# Patient Record
Sex: MALE | Race: WHITE | NOT HISPANIC OR LATINO | Employment: OTHER | URBAN - METROPOLITAN AREA
[De-identification: names, ages, dates, MRNs, and addresses within clinical notes are randomized per-mention and may not be internally consistent; named-entity substitution may affect disease eponyms.]

---

## 2017-01-17 ENCOUNTER — ALLSCRIPTS OFFICE VISIT (OUTPATIENT)
Dept: OTHER | Facility: OTHER | Age: 82
End: 2017-01-17

## 2017-02-13 ENCOUNTER — APPOINTMENT (OUTPATIENT)
Dept: LAB | Facility: MEDICAL CENTER | Age: 82
End: 2017-02-13
Payer: MEDICARE

## 2017-02-13 ENCOUNTER — TRANSCRIBE ORDERS (OUTPATIENT)
Dept: ADMINISTRATIVE | Facility: HOSPITAL | Age: 82
End: 2017-02-13

## 2017-02-13 DIAGNOSIS — E03.9 HYPOTHYROIDISM: ICD-10-CM

## 2017-02-13 LAB — TSH SERPL DL<=0.05 MIU/L-ACNC: 3.86 UIU/ML (ref 0.36–3.74)

## 2017-02-13 PROCEDURE — 84443 ASSAY THYROID STIM HORMONE: CPT

## 2017-02-13 PROCEDURE — 36415 COLL VENOUS BLD VENIPUNCTURE: CPT

## 2017-02-20 ENCOUNTER — ALLSCRIPTS OFFICE VISIT (OUTPATIENT)
Dept: OTHER | Facility: OTHER | Age: 82
End: 2017-02-20

## 2017-02-20 DIAGNOSIS — E11.9 TYPE 2 DIABETES MELLITUS WITHOUT COMPLICATIONS (HCC): ICD-10-CM

## 2017-02-20 DIAGNOSIS — E03.9 HYPOTHYROIDISM: ICD-10-CM

## 2017-02-20 DIAGNOSIS — I10 ESSENTIAL (PRIMARY) HYPERTENSION: ICD-10-CM

## 2017-02-20 DIAGNOSIS — E78.5 HYPERLIPIDEMIA: ICD-10-CM

## 2017-02-20 DIAGNOSIS — M72.2 PLANTAR FASCIAL FIBROMATOSIS: ICD-10-CM

## 2017-02-20 LAB
GLUCOSE SERPL-MCNC: 131 MG/DL
HBA1C MFR BLD HPLC: 5.6 %

## 2017-03-06 ENCOUNTER — GENERIC CONVERSION - ENCOUNTER (OUTPATIENT)
Dept: OTHER | Facility: OTHER | Age: 82
End: 2017-03-06

## 2017-03-16 ENCOUNTER — ALLSCRIPTS OFFICE VISIT (OUTPATIENT)
Dept: OTHER | Facility: OTHER | Age: 82
End: 2017-03-16

## 2017-04-12 ENCOUNTER — GENERIC CONVERSION - ENCOUNTER (OUTPATIENT)
Dept: OTHER | Facility: OTHER | Age: 82
End: 2017-04-12

## 2017-05-12 ENCOUNTER — TRANSCRIBE ORDERS (OUTPATIENT)
Dept: ADMINISTRATIVE | Facility: HOSPITAL | Age: 82
End: 2017-05-12

## 2017-05-12 ENCOUNTER — APPOINTMENT (OUTPATIENT)
Dept: LAB | Facility: MEDICAL CENTER | Age: 82
End: 2017-05-12
Payer: MEDICARE

## 2017-05-12 DIAGNOSIS — E11.9 TYPE 2 DIABETES MELLITUS WITHOUT COMPLICATIONS (HCC): ICD-10-CM

## 2017-05-12 DIAGNOSIS — E03.9 HYPOTHYROIDISM: ICD-10-CM

## 2017-05-12 DIAGNOSIS — I10 ESSENTIAL (PRIMARY) HYPERTENSION: ICD-10-CM

## 2017-05-12 DIAGNOSIS — E78.5 HYPERLIPIDEMIA: ICD-10-CM

## 2017-05-12 LAB
ALBUMIN SERPL BCP-MCNC: 4.1 G/DL (ref 3.5–5)
ALP SERPL-CCNC: 75 U/L (ref 46–116)
ALT SERPL W P-5'-P-CCNC: 27 U/L (ref 12–78)
ANION GAP SERPL CALCULATED.3IONS-SCNC: 6 MMOL/L (ref 4–13)
AST SERPL W P-5'-P-CCNC: 17 U/L (ref 5–45)
BASOPHILS # BLD AUTO: 0.05 THOUSANDS/ΜL (ref 0–0.1)
BASOPHILS NFR BLD AUTO: 1 % (ref 0–1)
BILIRUB SERPL-MCNC: 0.54 MG/DL (ref 0.2–1)
BUN SERPL-MCNC: 34 MG/DL (ref 5–25)
CALCIUM SERPL-MCNC: 8.5 MG/DL (ref 8.3–10.1)
CHLORIDE SERPL-SCNC: 104 MMOL/L (ref 100–108)
CHOLEST SERPL-MCNC: 110 MG/DL (ref 50–200)
CO2 SERPL-SCNC: 28 MMOL/L (ref 21–32)
CREAT SERPL-MCNC: 1.38 MG/DL (ref 0.6–1.3)
EOSINOPHIL # BLD AUTO: 0.2 THOUSAND/ΜL (ref 0–0.61)
EOSINOPHIL NFR BLD AUTO: 3 % (ref 0–6)
ERYTHROCYTE [DISTWIDTH] IN BLOOD BY AUTOMATED COUNT: 15.4 % (ref 11.6–15.1)
EST. AVERAGE GLUCOSE BLD GHB EST-MCNC: 114 MG/DL
GFR SERPL CREATININE-BSD FRML MDRD: 49.5 ML/MIN/1.73SQ M
GLUCOSE P FAST SERPL-MCNC: 104 MG/DL (ref 65–99)
HBA1C MFR BLD: 5.6 % (ref 4.2–6.3)
HCT VFR BLD AUTO: 32.1 % (ref 36.5–49.3)
HDLC SERPL-MCNC: 35 MG/DL (ref 40–60)
HGB BLD-MCNC: 9.9 G/DL (ref 12–17)
LDLC SERPL CALC-MCNC: 37 MG/DL (ref 0–100)
LYMPHOCYTES # BLD AUTO: 2.02 THOUSANDS/ΜL (ref 0.6–4.47)
LYMPHOCYTES NFR BLD AUTO: 28 % (ref 14–44)
MCH RBC QN AUTO: 19.8 PG (ref 26.8–34.3)
MCHC RBC AUTO-ENTMCNC: 30.8 G/DL (ref 31.4–37.4)
MCV RBC AUTO: 64 FL (ref 82–98)
MONOCYTES # BLD AUTO: 0.55 THOUSAND/ΜL (ref 0.17–1.22)
MONOCYTES NFR BLD AUTO: 8 % (ref 4–12)
NEUTROPHILS # BLD AUTO: 4.28 THOUSANDS/ΜL (ref 1.85–7.62)
NEUTS SEG NFR BLD AUTO: 60 % (ref 43–75)
NRBC BLD AUTO-RTO: 0 /100 WBCS
PLATELET # BLD AUTO: 195 THOUSANDS/UL (ref 149–390)
POTASSIUM SERPL-SCNC: 4.2 MMOL/L (ref 3.5–5.3)
PROT SERPL-MCNC: 7.3 G/DL (ref 6.4–8.2)
RBC # BLD AUTO: 4.99 MILLION/UL (ref 3.88–5.62)
SODIUM SERPL-SCNC: 138 MMOL/L (ref 136–145)
T4 FREE SERPL-MCNC: 1.28 NG/DL (ref 0.76–1.46)
TRIGL SERPL-MCNC: 189 MG/DL
TSH SERPL DL<=0.05 MIU/L-ACNC: 4.11 UIU/ML (ref 0.36–3.74)
WBC # BLD AUTO: 7.12 THOUSAND/UL (ref 4.31–10.16)

## 2017-05-12 PROCEDURE — 80053 COMPREHEN METABOLIC PANEL: CPT

## 2017-05-12 PROCEDURE — 85025 COMPLETE CBC W/AUTO DIFF WBC: CPT

## 2017-05-12 PROCEDURE — 80061 LIPID PANEL: CPT

## 2017-05-12 PROCEDURE — 83036 HEMOGLOBIN GLYCOSYLATED A1C: CPT

## 2017-05-12 PROCEDURE — 84443 ASSAY THYROID STIM HORMONE: CPT

## 2017-05-12 PROCEDURE — 36415 COLL VENOUS BLD VENIPUNCTURE: CPT

## 2017-05-12 PROCEDURE — 84439 ASSAY OF FREE THYROXINE: CPT

## 2017-05-15 ENCOUNTER — TRANSCRIBE ORDERS (OUTPATIENT)
Dept: ADMINISTRATIVE | Facility: HOSPITAL | Age: 82
End: 2017-05-15

## 2017-05-15 ENCOUNTER — GENERIC CONVERSION - ENCOUNTER (OUTPATIENT)
Dept: OTHER | Facility: OTHER | Age: 82
End: 2017-05-15

## 2017-05-15 ENCOUNTER — APPOINTMENT (OUTPATIENT)
Dept: LAB | Facility: MEDICAL CENTER | Age: 82
End: 2017-05-15
Payer: MEDICARE

## 2017-05-15 DIAGNOSIS — K51.50 LEFT SIDED ULCERATIVE (CHRONIC) COLITIS (HCC): ICD-10-CM

## 2017-05-15 DIAGNOSIS — K51.50 LEFT SIDED ULCERATIVE (CHRONIC) COLITIS (HCC): Primary | ICD-10-CM

## 2017-05-15 LAB — CRP SERPL QL: <3 MG/L

## 2017-05-15 PROCEDURE — 36415 COLL VENOUS BLD VENIPUNCTURE: CPT

## 2017-05-15 PROCEDURE — 86140 C-REACTIVE PROTEIN: CPT

## 2017-05-19 ENCOUNTER — ALLSCRIPTS OFFICE VISIT (OUTPATIENT)
Dept: OTHER | Facility: OTHER | Age: 82
End: 2017-05-19

## 2017-05-19 DIAGNOSIS — I25.10 ATHEROSCLEROTIC HEART DISEASE OF NATIVE CORONARY ARTERY WITHOUT ANGINA PECTORIS: ICD-10-CM

## 2017-05-19 DIAGNOSIS — I73.9 PERIPHERAL VASCULAR DISEASE (HCC): ICD-10-CM

## 2017-05-19 DIAGNOSIS — E78.5 HYPERLIPIDEMIA: ICD-10-CM

## 2017-05-19 DIAGNOSIS — E03.9 HYPOTHYROIDISM: ICD-10-CM

## 2017-05-19 DIAGNOSIS — I10 ESSENTIAL (PRIMARY) HYPERTENSION: ICD-10-CM

## 2017-05-19 DIAGNOSIS — K51.90 ULCERATIVE COLITIS WITHOUT COMPLICATIONS (HCC): ICD-10-CM

## 2017-05-19 DIAGNOSIS — E11.9 TYPE 2 DIABETES MELLITUS WITHOUT COMPLICATIONS (HCC): ICD-10-CM

## 2017-06-07 ENCOUNTER — GENERIC CONVERSION - ENCOUNTER (OUTPATIENT)
Dept: OTHER | Facility: OTHER | Age: 82
End: 2017-06-07

## 2017-06-13 ENCOUNTER — GENERIC CONVERSION - ENCOUNTER (OUTPATIENT)
Dept: OTHER | Facility: OTHER | Age: 82
End: 2017-06-13

## 2017-08-02 ENCOUNTER — TRANSCRIBE ORDERS (OUTPATIENT)
Dept: ADMINISTRATIVE | Facility: HOSPITAL | Age: 82
End: 2017-08-02

## 2017-08-02 ENCOUNTER — APPOINTMENT (OUTPATIENT)
Dept: LAB | Facility: MEDICAL CENTER | Age: 82
End: 2017-08-02
Payer: MEDICARE

## 2017-08-02 DIAGNOSIS — K51.90 ULCERATIVE COLITIS WITHOUT COMPLICATIONS (HCC): ICD-10-CM

## 2017-08-02 DIAGNOSIS — I25.10 ATHEROSCLEROTIC HEART DISEASE OF NATIVE CORONARY ARTERY WITHOUT ANGINA PECTORIS: ICD-10-CM

## 2017-08-02 DIAGNOSIS — E03.9 HYPOTHYROIDISM: ICD-10-CM

## 2017-08-02 DIAGNOSIS — I73.9 PERIPHERAL VASCULAR DISEASE (HCC): ICD-10-CM

## 2017-08-02 DIAGNOSIS — I10 ESSENTIAL (PRIMARY) HYPERTENSION: ICD-10-CM

## 2017-08-02 DIAGNOSIS — E78.5 HYPERLIPIDEMIA: ICD-10-CM

## 2017-08-02 DIAGNOSIS — E11.9 TYPE 2 DIABETES MELLITUS WITHOUT COMPLICATIONS (HCC): ICD-10-CM

## 2017-08-02 LAB
BASOPHILS # BLD AUTO: 0.04 THOUSANDS/ΜL (ref 0–0.1)
BASOPHILS NFR BLD AUTO: 1 % (ref 0–1)
EOSINOPHIL # BLD AUTO: 0.17 THOUSAND/ΜL (ref 0–0.61)
EOSINOPHIL NFR BLD AUTO: 2 % (ref 0–6)
ERYTHROCYTE [DISTWIDTH] IN BLOOD BY AUTOMATED COUNT: 15.9 % (ref 11.6–15.1)
FERRITIN SERPL-MCNC: 108 NG/ML (ref 8–388)
HCT VFR BLD AUTO: 29.7 % (ref 36.5–49.3)
HGB BLD-MCNC: 9.1 G/DL (ref 12–17)
IRON SATN MFR SERPL: 29 %
IRON SERPL-MCNC: 72 UG/DL (ref 65–175)
LYMPHOCYTES # BLD AUTO: 2.09 THOUSANDS/ΜL (ref 0.6–4.47)
LYMPHOCYTES NFR BLD AUTO: 25 % (ref 14–44)
MCH RBC QN AUTO: 19.6 PG (ref 26.8–34.3)
MCHC RBC AUTO-ENTMCNC: 30.6 G/DL (ref 31.4–37.4)
MCV RBC AUTO: 64 FL (ref 82–98)
MONOCYTES # BLD AUTO: 0.74 THOUSAND/ΜL (ref 0.17–1.22)
MONOCYTES NFR BLD AUTO: 9 % (ref 4–12)
NEUTROPHILS # BLD AUTO: 5.38 THOUSANDS/ΜL (ref 1.85–7.62)
NEUTS SEG NFR BLD AUTO: 63 % (ref 43–75)
NRBC BLD AUTO-RTO: 0 /100 WBCS
PLATELET # BLD AUTO: 201 THOUSANDS/UL (ref 149–390)
RBC # BLD AUTO: 4.64 MILLION/UL (ref 3.88–5.62)
TIBC SERPL-MCNC: 252 UG/DL (ref 250–450)
TSH SERPL DL<=0.05 MIU/L-ACNC: 3.24 UIU/ML (ref 0.36–3.74)
WBC # BLD AUTO: 8.47 THOUSAND/UL (ref 4.31–10.16)

## 2017-08-02 PROCEDURE — 83540 ASSAY OF IRON: CPT

## 2017-08-02 PROCEDURE — 84443 ASSAY THYROID STIM HORMONE: CPT

## 2017-08-02 PROCEDURE — 36415 COLL VENOUS BLD VENIPUNCTURE: CPT

## 2017-08-02 PROCEDURE — 83550 IRON BINDING TEST: CPT

## 2017-08-02 PROCEDURE — 85025 COMPLETE CBC W/AUTO DIFF WBC: CPT

## 2017-08-02 PROCEDURE — 82728 ASSAY OF FERRITIN: CPT

## 2017-08-08 ENCOUNTER — GENERIC CONVERSION - ENCOUNTER (OUTPATIENT)
Dept: OTHER | Facility: OTHER | Age: 82
End: 2017-08-08

## 2017-08-09 ENCOUNTER — ALLSCRIPTS OFFICE VISIT (OUTPATIENT)
Dept: OTHER | Facility: OTHER | Age: 82
End: 2017-08-09

## 2017-09-05 ENCOUNTER — GENERIC CONVERSION - ENCOUNTER (OUTPATIENT)
Dept: OTHER | Facility: OTHER | Age: 82
End: 2017-09-05

## 2017-10-23 ENCOUNTER — ALLSCRIPTS OFFICE VISIT (OUTPATIENT)
Dept: OTHER | Facility: OTHER | Age: 82
End: 2017-10-23

## 2017-10-24 NOTE — PROGRESS NOTES
Assessment  1  Chest pain (786 50) (R07 9)   2  DMII (diabetes mellitus, type 2) (250 00) (E11 9)   3  Atherosclerotic heart disease of native coronary artery without angina pectoris (414 01)   (I25 10)   4  Hypertension (401 9) (I10)    Plan  Atherosclerotic heart disease of native coronary artery without angina pectoris, Chest  pain, DMII (diabetes mellitus, type 2), Hypertension    · EKG/ECG- POC; Status:Active - Perform Order; Requested [id number];   Chest pain    · Naproxen 500 MG Oral Tablet; Take 1 tablet twice daily as needed   · Follow Up if Not Better Evaluation and Treatment  Follow-up  Status: Complete  Done:  57LAE5281 02:32PM    Discussion/Summary    EKG done showing normal sinus rhythm with no acute changes  Patient will try naproxen twice daily with food for musculoskeletal chest pain  Chief Complaint  Patient presents today stating he has had tightness in the left side of his chest that goes right through to his back since Saturday morning  Patient states he was at a wedding with loud music on Saturday  He feels tense and does not know if his chest discomfort is due to anxiety  He has questions and concerns if he is in good enough health to be driving to [facility] [employer] tomorrow  No other concerns  History of Present Illness  HPI: Patient is here with left-sided chest pain persistently there since Saturday IE 2 days ago  Patient is chest pain with movement and taking a deep breath  No fevers or significant sputum production  This started after being at a wedding with loud music  No vomiting or diaphoresis or radiation to the arms  No radiation to the neck  No medications use  Review of Systems    Constitutional: no fever or chills, feels well, no tiredness, no recent weight loss or weight gain  ENT: no complaints of earache, no loss of hearing, no nosebleeds or nasal discharge, no sore throat or hoarseness  Cardiovascular: as noted in HPI     Respiratory: no complaints of shortness of breath, no wheezing or cough, no dyspnea on exertion, no orthopnea or PND  Gastrointestinal: no complaints of abdominal pain, no constipation, no nausea or vomiting, no diarrhea or bloody stools  Genitourinary: no complaints of dysuria or incontinence, no hesitancy, no nocturia, no genital lesion, no inadequacy of penile erection  Musculoskeletal: no complaints of arthralgia, no myalgia, no joint swelling or stiffness, no limb pain or swelling  Integumentary: no complaints of skin rash or lesion, no itching or dry skin, no skin wounds  Neurological: no complaints of headache, no confusion, no numbness or tingling, no dizziness or fainting  Active Problems  1  Acute gastritis (535 00) (K29 00)   2  Anemia (285 9) (D64 9)   3  Anxiety (300 00) (F41 9)   4  Atherosclerotic heart disease of native coronary artery without angina pectoris (414 01)   (I25 10)   5  Dehydration (276 51) (E86 0)   6  Depression screening (V79 0) (Z13 89)   7  Dermatitis (692 9) (L30 9)   8  DMII (diabetes mellitus, type 2) (250 00) (E11 9)   9  Dysphagia (787 20) (R13 10)   10  Dysuria (788 1) (R30 0)   11  Elevated prostate specific antigen (PSA) (790 93) (R97 20)   12  Hyperlipidemia (272 4) (E78 5)   13  Hypertension (401 9) (I10)   14  Hypothyroidism (244 9) (E03 9)   15  Need for immunization against influenza (V04 81) (Z23)   16  Oral thrush (112 0) (B37 0)   17  Other thalassemias (282 49) (D56 8)   18  PAD (peripheral artery disease) (443 9) (I73 9)   19  Plantar fasciitis, left (728 71) (M72 2)   20  Screening for genitourinary condition (V81 6) (Z13 89)   21  Screening for neurological condition (V80 09) (Z13 89)   22  Tick bite (919 4,E906 4) (W57 XXXA)   23  Ulcerative colitis without complications (868 0) (L40 30)   24  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)   25  Xerostomia (527 7) (R68 2)    Past Medical History  1  Acute maxillary sinusitis (461 0) (J01 00)   2  Cough (786 2) (R05)   3   History of acute pharyngitis (V12 69) (Z87 09)  Active Problems And Past Medical History Reviewed: The active problems and past medical history were reviewed and updated today  Family History  Mother    1  No pertinent family history    Social History   · Being A Social Drinker   · Former smoker (V15 82) (B54 401)   · No drug use   · Tobacco non-user (V49 89) (Z78 9)    Surgical History  1  History of Complete Colonoscopy   2  History of Needle Biopsy Of Prostate    Current Meds   1  Allegra Allergy 180 MG Oral Tablet; Therapy: (Recorded:75Zpr6373) to Recorded   2  AmLODIPine Besylate 5 MG Oral Tablet; Therapy: 41SKX5197 to (Last Rx:68Dsc6648)  Requested for: 18MVQ8153 Ordered   3  Atorvastatin Calcium 20 MG Oral Tablet; TAKE 1 TABLET DAILY AS DIRECTED; Therapy: 14KZN9465 to 73 773 640); Last Rx:23Oct2012 Ordered   4  Centrum Silver Oral Tablet; Therapy: 79UZG4465 to Recorded   5  Fish Oil 1000 MG Oral Capsule; Therapy: 29AEJ8434 to Recorded   6  Folic Acid 1 MG Oral Tablet; TAKE 1 TABLET DAILY; Therapy: 13DPA9833 to (Evaluate:11Dqx6850); Last Rx:35Wot3155 Ordered   7  Levothyroxine Sodium 25 MCG Oral Tablet; TAKE 1 TABLET DAILY; Therapy: 70BUJ6885 to (Evaluate:80Hjr0859)  Requested for: 57IXH9014; Last   Rx:32Ygm8260 Ordered   8  Metoprolol Tartrate 50 MG Oral Tablet; TAKE 1 TABLET TWICE DAILY; Therapy: 54MSX4016 to (Evaluate:33Sxl5761) Recorded   9  Montelukast Sodium 10 MG Oral Tablet; Therapy: 44GQG8856 to Recorded   10  Potassium 99 MG Oral Tablet; Therapy: 01RTE2001 to Recorded   11  Saw Palmetto 160 MG Oral Tablet; Therapy: (Recorded:03Ahq8123) to Recorded   12  SulfaSALAzine 500 MG Oral Tablet; TAKE 1 TABLET 4 TIMES DAILY; Therapy: 41JHK4587 to (Evaluate:87Vci5699); Last Rx:23Oct2012 Ordered   13  Valsartan-Hydrochlorothiazide 160-25 MG Oral Tablet; take 1 tablet by mouth once    daily;     Therapy: 78QEZ4014 to (YZJBPOFS:34MSC5449)  Requested for: 56ZFM7613; Last    Yumi Talha Ordered   14  Vitamin D3 1000 UNIT Oral Tablet; Therapy: (Recorded:68Zrt7894) to Recorded   15  Zinc 50 MG Oral Tablet; Therapy: 95WIL3916 to Recorded    The medication list was reviewed and updated today  Allergies  1  Atacand TABS   2  Prinivil TABS    Vitals   Recorded: 23NUE1269 01:58PM   Temperature 98 4 F, Tympanic   Systolic 568, LUE, Sitting   Diastolic 68, LUE, Sitting   Height 5 ft 9 in   Weight 175 lb    BMI Calculated 25 84   BSA Calculated 1 95     Physical Exam    Constitutional   General appearance: No acute distress, well appearing and well nourished  Eyes   Conjunctiva and lids: No swelling, erythema, or discharge  Pupils and irises: Equal, round and reactive to light  Ears, Nose, Mouth, and Throat   External inspection of ears and nose: Normal     Otoscopic examination: Tympanic membrance translucent with normal light reflex  Canals patent without erythema  Nasal mucosa, septum, and turbinates: Normal without edema or erythema  Oropharynx: Normal with no erythema, edema, exudate or lesions  Pulmonary   Respiratory effort: No increased work of breathing or signs of respiratory distress  Auscultation of lungs: Clear to auscultation, equal breath sounds bilaterally, no wheezes, no rales, no rhonci  Cardiovascular   Palpation of heart: Normal PMI, no thrills  Auscultation of heart: Normal rate and rhythm, normal S1 and S2, without murmurs  Examination of extremities for edema and/or varicosities: Normal     Abdomen   Abdomen: Non-tender, no masses  Liver and spleen: No hepatomegaly or splenomegaly  Lymphatic   Palpation of lymph nodes in neck: No lymphadenopathy  Musculoskeletal   Gait and station: Normal     Digits and nails: Normal without clubbing or cyanosis  Inspection/palpation of joints, bones, and muscles: Abnormal  -- Anterior chest wall pain with palpation  Skin   Skin and subcutaneous tissue: Normal without rashes or lesions  Neurologic   Cranial nerves: Cranial nerves 2-12 intact  Reflexes: 2+ and symmetric  Sensation: No sensory loss  Psychiatric   Orientation to person, place and time: Normal     Mood and affect: Normal          Results/Data  A 12 lead ECG was performed and was normal -- No acute ischemia  Rhythm and rate: normal sinus rhythm  P-waves: the P wave is normal    QRS: the QRS is normal    ST segment: the ST segments are normal    Comparison to prior ECGs:  no prior ECGs were available for comparison        Future Appointments    Date/Time Provider Specialty Site   11/09/2017 08:00 AM Claude Fowler DO Family Medicine  [facility]     Signatures   Electronically signed by : Chris Concepcion DO; Oct 23 2017  2:32PM EST                       (Author)

## 2017-11-07 ENCOUNTER — ALLSCRIPTS OFFICE VISIT (OUTPATIENT)
Dept: OTHER | Facility: OTHER | Age: 82
End: 2017-11-07

## 2017-11-07 DIAGNOSIS — D64.9 ANEMIA: ICD-10-CM

## 2017-11-07 DIAGNOSIS — I10 ESSENTIAL (PRIMARY) HYPERTENSION: ICD-10-CM

## 2017-11-07 DIAGNOSIS — E03.9 HYPOTHYROIDISM: ICD-10-CM

## 2017-11-07 DIAGNOSIS — E11.9 TYPE 2 DIABETES MELLITUS WITHOUT COMPLICATIONS (HCC): ICD-10-CM

## 2017-11-07 DIAGNOSIS — I25.10 ATHEROSCLEROTIC HEART DISEASE OF NATIVE CORONARY ARTERY WITHOUT ANGINA PECTORIS: ICD-10-CM

## 2017-11-07 DIAGNOSIS — E78.5 HYPERLIPIDEMIA: ICD-10-CM

## 2017-11-07 LAB — GLUCOSE SERPL-MCNC: 129 MG/DL

## 2018-01-13 VITALS — WEIGHT: 184.25 LBS | BODY MASS INDEX: 27.29 KG/M2 | HEIGHT: 69 IN

## 2018-01-13 VITALS
TEMPERATURE: 98.4 F | SYSTOLIC BLOOD PRESSURE: 140 MMHG | BODY MASS INDEX: 25.92 KG/M2 | WEIGHT: 175 LBS | DIASTOLIC BLOOD PRESSURE: 68 MMHG | HEIGHT: 69 IN

## 2018-01-13 VITALS
SYSTOLIC BLOOD PRESSURE: 130 MMHG | BODY MASS INDEX: 26.38 KG/M2 | WEIGHT: 178.13 LBS | HEIGHT: 69 IN | DIASTOLIC BLOOD PRESSURE: 70 MMHG

## 2018-01-13 NOTE — RESULT NOTES
Message   The patient start on levothyroxine 50 Âµg one daily dispense #90     Verified Results  (1) CBC/PLT/DIFF 19Oct2016 06:48AM Don Zambrano   TW Order Number: [id number]     Test Name Result Flag Reference   WBC COUNT 9 68 Thousand/uL  4 31-10 16   RBC COUNT 4 80 Million/uL  3 88-5 62   HEMOGLOBIN 9 5 g/dL L 12 0-17 0   HEMATOCRIT 30 7 % L 36 5-49 3   MCV 64 fL L 82-98   MCH 19 8 pg L 26 8-34 3   MCHC 30 9 g/dL L 31 4-37 4   RDW 16 4 % H 11 6-15 1   PLATELET COUNT 636 Thousands/uL  149-390   nRBC AUTOMATED 0 /100 WBCs     NEUTROPHILS RELATIVE PERCENT 65 %  43-75   LYMPHOCYTES RELATIVE PERCENT 23 %  14-44   MONOCYTES RELATIVE PERCENT 9 %  4-12   EOSINOPHILS RELATIVE PERCENT 2 %  0-6   BASOPHILS RELATIVE PERCENT 1 %  0-1   NEUTROPHILS ABSOLUTE COUNT 6 24 Thousands/?L  1 85-7 62   LYMPHOCYTES ABSOLUTE COUNT 2 25 Thousands/?L  0 60-4 47   MONOCYTES ABSOLUTE COUNT 0 86 Thousand/?L  0 17-1 22   EOSINOPHILS ABSOLUTE COUNT 0 23 Thousand/?L  0 00-0 61   BASOPHILS ABSOLUTE COUNT 0 05 Thousands/?L  0 00-0 10   - Patient Instructions: This bloodwork is non-fasting  Please drink two glasses of water morning of bloodwork  - Patient Instructions: This bloodwork is non-fasting  Please drink two glasses of water morning of bloodwork

## 2018-01-14 VITALS
DIASTOLIC BLOOD PRESSURE: 70 MMHG | BODY MASS INDEX: 25.92 KG/M2 | SYSTOLIC BLOOD PRESSURE: 120 MMHG | WEIGHT: 175 LBS | TEMPERATURE: 96.3 F | HEIGHT: 69 IN

## 2018-01-14 VITALS
HEIGHT: 69 IN | BODY MASS INDEX: 26.99 KG/M2 | DIASTOLIC BLOOD PRESSURE: 60 MMHG | WEIGHT: 182.25 LBS | SYSTOLIC BLOOD PRESSURE: 140 MMHG

## 2018-01-14 VITALS
DIASTOLIC BLOOD PRESSURE: 60 MMHG | WEIGHT: 175 LBS | BODY MASS INDEX: 25.92 KG/M2 | SYSTOLIC BLOOD PRESSURE: 120 MMHG | TEMPERATURE: 96.6 F | HEIGHT: 69 IN

## 2018-01-14 VITALS
DIASTOLIC BLOOD PRESSURE: 78 MMHG | HEIGHT: 69 IN | SYSTOLIC BLOOD PRESSURE: 138 MMHG | BODY MASS INDEX: 27.14 KG/M2 | WEIGHT: 183.25 LBS

## 2018-01-16 NOTE — RESULT NOTES
Verified Results  (1) CBC/PLT/DIFF 48IOM2382 07:33AM Rito Jovel    Order Number: [id number]     Test Name Result Flag Reference   WBC COUNT 7 12 Thousand/uL  4 31-10 16   RBC COUNT 4 99 Million/uL  3 88-5 62   HEMOGLOBIN 9 9 g/dL L 12 0-17 0   HEMATOCRIT 32 1 % L 36 5-49 3   MCV 64 fL L 82-98   MCH 19 8 pg L 26 8-34 3   MCHC 30 8 g/dL L 31 4-37 4   RDW 15 4 % H 11 6-15 1   PLATELET COUNT 396 Thousands/uL  149-390   nRBC AUTOMATED 0 /100 WBCs     NEUTROPHILS RELATIVE PERCENT 60 %  43-75   LYMPHOCYTES RELATIVE PERCENT 28 %  14-44   MONOCYTES RELATIVE PERCENT 8 %  4-12   EOSINOPHILS RELATIVE PERCENT 3 %  0-6   BASOPHILS RELATIVE PERCENT 1 %  0-1   NEUTROPHILS ABSOLUTE COUNT 4 28 Thousands/? ??L  1 85-7 62   LYMPHOCYTES ABSOLUTE COUNT 2 02 Thousands/? ??L  0 60-4 47   MONOCYTES ABSOLUTE COUNT 0 55 Thousand/? ??L  0 17-1 22   EOSINOPHILS ABSOLUTE COUNT 0 20 Thousand/? ??L  0 00-0 61   BASOPHILS ABSOLUTE COUNT 0 05 Thousands/? ??L  0 00-0 10   - Patient Instructions: This bloodwork is non-fasting  Please drink two glasses of water morning of bloodwork       (1) COMPREHENSIVE METABOLIC PANEL 82CRP5449 89:11ZD Rito Jovel    Order Number: [id number]     Test Name Result Flag Reference   SODIUM 138 mmol/L  136-145   POTASSIUM 4 2 mmol/L  3 5-5 3   CHLORIDE 104 mmol/L  100-108   CARBON DIOXIDE 28 mmol/L  21-32   ANION GAP (CALC) 6 mmol/L  4-13   BLOOD UREA NITROGEN 34 mg/dL H 5-25   CREATININE 1 38 mg/dL H 0 60-1 30   Standardized to IDMS reference method   CALCIUM 8 5 mg/dL  8 3-10 1   BILI, TOTAL 0 54 mg/dL  0 20-1 00   ALK PHOSPHATAS 75 U/L     ALT (SGPT) 27 U/L  12-78   AST(SGOT) 17 U/L  5-45   ALBUMIN 4 1 g/dL  3 5-5 0   TOTAL PROTEIN 7 3 g/dL  6 4-8 2   eGFR Non-African American 49 5 ml/min/1 73sq m     National Kidney Disease Education Program recommendations are as follows:  GFR calculation is accurate only with a steady state creatinine  Chronic Kidney disease less than 60 ml/min/1 73 sq  meters  Kidney failure less than 15 ml/min/1 73 sq  meters  GLUCOSE FASTING 104 mg/dL H 65-99     (1) HEMOGLOBIN A1C 25FZY3083 07:33AM Marcelina Delvalle Order Number: [id number]     Test Name Result Flag Reference   HEMOGLOBIN A1C 5 6 %  4 2-6 3   EST  AVG  GLUCOSE 114 mg/dl       (1) LIPID PANEL FASTING W DIRECT LDL REFLEX [id number] 07:33AM Marcelina Delvalle Order Number: [id number]     Test Name Result Flag Reference   CHOLESTEROL 110 mg/dL     LDL CHOLESTEROL CALCULATED 37 mg/dL  0-100   - Patient Instructions: This is a fasting blood test  Water, black tea or black coffee only after 9:00pm the night before test   Drink 2 glasses of water the morning of test       Triglyceride:         Normal              <150 mg/dl       Borderline High    150-199 mg/dl       High               200-499 mg/dl       Very High          >499 mg/dl  Cholesterol:         Desirable        <200 mg/dl      Borderline High  200-239 mg/dl      High             >239 mg/dl  HDL Cholesterol:        High    >59 mg/dL      Low     <41 mg/dL  LDL Cholesterol:        Optimal          <100 mg/dl        Near Optimal     100-129 mg/dl        Above Optimal          Borderline High   130-159 mg/dl          High              160-189 mg/dl          Very High        >189 mg/dl  LDL CALCULATED:    This screening LDL is a calculated result  It does not have the accuracy of the Direct Measured LDL in the monitoring of patients with hyperlipidemia and/or statin therapy  Direct Measure LDL (BFQ878) must be ordered separately in these patients  TRIGLYCERIDES 189 mg/dL H <=150   Specimen collection should occur prior to N-Acetylcysteine or Metamizole administration due to the potential for falsely depressed results  HDL,DIRECT 35 mg/dL L 40-60   Specimen collection should occur prior to Metamizole administration due to the potential for falsely depressed results       (1) TSH WITH FT4 REFLEX [id number] 07:33AM MARTÍN Smith [address], Belen Brand Order Number: [id number]     Test Name Result Flag Reference   TSH 4 110 uIU/mL H 0 358-3 740   Patients undergoing fluorescein dye angiography may retain small amounts of fluorescein in the body for 48-72 hours post procedure  Samples containing fluorescein can produce falsely depressed TSH values  If the patient had this procedure,a specimen should be resubmitted post fluorescein clearance     T4,FREE 1 28 ng/dL  0 76-1 46

## 2018-01-18 NOTE — RESULT NOTES
Verified Results  (1) TSH WITH FT4 REFLEX 02Aug2017 08:09AM Miroslava Huff Order Number: [id number]     Test Name Result Flag Reference   TSH 3 240 uIU/mL  0 358-3 740   Patients undergoing fluorescein dye angiography may retain small amounts of fluorescein in the body for 48-72 hours post procedure  Samples containing fluorescein can produce falsely depressed TSH values  If the patient had this procedure,a specimen should be resubmitted post fluorescein clearance  (1) CBC/PLT/DIFF 02Aug2017 08:09AM Miroslava Huff Order Number: [id number]     Test Name Result Flag Reference   WBC COUNT 8 47 Thousand/uL  4 31-10 16   RBC COUNT 4 64 Million/uL  3 88-5 62   HEMOGLOBIN 9 1 g/dL L 12 0-17 0   HEMATOCRIT 29 7 % L 36 5-49 3   MCV 64 fL L 82-98   MCH 19 6 pg L 26 8-34 3   MCHC 30 6 g/dL L 31 4-37 4   RDW 15 9 % H 11 6-15 1   PLATELET COUNT 804 Thousands/uL  149-390   nRBC AUTOMATED 0 /100 WBCs     NEUTROPHILS RELATIVE PERCENT 63 %  43-75   LYMPHOCYTES RELATIVE PERCENT 25 %  14-44   MONOCYTES RELATIVE PERCENT 9 %  4-12   EOSINOPHILS RELATIVE PERCENT 2 %  0-6   BASOPHILS RELATIVE PERCENT 1 %  0-1   NEUTROPHILS ABSOLUTE COUNT 5 38 Thousands/? ??L  1 85-7 62   LYMPHOCYTES ABSOLUTE COUNT 2 09 Thousands/? ??L  0 60-4 47   MONOCYTES ABSOLUTE COUNT 0 74 Thousand/? ??L  0 17-1 22   EOSINOPHILS ABSOLUTE COUNT 0 17 Thousand/? ??L  0 00-0 61   BASOPHILS ABSOLUTE COUNT 0 04 Thousands/? ??L  0 00-0 10     (1) FERRITIN 02Aug2017 08:09AM Miroslava Huff Order Number: [id number]     Test Name Result Flag Reference   FERRITIN 108 ng/mL  8-388     (1) IRON SATURATION %, TIBC 02Aug2017 08:09 Miroslava Huff Order Number: [id number]     Test Name Result Flag Reference   IRON SATURATION 29 %     TOTAL IRON BINDING CAPACITY 252 ug/dL  250-450   IRON 72 ug/dL     Patients treated with metal-binding drugs (ie  Deferoxamine) may have depressed iron values

## 2018-01-31 ENCOUNTER — TRANSCRIBE ORDERS (OUTPATIENT)
Dept: ADMINISTRATIVE | Facility: HOSPITAL | Age: 83
End: 2018-01-31

## 2018-01-31 ENCOUNTER — TELEPHONE (OUTPATIENT)
Dept: FAMILY MEDICINE CLINIC | Facility: CLINIC | Age: 83
End: 2018-01-31

## 2018-01-31 ENCOUNTER — APPOINTMENT (OUTPATIENT)
Dept: LAB | Facility: MEDICAL CENTER | Age: 83
End: 2018-01-31
Payer: MEDICARE

## 2018-01-31 DIAGNOSIS — I25.10 ATHEROSCLEROTIC HEART DISEASE OF NATIVE CORONARY ARTERY WITHOUT ANGINA PECTORIS: ICD-10-CM

## 2018-01-31 DIAGNOSIS — I10 ESSENTIAL (PRIMARY) HYPERTENSION: ICD-10-CM

## 2018-01-31 DIAGNOSIS — E78.5 HYPERLIPIDEMIA: ICD-10-CM

## 2018-01-31 DIAGNOSIS — D64.9 ANEMIA: ICD-10-CM

## 2018-01-31 DIAGNOSIS — E11.9 TYPE 2 DIABETES MELLITUS WITHOUT COMPLICATIONS (HCC): ICD-10-CM

## 2018-01-31 DIAGNOSIS — E03.9 HYPOTHYROIDISM: ICD-10-CM

## 2018-01-31 LAB
ALBUMIN SERPL BCP-MCNC: 4.3 G/DL (ref 3.5–5)
ALP SERPL-CCNC: 75 U/L (ref 46–116)
ALT SERPL W P-5'-P-CCNC: 24 U/L (ref 12–78)
ANION GAP SERPL CALCULATED.3IONS-SCNC: 7 MMOL/L (ref 4–13)
AST SERPL W P-5'-P-CCNC: 14 U/L (ref 5–45)
BASOPHILS # BLD AUTO: 0.04 THOUSANDS/ΜL (ref 0–0.1)
BASOPHILS NFR BLD AUTO: 1 % (ref 0–1)
BILIRUB SERPL-MCNC: 0.67 MG/DL (ref 0.2–1)
BUN SERPL-MCNC: 30 MG/DL (ref 5–25)
CALCIUM SERPL-MCNC: 8.7 MG/DL (ref 8.3–10.1)
CHLORIDE SERPL-SCNC: 104 MMOL/L (ref 100–108)
CHOLEST SERPL-MCNC: 109 MG/DL (ref 50–200)
CO2 SERPL-SCNC: 30 MMOL/L (ref 21–32)
CREAT SERPL-MCNC: 1.45 MG/DL (ref 0.6–1.3)
CREAT UR-MCNC: 104 MG/DL
EOSINOPHIL # BLD AUTO: 0.2 THOUSAND/ΜL (ref 0–0.61)
EOSINOPHIL NFR BLD AUTO: 2 % (ref 0–6)
ERYTHROCYTE [DISTWIDTH] IN BLOOD BY AUTOMATED COUNT: 15.8 % (ref 11.6–15.1)
EST. AVERAGE GLUCOSE BLD GHB EST-MCNC: 108 MG/DL
FERRITIN SERPL-MCNC: 120 NG/ML (ref 8–388)
GFR SERPL CREATININE-BSD FRML MDRD: 45 ML/MIN/1.73SQ M
GLUCOSE P FAST SERPL-MCNC: 117 MG/DL (ref 65–99)
HBA1C MFR BLD: 5.4 % (ref 4.2–6.3)
HCT VFR BLD AUTO: 31.9 % (ref 36.5–49.3)
HDLC SERPL-MCNC: 41 MG/DL (ref 40–60)
HGB BLD-MCNC: 9.7 G/DL (ref 12–17)
LDLC SERPL CALC-MCNC: 43 MG/DL (ref 0–100)
LYMPHOCYTES # BLD AUTO: 2.24 THOUSANDS/ΜL (ref 0.6–4.47)
LYMPHOCYTES NFR BLD AUTO: 27 % (ref 14–44)
MCH RBC QN AUTO: 19.7 PG (ref 26.8–34.3)
MCHC RBC AUTO-ENTMCNC: 30.4 G/DL (ref 31.4–37.4)
MCV RBC AUTO: 65 FL (ref 82–98)
MICROALBUMIN UR-MCNC: 31.4 MG/L (ref 0–20)
MICROALBUMIN/CREAT 24H UR: 30 MG/G CREATININE (ref 0–30)
MONOCYTES # BLD AUTO: 0.61 THOUSAND/ΜL (ref 0.17–1.22)
MONOCYTES NFR BLD AUTO: 7 % (ref 4–12)
NEUTROPHILS # BLD AUTO: 5.19 THOUSANDS/ΜL (ref 1.85–7.62)
NEUTS SEG NFR BLD AUTO: 63 % (ref 43–75)
NRBC BLD AUTO-RTO: 0 /100 WBCS
PLATELET # BLD AUTO: 217 THOUSANDS/UL (ref 149–390)
POTASSIUM SERPL-SCNC: 4.5 MMOL/L (ref 3.5–5.3)
PROT SERPL-MCNC: 7.7 G/DL (ref 6.4–8.2)
RBC # BLD AUTO: 4.92 MILLION/UL (ref 3.88–5.62)
SODIUM SERPL-SCNC: 141 MMOL/L (ref 136–145)
T4 FREE SERPL-MCNC: 1.22 NG/DL (ref 0.76–1.46)
TRIGL SERPL-MCNC: 127 MG/DL
TSH SERPL DL<=0.05 MIU/L-ACNC: 4.81 UIU/ML (ref 0.36–3.74)
WBC # BLD AUTO: 8.33 THOUSAND/UL (ref 4.31–10.16)

## 2018-01-31 PROCEDURE — 83036 HEMOGLOBIN GLYCOSYLATED A1C: CPT

## 2018-01-31 PROCEDURE — 84443 ASSAY THYROID STIM HORMONE: CPT

## 2018-01-31 PROCEDURE — 82043 UR ALBUMIN QUANTITATIVE: CPT

## 2018-01-31 PROCEDURE — 80053 COMPREHEN METABOLIC PANEL: CPT

## 2018-01-31 PROCEDURE — 82570 ASSAY OF URINE CREATININE: CPT

## 2018-01-31 PROCEDURE — 80061 LIPID PANEL: CPT

## 2018-01-31 PROCEDURE — 36415 COLL VENOUS BLD VENIPUNCTURE: CPT

## 2018-01-31 PROCEDURE — 85025 COMPLETE CBC W/AUTO DIFF WBC: CPT

## 2018-01-31 PROCEDURE — 82728 ASSAY OF FERRITIN: CPT

## 2018-01-31 PROCEDURE — 84439 ASSAY OF FREE THYROXINE: CPT

## 2018-02-01 ENCOUNTER — TELEPHONE (OUTPATIENT)
Dept: FAMILY MEDICINE CLINIC | Facility: CLINIC | Age: 83
End: 2018-02-01

## 2018-02-07 ENCOUNTER — OFFICE VISIT (OUTPATIENT)
Dept: FAMILY MEDICINE CLINIC | Facility: CLINIC | Age: 83
End: 2018-02-07
Payer: MEDICARE

## 2018-02-07 VITALS
BODY MASS INDEX: 26.39 KG/M2 | HEIGHT: 69 IN | DIASTOLIC BLOOD PRESSURE: 90 MMHG | WEIGHT: 178.2 LBS | SYSTOLIC BLOOD PRESSURE: 170 MMHG

## 2018-02-07 DIAGNOSIS — E06.3 HYPOTHYROIDISM DUE TO HASHIMOTO'S THYROIDITIS: ICD-10-CM

## 2018-02-07 DIAGNOSIS — E78.2 MIXED HYPERLIPIDEMIA: ICD-10-CM

## 2018-02-07 DIAGNOSIS — E03.8 HYPOTHYROIDISM DUE TO HASHIMOTO'S THYROIDITIS: ICD-10-CM

## 2018-02-07 DIAGNOSIS — D50.8 OTHER IRON DEFICIENCY ANEMIA: ICD-10-CM

## 2018-02-07 DIAGNOSIS — I25.10 ATHEROSCLEROSIS OF NATIVE CORONARY ARTERY OF NATIVE HEART WITHOUT ANGINA PECTORIS: ICD-10-CM

## 2018-02-07 DIAGNOSIS — E11.00 TYPE 2 DIABETES MELLITUS WITH HYPEROSMOLARITY WITHOUT COMA, UNSPECIFIED LONG TERM INSULIN USE STATUS: Primary | ICD-10-CM

## 2018-02-07 DIAGNOSIS — I10 ESSENTIAL HYPERTENSION: ICD-10-CM

## 2018-02-07 PROBLEM — D64.9 ANEMIA: Status: ACTIVE | Noted: 2017-08-09

## 2018-02-07 PROCEDURE — 99214 OFFICE O/P EST MOD 30 MIN: CPT | Performed by: FAMILY MEDICINE

## 2018-02-07 RX ORDER — SULFASALAZINE 500 MG/1
TABLET ORAL
Refills: 0 | COMMUNITY
Start: 2018-01-16

## 2018-02-07 RX ORDER — BACITRACIN 500 UNIT/G
OINTMENT (GRAM) TOPICAL
COMMUNITY

## 2018-02-07 RX ORDER — MULTIVIT WITH MINERALS/LUTEIN
TABLET ORAL
COMMUNITY
Start: 2015-05-14

## 2018-02-07 RX ORDER — VALSARTAN AND HYDROCHLOROTHIAZIDE 160; 25 MG/1; MG/1
1 TABLET ORAL DAILY
COMMUNITY
Start: 2013-10-01 | End: 2018-05-15 | Stop reason: SDUPTHER

## 2018-02-07 RX ORDER — FEXOFENADINE HCL 180 MG/1
TABLET ORAL
COMMUNITY

## 2018-02-07 RX ORDER — AMLODIPINE BESYLATE 5 MG/1
5 TABLET ORAL DAILY
Refills: 0 | COMMUNITY
Start: 2018-01-24 | End: 2019-05-15 | Stop reason: SDUPTHER

## 2018-02-07 RX ORDER — FOLIC ACID 1 MG/1
1 TABLET ORAL DAILY
COMMUNITY
Start: 2012-10-23

## 2018-02-07 RX ORDER — MONTELUKAST SODIUM 10 MG/1
TABLET ORAL
COMMUNITY
Start: 2014-05-07

## 2018-02-07 RX ORDER — MELATONIN: COMMUNITY

## 2018-02-07 RX ORDER — OMEGA-3 FATTY ACIDS CAP DELAYED RELEASE 1000 MG 1000 MG
CAPSULE DELAYED RELEASE ORAL
COMMUNITY
Start: 2015-05-14

## 2018-02-07 RX ORDER — ATORVASTATIN CALCIUM 20 MG/1
1 TABLET, FILM COATED ORAL DAILY
COMMUNITY
Start: 2012-10-23

## 2018-02-07 RX ORDER — ZINC GLUCONATE 50 MG
TABLET ORAL
COMMUNITY
Start: 2015-05-14

## 2018-02-07 RX ORDER — METOPROLOL TARTRATE 50 MG/1
1 TABLET, FILM COATED ORAL 2 TIMES DAILY
COMMUNITY
Start: 2014-05-20

## 2018-02-07 NOTE — PROGRESS NOTES
Assessment/Plan:    Patient will increase iron supplementation to daily  Cholesterol stable  Blood sugar stable  The patient will start B complex vitamin  Medications on automatic refill  Refills will be given as needed for all chronic disease states listed  No problem-specific Assessment & Plan notes found for this encounter  Diagnoses and all orders for this visit:    Type 2 diabetes mellitus with hyperosmolarity without coma, unspecified long term insulin use status ([facility] [address] )    Hypothyroidism due to Hashimoto's thyroiditis    Mixed hyperlipidemia    Essential hypertension    Atherosclerosis of native coronary artery of native heart without angina pectoris    Other orders  -     folic acid (FOLVITE) 1 mg tablet; Take 1 tablet by mouth daily  -     sulfaSALAzine (AZULFIDINE) 500 mg tablet;   -     valsartan-hydrochlorothiazide (DIOVAN-HCT) 160-25 MG per tablet; Take 1 tablet by mouth daily  -     amLODIPine (NORVASC) 5 mg tablet;   -     metoprolol tartrate (LOPRESSOR) 50 mg tablet; Take 1 tablet by mouth 2 (two) times a day  -     montelukast (SINGULAIR) 10 mg tablet; Take by mouth  -     atorvastatin (LIPITOR) 20 mg tablet; Take 1 tablet by mouth daily  -     Multiple Vitamins-Minerals (CENTRUM SILVER) tablet; Take by mouth  -     cholecalciferol (VITAMIN D3) 1,000 units tablet; Take by mouth  -     Saw Palmetto 160 MG CAPS; Take by mouth  -     Potassium 99 MG TABS; Take by mouth  -     zinc gluconate 50 mg tablet; Take by mouth  -     Omega-3 Fatty Acids (FISH OIL) 1000 MG CPDR; Take by mouth  -     fexofenadine (ALLEGRA) 180 MG tablet; Take by mouth          Subjective:      Patient ID: Anastasia Roman is a 80 y o  male  Patient is here to follow-up on diabetes hypertension hyperlipidemia hypothyroidism  Patient does have some back pain intermittently and does go to chiropractor which does seem to help  No chest pain or shortness of breath or change with urination or defecation   The patient is stable  Patient did go for laboratory studies  No numbness or tingling of the  Feet  All other review systems negative  The following portions of the patient's history were reviewed and updated as appropriate: allergies, current medications, past family history, past medical history, past social history, past surgical history and problem list     Review of Systems   Constitutional: Negative  HENT: Negative  Eyes: Negative  Respiratory: Negative  Cardiovascular: Negative  Gastrointestinal: Negative  Endocrine: Negative  Genitourinary: Negative  Musculoskeletal: Positive for back pain  Skin: Negative  Allergic/Immunologic: Negative  Neurological: Negative  Hematological: Negative  Psychiatric/Behavioral: Negative  Objective:     Physical Exam   Constitutional: He is oriented to person, place, and time  He appears well-developed and well-nourished  No distress  HENT:   Head: Normocephalic  Right Ear: External ear normal    Left Ear: External ear normal    Mouth/Throat: Oropharynx is clear and moist  No oropharyngeal exudate  Eyes: EOM are normal  Pupils are equal, round, and reactive to light  Right eye exhibits no discharge  Left eye exhibits no discharge  No scleral icterus  Neck: Normal range of motion  Neck supple  No thyromegaly present  Cardiovascular: Normal rate, regular rhythm, normal heart sounds and intact distal pulses  Exam reveals no gallop and no friction rub  Pulses are no weak pulses  No murmur heard  Pulses:       Dorsalis pedis pulses are 2+ on the right side, and 2+ on the left side  Posterior tibial pulses are 2+ on the right side, and 2+ on the left side  Pulmonary/Chest: Effort normal and breath sounds normal  No respiratory distress  He has no wheezes  He has no rales  He exhibits no tenderness  Abdominal: Soft  Bowel sounds are normal  He exhibits no distension  There is no tenderness   There is no rebound and no guarding  Musculoskeletal: Normal range of motion  He exhibits no edema or tenderness  Feet:   Right Foot:   Skin Integrity: Negative for ulcer, skin breakdown, erythema, warmth, callus or dry skin  Left Foot:   Skin Integrity: Negative for ulcer, skin breakdown, erythema, warmth, callus or dry skin  Lymphadenopathy:     He has no cervical adenopathy  Neurological: He is oriented to person, place, and time  No cranial nerve deficit  He exhibits normal muscle tone  Coordination normal    Skin: Skin is warm and dry  No rash noted  He is not diaphoretic  No erythema  No pallor  Psychiatric: He has a normal mood and affect  His behavior is normal  Judgment and thought content normal    Nursing note and vitals reviewed  Patient's shoes and socks removed  Right Foot/Ankle   Right Foot Inspection  Skin Exam: skin normal and skin intact no dry skin, no warmth, no callus, no erythema, no maceration, no abnormal color, no pre-ulcer, no ulcer and no callus                          Toe Exam: ROM and strength within normal limitsno swelling, no tenderness, erythema and  no right toe deformity    Vascular    The right DP pulse is 2+  The right PT pulse is 2+  Left Foot/Ankle  Left Foot Inspection  Skin Exam: skin normal and skin intactno dry skin, no warmth, no erythema, no maceration, normal color, no pre-ulcer, no ulcer and no callus                         Toe Exam: ROM and strength within normal limitsno swelling, no tenderness, no erythema and no left toe deformity                     Vascular    The left DP pulse is 2+  The left PT pulse is 2+  Assign Risk Category:  No deformity present; No loss of protective sensation;  No weak pulses       Risk: 0

## 2018-05-08 ENCOUNTER — OFFICE VISIT (OUTPATIENT)
Dept: FAMILY MEDICINE CLINIC | Facility: CLINIC | Age: 83
End: 2018-05-08
Payer: MEDICARE

## 2018-05-08 VITALS
TEMPERATURE: 97.6 F | WEIGHT: 179.6 LBS | SYSTOLIC BLOOD PRESSURE: 140 MMHG | HEIGHT: 69 IN | DIASTOLIC BLOOD PRESSURE: 66 MMHG | BODY MASS INDEX: 26.6 KG/M2

## 2018-05-08 DIAGNOSIS — I25.10 ATHEROSCLEROSIS OF NATIVE CORONARY ARTERY OF NATIVE HEART WITHOUT ANGINA PECTORIS: ICD-10-CM

## 2018-05-08 DIAGNOSIS — E11.00 TYPE 2 DIABETES MELLITUS WITH HYPEROSMOLARITY WITHOUT COMA, WITHOUT LONG-TERM CURRENT USE OF INSULIN (HCC): Primary | ICD-10-CM

## 2018-05-08 DIAGNOSIS — E78.2 MIXED HYPERLIPIDEMIA: ICD-10-CM

## 2018-05-08 DIAGNOSIS — E03.8 HYPOTHYROIDISM DUE TO HASHIMOTO'S THYROIDITIS: ICD-10-CM

## 2018-05-08 DIAGNOSIS — E06.3 HYPOTHYROIDISM DUE TO HASHIMOTO'S THYROIDITIS: ICD-10-CM

## 2018-05-08 DIAGNOSIS — I10 ESSENTIAL HYPERTENSION: ICD-10-CM

## 2018-05-08 PROCEDURE — 99214 OFFICE O/P EST MOD 30 MIN: CPT | Performed by: FAMILY MEDICINE

## 2018-05-08 RX ORDER — LEVOTHYROXINE SODIUM 0.03 MG/1
25 TABLET ORAL DAILY
Refills: 0 | COMMUNITY
Start: 2018-05-04 | End: 2018-11-01 | Stop reason: SDUPTHER

## 2018-05-08 NOTE — PROGRESS NOTES
Assessment/Plan:   diabetes, hypertension, hyperlipidemia and CAD all stable at this time  Patient have laboratory studies prior to next visit  Meds are on automatic refill  Continue with current regimen  Diagnoses and all orders for this visit:    Type 2 diabetes mellitus with hyperosmolarity without coma, without long-term current use of insulin (HCC)  -     Lipid panel; Future  -     Microalbumin / creatinine urine ratio  -     TSH, 3rd generation with T4 reflex; Future  -     CBC and differential; Future  -     Comprehensive metabolic panel; Future  -     HEMOGLOBIN A1C W/ EAG ESTIMATION; Future    Hypothyroidism due to Hashimoto's thyroiditis    Essential hypertension    Mixed hyperlipidemia    Atherosclerosis of native coronary artery of native heart without angina pectoris    Other orders  -     levothyroxine 25 mcg tablet; Take 25 mcg by mouth daily          Subjective:      Patient ID: Stephanie Oleary is a 80 y o  male  Patient is here to follow-up on diabetes hypertension hyperlipidemia CAD  Patient doing well at this time  No chest pain or shortness of breath  The patient with some constipation  Patient is using Metamucil and fruit with improvement  Normal urination  No numbness or tingling in the feet  No change in vision  Patient will be seeing ophthalmologist next month  All review systems negative        The following portions of the patient's history were reviewed and updated as appropriate: allergies, current medications, past family history, past medical history, past social history, past surgical history and problem list     Review of Systems   Constitutional: Negative  HENT: Negative  Eyes: Negative  Respiratory: Negative  Cardiovascular: Negative  Gastrointestinal: Negative  Endocrine: Negative  Genitourinary: Negative  Musculoskeletal: Negative  Skin: Negative  Allergic/Immunologic: Negative  Neurological: Negative      Hematological: Negative  Psychiatric/Behavioral: Negative  Objective:      /66 (BP Location: Left arm, Patient Position: Sitting, Cuff Size: Standard)   Temp 97 6 °F (36 4 °C) (Tympanic)   Ht 5' 8 5" (1 74 m)   Wt 81 5 kg (179 lb 9 6 oz)   BMI 26 91 kg/m²          Physical Exam   Constitutional: He is oriented to person, place, and time  He appears well-developed and well-nourished  No distress  HENT:   Head: Normocephalic  Right Ear: External ear normal    Left Ear: External ear normal    Mouth/Throat: Oropharynx is clear and moist  No oropharyngeal exudate  Eyes: EOM are normal  Pupils are equal, round, and reactive to light  Right eye exhibits no discharge  Left eye exhibits no discharge  No scleral icterus  Neck: Normal range of motion  Neck supple  No thyromegaly present  Cardiovascular: Normal rate, regular rhythm, normal heart sounds and intact distal pulses  Exam reveals no gallop and no friction rub  No murmur heard  Pulmonary/Chest: Effort normal and breath sounds normal  No respiratory distress  He has no wheezes  He has no rales  He exhibits no tenderness  Abdominal: Soft  Bowel sounds are normal  He exhibits no distension  There is no tenderness  There is no rebound and no guarding  Musculoskeletal: Normal range of motion  He exhibits no edema or tenderness  Lymphadenopathy:     He has no cervical adenopathy  Neurological: He is oriented to person, place, and time  No cranial nerve deficit  He exhibits normal muscle tone  Coordination normal    Skin: Skin is warm and dry  No rash noted  He is not diaphoretic  No erythema  No pallor  Psychiatric: He has a normal mood and affect  His behavior is normal  Judgment and thought content normal    Nursing note and vitals reviewed

## 2018-05-15 DIAGNOSIS — I10 ESSENTIAL HYPERTENSION: Primary | ICD-10-CM

## 2018-05-15 RX ORDER — VALSARTAN AND HYDROCHLOROTHIAZIDE 160; 25 MG/1; MG/1
TABLET ORAL
Qty: 30 TABLET | Refills: 3 | Status: SHIPPED | OUTPATIENT
Start: 2018-05-15 | End: 2018-08-14

## 2018-06-13 LAB
LEFT EYE DIABETIC RETINOPATHY: NORMAL
RIGHT EYE DIABETIC RETINOPATHY: NORMAL

## 2018-07-26 DIAGNOSIS — I10 ESSENTIAL HYPERTENSION: Primary | ICD-10-CM

## 2018-07-30 RX ORDER — OLMESARTAN MEDOXOMIL AND HYDROCHLOROTHIAZIDE 20/12.5 20; 12.5 MG/1; MG/1
1 TABLET ORAL DAILY
Qty: 30 TABLET | Refills: 2
Start: 2018-07-30 | End: 2018-11-06 | Stop reason: SDUPTHER

## 2018-08-07 ENCOUNTER — APPOINTMENT (OUTPATIENT)
Dept: LAB | Facility: MEDICAL CENTER | Age: 83
End: 2018-08-07
Payer: MEDICARE

## 2018-08-07 DIAGNOSIS — E11.00 TYPE 2 DIABETES MELLITUS WITH HYPEROSMOLARITY WITHOUT COMA, WITHOUT LONG-TERM CURRENT USE OF INSULIN (HCC): ICD-10-CM

## 2018-08-07 LAB
ALBUMIN SERPL BCP-MCNC: 4 G/DL (ref 3.5–5)
ALP SERPL-CCNC: 67 U/L (ref 46–116)
ALT SERPL W P-5'-P-CCNC: 22 U/L (ref 12–78)
ANION GAP SERPL CALCULATED.3IONS-SCNC: 6 MMOL/L (ref 4–13)
AST SERPL W P-5'-P-CCNC: 14 U/L (ref 5–45)
BASOPHILS # BLD AUTO: 0.05 THOUSANDS/ΜL (ref 0–0.1)
BASOPHILS NFR BLD AUTO: 1 % (ref 0–1)
BILIRUB SERPL-MCNC: 0.57 MG/DL (ref 0.2–1)
BUN SERPL-MCNC: 29 MG/DL (ref 5–25)
CALCIUM SERPL-MCNC: 8.6 MG/DL (ref 8.3–10.1)
CHLORIDE SERPL-SCNC: 107 MMOL/L (ref 100–108)
CHOLEST SERPL-MCNC: 103 MG/DL (ref 50–200)
CO2 SERPL-SCNC: 25 MMOL/L (ref 21–32)
CREAT SERPL-MCNC: 1.44 MG/DL (ref 0.6–1.3)
CREAT UR-MCNC: 57.4 MG/DL
EOSINOPHIL # BLD AUTO: 0.18 THOUSAND/ΜL (ref 0–0.61)
EOSINOPHIL NFR BLD AUTO: 2 % (ref 0–6)
ERYTHROCYTE [DISTWIDTH] IN BLOOD BY AUTOMATED COUNT: 16.2 % (ref 11.6–15.1)
EST. AVERAGE GLUCOSE BLD GHB EST-MCNC: 111 MG/DL
GFR SERPL CREATININE-BSD FRML MDRD: 45 ML/MIN/1.73SQ M
GLUCOSE P FAST SERPL-MCNC: 113 MG/DL (ref 65–99)
HBA1C MFR BLD: 5.5 % (ref 4.2–6.3)
HCT VFR BLD AUTO: 31.6 % (ref 36.5–49.3)
HDLC SERPL-MCNC: 35 MG/DL (ref 40–60)
HGB BLD-MCNC: 9.4 G/DL (ref 12–17)
IMM GRANULOCYTES # BLD AUTO: 0.04 THOUSAND/UL (ref 0–0.2)
IMM GRANULOCYTES NFR BLD AUTO: 1 % (ref 0–2)
LDLC SERPL CALC-MCNC: 33 MG/DL (ref 0–100)
LYMPHOCYTES # BLD AUTO: 2.03 THOUSANDS/ΜL (ref 0.6–4.47)
LYMPHOCYTES NFR BLD AUTO: 27 % (ref 14–44)
MCH RBC QN AUTO: 20.4 PG (ref 26.8–34.3)
MCHC RBC AUTO-ENTMCNC: 29.7 G/DL (ref 31.4–37.4)
MCV RBC AUTO: 69 FL (ref 82–98)
MICROALBUMIN UR-MCNC: 17.6 MG/L (ref 0–20)
MICROALBUMIN/CREAT 24H UR: 31 MG/G CREATININE (ref 0–30)
MONOCYTES # BLD AUTO: 0.57 THOUSAND/ΜL (ref 0.17–1.22)
MONOCYTES NFR BLD AUTO: 8 % (ref 4–12)
NEUTROPHILS # BLD AUTO: 4.75 THOUSANDS/ΜL (ref 1.85–7.62)
NEUTS SEG NFR BLD AUTO: 61 % (ref 43–75)
NONHDLC SERPL-MCNC: 68 MG/DL
NRBC BLD AUTO-RTO: 0 /100 WBCS
PLATELET # BLD AUTO: 203 THOUSANDS/UL (ref 149–390)
PMV BLD AUTO: 11.4 FL (ref 8.9–12.7)
POTASSIUM SERPL-SCNC: 4.3 MMOL/L (ref 3.5–5.3)
PROT SERPL-MCNC: 7.6 G/DL (ref 6.4–8.2)
RBC # BLD AUTO: 4.61 MILLION/UL (ref 3.88–5.62)
SODIUM SERPL-SCNC: 138 MMOL/L (ref 136–145)
TRIGL SERPL-MCNC: 176 MG/DL
TSH SERPL DL<=0.05 MIU/L-ACNC: 3.39 UIU/ML (ref 0.36–3.74)
WBC # BLD AUTO: 7.62 THOUSAND/UL (ref 4.31–10.16)

## 2018-08-07 PROCEDURE — 80061 LIPID PANEL: CPT

## 2018-08-07 PROCEDURE — 83036 HEMOGLOBIN GLYCOSYLATED A1C: CPT

## 2018-08-07 PROCEDURE — 36415 COLL VENOUS BLD VENIPUNCTURE: CPT

## 2018-08-07 PROCEDURE — 82570 ASSAY OF URINE CREATININE: CPT | Performed by: FAMILY MEDICINE

## 2018-08-07 PROCEDURE — 85025 COMPLETE CBC W/AUTO DIFF WBC: CPT

## 2018-08-07 PROCEDURE — 82043 UR ALBUMIN QUANTITATIVE: CPT | Performed by: FAMILY MEDICINE

## 2018-08-07 PROCEDURE — 80053 COMPREHEN METABOLIC PANEL: CPT

## 2018-08-07 PROCEDURE — 84443 ASSAY THYROID STIM HORMONE: CPT

## 2018-08-14 ENCOUNTER — OFFICE VISIT (OUTPATIENT)
Dept: FAMILY MEDICINE CLINIC | Facility: CLINIC | Age: 83
End: 2018-08-14
Payer: MEDICARE

## 2018-08-14 VITALS
OXYGEN SATURATION: 96 % | HEIGHT: 69 IN | BODY MASS INDEX: 26.57 KG/M2 | SYSTOLIC BLOOD PRESSURE: 150 MMHG | DIASTOLIC BLOOD PRESSURE: 68 MMHG | WEIGHT: 179.4 LBS | HEART RATE: 81 BPM

## 2018-08-14 DIAGNOSIS — I10 ESSENTIAL HYPERTENSION: ICD-10-CM

## 2018-08-14 DIAGNOSIS — E78.2 MIXED HYPERLIPIDEMIA: ICD-10-CM

## 2018-08-14 DIAGNOSIS — E11.22 TYPE 2 DIABETES MELLITUS WITH STAGE 3 CHRONIC KIDNEY DISEASE, UNSPECIFIED WHETHER LONG TERM INSULIN USE: Primary | ICD-10-CM

## 2018-08-14 DIAGNOSIS — E03.9 HYPOTHYROIDISM, UNSPECIFIED TYPE: ICD-10-CM

## 2018-08-14 DIAGNOSIS — N18.3 TYPE 2 DIABETES MELLITUS WITH STAGE 3 CHRONIC KIDNEY DISEASE, UNSPECIFIED WHETHER LONG TERM INSULIN USE: Primary | ICD-10-CM

## 2018-08-14 DIAGNOSIS — D50.8 OTHER IRON DEFICIENCY ANEMIA: ICD-10-CM

## 2018-08-14 PROCEDURE — 99214 OFFICE O/P EST MOD 30 MIN: CPT | Performed by: FAMILY MEDICINE

## 2018-08-14 RX ORDER — FLUTICASONE PROPIONATE 50 MCG
SPRAY, SUSPENSION (ML) NASAL
Refills: 0 | COMMUNITY
Start: 2018-06-05

## 2018-08-14 NOTE — PROGRESS NOTES
Assessment/Plan:     Anemia may be related to thalassemia due to family history  Patient will try using iron twice daily  Diabetes, hyperlipidemia, hypothyroidism all stable at this time  Labs reviewed  Patient will continue current regimen  Patient will have blood pressure rechecked at follow-up visit  Will make adjustments accordingly if elevated  meds on automatic refill  Will fill appropriately when needed  Diagnoses and all orders for this visit:    Type 2 diabetes mellitus with stage 3 chronic kidney disease, unspecified whether long term insulin use (HCC)    Essential hypertension    Mixed hyperlipidemia    Hypothyroidism, unspecified type    Other iron deficiency anemia    Other orders  -     fluticasone (FLONASE) 50 mcg/act nasal spray; instill 2 sprays into each nostril once daily as directed          Subjective:      Patient ID: Amanda Becker is a 80 y o  male  Patient here to follow-up on diabetes hypertension hyperlipidemia hypothyroidism  Patient feeling well overall  No headaches or visual disturbance or chest pain or shortness of breath  No difficulty with urination or defecation  No edema  All review systems negative  The following portions of the patient's history were reviewed and updated as appropriate: allergies, current medications, past family history, past medical history, past social history, past surgical history and problem list     Review of Systems   Constitutional: Negative  HENT: Negative  Eyes: Negative  Respiratory: Negative  Cardiovascular: Negative  Gastrointestinal: Negative  Endocrine: Negative  Genitourinary: Negative  Musculoskeletal: Negative  Skin: Negative  Allergic/Immunologic: Negative  Neurological: Negative  Hematological: Negative  Psychiatric/Behavioral: Negative            Objective:      /68 (BP Location: Left arm, Patient Position: Sitting, Cuff Size: Adult)   Pulse 81   Ht 5' 9" (1 753 m) Wt 81 4 kg (179 lb 6 4 oz)   SpO2 96%   BMI 26 49 kg/m²          Physical Exam   Constitutional: He is oriented to person, place, and time  He appears well-developed and well-nourished  No distress  HENT:   Head: Normocephalic  Right Ear: External ear normal    Left Ear: External ear normal    Mouth/Throat: Oropharynx is clear and moist  No oropharyngeal exudate  Eyes: EOM are normal  Pupils are equal, round, and reactive to light  Right eye exhibits no discharge  Left eye exhibits no discharge  No scleral icterus  Neck: Normal range of motion  Neck supple  No thyromegaly present  Cardiovascular: Normal rate, regular rhythm and intact distal pulses  Exam reveals no gallop and no friction rub  Murmur heard  Pulmonary/Chest: Effort normal and breath sounds normal  No respiratory distress  He has no wheezes  He has no rales  He exhibits no tenderness  Abdominal: Soft  Bowel sounds are normal  He exhibits no distension  There is no tenderness  There is no rebound and no guarding  Musculoskeletal: Normal range of motion  He exhibits no edema or tenderness  Lymphadenopathy:     He has no cervical adenopathy  Neurological: He is oriented to person, place, and time  No cranial nerve deficit  He exhibits normal muscle tone  Coordination normal    Skin: Skin is warm and dry  No rash noted  He is not diaphoretic  No erythema  No pallor  Psychiatric: He has a normal mood and affect  His behavior is normal  Judgment and thought content normal    Nursing note and vitals reviewed

## 2018-11-01 DIAGNOSIS — I10 ESSENTIAL HYPERTENSION: ICD-10-CM

## 2018-11-01 DIAGNOSIS — E03.9 PRIMARY HYPOTHYROIDISM: Primary | ICD-10-CM

## 2018-11-01 RX ORDER — LEVOTHYROXINE SODIUM 0.03 MG/1
25 TABLET ORAL DAILY
Qty: 90 TABLET | Refills: 1 | Status: SHIPPED | OUTPATIENT
Start: 2018-11-01 | End: 2018-11-06 | Stop reason: SDUPTHER

## 2018-11-01 NOTE — TELEPHONE ENCOUNTER
[facility] faxed today a request for Levothrthyroxine 25 mcg tab  Another RX request from [facility] is for a refill of Olesartan HXCTZ 20-12 5 mg tab

## 2018-11-06 RX ORDER — LEVOTHYROXINE SODIUM 0.03 MG/1
25 TABLET ORAL DAILY
Qty: 90 TABLET | Refills: 1 | Status: SHIPPED | OUTPATIENT
Start: 2018-11-06 | End: 2019-10-15 | Stop reason: SDUPTHER

## 2018-11-06 RX ORDER — OLMESARTAN MEDOXOMIL AND HYDROCHLOROTHIAZIDE 20/12.5 20; 12.5 MG/1; MG/1
1 TABLET ORAL DAILY
Qty: 30 TABLET | Refills: 2
Start: 2018-11-06 | End: 2019-02-01 | Stop reason: SDUPTHER

## 2018-11-06 NOTE — TELEPHONE ENCOUNTER
Script for Benicar was verbally called into [facility] since it was set to [id number]

## 2018-11-19 ENCOUNTER — OFFICE VISIT (OUTPATIENT)
Dept: FAMILY MEDICINE CLINIC | Facility: CLINIC | Age: 83
End: 2018-11-19
Payer: MEDICARE

## 2018-11-19 VITALS
DIASTOLIC BLOOD PRESSURE: 60 MMHG | SYSTOLIC BLOOD PRESSURE: 140 MMHG | WEIGHT: 172.4 LBS | BODY MASS INDEX: 25.53 KG/M2 | HEIGHT: 69 IN

## 2018-11-19 DIAGNOSIS — E11.22 TYPE 2 DIABETES MELLITUS WITH STAGE 3 CHRONIC KIDNEY DISEASE, UNSPECIFIED WHETHER LONG TERM INSULIN USE: Primary | ICD-10-CM

## 2018-11-19 DIAGNOSIS — N18.3 TYPE 2 DIABETES MELLITUS WITH STAGE 3 CHRONIC KIDNEY DISEASE, UNSPECIFIED WHETHER LONG TERM INSULIN USE: Primary | ICD-10-CM

## 2018-11-19 DIAGNOSIS — E06.3 HYPOTHYROIDISM DUE TO HASHIMOTO'S THYROIDITIS: ICD-10-CM

## 2018-11-19 DIAGNOSIS — E03.8 HYPOTHYROIDISM DUE TO HASHIMOTO'S THYROIDITIS: ICD-10-CM

## 2018-11-19 DIAGNOSIS — E78.2 MIXED HYPERLIPIDEMIA: ICD-10-CM

## 2018-11-19 DIAGNOSIS — I25.10 ATHEROSCLEROSIS OF NATIVE CORONARY ARTERY OF NATIVE HEART WITHOUT ANGINA PECTORIS: ICD-10-CM

## 2018-11-19 DIAGNOSIS — I10 ESSENTIAL HYPERTENSION: ICD-10-CM

## 2018-11-19 DIAGNOSIS — D50.0 IRON DEFICIENCY ANEMIA DUE TO CHRONIC BLOOD LOSS: ICD-10-CM

## 2018-11-19 DIAGNOSIS — Z00.00 MEDICARE ANNUAL WELLNESS VISIT, SUBSEQUENT: ICD-10-CM

## 2018-11-19 PROCEDURE — G0439 PPPS, SUBSEQ VISIT: HCPCS | Performed by: FAMILY MEDICINE

## 2018-11-19 PROCEDURE — 90662 IIV NO PRSV INCREASED AG IM: CPT | Performed by: FAMILY MEDICINE

## 2018-11-19 PROCEDURE — 99214 OFFICE O/P EST MOD 30 MIN: CPT | Performed by: FAMILY MEDICINE

## 2018-11-19 PROCEDURE — G0008 ADMIN INFLUENZA VIRUS VAC: HCPCS | Performed by: FAMILY MEDICINE

## 2018-11-19 NOTE — PROGRESS NOTES
Assessment/Plan:  The diabetes hypertension hypothyroidism CAD all stable at present time  Hyperlipidemia stable  Continue with current regimen  Patient have laboratory studies prior to next visit  Patient have hemoglobin electrophoresis due to family history of thalassemia  And personal history of anemia  Flu shot given at this time  Follow-up in 3 months  Diagnoses and all orders for this visit:    Type 2 diabetes mellitus with stage 3 chronic kidney disease, unspecified whether long term insulin use (HCC)  -     CBC and differential; Future  -     Comprehensive metabolic panel; Future  -     Hemoglobin A1C; Future  -     Lipid panel; Future  -     Microalbumin / creatinine urine ratio  -     TSH, 3rd generation with Free T4 reflex; Future  -     Hemoglobin Electrophoresis; Future    Hypothyroidism due to Hashimoto's thyroiditis    Atherosclerosis of native coronary artery of native heart without angina pectoris    Mixed hyperlipidemia    Essential hypertension    Medicare annual wellness visit, subsequent  -     Flu Vaccine High Dose Split Preservative Free IM    Iron deficiency anemia due to chronic blood loss  -     CBC and differential; Future  -     Comprehensive metabolic panel; Future  -     Hemoglobin A1C; Future  -     Lipid panel; Future  -     Microalbumin / creatinine urine ratio  -     TSH, 3rd generation with Free T4 reflex; Future  -     Hemoglobin Electrophoresis; Future          Subjective:      Patient ID: Ignacio Vance is a 80 y o  male  Patient follow-up on diabetes hypertension hyperlipidemia hypothyroidism  Patient feeling well overall without any new headaches or blurred vision or chest pain or shortness of breath or problems with urination or defecation  No numbness of the feet  All review systems negative  Labs reviewed    All review systems negative        The following portions of the patient's history were reviewed and updated as appropriate: allergies, current medications, past family history, past medical history, past social history, past surgical history and problem list     Review of Systems   Constitutional: Negative  HENT: Negative  Eyes: Negative  Respiratory: Negative  Cardiovascular: Negative  Gastrointestinal: Negative  Endocrine: Negative  Genitourinary: Negative  Musculoskeletal: Negative  Skin: Negative  Allergic/Immunologic: Negative  Neurological: Negative  Hematological: Negative  Psychiatric/Behavioral: Negative  Objective:      /60 (BP Location: Right arm, Patient Position: Sitting, Cuff Size: Standard)   Ht 5' 9" (1 753 m)   Wt 78 2 kg (172 lb 6 4 oz)   BMI 25 46 kg/m²          Physical Exam   Constitutional: He is oriented to person, place, and time  He appears well-developed and well-nourished  No distress  HENT:   Head: Normocephalic  Right Ear: External ear normal    Left Ear: External ear normal    Mouth/Throat: Oropharynx is clear and moist  No oropharyngeal exudate  Eyes: Pupils are equal, round, and reactive to light  EOM are normal  Right eye exhibits no discharge  Left eye exhibits no discharge  No scleral icterus  Neck: Normal range of motion  Neck supple  No thyromegaly present  Cardiovascular: Normal rate, regular rhythm, normal heart sounds and intact distal pulses  Exam reveals no gallop and no friction rub  No murmur heard  Pulmonary/Chest: Effort normal and breath sounds normal  No respiratory distress  He has no wheezes  He has no rales  He exhibits no tenderness  Abdominal: Soft  Bowel sounds are normal  He exhibits no distension  There is no tenderness  There is no rebound and no guarding  Musculoskeletal: Normal range of motion  He exhibits no edema or tenderness  Lymphadenopathy:     He has no cervical adenopathy  Neurological: He is oriented to person, place, and time  No cranial nerve deficit  He exhibits normal muscle tone   Coordination normal  Skin: Skin is warm and dry  No rash noted  He is not diaphoretic  No erythema  No pallor  Psychiatric: He has a normal mood and affect  His behavior is normal  Judgment and thought content normal    Nursing note and vitals reviewed

## 2018-11-19 NOTE — PROGRESS NOTES
Assessment and Plan:  Problem List Items Addressed This Visit     None        Health Maintenance Due   Topic Date Due    INFLUENZA VACCINE  07/01/2018         HPI:  Patient Active Problem List   Diagnosis    Anemia    Atherosclerotic heart disease of native coronary artery without angina pectoris    DMII [facility]di[facility], [facility] 2) [facility]    Hyperlipidemia    Hypertension    Hypothyroidism    PAD (peripheral artery disease) ([facility] [address] )     Past Medical History:   Diagnosis Date    Diabetes mellitus ([facility] )      Past Surgical History:   Procedure Laterality Date    COLONOSCOPY      PROSTATE BIOPSY      needle,  resolved may 2005     Family History   Problem Relation Age of Onset    No Known Problems Mother      History   Smoking Status    Former Smoker   Smokeless Tobacco    Never Used     History   Alcohol Use    Yes     Comment: social      History   Drug Use No         Current Outpatient Prescriptions   Medication Sig Dispense Refill    amLODIPine (NORVASC) 5 mg tablet   0    atorvastatin (LIPITOR) 20 mg tablet Take 1 tablet by mouth daily      cholecalciferol (VITAMIN D3) 1,000 units tablet Take by mouth      fexofenadine (ALLEGRA) 180 MG tablet Take by mouth      fluticasone (FLONASE) 50 mcg/act nasal spray instill 2 sprays into each nostril once daily as directed  0    folic acid (FOLVITE) 1 mg tablet Take 1 tablet by mouth daily      levothyroxine 25 mcg tablet Take 1 tablet (25 mcg total) by mouth daily 90 tablet 1    metoprolol tartrate (LOPRESSOR) 50 mg tablet Take 1 tablet by mouth 2 (two) times a day      montelukast (SINGULAIR) 10 mg tablet Take by mouth      Multiple Vitamins-Minerals (CENTRUM SILVER) tablet Take by mouth      olmesartan-hydrochlorothiazide (BENICAR HCT) 20-12 5 MG per tablet Take 1 tablet by mouth daily 30 tablet 2    Omega-3 Fatty Acids (FISH OIL) 1000 MG CPDR Take by mouth      Potassium 99 MG TABS Take by mouth      Saw Palmetto 160 MG CAPS Take by mouth      sulfaSALAzine (AZULFIDINE) 500 mg tablet   0    zinc gluconate 50 mg tablet Take by mouth       No current facility-administered medications for this visit  Allergies   Allergen Reactions    Lisinopril     Candesartan Rash     Immunization History   Administered Date(s) Administered    Influenza Split High Dose Preservative Free IM 11/07/2017    Pneumococcal Conjugate 13-Valent 10/18/2016    Pneumococcal Polysaccharide PPV23 11/07/2017    Td (adult), adsorbed 10/23/2007       Patient Care Team:  Sander Mcfadden,  as PCP - General (Family Medicine)  Marvin Fonseca DO    Medicare Screening Tests and Risk Assessments:  Ken Roman is here for his Subsequent Wellness visit  Health Risk Assessment:  Patient rates overall health as very good  Patient feels that their physical health rating is Same  Eyesight was rated as Same  Hearing was rated as Same  Patient feels that their emotional and mental health rating is Same  Pain experienced by patient in the last 7 days has been None  Patient states that he has experienced no weight loss or gain in last 6 months  Emotional/Mental Health:  Patient has been feeling nervous/anxious  PHQ-9 Depression Screening:    Frequency of the following problems over the past two weeks:      1  Little interest or pleasure in doing things: 0 - not at all      2  Feeling down, depressed, or hopeless: 0 - not at all  PHQ-2 Score: 0          Broken Bones/Falls: Fall Risk Assessment:    In the past year, patient has experienced: No history of falling in past year          Bladder/Bowel:  Patient has not leaked urine accidently in the last six months  Patient reports no loss of bowel control  Immunizations:  Patient has had a flu vaccination within the last year  Patient has received a pneumonia shot  Patient has not received a shingles shot  Patient has not received tetanus/diphtheria shot       Home Safety:  Patient does not have trouble with stairs inside or outside of their home  Patient currently reports that there are no safety hazards present in home, working smoke alarms, no working carbon monoxide detectors  (Additional Comments: Pt does not have a home that requires them  )    Preventative Screenings:   no prostate cancer screen performed, colon cancer screen completed, no cholesterol screen completed, no glaucoma eye exam completed(Additional Comments: Pt had colon cancer screening within 10 yrs - does not remember when  Dr Mynor Wadsworth did the screening  )    Nutrition:  Current diet: Regular with servings of the following:    Medications:  Patient is currently taking over-the-counter supplements  List of OTC medications includes: Iron, Vitamin D, Allegra as needed, Folic Acid, MVI, Fish Oil, Potassium, Saw Palmetto, Zinc   Patient is able to manage medications  Lifestyle Choices:  Patient reports no tobacco use  Patient has smoked or used tobacco in the past   Patient has stopped his tobacco use  Tobacco use quit date: 40+ yrs ago  Patient reports alcohol use  Alcohol use per week: rarely  Patient drives a vehicle  Patient wears seat belt  Current level of exercise of physical activity described by patient as: moderate  Activities of Daily Living:  Can get out of bed by his or her self, able to dress self, able to make own meals, able to do own shopping, able to bathe self, can do own laundry/housekeeping, can manage own money, pay bills and track expenses    Previous Hospitalizations:  No hospitalization or ED visit in past 12 months        Advanced Directives:  Patient has decided on a power of   Patient has spoken to designated power of   Patient has completed advanced directive          Preventative Screening/Counseling:      Cardiovascular:      General: Risks and Benefits Discussed and Screening Current          Diabetes:      General: Risks and Benefits Discussed and Screening Current          Colorectal Cancer: General: Risks and Benefits Discussed and Screening Current          Prostate Cancer:      General: Risks and Benefits Discussed and Screening Current          Osteoporosis:      General: Risks and Benefits Discussed      Counseling: Calcium and Vitamin D Intake          AAA:      General: Screening Not Indicated          Glaucoma:      General: Risks and Benefits Discussed and Screening Current          HIV:      General: Risks and Benefits Discussed and Screening Not Indicated          Hepatitis C:      General: Risks and Benefits Discussed and Screening Not Indicated        Advanced Directives:   Patient has living will for healthcare, has durable POA for healthcare, patient has an advanced directive  Immunizations:      Influenza: Risks & Benefits Discussed, Influenza UTD This Year and Influenza Due Today      Pneumococcal: Risks & Benefits Discussed and Lifetime Vaccine Completed      Shingrix: Risks & Benefits Discussed and Vaccine Status Unknown      TD: Risks & Benefits Discussed and Td Vaccine UTD      Other Preventative Counseling (Non-Medicare): Fall Prevention and Increase physical activity          No exam data present    Physical Exam:  Review of Systems   Gastrointestinal: Negative for bowel incontinence  Psychiatric/Behavioral: The patient is not nervous/anxious  There were no vitals filed for this visit  There is no height or weight on file to calculate BMI  Physical Exam          Assessment and Plan:    Problem List Items Addressed This Visit     Atherosclerotic heart disease of native coronary artery without angina pectoris    DMII (diabetes mellitus, type 2) ([facility] [address] ) - Primary    Hyperlipidemia    Hypertension    Hypothyroidism      Other Visit Diagnoses     Medicare annual wellness visit, subsequent        Relevant Orders    Flu Vaccine High Dose Split Preservative Free IM        There are no preventive care reminders to display for this patient        HPI:  Kristina Pugh is a 80 y o  male here for his Subsequent Wellness Visit      Patient Active Problem List   Diagnosis    Anemia    Atherosclerotic heart disease of native coronary artery without angina pectoris    DMII [facility]di[facility], [facility]) [facility]    Hyperlipidemia    Hypertension    Hypothyroidism    PAD (peripheral artery disease) ([facility] [address] )     Past Medical History:   Diagnosis Date    Diabetes mellitus ([facility] [address] )      Past Surgical History:   Procedure Laterality Date    COLONOSCOPY      PROSTATE BIOPSY      needle,  resolved may 2005     Family History   Problem Relation Age of Onset    No Known Problems Mother      History   Smoking Status    Former Smoker   Smokeless Tobacco    Never Used     History   Alcohol Use    Yes     Comment: social      History   Drug Use No       Current Outpatient Prescriptions   Medication Sig Dispense Refill    amLODIPine (NORVASC) 5 mg tablet 5 mg daily    0    atorvastatin (LIPITOR) 20 mg tablet Take 1 tablet by mouth daily      cholecalciferol (VITAMIN D3) 1,000 units tablet Take by mouth      fexofenadine (ALLEGRA) 180 MG tablet Take by mouth      fluticasone (FLONASE) 50 mcg/act nasal spray instill 2 sprays into each nostril once daily as directed  0    folic acid (FOLVITE) 1 mg tablet Take 1 tablet by mouth daily      levothyroxine 25 mcg tablet Take 1 tablet (25 mcg total) by mouth daily 90 tablet 1    metoprolol tartrate (LOPRESSOR) 50 mg tablet Take 1 tablet by mouth 2 (two) times a day      montelukast (SINGULAIR) 10 mg tablet Take by mouth      Multiple Vitamins-Minerals (CENTRUM SILVER) tablet Take by mouth      olmesartan-hydrochlorothiazide (BENICAR HCT) 20-12 5 MG per tablet Take 1 tablet by mouth daily 30 tablet 2    Omega-3 Fatty Acids (FISH OIL) 1000 MG CPDR Take by mouth      Potassium 99 MG TABS Take by mouth      Saw Palmetto 160 MG CAPS Take by mouth      sulfaSALAzine (AZULFIDINE) 500 mg tablet   0    zinc gluconate 50 mg tablet Take by mouth       No current facility-administered medications for this visit        Allergies   Allergen Reactions    Lisinopril     Candesartan Rash     Immunization History   Administered Date(s) Administered    Influenza Split High Dose Preservative Free IM 11/07/2017    Pneumococcal Conjugate 13-Valent 10/18/2016    Pneumococcal Polysaccharide PPV23 11/07/2017    Td (adult), adsorbed 10/23/2007       Patient Care Team:  Roderick Hardy DO as PCP - General (Family Medicine)  Dewayne Keto, DO Medicare Screening Tests and Risk Assessments:  Medicare Annual Wellness Visit

## 2019-01-08 ENCOUNTER — OFFICE VISIT (OUTPATIENT)
Dept: FAMILY MEDICINE CLINIC | Facility: CLINIC | Age: 84
End: 2019-01-08
Payer: MEDICARE

## 2019-01-08 VITALS
SYSTOLIC BLOOD PRESSURE: 146 MMHG | DIASTOLIC BLOOD PRESSURE: 70 MMHG | BODY MASS INDEX: 25.48 KG/M2 | WEIGHT: 172 LBS | HEIGHT: 69 IN

## 2019-01-08 DIAGNOSIS — Z00.00 MEDICARE ANNUAL WELLNESS VISIT, SUBSEQUENT: Primary | ICD-10-CM

## 2019-01-08 DIAGNOSIS — D49.2 SKIN NEOPLASM: ICD-10-CM

## 2019-01-08 PROCEDURE — 99213 OFFICE O/P EST LOW 20 MIN: CPT | Performed by: FAMILY MEDICINE

## 2019-01-08 PROCEDURE — G0439 PPPS, SUBSEQ VISIT: HCPCS | Performed by: FAMILY MEDICINE

## 2019-01-08 NOTE — PROGRESS NOTES
Assessment and Plan:  Problem List Items Addressed This Visit     None        There are no preventive care reminders to display for this patient        HPI:  Patient Active Problem List   Diagnosis    Anemia    Atherosclerotic heart disease of native coronary artery without angina pectoris    DMII (diabetes mellitus, type 2) ([facility])    Hyperlipidemia    Hypertension    Hypothyroidism    PAD (peripheral artery disease) ([facility])     Past Medical History:   Diagnosis Date    Diabetes mellitus ([facility] [address] )      Past Surgical History:   Procedure Laterality Date    COLONOSCOPY      PROSTATE BIOPSY      needle,  resolved may 2005     Family History   Problem Relation Age of Onset    No Known Problems Mother      History   Smoking Status    Former Smoker   Smokeless Tobacco    Never Used     History   Alcohol Use    Yes     Comment: social      History   Drug Use No         Current Outpatient Prescriptions   Medication Sig Dispense Refill    amLODIPine (NORVASC) 5 mg tablet 5 mg daily    0    atorvastatin (LIPITOR) 20 mg tablet Take 1 tablet by mouth daily      cholecalciferol (VITAMIN D3) 1,000 units tablet Take by mouth      fexofenadine (ALLEGRA) 180 MG tablet Take by mouth      fluticasone (FLONASE) 50 mcg/act nasal spray instill 2 sprays into each nostril once daily as directed  0    folic acid (FOLVITE) 1 mg tablet Take 1 tablet by mouth daily      levothyroxine 25 mcg tablet Take 1 tablet (25 mcg total) by mouth daily 90 tablet 1    metoprolol tartrate (LOPRESSOR) 50 mg tablet Take 1 tablet by mouth 2 (two) times a day      montelukast (SINGULAIR) 10 mg tablet Take by mouth      Multiple Vitamins-Minerals (CENTRUM SILVER) tablet Take by mouth      olmesartan-hydrochlorothiazide (BENICAR HCT) 20-12 5 MG per tablet Take 1 tablet by mouth daily 30 tablet 2    Omega-3 Fatty Acids (FISH OIL) 1000 MG CPDR Take by mouth      Potassium 99 MG TABS Take by mouth      Saw Palmetto 160 MG CAPS Take by mouth      sulfaSALAzine (AZULFIDINE) 500 mg tablet   0    zinc gluconate 50 mg tablet Take by mouth       No current facility-administered medications for this visit  Allergies   Allergen Reactions    Lisinopril     Candesartan Rash     Immunization History   Administered Date(s) Administered    Influenza Split High Dose Preservative Free IM 11/07/2017    Influenza, high dose seasonal 0 5 mL 11/19/2018    Pneumococcal Conjugate 13-Valent 10/18/2016    Pneumococcal Polysaccharide PPV23 11/07/2017    Td (adult), adsorbed 10/23/2007       Patient Care Team:  Shirley Sahu,  as PCP - General (Family Medicine)  Brenda Brown DO    Medicare Screening Tests and Risk Assessments:  Elvira Rivas is here for his Subsequent Wellness visit  Health Risk Assessment:  Patient rates overall health as good  Patient feels that their physical health rating is Same  Eyesight was rated as Same  Hearing was rated as Same  Patient feels that their emotional and mental health rating is Same  Pain experienced by patient in the last 7 days has been None  Patient states that he has experienced no weight loss or gain in last 6 months  Emotional/Mental Health:  Patient has been feeling nervous/anxious  PHQ-9 Depression Screening:    Frequency of the following problems over the past two weeks:      1  Little interest or pleasure in doing things: 0 - not at all      2  Feeling down, depressed, or hopeless: 0 - not at all  PHQ-2 Score: 0          Broken Bones/Falls: Fall Risk Assessment:    In the past year, patient has experienced: No history of falling in past year          Bladder/Bowel:  Patient has not leaked urine accidently in the last six months  Patient reports no loss of bowel control  Immunizations:  Patient has had a flu vaccination within the last year  Patient has received a pneumonia shot  Patient has not received a shingles shot  Patient has received tetanus/diphtheria shot   Date of tetanus/diphtheria shot: 10/23/2007    Home Safety:  Patient does not have trouble with stairs inside or outside of their home  Patient currently reports that there are no safety hazards present in home, working smoke alarms, working carbon monoxide detectors  Preventative Screenings:   prostate cancer screen performed, colon cancer screen completed, no cholesterol screen completed, glaucoma eye exam completed,     Nutrition:  Current diet: Low Saturated Fat with servings of the following:    Medications:  Patient is currently taking over-the-counter supplements  List of OTC medications includes: Saw Palmetto, folic acid, vitamin D  Patient is able to manage medications  Lifestyle Choices:  Patient reports no tobacco use  Patient has smoked or used tobacco in the past   Patient has stopped his tobacco use  Patient reports alcohol use  Patient drives a vehicle  Patient wears seat belt  Activities of Daily Living:  Can get out of bed by his or her self, able to dress self, able to make own meals, able to do own shopping, able to bathe self, can do own laundry/housekeeping, can manage own money, pay bills and track expenses    Previous Hospitalizations:  No hospitalization or ED visit in past 12 months        Advanced Directives:  Patient has decided on a power of   Patient has spoken to designated power of   Patient has completed advanced directive          Preventative Screening/Counseling:      Cardiovascular:      General: Risks and Benefits Discussed and Screening Current          Diabetes:      General: Risks and Benefits Discussed and Screening Current      Counseling: Healthy Diet          Colorectal Cancer:      General: Risks and Benefits Discussed and Screening Current          Prostate Cancer:      General: Risks and Benefits Discussed      Due for labs: PSA          Osteoporosis:      General: Risks and Benefits Discussed      Counseling: Calcium and Vitamin D Intake          AAA: General: Risks and Benefits Discussed and Screening Not Indicated          Glaucoma:      General: Risks and Benefits Discussed and Screening Current          HIV:      General: Risks and Benefits Discussed and Screening Not Indicated          Hepatitis C:      General: Risks and Benefits Discussed      Counseling: has received general HCV counseling        Advanced Directives:   Patient has living will for healthcare, has durable POA for healthcare, patient has an advanced directive  Immunizations:      Influenza: Risks & Benefits Discussed and Influenza UTD This Year      Pneumococcal: Risks & Benefits Discussed and Lifetime Vaccine Completed      Shingrix: Risks & Benefits Discussed and Vaccine Status Unknown      Zostavax: Risks & Benefits Discussed and Vaccine Status Unknown      TD: Risks & Benefits Discussed and Td Vaccine UTD      Other Preventative Counseling (Non-Medicare): Fall Prevention and Increase physical activity          No exam data present    Physical Exam:  Review of Systems   Gastrointestinal: Negative for bowel incontinence  Psychiatric/Behavioral: The patient is not nervous/anxious  Vitals:    01/08/19 0751   BP: 146/70   BP Location: Right arm   Patient Position: Sitting   Cuff Size: Adult   Weight: 78 kg (172 lb)   Height: 5' 9" (1 753 m)   Body mass index is 25 4 kg/m²  Physical Exam          Assessment and Plan:    Problem List Items Addressed This Visit     None      Visit Diagnoses     Medicare annual wellness visit, subsequent    -  Primary    Relevant Orders    Hepatitis C antibody    PSA, Total Screen        There are no preventive care reminders to display for this patient  HPI:  Jaky Harding is a 80 y o  male here for his Subsequent Wellness Visit      Patient Active Problem List   Diagnosis    Anemia    Atherosclerotic heart disease of native coronary artery without angina pectoris    DMII (diabetes mellitus, type 2) (HCC)    Hyperlipidemia    Hypertension    Hypothyroidism    PAD (peripheral artery disease) ([facility]CC)     Past Medical History:   Diagnosis Date    Diabetes mellitus ([address] )      Past Surgical History:   Procedure Laterality Date    COLONOSCOPY      PROSTATE BIOPSY      needle,  resolved may 2005     Family History   Problem Relation Age of Onset    No Known Problems Mother      History   Smoking Status    Former Smoker   Smokeless Tobacco    Never Used     History   Alcohol Use    Yes     Comment: social      History   Drug Use No       Current Outpatient Prescriptions   Medication Sig Dispense Refill    amLODIPine (NORVASC) 5 mg tablet 5 mg daily    0    atorvastatin (LIPITOR) 20 mg tablet Take 1 tablet by mouth daily      cholecalciferol (VITAMIN D3) 1,000 units tablet Take by mouth      fexofenadine (ALLEGRA) 180 MG tablet Take by mouth      fluticasone (FLONASE) 50 mcg/act nasal spray instill 2 sprays into each nostril once daily as directed  0    folic acid (FOLVITE) 1 mg tablet Take 1 tablet by mouth daily      levothyroxine 25 mcg tablet Take 1 tablet (25 mcg total) by mouth daily 90 tablet 1    metoprolol tartrate (LOPRESSOR) 50 mg tablet Take 1 tablet by mouth 2 (two) times a day      montelukast (SINGULAIR) 10 mg tablet Take by mouth      Multiple Vitamins-Minerals (CENTRUM SILVER) tablet Take by mouth      olmesartan-hydrochlorothiazide (BENICAR HCT) 20-12 5 MG per tablet Take 1 tablet by mouth daily 30 tablet 2    Omega-3 Fatty Acids (FISH OIL) 1000 MG CPDR Take by mouth      Potassium 99 MG TABS Take by mouth      Saw Palmetto 160 MG CAPS Take by mouth      sulfaSALAzine (AZULFIDINE) 500 mg tablet   0    zinc gluconate 50 mg tablet Take by mouth       No current facility-administered medications for this visit        Allergies   Allergen Reactions    Lisinopril     Candesartan Rash     Immunization History   Administered Date(s) Administered    Influenza Split High Dose Preservative Free IM 11/07/2017    Influenza, high dose seasonal 0 5 mL 11/19/2018    Pneumococcal Conjugate 13-Valent 10/18/2016    Pneumococcal Polysaccharide PPV23 11/07/2017    Td (adult), adsorbed 10/23/2007       Patient Care Team:  Madison Gaytan DO as PCP - General (Family Medicine)  Albin Tan DO    Medicare Screening Tests and Risk Assessments:  Medicare Annual Wellness Visit

## 2019-01-08 NOTE — PROGRESS NOTES
Assessment/Plan:  Patient will be set up for biopsy of skin lesion  Diagnoses and all orders for this visit:    Medicare annual wellness visit, subsequent  -     Hepatitis C antibody  -     PSA, Total Screen          Subjective:      Patient ID: Shai Moon is a 80 y o  male  Patient is here with lesion on right pinna for the past 2 months  No bleeding or pruritus  The following portions of the patient's history were reviewed and updated as appropriate: allergies, current medications, past family history, past medical history, past social history, past surgical history and problem list     Review of Systems   Constitutional: Negative  HENT: Negative  Eyes: Negative  Respiratory: Negative  Cardiovascular: Negative  Gastrointestinal: Negative  Endocrine: Negative  Genitourinary: Negative  Musculoskeletal: Negative  Skin: Positive for rash  Allergic/Immunologic: Negative  Neurological: Negative  Hematological: Negative  Psychiatric/Behavioral: Negative  Objective:      /70 (BP Location: Right arm, Patient Position: Sitting, Cuff Size: Adult)   Ht 5' 9" (1 753 m)   Wt 78 kg (172 lb)   BMI 25 40 kg/m²          Physical Exam   Constitutional: He appears well-developed and well-nourished  HENT:   Head: Normocephalic and atraumatic  Left Ear: External ear normal    Mouth/Throat: No oropharyngeal exudate     Skin:   Irregular lesion on right pinna superiorly which may represent possible squamous cell carcinoma

## 2019-01-11 ENCOUNTER — PROCEDURE VISIT (OUTPATIENT)
Dept: FAMILY MEDICINE CLINIC | Facility: CLINIC | Age: 84
End: 2019-01-11
Payer: MEDICARE

## 2019-01-11 VITALS
BODY MASS INDEX: 25.77 KG/M2 | HEIGHT: 69 IN | WEIGHT: 174 LBS | DIASTOLIC BLOOD PRESSURE: 70 MMHG | OXYGEN SATURATION: 98 % | SYSTOLIC BLOOD PRESSURE: 140 MMHG | HEART RATE: 86 BPM

## 2019-01-11 DIAGNOSIS — D49.2 SKIN NEOPLASM: Primary | ICD-10-CM

## 2019-01-11 PROCEDURE — 88305 TISSUE EXAM BY PATHOLOGIST: CPT | Performed by: PATHOLOGY

## 2019-01-11 PROCEDURE — 11311 SHAVE SKIN LESION 0.6-1.0 CM: CPT | Performed by: FAMILY MEDICINE

## 2019-01-11 NOTE — PROGRESS NOTES
Assessment/Plan:  Betadine and alcohol use  Half a cc of lidocaine 1% without epinephrine use  Informed consent obtained Diagnoses and all orders for this visit:    Skin neoplasm          Subjective:      Patient ID: Gemini Aquino is a 80 y o  male  Patient is here for biopsy of lesion on right pinna        The following portions of the patient's history were reviewed and updated as appropriate: allergies, current medications, past family history, past medical history, past social history, past surgical history and problem list     Review of Systems   Skin: Positive for color change           Objective:      /70 (BP Location: Left arm, Patient Position: Sitting, Cuff Size: Large)   Pulse 86   Ht 5' 9" (1 753 m)   Wt 78 9 kg (174 lb)   SpO2 98%   BMI 25 70 kg/m²          Physical Exam   Skin:   Irregular lesion right pinna measuring 8 mm     Shave lesion  Date/Time: 1/11/2019 11:55 AM  Performed by: Paul Pallas  Authorized by: Paul Pallas     Number of Lesions: 1  Lesion 1:     Body area: head/neck    Head/neck location: R ear    Initial size (mm): 8    Final defect size (mm): 8    Malignancy: malignant lesion      Destruction method: shave removal

## 2019-01-15 DIAGNOSIS — C44.222 SQUAMOUS CELL CANCER OF EXTERNAL EAR, RIGHT: Primary | ICD-10-CM

## 2019-01-16 DIAGNOSIS — C44.212 BASAL CELL CARCINOMA (BCC) OF ANTIHELIX OF RIGHT EAR: Primary | ICD-10-CM

## 2019-01-18 ENCOUNTER — TELEPHONE (OUTPATIENT)
Dept: FAMILY MEDICINE CLINIC | Facility: CLINIC | Age: 84
End: 2019-01-18

## 2019-01-18 NOTE — TELEPHONE ENCOUNTER
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

## 2019-01-25 ENCOUNTER — OFFICE VISIT (OUTPATIENT)
Dept: FAMILY MEDICINE CLINIC | Facility: CLINIC | Age: 84
End: 2019-01-25
Payer: MEDICARE

## 2019-01-25 VITALS
SYSTOLIC BLOOD PRESSURE: 156 MMHG | HEIGHT: 69 IN | DIASTOLIC BLOOD PRESSURE: 60 MMHG | BODY MASS INDEX: 25.68 KG/M2 | WEIGHT: 173.4 LBS | TEMPERATURE: 97.4 F | HEART RATE: 77 BPM

## 2019-01-25 DIAGNOSIS — IMO0002 SQUAMOUS CELL CARCINOMA: Primary | ICD-10-CM

## 2019-01-25 DIAGNOSIS — L30.9 DERMATITIS: ICD-10-CM

## 2019-01-25 PROCEDURE — 99213 OFFICE O/P EST LOW 20 MIN: CPT | Performed by: FAMILY MEDICINE

## 2019-01-25 RX ORDER — MOMETASONE FUROATE 1 MG/G
CREAM TOPICAL DAILY
Qty: 45 G | Refills: 0 | Status: SHIPPED | OUTPATIENT
Start: 2019-01-25 | End: 2020-04-14

## 2019-01-25 NOTE — PROGRESS NOTES
Assessment/Plan:  Pathology report discussed with patient  Patient will follow up with Dermatology on the 15th of February for further excision  Diagnoses and all orders for this visit:    Squamous cell carcinoma    Dermatitis  -     mometasone (ELOCON) 0 1 % cream; Apply topically daily          Subjective:      Patient ID: Sheela Garcia is a 80 y o  male  The patient is here with rash intermittently over the past few months on anterior chest wall and shoulder  Patient also status post biopsy right ear  Patient has appointment with Dermatology for further excision due to it being squamous cell carcinoma  Pathology discussed with the patient  The patient has used Neosporin patient      Rash         The following portions of the patient's history were reviewed and updated as appropriate: allergies, current medications, past family history, past medical history, past social history, past surgical history and problem list     Review of Systems   Constitutional: Negative  HENT: Negative  Eyes: Negative  Respiratory: Negative  Cardiovascular: Negative  Gastrointestinal: Negative  Endocrine: Negative  Genitourinary: Negative  Musculoskeletal: Negative  Skin: Positive for rash  Allergic/Immunologic: Negative  Neurological: Negative  Hematological: Negative  Psychiatric/Behavioral: Negative  Objective:      /60 (BP Location: Right arm, Patient Position: Sitting, Cuff Size: Adult)   Pulse 77   Temp (!) 97 4 °F (36 3 °C) (Tympanic)   Ht 5' 9" (1 753 m)   Wt 78 7 kg (173 lb 6 4 oz)   BMI 25 61 kg/m²          Physical Exam   Skin: Rash noted  The erythematous rash on anterior chest wall and back

## 2019-02-01 DIAGNOSIS — I10 ESSENTIAL HYPERTENSION: ICD-10-CM

## 2019-02-01 RX ORDER — OLMESARTAN MEDOXOMIL AND HYDROCHLOROTHIAZIDE 20/12.5 20; 12.5 MG/1; MG/1
1 TABLET ORAL DAILY
Qty: 30 TABLET | Refills: 2
Start: 2019-02-01 | End: 2019-05-08 | Stop reason: SDUPTHER

## 2019-02-20 ENCOUNTER — OFFICE VISIT (OUTPATIENT)
Dept: FAMILY MEDICINE CLINIC | Facility: CLINIC | Age: 84
End: 2019-02-20
Payer: MEDICARE

## 2019-02-20 VITALS
WEIGHT: 174.4 LBS | SYSTOLIC BLOOD PRESSURE: 140 MMHG | BODY MASS INDEX: 25.83 KG/M2 | TEMPERATURE: 96.4 F | DIASTOLIC BLOOD PRESSURE: 70 MMHG | HEIGHT: 69 IN

## 2019-02-20 DIAGNOSIS — E11.22 TYPE 2 DIABETES MELLITUS WITH STAGE 3 CHRONIC KIDNEY DISEASE, UNSPECIFIED WHETHER LONG TERM INSULIN USE: ICD-10-CM

## 2019-02-20 DIAGNOSIS — E03.8 HYPOTHYROIDISM DUE TO HASHIMOTO'S THYROIDITIS: ICD-10-CM

## 2019-02-20 DIAGNOSIS — E66.3 OVERWEIGHT (BMI 25.0-29.9): ICD-10-CM

## 2019-02-20 DIAGNOSIS — E11.9 TYPE 2 DIABETES MELLITUS WITHOUT COMPLICATION, UNSPECIFIED WHETHER LONG TERM INSULIN USE (HCC): Primary | ICD-10-CM

## 2019-02-20 DIAGNOSIS — N18.3 TYPE 2 DIABETES MELLITUS WITH STAGE 3 CHRONIC KIDNEY DISEASE, UNSPECIFIED WHETHER LONG TERM INSULIN USE: ICD-10-CM

## 2019-02-20 DIAGNOSIS — I10 ESSENTIAL HYPERTENSION: ICD-10-CM

## 2019-02-20 DIAGNOSIS — E78.2 MIXED HYPERLIPIDEMIA: ICD-10-CM

## 2019-02-20 DIAGNOSIS — I25.10 ATHEROSCLEROSIS OF NATIVE CORONARY ARTERY OF NATIVE HEART WITHOUT ANGINA PECTORIS: ICD-10-CM

## 2019-02-20 DIAGNOSIS — I73.9 PERIPHERAL VASCULAR DISEASE (HCC): ICD-10-CM

## 2019-02-20 DIAGNOSIS — E06.3 HYPOTHYROIDISM DUE TO HASHIMOTO'S THYROIDITIS: ICD-10-CM

## 2019-02-20 DIAGNOSIS — K51.00 ULCERATIVE PANCOLITIS WITHOUT COMPLICATION (HCC): ICD-10-CM

## 2019-02-20 PROBLEM — N18.30 TYPE 2 DIABETES MELLITUS WITH STAGE 3 CHRONIC KIDNEY DISEASE (HCC): Status: ACTIVE | Noted: 2019-02-20

## 2019-02-20 LAB — SL AMB POCT HEMOGLOBIN AIC: 5.4 (ref ?–6.5)

## 2019-02-20 PROCEDURE — 83036 HEMOGLOBIN GLYCOSYLATED A1C: CPT | Performed by: FAMILY MEDICINE

## 2019-02-20 PROCEDURE — 99214 OFFICE O/P EST MOD 30 MIN: CPT | Performed by: FAMILY MEDICINE

## 2019-02-20 NOTE — PROGRESS NOTES
Assessment/Plan:  The A1c 5 4  Diabetes is very stable as well as blood pressure at this time  Cholesterol stable  Patient will get blood work done before next visit  Thyroid stable also  Patient have labs  Refills will be done automatically  The patient did see dermatologist   Patient will be seeing ophthalmologist in June  Guidance given overall  Diagnoses and all orders for this visit:    Type 2 diabetes mellitus without complication, unspecified whether long term insulin use (HCC)  -     POCT hemoglobin A1c    Essential hypertension    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)    Type 2 diabetes mellitus with stage 3 chronic kidney disease, unspecified whether long term insulin use ([address] )    Peripheral vascular disease (HCC)    Mixed hyperlipidemia    Hypothyroidism due to Hashimoto's thyroiditis    Atherosclerosis of native coronary artery of native heart without angina pectoris          Subjective:      Patient ID: Castillo Hinson is a 80 y o  male  Patient follow-up on diabetes hypertension hyperlipidemia  Patient's fever doing well without cuts or abrasions  No numbness in the feet  No difficulty with urination or defecation  No visual disturbance  No headache or chest pain or shortness of breath  All review systems negative  The following portions of the patient's history were reviewed and updated as appropriate: allergies, current medications, past family history, past medical history, past social history, past surgical history and problem list     Review of Systems   Constitutional: Negative  HENT: Negative  Eyes: Negative  Respiratory: Negative  Cardiovascular: Negative  Gastrointestinal: Negative  Endocrine: Negative  Genitourinary: Negative  Musculoskeletal: Negative  Skin: Negative  Allergic/Immunologic: Negative  Neurological: Negative  Hematological: Negative  Psychiatric/Behavioral: Negative            Objective:      /70 (BP Location: Left arm, Patient Position: Sitting, Cuff Size: Adult)   Temp (!) 96 4 °F (35 8 °C) (Tympanic)   Ht 5' 9" (1 753 m)   Wt 79 1 kg (174 lb 6 4 oz)   BMI 25 75 kg/m²          Physical Exam   Constitutional: He is oriented to person, place, and time  He appears well-developed and well-nourished  No distress  HENT:   Head: Normocephalic  Right Ear: External ear normal    Left Ear: External ear normal    Mouth/Throat: Oropharynx is clear and moist  No oropharyngeal exudate  Eyes: Pupils are equal, round, and reactive to light  EOM are normal  Right eye exhibits no discharge  Left eye exhibits no discharge  No scleral icterus  Neck: Normal range of motion  Neck supple  No thyromegaly present  Cardiovascular: Normal rate, regular rhythm, normal heart sounds and intact distal pulses  Exam reveals no gallop and no friction rub  Pulses are no weak pulses  No murmur heard  Pulses:       Dorsalis pedis pulses are 2+ on the right side, and 2+ on the left side  Posterior tibial pulses are 2+ on the right side, and 2+ on the left side  Pulmonary/Chest: Effort normal and breath sounds normal  No respiratory distress  He has no wheezes  He has no rales  He exhibits no tenderness  Abdominal: Soft  Bowel sounds are normal  He exhibits no distension  There is no tenderness  There is no rebound and no guarding  Musculoskeletal: Normal range of motion  He exhibits no edema or tenderness  Feet:    Feet:   Right Foot:   Skin Integrity: Negative for ulcer, skin breakdown, erythema, warmth, callus or dry skin  Left Foot:   Skin Integrity: Negative for ulcer, skin breakdown, erythema, warmth, callus or dry skin  Lymphadenopathy:     He has no cervical adenopathy  Neurological: He is oriented to person, place, and time  No cranial nerve deficit  He exhibits normal muscle tone  Coordination normal    Skin: Skin is warm and dry  No rash noted  He is not diaphoretic  No erythema  No pallor  Psychiatric: He has a normal mood and affect  His behavior is normal  Judgment and thought content normal    Nursing note and vitals reviewed  Patient's shoes and socks removed  Right Foot/Ankle   Right Foot Inspection  Skin Exam: skin normal and skin intact no dry skin, no warmth, no callus, no erythema, no maceration, no abnormal color, no pre-ulcer, no ulcer and no callus                          Toe Exam: ROM and strength within normal limits  Sensory       Monofilament testing: intact  Vascular  Capillary refills: < 3 seconds  The right DP pulse is 2+  The right PT pulse is 2+  Left Foot/Ankle  Left Foot Inspection  Skin Exam: skin normal and skin intactno dry skin, no warmth, no erythema, no maceration, normal color, no pre-ulcer, no ulcer and no callus                         Toe Exam: ROM and strength within normal limits                   Sensory       Monofilament: intact  Vascular  Capillary refills: < 3 seconds  The left DP pulse is 2+  The left PT pulse is 2+  Assign Risk Category:  No deformity present; No loss of protective sensation; No weak pulses       Risk: 0      BMI Counseling: Body mass index is 25 75 kg/m²  Discussed the patient's BMI with him  The BMI is above average  BMI counseling and education was provided to the patient  Nutrition recommendations include reducing portion sizes

## 2019-02-20 NOTE — PATIENT INSTRUCTIONS

## 2019-05-08 DIAGNOSIS — I10 ESSENTIAL HYPERTENSION: ICD-10-CM

## 2019-05-08 RX ORDER — OLMESARTAN MEDOXOMIL AND HYDROCHLOROTHIAZIDE 20/12.5 20; 12.5 MG/1; MG/1
1 TABLET ORAL DAILY
Qty: 30 TABLET | Refills: 2
Start: 2019-05-08 | End: 2019-05-24 | Stop reason: SDUPTHER

## 2019-05-09 ENCOUNTER — APPOINTMENT (OUTPATIENT)
Dept: LAB | Facility: MEDICAL CENTER | Age: 84
End: 2019-05-09
Payer: MEDICARE

## 2019-05-09 DIAGNOSIS — E11.22 TYPE 2 DIABETES MELLITUS WITH STAGE 3 CHRONIC KIDNEY DISEASE, UNSPECIFIED WHETHER LONG TERM INSULIN USE: ICD-10-CM

## 2019-05-09 DIAGNOSIS — D50.0 IRON DEFICIENCY ANEMIA DUE TO CHRONIC BLOOD LOSS: ICD-10-CM

## 2019-05-09 DIAGNOSIS — N18.3 TYPE 2 DIABETES MELLITUS WITH STAGE 3 CHRONIC KIDNEY DISEASE, UNSPECIFIED WHETHER LONG TERM INSULIN USE: ICD-10-CM

## 2019-05-09 DIAGNOSIS — R97.20 ELEVATED PSA: Primary | ICD-10-CM

## 2019-05-09 LAB
CREAT UR-MCNC: 124 MG/DL
HCV AB SER QL: NORMAL
MICROALBUMIN UR-MCNC: 71.9 MG/L (ref 0–20)
MICROALBUMIN/CREAT 24H UR: 58 MG/G CREATININE (ref 0–30)
PSA SERPL-MCNC: 16 NG/ML (ref 0–4)

## 2019-05-09 PROCEDURE — 82043 UR ALBUMIN QUANTITATIVE: CPT | Performed by: FAMILY MEDICINE

## 2019-05-09 PROCEDURE — 83020 HEMOGLOBIN ELECTROPHORESIS: CPT

## 2019-05-09 PROCEDURE — 86803 HEPATITIS C AB TEST: CPT | Performed by: FAMILY MEDICINE

## 2019-05-09 PROCEDURE — G0103 PSA SCREENING: HCPCS | Performed by: FAMILY MEDICINE

## 2019-05-09 PROCEDURE — 36415 COLL VENOUS BLD VENIPUNCTURE: CPT | Performed by: FAMILY MEDICINE

## 2019-05-09 PROCEDURE — 82570 ASSAY OF URINE CREATININE: CPT | Performed by: FAMILY MEDICINE

## 2019-05-10 ENCOUNTER — TELEPHONE (OUTPATIENT)
Dept: UROLOGY | Facility: AMBULATORY SURGERY CENTER | Age: 84
End: 2019-05-10

## 2019-05-13 LAB
DEPRECATED HGB OTHER BLD-IMP: 0 %
HGB A MFR BLD: 4.1 % (ref 1.8–3.2)
HGB A MFR BLD: 95.9 % (ref 96.4–98.8)
HGB C MFR BLD: 0 %
HGB F MFR BLD: 0 % (ref 0–2)
HGB FRACT BLD-IMP: ABNORMAL
HGB S BLD QL SOLY: NEGATIVE
HGB S MFR BLD: 0 %

## 2019-05-15 ENCOUNTER — OFFICE VISIT (OUTPATIENT)
Dept: FAMILY MEDICINE CLINIC | Facility: CLINIC | Age: 84
End: 2019-05-15
Payer: MEDICARE

## 2019-05-15 VITALS
SYSTOLIC BLOOD PRESSURE: 150 MMHG | TEMPERATURE: 96.5 F | WEIGHT: 179 LBS | DIASTOLIC BLOOD PRESSURE: 70 MMHG | HEIGHT: 69 IN | BODY MASS INDEX: 26.51 KG/M2

## 2019-05-15 DIAGNOSIS — D50.8 OTHER IRON DEFICIENCY ANEMIA: ICD-10-CM

## 2019-05-15 DIAGNOSIS — I25.10 ATHEROSCLEROSIS OF NATIVE CORONARY ARTERY OF NATIVE HEART WITHOUT ANGINA PECTORIS: ICD-10-CM

## 2019-05-15 DIAGNOSIS — I73.9 PAD (PERIPHERAL ARTERY DISEASE) (HCC): ICD-10-CM

## 2019-05-15 DIAGNOSIS — E06.3 HYPOTHYROIDISM DUE TO HASHIMOTO'S THYROIDITIS: ICD-10-CM

## 2019-05-15 DIAGNOSIS — E11.00 TYPE 2 DIABETES MELLITUS WITH HYPEROSMOLARITY WITHOUT COMA, WITHOUT LONG-TERM CURRENT USE OF INSULIN (HCC): Primary | ICD-10-CM

## 2019-05-15 DIAGNOSIS — E03.8 HYPOTHYROIDISM DUE TO HASHIMOTO'S THYROIDITIS: ICD-10-CM

## 2019-05-15 DIAGNOSIS — D56.3 BETA THALASSEMIA MINOR: ICD-10-CM

## 2019-05-15 DIAGNOSIS — N18.30 TYPE 2 DIABETES MELLITUS WITH STAGE 3 CHRONIC KIDNEY DISEASE, WITHOUT LONG-TERM CURRENT USE OF INSULIN (HCC): ICD-10-CM

## 2019-05-15 DIAGNOSIS — E78.2 MIXED HYPERLIPIDEMIA: ICD-10-CM

## 2019-05-15 DIAGNOSIS — I10 ESSENTIAL HYPERTENSION: ICD-10-CM

## 2019-05-15 DIAGNOSIS — R97.20 ELEVATED PSA: ICD-10-CM

## 2019-05-15 DIAGNOSIS — E11.22 TYPE 2 DIABETES MELLITUS WITH STAGE 3 CHRONIC KIDNEY DISEASE, WITHOUT LONG-TERM CURRENT USE OF INSULIN (HCC): ICD-10-CM

## 2019-05-15 PROCEDURE — 99214 OFFICE O/P EST MOD 30 MIN: CPT | Performed by: FAMILY MEDICINE

## 2019-05-15 RX ORDER — AMLODIPINE BESYLATE 10 MG/1
10 TABLET ORAL DAILY
Qty: 90 TABLET | Refills: 1 | Status: SHIPPED | OUTPATIENT
Start: 2019-05-15 | End: 2019-07-11 | Stop reason: SDUPTHER

## 2019-05-20 ENCOUNTER — APPOINTMENT (OUTPATIENT)
Dept: LAB | Facility: MEDICAL CENTER | Age: 84
End: 2019-05-20
Payer: MEDICARE

## 2019-05-20 ENCOUNTER — OFFICE VISIT (OUTPATIENT)
Dept: UROLOGY | Facility: CLINIC | Age: 84
End: 2019-05-20
Payer: MEDICARE

## 2019-05-20 VITALS
HEART RATE: 72 BPM | HEIGHT: 70 IN | WEIGHT: 179 LBS | BODY MASS INDEX: 25.62 KG/M2 | DIASTOLIC BLOOD PRESSURE: 60 MMHG | SYSTOLIC BLOOD PRESSURE: 150 MMHG

## 2019-05-20 DIAGNOSIS — R39.89 ABNORMAL PROSTATE EXAM: Primary | ICD-10-CM

## 2019-05-20 DIAGNOSIS — R97.20 ELEVATED PSA: ICD-10-CM

## 2019-05-20 DIAGNOSIS — R39.89 ABNORMAL PROSTATE EXAM: ICD-10-CM

## 2019-05-20 LAB
ANION GAP SERPL CALCULATED.3IONS-SCNC: 5 MMOL/L (ref 4–13)
BUN SERPL-MCNC: 32 MG/DL (ref 5–25)
CALCIUM SERPL-MCNC: 8.5 MG/DL (ref 8.3–10.1)
CHLORIDE SERPL-SCNC: 107 MMOL/L (ref 100–108)
CO2 SERPL-SCNC: 28 MMOL/L (ref 21–32)
CREAT SERPL-MCNC: 1.53 MG/DL (ref 0.6–1.3)
GFR SERPL CREATININE-BSD FRML MDRD: 41 ML/MIN/1.73SQ M
GLUCOSE P FAST SERPL-MCNC: 126 MG/DL (ref 65–99)
POTASSIUM SERPL-SCNC: 4.6 MMOL/L (ref 3.5–5.3)
SODIUM SERPL-SCNC: 140 MMOL/L (ref 136–145)

## 2019-05-20 PROCEDURE — 36415 COLL VENOUS BLD VENIPUNCTURE: CPT

## 2019-05-20 PROCEDURE — 99204 OFFICE O/P NEW MOD 45 MIN: CPT | Performed by: UROLOGY

## 2019-05-20 PROCEDURE — 80048 BASIC METABOLIC PNL TOTAL CA: CPT

## 2019-05-24 ENCOUNTER — HOSPITAL ENCOUNTER (OUTPATIENT)
Dept: NUCLEAR MEDICINE | Facility: HOSPITAL | Age: 84
Discharge: HOME | End: 2019-05-24
Attending: UROLOGY
Payer: MEDICARE

## 2019-05-24 DIAGNOSIS — I10 ESSENTIAL HYPERTENSION: ICD-10-CM

## 2019-05-24 DIAGNOSIS — R39.89 ABNORMAL PROSTATE EXAM: ICD-10-CM

## 2019-05-24 DIAGNOSIS — R97.20 ELEVATED PSA: ICD-10-CM

## 2019-05-24 PROCEDURE — 78306 BONE IMAGING WHOLE BODY: CPT

## 2019-05-24 PROCEDURE — A9503 TC99M MEDRONATE: HCPCS

## 2019-05-24 RX ORDER — OLMESARTAN MEDOXOMIL AND HYDROCHLOROTHIAZIDE 20/12.5 20; 12.5 MG/1; MG/1
1 TABLET ORAL DAILY
Qty: 30 TABLET | Refills: 0 | Status: SHIPPED | OUTPATIENT
Start: 2019-05-24 | End: 2019-06-21 | Stop reason: SDUPTHER

## 2019-05-24 RX ORDER — OLMESARTAN MEDOXOMIL AND HYDROCHLOROTHIAZIDE 20/12.5 20; 12.5 MG/1; MG/1
1 TABLET ORAL DAILY
Qty: 30 TABLET | Refills: 0
Start: 2019-05-24 | End: 2019-05-24 | Stop reason: SDUPTHER

## 2019-06-17 ENCOUNTER — HOSPITAL ENCOUNTER (OUTPATIENT)
Dept: RADIOLOGY | Facility: HOSPITAL | Age: 84
Discharge: HOME | End: 2019-06-17
Attending: UROLOGY
Payer: MEDICARE

## 2019-06-17 DIAGNOSIS — R97.20 ELEVATED PSA: ICD-10-CM

## 2019-06-17 DIAGNOSIS — R39.89 ABNORMAL PROSTATE EXAM: ICD-10-CM

## 2019-06-17 PROCEDURE — 76377 3D RENDER W/INTRP POSTPROCES: CPT

## 2019-06-17 PROCEDURE — 72197 MRI PELVIS W/O & W/DYE: CPT

## 2019-06-17 PROCEDURE — A9585 GADOBUTROL INJECTION: HCPCS | Performed by: UROLOGY

## 2019-06-17 RX ADMIN — GADOBUTROL 8 ML: 604.72 INJECTION INTRAVENOUS at 11:43

## 2019-06-19 LAB
LEFT EYE DIABETIC RETINOPATHY: NORMAL
RIGHT EYE DIABETIC RETINOPATHY: NORMAL

## 2019-06-21 DIAGNOSIS — I10 ESSENTIAL HYPERTENSION: ICD-10-CM

## 2019-06-21 RX ORDER — OLMESARTAN MEDOXOMIL AND HYDROCHLOROTHIAZIDE 20/12.5 20; 12.5 MG/1; MG/1
1 TABLET ORAL DAILY
Qty: 30 TABLET | Refills: 0 | Status: SHIPPED | OUTPATIENT
Start: 2019-06-21 | End: 2019-07-22 | Stop reason: SDUPTHER

## 2019-06-24 ENCOUNTER — OFFICE VISIT (OUTPATIENT)
Dept: UROLOGY | Facility: CLINIC | Age: 84
End: 2019-06-24
Payer: MEDICARE

## 2019-06-24 ENCOUNTER — TELEPHONE (OUTPATIENT)
Dept: UROLOGY | Facility: CLINIC | Age: 84
End: 2019-06-24

## 2019-06-24 VITALS
BODY MASS INDEX: 25.11 KG/M2 | WEIGHT: 175 LBS | SYSTOLIC BLOOD PRESSURE: 152 MMHG | DIASTOLIC BLOOD PRESSURE: 68 MMHG | HEART RATE: 72 BPM

## 2019-06-24 DIAGNOSIS — R97.20 ELEVATED PSA: Primary | ICD-10-CM

## 2019-06-24 DIAGNOSIS — R39.89 ABNORMAL PROSTATE EXAM: ICD-10-CM

## 2019-06-24 PROCEDURE — 99213 OFFICE O/P EST LOW 20 MIN: CPT | Performed by: UROLOGY

## 2019-07-11 DIAGNOSIS — I10 ESSENTIAL HYPERTENSION: ICD-10-CM

## 2019-07-11 RX ORDER — AMLODIPINE BESYLATE 10 MG/1
10 TABLET ORAL DAILY
Qty: 90 TABLET | Refills: 1 | Status: SHIPPED | OUTPATIENT
Start: 2019-07-11 | End: 2021-03-30 | Stop reason: SDUPTHER

## 2019-07-22 DIAGNOSIS — I10 ESSENTIAL HYPERTENSION: ICD-10-CM

## 2019-07-22 RX ORDER — OLMESARTAN MEDOXOMIL AND HYDROCHLOROTHIAZIDE 20/12.5 20; 12.5 MG/1; MG/1
1 TABLET ORAL DAILY
Qty: 30 TABLET | Refills: 0 | Status: SHIPPED | OUTPATIENT
Start: 2019-07-22 | End: 2019-08-22 | Stop reason: SDUPTHER

## 2019-08-21 ENCOUNTER — OFFICE VISIT (OUTPATIENT)
Dept: FAMILY MEDICINE CLINIC | Facility: CLINIC | Age: 84
End: 2019-08-21
Payer: MEDICARE

## 2019-08-21 VITALS
WEIGHT: 175 LBS | DIASTOLIC BLOOD PRESSURE: 66 MMHG | TEMPERATURE: 97.2 F | BODY MASS INDEX: 25.92 KG/M2 | SYSTOLIC BLOOD PRESSURE: 134 MMHG | HEIGHT: 69 IN

## 2019-08-21 DIAGNOSIS — E78.2 MIXED HYPERLIPIDEMIA: ICD-10-CM

## 2019-08-21 DIAGNOSIS — I10 ESSENTIAL HYPERTENSION: ICD-10-CM

## 2019-08-21 DIAGNOSIS — E11.00 TYPE 2 DIABETES MELLITUS WITH HYPEROSMOLARITY WITHOUT COMA, WITHOUT LONG-TERM CURRENT USE OF INSULIN (HCC): Primary | ICD-10-CM

## 2019-08-21 DIAGNOSIS — E03.3 POSTINFECTIOUS HYPOTHYROIDISM: ICD-10-CM

## 2019-08-21 LAB — SL AMB POCT HEMOGLOBIN AIC: 5.6 (ref ?–6.5)

## 2019-08-21 PROCEDURE — 99214 OFFICE O/P EST MOD 30 MIN: CPT | Performed by: FAMILY MEDICINE

## 2019-08-21 PROCEDURE — 83036 HEMOGLOBIN GLYCOSYLATED A1C: CPT | Performed by: FAMILY MEDICINE

## 2019-08-21 RX ORDER — FLUOROURACIL 50 MG/G
CREAM TOPICAL
Refills: 0 | COMMUNITY
Start: 2019-07-12 | End: 2020-04-14

## 2019-08-21 NOTE — PROGRESS NOTES
Assessment/Plan:  A1c is 5 6  Diabetes hypertension anemia hypothyroidism all stable  Labs reviewed  Patient will have refills given when needed  Follow-up in 3 months  the refills were given when needed  Diagnoses and all orders for this visit:    Type 2 diabetes mellitus with hyperosmolarity without coma, without long-term current use of insulin (HCC)  -     POCT hemoglobin A1c    Essential hypertension    Mixed hyperlipidemia    Postinfectious hypothyroidism    Other orders  -     fluorouracil (EFUDEX) 5 % cream; apply to ROUGH SPOT ON RIGHT EAR ONCE NIGHTLY AT SUPPER TIME FOR 6 WEEKS  [facility] OFF IN AM            Subjective:        Patient ID: Fannie Azul is a 80 y o  male  Patient follow-up on diabetes hypertension hyperlipidemia hypothyroidism  Patient also with history of elevated PSA and did see Urology  Patient had negative workup for cancer  Patient has follow-up in June of 2020  The no chest pain shortness of breath or problems with urination defecation  The no foot related issues  The patient the vision is stable overall  All other review systems negative        The following portions of the patient's history were reviewed and updated as appropriate: allergies, current medications, past family history, past medical history, past social history, past surgical history and problem list       Review of Systems   Constitutional: Negative  HENT: Negative  Eyes: Negative  Respiratory: Negative  Cardiovascular: Negative  Gastrointestinal: Negative  Endocrine: Negative  Genitourinary: Negative  Musculoskeletal: Negative  Skin: Negative  Allergic/Immunologic: Negative  Neurological: Negative  Hematological: Negative  Psychiatric/Behavioral: Negative  Objective:      BMI Counseling: Body mass index is 25 84 kg/m²  Discussed the patient's BMI with him  The BMI is above average  BMI counseling and education was provided to the patient   Nutrition recommendations include reducing portion sizes  Depression Screening Follow-up Plan: Patient's depression screening was positive with a PHQ-2 score of   Their PHQ-9 score was   Patient assessed for underlying major depression  They have no active suicidal ideations  Brief counseling provided and recommend additional follow-up/re-evaluation next office visit  /66 (BP Location: Right arm, Patient Position: Sitting, Cuff Size: Adult)   Temp (!) 97 2 °F (36 2 °C) (Tympanic)   Ht 5' 9" (1 753 m)   Wt 79 4 kg (175 lb)   BMI 25 84 kg/m²          Physical Exam   Constitutional: He is oriented to person, place, and time  He appears well-developed and well-nourished  No distress  HENT:   Head: Normocephalic  Right Ear: External ear normal    Left Ear: External ear normal    Mouth/Throat: Oropharynx is clear and moist  No oropharyngeal exudate  Eyes: Pupils are equal, round, and reactive to light  EOM are normal  Right eye exhibits no discharge  Left eye exhibits no discharge  No scleral icterus  Neck: Normal range of motion  Neck supple  No thyromegaly present  Cardiovascular: Normal rate, regular rhythm, normal heart sounds and intact distal pulses  Exam reveals no gallop and no friction rub  No murmur heard  Pulmonary/Chest: Effort normal and breath sounds normal  No respiratory distress  He has no wheezes  He has no rales  He exhibits no tenderness  Abdominal: Soft  Bowel sounds are normal  He exhibits no distension  There is no tenderness  There is no rebound and no guarding  Musculoskeletal: Normal range of motion  He exhibits no edema or tenderness  Lymphadenopathy:     He has no cervical adenopathy  Neurological: He is oriented to person, place, and time  No cranial nerve deficit  He exhibits normal muscle tone  Coordination normal    Skin: Skin is warm and dry  No rash noted  He is not diaphoretic  No erythema  No pallor  Psychiatric: He has a normal mood and affect   His behavior is normal  Judgment and thought content normal    Nursing note and vitals reviewed

## 2019-08-22 DIAGNOSIS — I10 ESSENTIAL HYPERTENSION: ICD-10-CM

## 2019-08-22 RX ORDER — OLMESARTAN MEDOXOMIL AND HYDROCHLOROTHIAZIDE 20/12.5 20; 12.5 MG/1; MG/1
1 TABLET ORAL DAILY
Qty: 90 TABLET | Refills: 1 | Status: SHIPPED | OUTPATIENT
Start: 2019-08-22 | End: 2020-02-11

## 2019-10-15 DIAGNOSIS — E03.9 PRIMARY HYPOTHYROIDISM: ICD-10-CM

## 2019-10-15 RX ORDER — LEVOTHYROXINE SODIUM 0.03 MG/1
25 TABLET ORAL DAILY
Qty: 90 TABLET | Refills: 1 | Status: SHIPPED | OUTPATIENT
Start: 2019-10-15 | End: 2020-04-21 | Stop reason: SDUPTHER

## 2019-10-25 ENCOUNTER — OFFICE VISIT (OUTPATIENT)
Dept: FAMILY MEDICINE CLINIC | Facility: CLINIC | Age: 84
End: 2019-10-25
Payer: MEDICARE

## 2019-10-25 VITALS
HEIGHT: 69 IN | BODY MASS INDEX: 26.36 KG/M2 | WEIGHT: 178 LBS | DIASTOLIC BLOOD PRESSURE: 60 MMHG | SYSTOLIC BLOOD PRESSURE: 140 MMHG

## 2019-10-25 DIAGNOSIS — E03.8 HYPOTHYROIDISM DUE TO HASHIMOTO'S THYROIDITIS: ICD-10-CM

## 2019-10-25 DIAGNOSIS — E78.2 MIXED HYPERLIPIDEMIA: ICD-10-CM

## 2019-10-25 DIAGNOSIS — E06.3 HYPOTHYROIDISM DUE TO HASHIMOTO'S THYROIDITIS: ICD-10-CM

## 2019-10-25 DIAGNOSIS — S39.012A STRAIN OF LUMBAR REGION, INITIAL ENCOUNTER: ICD-10-CM

## 2019-10-25 DIAGNOSIS — I25.10 ATHEROSCLEROSIS OF NATIVE CORONARY ARTERY OF NATIVE HEART WITHOUT ANGINA PECTORIS: ICD-10-CM

## 2019-10-25 DIAGNOSIS — E11.00 TYPE 2 DIABETES MELLITUS WITH HYPEROSMOLARITY WITHOUT COMA, WITHOUT LONG-TERM CURRENT USE OF INSULIN (HCC): Primary | ICD-10-CM

## 2019-10-25 DIAGNOSIS — I10 ESSENTIAL HYPERTENSION: ICD-10-CM

## 2019-10-25 PROCEDURE — 99213 OFFICE O/P EST LOW 20 MIN: CPT | Performed by: FAMILY MEDICINE

## 2019-10-25 RX ORDER — CELECOXIB 200 MG/1
200 CAPSULE ORAL DAILY
Qty: 30 CAPSULE | Refills: 2 | Status: SHIPPED | OUTPATIENT
Start: 2019-10-25 | End: 2020-06-19

## 2019-10-25 RX ORDER — CYCLOBENZAPRINE HCL 10 MG
10 TABLET ORAL 3 TIMES DAILY PRN
Qty: 30 TABLET | Refills: 0 | Status: SHIPPED | OUTPATIENT
Start: 2019-10-25 | End: 2020-04-14

## 2019-10-25 NOTE — PROGRESS NOTES
Assessment/Plan: To consider physical therapy  Patient use Celebrex and cyclobenzaprine as directed  Diagnoses and all orders for this visit:    Type 2 diabetes mellitus with hyperosmolarity without coma, without long-term current use of insulin ([facility] )    Hypothyroidism due to Hashimoto's thyroiditis    Atherosclerosis of native coronary artery of native heart without angina pectoris    Essential hypertension    Mixed hyperlipidemia    Strain of lumbar region, initial encounter  -     celecoxib (CeleBREX) 200 mg capsule; Take 1 capsule (200 mg total) by mouth daily  -     cyclobenzaprine (FLEXERIL) 10 mg tablet; Take 1 tablet (10 mg total) by mouth 3 (three) times a day as needed for muscle spasms            Subjective:        Patient ID: Dimple Bermeo is a 80 y o  male  Patient is here with some the back pain over the past few weeks  Patient was a reaching for something with left hand when pain ensued  No radicular symptoms  No change in urination or defecation or chest pain shortness of breath  No headache or visual changes  The pain worse 1st thing in the morning and improves with time the  Patient using Tylenol  Patient did go to chiropractor without any significant improvement  The following portions of the patient's history were reviewed and updated as appropriate: allergies, current medications, past family history, past medical history, past social history, past surgical history and problem list       Review of Systems   Constitutional: Negative  HENT: Negative  Eyes: Negative  Respiratory: Negative  Cardiovascular: Negative  Gastrointestinal: Negative  Endocrine: Negative  Genitourinary: Negative  Musculoskeletal: Positive for back pain  Skin: Negative  Allergic/Immunologic: Negative  Neurological: Negative  Hematological: Negative  Psychiatric/Behavioral: Negative  Objective:      BMI Counseling: Body mass index is 26 29 kg/m²  Discussed the patient's BMI with him  The BMI is above normal  Nutrition recommendations include reducing portion sizes  /60 (BP Location: Right arm, Patient Position: Sitting, Cuff Size: Standard)   Ht 5' 9" (1 753 m)   Wt 80 7 kg (178 lb)   BMI 26 29 kg/m²          Physical Exam   Constitutional: He is oriented to person, place, and time  He appears well-developed and well-nourished  Musculoskeletal: Normal range of motion  He exhibits tenderness  Paravertebral muscle spasm pain right lumbar region the greater than left  Some pain with extension and flexion  Negative straight leg raise bilaterally  Gross sensation to the light touch intact bilaterally lower extremities   Neurological: He is alert and oriented to person, place, and time  No sensory deficit  Skin: Skin is warm and dry  Nursing note and vitals reviewed

## 2020-01-10 ENCOUNTER — OFFICE VISIT (OUTPATIENT)
Dept: FAMILY MEDICINE CLINIC | Facility: CLINIC | Age: 85
End: 2020-01-10
Payer: MEDICARE

## 2020-01-10 VITALS
HEIGHT: 69 IN | WEIGHT: 178 LBS | DIASTOLIC BLOOD PRESSURE: 72 MMHG | BODY MASS INDEX: 26.36 KG/M2 | SYSTOLIC BLOOD PRESSURE: 152 MMHG | HEART RATE: 68 BPM

## 2020-01-10 DIAGNOSIS — Z00.00 MEDICARE ANNUAL WELLNESS VISIT, SUBSEQUENT: ICD-10-CM

## 2020-01-10 DIAGNOSIS — Z23 ENCOUNTER FOR ADMINISTRATION OF VACCINE: ICD-10-CM

## 2020-01-10 DIAGNOSIS — I25.10 ATHEROSCLEROSIS OF NATIVE CORONARY ARTERY OF NATIVE HEART WITHOUT ANGINA PECTORIS: ICD-10-CM

## 2020-01-10 DIAGNOSIS — I10 ESSENTIAL HYPERTENSION: ICD-10-CM

## 2020-01-10 DIAGNOSIS — E11.00 TYPE 2 DIABETES MELLITUS WITH HYPEROSMOLARITY WITHOUT COMA, WITHOUT LONG-TERM CURRENT USE OF INSULIN (HCC): Primary | ICD-10-CM

## 2020-01-10 DIAGNOSIS — E03.8 HYPOTHYROIDISM DUE TO HASHIMOTO'S THYROIDITIS: ICD-10-CM

## 2020-01-10 DIAGNOSIS — I73.9 PAD (PERIPHERAL ARTERY DISEASE) (HCC): ICD-10-CM

## 2020-01-10 DIAGNOSIS — E06.3 HYPOTHYROIDISM DUE TO HASHIMOTO'S THYROIDITIS: ICD-10-CM

## 2020-01-10 DIAGNOSIS — E78.2 MIXED HYPERLIPIDEMIA: ICD-10-CM

## 2020-01-10 DIAGNOSIS — N18.30 TYPE 2 DIABETES MELLITUS WITH STAGE 3 CHRONIC KIDNEY DISEASE, WITHOUT LONG-TERM CURRENT USE OF INSULIN (HCC): ICD-10-CM

## 2020-01-10 DIAGNOSIS — K51.00 ULCERATIVE PANCOLITIS WITHOUT COMPLICATION (HCC): ICD-10-CM

## 2020-01-10 DIAGNOSIS — E11.22 TYPE 2 DIABETES MELLITUS WITH STAGE 3 CHRONIC KIDNEY DISEASE, WITHOUT LONG-TERM CURRENT USE OF INSULIN (HCC): ICD-10-CM

## 2020-01-10 PROCEDURE — 90662 IIV NO PRSV INCREASED AG IM: CPT

## 2020-01-10 PROCEDURE — 99214 OFFICE O/P EST MOD 30 MIN: CPT

## 2020-01-10 PROCEDURE — 1123F ACP DISCUSS/DSCN MKR DOCD: CPT

## 2020-01-10 PROCEDURE — G0439 PPPS, SUBSEQ VISIT: HCPCS

## 2020-01-10 PROCEDURE — G0008 ADMIN INFLUENZA VIRUS VAC: HCPCS

## 2020-01-10 NOTE — PROGRESS NOTES
Assessment and Plan:     Problem List Items Addressed This Visit     None           Preventive health issues were discussed with patient, and age appropriate screening tests were ordered as noted in patient's After Visit Summary  Personalized health advice and appropriate referrals for health education or preventive services given if needed, as noted in patient's After Visit Summary  History of Present Illness:     Patient presents for Welcome to Medicare visit       Patient Care Team:  Mere Cardoza DO as PCP - General (Family Medicine)  Slava Blackwell DO     Review of Systems:     Review of Systems   Problem List:     Patient Active Problem List   Diagnosis    Anemia    Atherosclerotic heart disease of native coronary artery without angina pectoris    DMII (diabetes mellitus, type 2) ([facility] )    Hyperlipidemia    Hypertension    Hypothyroidism    PAD (peripheral artery disease) ([facility] )    Skin neoplasm    Squamous cell carcinoma    Dermatitis    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)    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)    Beta thalassemia minor    Elevated PSA    Abnormal prostate exam    Lumbar strain      Past Medical and Surgical History:     Past Medical History:   Diagnosis Date    Diabetes mellitus ([facility] )      Past Surgical History:   Procedure Laterality Date    COLONOSCOPY      PROSTATE BIOPSY      needle,  resolved may 2005      Family History:     Family History   Problem Relation Age of Onset    No Known Problems Mother       Social History:     Social History     Socioeconomic History    Marital status: /Civil Union     Spouse name: Not on file    Number of children: Not on file    Years of education: Not on file    Highest education level: Not on file   Occupational History    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[place] [address] resource strain: Not on file    Food insecurity:     Worry: Not on file Inability: Not on file    Transportation needs:     Medical: Not on file     Non-medical: Not on file   Tobacco Use    Smoking status: Former Smoker    Smokeless tobacco: Never Used   Substance and Sexual Activity    Alcohol use: Yes     Comment: social/rarely    Drug use: No    Sexual activity: Never   Lifestyle    Physical activity:     Days per week: Not on file     Minutes per session: Not on file    Stress: Not on file   Relationships    Social connections:     Talks on phone: Not on file     Gets together: Not on file     Attends [employer] service: Not on file     Active member of club or organization: Not on file     Attends meetings of clubs or organizations: Not on file     Relationship status: Not on file    Intimate partner violence:     Fear of current or ex partner: Not on file     Emotionally abused: Not on file     Physically abused: Not on file     Forced sexual activity: Not on file   Other Topics Concern    Not on file   Social History Narrative    Not on file      Medications and Allergies:     Current Outpatient Medications   Medication Sig Dispense Refill    amLODIPine (NORVASC) 10 mg tablet Take 1 tablet (10 mg total) by mouth daily 90 tablet 1    atorvastatin (LIPITOR) 20 mg tablet Take 1 tablet by mouth daily      celecoxib (CeleBREX) 200 mg capsule Take 1 capsule (200 mg total) by mouth daily 30 capsule 2    cholecalciferol (VITAMIN D3) 1,000 units tablet Take by mouth      cyclobenzaprine (FLEXERIL) 10 mg tablet Take 1 tablet (10 mg total) by mouth 3 (three) times a day as needed for muscle spasms 30 tablet 0    fexofenadine (ALLEGRA) 180 MG tablet Take by mouth      fluorouracil (EFUDEX) 5 % cream apply to ROUGH SPOT ON RIGHT EAR ONCE NIGHTLY AT SUPPER TIME FOR 6 WEEKS   [facility] OFF IN AM  0    fluticasone (FLONASE) 50 mcg/act nasal spray instill 2 sprays into each nostril once daily as directed  0    folic acid (FOLVITE) 1 mg tablet Take 1 tablet by mouth daily      levothyroxine 25 mcg tablet Take 1 tablet (25 mcg total) by mouth daily 90 tablet 1    metoprolol tartrate (LOPRESSOR) 50 mg tablet Take 1 tablet by mouth 2 (two) times a day      mometasone (ELOCON) 0 1 % cream Apply topically daily 45 g 0    montelukast (SINGULAIR) 10 mg tablet Take by mouth      Multiple Vitamins-Minerals (CENTRUM SILVER) tablet Take by mouth      olmesartan-hydrochlorothiazide (BENICAR HCT) 20-12 5 MG per tablet Take 1 tablet by mouth daily 90 tablet 1    Omega-3 Fatty Acids (FISH OIL) 1000 MG CPDR Take by mouth      Potassium 99 MG TABS Take by mouth      Saw Palmetto 160 MG CAPS Take by mouth      sulfaSALAzine (AZULFIDINE) 500 mg tablet   0    zinc gluconate 50 mg tablet Take by mouth       No current facility-administered medications for this visit  Allergies   Allergen Reactions    Lisinopril     Candesartan Rash      Immunizations:     Immunization History   Administered Date(s) Administered    INFLUENZA 10/08/2018    Influenza Split High Dose Preservative Free IM 11/07/2017    Influenza, high dose seasonal 0 5 mL 11/19/2018    Pneumococcal Conjugate 13-Valent 10/18/2016    Pneumococcal Polysaccharide PPV23 11/07/2017    Td (adult), adsorbed 10/23/2007      Health Maintenance:         Topic Date Due    Hepatitis C Screening  Completed         Topic Date Due    Influenza Vaccine  07/01/2019      Medicare Screening Tests and Risk Assessments:     Chelsy Junior is here for his Subsequent Wellness visit  Health Risk Assessment:   Patient rates overall health as good  Patient feels that their physical health rating is same  Eyesight was rated as same  Hearing was rated as same  Patient feels that their emotional and mental health rating is same  Pain experienced in the last 7 days has been none  Patient states that he has experienced no weight loss or gain in last 6 months  Depression Screening:   PHQ-2 Score: 0      Fall Risk Screening:    In the past year, patient has experienced: no history of falling in past year      Home Safety:  Patient does not have trouble with stairs inside or outside of their home  Patient has working smoke alarms and has working carbon monoxide detector  Home safety hazards include: none  Nutrition:   Current diet is Regular and Limited junk food  Medications:   Patient is currently taking over-the-counter supplements  OTC medications include: see medication list  Patient is able to manage medications  Activities of Daily Living (ADLs)/Instrumental Activities of Daily Living (IADLs):   Walk and transfer into and out of bed and chair?: Yes  Dress and groom yourself?: Yes    Bathe or shower yourself?: Yes    Feed yourself? Yes  Do your laundry/housekeeping?: Yes  Manage your money, pay your bills and track your expenses?: Yes  Make your own meals?: Yes    Do your own shopping?: Yes    Advance Care Planning:   Living will: Yes    Durable POA for healthcare:  Yes    Advanced directive: Yes      PREVENTIVE SCREENINGS      Cardiovascular Screening:    General: Screening Not Indicated, History Lipid Disorder and Risks and Benefits Discussed    Due for: Lipid Panel      Diabetes Screening:     General: Screening Not Indicated, History Diabetes and Risks and Benefits Discussed    Due for: Blood Glucose      Colorectal Cancer Screening:     General: Risks and Benefits Discussed and Screening Not Indicated      Prostate Cancer Screening:    General: Screening Not Indicated, Risks and Benefits Discussed and Screening Current      Osteoporosis Screening:    General: Risks and Benefits Discussed      Abdominal Aortic Aneurysm (AAA) Screening:    Risk factors include: tobacco use        General: Risks and Benefits Discussed and Screening Not Indicated      Lung Cancer Screening:     General: Risks and Benefits Discussed and Screening Not Indicated      Hepatitis C Screening:    General: Screening Current and Risks and Benefits Discussed    Other Counseling Topics:   Calcium and vitamin D intake and regular weightbearing exercise  No exam data present     Physical Exam:     There were no vitals taken for this visit      Physical Exam    Alban Bingham DO

## 2020-01-10 NOTE — PATIENT INSTRUCTIONS

## 2020-01-10 NOTE — PROGRESS NOTES
Assessment/Plan:       Diagnoses and all orders for this visit:    Type 2 diabetes mellitus with hyperosmolarity without coma, without long-term current use of insulin ([address] )    Hypothyroidism due to Hashimoto's thyroiditis    Type 2 diabetes mellitus with stage 3 chronic kidney disease, without long-term current use of insulin ([facility])    Atherosclerosis of native coronary artery of native heart without angina pectoris    Essential hypertension    Mixed hyperlipidemia    Medicare annual wellness visit, subsequent  -     influenza vaccine, [id number], high-dose, PF 0 5 mL (FLUZONE HIGH-DOSE)            Subjective:        Patient ID: Jeremy Lacy is a 80 y o  male  Patient here to follow-up on diabetes hypertension hyperlipidemia as well as CAD and hypothyroidism  Patient doing very well at this time  No new headache or blurred vision or chest pain or shortness of breath or problems urinating or defecating  Feet are doing well without any complaints  All other review systems negative        The following portions of the patient's history were reviewed and updated as appropriate: allergies, current medications, past family history, past medical history, past social history, past surgical history and problem list       Review of Systems   Constitutional: Negative  HENT: Negative  Eyes: Negative  Respiratory: Negative  Cardiovascular: Negative  Gastrointestinal: Negative  Endocrine: Negative  Genitourinary: Negative  Musculoskeletal: Negative  Skin: Negative  Allergic/Immunologic: Negative  Neurological: Negative  Hematological: Negative  Psychiatric/Behavioral: Negative  Objective:      BMI Counseling: Body mass index is 26 29 kg/m²  The BMI is above normal  Nutrition recommendations include decreasing portion sizes  Exercise recommendations include moderate physical activity 150 minutes/week                 /72 (BP Location: Right arm)   Pulse 68   Ht 5' 9" (1 753 m)   Wt 80 7 kg (178 lb)   BMI 26 29 kg/m²          Physical Exam   Constitutional: He appears well-developed and well-nourished  No distress  HENT:   Head: Normocephalic  Right Ear: External ear normal    Left Ear: External ear normal    Mouth/Throat: Oropharynx is clear and moist  No oropharyngeal exudate  Eyes: Pupils are equal, round, and reactive to light  EOM are normal  Right eye exhibits no discharge  Left eye exhibits no discharge  No scleral icterus  Neck: Normal range of motion  Neck supple  No thyromegaly present  Cardiovascular: Normal rate, regular rhythm, normal heart sounds and intact distal pulses  Exam reveals no gallop and no friction rub  No murmur heard  Pulmonary/Chest: Effort normal and breath sounds normal  No respiratory distress  He has no wheezes  He has no rales  He exhibits no tenderness  Abdominal: Soft  Bowel sounds are normal  He exhibits no distension  There is no tenderness  There is no rebound and no guarding  Musculoskeletal: Normal range of motion  He exhibits no edema or tenderness  Lymphadenopathy:     He has no cervical adenopathy  Neurological: He is alert  No cranial nerve deficit  He exhibits normal muscle tone  Coordination normal    Skin: Skin is warm and dry  No rash noted  He is not diaphoretic  No erythema  No pallor  Psychiatric: He has a normal mood and affect  His behavior is normal  Judgment and thought content normal    Nursing note and vitals reviewed

## 2020-02-11 DIAGNOSIS — I10 ESSENTIAL HYPERTENSION: ICD-10-CM

## 2020-02-11 RX ORDER — OLMESARTAN MEDOXOMIL AND HYDROCHLOROTHIAZIDE 20/12.5 20; 12.5 MG/1; MG/1
1 TABLET ORAL DAILY
Qty: 90 TABLET | Refills: 1 | Status: SHIPPED | OUTPATIENT
Start: 2020-02-11 | End: 2020-08-06

## 2020-04-07 DIAGNOSIS — E11.00 TYPE 2 DIABETES MELLITUS WITH HYPEROSMOLARITY WITHOUT COMA, WITHOUT LONG-TERM CURRENT USE OF INSULIN (HCC): Primary | ICD-10-CM

## 2020-04-14 ENCOUNTER — OFFICE VISIT (OUTPATIENT)
Dept: FAMILY MEDICINE CLINIC | Facility: CLINIC | Age: 85
End: 2020-04-14
Payer: MEDICARE

## 2020-04-14 VITALS
SYSTOLIC BLOOD PRESSURE: 146 MMHG | HEIGHT: 69 IN | TEMPERATURE: 96.9 F | BODY MASS INDEX: 26.96 KG/M2 | DIASTOLIC BLOOD PRESSURE: 70 MMHG | WEIGHT: 182 LBS

## 2020-04-14 DIAGNOSIS — E11.00 TYPE 2 DIABETES MELLITUS WITH HYPEROSMOLARITY WITHOUT COMA, WITHOUT LONG-TERM CURRENT USE OF INSULIN (HCC): Primary | ICD-10-CM

## 2020-04-14 DIAGNOSIS — I73.9 PAD (PERIPHERAL ARTERY DISEASE) (HCC): ICD-10-CM

## 2020-04-14 DIAGNOSIS — E06.3 HYPOTHYROIDISM DUE TO HASHIMOTO'S THYROIDITIS: ICD-10-CM

## 2020-04-14 DIAGNOSIS — E78.2 MIXED HYPERLIPIDEMIA: ICD-10-CM

## 2020-04-14 DIAGNOSIS — I25.10 ATHEROSCLEROSIS OF NATIVE CORONARY ARTERY OF NATIVE HEART WITHOUT ANGINA PECTORIS: ICD-10-CM

## 2020-04-14 DIAGNOSIS — E03.8 HYPOTHYROIDISM DUE TO HASHIMOTO'S THYROIDITIS: ICD-10-CM

## 2020-04-14 DIAGNOSIS — I10 ESSENTIAL HYPERTENSION: ICD-10-CM

## 2020-04-14 LAB — SL AMB POCT HEMOGLOBIN AIC: 6.1 (ref ?–6.5)

## 2020-04-14 PROCEDURE — 3008F BODY MASS INDEX DOCD: CPT | Performed by: FAMILY MEDICINE

## 2020-04-14 PROCEDURE — 3077F SYST BP >= 140 MM HG: CPT | Performed by: FAMILY MEDICINE

## 2020-04-14 PROCEDURE — 1160F RVW MEDS BY RX/DR IN RCRD: CPT | Performed by: FAMILY MEDICINE

## 2020-04-14 PROCEDURE — 3078F DIAST BP <80 MM HG: CPT | Performed by: FAMILY MEDICINE

## 2020-04-14 PROCEDURE — 4040F PNEUMOC VAC/ADMIN/RCVD: CPT | Performed by: FAMILY MEDICINE

## 2020-04-14 PROCEDURE — 3066F NEPHROPATHY DOC TX: CPT | Performed by: FAMILY MEDICINE

## 2020-04-14 PROCEDURE — 83036 HEMOGLOBIN GLYCOSYLATED A1C: CPT | Performed by: FAMILY MEDICINE

## 2020-04-14 PROCEDURE — 3044F HG A1C LEVEL LT 7.0%: CPT | Performed by: FAMILY MEDICINE

## 2020-04-14 PROCEDURE — 1036F TOBACCO NON-USER: CPT | Performed by: FAMILY MEDICINE

## 2020-04-14 PROCEDURE — 99214 OFFICE O/P EST MOD 30 MIN: CPT | Performed by: FAMILY MEDICINE

## 2020-04-14 PROCEDURE — 2022F DILAT RTA XM EVC RTNOPTHY: CPT | Performed by: FAMILY MEDICINE

## 2020-04-21 DIAGNOSIS — E03.9 PRIMARY HYPOTHYROIDISM: ICD-10-CM

## 2020-04-21 RX ORDER — LEVOTHYROXINE SODIUM 0.03 MG/1
25 TABLET ORAL DAILY
Qty: 90 TABLET | Refills: 1 | Status: SHIPPED | OUTPATIENT
Start: 2020-04-21 | End: 2020-10-12

## 2020-05-21 ENCOUNTER — TELEPHONE (OUTPATIENT)
Dept: UROLOGY | Facility: CLINIC | Age: 85
End: 2020-05-21

## 2020-06-01 ENCOUNTER — TELEPHONE (OUTPATIENT)
Dept: FAMILY MEDICINE CLINIC | Facility: CLINIC | Age: 85
End: 2020-06-01

## 2020-06-12 ENCOUNTER — OFFICE VISIT (OUTPATIENT)
Dept: FAMILY MEDICINE CLINIC | Facility: CLINIC | Age: 85
End: 2020-06-12
Payer: MEDICARE

## 2020-06-12 ENCOUNTER — TELEPHONE (OUTPATIENT)
Dept: FAMILY MEDICINE CLINIC | Facility: CLINIC | Age: 85
End: 2020-06-12

## 2020-06-12 ENCOUNTER — APPOINTMENT (OUTPATIENT)
Dept: RADIOLOGY | Facility: MEDICAL CENTER | Age: 85
End: 2020-06-12
Payer: MEDICARE

## 2020-06-12 VITALS
SYSTOLIC BLOOD PRESSURE: 170 MMHG | TEMPERATURE: 96.6 F | WEIGHT: 180 LBS | HEIGHT: 69 IN | BODY MASS INDEX: 26.66 KG/M2 | DIASTOLIC BLOOD PRESSURE: 70 MMHG

## 2020-06-12 DIAGNOSIS — S86.811A STRAIN OF CALF MUSCLE, RIGHT, INITIAL ENCOUNTER: ICD-10-CM

## 2020-06-12 DIAGNOSIS — M25.561 ACUTE PAIN OF RIGHT KNEE: ICD-10-CM

## 2020-06-12 DIAGNOSIS — M25.561 ACUTE PAIN OF RIGHT KNEE: Primary | ICD-10-CM

## 2020-06-12 PROBLEM — S86.819A STRAIN OF CALF MUSCLE: Status: ACTIVE | Noted: 2020-06-12

## 2020-06-12 PROCEDURE — 1036F TOBACCO NON-USER: CPT | Performed by: FAMILY MEDICINE

## 2020-06-12 PROCEDURE — 2022F DILAT RTA XM EVC RTNOPTHY: CPT | Performed by: FAMILY MEDICINE

## 2020-06-12 PROCEDURE — 3078F DIAST BP <80 MM HG: CPT | Performed by: FAMILY MEDICINE

## 2020-06-12 PROCEDURE — 99213 OFFICE O/P EST LOW 20 MIN: CPT | Performed by: FAMILY MEDICINE

## 2020-06-12 PROCEDURE — 3077F SYST BP >= 140 MM HG: CPT | Performed by: FAMILY MEDICINE

## 2020-06-12 PROCEDURE — 3008F BODY MASS INDEX DOCD: CPT | Performed by: FAMILY MEDICINE

## 2020-06-12 PROCEDURE — 3066F NEPHROPATHY DOC TX: CPT | Performed by: FAMILY MEDICINE

## 2020-06-12 PROCEDURE — 3044F HG A1C LEVEL LT 7.0%: CPT | Performed by: FAMILY MEDICINE

## 2020-06-12 PROCEDURE — 4040F PNEUMOC VAC/ADMIN/RCVD: CPT | Performed by: FAMILY MEDICINE

## 2020-06-12 PROCEDURE — 1160F RVW MEDS BY RX/DR IN RCRD: CPT | Performed by: FAMILY MEDICINE

## 2020-06-12 PROCEDURE — 73562 X-RAY EXAM OF KNEE 3: CPT

## 2020-06-19 ENCOUNTER — OFFICE VISIT (OUTPATIENT)
Dept: FAMILY MEDICINE CLINIC | Facility: CLINIC | Age: 85
End: 2020-06-19
Payer: MEDICARE

## 2020-06-19 VITALS
WEIGHT: 180 LBS | HEIGHT: 69 IN | SYSTOLIC BLOOD PRESSURE: 158 MMHG | BODY MASS INDEX: 26.66 KG/M2 | DIASTOLIC BLOOD PRESSURE: 60 MMHG | TEMPERATURE: 96.8 F

## 2020-06-19 DIAGNOSIS — N18.30 TYPE 2 DIABETES MELLITUS WITH STAGE 3 CHRONIC KIDNEY DISEASE, WITHOUT LONG-TERM CURRENT USE OF INSULIN (HCC): ICD-10-CM

## 2020-06-19 DIAGNOSIS — M17.11 LOCALIZED OSTEOARTHRITIS OF RIGHT KNEE: Primary | ICD-10-CM

## 2020-06-19 DIAGNOSIS — E11.22 TYPE 2 DIABETES MELLITUS WITH STAGE 3 CHRONIC KIDNEY DISEASE, WITHOUT LONG-TERM CURRENT USE OF INSULIN (HCC): ICD-10-CM

## 2020-06-19 LAB
CREAT UR-MCNC: 105 MG/DL
MICROALBUMIN UR-MCNC: 135 MG/L (ref 0–20)
MICROALBUMIN/CREAT 24H UR: 129 MG/G CREATININE (ref 0–30)

## 2020-06-19 PROCEDURE — 3078F DIAST BP <80 MM HG: CPT | Performed by: FAMILY MEDICINE

## 2020-06-19 PROCEDURE — 3044F HG A1C LEVEL LT 7.0%: CPT | Performed by: FAMILY MEDICINE

## 2020-06-19 PROCEDURE — 1036F TOBACCO NON-USER: CPT | Performed by: FAMILY MEDICINE

## 2020-06-19 PROCEDURE — 99213 OFFICE O/P EST LOW 20 MIN: CPT | Performed by: FAMILY MEDICINE

## 2020-06-19 PROCEDURE — 2022F DILAT RTA XM EVC RTNOPTHY: CPT | Performed by: FAMILY MEDICINE

## 2020-06-19 PROCEDURE — 3066F NEPHROPATHY DOC TX: CPT | Performed by: FAMILY MEDICINE

## 2020-06-19 PROCEDURE — 3008F BODY MASS INDEX DOCD: CPT | Performed by: FAMILY MEDICINE

## 2020-06-19 PROCEDURE — 82043 UR ALBUMIN QUANTITATIVE: CPT | Performed by: FAMILY MEDICINE

## 2020-06-19 PROCEDURE — 4040F PNEUMOC VAC/ADMIN/RCVD: CPT | Performed by: FAMILY MEDICINE

## 2020-06-19 PROCEDURE — 4010F ACE/ARB THERAPY RXD/TAKEN: CPT | Performed by: FAMILY MEDICINE

## 2020-06-19 PROCEDURE — 82570 ASSAY OF URINE CREATININE: CPT | Performed by: FAMILY MEDICINE

## 2020-06-19 PROCEDURE — 3077F SYST BP >= 140 MM HG: CPT | Performed by: FAMILY MEDICINE

## 2020-06-19 PROCEDURE — 1160F RVW MEDS BY RX/DR IN RCRD: CPT | Performed by: FAMILY MEDICINE

## 2020-06-19 RX ORDER — AMLODIPINE BESYLATE 5 MG/1
5 TABLET ORAL DAILY
COMMUNITY
Start: 2020-06-08 | End: 2021-03-30 | Stop reason: DRUGHIGH

## 2020-06-19 RX ORDER — MELOXICAM 15 MG/1
15 TABLET ORAL DAILY
Qty: 30 TABLET | Refills: 1 | Status: SHIPPED | OUTPATIENT
Start: 2020-06-19 | End: 2020-11-16 | Stop reason: SDUPTHER

## 2020-06-24 LAB
LEFT EYE DIABETIC RETINOPATHY: NORMAL
RIGHT EYE DIABETIC RETINOPATHY: NORMAL

## 2020-07-15 ENCOUNTER — OFFICE VISIT (OUTPATIENT)
Dept: FAMILY MEDICINE CLINIC | Facility: CLINIC | Age: 85
End: 2020-07-15
Payer: MEDICARE

## 2020-07-15 VITALS
WEIGHT: 177.4 LBS | BODY MASS INDEX: 26.28 KG/M2 | TEMPERATURE: 97.1 F | SYSTOLIC BLOOD PRESSURE: 166 MMHG | HEIGHT: 69 IN | DIASTOLIC BLOOD PRESSURE: 70 MMHG | OXYGEN SATURATION: 97 % | HEART RATE: 83 BPM

## 2020-07-15 DIAGNOSIS — E11.00 TYPE 2 DIABETES MELLITUS WITH HYPEROSMOLARITY WITHOUT COMA, WITHOUT LONG-TERM CURRENT USE OF INSULIN (HCC): Primary | ICD-10-CM

## 2020-07-15 DIAGNOSIS — E11.22 TYPE 2 DIABETES MELLITUS WITH STAGE 3 CHRONIC KIDNEY DISEASE, WITHOUT LONG-TERM CURRENT USE OF INSULIN (HCC): ICD-10-CM

## 2020-07-15 DIAGNOSIS — I10 ESSENTIAL HYPERTENSION: ICD-10-CM

## 2020-07-15 DIAGNOSIS — N18.30 TYPE 2 DIABETES MELLITUS WITH STAGE 3 CHRONIC KIDNEY DISEASE, WITHOUT LONG-TERM CURRENT USE OF INSULIN (HCC): ICD-10-CM

## 2020-07-15 DIAGNOSIS — I25.10 ATHEROSCLEROSIS OF NATIVE CORONARY ARTERY OF NATIVE HEART WITHOUT ANGINA PECTORIS: ICD-10-CM

## 2020-07-15 DIAGNOSIS — I73.9 PAD (PERIPHERAL ARTERY DISEASE) (HCC): ICD-10-CM

## 2020-07-15 DIAGNOSIS — E78.2 MIXED HYPERLIPIDEMIA: ICD-10-CM

## 2020-07-15 PROCEDURE — 3078F DIAST BP <80 MM HG: CPT | Performed by: FAMILY MEDICINE

## 2020-07-15 PROCEDURE — 3077F SYST BP >= 140 MM HG: CPT | Performed by: FAMILY MEDICINE

## 2020-07-15 PROCEDURE — 3044F HG A1C LEVEL LT 7.0%: CPT | Performed by: FAMILY MEDICINE

## 2020-07-15 PROCEDURE — 3008F BODY MASS INDEX DOCD: CPT | Performed by: FAMILY MEDICINE

## 2020-07-15 PROCEDURE — 1160F RVW MEDS BY RX/DR IN RCRD: CPT | Performed by: FAMILY MEDICINE

## 2020-07-15 PROCEDURE — 1036F TOBACCO NON-USER: CPT | Performed by: FAMILY MEDICINE

## 2020-07-15 PROCEDURE — 99214 OFFICE O/P EST MOD 30 MIN: CPT | Performed by: FAMILY MEDICINE

## 2020-07-15 PROCEDURE — 3066F NEPHROPATHY DOC TX: CPT | Performed by: FAMILY MEDICINE

## 2020-07-15 PROCEDURE — 3060F POS MICROALBUMINURIA REV: CPT | Performed by: FAMILY MEDICINE

## 2020-07-15 PROCEDURE — 2022F DILAT RTA XM EVC RTNOPTHY: CPT | Performed by: FAMILY MEDICINE

## 2020-07-15 PROCEDURE — 4040F PNEUMOC VAC/ADMIN/RCVD: CPT | Performed by: FAMILY MEDICINE

## 2020-07-15 NOTE — PROGRESS NOTES
Assessment/Plan:  Diabetes hypertension hyperlipidemia as well as CAD PVD all stable at this time  Patient will continue with current meds as listed  Refills will be given when needed  Patient have laboratory studies prior to next visit  Follow-up in 3 months  The patient did see ophthalmologist        Diagnoses and all orders for this visit:    Type 2 diabetes mellitus with hyperosmolarity without coma, without long-term current use of insulin (HCC)  -     CBC and differential; Future  -     Comprehensive metabolic panel; Future  -     Hemoglobin A1C; Future  -     Lipid panel; Future  -     Microalbumin / creatinine urine ratio    Type 2 diabetes mellitus with stage 3 chronic kidney disease, without long-term current use of insulin (HCC)  -     CBC and differential; Future  -     Comprehensive metabolic panel; Future  -     Hemoglobin A1C; Future  -     Lipid panel; Future  -     Microalbumin / creatinine urine ratio    Atherosclerosis of native coronary artery of native heart without angina pectoris  -     CBC and differential; Future  -     Comprehensive metabolic panel; Future  -     Hemoglobin A1C; Future  -     Lipid panel; Future  -     Microalbumin / creatinine urine ratio    Essential hypertension  -     CBC and differential; Future  -     Comprehensive metabolic panel; Future  -     Hemoglobin A1C; Future  -     Lipid panel; Future  -     Microalbumin / creatinine urine ratio    PAD (peripheral artery disease) (HCC)  -     CBC and differential; Future  -     Comprehensive metabolic panel; Future  -     Hemoglobin A1C; Future  -     Lipid panel; Future  -     Microalbumin / creatinine urine ratio    Mixed hyperlipidemia  -     CBC and differential; Future  -     Comprehensive metabolic panel; Future  -     Hemoglobin A1C; Future  -     Lipid panel; Future  -     Microalbumin / creatinine urine ratio            Subjective:        Patient ID: Berta Kumar is a 80 y o  male        Patient here to follow-up on diabetes hypertension hyperlipidemia  hypothyroidism Peripheral vascular disease and CAD  Patient doing very well at this time  Patient is still using amlodipine 5 mg daily  Patient did not increased to 10 mg yet  No new chest pain or shortness of breath or problems with headaches or blurred vision  No problems with urination or defecation  Patient did stop meloxicam   Knee pain is better  The following portions of the patient's history were reviewed and updated as appropriate: allergies, current medications, past family history, past medical history, past social history, past surgical history and problem list       Review of Systems   Constitutional: Negative  HENT: Negative  Eyes: Negative  Respiratory: Negative  Cardiovascular: Negative  Gastrointestinal: Negative  Endocrine: Negative  Genitourinary: Negative  Musculoskeletal: Negative  Skin: Negative  Allergic/Immunologic: Negative  Neurological: Negative  Hematological: Negative  Psychiatric/Behavioral: Negative  Objective:               /70   Pulse 83   Temp (!) 97 1 °F (36 2 °C) (Oral)   Ht 5' 9" (1 753 m)   Wt 80 5 kg (177 lb 6 4 oz)   SpO2 97%   BMI 26 20 kg/m²          Physical Exam   Constitutional: He appears well-developed and well-nourished  No distress  HENT:   Head: Normocephalic and atraumatic  Right Ear: External ear normal    Left Ear: External ear normal    Eyes: Pupils are equal, round, and reactive to light  EOM are normal  Right eye exhibits no discharge  Left eye exhibits no discharge  No scleral icterus  Neck: Normal range of motion  Neck supple  No thyromegaly present  Cardiovascular: Normal rate, regular rhythm, normal heart sounds and intact distal pulses  Exam reveals no gallop and no friction rub  No murmur heard  Pulmonary/Chest: Effort normal and breath sounds normal  No respiratory distress  He has no wheezes  He has no rales   He exhibits no tenderness  Abdominal: Soft  Bowel sounds are normal  He exhibits no distension  There is no tenderness  There is no rebound and no guarding  Musculoskeletal: Normal range of motion  He exhibits no edema or tenderness  Lymphadenopathy:     He has no cervical adenopathy  Neurological: He is alert  No cranial nerve deficit  He exhibits normal muscle tone  Coordination normal    Skin: Skin is warm and dry  No rash noted  He is not diaphoretic  No erythema  No pallor  Psychiatric: He has a normal mood and affect  His behavior is normal  Judgment and thought content normal    Nursing note and vitals reviewed

## 2020-08-06 DIAGNOSIS — I10 ESSENTIAL HYPERTENSION: ICD-10-CM

## 2020-08-06 RX ORDER — OLMESARTAN MEDOXOMIL AND HYDROCHLOROTHIAZIDE 20/12.5 20; 12.5 MG/1; MG/1
1 TABLET ORAL DAILY
Qty: 90 TABLET | Refills: 1 | Status: SHIPPED | OUTPATIENT
Start: 2020-08-06 | End: 2021-01-31

## 2020-10-12 DIAGNOSIS — E03.9 PRIMARY HYPOTHYROIDISM: ICD-10-CM

## 2020-10-12 RX ORDER — LEVOTHYROXINE SODIUM 0.03 MG/1
TABLET ORAL
Qty: 90 TABLET | Refills: 1 | Status: SHIPPED | OUTPATIENT
Start: 2020-10-12 | End: 2021-04-12

## 2020-10-15 ENCOUNTER — OFFICE VISIT (OUTPATIENT)
Dept: FAMILY MEDICINE CLINIC | Facility: CLINIC | Age: 85
End: 2020-10-15
Payer: MEDICARE

## 2020-10-15 VITALS
WEIGHT: 177 LBS | SYSTOLIC BLOOD PRESSURE: 180 MMHG | HEIGHT: 69 IN | TEMPERATURE: 97.4 F | BODY MASS INDEX: 26.22 KG/M2 | DIASTOLIC BLOOD PRESSURE: 72 MMHG

## 2020-10-15 DIAGNOSIS — I10 ESSENTIAL HYPERTENSION: ICD-10-CM

## 2020-10-15 DIAGNOSIS — I73.9 PAD (PERIPHERAL ARTERY DISEASE) (HCC): ICD-10-CM

## 2020-10-15 DIAGNOSIS — Z23 NEED FOR IMMUNIZATION AGAINST INFLUENZA: ICD-10-CM

## 2020-10-15 DIAGNOSIS — E03.9 HYPOTHYROIDISM, UNSPECIFIED TYPE: ICD-10-CM

## 2020-10-15 DIAGNOSIS — E78.2 MIXED HYPERLIPIDEMIA: ICD-10-CM

## 2020-10-15 DIAGNOSIS — E11.00 TYPE 2 DIABETES MELLITUS WITH HYPEROSMOLARITY WITHOUT COMA, WITHOUT LONG-TERM CURRENT USE OF INSULIN (HCC): Primary | ICD-10-CM

## 2020-10-15 DIAGNOSIS — K51.00 ULCERATIVE PANCOLITIS WITHOUT COMPLICATION (HCC): ICD-10-CM

## 2020-10-15 LAB — SL AMB POCT HEMOGLOBIN AIC: 5.5 (ref ?–6.5)

## 2020-10-15 PROCEDURE — 83036 HEMOGLOBIN GLYCOSYLATED A1C: CPT | Performed by: FAMILY MEDICINE

## 2020-10-15 PROCEDURE — 99214 OFFICE O/P EST MOD 30 MIN: CPT | Performed by: FAMILY MEDICINE

## 2020-10-15 PROCEDURE — G0008 ADMIN INFLUENZA VIRUS VAC: HCPCS

## 2020-10-15 PROCEDURE — 90662 IIV NO PRSV INCREASED AG IM: CPT

## 2020-10-15 RX ORDER — OLMESARTAN MEDOXOMIL AND HYDROCHLOROTHIAZIDE 40/25 40; 25 MG/1; MG/1
1 TABLET ORAL DAILY
Qty: 90 TABLET | Refills: 1 | Status: SHIPPED | OUTPATIENT
Start: 2020-10-15 | End: 2021-04-12

## 2020-11-16 DIAGNOSIS — M17.11 LOCALIZED OSTEOARTHRITIS OF RIGHT KNEE: ICD-10-CM

## 2020-11-16 RX ORDER — MELOXICAM 15 MG/1
15 TABLET ORAL DAILY
Qty: 30 TABLET | Refills: 1 | Status: SHIPPED | OUTPATIENT
Start: 2020-11-16 | End: 2021-04-15 | Stop reason: SDUPTHER

## 2021-01-14 ENCOUNTER — OFFICE VISIT (OUTPATIENT)
Dept: FAMILY MEDICINE CLINIC | Facility: CLINIC | Age: 86
End: 2021-01-14
Payer: MEDICARE

## 2021-01-14 VITALS
TEMPERATURE: 97.9 F | RESPIRATION RATE: 18 BRPM | SYSTOLIC BLOOD PRESSURE: 136 MMHG | HEART RATE: 77 BPM | BODY MASS INDEX: 26.51 KG/M2 | OXYGEN SATURATION: 97 % | DIASTOLIC BLOOD PRESSURE: 78 MMHG | WEIGHT: 179 LBS | HEIGHT: 69 IN

## 2021-01-14 DIAGNOSIS — I25.10 ATHEROSCLEROSIS OF NATIVE CORONARY ARTERY OF NATIVE HEART WITHOUT ANGINA PECTORIS: ICD-10-CM

## 2021-01-14 DIAGNOSIS — I10 ESSENTIAL HYPERTENSION: ICD-10-CM

## 2021-01-14 DIAGNOSIS — E78.2 MIXED HYPERLIPIDEMIA: ICD-10-CM

## 2021-01-14 DIAGNOSIS — E03.9 HYPOTHYROIDISM, UNSPECIFIED TYPE: ICD-10-CM

## 2021-01-14 DIAGNOSIS — N18.31 TYPE 2 DIABETES MELLITUS WITH STAGE 3A CHRONIC KIDNEY DISEASE, WITHOUT LONG-TERM CURRENT USE OF INSULIN (HCC): Primary | ICD-10-CM

## 2021-01-14 DIAGNOSIS — E11.22 TYPE 2 DIABETES MELLITUS WITH STAGE 3A CHRONIC KIDNEY DISEASE, WITHOUT LONG-TERM CURRENT USE OF INSULIN (HCC): Primary | ICD-10-CM

## 2021-01-14 DIAGNOSIS — I73.9 PAD (PERIPHERAL ARTERY DISEASE) (HCC): ICD-10-CM

## 2021-01-14 PROCEDURE — 99214 OFFICE O/P EST MOD 30 MIN: CPT | Performed by: FAMILY MEDICINE

## 2021-01-14 NOTE — PROGRESS NOTES
Assessment/Plan: labs reviewed  Patient go for laboratory studies prior to next visit  Patient will continue modify diet appropriately for diabetes which is been stable last A1c was 5 5  The blood pressure stable at this time  Other chronic conditions stable also  Guidance given regarding meloxicam   Other medications will be refilled when needed  Follow-up in 3 months or as needed  Patient to consider COVID vaccine       Diagnoses and all orders for this visit:    Type 2 diabetes mellitus with stage 3a chronic kidney disease, without long-term current use of insulin (HCC)    Essential hypertension    PAD (peripheral artery disease) (HCC)    Hypothyroidism, unspecified type    Atherosclerosis of native coronary artery of native heart without angina pectoris    Mixed hyperlipidemia            Subjective:        Patient ID: Yuridia Mujica is a 80 y o  male  Patient follow-up on diabetes hypertension hyperlipidemia hypothyroidism CAD  Patient is feeling well  Patient without any headache or blurred vision or chest pain or shortness of breath problems urinating or defecating or joint related issues  All other review systems negative        The following portions of the patient's history were reviewed and updated as appropriate: allergies, current medications, past family history, past medical history, past social history, past surgical history and problem list       Review of Systems   Constitutional: Negative  HENT: Negative  Eyes: Negative  Respiratory: Negative  Cardiovascular: Negative  Gastrointestinal: Negative  Endocrine: Negative  Genitourinary: Negative  Musculoskeletal: Negative  Skin: Negative  Allergic/Immunologic: Negative  Neurological: Negative  Hematological: Negative  Psychiatric/Behavioral: Negative  Objective:      BMI Counseling: Body mass index is 26 43 kg/m²   The BMI is above normal  Nutrition recommendations include decreasing portion sizes  Exercise recommendations include moderate physical activity 150 minutes/week  Depression Screening and Follow-up Plan: Patient's depression screening was positive with a PHQ-2 score of 0  Clincally patient does not have depression  No treatment is required  /78   Pulse 77   Temp 97 9 °F (36 6 °C) (Tympanic)   Resp 18   Ht 5' 9" (1 753 m)   Wt 81 2 kg (179 lb)   SpO2 97%   BMI 26 43 kg/m²          Physical Exam  Vitals signs and nursing note reviewed  Constitutional:       General: He is not in acute distress  Appearance: Normal appearance  He is not ill-appearing, toxic-appearing or diaphoretic  HENT:      Head: Normocephalic and atraumatic  Right Ear: Tympanic membrane, ear canal and external ear normal  There is no impacted cerumen  Left Ear: Tympanic membrane, ear canal and external ear normal  There is no impacted cerumen  Nose: Nose normal  No congestion or rhinorrhea  Mouth/Throat:      Mouth: Mucous membranes are moist       Pharynx: No oropharyngeal exudate or posterior oropharyngeal erythema  Eyes:      General: No scleral icterus  Right eye: No discharge  Left eye: No discharge  Extraocular Movements: Extraocular movements intact  Conjunctiva/sclera: Conjunctivae normal       Pupils: Pupils are equal, round, and reactive to light  Neck:      Musculoskeletal: Normal range of motion and neck supple  No neck rigidity or muscular tenderness  Vascular: No carotid bruit  Cardiovascular:      Rate and Rhythm: Normal rate and regular rhythm  Pulses: Normal pulses  Heart sounds: Normal heart sounds  No murmur  No friction rub  No gallop  Pulmonary:      Effort: Pulmonary effort is normal  No respiratory distress  Breath sounds: Normal breath sounds  No stridor  No wheezing, rhonchi or rales  Chest:      Chest wall: No tenderness  Abdominal:      General: Abdomen is flat   Bowel sounds are normal  There is no distension  Palpations: Abdomen is soft  Tenderness: There is no abdominal tenderness  There is no guarding or rebound  Musculoskeletal: Normal range of motion  General: No swelling, tenderness, deformity or signs of injury  Right lower leg: No edema  Left lower leg: No edema  Lymphadenopathy:      Cervical: No cervical adenopathy  Skin:     General: Skin is warm and dry  Capillary Refill: Capillary refill takes less than 2 seconds  Coloration: Skin is not jaundiced  Findings: No bruising, erythema, lesion or rash  Neurological:      General: No focal deficit present  Mental Status: He is alert and oriented to person, place, and time  Cranial Nerves: No cranial nerve deficit  Sensory: No sensory deficit  Motor: No weakness  Coordination: Coordination normal       Gait: Gait normal    Psychiatric:         Mood and Affect: Mood normal          Behavior: Behavior normal          Thought Content:  Thought content normal          Judgment: Judgment normal

## 2021-01-31 DIAGNOSIS — I10 ESSENTIAL HYPERTENSION: ICD-10-CM

## 2021-01-31 RX ORDER — OLMESARTAN MEDOXOMIL AND HYDROCHLOROTHIAZIDE 20/12.5 20; 12.5 MG/1; MG/1
1 TABLET ORAL DAILY
Qty: 90 TABLET | Refills: 1 | Status: SHIPPED | OUTPATIENT
Start: 2021-01-31 | End: 2021-07-15

## 2021-03-30 DIAGNOSIS — I10 ESSENTIAL HYPERTENSION: ICD-10-CM

## 2021-03-30 RX ORDER — AMLODIPINE BESYLATE 10 MG/1
10 TABLET ORAL DAILY
Qty: 90 TABLET | Refills: 1 | Status: SHIPPED | OUTPATIENT
Start: 2021-03-30 | End: 2021-09-27

## 2021-03-30 NOTE — TELEPHONE ENCOUNTER
Patient is requesting refill of amlodipine 10 mg  He has been taking two 5 mg tablets to use these up instead of using 10 mg tablets

## 2021-04-06 ENCOUNTER — APPOINTMENT (OUTPATIENT)
Dept: LAB | Facility: MEDICAL CENTER | Age: 86
End: 2021-04-06
Payer: MEDICARE

## 2021-04-06 DIAGNOSIS — I10 ESSENTIAL HYPERTENSION: ICD-10-CM

## 2021-04-06 DIAGNOSIS — N18.30 TYPE 2 DIABETES MELLITUS WITH STAGE 3 CHRONIC KIDNEY DISEASE, WITHOUT LONG-TERM CURRENT USE OF INSULIN (HCC): ICD-10-CM

## 2021-04-06 DIAGNOSIS — E11.22 TYPE 2 DIABETES MELLITUS WITH STAGE 3 CHRONIC KIDNEY DISEASE, WITHOUT LONG-TERM CURRENT USE OF INSULIN (HCC): ICD-10-CM

## 2021-04-06 DIAGNOSIS — E78.2 MIXED HYPERLIPIDEMIA: ICD-10-CM

## 2021-04-06 DIAGNOSIS — I25.10 ATHEROSCLEROSIS OF NATIVE CORONARY ARTERY OF NATIVE HEART WITHOUT ANGINA PECTORIS: ICD-10-CM

## 2021-04-06 DIAGNOSIS — E11.00 TYPE 2 DIABETES MELLITUS WITH HYPEROSMOLARITY WITHOUT COMA, WITHOUT LONG-TERM CURRENT USE OF INSULIN (HCC): ICD-10-CM

## 2021-04-06 DIAGNOSIS — I73.9 PAD (PERIPHERAL ARTERY DISEASE) (HCC): ICD-10-CM

## 2021-04-06 LAB
ALBUMIN SERPL BCP-MCNC: 4 G/DL (ref 3.5–5)
ALP SERPL-CCNC: 83 U/L (ref 46–116)
ALT SERPL W P-5'-P-CCNC: 26 U/L (ref 12–78)
ANION GAP SERPL CALCULATED.3IONS-SCNC: 8 MMOL/L (ref 4–13)
AST SERPL W P-5'-P-CCNC: 16 U/L (ref 5–45)
BASOPHILS # BLD AUTO: 0.06 THOUSANDS/ΜL (ref 0–0.1)
BASOPHILS NFR BLD AUTO: 1 % (ref 0–1)
BILIRUB SERPL-MCNC: 0.53 MG/DL (ref 0.2–1)
BUN SERPL-MCNC: 35 MG/DL (ref 5–25)
CALCIUM SERPL-MCNC: 8.8 MG/DL (ref 8.3–10.1)
CHLORIDE SERPL-SCNC: 108 MMOL/L (ref 100–108)
CHOLEST SERPL-MCNC: 111 MG/DL (ref 50–200)
CO2 SERPL-SCNC: 24 MMOL/L (ref 21–32)
CREAT SERPL-MCNC: 1.67 MG/DL (ref 0.6–1.3)
EOSINOPHIL # BLD AUTO: 0.25 THOUSAND/ΜL (ref 0–0.61)
EOSINOPHIL NFR BLD AUTO: 3 % (ref 0–6)
ERYTHROCYTE [DISTWIDTH] IN BLOOD BY AUTOMATED COUNT: 16.8 % (ref 11.6–15.1)
EST. AVERAGE GLUCOSE BLD GHB EST-MCNC: 128 MG/DL
GFR SERPL CREATININE-BSD FRML MDRD: 37 ML/MIN/1.73SQ M
GLUCOSE P FAST SERPL-MCNC: 124 MG/DL (ref 65–99)
HBA1C MFR BLD: 6.1 %
HCT VFR BLD AUTO: 30.8 % (ref 36.5–49.3)
HDLC SERPL-MCNC: 33 MG/DL
HGB BLD-MCNC: 9 G/DL (ref 12–17)
IMM GRANULOCYTES # BLD AUTO: 0.05 THOUSAND/UL (ref 0–0.2)
IMM GRANULOCYTES NFR BLD AUTO: 1 % (ref 0–2)
LDLC SERPL CALC-MCNC: 45 MG/DL (ref 0–100)
LYMPHOCYTES # BLD AUTO: 2.2 THOUSANDS/ΜL (ref 0.6–4.47)
LYMPHOCYTES NFR BLD AUTO: 28 % (ref 14–44)
MCH RBC QN AUTO: 19.8 PG (ref 26.8–34.3)
MCHC RBC AUTO-ENTMCNC: 29.2 G/DL (ref 31.4–37.4)
MCV RBC AUTO: 68 FL (ref 82–98)
MONOCYTES # BLD AUTO: 0.7 THOUSAND/ΜL (ref 0.17–1.22)
MONOCYTES NFR BLD AUTO: 9 % (ref 4–12)
NEUTROPHILS # BLD AUTO: 4.6 THOUSANDS/ΜL (ref 1.85–7.62)
NEUTS SEG NFR BLD AUTO: 58 % (ref 43–75)
NONHDLC SERPL-MCNC: 78 MG/DL
NRBC BLD AUTO-RTO: 0 /100 WBCS
PLATELET # BLD AUTO: 231 THOUSANDS/UL (ref 149–390)
PMV BLD AUTO: 11.2 FL (ref 8.9–12.7)
POTASSIUM SERPL-SCNC: 4.1 MMOL/L (ref 3.5–5.3)
PROT SERPL-MCNC: 7.6 G/DL (ref 6.4–8.2)
RBC # BLD AUTO: 4.54 MILLION/UL (ref 3.88–5.62)
SODIUM SERPL-SCNC: 140 MMOL/L (ref 136–145)
TRIGL SERPL-MCNC: 165 MG/DL
WBC # BLD AUTO: 7.86 THOUSAND/UL (ref 4.31–10.16)

## 2021-04-06 PROCEDURE — 83036 HEMOGLOBIN GLYCOSYLATED A1C: CPT

## 2021-04-06 PROCEDURE — 36415 COLL VENOUS BLD VENIPUNCTURE: CPT

## 2021-04-06 PROCEDURE — 80053 COMPREHEN METABOLIC PANEL: CPT

## 2021-04-06 PROCEDURE — 85025 COMPLETE CBC W/AUTO DIFF WBC: CPT

## 2021-04-06 PROCEDURE — 80061 LIPID PANEL: CPT

## 2021-04-11 DIAGNOSIS — E03.9 PRIMARY HYPOTHYROIDISM: ICD-10-CM

## 2021-04-11 DIAGNOSIS — I10 ESSENTIAL HYPERTENSION: ICD-10-CM

## 2021-04-12 RX ORDER — OLMESARTAN MEDOXOMIL AND HYDROCHLOROTHIAZIDE 40/25 40; 25 MG/1; MG/1
1 TABLET ORAL DAILY
Qty: 90 TABLET | Refills: 1 | Status: SHIPPED | OUTPATIENT
Start: 2021-04-12 | End: 2021-10-07

## 2021-04-12 RX ORDER — LEVOTHYROXINE SODIUM 0.03 MG/1
TABLET ORAL
Qty: 90 TABLET | Refills: 1 | Status: SHIPPED | OUTPATIENT
Start: 2021-04-12 | End: 2021-10-07

## 2021-04-15 ENCOUNTER — OFFICE VISIT (OUTPATIENT)
Dept: FAMILY MEDICINE CLINIC | Facility: CLINIC | Age: 86
End: 2021-04-15
Payer: MEDICARE

## 2021-04-15 VITALS
DIASTOLIC BLOOD PRESSURE: 80 MMHG | BODY MASS INDEX: 26.36 KG/M2 | HEIGHT: 69 IN | TEMPERATURE: 96.5 F | WEIGHT: 178 LBS | SYSTOLIC BLOOD PRESSURE: 160 MMHG

## 2021-04-15 DIAGNOSIS — E03.9 HYPOTHYROIDISM, UNSPECIFIED TYPE: ICD-10-CM

## 2021-04-15 DIAGNOSIS — E78.2 MIXED HYPERLIPIDEMIA: ICD-10-CM

## 2021-04-15 DIAGNOSIS — N18.31 TYPE 2 DIABETES MELLITUS WITH STAGE 3A CHRONIC KIDNEY DISEASE, WITHOUT LONG-TERM CURRENT USE OF INSULIN (HCC): Primary | ICD-10-CM

## 2021-04-15 DIAGNOSIS — M17.11 LOCALIZED OSTEOARTHRITIS OF RIGHT KNEE: ICD-10-CM

## 2021-04-15 DIAGNOSIS — I25.10 ATHEROSCLEROSIS OF NATIVE CORONARY ARTERY OF NATIVE HEART WITHOUT ANGINA PECTORIS: ICD-10-CM

## 2021-04-15 DIAGNOSIS — E11.22 TYPE 2 DIABETES MELLITUS WITH STAGE 3A CHRONIC KIDNEY DISEASE, WITHOUT LONG-TERM CURRENT USE OF INSULIN (HCC): Primary | ICD-10-CM

## 2021-04-15 DIAGNOSIS — I10 ESSENTIAL HYPERTENSION: ICD-10-CM

## 2021-04-15 DIAGNOSIS — Z00.00 MEDICARE ANNUAL WELLNESS VISIT, SUBSEQUENT: ICD-10-CM

## 2021-04-15 DIAGNOSIS — K51.00 ULCERATIVE PANCOLITIS WITHOUT COMPLICATION (HCC): ICD-10-CM

## 2021-04-15 PROCEDURE — 99214 OFFICE O/P EST MOD 30 MIN: CPT | Performed by: FAMILY MEDICINE

## 2021-04-15 PROCEDURE — G0439 PPPS, SUBSEQ VISIT: HCPCS | Performed by: FAMILY MEDICINE

## 2021-04-15 PROCEDURE — 1123F ACP DISCUSS/DSCN MKR DOCD: CPT | Performed by: FAMILY MEDICINE

## 2021-04-15 RX ORDER — MELOXICAM 15 MG/1
15 TABLET ORAL DAILY
Qty: 30 TABLET | Refills: 1 | Status: SHIPPED | OUTPATIENT
Start: 2021-04-15 | End: 2021-10-06 | Stop reason: SDUPTHER

## 2021-04-15 NOTE — PROGRESS NOTES
Assessment/Plan:  The labs and records reviewed  LDL 45  A1c is 6 1  Hypothyroidism diabetes hypertension hyperlipidemia all stable present time  Patient with ongoing chronic anemia which is stable overall  Patient will try to stay more hydrated  Recheck kidney function at follow-up  Diagnoses and all orders for this visit:    Type 2 diabetes mellitus with stage 3a chronic kidney disease, without long-term current use of insulin ([facility])  -     Comprehensive metabolic panel; Future    Localized osteoarthritis of right knee  -     meloxicam (MOBIC) 15 mg tablet; Take 1 tablet (15 mg total) by mouth daily    Hypothyroidism, unspecified type    Essential hypertension  -     Comprehensive metabolic panel; Future    Atherosclerosis of native coronary artery of native heart without angina pectoris    Mixed hyperlipidemia    Medicare annual wellness visit, subsequent    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 ([facility]            Subjective:        Patient ID: Mita Mg is a 80 y o  male  Patient here to follow-up on diabetes hypertension hyperlipidemia hypothyroidism and right knee pain  Patient's knee is stable at the present time  No headache visual disturbance, chest pain or shortness of breath or abdominal pain  No problems with urination or defecation  All other review systems negative  Patient did have laboratory studies done  The following portions of the patient's history were reviewed and updated as appropriate: allergies, current medications, past family history, past medical history, past social history, past surgical history and problem list       Review of Systems   Constitutional: Negative  HENT: Negative  Eyes: Negative  Respiratory: Negative  Cardiovascular: Negative  Gastrointestinal: Negative  Endocrine: Negative  Genitourinary: Negative  Musculoskeletal: Negative  Skin: Negative  Allergic/Immunologic: Negative  Neurological: Negative  Hematological: Negative  Psychiatric/Behavioral: Negative  Objective:      BMI Counseling: Body mass index is 26 29 kg/m²  The BMI is above normal  Nutrition recommendations include consuming healthier snacks  Exercise recommendations include moderate physical activity 150 minutes/week  Depression Screening and Follow-up Plan: Patient's depression screening was positive with a PHQ-2 score of 0  Clincally patient does not have depression  No treatment is required  /80 (BP Location: Right arm, Patient Position: Sitting, Cuff Size: Standard)   Temp (!) 96 5 °F (35 8 °C) (Tympanic)   Ht 5' 9" (1 753 m)   Wt 80 7 kg (178 lb)   BMI 26 29 kg/m²          Physical Exam  Vitals signs and nursing note reviewed  Constitutional:       General: He is not in acute distress  Appearance: Normal appearance  He is not ill-appearing, toxic-appearing or diaphoretic  HENT:      Head: Normocephalic and atraumatic  Right Ear: Tympanic membrane, ear canal and external ear normal  There is no impacted cerumen  Left Ear: Tympanic membrane, ear canal and external ear normal  There is no impacted cerumen  Nose: Nose normal  No congestion or rhinorrhea  Mouth/Throat:      Mouth: Mucous membranes are moist       Pharynx: No oropharyngeal exudate or posterior oropharyngeal erythema  Eyes:      General: No scleral icterus  Right eye: No discharge  Left eye: No discharge  Extraocular Movements: Extraocular movements intact  Conjunctiva/sclera: Conjunctivae normal       Pupils: Pupils are equal, round, and reactive to light  Neck:      Musculoskeletal: Normal range of motion and neck supple  No neck rigidity or muscular tenderness  Vascular: No carotid bruit  Cardiovascular:      Rate and Rhythm: Normal rate and regular rhythm  Pulses: Normal pulses  Heart sounds: Normal heart sounds  No murmur  No friction rub  No gallop  Pulmonary:      Effort: Pulmonary effort is normal  No respiratory distress  Breath sounds: Normal breath sounds  No stridor  No wheezing, rhonchi or rales  Chest:      Chest wall: No tenderness  Abdominal:      General: Abdomen is flat  Bowel sounds are normal  There is no distension  Palpations: Abdomen is soft  Tenderness: There is no abdominal tenderness  There is no guarding or rebound  Musculoskeletal: Normal range of motion  General: No swelling, tenderness, deformity or signs of injury  Right lower leg: No edema  Left lower leg: No edema  Lymphadenopathy:      Cervical: No cervical adenopathy  Skin:     General: Skin is warm and dry  Capillary Refill: Capillary refill takes less than 2 seconds  Coloration: Skin is not jaundiced  Findings: No bruising, erythema, lesion or rash  Neurological:      General: No focal deficit present  Mental Status: He is alert and oriented to person, place, and time  Cranial Nerves: No cranial nerve deficit  Sensory: No sensory deficit  Motor: No weakness  Coordination: Coordination normal       Gait: Gait normal    Psychiatric:         Mood and Affect: Mood normal          Behavior: Behavior normal          Thought Content:  Thought content normal          Judgment: Judgment normal

## 2021-04-15 NOTE — PATIENT INSTRUCTIONS

## 2021-04-15 NOTE — PROGRESS NOTES
Diabetic Foot Exam    Patient's shoes and socks removed  Right Foot/Ankle   Right Foot Inspection  Skin Exam: skin normal and skin intact no dry skin, no warmth, no callus, no erythema, no maceration, no abnormal color, no pre-ulcer, no ulcer and no callus                          Toe Exam: ROM and strength within normal limits  Sensory       Monofilament testing: intact  Vascular  Capillary refills: < 3 seconds  The right DP pulse is 2+  The right PT pulse is 2+  Left Foot/Ankle  Left Foot Inspection  Skin Exam: skin normal and skin intactno dry skin, no warmth, no erythema, no maceration, normal color, no pre-ulcer, no ulcer and no callus                         Toe Exam: ROM and strength within normal limits                   Sensory       Monofilament: intact  Vascular  Capillary refills: < 3 seconds  The left DP pulse is 2+  The left PT pulse is 2+  Assign Risk Category:  No deformity present; No loss of protective sensation; No weak pulses       Risk: 0     Assessment and Plan:     Problem List Items Addressed This Visit     None           Preventive health issues were discussed with patient, and age appropriate screening tests were ordered as noted in patient's After Visit Summary  Personalized health advice and appropriate referrals for health education or preventive services given if needed, as noted in patient's After Visit Summary       History of Present Illness:     Patient presents for Medicare Annual Wellness visit    Patient Care Team:  Fide Cardenas DO as PCP - General (Family Medicine)  Saima Haddad DO     Problem List:     Patient Active Problem List   Diagnosis    Anemia    Atherosclerotic heart disease of native coronary artery without angina pectoris    [facility]    Hyperlipidemia    Hypertension    Hypothyroidism    [facility]AD [facility]peripheral artery disease) ([facility] )    Skin neoplasm    Squamous cell carcinoma    Dermatitis    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 ([place] [address] )    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)    Beta thalassemia minor    Elevated PSA    Abnormal prostate exam    Lumbar strain    Acute pain of right knee    Strain of calf muscle    Localized osteoarthritis of right knee      Past Medical and Surgical History:     Past Medical History:   Diagnosis Date    Anemia     Diabetes mellitus [facility] )     Hypertension      Past Surgical History:   Procedure Laterality Date    COLONOSCOPY      PROSTATE BIOPSY      needle,  resolved may 2005      Family History:     Family History   Problem Relation Age of Onset    No Known Problems Mother       Social History:     E-Cigarette/Vaping    E-Cigarette Use Never User      E-Cigarette/Vaping Substances    Nicotine No     THC No     CBD No     Flavoring No     Other No     Unknown No      Social History     Socioeconomic History    Marital status: /Civil Union     Spouse name: Not on file    Number of children: Not on file    Years of education: Not on file    Highest education level: Not on file   Occupational History    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[place] [address] resource strain: Not on file    Food insecurity     Worry: Not on file     Inability: Not on file   Sopheon needs     Medical: Not on file     Non-medical: Not on file   Tobacco Use    Smoking status: Former Smoker    Smokeless tobacco: Never Used   Substance and Sexual Activity    Alcohol use: Yes     Comment: social/rarely    Drug use: Never    Sexual activity: Never   Lifestyle    Physical activity     Days per week: Not on file     Minutes per session: Not on file    Stress: Not on file   Relationships    Social connections     Talks on phone: Not on file     Gets together: Not on file     Attends Baptism service: Not on file     Active member of club or organization: Not on file     Attends meetings of clubs or organizations: Not on file Relationship status: Not on file    Intimate partner violence     Fear of current or ex partner: Not on file     Emotionally abused: Not on file     Physically abused: Not on file     Forced sexual activity: Not on file   Other Topics Concern    Not on file   Social History Narrative    Not on file      Medications and Allergies:     Current Outpatient Medications   Medication Sig Dispense Refill    amLODIPine (NORVASC) 10 mg tablet Take 1 tablet (10 mg total) by mouth daily 90 tablet 1    atorvastatin (LIPITOR) 20 mg tablet Take 1 tablet by mouth daily      cholecalciferol (VITAMIN D3) 1,000 units tablet Take by mouth      fexofenadine (ALLEGRA) 180 MG tablet Take by mouth      fluticasone (FLONASE) 50 mcg/act nasal spray instill 2 sprays into each nostril once daily as directed  0    folic acid (FOLVITE) 1 mg tablet Take 1 tablet by mouth daily      levothyroxine 25 mcg tablet take 1 tablet by mouth daily 90 tablet 1    meloxicam (MOBIC) 15 mg tablet Take 1 tablet (15 mg total) by mouth daily 30 tablet 1    metoprolol tartrate (LOPRESSOR) 50 mg tablet Take 1 tablet by mouth 2 (two) times a day      montelukast (SINGULAIR) 10 mg tablet Take by mouth      Multiple Vitamins-Minerals (CENTRUM SILVER) tablet Take by mouth      olmesartan-hydrochlorothiazide (BENICAR HCT) 20-12 5 MG per tablet take 1 tablet by mouth daily 90 tablet 1    olmesartan-hydrochlorothiazide (BENICAR HCT) 40-25 MG per tablet take 1 tablet by mouth daily 90 tablet 1    Omega-3 Fatty Acids (FISH OIL) 1000 MG CPDR Take by mouth      Potassium 99 MG TABS Take by mouth      Saw Palmetto 160 MG CAPS Take by mouth      sulfaSALAzine (AZULFIDINE) 500 mg tablet   0    zinc gluconate 50 mg tablet Take by mouth       No current facility-administered medications for this visit        Allergies   Allergen Reactions    Lisinopril     Candesartan Rash      Immunizations:     Immunization History   Administered Date(s) Administered   [facility] INFLUENZA 10/08/2018, 11/03/2019    Influenza Split High Dose Preservative Free IM 11/07/2017    Influenza, high dose seasonal 0 7 mL 11/19/2018, 01/10/2020, 10/15/2020    Pneumococcal Conjugate 13-Valent 10/18/2016    Pneumococcal Polysaccharide PPV23 11/07/2017    Td (adult), adsorbed 10/23/2007      Health Maintenance:         Topic Date Due    Hepatitis C Screening  Completed         Topic Date Due    DTaP,Tdap,and Td Vaccines (1 - Tdap) 12/01/1956      Medicare Health Risk Assessment:     There were no vitals taken for this visit  Lucho bowers is here for his Subsequent Wellness visit  Health Risk Assessment:   Patient rates overall health as good  Patient feels that their physical health rating is same  Patient is very satisfied with their life  Eyesight was rated as same  Hearing was rated as slightly worse  Patient feels that their emotional and mental health rating is same  Patients states they are sometimes angry  Patient states they are sometimes unusually tired/fatigued  Pain experienced in the last 7 days has been some  Patient's pain rating has been 3/10  Patient states that he has experienced no weight loss or gain in last 6 months  Depression Screening:   PHQ-2 Score: 0      Fall Risk Screening: In the past year, patient has experienced: no history of falling in past year      Home Safety:  Patient does not have trouble with stairs inside or outside of their home  Patient has working smoke alarms and has working carbon monoxide detector  Home safety hazards include: none  Nutrition:   Current diet is Regular  Medications:   Patient is currently taking over-the-counter supplements  OTC medications include: see medication list  Patient is able to manage medications       Activities of Daily Living (ADLs)/Instrumental Activities of Daily Living (IADLs):   Walk and transfer into and out of bed and chair?: Yes  Dress and groom yourself?: Yes    Bathe or shower yourself?: Yes    Feed yourself? Yes  Do your laundry/housekeeping?: Yes  Manage your money, pay your bills and track your expenses?: Yes  Make your own meals?: Yes    Do your own shopping?: Yes    Previous Hospitalizations:   Any hospitalizations or ED visits within the last 12 months?: No      Advance Care Planning:   Living will: Yes    Durable POA for healthcare: Yes    Advanced directive: Yes      PREVENTIVE SCREENINGS      Cardiovascular Screening:    General: Screening Not Indicated, History Lipid Disorder, Risks and Benefits Discussed and Screening Current      Diabetes Screening:     General: Screening Not Indicated, History Diabetes, Risks and Benefits Discussed and Screening Current      Colorectal Cancer Screening:     General: Screening Not Indicated and Risks and Benefits Discussed      Prostate Cancer Screening:    General: Screening Not Indicated and Risks and Benefits Discussed      Osteoporosis Screening:    General: Risks and Benefits Discussed      Abdominal Aortic Aneurysm (AAA) Screening:    Risk factors include: tobacco use        General: Risks and Benefits Discussed      Lung Cancer Screening:     General: Screening Not Indicated and Risks and Benefits Discussed      Hepatitis C Screening:    General: Screening Current and Risks and Benefits Discussed    Screening, Brief Intervention, and Referral to Treatment (SBIRT)    Screening  Typical number of drinks in a day: 0  Typical number of drinks in a week: 0  Interpretation: Low risk drinking behavior  Other Counseling Topics:   Regular weightbearing exercise         Shon Rater, DO

## 2021-07-15 ENCOUNTER — OFFICE VISIT (OUTPATIENT)
Dept: FAMILY MEDICINE CLINIC | Facility: CLINIC | Age: 86
End: 2021-07-15
Payer: MEDICARE

## 2021-07-15 VITALS
BODY MASS INDEX: 26.13 KG/M2 | WEIGHT: 176.4 LBS | DIASTOLIC BLOOD PRESSURE: 70 MMHG | SYSTOLIC BLOOD PRESSURE: 140 MMHG | TEMPERATURE: 95.1 F | HEIGHT: 69 IN

## 2021-07-15 DIAGNOSIS — E78.2 MIXED HYPERLIPIDEMIA: ICD-10-CM

## 2021-07-15 DIAGNOSIS — E11.00 TYPE 2 DIABETES MELLITUS WITH HYPEROSMOLARITY WITHOUT COMA, WITHOUT LONG-TERM CURRENT USE OF INSULIN (HCC): Primary | ICD-10-CM

## 2021-07-15 DIAGNOSIS — I25.10 ATHEROSCLEROSIS OF NATIVE CORONARY ARTERY OF NATIVE HEART WITHOUT ANGINA PECTORIS: ICD-10-CM

## 2021-07-15 DIAGNOSIS — N18.30 TYPE 2 DIABETES MELLITUS WITH STAGE 3 CHRONIC KIDNEY DISEASE, WITHOUT LONG-TERM CURRENT USE OF INSULIN, UNSPECIFIED WHETHER STAGE 3A OR 3B CKD (HCC): ICD-10-CM

## 2021-07-15 DIAGNOSIS — E03.9 HYPOTHYROIDISM, UNSPECIFIED TYPE: ICD-10-CM

## 2021-07-15 DIAGNOSIS — E11.22 TYPE 2 DIABETES MELLITUS WITH STAGE 3 CHRONIC KIDNEY DISEASE, WITHOUT LONG-TERM CURRENT USE OF INSULIN, UNSPECIFIED WHETHER STAGE 3A OR 3B CKD (HCC): ICD-10-CM

## 2021-07-15 DIAGNOSIS — I73.9 PAD (PERIPHERAL ARTERY DISEASE) (HCC): ICD-10-CM

## 2021-07-15 DIAGNOSIS — I10 ESSENTIAL HYPERTENSION: ICD-10-CM

## 2021-07-15 LAB — SL AMB POCT HEMOGLOBIN AIC: 5.9 (ref ?–6.5)

## 2021-07-15 PROCEDURE — 99214 OFFICE O/P EST MOD 30 MIN: CPT | Performed by: FAMILY MEDICINE

## 2021-07-15 PROCEDURE — 83036 HEMOGLOBIN GLYCOSYLATED A1C: CPT | Performed by: FAMILY MEDICINE

## 2021-07-15 NOTE — PROGRESS NOTES
Assessment/Plan:  Patient's conditions stable overall  A1c is 5 9  Labs and records reviewed  Patient doing well physically  Patient will continue with current list of medications and will have refills done automatically  Patient have laboratory studies done prior to next visit  Follow-up in 3 months or as needed  Diagnoses and all orders for this visit:    Type 2 diabetes mellitus with hyperosmolarity without coma, without long-term current use of insulin ([facility])  -     POCT hemoglobin A1c    Type 2 diabetes mellitus with stage 3 chronic kidney disease, without long-term current use of insulin, unspecified whether stage 3a or 3b CKD ([facility])    Hypothyroidism, unspecified type    Essential hypertension    PAD (peripheral artery disease) ([facility] )    Mixed hyperlipidemia    Atherosclerosis of native coronary artery of native heart without angina pectoris            Subjective:        Patient ID: Yanira Sow is a 80 y o  male  Patient follow-up on diabetes hypothyroidism hypertension peripheral vascular disease CAD  Patient feeling well overall  Patient is sleeping well overall  Patient with some arthritic issues intermittently  No chest pain shortness of breath or headache or visual changes or problems with urination or defecation or abdominal pain or edema  The all other review systems negative  The following portions of the patient's history were reviewed and updated as appropriate: allergies, current medications, past family history, past medical history, past social history, past surgical history and problem list       Review of Systems   Constitutional: Negative  HENT: Negative  Eyes: Negative  Respiratory: Negative  Cardiovascular: Negative  Gastrointestinal: Negative  Endocrine: Negative  Genitourinary: Negative  Musculoskeletal: Positive for arthralgias  Skin: Negative  Allergic/Immunologic: Negative  Neurological: Negative  Hematological: Negative  Psychiatric/Behavioral: Negative  Objective:      BMI Counseling: Body mass index is 26 05 kg/m²  The BMI is above normal  Nutrition recommendations include decreasing portion sizes  Exercise recommendations include moderate physical activity 150 minutes/week  Depression Screening and Follow-up Plan: Clincally patient does not have depression  No treatment is required  /70 (BP Location: Right arm, Patient Position: Sitting, Cuff Size: Standard)   Temp (!) 95 1 °F (35 1 °C) (Tympanic)   Ht 5' 9" (1 753 m)   Wt 80 kg (176 lb 6 4 oz)   BMI 26 05 kg/m²          Physical Exam  Vitals and nursing note reviewed  Constitutional:       General: He is not in acute distress  Appearance: Normal appearance  He is not ill-appearing, toxic-appearing or diaphoretic  HENT:      Head: Normocephalic and atraumatic  Right Ear: Tympanic membrane, ear canal and external ear normal  There is no impacted cerumen  Left Ear: Tympanic membrane, ear canal and external ear normal  There is no impacted cerumen  Nose: Nose normal  No congestion or rhinorrhea  Mouth/Throat:      Mouth: Mucous membranes are moist       Pharynx: No oropharyngeal exudate or posterior oropharyngeal erythema  Eyes:      General: No scleral icterus  Right eye: No discharge  Left eye: No discharge  Extraocular Movements: Extraocular movements intact  Conjunctiva/sclera: Conjunctivae normal       Pupils: Pupils are equal, round, and reactive to light  Neck:      Vascular: No carotid bruit  Cardiovascular:      Rate and Rhythm: Normal rate and regular rhythm  Pulses: Normal pulses  Heart sounds: Normal heart sounds  No murmur heard  No friction rub  No gallop  Pulmonary:      Effort: Pulmonary effort is normal  No respiratory distress  Breath sounds: Normal breath sounds  No stridor  No wheezing, rhonchi or rales  Chest:      Chest wall: No tenderness  Abdominal:      General: Abdomen is flat  Bowel sounds are normal  There is no distension  Palpations: Abdomen is soft  Tenderness: There is no abdominal tenderness  There is no guarding or rebound  Musculoskeletal:         General: No swelling, tenderness, deformity or signs of injury  Normal range of motion  Cervical back: Normal range of motion and neck supple  No rigidity  No muscular tenderness  Right lower leg: No edema  Left lower leg: No edema  Lymphadenopathy:      Cervical: No cervical adenopathy  Skin:     General: Skin is warm and dry  Capillary Refill: Capillary refill takes less than 2 seconds  Coloration: Skin is not jaundiced  Findings: No bruising, erythema, lesion or rash  Neurological:      Mental Status: He is alert and oriented to person, place, and time  Mental status is at baseline  Cranial Nerves: No cranial nerve deficit  Sensory: No sensory deficit  Motor: No weakness  Coordination: Coordination normal       Gait: Gait normal    Psychiatric:         Mood and Affect: Mood normal          Behavior: Behavior normal          Thought Content:  Thought content normal          Judgment: Judgment normal

## 2021-10-06 ENCOUNTER — APPOINTMENT (OUTPATIENT)
Dept: LAB | Facility: MEDICAL CENTER | Age: 86
End: 2021-10-06
Payer: MEDICARE

## 2021-10-06 DIAGNOSIS — I25.10 ATHEROSCLEROSIS OF NATIVE CORONARY ARTERY OF NATIVE HEART WITHOUT ANGINA PECTORIS: ICD-10-CM

## 2021-10-06 DIAGNOSIS — M17.11 LOCALIZED OSTEOARTHRITIS OF RIGHT KNEE: ICD-10-CM

## 2021-10-06 DIAGNOSIS — I10 ESSENTIAL HYPERTENSION: ICD-10-CM

## 2021-10-06 DIAGNOSIS — E11.00 TYPE 2 DIABETES MELLITUS WITH HYPEROSMOLARITY WITHOUT COMA, WITHOUT LONG-TERM CURRENT USE OF INSULIN (HCC): ICD-10-CM

## 2021-10-06 DIAGNOSIS — E03.9 HYPOTHYROIDISM, UNSPECIFIED TYPE: ICD-10-CM

## 2021-10-06 DIAGNOSIS — E78.2 MIXED HYPERLIPIDEMIA: ICD-10-CM

## 2021-10-06 DIAGNOSIS — I73.9 PAD (PERIPHERAL ARTERY DISEASE) (HCC): ICD-10-CM

## 2021-10-06 LAB
ALBUMIN SERPL BCP-MCNC: 3.9 G/DL (ref 3.5–5)
ALP SERPL-CCNC: 81 U/L (ref 46–116)
ALT SERPL W P-5'-P-CCNC: 39 U/L (ref 12–78)
ANION GAP SERPL CALCULATED.3IONS-SCNC: 5 MMOL/L (ref 4–13)
AST SERPL W P-5'-P-CCNC: 22 U/L (ref 5–45)
BASOPHILS # BLD AUTO: 0.07 THOUSANDS/ΜL (ref 0–0.1)
BASOPHILS NFR BLD AUTO: 1 % (ref 0–1)
BILIRUB SERPL-MCNC: 0.45 MG/DL (ref 0.2–1)
BUN SERPL-MCNC: 32 MG/DL (ref 5–25)
CALCIUM SERPL-MCNC: 8.9 MG/DL (ref 8.3–10.1)
CHLORIDE SERPL-SCNC: 109 MMOL/L (ref 100–108)
CHOLEST SERPL-MCNC: 92 MG/DL (ref 50–200)
CO2 SERPL-SCNC: 25 MMOL/L (ref 21–32)
CREAT SERPL-MCNC: 1.5 MG/DL (ref 0.6–1.3)
CREAT UR-MCNC: 106 MG/DL
EOSINOPHIL # BLD AUTO: 0.13 THOUSAND/ΜL (ref 0–0.61)
EOSINOPHIL NFR BLD AUTO: 2 % (ref 0–6)
ERYTHROCYTE [DISTWIDTH] IN BLOOD BY AUTOMATED COUNT: 16.6 % (ref 11.6–15.1)
GFR SERPL CREATININE-BSD FRML MDRD: 42 ML/MIN/1.73SQ M
GLUCOSE P FAST SERPL-MCNC: 130 MG/DL (ref 65–99)
HCT VFR BLD AUTO: 30.6 % (ref 36.5–49.3)
HDLC SERPL-MCNC: 32 MG/DL
HGB BLD-MCNC: 9 G/DL (ref 12–17)
IMM GRANULOCYTES # BLD AUTO: 0.04 THOUSAND/UL (ref 0–0.2)
IMM GRANULOCYTES NFR BLD AUTO: 1 % (ref 0–2)
LDLC SERPL CALC-MCNC: 39 MG/DL (ref 0–100)
LYMPHOCYTES # BLD AUTO: 1.75 THOUSANDS/ΜL (ref 0.6–4.47)
LYMPHOCYTES NFR BLD AUTO: 27 % (ref 14–44)
MCH RBC QN AUTO: 20.1 PG (ref 26.8–34.3)
MCHC RBC AUTO-ENTMCNC: 29.4 G/DL (ref 31.4–37.4)
MCV RBC AUTO: 68 FL (ref 82–98)
MICROALBUMIN UR-MCNC: 152 MG/L (ref 0–20)
MICROALBUMIN/CREAT 24H UR: 143 MG/G CREATININE (ref 0–30)
MONOCYTES # BLD AUTO: 0.62 THOUSAND/ΜL (ref 0.17–1.22)
MONOCYTES NFR BLD AUTO: 10 % (ref 4–12)
NEUTROPHILS # BLD AUTO: 3.88 THOUSANDS/ΜL (ref 1.85–7.62)
NEUTS SEG NFR BLD AUTO: 59 % (ref 43–75)
NONHDLC SERPL-MCNC: 60 MG/DL
NRBC BLD AUTO-RTO: 0 /100 WBCS
PLATELET # BLD AUTO: 202 THOUSANDS/UL (ref 149–390)
PMV BLD AUTO: 11.2 FL (ref 8.9–12.7)
POTASSIUM SERPL-SCNC: 4.8 MMOL/L (ref 3.5–5.3)
PROT SERPL-MCNC: 7.2 G/DL (ref 6.4–8.2)
RBC # BLD AUTO: 4.48 MILLION/UL (ref 3.88–5.62)
SODIUM SERPL-SCNC: 139 MMOL/L (ref 136–145)
T4 FREE SERPL-MCNC: 1.3 NG/DL (ref 0.76–1.46)
TRIGL SERPL-MCNC: 107 MG/DL
TSH SERPL DL<=0.05 MIU/L-ACNC: 4.01 UIU/ML (ref 0.36–3.74)
WBC # BLD AUTO: 6.49 THOUSAND/UL (ref 4.31–10.16)

## 2021-10-06 PROCEDURE — 82570 ASSAY OF URINE CREATININE: CPT | Performed by: FAMILY MEDICINE

## 2021-10-06 PROCEDURE — 84443 ASSAY THYROID STIM HORMONE: CPT

## 2021-10-06 PROCEDURE — 85025 COMPLETE CBC W/AUTO DIFF WBC: CPT

## 2021-10-06 PROCEDURE — 84439 ASSAY OF FREE THYROXINE: CPT

## 2021-10-06 PROCEDURE — 80053 COMPREHEN METABOLIC PANEL: CPT

## 2021-10-06 PROCEDURE — 82043 UR ALBUMIN QUANTITATIVE: CPT | Performed by: FAMILY MEDICINE

## 2021-10-06 PROCEDURE — 80061 LIPID PANEL: CPT

## 2021-10-06 PROCEDURE — 36415 COLL VENOUS BLD VENIPUNCTURE: CPT

## 2021-10-06 RX ORDER — MELOXICAM 15 MG/1
15 TABLET ORAL DAILY
Qty: 30 TABLET | Refills: 1 | Status: SHIPPED | OUTPATIENT
Start: 2021-10-06 | End: 2021-12-15 | Stop reason: SDUPTHER

## 2021-11-02 ENCOUNTER — OFFICE VISIT (OUTPATIENT)
Dept: FAMILY MEDICINE CLINIC | Facility: CLINIC | Age: 86
End: 2021-11-02
Payer: MEDICARE

## 2021-11-02 VITALS
HEIGHT: 69 IN | BODY MASS INDEX: 25.65 KG/M2 | SYSTOLIC BLOOD PRESSURE: 144 MMHG | OXYGEN SATURATION: 98 % | DIASTOLIC BLOOD PRESSURE: 78 MMHG | RESPIRATION RATE: 16 BRPM | TEMPERATURE: 97.8 F | WEIGHT: 173.2 LBS | HEART RATE: 58 BPM

## 2021-11-02 DIAGNOSIS — E11.00 TYPE 2 DIABETES MELLITUS WITH HYPEROSMOLARITY WITHOUT COMA, WITHOUT LONG-TERM CURRENT USE OF INSULIN (HCC): Primary | ICD-10-CM

## 2021-11-02 DIAGNOSIS — I10 PRIMARY HYPERTENSION: ICD-10-CM

## 2021-11-02 DIAGNOSIS — Z23 NEED FOR IMMUNIZATION AGAINST INFLUENZA: ICD-10-CM

## 2021-11-02 DIAGNOSIS — E78.2 MIXED HYPERLIPIDEMIA: ICD-10-CM

## 2021-11-02 DIAGNOSIS — I25.10 ATHEROSCLEROSIS OF NATIVE CORONARY ARTERY OF NATIVE HEART WITHOUT ANGINA PECTORIS: ICD-10-CM

## 2021-11-02 DIAGNOSIS — I73.9 PAD (PERIPHERAL ARTERY DISEASE) (HCC): ICD-10-CM

## 2021-11-02 DIAGNOSIS — E03.9 HYPOTHYROIDISM, UNSPECIFIED TYPE: ICD-10-CM

## 2021-11-02 LAB — SL AMB POCT HEMOGLOBIN AIC: 5.7 (ref ?–6.5)

## 2021-11-02 PROCEDURE — 99214 OFFICE O/P EST MOD 30 MIN: CPT | Performed by: FAMILY MEDICINE

## 2021-11-02 PROCEDURE — 83036 HEMOGLOBIN GLYCOSYLATED A1C: CPT | Performed by: FAMILY MEDICINE

## 2021-11-02 PROCEDURE — 90662 IIV NO PRSV INCREASED AG IM: CPT

## 2021-11-02 PROCEDURE — G0008 ADMIN INFLUENZA VIRUS VAC: HCPCS

## 2021-12-15 DIAGNOSIS — M17.11 LOCALIZED OSTEOARTHRITIS OF RIGHT KNEE: ICD-10-CM

## 2021-12-15 RX ORDER — MELOXICAM 15 MG/1
15 TABLET ORAL DAILY
Qty: 30 TABLET | Refills: 1 | Status: SHIPPED | OUTPATIENT
Start: 2021-12-15 | End: 2022-02-03

## 2022-01-24 ENCOUNTER — APPOINTMENT (OUTPATIENT)
Dept: LAB | Facility: MEDICAL CENTER | Age: 87
End: 2022-01-24
Payer: MEDICARE

## 2022-01-24 DIAGNOSIS — E11.22 TYPE 2 DIABETES MELLITUS WITH STAGE 3A CHRONIC KIDNEY DISEASE, WITHOUT LONG-TERM CURRENT USE OF INSULIN (HCC): ICD-10-CM

## 2022-01-24 DIAGNOSIS — I10 ESSENTIAL HYPERTENSION: ICD-10-CM

## 2022-01-24 DIAGNOSIS — E03.9 HYPOTHYROIDISM, UNSPECIFIED TYPE: ICD-10-CM

## 2022-01-24 DIAGNOSIS — N18.31 TYPE 2 DIABETES MELLITUS WITH STAGE 3A CHRONIC KIDNEY DISEASE, WITHOUT LONG-TERM CURRENT USE OF INSULIN (HCC): ICD-10-CM

## 2022-01-24 LAB
ALBUMIN SERPL BCP-MCNC: 4.1 G/DL (ref 3.5–5)
ALP SERPL-CCNC: 81 U/L (ref 46–116)
ALT SERPL W P-5'-P-CCNC: 25 U/L (ref 12–78)
ANION GAP SERPL CALCULATED.3IONS-SCNC: 6 MMOL/L (ref 4–13)
AST SERPL W P-5'-P-CCNC: 12 U/L (ref 5–45)
BILIRUB SERPL-MCNC: 1.46 MG/DL (ref 0.2–1)
BUN SERPL-MCNC: 43 MG/DL (ref 5–25)
CALCIUM SERPL-MCNC: 9.2 MG/DL (ref 8.3–10.1)
CHLORIDE SERPL-SCNC: 110 MMOL/L (ref 100–108)
CO2 SERPL-SCNC: 25 MMOL/L (ref 21–32)
CREAT SERPL-MCNC: 1.45 MG/DL (ref 0.6–1.3)
GFR SERPL CREATININE-BSD FRML MDRD: 43 ML/MIN/1.73SQ M
GLUCOSE P FAST SERPL-MCNC: 133 MG/DL (ref 65–99)
POTASSIUM SERPL-SCNC: 4.5 MMOL/L (ref 3.5–5.3)
PROT SERPL-MCNC: 7.6 G/DL (ref 6.4–8.2)
SODIUM SERPL-SCNC: 141 MMOL/L (ref 136–145)
TSH SERPL DL<=0.05 MIU/L-ACNC: 3.27 UIU/ML (ref 0.36–3.74)

## 2022-01-24 PROCEDURE — 80053 COMPREHEN METABOLIC PANEL: CPT

## 2022-01-24 PROCEDURE — 84443 ASSAY THYROID STIM HORMONE: CPT

## 2022-01-24 PROCEDURE — 36415 COLL VENOUS BLD VENIPUNCTURE: CPT

## 2022-02-03 ENCOUNTER — OFFICE VISIT (OUTPATIENT)
Dept: FAMILY MEDICINE CLINIC | Facility: CLINIC | Age: 87
End: 2022-02-03
Payer: MEDICARE

## 2022-02-03 VITALS
HEART RATE: 80 BPM | SYSTOLIC BLOOD PRESSURE: 130 MMHG | HEIGHT: 69 IN | OXYGEN SATURATION: 98 % | TEMPERATURE: 96.6 F | WEIGHT: 168 LBS | BODY MASS INDEX: 24.88 KG/M2 | DIASTOLIC BLOOD PRESSURE: 80 MMHG

## 2022-02-03 DIAGNOSIS — K51.00 ULCERATIVE PANCOLITIS WITHOUT COMPLICATION (HCC): ICD-10-CM

## 2022-02-03 DIAGNOSIS — E78.2 MIXED HYPERLIPIDEMIA: ICD-10-CM

## 2022-02-03 DIAGNOSIS — I10 PRIMARY HYPERTENSION: ICD-10-CM

## 2022-02-03 DIAGNOSIS — M25.511 ACUTE PAIN OF BOTH SHOULDERS: ICD-10-CM

## 2022-02-03 DIAGNOSIS — N18.30 TYPE 2 DIABETES MELLITUS WITH STAGE 3 CHRONIC KIDNEY DISEASE, WITHOUT LONG-TERM CURRENT USE OF INSULIN, UNSPECIFIED WHETHER STAGE 3A OR 3B CKD (HCC): Primary | ICD-10-CM

## 2022-02-03 DIAGNOSIS — E03.9 HYPOTHYROIDISM, UNSPECIFIED TYPE: ICD-10-CM

## 2022-02-03 DIAGNOSIS — M25.512 ACUTE PAIN OF BOTH SHOULDERS: ICD-10-CM

## 2022-02-03 DIAGNOSIS — I73.9 PAD (PERIPHERAL ARTERY DISEASE) (HCC): ICD-10-CM

## 2022-02-03 DIAGNOSIS — E11.22 TYPE 2 DIABETES MELLITUS WITH STAGE 3 CHRONIC KIDNEY DISEASE, WITHOUT LONG-TERM CURRENT USE OF INSULIN, UNSPECIFIED WHETHER STAGE 3A OR 3B CKD (HCC): Primary | ICD-10-CM

## 2022-02-03 PROCEDURE — 99214 OFFICE O/P EST MOD 30 MIN: CPT | Performed by: FAMILY MEDICINE

## 2022-02-03 RX ORDER — CELECOXIB 200 MG/1
200 CAPSULE ORAL DAILY
Qty: 30 CAPSULE | Refills: 3 | Status: SHIPPED | OUTPATIENT
Start: 2022-02-03 | End: 2022-05-03

## 2022-02-03 RX ORDER — PREDNISONE 20 MG/1
40 TABLET ORAL DAILY
Qty: 10 TABLET | Refills: 0 | Status: SHIPPED | OUTPATIENT
Start: 2022-02-03 | End: 2022-02-08

## 2022-02-03 NOTE — PATIENT INSTRUCTIONS
10% - bad control"> 10% - bad control,Hemoglobin A1c (HbA1c) greater than 10% indicating poor diabetic control,Haemoglobin A1c greater than 10% indicating poor diabetic control">   Diabetes Mellitus Type 2 in Adults, Ambulatory Care   GENERAL INFORMATION:   Diabetes mellitus type 2  is a disease that affects how your body uses glucose (sugar)  Insulin helps move sugar out of the blood so it can be used for energy  Normally, when the blood sugar level increases, the pancreas makes more insulin  Type 2 diabetes develops because either the body cannot make enough insulin, or it cannot use the insulin correctly  After many years, your pancreas may stop making insulin  Common symptoms include the following:   · More hunger or thirst than usual     · Frequent urination     · Weight loss without trying     · Blurred vision  Seek immediate care for the following symptoms:   · Severe abdominal pain, or pain that spreads to your back  You may also be vomiting  · Trouble staying awake or focusing    · Shaking or sweating    · Blurred or double vision    · Breath has a fruity, sweet smell    · Breathing is deep and labored, or rapid and shallow    · Heartbeat is fast and weak  Treatment for diabetes mellitus type 2  includes keeping your blood sugar at a normal level  You must eat the right foods, and exercise regularly  You may also need medicine if you cannot control your blood sugar level with nutrition and exercise  Manage diabetes mellitus type 2:   · Check your blood sugar level  You will be taught how to check a small drop of blood in a glucose monitor  Ask your healthcare provider when and how often to check during the day  Ask your healthcare provider what your blood sugar levels should be when you check them  · Keep track of carbohydrates (sugar and starchy foods)  Your blood sugar level can get too high if you eat too many carbohydrates   Your dietitian will help you plan meals and snacks that have the right amount of carbohydrates  · Eat low-fat foods  Some examples are skinless chicken and low-fat milk  · Eat less sodium (salt)  Some examples of high-sodium foods to limit are soy sauce, potato chips, and soup  Do not add salt to food you cook  Limit your use of table salt  · Eat high-fiber foods  Foods that are a good source of fiber include vegetables, whole grain bread, and beans  · Limit alcohol  Alcohol affects your blood sugar level and can make it harder to manage your diabetes  Women should limit alcohol to 1 drink a day  Men should limit alcohol to 2 drinks a day  A drink of alcohol is 12 ounces of beer, 5 ounces of wine, or 1½ ounces of liquor  · Get regular exercise  Exercise can help keep your blood sugar level steady, decrease your risk of heart disease, and help you lose weight  Exercise for at least 30 minutes, 5 days a week  Include muscle strengthening activities 2 days each week  Work with your healthcare provider to create an exercise plan  · Check your feet each day  for injuries or open sores  Ask your healthcare provider for activities you can do if you have an open sore  · Quit smoking  If you smoke, it is never too late to quit  Smoking can worsen the problems that may occur with diabetes  Ask your healthcare provider for information about how to stop smoking if you are having trouble quitting  · Ask about your weight:  Ask healthcare providers if you need to lose weight, and how much to lose  Ask them to help you with a weight loss program  Even a 10 to 15 pound weight loss can help you manage your blood sugar level  · Carry medical alert identification  Wear medical alert jewelry or carry a card that says you have diabetes  Ask your healthcare provider where to get these items  · Ask about vaccines  Diabetes puts you at risk of serious illness if you get the flu, pneumonia, or hepatitis   Ask your healthcare provider if you should get a flu, pneumonia, or hepatitis B vaccine, and when to get the vaccine  Follow up with your healthcare provider as directed:  Write down your questions so you remember to ask them during your visits  CARE AGREEMENT:   You have the right to help plan your care  Learn about your health condition and how it may be treated  Discuss treatment options with your caregivers to decide what care you want to receive  You always have the right to refuse treatment  The above information is an  only  It is not intended as medical advice for individual conditions or treatments  Talk to your doctor, nurse or pharmacist before following any medical regimen to see if it is safe and effective for you  © 2014 [address] is for End User's use only and may not be sold, redistributed or otherwise used for commercial purposes  All illustrations and images included in CareNotes® are the copyrighted property of [employer] D [employer]  or [employer]

## 2022-02-03 NOTE — PROGRESS NOTES
Assessment/Plan:  TSH is 3 2  Glucose 133 AST 12 GFR 43 LDL 39  Last A1c is 5 7  The diabetes, hypothyroidism, hypertension, hyperlipidemia all stable at present time  Continue with current regimen of meds as listed  Refills will be given as needed as these are on automatic refill  Patient use prednisone as directed as well as Celebrex  Patient will remain off meloxicam   Diabetic education given  Follow-up in 3 months       Diagnoses and all orders for this visit:    Type 2 diabetes mellitus with stage 3 chronic kidney disease, without long-term current use of insulin, unspecified whether stage 3a or 3b CKD (HCC)    Hypothyroidism, unspecified type    Primary hypertension    PAD (peripheral artery disease) (HCC)    Mixed hyperlipidemia    Acute pain of both shoulders  -     celecoxib (CeleBREX) 200 mg capsule; Take 1 capsule (200 mg total) by mouth daily  -     predniSONE 20 mg tablet; Take 2 tablets (40 mg total) by mouth daily for 5 days    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 ([facility])            Subjective:        Patient ID: Gemini Aquino is a 80 y o  male  Patient follow-up on diabetes hypothyroidism hypertension PAD hyperlipidemia  Patient does notice bilateral shoulder pain as well as some bilateral arm pain worse at night when lying down  The no neck pain or back pain associated with this  No lower extremity pain associated with this  No chest pain or shortness of breath associated with this  No change urination or defecation  Vision is stable overall  No Headaches  Patient using meloxicam without significant improvement  The following portions of the patient's history were reviewed and updated as appropriate: allergies, current medications, past family history, past medical history, past social history, past surgical history and problem list       Review of Systems   Constitutional: Negative  HENT: Negative  Eyes: Negative  Respiratory: Negative      Cardiovascular: Negative  Gastrointestinal: Negative  Endocrine: Negative  Genitourinary: Negative  Musculoskeletal: Positive for arthralgias  Skin: Negative  Allergic/Immunologic: Negative  Neurological: Negative  Hematological: Negative  Psychiatric/Behavioral: Negative  Objective:               /80   Pulse 80   Temp (!) 96 6 °F (35 9 °C)   Ht 5' 9" (1 753 m)   Wt 76 2 kg (168 lb)   SpO2 98%   BMI 24 81 kg/m²          Physical Exam  Vitals and nursing note reviewed  Constitutional:       General: He is not in acute distress  Appearance: Normal appearance  He is not ill-appearing, toxic-appearing or diaphoretic  HENT:      Head: Normocephalic and atraumatic  Right Ear: Tympanic membrane, ear canal and external ear normal  There is no impacted cerumen  Left Ear: Tympanic membrane, ear canal and external ear normal  There is no impacted cerumen  Nose: Nose normal  No congestion or rhinorrhea  Mouth/Throat:      Mouth: Mucous membranes are moist       Pharynx: No oropharyngeal exudate or posterior oropharyngeal erythema  Eyes:      General: No scleral icterus  Right eye: No discharge  Left eye: No discharge  Extraocular Movements: Extraocular movements intact  Conjunctiva/sclera: Conjunctivae normal       Pupils: Pupils are equal, round, and reactive to light  Neck:      Vascular: No carotid bruit  Cardiovascular:      Rate and Rhythm: Normal rate and regular rhythm  Pulses: Normal pulses  Heart sounds: Normal heart sounds  No murmur heard  No friction rub  No gallop  Pulmonary:      Effort: Pulmonary effort is normal  No respiratory distress  Breath sounds: Normal breath sounds  No stridor  No wheezing, rhonchi or rales  Chest:      Chest wall: No tenderness  Musculoskeletal:         General: Tenderness present  No swelling, deformity or signs of injury        Cervical back: Normal range of motion and neck supple  No rigidity  No muscular tenderness  Right lower leg: No edema  Left lower leg: No edema  Lymphadenopathy:      Cervical: No cervical adenopathy  Skin:     General: Skin is warm and dry  Capillary Refill: Capillary refill takes less than 2 seconds  Coloration: Skin is not jaundiced  Findings: No bruising, erythema, lesion or rash  Neurological:      Mental Status: He is alert and oriented to person, place, and time  Mental status is at baseline  Cranial Nerves: No cranial nerve deficit  Sensory: No sensory deficit  Motor: No weakness  Coordination: Coordination normal       Gait: Gait normal    Psychiatric:         Mood and Affect: Mood normal          Behavior: Behavior normal          Thought Content:  Thought content normal          Judgment: Judgment normal

## 2022-03-22 ENCOUNTER — APPOINTMENT (OUTPATIENT)
Dept: LAB | Facility: MEDICAL CENTER | Age: 87
End: 2022-03-22
Payer: MEDICARE

## 2022-03-22 DIAGNOSIS — K51.919 ULCERATIVE COLITIS WITH COMPLICATION, UNSPECIFIED LOCATION (HCC): ICD-10-CM

## 2022-03-22 LAB
ALBUMIN SERPL BCP-MCNC: 4.2 G/DL (ref 3.5–5)
ALP SERPL-CCNC: 73 U/L (ref 46–116)
ALT SERPL W P-5'-P-CCNC: 23 U/L (ref 12–78)
ANION GAP SERPL CALCULATED.3IONS-SCNC: 6 MMOL/L (ref 4–13)
AST SERPL W P-5'-P-CCNC: 17 U/L (ref 5–45)
BILIRUB SERPL-MCNC: 0.53 MG/DL (ref 0.2–1)
BUN SERPL-MCNC: 41 MG/DL (ref 5–25)
CALCIUM SERPL-MCNC: 8.8 MG/DL (ref 8.3–10.1)
CHLORIDE SERPL-SCNC: 109 MMOL/L (ref 100–108)
CO2 SERPL-SCNC: 25 MMOL/L (ref 21–32)
CREAT SERPL-MCNC: 1.61 MG/DL (ref 0.6–1.3)
CRP SERPL QL: <3 MG/L
ERYTHROCYTE [DISTWIDTH] IN BLOOD BY AUTOMATED COUNT: 16.2 % (ref 11.6–15.1)
GFR SERPL CREATININE-BSD FRML MDRD: 38 ML/MIN/1.73SQ M
GLUCOSE P FAST SERPL-MCNC: 121 MG/DL (ref 65–99)
HCT VFR BLD AUTO: 28 % (ref 36.5–49.3)
HGB BLD-MCNC: 8.5 G/DL (ref 12–17)
MCH RBC QN AUTO: 19.7 PG (ref 26.8–34.3)
MCHC RBC AUTO-ENTMCNC: 30.4 G/DL (ref 31.4–37.4)
MCV RBC AUTO: 65 FL (ref 82–98)
PLATELET # BLD AUTO: 281 THOUSANDS/UL (ref 149–390)
PMV BLD AUTO: 11.5 FL (ref 8.9–12.7)
POTASSIUM SERPL-SCNC: 4.4 MMOL/L (ref 3.5–5.3)
PROT SERPL-MCNC: 7.5 G/DL (ref 6.4–8.2)
RBC # BLD AUTO: 4.32 MILLION/UL (ref 3.88–5.62)
SODIUM SERPL-SCNC: 140 MMOL/L (ref 136–145)
WBC # BLD AUTO: 8.78 THOUSAND/UL (ref 4.31–10.16)

## 2022-03-22 PROCEDURE — 80053 COMPREHEN METABOLIC PANEL: CPT

## 2022-03-22 PROCEDURE — 86140 C-REACTIVE PROTEIN: CPT

## 2022-03-22 PROCEDURE — 85027 COMPLETE CBC AUTOMATED: CPT

## 2022-03-22 PROCEDURE — 36415 COLL VENOUS BLD VENIPUNCTURE: CPT

## 2022-03-25 DIAGNOSIS — I10 ESSENTIAL HYPERTENSION: ICD-10-CM

## 2022-03-25 RX ORDER — AMLODIPINE BESYLATE 10 MG/1
TABLET ORAL
Qty: 90 TABLET | Refills: 1 | Status: SHIPPED | OUTPATIENT
Start: 2022-03-25

## 2022-05-03 ENCOUNTER — OFFICE VISIT (OUTPATIENT)
Dept: FAMILY MEDICINE CLINIC | Facility: CLINIC | Age: 87
End: 2022-05-03
Payer: MEDICARE

## 2022-05-03 VITALS
TEMPERATURE: 95 F | SYSTOLIC BLOOD PRESSURE: 140 MMHG | DIASTOLIC BLOOD PRESSURE: 60 MMHG | RESPIRATION RATE: 20 BRPM | OXYGEN SATURATION: 96 % | HEIGHT: 69 IN | BODY MASS INDEX: 25.77 KG/M2 | HEART RATE: 87 BPM | WEIGHT: 174 LBS

## 2022-05-03 DIAGNOSIS — E11.00 TYPE 2 DIABETES MELLITUS WITH HYPEROSMOLARITY WITHOUT COMA, WITHOUT LONG-TERM CURRENT USE OF INSULIN (HCC): Primary | ICD-10-CM

## 2022-05-03 DIAGNOSIS — E03.9 HYPOTHYROIDISM, UNSPECIFIED TYPE: ICD-10-CM

## 2022-05-03 DIAGNOSIS — I10 PRIMARY HYPERTENSION: ICD-10-CM

## 2022-05-03 DIAGNOSIS — I25.10 ATHEROSCLEROSIS OF NATIVE CORONARY ARTERY OF NATIVE HEART WITHOUT ANGINA PECTORIS: ICD-10-CM

## 2022-05-03 DIAGNOSIS — E78.2 MIXED HYPERLIPIDEMIA: ICD-10-CM

## 2022-05-03 LAB — SL AMB POCT HEMOGLOBIN AIC: 5.4 (ref ?–6.5)

## 2022-05-03 PROCEDURE — 83036 HEMOGLOBIN GLYCOSYLATED A1C: CPT | Performed by: FAMILY MEDICINE

## 2022-05-03 PROCEDURE — 1123F ACP DISCUSS/DSCN MKR DOCD: CPT | Performed by: FAMILY MEDICINE

## 2022-05-03 PROCEDURE — 99214 OFFICE O/P EST MOD 30 MIN: CPT | Performed by: FAMILY MEDICINE

## 2022-05-03 RX ORDER — ASPIRIN 81 MG/1
81 TABLET ORAL DAILY
COMMUNITY

## 2022-05-03 NOTE — PROGRESS NOTES
Assessment/Plan:A1c 5 4  CMP CBC C-reactive protein TSH reviewed  Monofilament test done at this time  Patient will be referred to podiatry for routine foot care  Patient see ophthalmologist appropriately  Patient will continue with current regimen for diabetes hypertension hyperlipidemia hypothyroidism  Refills   Will be given when needed  Patient will follow-up in 3 months or as needed  Diagnoses and all orders for this visit:    Type 2 diabetes mellitus with hyperosmolarity without coma, without long-term current use of insulin (HCC)  -     POCT hemoglobin A1c    Hypothyroidism, unspecified type    Atherosclerosis of native coronary artery of native heart without angina pectoris    Primary hypertension    Mixed hyperlipidemia    Other orders  -     aspirin (ECOTRIN LOW STRENGTH) 81 mg EC tablet; Take 81 mg by mouth daily            Subjective:        Patient ID: Jessie Evans is a 80 y o  male  Patient here to follow-up on diabetes hypothyroidism well as hypertension hyperlipidemia CAD  Patient feeling fairly well overall other than having some arthritic issues in hands  Patient is using fish oil with improvement in shoulder pain  No new change urination or defecation is patient shortness of breath  Diabetes          The following portions of the patient's history were reviewed and updated as appropriate: allergies, current medications, past family history, past medical history, past social history, past surgical history and problem list       Review of Systems   Constitutional: Negative  HENT: Negative  Eyes: Negative  Respiratory: Negative  Cardiovascular: Negative  Gastrointestinal: Negative  Endocrine: Negative  Genitourinary: Negative  Musculoskeletal: Positive for arthralgias  Skin: Negative  Allergic/Immunologic: Negative  Neurological: Negative  Hematological: Negative  Psychiatric/Behavioral: Negative              Objective:      BMI Counseling: Body mass index is 25 7 kg/m²  The BMI is above normal  Nutrition recommendations include decreasing portion sizes  Exercise recommendations include moderate physical activity 150 minutes/week  Rationale for BMI follow-up plan is due to patient being overweight or obese  Depression Screening and Follow-up Plan: Patient was screened for depression during today's encounter  They screened negative with a PHQ-2 score of 0             /60 (BP Location: Right arm, Patient Position: Sitting, Cuff Size: Standard)   Pulse 87   Temp (!) 95 °F (35 °C) (Tympanic)   Resp 20   Ht 5' 9" (1 753 m)   Wt 78 9 kg (174 lb)   SpO2 96%   BMI 25 70 kg/m²          Physical Exam  Vitals and nursing note reviewed  Constitutional:       General: He is not in acute distress  Appearance: Normal appearance  He is not ill-appearing, toxic-appearing or diaphoretic  HENT:      Head: Normocephalic and atraumatic  Right Ear: Tympanic membrane, ear canal and external ear normal  There is no impacted cerumen  Left Ear: Tympanic membrane, ear canal and external ear normal  There is no impacted cerumen  Nose: Nose normal  No congestion or rhinorrhea  Mouth/Throat:      Mouth: Mucous membranes are moist       Pharynx: No oropharyngeal exudate or posterior oropharyngeal erythema  Eyes:      General: No scleral icterus  Right eye: No discharge  Left eye: No discharge  Extraocular Movements: Extraocular movements intact  Conjunctiva/sclera: Conjunctivae normal       Pupils: Pupils are equal, round, and reactive to light  Neck:      Vascular: No carotid bruit  Cardiovascular:      Rate and Rhythm: Normal rate and regular rhythm  Pulses: Normal pulses  Heart sounds: Normal heart sounds  No murmur heard  No friction rub  No gallop  Pulmonary:      Effort: Pulmonary effort is normal  No respiratory distress  Breath sounds: Normal breath sounds  No stridor  No wheezing, rhonchi or rales  Chest:      Chest wall: No tenderness  Musculoskeletal:         General: Tenderness present  No swelling, deformity or signs of injury  Normal range of motion  Cervical back: Normal range of motion and neck supple  No rigidity  No muscular tenderness  Right lower leg: No edema  Left lower leg: No edema  Lymphadenopathy:      Cervical: No cervical adenopathy  Skin:     General: Skin is warm and dry  Capillary Refill: Capillary refill takes less than 2 seconds  Coloration: Skin is not jaundiced  Findings: No bruising, erythema, lesion or rash  Neurological:      Mental Status: He is alert and oriented to person, place, and time  Mental status is at baseline  Cranial Nerves: No cranial nerve deficit  Sensory: No sensory deficit  Motor: No weakness  Coordination: Coordination normal       Gait: Gait normal    Psychiatric:         Mood and Affect: Mood normal          Behavior: Behavior normal          Thought Content:  Thought content normal          Judgment: Judgment normal

## 2022-05-03 NOTE — PROGRESS NOTES
Diabetic Foot Exam    Patient's shoes and socks removed  Right Foot/Ankle   Right Foot Inspection  Skin Exam: skin normal and skin intact  No dry skin, no warmth, no callus, no erythema, no maceration, no abnormal color, no pre-ulcer, no ulcer and no callus  Toe Exam: ROM and strength within normal limits  Sensory   Monofilament testing: intact    Vascular  Capillary refills: < 3 seconds  The right DP pulse is 2+  The right PT pulse is 2+  Left Foot/Ankle  Left Foot Inspection  Skin Exam: skin normal and skin intact  No dry skin, no warmth, no erythema, no maceration, normal color, no pre-ulcer, no ulcer and no callus  Toe Exam: ROM and strength within normal limits  Sensory   Monofilament testing: intact    Vascular  Capillary refills: < 3 seconds  The left DP pulse is 2+  The left PT pulse is 2+  Assign Risk Category  No deformity present  No loss of protective sensation  No weak pulses  Risk: 0        Assessment and Plan:     Problem List Items Addressed This Visit        Endocrine    DMII (diabetes mellitus, type 2) ([facility] ) - Primary    Relevant Orders    POCT hemoglobin A1c (Completed)           Preventive health issues were discussed with patient, and age appropriate screening tests were ordered as noted in patient's After Visit Summary  Personalized health advice and appropriate referrals for health education or preventive services given if needed, as noted in patient's After Visit Summary       History of Present Illness:     Patient presents for Medicare Annual Wellness visit    Patient Care Team:  Mere Cardoza DO as PCP - General (Family Medicine)  Mansoor Oden DO     Problem List:     Patient Active Problem List   Diagnosis    Anemia    Atherosclerotic heart disease of native coronary artery without angina pectoris    DMII (diabetes mellitus, type 2) ([facility]    Hyperlipidemia    Hypertension    Hypothyroidism    PAD (peripheral artery disease) ([facility] )    Skin neoplasm  Squamous cell carcinoma    Dermatitis    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)    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)    Beta thalassemia minor    Elevated PSA    Abnormal prostate exam    Lumbar strain    Acute pain of right knee    Strain of calf muscle    Localized osteoarthritis of right knee    Acute pain of both shoulders      Past Medical and Surgical History:     Past Medical History:   Diagnosis Date    Allergic     Anemia     Arthritis     Diabetes mellitus ([address] )     Disease of thyroid gland     Hyperlipemia     Hypertension      Past Surgical History:   Procedure Laterality Date    COLONOSCOPY      PROSTATE BIOPSY      needle,  resolved may 2005      Family History:     Family History   Problem Relation Age of Onset    No Known Problems Mother       Social History:     Social History     Socioeconomic History    Marital status: /Civil Union     Spouse name: None    Number of children: None    Years of education: None    Highest education level: None   Occupational History    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[employer]    Smoking status: Former Smoker     Types: Cigarettes    Smokeless tobacco: Never Used   Vaping Use    Vaping Use: Never used   Substance and Sexual Activity    Alcohol use: Not Currently     Comment: social/rarely    Drug use: Never    Sexual activity: Not Currently   Other Topics Concern    None   Social History Narrative    None     Social Determinants of Health     Financial Resource Strain: Not on file   Food Insecurity: Not on file   Transportation Needs: Not on file   Physical Activity: Not on file   Stress: Not on file   Social Connections: Not on file   Intimate Partner Violence: Not on file   Housing Stability: Not on file      Medications and Allergies:     Current Outpatient Medications   Medication Sig Dispense Refill    amLODIPine (NORVASC) 10 mg tablet take 1 tablet by mouth once daily 90 tablet 1    aspirin (ECOTRIN LOW STRENGTH) 81 mg EC tablet Take 81 mg by mouth daily      atorvastatin (LIPITOR) 20 mg tablet Take 1 tablet by mouth daily      cholecalciferol (VITAMIN D3) 1,000 units tablet Take by mouth      fexofenadine (ALLEGRA) 180 MG tablet Take by mouth      fluticasone (FLONASE) 50 mcg/act nasal spray instill 2 sprays into each nostril once daily as directed  0    folic acid (FOLVITE) 1 mg tablet Take 1 tablet by mouth daily      levothyroxine 25 mcg tablet take 1 tablet by mouth once daily 90 tablet 1    metoprolol tartrate (LOPRESSOR) 50 mg tablet Take 1 tablet by mouth 2 (two) times a day      montelukast (SINGULAIR) 10 mg tablet Take by mouth      Multiple Vitamins-Minerals (CENTRUM SILVER) tablet Take by mouth      olmesartan-hydrochlorothiazide (BENICAR HCT) 40-25 MG per tablet take 1 tablet by mouth once daily 90 tablet 1    Omega-3 Fatty Acids (FISH OIL) 1000 MG CPDR Take by mouth      Potassium 99 MG TABS Take by mouth      Saw Palmetto 160 MG CAPS Take by mouth      sulfaSALAzine (AZULFIDINE) 500 mg tablet   0    zinc gluconate 50 mg tablet Take by mouth      celecoxib (CeleBREX) 200 mg capsule Take 1 capsule (200 mg total) by mouth daily (Patient not taking: Reported on 5/3/2022 ) 30 capsule 3    oxaprozin (DAYPRO) 600 MG tablet Take 2 tablets (1,200 mg total) by mouth daily (Patient not taking: Reported on 5/3/2022 ) 60 tablet 2     No current facility-administered medications for this visit       Allergies   Allergen Reactions    Lisinopril     Candesartan Rash      Immunizations:     Immunization History   Administered Date(s) Administered    COVID-19 MODERNA VACC 0 5 ML IM 02/08/2021, 02/16/2021, 03/09/2021    INFLUENZA 10/08/2018, 11/03/2019    Influenza Split High Dose Preservative Free IM 11/07/2017    Influenza, high dose seasonal 0 7 mL 11/19/2018, 01/10/2020, 10/15/2020, 11/02/2021    Pneumococcal Conjugate 13-Valent 10/18/2016    Pneumococcal Polysaccharide PPV23 11/07/2017    Td (adult), adsorbed 10/23/2007      Health Maintenance:         Topic Date Due    Hepatitis C Screening  Completed         Topic Date Due    DTaP,Tdap,and Td Vaccines (1 - Tdap) 10/24/2007    COVID-19 Vaccine (3 - Booster) 08/09/2021      Medicare Health Risk Assessment:     There were no vitals taken for this visit  Zuhair Lau is here for his Subsequent Wellness visit  Health Risk Assessment:   Patient rates overall health as good  Patient feels that their physical health rating is same  Patient is very satisfied with their life  Eyesight was rated as same  Hearing was rated as slightly worse  Patient feels that their emotional and mental health rating is same  Patients states they are sometimes angry  Patient states they are never, rarely unusually tired/fatigued  Pain experienced in the last 7 days has been some  Patient's pain rating has been 5/10  Patient states that he has experienced no weight loss or gain in last 6 months  Depression Screening:   PHQ-2 Score: 0      Fall Risk Screening: In the past year, patient has experienced: no history of falling in past year      Home Safety:  Patient does not have trouble with stairs inside or outside of their home  Patient has working smoke alarms and has no working carbon monoxide detector  Home safety hazards include: none  Nutrition:   Current diet is Regular  Medications:   Patient is currently taking over-the-counter supplements  OTC medications include: see medication list  Patient is able to manage medications  Activities of Daily Living (ADLs)/Instrumental Activities of Daily Living (IADLs):   Walk and transfer into and out of bed and chair?: Yes  Dress and groom yourself?: Yes    Bathe or shower yourself?: Yes    Feed yourself?  Yes  Do your laundry/housekeeping?: Yes  Manage your money, pay your bills and track your expenses?: Yes  Make your own meals?: Yes    Do your own shopping?: Yes    Previous Hospitalizations:   Any hospitalizations or ED visits within the last 12 months?: No      Advance Care Planning:   Living will: Yes    Durable POA for healthcare:  Yes    Advanced directive: Yes      PREVENTIVE SCREENINGS      Cardiovascular Screening:    General: Screening Not Indicated and History Lipid Disorder      Diabetes Screening:     General: Screening Not Indicated and History Diabetes      Colorectal Cancer Screening:     General: Screening Not Indicated      Prostate Cancer Screening:    General: Screening Not Indicated      Abdominal Aortic Aneurysm (AAA) Screening:    Risk factors include: tobacco use        Lung Cancer Screening:     General: Screening Not Indicated      Hepatitis C Screening:    General: Screening Current      [facility]

## 2022-08-04 ENCOUNTER — OFFICE VISIT (OUTPATIENT)
Dept: FAMILY MEDICINE CLINIC | Facility: CLINIC | Age: 87
End: 2022-08-04
Payer: MEDICARE

## 2022-08-04 VITALS
DIASTOLIC BLOOD PRESSURE: 82 MMHG | SYSTOLIC BLOOD PRESSURE: 130 MMHG | TEMPERATURE: 99 F | WEIGHT: 176 LBS | OXYGEN SATURATION: 95 % | HEIGHT: 69 IN | HEART RATE: 83 BPM | BODY MASS INDEX: 26.07 KG/M2

## 2022-08-04 DIAGNOSIS — M79.641 RIGHT HAND PAIN: ICD-10-CM

## 2022-08-04 DIAGNOSIS — E11.00 TYPE 2 DIABETES MELLITUS WITH HYPEROSMOLARITY WITHOUT COMA, WITHOUT LONG-TERM CURRENT USE OF INSULIN (HCC): Primary | ICD-10-CM

## 2022-08-04 DIAGNOSIS — Z00.00 MEDICARE ANNUAL WELLNESS VISIT, SUBSEQUENT: ICD-10-CM

## 2022-08-04 DIAGNOSIS — Z23 ENCOUNTER FOR IMMUNIZATION: ICD-10-CM

## 2022-08-04 PROCEDURE — G0439 PPPS, SUBSEQ VISIT: HCPCS | Performed by: FAMILY MEDICINE

## 2022-08-04 PROCEDURE — 90677 PCV20 VACCINE IM: CPT

## 2022-08-04 PROCEDURE — G0009 ADMIN PNEUMOCOCCAL VACCINE: HCPCS

## 2022-08-04 NOTE — PROGRESS NOTES
Assessment and Plan:     Problem List Items Addressed This Visit        Endocrine    DMII (diabetes mellitus, type 2) ([facility] ) - Primary           Preventive health issues were discussed with patient, and age appropriate screening tests were ordered as noted in patient's After Visit Summary  Personalized health advice and appropriate referrals for health education or preventive services given if needed, as noted in patient's After Visit Summary       History of Present Illness:     Patient presents for a Medicare Wellness Visit    HPI   Patient Care Team:  Marissa Davis DO as PCP - General (Family Medicine)  Jus Rodriguez DO     Review of Systems:     Review of Systems     Problem List:     Patient Active Problem List   Diagnosis    Anemia    Atherosclerotic heart disease of native coronary artery without angina pectoris    DMII (diabetes mellitus, type 2) ([facility] )    Hyperlipidemia    Hypertension    Hypothyroidism    PAD (peripheral artery disease) ([facility] )    Skin neoplasm    Squamous cell carcinoma    Dermatitis    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)    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)    Beta thalassemia minor    Elevated PSA    Abnormal prostate exam    Lumbar strain    Acute pain of right knee    Strain of calf muscle    Localized osteoarthritis of right knee    Acute pain of both shoulders      Past Medical and Surgical History:     Past Medical History:   Diagnosis Date    Allergic     Anemia     Arthritis     Diabetes mellitus ([facility] )     Disease of thyroid gland     Hyperlipemia     Hypertension      Past Surgical History:   Procedure Laterality Date    COLONOSCOPY      PROSTATE BIOPSY      needle,  resolved may 2005      Family History:     Family History   Problem Relation Age of Onset    No Known Problems Mother       Social History:     Social History     Socioeconomic History    Marital status: /Civil Union     Spouse name: None    Number of children: None    Years of education: None    Highest education level: None   Occupational History    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[employer]    Smoking status: Former Smoker     Types: Cigarettes    Smokeless tobacco: Never Used   Vaping Use    Vaping Use: Never used   Substance and Sexual Activity    Alcohol use: Not Currently     Comment: social/rarely    Drug use: Never    Sexual activity: Not Currently   Other Topics Concern    None   Social History Narrative    None     Social Determinants of Health     Financial Resource Strain: Not on file   Food Insecurity: Not on file   Transportation Needs: Not on file   Physical Activity: Not on file   Stress: Not on file   Social Connections: Not on file   Intimate Partner Violence: Not on file   Housing Stability: Not on file      Medications and Allergies:     Current Outpatient Medications   Medication Sig Dispense Refill    amLODIPine (NORVASC) 10 mg tablet take 1 tablet by mouth once daily 90 tablet 1    aspirin (ECOTRIN LOW STRENGTH) 81 mg EC tablet Take 81 mg by mouth daily      atorvastatin (LIPITOR) 20 mg tablet Take 1 tablet by mouth daily      cholecalciferol (VITAMIN D3) 1,000 units tablet Take by mouth      fexofenadine (ALLEGRA) 180 MG tablet Take by mouth      fluticasone (FLONASE) 50 mcg/act nasal spray instill 2 sprays into each nostril once daily as directed  0    folic acid (FOLVITE) 1 mg tablet Take 1 tablet by mouth daily      levothyroxine 25 mcg tablet take 1 tablet by mouth once daily 90 tablet 1    metoprolol tartrate (LOPRESSOR) 50 mg tablet Take 1 tablet by mouth 2 (two) times a day      montelukast (SINGULAIR) 10 mg tablet Take by mouth      Multiple Vitamins-Minerals (CENTRUM SILVER) tablet Take by mouth      olmesartan-hydrochlorothiazide (BENICAR HCT) 40-25 MG per tablet take 1 tablet by mouth once daily 90 tablet 1    Omega-3 Fatty Acids (FISH OIL) 1000 MG CPDR Take by mouth  Potassium 99 MG TABS Take by mouth      Saw Palmetto 160 MG CAPS Take by mouth      sulfaSALAzine (AZULFIDINE) 500 mg tablet   0    zinc gluconate 50 mg tablet Take by mouth       No current facility-administered medications for this visit  Allergies   Allergen Reactions    Lisinopril     Candesartan Rash      Immunizations:     Immunization History   Administered Date(s) Administered    COVID-19 MODERNA VACC 0 5 ML IM 02/08/2021, 02/16/2021, 03/09/2021    INFLUENZA 10/08/2018, 11/03/2019    Influenza Split High Dose Preservative Free IM 11/07/2017    Influenza, high dose seasonal 0 7 mL 11/19/2018, 01/10/2020, 10/15/2020, 11/02/2021    Pneumococcal Conjugate 13-Valent 10/18/2016    Pneumococcal Polysaccharide PPV23 11/07/2017    Td (adult), adsorbed 10/23/2007      Health Maintenance:         Topic Date Due    Hepatitis C Screening  Completed         Topic Date Due    COVID-19 Vaccine (4 - Booster) 07/09/2021    Influenza Vaccine (1) 09/01/2022      Medicare Screening Tests and Risk Assessments:     Jennifer Whitman is here for his Subsequent Wellness visit  Health Risk Assessment:   Patient rates overall health as good  Patient feels that their physical health rating is same  Patient is satisfied with their life  Eyesight was rated as same  Hearing was rated as slightly worse  Patient feels that their emotional and mental health rating is same  Patients states they are never, rarely angry  Patient states they are never, rarely unusually tired/fatigued  Pain experienced in the last 7 days has been some  Patient states that he has experienced no weight loss or gain in last 6 months  Depression Screening:   PHQ-2 Score: 0      Fall Risk Screening: In the past year, patient has experienced: no history of falling in past year      Home Safety:  Patient does not have trouble with stairs inside or outside of their home  Patient has working smoke alarms and has no working carbon monoxide detector  Home safety hazards include: none  Nutrition:   Current diet is Regular  Medications:   Patient is currently taking over-the-counter supplements  OTC medications include: see medication list  Patient is able to manage medications  Activities of Daily Living (ADLs)/Instrumental Activities of Daily Living (IADLs):   Walk and transfer into and out of bed and chair?: Yes  Dress and groom yourself?: Yes    Bathe or shower yourself?: Yes    Feed yourself? Yes  Do your laundry/housekeeping?: Yes  Manage your money, pay your bills and track your expenses?: Yes  Make your own meals?: Yes    Do your own shopping?: Yes    Previous Hospitalizations:   Any hospitalizations or ED visits within the last 12 months?: No      Advance Care Planning:   Living will: Yes    Durable POA for healthcare: Yes    Advanced directive: Yes      Cognitive Screening:   Provider or family/friend/caregiver concerned regarding cognition?: No    PREVENTIVE SCREENINGS      Cardiovascular Screening:    General: Screening Not Indicated, History Lipid Disorder, Risks and Benefits Discussed and Screening Current      Diabetes Screening:     General: Screening Not Indicated, History Diabetes, Risks and Benefits Discussed and Screening Current      Colorectal Cancer Screening:     General: Screening Not Indicated and Risks and Benefits Discussed      Prostate Cancer Screening:    General: Screening Not Indicated and Risks and Benefits Discussed      Osteoporosis Screening:    General: Risks and Benefits Discussed and Patient Declines      Abdominal Aortic Aneurysm (AAA) Screening:    Risk factors include: tobacco use        General: Risks and Benefits Discussed and Screening Not Indicated      Lung Cancer Screening:     General: Screening Not Indicated and Risks and Benefits Discussed      Hepatitis C Screening:    General: Screening Current and Risks and Benefits Discussed    Other Counseling Topics:   Regular weightbearing exercise       No exam data present     Physical Exam:     /82 (BP Location: Right arm, Patient Position: Sitting, Cuff Size: Standard)   Pulse 83   Temp 99 °F (37 2 °C) (Temporal)   Ht 5' 9" (1 753 m)   Wt 79 8 kg (176 lb)   SpO2 95%   BMI 25 99 kg/m²     Physical Exam     Kimberly Rutledge DO

## 2022-08-04 NOTE — PATIENT INSTRUCTIONS

## 2022-08-17 ENCOUNTER — TELEPHONE (OUTPATIENT)
Dept: FAMILY MEDICINE CLINIC | Facility: CLINIC | Age: 87
End: 2022-08-17

## 2022-08-17 NOTE — TELEPHONE ENCOUNTER
The above medication of Delofenac Sodium was approved today and I called patient pharmacy of choice to process  I also called patient to let him know that his medication was approved and call the pharmacy of his choice to see when he can pick-up; a $40 co pay is due for this medication

## 2022-10-28 ENCOUNTER — RA CDI HCC (OUTPATIENT)
Dept: OTHER | Facility: HOSPITAL | Age: 87
End: 2022-10-28

## 2022-10-28 NOTE — PROGRESS NOTES
[facility]  coding opportunities          Chart Reviewed number of suggestions sent to Provider: 2     Patients Insurance     Medicare Insurance: Medicare        [id number]  [id number]

## 2022-11-04 ENCOUNTER — OFFICE VISIT (OUTPATIENT)
Dept: FAMILY MEDICINE CLINIC | Facility: CLINIC | Age: 87
End: 2022-11-04

## 2022-11-04 VITALS
OXYGEN SATURATION: 98 % | TEMPERATURE: 98 F | DIASTOLIC BLOOD PRESSURE: 88 MMHG | HEART RATE: 98 BPM | BODY MASS INDEX: 25.68 KG/M2 | HEIGHT: 69 IN | SYSTOLIC BLOOD PRESSURE: 132 MMHG | WEIGHT: 173.4 LBS

## 2022-11-04 DIAGNOSIS — I73.9 PAD (PERIPHERAL ARTERY DISEASE) (HCC): ICD-10-CM

## 2022-11-04 DIAGNOSIS — E78.2 MIXED HYPERLIPIDEMIA: ICD-10-CM

## 2022-11-04 DIAGNOSIS — I10 PRIMARY HYPERTENSION: ICD-10-CM

## 2022-11-04 DIAGNOSIS — E03.9 HYPOTHYROIDISM, UNSPECIFIED TYPE: ICD-10-CM

## 2022-11-04 DIAGNOSIS — Z23 IMMUNIZATION DUE: Primary | ICD-10-CM

## 2022-11-04 DIAGNOSIS — E11.00 TYPE 2 DIABETES MELLITUS WITH HYPEROSMOLARITY WITHOUT COMA, WITHOUT LONG-TERM CURRENT USE OF INSULIN (HCC): ICD-10-CM

## 2022-11-04 LAB — SL AMB POCT HEMOGLOBIN AIC: 5.7 (ref ?–6.5)

## 2022-11-04 NOTE — PROGRESS NOTES
Assessment/Plan: A1c 5 7 previous CMP CBC TSH C-reactive protein reviewed at this time  Last LDL 39  Patient will continue with current regimen for diabetes hypertension hyperlipidemia well as hypothyroidism P 80  Refills will given when needed  Flu shot given this time  The patient will follow-up as needed  Diagnoses and all orders for this visit:    Type 2 diabetes mellitus with hyperosmolarity without coma, without long-term current use of insulin (HCC)  -     POCT hemoglobin A1c    Primary hypertension    Hypothyroidism, unspecified type    PAD (peripheral artery disease) ([facility])    Mixed hyperlipidemia            Subjective:        Patient ID: Estrada Barbosa is a 80 y o  male  Patient is here to follow-up on diabetes hypertension hyperlipidemia hypothyroidism PVD  Patient feeling well overall  The no chest pain shortness a Liza or dizziness  No abdominal pain or problems urinating or defecating  No foot issues  No visual issues  The following portions of the patient's history were reviewed and updated as appropriate: allergies, current medications, past family history, past medical history, past social history, past surgical history and problem list       Review of Systems   Constitutional: Negative  HENT: Negative  Eyes: Negative  Respiratory: Negative  Cardiovascular: Negative  Gastrointestinal: Negative  Endocrine: Negative  Genitourinary: Negative  Musculoskeletal: Negative  Skin: Negative  Allergic/Immunologic: Negative  Neurological: Negative  Hematological: Negative  Psychiatric/Behavioral: Negative  Objective:               /88 (BP Location: Right arm, Patient Position: Sitting, Cuff Size: Standard)   Pulse 98   Temp 98 °F (36 7 °C) (Temporal)   Ht 5' 9" (1 753 m)   Wt 78 7 kg (173 lb 6 4 oz)   SpO2 98%   BMI 25 61 kg/m²          Physical Exam  Vitals and nursing note reviewed     Constitutional:       General: He is not in acute distress  Appearance: Normal appearance  He is not ill-appearing, toxic-appearing or diaphoretic  HENT:      Head: Normocephalic and atraumatic  Right Ear: Tympanic membrane, ear canal and external ear normal  There is no impacted cerumen  Left Ear: Tympanic membrane, ear canal and external ear normal  There is no impacted cerumen  Nose: Nose normal  No congestion or rhinorrhea  Mouth/Throat:      Mouth: Mucous membranes are moist       Pharynx: No oropharyngeal exudate or posterior oropharyngeal erythema  Eyes:      General: No scleral icterus  Right eye: No discharge  Left eye: No discharge  Extraocular Movements: Extraocular movements intact  Conjunctiva/sclera: Conjunctivae normal       Pupils: Pupils are equal, round, and reactive to light  Neck:      Vascular: No carotid bruit  Cardiovascular:      Rate and Rhythm: Normal rate and regular rhythm  Pulses: Normal pulses  Heart sounds: Normal heart sounds  No murmur heard  No friction rub  No gallop  Pulmonary:      Effort: Pulmonary effort is normal  No respiratory distress  Breath sounds: Normal breath sounds  No stridor  No wheezing, rhonchi or rales  Chest:      Chest wall: No tenderness  Musculoskeletal:         General: No swelling, tenderness, deformity or signs of injury  Normal range of motion  Cervical back: Normal range of motion and neck supple  No rigidity  No muscular tenderness  Right lower leg: No edema  Left lower leg: No edema  Lymphadenopathy:      Cervical: No cervical adenopathy  Skin:     General: Skin is warm and dry  Capillary Refill: Capillary refill takes less than 2 seconds  Coloration: Skin is not jaundiced  Findings: No bruising, erythema, lesion or rash  Neurological:      Mental Status: He is alert and oriented to person, place, and time  Mental status is at baseline        Cranial Nerves: No cranial nerve deficit  Sensory: No sensory deficit  Motor: No weakness  Coordination: Coordination normal       Gait: Gait normal    Psychiatric:         Mood and Affect: Mood normal          Behavior: Behavior normal          Thought Content:  Thought content normal          Judgment: Judgment normal

## 2022-11-10 ENCOUNTER — NURSE TRIAGE (OUTPATIENT)
Dept: OTHER | Facility: OTHER | Age: 87
End: 2022-11-10

## 2022-11-10 ENCOUNTER — OFFICE VISIT (OUTPATIENT)
Dept: FAMILY MEDICINE CLINIC | Facility: CLINIC | Age: 87
End: 2022-11-10

## 2022-11-10 VITALS
HEIGHT: 69 IN | HEART RATE: 93 BPM | BODY MASS INDEX: 25.18 KG/M2 | DIASTOLIC BLOOD PRESSURE: 60 MMHG | WEIGHT: 170 LBS | RESPIRATION RATE: 20 BRPM | SYSTOLIC BLOOD PRESSURE: 148 MMHG | TEMPERATURE: 100.5 F | OXYGEN SATURATION: 99 %

## 2022-11-10 DIAGNOSIS — J06.9 URTI (ACUTE UPPER RESPIRATORY INFECTION): Primary | ICD-10-CM

## 2022-11-10 RX ORDER — AZITHROMYCIN 250 MG/1
TABLET, FILM COATED ORAL
Qty: 6 TABLET | Refills: 0 | Status: SHIPPED | OUTPATIENT
Start: 2022-11-10 | End: 2022-11-15

## 2022-11-10 NOTE — TELEPHONE ENCOUNTER
Regarding: sore throat and back ache  ----- Message from Coral Fung sent at 11/10/2022  8:27 AM EST -----  " I have a really bad sore throat, back ache and post nasal drip   I would like to an apt today "

## 2022-11-10 NOTE — TELEPHONE ENCOUNTER
Reason for Disposition  • SEVERE (e g , excruciating) throat pain    Answer Assessment - Initial Assessment Questions  1  ONSET: "When did the throat start hurting?" (Hours or days ago)       2 days ago  2  SEVERITY: "How bad is the sore throat?" (Scale 1-10; mild, moderate or severe)    - MILD (1-3):  doesn't interfere with eating or normal activities    - MODERATE (4-7): interferes with eating some solids and normal activities    - SEVERE (8-10):  excruciating pain, interferes with most normal activities    - SEVERE DYSPHAGIA: can't swallow liquids, drooling      Moderate  3  STREP EXPOSURE: "Has there been any exposure to strep within the past week?" If Yes, ask: "What type of contact occurred?"       None  4  VIRAL SYMPTOMS: "Are there any symptoms of a cold, such as a runny nose, cough, hoarse voice or red eyes?"       Rhinorrhea-clear  Coughing on occasion  Mid-back discomfort  5  FEVER: "Do you have a fever?" If Yes, ask: "What is your temperature, how was it measured, and when did it start?"      No fever  6  PUS ON THE TONSILS: "Is there pus on the tonsils in the back of your throat?"      Unsure  7  OTHER SYMPTOMS: "Do you have any other symptoms?" (e g , difficulty breathing, headache, rash)      Post nasal drip  8   PREGNANCY: "Is there any chance you are pregnant?" "When was your last menstrual period?"      NA    Protocols used: SORE THROAT-ADULT-

## 2022-11-10 NOTE — PROGRESS NOTES
Assessment/Plan:   I recommend supportive care fluids and rest   Will call with swab results  Follow-up if no better in 3-5 days  Seek treatment in the emergency department immediately if symptoms worsen  Diagnoses and all orders for this visit:    URTI (acute upper respiratory infection)  -     azithromycin (ZITHROMAX) 250 mg tablet; Take 2 tablets today then 1 tablet daily x 4 days  -     COVID/FLU/RSV; Future        Subjective:     Patient ID: Tracey Garcia is a 80 y o  male  Patient presents with:  Sore Throat: Patient is complaining of a sore throat, running nose, cough, loss of appetite  and sneezing for about 2 days  Cough          Review of Systems   Constitutional: Positive for activity change, appetite change and fever  HENT: Positive for congestion, postnasal drip, rhinorrhea, sinus pressure, sinus pain and sore throat  Respiratory: Positive for cough  Cardiovascular: Negative  Objective:     Physical Exam  Vitals and nursing note reviewed  Constitutional:       Appearance: Normal appearance  He is well-developed  HENT:      Head: Normocephalic and atraumatic  Right Ear: External ear normal       Left Ear: External ear normal       Nose: Nose normal  No congestion or rhinorrhea  Eyes:      Conjunctiva/sclera: Conjunctivae normal       Pupils: Pupils are equal, round, and reactive to light  Cardiovascular:      Rate and Rhythm: Normal rate and regular rhythm  Heart sounds: Normal heart sounds  Pulmonary:      Effort: Pulmonary effort is normal       Breath sounds: Rhonchi present  No wheezing or rales  Abdominal:      General: Bowel sounds are normal       Palpations: Abdomen is soft  Musculoskeletal:         General: Normal range of motion  Cervical back: Normal range of motion and neck supple  Skin:     General: Skin is warm and dry  Neurological:      Mental Status: He is alert and oriented to person, place, and time        Deep Tendon Reflexes: Reflexes are normal and symmetric     Psychiatric:         Behavior: Behavior normal

## 2022-11-11 DIAGNOSIS — U07.1 COVID: Primary | ICD-10-CM

## 2022-11-11 LAB
FLUAV RNA RESP QL NAA+PROBE: NEGATIVE
FLUBV RNA RESP QL NAA+PROBE: NEGATIVE
RSV RNA RESP QL NAA+PROBE: NEGATIVE
SARS-COV-2 RNA RESP QL NAA+PROBE: POSITIVE

## 2022-11-11 RX ORDER — NIRMATRELVIR AND RITONAVIR 150-100 MG
2 KIT ORAL 2 TIMES DAILY
Qty: 20 TABLET | Refills: 0 | Status: SHIPPED | OUTPATIENT
Start: 2022-11-11 | End: 2022-11-16

## 2022-11-11 NOTE — PROGRESS NOTES
I spoke to the patient regarding his swab results  we discussed treatment options  We will start back squid two capsules twice a day for five days  I will also treat his wife who is starting with symptoms

## 2023-01-09 ENCOUNTER — OFFICE VISIT (OUTPATIENT)
Dept: FAMILY MEDICINE CLINIC | Facility: CLINIC | Age: 88
End: 2023-01-09

## 2023-01-09 VITALS
DIASTOLIC BLOOD PRESSURE: 68 MMHG | TEMPERATURE: 97.5 F | HEART RATE: 86 BPM | SYSTOLIC BLOOD PRESSURE: 158 MMHG | WEIGHT: 166.4 LBS | HEIGHT: 69 IN | BODY MASS INDEX: 24.65 KG/M2 | OXYGEN SATURATION: 98 %

## 2023-01-09 DIAGNOSIS — J01.00 ACUTE NON-RECURRENT MAXILLARY SINUSITIS: Primary | ICD-10-CM

## 2023-01-09 DIAGNOSIS — E11.00 TYPE 2 DIABETES MELLITUS WITH HYPEROSMOLARITY WITHOUT COMA, WITHOUT LONG-TERM CURRENT USE OF INSULIN (HCC): ICD-10-CM

## 2023-01-09 DIAGNOSIS — N18.30 TYPE 2 DIABETES MELLITUS WITH STAGE 3 CHRONIC KIDNEY DISEASE, WITHOUT LONG-TERM CURRENT USE OF INSULIN, UNSPECIFIED WHETHER STAGE 3A OR 3B CKD (HCC): ICD-10-CM

## 2023-01-09 DIAGNOSIS — E11.22 TYPE 2 DIABETES MELLITUS WITH STAGE 3 CHRONIC KIDNEY DISEASE, WITHOUT LONG-TERM CURRENT USE OF INSULIN, UNSPECIFIED WHETHER STAGE 3A OR 3B CKD (HCC): ICD-10-CM

## 2023-01-09 DIAGNOSIS — K51.00 ULCERATIVE PANCOLITIS WITHOUT COMPLICATION (HCC): ICD-10-CM

## 2023-01-09 DIAGNOSIS — I73.9 PAD (PERIPHERAL ARTERY DISEASE) (HCC): ICD-10-CM

## 2023-01-09 RX ORDER — AMOXICILLIN 500 MG/1
1000 CAPSULE ORAL EVERY 12 HOURS SCHEDULED
Qty: 28 CAPSULE | Refills: 0 | Status: SHIPPED | OUTPATIENT
Start: 2023-01-09 | End: 2023-01-16

## 2023-01-09 NOTE — PROGRESS NOTES
Assessment/Plan:       Diagnoses and all orders for this visit:    Acute non-recurrent maxillary sinusitis  -     amoxicillin (AMOXIL) 500 mg capsule; Take 2 capsules (1,000 mg total) by mouth every 12 (twelve) hours for 7 days    Type 2 diabetes mellitus with hyperosmolarity without coma, without long-term current use of insulin (HCC)    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)    PAD (peripheral artery disease) ([facility])    Type 2 diabetes mellitus with stage 3 chronic kidney disease, without long-term current use of insulin, unspecified whether stage 3a or 3b CKD ([facility])            Subjective:        Patient ID: Concepción De Luna is a 80 y o  male  Patient is here with sinus pain pressure congestion and bad taste in his mouth along with sore throat over the past week or 2  The following portions of the patient's history were reviewed and updated as appropriate: allergies, current medications, past family history, past medical history, past social history, past surgical history and problem list       Review of Systems   Constitutional: Negative  HENT: Positive for congestion, postnasal drip, rhinorrhea, sinus pressure, sinus pain and sore throat  Eyes: Negative  Respiratory: Negative  Cardiovascular: Negative  Gastrointestinal: Negative  Endocrine: Negative  Genitourinary: Negative  Musculoskeletal: Negative  Skin: Negative  Allergic/Immunologic: Negative  Neurological: Negative  Hematological: Negative  Psychiatric/Behavioral: Negative  Objective:        Depression Screening and Follow-up Plan: Clincally patient does not have depression  No treatment is required  /68 (BP Location: Right arm, Patient Position: Sitting, Cuff Size: Standard)   Pulse 86   Temp 97 5 °F (36 4 °C) (Temporal)   Ht 5' 9" (1 753 m)   Wt 75 5 kg (166 lb 6 4 oz)   SpO2 98%   BMI 24 57 kg/m²          Physical Exam  Vitals and nursing note reviewed  Constitutional:       General: He is not in acute distress  Appearance: Normal appearance  He is not ill-appearing, toxic-appearing or diaphoretic  HENT:      Head: Normocephalic and atraumatic  Right Ear: Tympanic membrane, ear canal and external ear normal  There is no impacted cerumen  Left Ear: Tympanic membrane, ear canal and external ear normal  There is no impacted cerumen  Nose: Rhinorrhea present  No congestion  Mouth/Throat:      Mouth: Mucous membranes are moist       Pharynx: Oropharyngeal exudate present  No posterior oropharyngeal erythema  Eyes:      General: No scleral icterus  Right eye: No discharge  Left eye: No discharge  Extraocular Movements: Extraocular movements intact  Conjunctiva/sclera: Conjunctivae normal       Pupils: Pupils are equal, round, and reactive to light  Neck:      Vascular: No carotid bruit  Cardiovascular:      Rate and Rhythm: Normal rate and regular rhythm  Pulses: Normal pulses  Heart sounds: Normal heart sounds  No murmur heard  No friction rub  No gallop  Pulmonary:      Effort: Pulmonary effort is normal  No respiratory distress  Breath sounds: Normal breath sounds  No stridor  No wheezing, rhonchi or rales  Chest:      Chest wall: No tenderness  Abdominal:      Palpations: Abdomen is soft  Musculoskeletal:         General: No swelling, tenderness, deformity or signs of injury  Normal range of motion  Cervical back: Normal range of motion and neck supple  No rigidity  No muscular tenderness  Right lower leg: No edema  Left lower leg: No edema  Lymphadenopathy:      Cervical: No cervical adenopathy  Skin:     General: Skin is warm and dry  Capillary Refill: Capillary refill takes less than 2 seconds  Coloration: Skin is not jaundiced  Findings: No bruising, erythema, lesion or rash     Neurological:      Mental Status: He is alert and oriented to person, place, and time  Mental status is at baseline  Cranial Nerves: No cranial nerve deficit  Sensory: No sensory deficit  Motor: No weakness  Coordination: Coordination normal       Gait: Gait normal    Psychiatric:         Mood and Affect: Mood normal          Behavior: Behavior normal          Thought Content:  Thought content normal          Judgment: Judgment normal

## 2023-02-06 ENCOUNTER — OFFICE VISIT (OUTPATIENT)
Dept: FAMILY MEDICINE CLINIC | Facility: CLINIC | Age: 88
End: 2023-02-06

## 2023-02-06 VITALS
HEART RATE: 99 BPM | BODY MASS INDEX: 24.97 KG/M2 | HEIGHT: 69 IN | WEIGHT: 168.6 LBS | DIASTOLIC BLOOD PRESSURE: 70 MMHG | TEMPERATURE: 97.2 F | OXYGEN SATURATION: 95 % | SYSTOLIC BLOOD PRESSURE: 136 MMHG

## 2023-02-06 DIAGNOSIS — I73.9 PAD (PERIPHERAL ARTERY DISEASE) (HCC): ICD-10-CM

## 2023-02-06 DIAGNOSIS — I10 PRIMARY HYPERTENSION: ICD-10-CM

## 2023-02-06 DIAGNOSIS — E11.00 TYPE 2 DIABETES MELLITUS WITH HYPEROSMOLARITY WITHOUT COMA, WITHOUT LONG-TERM CURRENT USE OF INSULIN (HCC): Primary | ICD-10-CM

## 2023-02-06 DIAGNOSIS — E78.2 MIXED HYPERLIPIDEMIA: ICD-10-CM

## 2023-02-06 DIAGNOSIS — E03.9 ACQUIRED HYPOTHYROIDISM: ICD-10-CM

## 2023-02-06 LAB — SL AMB POCT HEMOGLOBIN AIC: 6.2 (ref ?–6.5)

## 2023-02-06 RX ORDER — AMLODIPINE BESYLATE 5 MG/1
TABLET ORAL
COMMUNITY
Start: 2023-02-03 | End: 2023-02-06

## 2023-02-06 NOTE — PROGRESS NOTES
Assessment/Plan: A1c 6 2  Blood pressure will be rechecked  BP stable  TSH normal   CMP reviewed  Patient will go for the laboratory studies prior to next visit  Patient will continue with current regimen for chronic conditions listed below  Refills will be given when needed  Follow-up in 3 months  Patient will see ophthalmologist        Diagnoses and all orders for this visit:    Type 2 diabetes mellitus with hyperosmolarity without coma, without long-term current use of insulin (HCC)  -     POCT hemoglobin A1c  -     CBC and differential; Future  -     Comprehensive metabolic panel; Future  -     Hemoglobin A1C; Future  -     Lipid panel; Future  -     Microalbumin / creatinine urine ratio  -     TSH, 3rd generation with Free T4 reflex; Future    Acquired hypothyroidism  -     CBC and differential; Future  -     Comprehensive metabolic panel; Future  -     Hemoglobin A1C; Future  -     Lipid panel; Future  -     Microalbumin / creatinine urine ratio  -     TSH, 3rd generation with Free T4 reflex; Future    Primary hypertension  -     CBC and differential; Future  -     Comprehensive metabolic panel; Future  -     Hemoglobin A1C; Future  -     Lipid panel; Future  -     Microalbumin / creatinine urine ratio  -     TSH, 3rd generation with Free T4 reflex; Future    PAD (peripheral artery disease) (HCC)  -     CBC and differential; Future  -     Comprehensive metabolic panel; Future  -     Hemoglobin A1C; Future  -     Lipid panel; Future  -     Microalbumin / creatinine urine ratio  -     TSH, 3rd generation with Free T4 reflex; Future    Mixed hyperlipidemia  -     CBC and differential; Future  -     Comprehensive metabolic panel; Future  -     Hemoglobin A1C; Future  -     Lipid panel; Future  -     Microalbumin / creatinine urine ratio  -     TSH, 3rd generation with Free T4 reflex;  Future    Other orders  -     Discontinue: amLODIPine (NORVASC) 5 mg tablet;  (Patient not taking: Reported on 2/6/2023)  - OLMESARTAN MEDOXOMIL PO; Take by mouth (Patient not taking: Reported on 2/6/2023)            Subjective:        Patient ID: Myah Ruiz is a 80 y o  male  Patient here for follow-up on diabetes hypertension hyperlipidemia hypothyroidism  Patient with postnasal drip/sinus issues  Patient is using [employer] [employer]  Patient otherwise in normal state of health  No new change in urination or defecation or chest pain shortness of breath  Vision stable overall  No foot issues  The following portions of the patient's history were reviewed and updated as appropriate: allergies, current medications, past family history, past medical history, past social history, past surgical history and problem list       Review of Systems   Constitutional: Negative  HENT: Positive for postnasal drip  Eyes: Negative  Respiratory: Negative  Cardiovascular: Negative  Gastrointestinal: Negative  Endocrine: Negative  Genitourinary: Negative  Musculoskeletal: Negative  Skin: Negative  Allergic/Immunologic: Negative  Neurological: Negative  Hematological: Negative  Psychiatric/Behavioral: Negative  Objective:        Depression Screening and Follow-up Plan: Clincally patient does not have depression  No treatment is required  /80 (BP Location: Right arm, Patient Position: Sitting, Cuff Size: Standard)   Pulse 99   Temp (!) 97 2 °F (36 2 °C) (Tympanic)   Ht 5' 9" (1 753 m)   Wt 76 5 kg (168 lb 9 6 oz)   SpO2 95%   BMI 24 90 kg/m²          Physical Exam  Vitals and nursing note reviewed  Constitutional:       General: He is not in acute distress  Appearance: Normal appearance  He is not ill-appearing, toxic-appearing or diaphoretic  HENT:      Head: Normocephalic and atraumatic  Right Ear: Tympanic membrane, ear canal and external ear normal  There is no impacted cerumen        Left Ear: Tympanic membrane, ear canal and external ear normal  There is no impacted cerumen  Nose: Nose normal  No congestion or rhinorrhea  Mouth/Throat:      Mouth: Mucous membranes are moist       Pharynx: No oropharyngeal exudate or posterior oropharyngeal erythema  Eyes:      General: No scleral icterus  Right eye: No discharge  Left eye: No discharge  Extraocular Movements: Extraocular movements intact  Conjunctiva/sclera: Conjunctivae normal       Pupils: Pupils are equal, round, and reactive to light  Neck:      Vascular: No carotid bruit  Cardiovascular:      Rate and Rhythm: Normal rate and regular rhythm  Pulses: Normal pulses  Heart sounds: Normal heart sounds  No murmur heard  No friction rub  No gallop  Pulmonary:      Effort: Pulmonary effort is normal  No respiratory distress  Breath sounds: Normal breath sounds  No stridor  No wheezing, rhonchi or rales  Chest:      Chest wall: No tenderness  Musculoskeletal:         General: No swelling, tenderness, deformity or signs of injury  Normal range of motion  Cervical back: Normal range of motion and neck supple  No rigidity  No muscular tenderness  Right lower leg: No edema  Left lower leg: No edema  Lymphadenopathy:      Cervical: No cervical adenopathy  Skin:     General: Skin is warm and dry  Capillary Refill: Capillary refill takes less than 2 seconds  Coloration: Skin is not jaundiced  Findings: No bruising, erythema, lesion or rash  Neurological:      Mental Status: He is alert and oriented to person, place, and time  Mental status is at baseline  Cranial Nerves: No cranial nerve deficit  Sensory: No sensory deficit  Motor: No weakness  Coordination: Coordination normal       Gait: Gait normal    Psychiatric:         Mood and Affect: Mood normal          Behavior: Behavior normal          Thought Content:  Thought content normal          Judgment: Judgment normal

## 2023-03-10 PROBLEM — J01.00 ACUTE NON-RECURRENT MAXILLARY SINUSITIS: Status: RESOLVED | Noted: 2023-01-09 | Resolved: 2023-03-10

## 2023-04-01 DIAGNOSIS — I10 ESSENTIAL HYPERTENSION: ICD-10-CM

## 2023-04-03 RX ORDER — OLMESARTAN MEDOXOMIL AND HYDROCHLOROTHIAZIDE 40/25 40; 25 MG/1; MG/1
TABLET ORAL
Qty: 90 TABLET | Refills: 1 | Status: SHIPPED | OUTPATIENT
Start: 2023-04-03

## 2023-04-03 RX ORDER — AMLODIPINE BESYLATE 10 MG/1
TABLET ORAL
Qty: 90 TABLET | Refills: 1 | Status: SHIPPED | OUTPATIENT
Start: 2023-04-03

## 2023-04-07 DIAGNOSIS — E03.9 PRIMARY HYPOTHYROIDISM: ICD-10-CM

## 2023-04-07 RX ORDER — LEVOTHYROXINE SODIUM 0.03 MG/1
TABLET ORAL
Qty: 90 TABLET | Refills: 1 | Status: SHIPPED | OUTPATIENT
Start: 2023-04-07

## 2023-05-01 ENCOUNTER — APPOINTMENT (OUTPATIENT)
Dept: LAB | Facility: MEDICAL CENTER | Age: 88
End: 2023-05-01

## 2023-05-01 DIAGNOSIS — I73.9 PAD (PERIPHERAL ARTERY DISEASE) (HCC): ICD-10-CM

## 2023-05-01 DIAGNOSIS — E78.2 MIXED HYPERLIPIDEMIA: ICD-10-CM

## 2023-05-01 DIAGNOSIS — E11.00 TYPE 2 DIABETES MELLITUS WITH HYPEROSMOLARITY WITHOUT COMA, WITHOUT LONG-TERM CURRENT USE OF INSULIN (HCC): ICD-10-CM

## 2023-05-01 DIAGNOSIS — E03.9 ACQUIRED HYPOTHYROIDISM: ICD-10-CM

## 2023-05-01 DIAGNOSIS — I10 PRIMARY HYPERTENSION: ICD-10-CM

## 2023-05-01 LAB
ALBUMIN SERPL BCP-MCNC: 4 G/DL (ref 3.5–5)
ALP SERPL-CCNC: 82 U/L (ref 46–116)
ALT SERPL W P-5'-P-CCNC: 28 U/L (ref 12–78)
ANION GAP SERPL CALCULATED.3IONS-SCNC: 2 MMOL/L (ref 4–13)
AST SERPL W P-5'-P-CCNC: 19 U/L (ref 5–45)
BASOPHILS # BLD AUTO: 0.06 THOUSANDS/ΜL (ref 0–0.1)
BASOPHILS NFR BLD AUTO: 1 % (ref 0–1)
BILIRUB SERPL-MCNC: 0.47 MG/DL (ref 0.2–1)
BUN SERPL-MCNC: 38 MG/DL (ref 5–25)
CALCIUM SERPL-MCNC: 8.6 MG/DL (ref 8.3–10.1)
CHLORIDE SERPL-SCNC: 111 MMOL/L (ref 96–108)
CHOLEST SERPL-MCNC: 124 MG/DL
CO2 SERPL-SCNC: 27 MMOL/L (ref 21–32)
CREAT SERPL-MCNC: 1.66 MG/DL (ref 0.6–1.3)
CREAT UR-MCNC: 71.9 MG/DL
EOSINOPHIL # BLD AUTO: 0.21 THOUSAND/ΜL (ref 0–0.61)
EOSINOPHIL NFR BLD AUTO: 3 % (ref 0–6)
ERYTHROCYTE [DISTWIDTH] IN BLOOD BY AUTOMATED COUNT: 17.5 % (ref 11.6–15.1)
GFR SERPL CREATININE-BSD FRML MDRD: 36 ML/MIN/1.73SQ M
GLUCOSE P FAST SERPL-MCNC: 131 MG/DL (ref 65–99)
HCT VFR BLD AUTO: 27.9 % (ref 36.5–49.3)
HDLC SERPL-MCNC: 40 MG/DL
HGB BLD-MCNC: 8.1 G/DL (ref 12–17)
IMM GRANULOCYTES # BLD AUTO: 0.05 THOUSAND/UL (ref 0–0.2)
IMM GRANULOCYTES NFR BLD AUTO: 1 % (ref 0–2)
LDLC SERPL CALC-MCNC: 57 MG/DL (ref 0–100)
LYMPHOCYTES # BLD AUTO: 2.17 THOUSANDS/ΜL (ref 0.6–4.47)
LYMPHOCYTES NFR BLD AUTO: 28 % (ref 14–44)
MCH RBC QN AUTO: 19.9 PG (ref 26.8–34.3)
MCHC RBC AUTO-ENTMCNC: 29 G/DL (ref 31.4–37.4)
MCV RBC AUTO: 68 FL (ref 82–98)
MICROALBUMIN UR-MCNC: 273 MG/L (ref 0–20)
MICROALBUMIN/CREAT 24H UR: 380 MG/G CREATININE (ref 0–30)
MONOCYTES # BLD AUTO: 0.66 THOUSAND/ΜL (ref 0.17–1.22)
MONOCYTES NFR BLD AUTO: 9 % (ref 4–12)
NEUTROPHILS # BLD AUTO: 4.59 THOUSANDS/ΜL (ref 1.85–7.62)
NEUTS SEG NFR BLD AUTO: 58 % (ref 43–75)
NONHDLC SERPL-MCNC: 84 MG/DL
NRBC BLD AUTO-RTO: 0 /100 WBCS
PLATELET # BLD AUTO: 245 THOUSANDS/UL (ref 149–390)
PMV BLD AUTO: 11.1 FL (ref 8.9–12.7)
POTASSIUM SERPL-SCNC: 4.6 MMOL/L (ref 3.5–5.3)
PROT SERPL-MCNC: 7.3 G/DL (ref 6.4–8.4)
RBC # BLD AUTO: 4.08 MILLION/UL (ref 3.88–5.62)
SODIUM SERPL-SCNC: 140 MMOL/L (ref 135–147)
TRIGL SERPL-MCNC: 135 MG/DL
TSH SERPL DL<=0.05 MIU/L-ACNC: 3.96 UIU/ML (ref 0.45–4.5)
WBC # BLD AUTO: 7.74 THOUSAND/UL (ref 4.31–10.16)

## 2023-05-02 LAB
EST. AVERAGE GLUCOSE BLD GHB EST-MCNC: 111 MG/DL
HBA1C MFR BLD: 5.5 %

## 2023-05-09 ENCOUNTER — OFFICE VISIT (OUTPATIENT)
Dept: FAMILY MEDICINE CLINIC | Facility: CLINIC | Age: 88
End: 2023-05-09

## 2023-05-09 VITALS
HEART RATE: 74 BPM | SYSTOLIC BLOOD PRESSURE: 170 MMHG | BODY MASS INDEX: 25.86 KG/M2 | TEMPERATURE: 97.8 F | HEIGHT: 69 IN | OXYGEN SATURATION: 96 % | DIASTOLIC BLOOD PRESSURE: 70 MMHG | WEIGHT: 174.6 LBS

## 2023-05-09 DIAGNOSIS — E03.9 ACQUIRED HYPOTHYROIDISM: ICD-10-CM

## 2023-05-09 DIAGNOSIS — I10 PRIMARY HYPERTENSION: ICD-10-CM

## 2023-05-09 DIAGNOSIS — I73.9 PAD (PERIPHERAL ARTERY DISEASE) (HCC): ICD-10-CM

## 2023-05-09 DIAGNOSIS — I25.10 ATHEROSCLEROSIS OF NATIVE CORONARY ARTERY OF NATIVE HEART WITHOUT ANGINA PECTORIS: ICD-10-CM

## 2023-05-09 DIAGNOSIS — E78.2 MIXED HYPERLIPIDEMIA: ICD-10-CM

## 2023-05-09 DIAGNOSIS — E11.00 TYPE 2 DIABETES MELLITUS WITH HYPEROSMOLARITY WITHOUT COMA, WITHOUT LONG-TERM CURRENT USE OF INSULIN (HCC): Primary | ICD-10-CM

## 2023-05-09 LAB
LEFT EYE DIABETIC RETINOPATHY: NORMAL
LEFT EYE IMAGE QUALITY: NORMAL
LEFT EYE MACULAR EDEMA: NORMAL
LEFT EYE OTHER RETINOPATHY: NORMAL
RIGHT EYE DIABETIC RETINOPATHY: NORMAL
RIGHT EYE IMAGE QUALITY: NORMAL
RIGHT EYE MACULAR EDEMA: NORMAL
RIGHT EYE OTHER RETINOPATHY: NORMAL
SEVERITY (EYE EXAM): NORMAL

## 2023-05-09 NOTE — PROGRESS NOTES
Assessment/Plan: TSH 3 9 CMP reviewed LDL 57 A1c 5 5  CBC reviewed  Patient worked up at this time and blood pressure reflecting this  Patient wishes to recheck  Patient will continue with other meds for chronic conditions such as diabetes hypothyroidism hyperlipidemia which is all stable at present time  CAD stable  Follow-up in 3 months  Diagnoses and all orders for this visit:    Type 2 diabetes mellitus with hyperosmolarity without coma, without long-term current use of insulin (HCC)  -     IRIS Diabetic eye exam    Acquired hypothyroidism    Primary hypertension    PAD (peripheral artery disease) ([facility] [address] )    Mixed hyperlipidemia    Atherosclerosis of native coronary artery of native heart without angina pectoris            Subjective:        Patient ID: Marcela Ramirez is a 80 y o  male  Patient is here to follow-up on diabetes hypothyroidism hypertension hyperlipidemia with history of PAD and CAD  Patient feeling well overall  Denies any chest pain shortness of breath or problems urinary defecating  Feet are stable overall  No new visual disturbance  Diabetes          The following portions of the patient's history were reviewed and updated as appropriate: allergies, current medications, past family history, past medical history, past social history, past surgical history and problem list       Review of Systems   Constitutional: Negative  HENT: Negative  Eyes: Negative  Respiratory: Negative  Cardiovascular: Negative  Gastrointestinal: Negative  Endocrine: Negative  Genitourinary: Negative  Musculoskeletal: Negative  Skin: Negative  Allergic/Immunologic: Negative  Neurological: Negative  Hematological: Negative  Psychiatric/Behavioral: Negative  Objective:      BMI Counseling: Body mass index is 25 78 kg/m²  The BMI is above normal  Nutrition recommendations include consuming healthier snacks   Exercise recommendations include moderate "physical activity 150 minutes/week  Rationale for BMI follow-up plan is due to patient being overweight or obese  Depression Screening and Follow-up Plan: Clincally patient does not have depression  No treatment is required  BP (!) 190/80 (BP Location: Right arm, Patient Position: Sitting, Cuff Size: Standard)   Pulse 74   Temp 97 8 °F (36 6 °C) (Temporal)   Ht 5' 9\" (1 753 m)   Wt 79 2 kg (174 lb 9 6 oz)   SpO2 96%   BMI 25 78 kg/m²          Physical Exam  Vitals and nursing note reviewed  Constitutional:       General: He is not in acute distress  Appearance: Normal appearance  He is not ill-appearing, toxic-appearing or diaphoretic  HENT:      Head: Normocephalic and atraumatic  Right Ear: Tympanic membrane, ear canal and external ear normal  There is no impacted cerumen  Left Ear: Tympanic membrane, ear canal and external ear normal  There is no impacted cerumen  Nose: Nose normal  No congestion or rhinorrhea  Mouth/Throat:      Mouth: Mucous membranes are moist       Pharynx: No oropharyngeal exudate or posterior oropharyngeal erythema  Eyes:      General: No scleral icterus  Right eye: No discharge  Left eye: No discharge  Extraocular Movements: Extraocular movements intact  Conjunctiva/sclera: Conjunctivae normal       Pupils: Pupils are equal, round, and reactive to light  Neck:      Vascular: No carotid bruit  Cardiovascular:      Rate and Rhythm: Normal rate and regular rhythm  Pulses: Normal pulses  Heart sounds: Normal heart sounds  No murmur heard  No friction rub  No gallop  Pulmonary:      Effort: Pulmonary effort is normal  No respiratory distress  Breath sounds: Normal breath sounds  No stridor  No wheezing, rhonchi or rales  Chest:      Chest wall: No tenderness  Musculoskeletal:         General: No swelling, tenderness, deformity or signs of injury  Normal range of motion        Cervical back: " Normal range of motion and neck supple  No rigidity  No muscular tenderness  Right lower leg: No edema  Left lower leg: No edema  Lymphadenopathy:      Cervical: No cervical adenopathy  Skin:     General: Skin is warm and dry  Capillary Refill: Capillary refill takes less than 2 seconds  Coloration: Skin is not jaundiced  Findings: No bruising, erythema, lesion or rash  Neurological:      Mental Status: He is alert and oriented to person, place, and time  Mental status is at baseline  Cranial Nerves: No cranial nerve deficit  Sensory: No sensory deficit  Motor: No weakness  Coordination: Coordination normal       Gait: Gait normal    Psychiatric:         Mood and Affect: Mood normal          Behavior: Behavior normal          Thought Content:  Thought content normal          Judgment: Judgment normal

## 2023-08-28 ENCOUNTER — RA CDI HCC (OUTPATIENT)
Dept: OTHER | Facility: HOSPITAL | Age: 88
End: 2023-08-28

## 2023-08-28 NOTE — PROGRESS NOTES
[address] [facility] coding opportunities          Chart Reviewed number of suggestions sent to Provider: 3  E11.51  E11.36  E11.29     Patients Insurance     Medicare Insurance: [employer]

## 2023-08-30 ENCOUNTER — OFFICE VISIT (OUTPATIENT)
Dept: FAMILY MEDICINE CLINIC | Facility: CLINIC | Age: 88
End: 2023-08-30
Payer: MEDICARE

## 2023-08-30 VITALS
HEIGHT: 69 IN | SYSTOLIC BLOOD PRESSURE: 128 MMHG | HEART RATE: 83 BPM | BODY MASS INDEX: 24.73 KG/M2 | TEMPERATURE: 98.6 F | OXYGEN SATURATION: 97 % | DIASTOLIC BLOOD PRESSURE: 62 MMHG | WEIGHT: 167 LBS

## 2023-08-30 DIAGNOSIS — E03.9 ACQUIRED HYPOTHYROIDISM: ICD-10-CM

## 2023-08-30 DIAGNOSIS — I73.9 PAD (PERIPHERAL ARTERY DISEASE) (HCC): ICD-10-CM

## 2023-08-30 DIAGNOSIS — E11.22 TYPE 2 DIABETES MELLITUS WITH STAGE 3 CHRONIC KIDNEY DISEASE, WITHOUT LONG-TERM CURRENT USE OF INSULIN, UNSPECIFIED WHETHER STAGE 3A OR 3B CKD (HCC): ICD-10-CM

## 2023-08-30 DIAGNOSIS — N18.30 TYPE 2 DIABETES MELLITUS WITH STAGE 3 CHRONIC KIDNEY DISEASE, WITHOUT LONG-TERM CURRENT USE OF INSULIN, UNSPECIFIED WHETHER STAGE 3A OR 3B CKD (HCC): ICD-10-CM

## 2023-08-30 DIAGNOSIS — E11.00 TYPE 2 DIABETES MELLITUS WITH HYPEROSMOLARITY WITHOUT COMA, WITHOUT LONG-TERM CURRENT USE OF INSULIN (HCC): Primary | ICD-10-CM

## 2023-08-30 DIAGNOSIS — Z00.00 MEDICARE ANNUAL WELLNESS VISIT, SUBSEQUENT: ICD-10-CM

## 2023-08-30 DIAGNOSIS — I10 PRIMARY HYPERTENSION: ICD-10-CM

## 2023-08-30 DIAGNOSIS — E78.2 MIXED HYPERLIPIDEMIA: ICD-10-CM

## 2023-08-30 LAB — SL AMB POCT HEMOGLOBIN AIC: 5.7 (ref ?–6.5)

## 2023-08-30 PROCEDURE — G0439 PPPS, SUBSEQ VISIT: HCPCS | Performed by: FAMILY MEDICINE

## 2023-08-30 PROCEDURE — 99214 OFFICE O/P EST MOD 30 MIN: CPT | Performed by: FAMILY MEDICINE

## 2023-08-30 PROCEDURE — 83036 HEMOGLOBIN GLYCOSYLATED A1C: CPT | Performed by: FAMILY MEDICINE

## 2023-08-30 NOTE — PROGRESS NOTES
Assessment and Plan:     Problem List Items Addressed This Visit        Endocrine    DMII (diabetes mellitus, type 2) ([address]) - Primary    Relevant Orders    POCT hemoglobin A1c (Completed)    Hypothyroidism    Type 2 diabetes mellitus with stage 3 chronic kidney disease ([address])       Cardiovascular and Mediastinum    Hypertension    PAD (peripheral artery disease) ([address])       Other    Hyperlipidemia   Other Visit Diagnoses     Medicare annual wellness visit, subsequent          A1c 5.7. TSH normal.  LDL 57 urine microalbumin reviewed. CMP CBC reviewed. Depression Screening and Follow-up Plan: Patient was screened for depression during today's encounter. They screened negative with a PHQ-2 score of 0. Preventive health issues were discussed with patient, and age appropriate screening tests were ordered as noted in patient's After Visit Summary. Personalized health advice and appropriate referrals for health education or preventive services given if needed, as noted in patient's After Visit Summary. History of Present Illness:     Patient presents for a Medicare Wellness Visit    Patient here to follow-up on diabetes hypertension hyperlipidemia peripheral vascular disease. Is also with hypothyroid state. Patient also here for Medicare wellness exam.  Patient feeling well overall. Patient Care Team:  Naheed Koehler DO as PCP - General (Family Medicine)  Francesco Fairchild DO     Review of Systems:     Review of Systems   Constitutional: Negative. HENT: Negative. Eyes: Negative. Respiratory: Negative. Cardiovascular: Negative. Gastrointestinal: Negative. Endocrine: Negative. Genitourinary: Negative. Musculoskeletal: Negative. Skin: Negative. Allergic/Immunologic: Negative. Neurological: Negative. Hematological: Negative. Psychiatric/Behavioral: Negative.          Problem List:     Patient Active Problem List   Diagnosis   • Anemia   • Atherosclerotic heart disease of native coronary artery without angina pectoris   • DMII (diabetes mellitus, type 2) ([address])   • Hyperlipidemia   • Hypertension   • Hypothyroidism   • PAD (peripheral artery disease) ([facility])   • Skin neoplasm   • Squamous cell carcinoma   • Dermatitis   • 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 ([facility])   • 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)   • Beta thalassemia minor   • Elevated PSA   • Abnormal prostate exam   • Lumbar strain   • Acute pain of right knee   • Strain of calf muscle   • Localized osteoarthritis of right knee   • Acute pain of both shoulders   • Right hand pain      Past Medical and Surgical History:     Past Medical History:   Diagnosis Date   • Allergic    • Anemia    • Arthritis    • Diabetes mellitus ([address])    • Disease of thyroid gland    • Hyperlipemia    • Hypertension      Past Surgical History:   Procedure Laterality Date   • COLONOSCOPY     • PROSTATE BIOPSY      needle,  resolved may 2005      Family History:     Family History   Problem Relation Age of Onset   • No Known Problems Mother       Social History:     Social History     Socioeconomic History   • Marital status: /Civil Union     Spouse name: None   • Number of children: None   • Years of education: None   • Highest education level: None   Occupational History   • 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[employer]   Tobacco Use   • Smoking status: Former     Types: Cigarettes   • Smokeless tobacco: Never   Vaping Use   • Vaping Use: Never used   Substance and Sexual Activity   • Alcohol use: Not Currently     Comment: social/rarely   • Drug use: Never   • Sexual activity: Not Currently   Other Topics Concern   • None   Social History Narrative   • None     Social Determinants of Health     Financial Resource Strain: Low Risk  (8/30/2023)    Overall Financial Resource Strain (CARDIA)    • Difficulty of Paying Living Expenses: Not hard at all   Food Insecurity: Not on file   Transportation Needs: No Transportation Needs (8/30/2023)    PRAPARE - Transportation    • Lack of Transportation (Medical): No    • Lack of Transportation (Non-Medical): No   Physical Activity: Not on file   Stress: Not on file   Social Connections: Not on file   Intimate Partner Violence: Not on file   Housing Stability: Not on file      Medications and Allergies:     Current Outpatient Medications   Medication Sig Dispense Refill   • amLODIPine (NORVASC) 10 mg tablet take 1 tablet by mouth once daily 90 tablet 1   • aspirin (ECOTRIN LOW STRENGTH) 81 mg EC tablet Take 81 mg by mouth daily     • atorvastatin (LIPITOR) 20 mg tablet Take 1 tablet by mouth daily     • cholecalciferol (VITAMIN D3) 1,000 units tablet Take by mouth     • Diclofenac Sodium (VOLTAREN) 1 % Apply 2 g topically 4 (four) times a day 441 g 5   • folic acid (FOLVITE) 1 mg tablet Take 1 tablet by mouth daily     • levothyroxine 25 mcg tablet take 1 tablet by mouth once daily 90 tablet 1   • metoprolol tartrate (LOPRESSOR) 50 mg tablet Take 1 tablet by mouth 2 (two) times a day     • montelukast (SINGULAIR) 10 mg tablet Take by mouth     • Multiple Vitamins-Minerals (CENTRUM SILVER) tablet Take by mouth     • olmesartan-hydrochlorothiazide (BENICAR HCT) 40-25 MG per tablet take 1 tablet by mouth once daily 90 tablet 1   • Omega-3 Fatty Acids (FISH OIL) 1000 MG CPDR Take by mouth     • Potassium 99 MG TABS Take by mouth     • Saw Palmetto 160 MG CAPS Take by mouth     • sulfaSALAzine (AZULFIDINE) 500 mg tablet   0   • zinc gluconate 50 mg tablet Take by mouth     • OLMESARTAN MEDOXOMIL PO Take by mouth (Patient not taking: Reported on 2/6/2023)       No current facility-administered medications for this visit.      Allergies   Allergen Reactions   • Lisinopril    • Candesartan Rash      Immunizations:     Immunization History   Administered Date(s) Administered   • COVID-19 MODERNA VACC 0.5 ML IM 02/08/2021, 02/16/2021, 03/09/2021   • INFLUENZA 10/08/2018, 11/03/2019   • Influenza Split High Dose Preservative Free IM 11/07/2017   • Influenza, high dose seasonal 0.7 mL 11/19/2018, 01/10/2020, 10/15/2020, 11/02/2021, 11/04/2022   • Pneumococcal Conjugate 13-Valent 10/18/2016   • Pneumococcal Conjugate Vaccine 20-valent (Pcv20), Polysace 08/04/2022   • Pneumococcal Polysaccharide PPV23 11/07/2017   • Td (adult), adsorbed 10/23/2007      Health Maintenance:         Topic Date Due   • Hepatitis C Screening  Completed         Topic Date Due   • COVID-19 Vaccine (4 - Moderna series) 05/04/2021   • Influenza Vaccine (1) 09/01/2023      Medicare Screening Tests and Risk Assessments:     Olayinka Wang is here for his Subsequent Wellness visit. Health Risk Assessment:   Patient rates overall health as good. Patient feels that their physical health rating is same. Patient is very satisfied with their life. Eyesight was rated as same. Hearing was rated as same. Patient feels that their emotional and mental health rating is same. Patients states they are never, rarely angry. Patient states they are sometimes unusually tired/fatigued. Pain experienced in the last 7 days has been none. Patient states that he has experienced no weight loss or gain in last 6 months. Depression Screening:   PHQ-2 Score: 0      Fall Risk Screening: In the past year, patient has experienced: no history of falling in past year      Home Safety:  Patient does not have trouble with stairs inside or outside of their home. Patient has working smoke alarms and has no working carbon monoxide detector. Home safety hazards include: none. Nutrition:   Current diet is Regular. Medications:   Patient is currently taking over-the-counter supplements. OTC medications include: see medication list. Patient is able to manage medications.      Activities of Daily Living (ADLs)/Instrumental Activities of Daily Living (IADLs):   Walk and transfer into and out of bed and chair?: Yes  Dress and groom yourself?: Yes    Bathe or shower yourself?: Yes    Feed yourself? Yes  Do your laundry/housekeeping?: Yes  Manage your money, pay your bills and track your expenses?: Yes  Make your own meals?: Yes    Do your own shopping?: Yes    Previous Hospitalizations:   Any hospitalizations or ED visits within the last 12 months?: No      Advance Care Planning:   Living will: Yes    Durable POA for healthcare: Yes    Advanced directive: Yes      Cognitive Screening:   Provider or family/friend/caregiver concerned regarding cognition?: No    PREVENTIVE SCREENINGS      Cardiovascular Screening:    General: Screening Not Indicated, History Lipid Disorder, Risks and Benefits Discussed and Screening Current      Diabetes Screening:     General: Screening Not Indicated, History Diabetes, Risks and Benefits Discussed and Screening Current      Colorectal Cancer Screening:     General: Screening Not Indicated, Risks and Benefits Discussed and Screening Current      Prostate Cancer Screening:    General: Screening Not Indicated, Risks and Benefits Discussed and Screening Current      Osteoporosis Screening:    General: Risks and Benefits Discussed and Patient Declines      Abdominal Aortic Aneurysm (AAA) Screening:    Risk factors include: tobacco use        General: Risks and Benefits Discussed and Screening Not Indicated      Lung Cancer Screening:     General: Screening Not Indicated and Risks and Benefits Discussed      Hepatitis C Screening:    General: Screening Current and Risks and Benefits Discussed    Other Counseling Topics:   Regular weightbearing exercise. No results found. Physical Exam:     /62 (BP Location: Right arm, Patient Position: Sitting, Cuff Size: Standard)   Pulse 83   Temp 98.6 °F (37 °C) (Temporal)   Ht 5' 9" (1.753 m)   Wt 75.8 kg (167 lb)   SpO2 97%   BMI 24.66 kg/m²     Physical Exam  Vitals and nursing note reviewed. Constitutional:       General: He is not in acute distress. Appearance: Normal appearance.  He is well-developed. He is not ill-appearing, toxic-appearing or diaphoretic. HENT:      Head: Normocephalic and atraumatic. Right Ear: Tympanic membrane, ear canal and external ear normal. There is no impacted cerumen. Left Ear: Tympanic membrane, ear canal and external ear normal. There is no impacted cerumen. Nose: Nose normal. No congestion or rhinorrhea. Mouth/Throat:      Pharynx: No oropharyngeal exudate. Eyes:      General: No scleral icterus. Right eye: No discharge. Left eye: No discharge. Conjunctiva/sclera: Conjunctivae normal.      Pupils: Pupils are equal, round, and reactive to light. Neck:      Thyroid: No thyromegaly. Vascular: No JVD. Trachea: No tracheal deviation. Cardiovascular:      Rate and Rhythm: Normal rate and regular rhythm. Pulses: Normal pulses. Heart sounds: Murmur heard. No friction rub. No gallop. Pulmonary:      Effort: Pulmonary effort is normal. No respiratory distress. Breath sounds: Normal breath sounds. No stridor. No wheezing or rales. Chest:      Chest wall: No tenderness. Musculoskeletal:         General: No swelling, tenderness or deformity. Normal range of motion. Cervical back: Normal range of motion and neck supple. Right lower leg: No edema. Left lower leg: No edema. Lymphadenopathy:      Cervical: No cervical adenopathy. Skin:     General: Skin is warm and dry. Capillary Refill: Capillary refill takes less than 2 seconds. Coloration: Skin is not jaundiced or pale. Findings: No bruising, erythema or rash. Neurological:      Mental Status: He is alert and oriented to person, place, and time. Mental status is at baseline. Cranial Nerves: No cranial nerve deficit. Sensory: No sensory deficit. Motor: No abnormal muscle tone.       Coordination: Coordination normal.      Deep Tendon Reflexes: Reflexes normal.   Psychiatric:         Mood and Affect: Mood normal.         Behavior: Behavior normal.         Thought Content:  Thought content normal.         Judgment: Judgment normal.          Ramon Fearing, DO

## 2023-08-30 NOTE — PATIENT INSTRUCTIONS
Medicare Preventive Visit Patient Instructions  Thank you for completing your Welcome to Medicare Visit or Medicare Annual Wellness Visit today. Your next wellness visit will be due in one year (8/30/2024). The screening/preventive services that you may require over the next 5-10 years are detailed below. Some tests may not apply to you based off risk factors and/or age. Screening tests ordered at today's visit but not completed yet may show as past due. Also, please note that scanned in results may not display below. Preventive Screenings:  Service Recommendations Previous Testing/Comments   Colorectal Cancer Screening  · Colonoscopy    · Fecal Occult Blood Test (FOBT)/Fecal Immunochemical Test (FIT)  · Fecal DNA/Cologuard Test  · Flexible Sigmoidoscopy Age: 43-73 years old   Colonoscopy: every 10 years (May be performed more frequently if at higher risk)  OR  FOBT/FIT: every 1 year  OR  Cologuard: every 3 years  OR  Sigmoidoscopy: every 5 years  Screening may be recommended earlier than age 39 if at higher risk for colorectal cancer. Also, an individualized decision between you and your healthcare provider will decide whether screening between the ages of 77-80 would be appropriate.  Colonoscopy: Not on file  FOBT/FIT: Not on file  Cologuard: Not on file  Sigmoidoscopy: Not on file    Screening Not Indicated     Prostate Cancer Screening Individualized decision between patient and health care provider in men between ages of 53-66   Medicare will cover every 12 months beginning on the day after your 50th birthday PSA: 16.0 ng/mL     Screening Not Indicated     Hepatitis C Screening Once for adults born between 1945 and 1965  More frequently in patients at high risk for Hepatitis C Hep C Antibody: 05/09/2019    Screening Current   Diabetes Screening 1-2 times per year if you're at risk for diabetes or have pre-diabetes Fasting glucose: 131 mg/dL (5/1/2023)  A1C: 5.7 (8/30/2023)  Screening Not Indicated  History Diabetes   Cholesterol Screening Once every 5 years if you don't have a lipid disorder. May order more often based on risk factors. Lipid panel: 05/01/2023  Screening Not Indicated  History Lipid Disorder      Other Preventive Screenings Covered by Medicare:  1. Abdominal Aortic Aneurysm (AAA) Screening: covered once if your at risk. You're considered to be at risk if you have a family history of AAA or a male between the age of 70-76 who smoking at least 100 cigarettes in your lifetime. 2. Lung Cancer Screening: covers low dose CT scan once per year if you meet all of the following conditions: (1) Age 48-67; (2) No signs or symptoms of lung cancer; (3) Current smoker or have quit smoking within the last 15 years; (4) You have a tobacco smoking history of at least 20 pack years (packs per day x number of years you smoked); (5) You get a written order from a healthcare provider. 3. Glaucoma Screening: covered annually if you're considered high risk: (1) You have diabetes OR (2) Family history of glaucoma OR (3)  aged 48 and older OR (3)  American aged 72 and older  3. Osteoporosis Screening: covered every 2 years if you meet one of the following conditions: (1) Have a vertebral abnormality; (2) On glucocorticoid therapy for more than 3 months; (3) Have primary hyperparathyroidism; (4) On osteoporosis medications and need to assess response to drug therapy. 5. HIV Screening: covered annually if you're between the age of 14-79. Also covered annually if you are younger than 13 and older than 72 with risk factors for HIV infection. For pregnant patients, it is covered up to 3 times per pregnancy.     Immunizations:  Immunization Recommendations   Influenza Vaccine Annual influenza vaccination during flu season is recommended for all persons aged >= 6 months who do not have contraindications   Pneumococcal Vaccine   * Pneumococcal conjugate vaccine = PCV13 (Prevnar 13), PCV15 (Vaxneuvance), PCV20 (Prevnar 20)  * Pneumococcal polysaccharide vaccine = PPSV23 (Pneumovax) Adults 2364 years old: 1-3 doses may be recommended based on certain risk factors  Adults 72 years old: 1-2 doses may be recommended based off what pneumonia vaccine you previously received   Hepatitis B Vaccine 3 dose series if at intermediate or high risk (ex: diabetes, end stage renal disease, liver disease)   Tetanus (Td) Vaccine - COST NOT COVERED BY MEDICARE PART B Following completion of primary series, a booster dose should be given every 10 years to maintain immunity against tetanus. Td may also be given as tetanus wound prophylaxis. Tdap Vaccine - COST NOT COVERED BY MEDICARE PART B Recommended at least once for all adults. For pregnant patients, recommended with each pregnancy. Shingles Vaccine (Shingrix) - COST NOT COVERED BY MEDICARE PART B  2 shot series recommended in those aged 48 and above     Health Maintenance Due:      Topic Date Due   • Hepatitis C Screening  Completed     Immunizations Due:      Topic Date Due   • COVID-19 Vaccine (4 - Moderna series) 05/04/2021   • Influenza Vaccine (1) 09/01/2023     Advance Directives   What are advance directives? Advance directives are legal documents that state your wishes and plans for medical care. These plans are made ahead of time in case you lose your ability to make decisions for yourself. Advance directives can apply to any medical decision, such as the treatments you want, and if you want to donate organs. What are the types of advance directives? There are many types of advance directives, and each state has rules about how to use them. You may choose a combination of any of the following:  · Living will: This is a written record of the treatment you want. You can also choose which treatments you do not want, which to limit, and which to stop at a certain time. This includes surgery, medicine, IV fluid, and tube feedings.    · Durable power of  for [facility]): This is a written record that states who you want to make healthcare choices for you when you are unable to make them for yourself. This person, called a proxy, is usually a family member or a friend. You may choose more than 1 proxy. · Do not resuscitate (DNR) order:  A DNR order is used in case your heart stops beating or you stop breathing. It is a request not to have certain forms of treatment, such as CPR. A DNR order may be included in other types of advance directives. · Medical directive: This covers the care that you want if you are in a coma, near death, or unable to make decisions for yourself. You can list the treatments you want for each condition. Treatment may include pain medicine, surgery, blood transfusions, dialysis, IV or tube feedings, and a ventilator (breathing machine). · Values history: This document has questions about your views, beliefs, and how you feel and think about life. This information can help others choose the care that you would choose. Why are advance directives important? An advance directive helps you control your care. Although spoken wishes may be used, it is better to have your wishes written down. Spoken wishes can be misunderstood, or not followed. Treatments may be given even if you do not want them. An advance directive may make it easier for your family to make difficult choices about your care. © Copyright [employer] 2018 Information is for End User's use only and may not be sold, redistributed or otherwise used for commercial purposes.  All illustrations and images included in CareNotes® are the copyrighted property of [employer] Inc. or [employer]

## 2023-10-19 DIAGNOSIS — E03.9 PRIMARY HYPOTHYROIDISM: ICD-10-CM

## 2023-10-19 RX ORDER — LEVOTHYROXINE SODIUM 0.03 MG/1
25 TABLET ORAL DAILY
Qty: 90 TABLET | Refills: 1 | Status: SHIPPED | OUTPATIENT
Start: 2023-10-19

## 2023-11-06 ENCOUNTER — OFFICE VISIT (OUTPATIENT)
Dept: FAMILY MEDICINE CLINIC | Facility: CLINIC | Age: 88
End: 2023-11-06
Payer: MEDICARE

## 2023-11-06 VITALS
BODY MASS INDEX: 25.12 KG/M2 | WEIGHT: 169.6 LBS | SYSTOLIC BLOOD PRESSURE: 130 MMHG | TEMPERATURE: 98 F | OXYGEN SATURATION: 99 % | DIASTOLIC BLOOD PRESSURE: 70 MMHG | HEART RATE: 81 BPM | HEIGHT: 69 IN

## 2023-11-06 DIAGNOSIS — E03.9 ACQUIRED HYPOTHYROIDISM: ICD-10-CM

## 2023-11-06 DIAGNOSIS — I10 PRIMARY HYPERTENSION: ICD-10-CM

## 2023-11-06 DIAGNOSIS — E78.2 MIXED HYPERLIPIDEMIA: ICD-10-CM

## 2023-11-06 DIAGNOSIS — I73.9 PAD (PERIPHERAL ARTERY DISEASE) (HCC): ICD-10-CM

## 2023-11-06 DIAGNOSIS — Z23 ENCOUNTER FOR IMMUNIZATION: ICD-10-CM

## 2023-11-06 DIAGNOSIS — E11.00 TYPE 2 DIABETES MELLITUS WITH HYPEROSMOLARITY WITHOUT COMA, UNSPECIFIED WHETHER LONG TERM INSULIN USE (HCC): Primary | ICD-10-CM

## 2023-11-06 DIAGNOSIS — I25.10 ATHEROSCLEROSIS OF NATIVE CORONARY ARTERY OF NATIVE HEART WITHOUT ANGINA PECTORIS: ICD-10-CM

## 2023-11-06 PROCEDURE — G0008 ADMIN INFLUENZA VIRUS VAC: HCPCS

## 2023-11-06 PROCEDURE — 90662 IIV NO PRSV INCREASED AG IM: CPT

## 2023-11-06 PROCEDURE — 99214 OFFICE O/P EST MOD 30 MIN: CPT | Performed by: FAMILY MEDICINE

## 2023-11-06 NOTE — PROGRESS NOTES
Assessment/Plan: Patient will see ophthalmologist appropriately. Last A1c 5.7 CMP CBC TSH lipid profile reviewed at this time. Patient will have labs drawn at follow-up visit. Patient will continue with current regimen for chronic conditions listed below. Patient's meds were refilled. We will continue to refill when needed. Flu shot given at this time. Follow-up in 3 months. Diagnoses and all orders for this visit:    Type 2 diabetes mellitus with hyperosmolarity without coma, unspecified whether long term insulin use ([address])    Acquired hypothyroidism    Primary hypertension    Atherosclerosis of native coronary artery of native heart without angina pectoris    PAD (peripheral artery disease) ([facility])    Mixed hyperlipidemia            Subjective:        Patient ID: Milan Brooks is a 80 y.o. male. Patient is here to follow-up on diabetes hypothyroidism hypertension hyperlipidemia etc.  Patient feeling well overall. No chest pain or shortness of breath. No problems urinating or defecating. Patient wishes to see a new ophthalmologist.          The following portions of the patient's history were reviewed and updated as appropriate: allergies, current medications, past family history, past medical history, past social history, past surgical history and problem list.      Review of Systems   Constitutional: Negative. HENT: Negative. Eyes:  Positive for visual disturbance. Respiratory: Negative. Cardiovascular: Negative. Gastrointestinal: Negative. Endocrine: Negative. Genitourinary: Negative. Musculoskeletal: Negative. Skin: Negative. Allergic/Immunologic: Negative. Neurological: Negative. Hematological: Negative. Psychiatric/Behavioral: Negative. Objective:      BMI Counseling: Body mass index is 25.05 kg/m². The BMI is above normal. Nutrition recommendations include encouraging healthy choices of fruits and vegetables.  Exercise recommendations include moderate physical activity 150 minutes/week. Rationale for BMI follow-up plan is due to patient being overweight or obese. Depression Screening and Follow-up Plan: Clincally patient does not have depression. No treatment is required. /70 (BP Location: Right arm, Patient Position: Sitting, Cuff Size: Standard)   Pulse 81   Temp 98 °F (36.7 °C) (Temporal)   Ht 5' 9" (1.753 m)   Wt 76.9 kg (169 lb 9.6 oz)   SpO2 99%   BMI 25.05 kg/m²          Physical Exam  Vitals and nursing note reviewed. Constitutional:       General: He is not in acute distress. Appearance: Normal appearance. He is not ill-appearing, toxic-appearing or diaphoretic. HENT:      Head: Normocephalic and atraumatic. Right Ear: Tympanic membrane, ear canal and external ear normal. There is no impacted cerumen. Left Ear: Tympanic membrane, ear canal and external ear normal. There is no impacted cerumen. Nose: Nose normal. No congestion or rhinorrhea. Mouth/Throat:      Mouth: Mucous membranes are moist.      Pharynx: No oropharyngeal exudate or posterior oropharyngeal erythema. Eyes:      General: No scleral icterus. Right eye: No discharge. Left eye: No discharge. Neck:      Vascular: No carotid bruit. Cardiovascular:      Rate and Rhythm: Normal rate and regular rhythm. Pulses: Normal pulses. Heart sounds: Normal heart sounds. No murmur heard. No friction rub. No gallop. Pulmonary:      Effort: Pulmonary effort is normal. No respiratory distress. Breath sounds: Normal breath sounds. No stridor. No wheezing, rhonchi or rales. Chest:      Chest wall: No tenderness. Musculoskeletal:         General: No swelling, tenderness, deformity or signs of injury. Normal range of motion. Cervical back: Normal range of motion and neck supple. No rigidity. No muscular tenderness. Right lower leg: No edema. Left lower leg: No edema. Lymphadenopathy:      Cervical: No cervical adenopathy. Skin:     General: Skin is warm and dry. Capillary Refill: Capillary refill takes less than 2 seconds. Coloration: Skin is not jaundiced. Findings: No bruising, erythema, lesion or rash. Neurological:      Mental Status: He is alert and oriented to person, place, and time. Mental status is at baseline. Cranial Nerves: No cranial nerve deficit. Sensory: No sensory deficit. Motor: No weakness. Coordination: Coordination normal.      Gait: Gait normal.   Psychiatric:         Mood and Affect: Mood normal.         Behavior: Behavior normal.         Thought Content:  Thought content normal.         Judgment: Judgment normal.

## 2024-02-07 ENCOUNTER — OFFICE VISIT (OUTPATIENT)
Dept: FAMILY MEDICINE CLINIC | Facility: CLINIC | Age: 89
End: 2024-02-07
Payer: MEDICARE

## 2024-02-07 ENCOUNTER — TELEPHONE (OUTPATIENT)
Age: 89
End: 2024-02-07

## 2024-02-07 VITALS
HEIGHT: 69 IN | HEART RATE: 85 BPM | WEIGHT: 171.4 LBS | BODY MASS INDEX: 25.39 KG/M2 | SYSTOLIC BLOOD PRESSURE: 170 MMHG | TEMPERATURE: 98 F | DIASTOLIC BLOOD PRESSURE: 60 MMHG | OXYGEN SATURATION: 98 %

## 2024-02-07 DIAGNOSIS — K51.00 ULCERATIVE PANCOLITIS WITHOUT COMPLICATION (HCC): ICD-10-CM

## 2024-02-07 DIAGNOSIS — I10 PRIMARY HYPERTENSION: ICD-10-CM

## 2024-02-07 DIAGNOSIS — E11.22 TYPE 2 DIABETES MELLITUS WITH STAGE 3 CHRONIC KIDNEY DISEASE, WITHOUT LONG-TERM CURRENT USE OF INSULIN, UNSPECIFIED WHETHER STAGE 3A OR 3B CKD (HCC): ICD-10-CM

## 2024-02-07 DIAGNOSIS — N18.30 TYPE 2 DIABETES MELLITUS WITH STAGE 3 CHRONIC KIDNEY DISEASE, WITHOUT LONG-TERM CURRENT USE OF INSULIN, UNSPECIFIED WHETHER STAGE 3A OR 3B CKD (HCC): ICD-10-CM

## 2024-02-07 DIAGNOSIS — I73.9 PAD (PERIPHERAL ARTERY DISEASE) (HCC): ICD-10-CM

## 2024-02-07 DIAGNOSIS — E11.00 TYPE 2 DIABETES MELLITUS WITH HYPEROSMOLARITY WITHOUT COMA, UNSPECIFIED WHETHER LONG TERM INSULIN USE (HCC): Primary | ICD-10-CM

## 2024-02-07 DIAGNOSIS — E78.2 MIXED HYPERLIPIDEMIA: ICD-10-CM

## 2024-02-07 DIAGNOSIS — I25.10 ATHEROSCLEROSIS OF NATIVE CORONARY ARTERY OF NATIVE HEART WITHOUT ANGINA PECTORIS: ICD-10-CM

## 2024-02-07 DIAGNOSIS — D56.3 BETA THALASSEMIA MINOR: ICD-10-CM

## 2024-02-07 DIAGNOSIS — E03.9 ACQUIRED HYPOTHYROIDISM: ICD-10-CM

## 2024-02-07 PROCEDURE — 99214 OFFICE O/P EST MOD 30 MIN: CPT | Performed by: FAMILY MEDICINE

## 2024-02-07 NOTE — PROGRESS NOTES
Assessment/Plan: Previous labs reviewed from last year.  Patient for the labs prior to next visit in 3 months.  Patient will continue with current regimen for chronic conditions.  Patient up-to-date seeing ophthalmologist.  No foot related issues at this time.  Will recheck blood pressure at follow-up visit.  Patient under some stress this morning.  Follow-up 3 months       Diagnoses and all orders for this visit:    Type 2 diabetes mellitus with hyperosmolarity without coma, unspecified whether long term insulin use (HCC)  -     POCT hemoglobin A1c  -     Comprehensive metabolic panel; Future  -     CBC and differential; Future  -     Albumin / creatinine urine ratio; Future  -     Hemoglobin A1C; Future  -     TSH, 3rd generation with Free T4 reflex; Future  -     Lipid panel; Future    Acquired hypothyroidism  -     Comprehensive metabolic panel; Future  -     CBC and differential; Future  -     Albumin / creatinine urine ratio; Future  -     Hemoglobin A1C; Future  -     TSH, 3rd generation with Free T4 reflex; Future  -     Lipid panel; Future    Primary hypertension  -     Comprehensive metabolic panel; Future  -     CBC and differential; Future  -     Albumin / creatinine urine ratio; Future  -     Hemoglobin A1C; Future  -     TSH, 3rd generation with Free T4 reflex; Future  -     Lipid panel; Future    Atherosclerosis of native coronary artery of native heart without angina pectoris  -     Comprehensive metabolic panel; Future  -     CBC and differential; Future  -     Albumin / creatinine urine ratio; Future  -     Hemoglobin A1C; Future  -     TSH, 3rd generation with Free T4 reflex; Future  -     Lipid panel; Future    Mixed hyperlipidemia  -     Comprehensive metabolic panel; Future  -     CBC and differential; Future  -     Albumin / creatinine urine ratio; Future  -     Hemoglobin A1C; Future  -     TSH, 3rd generation with Free T4 reflex; Future  -     Lipid panel; Future    Beta thalassemia minor  -  "    Comprehensive metabolic panel; Future  -     CBC and differential; Future  -     Albumin / creatinine urine ratio; Future  -     Hemoglobin A1C; Future  -     TSH, 3rd generation with Free T4 reflex; Future  -     Lipid panel; Future    PAD (peripheral artery disease) (HCC)    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)    Type 2 diabetes mellitus with stage 3 chronic kidney disease, without long-term current use of insulin, unspecified whether stage 3a or 3b CKD (HCC)            Subjective:        Patient ID: Jose Zamora is a 88 y.o. male.      The patient is here to follow-up on diabetes hypertension hyperlipidemia etc.  Patient doing fairly well overall.  Patient does have some nocturia.  No dysuria or hematuria.  No fevers noted.  Patient otherwise in normal state of health.  No chest pain shortness of breath or headache visual disturbances or foot related issues at the present time.  No problems with bowel movements.  Vaccines reviewed          The following portions of the patient's history were reviewed and updated as appropriate: allergies, current medications, past family history, past medical history, past social history, past surgical history and problem list.      Review of Systems   Constitutional: Negative.    HENT: Negative.     Eyes: Negative.    Respiratory: Negative.     Cardiovascular: Negative.    Gastrointestinal: Negative.    Endocrine: Negative.    Genitourinary:         Nocturia   Musculoskeletal: Negative.    Skin: Negative.    Allergic/Immunologic: Negative.    Neurological: Negative.    Hematological: Negative.    Psychiatric/Behavioral: Negative.             Objective:        Depression Screening and Follow-up Plan: Patient was screened for depression during today's encounter. They screened negative with a PHQ-2 score of 0.            /60 (BP Location: Right arm, Patient Position: Sitting, Cuff Size: Standard)   Pulse 85   Temp 98 °F (36.7 °C) (Temporal)   Ht 5' 9\" " (1.753 m)   Wt 77.7 kg (171 lb 6.4 oz)   SpO2 98%   BMI 25.31 kg/m²          Physical Exam  Vitals and nursing note reviewed.   Constitutional:       General: He is not in acute distress.     Appearance: Normal appearance. He is not ill-appearing, toxic-appearing or diaphoretic.   HENT:      Head: Normocephalic and atraumatic.      Right Ear: Tympanic membrane, ear canal and external ear normal. There is no impacted cerumen.      Left Ear: Tympanic membrane, ear canal and external ear normal. There is no impacted cerumen.      Nose: Nose normal. No congestion or rhinorrhea.      Mouth/Throat:      Mouth: Mucous membranes are moist.      Pharynx: No oropharyngeal exudate or posterior oropharyngeal erythema.   Eyes:      General: No scleral icterus.        Right eye: No discharge.         Left eye: No discharge.   Neck:      Vascular: No carotid bruit.   Cardiovascular:      Rate and Rhythm: Normal rate and regular rhythm.      Pulses: Normal pulses.      Heart sounds: Normal heart sounds. No murmur heard.     No friction rub. No gallop.   Pulmonary:      Effort: Pulmonary effort is normal. No respiratory distress.      Breath sounds: Normal breath sounds. No stridor. No wheezing, rhonchi or rales.   Chest:      Chest wall: No tenderness.   Musculoskeletal:         General: No swelling, tenderness, deformity or signs of injury. Normal range of motion.      Cervical back: Normal range of motion and neck supple. No rigidity. No muscular tenderness.      Right lower leg: No edema.      Left lower leg: No edema.   Lymphadenopathy:      Cervical: No cervical adenopathy.   Skin:     General: Skin is warm and dry.      Capillary Refill: Capillary refill takes less than 2 seconds.      Coloration: Skin is not jaundiced.      Findings: No bruising, erythema, lesion or rash.   Neurological:      Mental Status: He is alert and oriented to person, place, and time. Mental status is at baseline.      Cranial Nerves: No cranial  nerve deficit.      Sensory: No sensory deficit.      Motor: No weakness.      Coordination: Coordination normal.      Gait: Gait normal.   Psychiatric:         Mood and Affect: Mood normal.         Behavior: Behavior normal.         Thought Content: Thought content normal.         Judgment: Judgment normal.

## 2024-02-07 NOTE — TELEPHONE ENCOUNTER
Pt called in stating that he was just seen by Dr. Hodges today and the nurse that was taking his BP and asking him questions wanted him to call back about the one medication that he has been taking that he couldn't remember.  He states he is taking the amlodipine 10mg.   Please advise.

## 2024-03-23 DIAGNOSIS — E03.9 PRIMARY HYPOTHYROIDISM: ICD-10-CM

## 2024-03-24 RX ORDER — LEVOTHYROXINE SODIUM 0.03 MG/1
25 TABLET ORAL DAILY
Qty: 90 TABLET | Refills: 1 | Status: SHIPPED | OUTPATIENT
Start: 2024-03-24

## 2024-04-16 DIAGNOSIS — I10 ESSENTIAL HYPERTENSION: ICD-10-CM

## 2024-04-16 RX ORDER — OLMESARTAN MEDOXOMIL AND HYDROCHLOROTHIAZIDE 40/25 40; 25 MG/1; MG/1
1 TABLET ORAL DAILY
Qty: 90 TABLET | Refills: 1 | Status: SHIPPED | OUTPATIENT
Start: 2024-04-16

## 2024-04-16 NOTE — TELEPHONE ENCOUNTER
Reason for call:   [x] Refill   [] Prior Auth  [] Other:     Office:   [x] PCP/Provider -   [] Specialty/Provider -     Benicar-HCT  40-25 mg    [id number] - [place] - [address]  [address] [facility] [address]  Phone: [phone]  Fax: [phone]     Does the patient have enough for 3 days?   [] Yes   [x] No - Send as HP to POD

## 2024-05-06 ENCOUNTER — APPOINTMENT (OUTPATIENT)
Dept: LAB | Facility: MEDICAL CENTER | Age: 89
End: 2024-05-06
Payer: MEDICARE

## 2024-05-06 DIAGNOSIS — D56.3 BETA THALASSEMIA MINOR: ICD-10-CM

## 2024-05-06 DIAGNOSIS — I25.10 ATHEROSCLEROSIS OF NATIVE CORONARY ARTERY OF NATIVE HEART WITHOUT ANGINA PECTORIS: ICD-10-CM

## 2024-05-06 DIAGNOSIS — E11.00 TYPE 2 DIABETES MELLITUS WITH HYPEROSMOLARITY WITHOUT COMA, UNSPECIFIED WHETHER LONG TERM INSULIN USE (HCC): ICD-10-CM

## 2024-05-06 DIAGNOSIS — E78.2 MIXED HYPERLIPIDEMIA: ICD-10-CM

## 2024-05-06 DIAGNOSIS — I10 PRIMARY HYPERTENSION: ICD-10-CM

## 2024-05-06 DIAGNOSIS — E03.9 ACQUIRED HYPOTHYROIDISM: ICD-10-CM

## 2024-05-06 LAB
ALBUMIN SERPL BCP-MCNC: 4.4 G/DL (ref 3.5–5)
ALP SERPL-CCNC: 75 U/L (ref 34–104)
ALT SERPL W P-5'-P-CCNC: 17 U/L (ref 7–52)
ANION GAP SERPL CALCULATED.3IONS-SCNC: 10 MMOL/L (ref 4–13)
AST SERPL W P-5'-P-CCNC: 17 U/L (ref 13–39)
BASOPHILS # BLD AUTO: 0.06 THOUSANDS/ÂΜL (ref 0–0.1)
BASOPHILS NFR BLD AUTO: 1 % (ref 0–1)
BILIRUB SERPL-MCNC: 0.44 MG/DL (ref 0.2–1)
BUN SERPL-MCNC: 42 MG/DL (ref 5–25)
CALCIUM SERPL-MCNC: 8.4 MG/DL (ref 8.4–10.2)
CHLORIDE SERPL-SCNC: 107 MMOL/L (ref 96–108)
CHOLEST SERPL-MCNC: 111 MG/DL
CO2 SERPL-SCNC: 24 MMOL/L (ref 21–32)
CREAT SERPL-MCNC: 1.59 MG/DL (ref 0.6–1.3)
CREAT UR-MCNC: 76.3 MG/DL
EOSINOPHIL # BLD AUTO: 0.24 THOUSAND/ÂΜL (ref 0–0.61)
EOSINOPHIL NFR BLD AUTO: 3 % (ref 0–6)
ERYTHROCYTE [DISTWIDTH] IN BLOOD BY AUTOMATED COUNT: 16.3 % (ref 11.6–15.1)
EST. AVERAGE GLUCOSE BLD GHB EST-MCNC: 157 MG/DL
GFR SERPL CREATININE-BSD FRML MDRD: 38 ML/MIN/1.73SQ M
GLUCOSE P FAST SERPL-MCNC: 159 MG/DL (ref 65–99)
HBA1C MFR BLD: 7.1 %
HCT VFR BLD AUTO: 29 % (ref 36.5–49.3)
HDLC SERPL-MCNC: 35 MG/DL
HGB BLD-MCNC: 8.6 G/DL (ref 12–17)
IMM GRANULOCYTES # BLD AUTO: 0.05 THOUSAND/UL (ref 0–0.2)
IMM GRANULOCYTES NFR BLD AUTO: 1 % (ref 0–2)
LDLC SERPL CALC-MCNC: 43 MG/DL (ref 0–100)
LYMPHOCYTES # BLD AUTO: 1.79 THOUSANDS/ÂΜL (ref 0.6–4.47)
LYMPHOCYTES NFR BLD AUTO: 23 % (ref 14–44)
MCH RBC QN AUTO: 20.1 PG (ref 26.8–34.3)
MCHC RBC AUTO-ENTMCNC: 29.7 G/DL (ref 31.4–37.4)
MCV RBC AUTO: 68 FL (ref 82–98)
MICROALBUMIN UR-MCNC: 268.7 MG/L
MICROALBUMIN/CREAT 24H UR: 352 MG/G CREATININE (ref 0–30)
MONOCYTES # BLD AUTO: 0.66 THOUSAND/ÂΜL (ref 0.17–1.22)
MONOCYTES NFR BLD AUTO: 8 % (ref 4–12)
NEUTROPHILS # BLD AUTO: 5.1 THOUSANDS/ÂΜL (ref 1.85–7.62)
NEUTS SEG NFR BLD AUTO: 64 % (ref 43–75)
NONHDLC SERPL-MCNC: 76 MG/DL
NRBC BLD AUTO-RTO: 0 /100 WBCS
PLATELET # BLD AUTO: 217 THOUSANDS/UL (ref 149–390)
PMV BLD AUTO: 11.3 FL (ref 8.9–12.7)
POTASSIUM SERPL-SCNC: 4.6 MMOL/L (ref 3.5–5.3)
PROT SERPL-MCNC: 7.1 G/DL (ref 6.4–8.4)
RBC # BLD AUTO: 4.28 MILLION/UL (ref 3.88–5.62)
SODIUM SERPL-SCNC: 141 MMOL/L (ref 135–147)
T4 FREE SERPL-MCNC: 0.92 NG/DL (ref 0.61–1.12)
TRIGL SERPL-MCNC: 167 MG/DL
TSH SERPL DL<=0.05 MIU/L-ACNC: 5.03 UIU/ML (ref 0.45–4.5)
WBC # BLD AUTO: 7.9 THOUSAND/UL (ref 4.31–10.16)

## 2024-05-06 PROCEDURE — 36415 COLL VENOUS BLD VENIPUNCTURE: CPT

## 2024-05-06 PROCEDURE — 85025 COMPLETE CBC W/AUTO DIFF WBC: CPT

## 2024-05-06 PROCEDURE — 80053 COMPREHEN METABOLIC PANEL: CPT

## 2024-05-06 PROCEDURE — 80061 LIPID PANEL: CPT

## 2024-05-06 PROCEDURE — 82570 ASSAY OF URINE CREATININE: CPT

## 2024-05-06 PROCEDURE — 84443 ASSAY THYROID STIM HORMONE: CPT

## 2024-05-06 PROCEDURE — 84439 ASSAY OF FREE THYROXINE: CPT

## 2024-05-06 PROCEDURE — 83036 HEMOGLOBIN GLYCOSYLATED A1C: CPT

## 2024-05-06 PROCEDURE — 82043 UR ALBUMIN QUANTITATIVE: CPT

## 2024-05-07 ENCOUNTER — RA CDI HCC (OUTPATIENT)
Dept: OTHER | Facility: HOSPITAL | Age: 89
End: 2024-05-07

## 2024-05-07 NOTE — PROGRESS NOTES
HCC coding opportunities          Chart Reviewed number of suggestions sent to Provider: 2     Patients Insurance     Medicare Insurance: Medicare        E11.51  E11.36

## 2024-05-14 ENCOUNTER — OFFICE VISIT (OUTPATIENT)
Dept: FAMILY MEDICINE CLINIC | Facility: CLINIC | Age: 89
End: 2024-05-14
Payer: MEDICARE

## 2024-05-14 VITALS
OXYGEN SATURATION: 97 % | SYSTOLIC BLOOD PRESSURE: 175 MMHG | WEIGHT: 174.4 LBS | TEMPERATURE: 98.1 F | BODY MASS INDEX: 25.83 KG/M2 | HEART RATE: 82 BPM | DIASTOLIC BLOOD PRESSURE: 74 MMHG | HEIGHT: 69 IN

## 2024-05-14 DIAGNOSIS — E78.2 MIXED HYPERLIPIDEMIA: ICD-10-CM

## 2024-05-14 DIAGNOSIS — E11.00 TYPE 2 DIABETES MELLITUS WITH HYPEROSMOLARITY WITHOUT COMA, WITHOUT LONG-TERM CURRENT USE OF INSULIN (HCC): Primary | ICD-10-CM

## 2024-05-14 DIAGNOSIS — I73.9 PAD (PERIPHERAL ARTERY DISEASE) (HCC): ICD-10-CM

## 2024-05-14 DIAGNOSIS — I10 PRIMARY HYPERTENSION: ICD-10-CM

## 2024-05-14 DIAGNOSIS — E03.9 ACQUIRED HYPOTHYROIDISM: ICD-10-CM

## 2024-05-14 DIAGNOSIS — E03.9 PRIMARY HYPOTHYROIDISM: ICD-10-CM

## 2024-05-14 DIAGNOSIS — I25.10 ATHEROSCLEROSIS OF NATIVE CORONARY ARTERY OF NATIVE HEART WITHOUT ANGINA PECTORIS: ICD-10-CM

## 2024-05-14 PROCEDURE — G2211 COMPLEX E/M VISIT ADD ON: HCPCS | Performed by: FAMILY MEDICINE

## 2024-05-14 PROCEDURE — 99214 OFFICE O/P EST MOD 30 MIN: CPT | Performed by: FAMILY MEDICINE

## 2024-05-14 RX ORDER — LEVOTHYROXINE SODIUM 0.05 MG/1
50 TABLET ORAL DAILY
Qty: 90 TABLET | Refills: 1 | Status: SHIPPED | OUTPATIENT
Start: 2024-05-14

## 2024-05-14 NOTE — PROGRESS NOTES
Assessment/Plan: LDL 43 T4 normal CMP reviewed.  CBC reviewed.  TSH 5.0 A1c 7.1.  Urine microalbumin reviewed.  Patient will modify diet regarding diabetes.  Patient has been drinking more soda and eating more chocolate cake in general.  Monofilament test done at this time.  Levothyroxine increased to 50 mcg daily.  Continue with same treatment for hyperlipidemia.  Refills will be given when needed.  Will recheck blood pressure at follow-up.  Will add further medication if needed.  Patient under some stress at this time.  Follow-up in 3 months.       Diagnoses and all orders for this visit:    Type 2 diabetes mellitus with hyperosmolarity without coma, without long-term current use of insulin (HCC)    Primary hypertension    Atherosclerosis of native coronary artery of native heart without angina pectoris    PAD (peripheral artery disease) (HCC)    Acquired hypothyroidism    Mixed hyperlipidemia    Primary hypothyroidism  -     levothyroxine 50 mcg tablet; Take 1 tablet (50 mcg total) by mouth daily            Subjective:        Patient ID: Jose Zamora is a 88 y.o. male.      Patient is here to follow-up on diabetes hypertension hyperlipidemia PAD CAD and hypothyroid state.  No new foot related issues.  Vision stable.  No new chest pain shortness of breath or problems urinating or defecating.          The following portions of the patient's history were reviewed and updated as appropriate: allergies, current medications, past family history, past medical history, past social history, past surgical history and problem list.      Review of Systems   Constitutional: Negative.    HENT: Negative.     Eyes: Negative.    Respiratory: Negative.     Cardiovascular: Negative.    Gastrointestinal: Negative.    Endocrine: Negative.    Genitourinary: Negative.    Musculoskeletal: Negative.    Skin: Negative.    Allergic/Immunologic: Negative.    Neurological: Negative.    Hematological: Negative.    Psychiatric/Behavioral:  "Negative.             Objective:               BP (!) 175/74 (BP Location: Right arm, Patient Position: Sitting, Cuff Size: Standard)   Pulse 82   Temp 98.1 °F (36.7 °C) (Temporal)   Ht 5' 9\" (1.753 m)   Wt 79.1 kg (174 lb 6.4 oz)   SpO2 97%   BMI 25.75 kg/m²          Physical Exam  Vitals and nursing note reviewed.   Constitutional:       General: He is not in acute distress.     Appearance: Normal appearance. He is not ill-appearing, toxic-appearing or diaphoretic.   HENT:      Head: Normocephalic and atraumatic.      Right Ear: Tympanic membrane, ear canal and external ear normal. There is no impacted cerumen.      Left Ear: Tympanic membrane, ear canal and external ear normal. There is no impacted cerumen.      Nose: Nose normal. No congestion or rhinorrhea.      Mouth/Throat:      Mouth: Mucous membranes are moist.      Pharynx: No oropharyngeal exudate or posterior oropharyngeal erythema.   Eyes:      General: No scleral icterus.        Right eye: No discharge.         Left eye: No discharge.   Neck:      Vascular: No carotid bruit.   Cardiovascular:      Rate and Rhythm: Normal rate and regular rhythm.      Pulses: Normal pulses.      Heart sounds: Normal heart sounds. No murmur heard.     No friction rub. No gallop.   Pulmonary:      Effort: Pulmonary effort is normal. No respiratory distress.      Breath sounds: Normal breath sounds. No stridor. No wheezing, rhonchi or rales.   Chest:      Chest wall: No tenderness.   Musculoskeletal:         General: No swelling, tenderness, deformity or signs of injury. Normal range of motion.      Cervical back: Normal range of motion and neck supple. No rigidity. No muscular tenderness.      Right lower leg: No edema.      Left lower leg: No edema.   Lymphadenopathy:      Cervical: No cervical adenopathy.   Skin:     General: Skin is warm and dry.      Capillary Refill: Capillary refill takes less than 2 seconds.      Coloration: Skin is not jaundiced.      " Findings: No bruising, erythema, lesion or rash.   Neurological:      Mental Status: He is alert and oriented to person, place, and time. Mental status is at baseline.      Cranial Nerves: No cranial nerve deficit.      Sensory: No sensory deficit.      Motor: No weakness.      Coordination: Coordination normal.      Gait: Gait normal.   Psychiatric:         Mood and Affect: Mood normal.         Behavior: Behavior normal.         Thought Content: Thought content normal.         Judgment: Judgment normal.

## 2024-06-16 ENCOUNTER — TELEPHONE (OUTPATIENT)
Dept: OTHER | Facility: OTHER | Age: 89
End: 2024-06-16

## 2024-06-16 NOTE — TELEPHONE ENCOUNTER
"Pt stated, \"I would like a refill on my Metoprolol.\"    Advised pt to request refill from [facility] provider who previously prescribed medication. Asked pt to follow up with pcp.     Please call pt when office reopens.   "

## 2024-06-17 DIAGNOSIS — I10 PRIMARY HYPERTENSION: Primary | ICD-10-CM

## 2024-06-17 RX ORDER — METOPROLOL TARTRATE 50 MG/1
50 TABLET, FILM COATED ORAL 2 TIMES DAILY
Qty: 180 TABLET | Refills: 3 | Status: SHIPPED | OUTPATIENT
Start: 2024-06-17

## 2024-07-11 ENCOUNTER — TELEPHONE (OUTPATIENT)
Dept: FAMILY MEDICINE CLINIC | Facility: CLINIC | Age: 89
End: 2024-07-11

## 2024-07-11 NOTE — TELEPHONE ENCOUNTER
Patient called requesting refill for olmesartan-hydrochlorothiazide 40mg . Patient made aware medication was refilled on 4/16/24 for 90 tablets with 1 refills to   [id number] - [place] - [address].  [address], [place] [address]  Phone: [phone]  Fax: [phone]    pharmacy. Patient instructed to contact the pharmacy to obtain refills of medication. Patient verbalized understanding.

## 2024-07-15 DIAGNOSIS — I10 ESSENTIAL HYPERTENSION: ICD-10-CM

## 2024-07-15 RX ORDER — OLMESARTAN MEDOXOMIL AND HYDROCHLOROTHIAZIDE 40/25 40; 25 MG/1; MG/1
1 TABLET ORAL DAILY
Qty: 90 TABLET | Refills: 1 | Status: SHIPPED | OUTPATIENT
Start: 2024-07-15

## 2024-07-15 NOTE — TELEPHONE ENCOUNTER
Reason for call:   [x] Refill   [] Prior Auth  [x] Other: pharmacy change - [facility] closed     Office:   [x] PCP/Provider - Mega Hodges DO /[facility]  [] Specialty/Provider -     Medication: olmesartan-hydrochlorothiazide (BENICAR HCT) 40-25 MG per tablet     Dose/Frequency: Take 1 tablet by mouth daily,     Quantity: 90    Pharmacy:   [facility]/pharmacy #[id number] - [place], [place] - [address]        Does the patient have enough for 3 days?   [] Yes   [x] No - Send as HP to POD

## 2024-08-12 ENCOUNTER — RA CDI HCC (OUTPATIENT)
Dept: OTHER | Facility: HOSPITAL | Age: 89
End: 2024-08-12

## 2024-08-19 ENCOUNTER — OFFICE VISIT (OUTPATIENT)
Dept: FAMILY MEDICINE CLINIC | Facility: CLINIC | Age: 89
End: 2024-08-19
Payer: MEDICARE

## 2024-08-19 VITALS
HEART RATE: 60 BPM | TEMPERATURE: 98.3 F | SYSTOLIC BLOOD PRESSURE: 162 MMHG | RESPIRATION RATE: 16 BRPM | DIASTOLIC BLOOD PRESSURE: 86 MMHG | OXYGEN SATURATION: 97 % | BODY MASS INDEX: 24.29 KG/M2 | HEIGHT: 69 IN | WEIGHT: 164 LBS

## 2024-08-19 DIAGNOSIS — E03.9 ACQUIRED HYPOTHYROIDISM: ICD-10-CM

## 2024-08-19 DIAGNOSIS — E11.00 TYPE 2 DIABETES MELLITUS WITH HYPEROSMOLARITY WITHOUT COMA, WITHOUT LONG-TERM CURRENT USE OF INSULIN (HCC): Primary | ICD-10-CM

## 2024-08-19 DIAGNOSIS — I73.9 PAD (PERIPHERAL ARTERY DISEASE) (HCC): ICD-10-CM

## 2024-08-19 DIAGNOSIS — S39.012A STRAIN OF LUMBAR REGION, INITIAL ENCOUNTER: ICD-10-CM

## 2024-08-19 DIAGNOSIS — H61.23 BILATERAL IMPACTED CERUMEN: ICD-10-CM

## 2024-08-19 DIAGNOSIS — I10 PRIMARY HYPERTENSION: ICD-10-CM

## 2024-08-19 DIAGNOSIS — E78.2 MIXED HYPERLIPIDEMIA: ICD-10-CM

## 2024-08-19 DIAGNOSIS — I25.10 ATHEROSCLEROSIS OF NATIVE CORONARY ARTERY OF NATIVE HEART WITHOUT ANGINA PECTORIS: ICD-10-CM

## 2024-08-19 PROCEDURE — G2211 COMPLEX E/M VISIT ADD ON: HCPCS | Performed by: FAMILY MEDICINE

## 2024-08-19 PROCEDURE — 99214 OFFICE O/P EST MOD 30 MIN: CPT | Performed by: FAMILY MEDICINE

## 2024-08-19 RX ORDER — CYCLOBENZAPRINE HCL 10 MG
TABLET ORAL DAILY
COMMUNITY

## 2024-08-19 RX ORDER — CELECOXIB 200 MG/1
200 CAPSULE ORAL DAILY
Qty: 90 CAPSULE | Refills: 1 | Status: SHIPPED | OUTPATIENT
Start: 2024-08-19

## 2024-08-19 NOTE — PROGRESS NOTES
Assessment/Plan: LDL 43 TSH 5.0 A1c 7.1 urine microalbumin reviewed CMP CBC reviewed.  Patient is Celebrex for lumbar/thoracic pain and will use Tylenol as needed.  Patient will have labs prior to next visit.  Continue with dietary modification regarding diabetes.  Continue with other meds for other chronic conditions and will recheck TSH at follow-up visit.  Cerumen patient removed.  Follow-up in 3 months       Diagnoses and all orders for this visit:    Type 2 diabetes mellitus with hyperosmolarity without coma, without long-term current use of insulin ([facility])  -     POCT hemoglobin A1c  -     Comprehensive metabolic panel; Future  -     CBC and differential; Future  -     Hemoglobin A1C; Future  -     TSH, 3rd generation with Free T4 reflex; Future    Acquired hypothyroidism  -     Comprehensive metabolic panel; Future  -     CBC and differential; Future  -     Hemoglobin A1C; Future  -     TSH, 3rd generation with Free T4 reflex; Future    Primary hypertension  -     Comprehensive metabolic panel; Future  -     CBC and differential; Future  -     Hemoglobin A1C; Future  -     TSH, 3rd generation with Free T4 reflex; Future    Atherosclerosis of native coronary artery of native heart without angina pectoris  -     Comprehensive metabolic panel; Future  -     CBC and differential; Future  -     Hemoglobin A1C; Future  -     TSH, 3rd generation with Free T4 reflex; Future    PAD (peripheral artery disease) (HCC)  -     Comprehensive metabolic panel; Future  -     CBC and differential; Future  -     Hemoglobin A1C; Future  -     TSH, 3rd generation with Free T4 reflex; Future    Mixed hyperlipidemia  -     Comprehensive metabolic panel; Future  -     CBC and differential; Future  -     Hemoglobin A1C; Future  -     TSH, 3rd generation with Free T4 reflex; Future    Strain of lumbar region, initial encounter  -     celecoxib (CeleBREX) 200 mg capsule; Take 1 capsule (200 mg total) by mouth daily    Bilateral impacted  "cerumen    Other orders  -     cyclobenzaprine (FLEXERIL) 10 mg tablet; Take by mouth daily            Subjective:        Patient ID: Jose Zamora is a 88 y.o. male.      Patient here to follow-up on diabetes hypertension hyperlipidemia with history of CAD peripheral vascular disease.  Patient with some chronic back pain presently 2 out of 10.  No radicular symptoms.  No chest pain or difficulty breathing.  Urination and defecation normal overall.  Patient with decreased hearing.    Diabetes          The following portions of the patient's history were reviewed and updated as appropriate: allergies, current medications, past family history, past medical history, past social history, past surgical history and problem list.      Review of Systems   Constitutional: Negative.    HENT:  Positive for hearing loss.    Eyes: Negative.    Respiratory: Negative.     Cardiovascular: Negative.    Gastrointestinal: Negative.    Endocrine: Negative.    Genitourinary: Negative.    Musculoskeletal:  Positive for back pain.   Skin: Negative.    Allergic/Immunologic: Negative.    Neurological: Negative.    Hematological: Negative.    Psychiatric/Behavioral: Negative.             Objective:               /86 (BP Location: Right arm, Patient Position: Sitting, Cuff Size: Standard)   Pulse 60   Temp 98.3 °F (36.8 °C)   Resp 16   Ht 5' 9\" (1.753 m)   Wt 74.4 kg (164 lb)   SpO2 97%   BMI 24.22 kg/m²          Physical Exam  Vitals and nursing note reviewed.   Constitutional:       General: He is not in acute distress.     Appearance: Normal appearance. He is not ill-appearing, toxic-appearing or diaphoretic.   HENT:      Head: Normocephalic and atraumatic.      Right Ear: Tympanic membrane, ear canal and external ear normal. There is impacted cerumen.      Left Ear: Tympanic membrane, ear canal and external ear normal. There is impacted cerumen.      Nose: Nose normal. No congestion or rhinorrhea.      Mouth/Throat:      " Mouth: Mucous membranes are moist.      Pharynx: No oropharyngeal exudate or posterior oropharyngeal erythema.   Eyes:      General: No scleral icterus.        Right eye: No discharge.         Left eye: No discharge.   Neck:      Vascular: No carotid bruit.   Cardiovascular:      Rate and Rhythm: Normal rate and regular rhythm.      Pulses: Normal pulses.      Heart sounds: Normal heart sounds. No murmur heard.     No friction rub. No gallop.   Pulmonary:      Effort: Pulmonary effort is normal. No respiratory distress.      Breath sounds: Normal breath sounds. No stridor. No wheezing, rhonchi or rales.   Chest:      Chest wall: No tenderness.   Musculoskeletal:         General: Tenderness present. No swelling, deformity or signs of injury.      Cervical back: Normal range of motion and neck supple. No rigidity. No muscular tenderness.      Right lower leg: No edema.      Left lower leg: No edema.   Lymphadenopathy:      Cervical: No cervical adenopathy.   Skin:     General: Skin is warm and dry.      Capillary Refill: Capillary refill takes less than 2 seconds.      Coloration: Skin is not jaundiced.      Findings: No bruising, erythema, lesion or rash.   Neurological:      Mental Status: He is alert and oriented to person, place, and time. Mental status is at baseline.      Cranial Nerves: No cranial nerve deficit.      Sensory: No sensory deficit.      Motor: No weakness.      Coordination: Coordination normal.      Gait: Gait normal.   Psychiatric:         Mood and Affect: Mood normal.         Behavior: Behavior normal.         Thought Content: Thought content normal.         Judgment: Judgment normal.

## 2024-09-18 PROBLEM — H61.23 BILATERAL IMPACTED CERUMEN: Status: RESOLVED | Noted: 2024-08-19 | Resolved: 2024-09-18

## 2024-10-14 ENCOUNTER — OFFICE VISIT (OUTPATIENT)
Age: 89
End: 2024-10-14
Payer: MEDICARE

## 2024-10-14 ENCOUNTER — TELEPHONE (OUTPATIENT)
Age: 89
End: 2024-10-14

## 2024-10-14 VITALS
BODY MASS INDEX: 23.22 KG/M2 | SYSTOLIC BLOOD PRESSURE: 122 MMHG | HEIGHT: 69 IN | OXYGEN SATURATION: 98 % | WEIGHT: 156.8 LBS | TEMPERATURE: 97.8 F | HEART RATE: 101 BPM | DIASTOLIC BLOOD PRESSURE: 68 MMHG

## 2024-10-14 DIAGNOSIS — M54.6 ACUTE BILATERAL THORACIC BACK PAIN: ICD-10-CM

## 2024-10-14 DIAGNOSIS — R11.0 NAUSEA: ICD-10-CM

## 2024-10-14 DIAGNOSIS — J01.00 ACUTE MAXILLARY SINUSITIS, RECURRENCE NOT SPECIFIED: Primary | ICD-10-CM

## 2024-10-14 DIAGNOSIS — R63.4 WEIGHT LOSS: ICD-10-CM

## 2024-10-14 DIAGNOSIS — R10.84 GENERALIZED ABDOMINAL PAIN: Primary | ICD-10-CM

## 2024-10-14 DIAGNOSIS — E78.2 MIXED HYPERLIPIDEMIA: ICD-10-CM

## 2024-10-14 PROCEDURE — G2211 COMPLEX E/M VISIT ADD ON: HCPCS | Performed by: FAMILY MEDICINE

## 2024-10-14 PROCEDURE — 99214 OFFICE O/P EST MOD 30 MIN: CPT | Performed by: FAMILY MEDICINE

## 2024-10-14 RX ORDER — LEVOFLOXACIN 250 MG/1
250 TABLET, FILM COATED ORAL DAILY
Qty: 7 TABLET | Refills: 0 | Status: SHIPPED | OUTPATIENT
Start: 2024-10-14 | End: 2024-10-21

## 2024-10-14 RX ORDER — ONDANSETRON 4 MG/1
4 TABLET, FILM COATED ORAL EVERY 8 HOURS PRN
Qty: 20 TABLET | Refills: 0 | Status: SHIPPED | OUTPATIENT
Start: 2024-10-14

## 2024-10-14 RX ORDER — OMEPRAZOLE 40 MG/1
40 CAPSULE, DELAYED RELEASE ORAL
Qty: 90 CAPSULE | Refills: 3 | Status: SHIPPED | OUTPATIENT
Start: 2024-10-14 | End: 2024-10-20

## 2024-10-14 NOTE — TELEPHONE ENCOUNTER
Pt wife called in stating she just picked up the medication and missing sinus prescription kindly call the med and inform her by an return call so she can collect them too. Thanks

## 2024-10-14 NOTE — TELEPHONE ENCOUNTER
Mega Hodges, DO to [facility]  Just now  Levaquin 250 mg daily number 7 pills no refills

## 2024-10-14 NOTE — PROGRESS NOTES
Assessment/Plan: Patient have ultrasound done as well as start omeprazole and Zofran as needed.  Patient will go for laboratory studies as well as urinalysis.  Patient will follow-up as scheduled in 2 weeks.       Diagnoses and all orders for this visit:    Generalized abdominal pain  -     omeprazole (PriLOSEC) 40 MG capsule; Take 1 capsule (40 mg total) by mouth daily before breakfast  -     ondansetron (ZOFRAN) 4 mg tablet; Take 1 tablet (4 mg total) by mouth every 8 (eight) hours as needed for nausea or vomiting  -     US abdomen complete; Future  -     Lipase; Future  -     Comprehensive metabolic panel; Future  -     CBC and differential; Future  -     TSH, 3rd generation with Free T4 reflex; Future  -     Urinalysis with microscopic; Future    Weight loss  -     omeprazole (PriLOSEC) 40 MG capsule; Take 1 capsule (40 mg total) by mouth daily before breakfast  -     ondansetron (ZOFRAN) 4 mg tablet; Take 1 tablet (4 mg total) by mouth every 8 (eight) hours as needed for nausea or vomiting  -     US abdomen complete; Future  -     Lipase; Future  -     Comprehensive metabolic panel; Future  -     CBC and differential; Future  -     TSH, 3rd generation with Free T4 reflex; Future  -     Urinalysis with microscopic; Future    Acute bilateral thoracic back pain  -     omeprazole (PriLOSEC) 40 MG capsule; Take 1 capsule (40 mg total) by mouth daily before breakfast  -     ondansetron (ZOFRAN) 4 mg tablet; Take 1 tablet (4 mg total) by mouth every 8 (eight) hours as needed for nausea or vomiting  -     US abdomen complete; Future  -     Lipase; Future  -     Comprehensive metabolic panel; Future  -     CBC and differential; Future  -     TSH, 3rd generation with Free T4 reflex; Future  -     Urinalysis with microscopic; Future    Nausea  -     omeprazole (PriLOSEC) 40 MG capsule; Take 1 capsule (40 mg total) by mouth daily before breakfast  -     ondansetron (ZOFRAN) 4 mg tablet; Take 1 tablet (4 mg total) by mouth  every 8 (eight) hours as needed for nausea or vomiting  -     US abdomen complete; Future  -     Lipase; Future  -     Comprehensive metabolic panel; Future  -     CBC and differential; Future  -     TSH, 3rd generation with Free T4 reflex; Future  -     Urinalysis with microscopic; Future    Mixed hyperlipidemia  -     Lipid panel; Future            Subjective:        Patient ID: Jose Zamora is a 88 y.o. male.      Patient is here with abdominal pain as well as back pain over the past few weeks.  Patient to use Tylenol without any improvement.  Appetite is decreased.  Patient did notice weight loss.  Patient feeling weak overall.  No fever noted.  Patient does notice significant sinus issues with postnasal drip.  No vomiting.  Some nausea noted.  Patient with some urinary frequency.  Some looser stools.  No chest pain or shortness of breath.    Abdominal Pain  Associated symptoms include nausea. Pertinent negatives include no fever.   Back Pain  Associated symptoms include abdominal pain. Pertinent negatives include no fever.   Fatigue  Associated symptoms include abdominal pain, congestion, fatigue and nausea. Pertinent negatives include no fever.         The following portions of the patient's history were reviewed and updated as appropriate: allergies, current medications, past family history, past medical history, past social history, past surgical history and problem list.      Review of Systems   Constitutional:  Positive for activity change, appetite change, fatigue and unexpected weight change. Negative for fever.   HENT:  Positive for congestion, postnasal drip, rhinorrhea, sinus pressure and sinus pain.    Eyes: Negative.    Respiratory: Negative.     Cardiovascular: Negative.    Gastrointestinal:  Positive for abdominal pain and nausea.   Endocrine: Negative.    Genitourinary: Negative.    Musculoskeletal:  Positive for back pain.   Skin: Negative.    Allergic/Immunologic: Negative.    Neurological:  "Negative.    Hematological: Negative.    Psychiatric/Behavioral: Negative.             Objective:               /68 (BP Location: Right arm, Patient Position: Sitting, Cuff Size: Standard)   Pulse 101   Temp 97.8 °F (36.6 °C) (Temporal)   Ht 5' 9\" (1.753 m)   Wt 71.1 kg (156 lb 12.8 oz)   SpO2 98%   BMI 23.16 kg/m²          Physical Exam    "

## 2024-10-14 NOTE — TELEPHONE ENCOUNTER
Parul daughter requesting an update on prior message clarifying medication for sinuses.    Pharmacy- [facility]

## 2024-10-14 NOTE — TELEPHONE ENCOUNTER
Patients Daughter called, questions if medication for patient sinuses were called into he pharmacy. Upon chart review/messages, confirmed previous message regarding the patients medication. Patients Daughter request a callback if and when prescription are submitted, Please advise at [phone], if any further questions.

## 2024-10-15 ENCOUNTER — TELEPHONE (OUTPATIENT)
Age: 89
End: 2024-10-15

## 2024-10-15 ENCOUNTER — APPOINTMENT (EMERGENCY)
Dept: CT IMAGING | Facility: HOSPITAL | Age: 89
DRG: 378 | End: 2024-10-15
Payer: MEDICARE

## 2024-10-15 ENCOUNTER — HOSPITAL ENCOUNTER (INPATIENT)
Facility: HOSPITAL | Age: 89
LOS: 5 days | Discharge: HOME | DRG: 378 | End: 2024-10-20
Attending: EMERGENCY MEDICINE | Admitting: INTERNAL MEDICINE
Payer: MEDICARE

## 2024-10-15 ENCOUNTER — LAB (OUTPATIENT)
Age: 89
DRG: 378 | End: 2024-10-15
Payer: MEDICARE

## 2024-10-15 DIAGNOSIS — R63.4 WEIGHT LOSS: ICD-10-CM

## 2024-10-15 DIAGNOSIS — N18.32 CKD STAGE 3B, GFR 30-44 ML/MIN (HCC): ICD-10-CM

## 2024-10-15 DIAGNOSIS — R11.0 NAUSEA: ICD-10-CM

## 2024-10-15 DIAGNOSIS — N17.9 AKI (ACUTE KIDNEY INJURY) (HCC): ICD-10-CM

## 2024-10-15 DIAGNOSIS — I73.9 PAD (PERIPHERAL ARTERY DISEASE) (HCC): ICD-10-CM

## 2024-10-15 DIAGNOSIS — R10.84 GENERALIZED ABDOMINAL PAIN: ICD-10-CM

## 2024-10-15 DIAGNOSIS — I10 PRIMARY HYPERTENSION: ICD-10-CM

## 2024-10-15 DIAGNOSIS — E11.00 TYPE 2 DIABETES MELLITUS WITH HYPEROSMOLARITY WITHOUT COMA, WITHOUT LONG-TERM CURRENT USE OF INSULIN (HCC): ICD-10-CM

## 2024-10-15 DIAGNOSIS — D64.9 SYMPTOMATIC ANEMIA: Primary | ICD-10-CM

## 2024-10-15 DIAGNOSIS — K86.89 PANCREATIC MASS: ICD-10-CM

## 2024-10-15 DIAGNOSIS — I25.10 ATHEROSCLEROSIS OF NATIVE CORONARY ARTERY OF NATIVE HEART WITHOUT ANGINA PECTORIS: ICD-10-CM

## 2024-10-15 DIAGNOSIS — M54.6 ACUTE BILATERAL THORACIC BACK PAIN: ICD-10-CM

## 2024-10-15 DIAGNOSIS — E78.2 MIXED HYPERLIPIDEMIA: ICD-10-CM

## 2024-10-15 DIAGNOSIS — E03.9 ACQUIRED HYPOTHYROIDISM: ICD-10-CM

## 2024-10-15 DIAGNOSIS — K92.2 UGIB (UPPER GASTROINTESTINAL BLEED): ICD-10-CM

## 2024-10-15 LAB
ABO GROUP BLD: NORMAL
ABO GROUP BLD: NORMAL
ALBUMIN SERPL BCG-MCNC: 3.7 G/DL (ref 3.5–5)
ALBUMIN SERPL BCG-MCNC: 4 G/DL (ref 3.5–5)
ALP SERPL-CCNC: 171 U/L (ref 34–104)
ALP SERPL-CCNC: 174 U/L (ref 34–104)
ALT SERPL W P-5'-P-CCNC: 39 U/L (ref 7–52)
ALT SERPL W P-5'-P-CCNC: 40 U/L (ref 7–52)
ANION GAP SERPL CALCULATED.3IONS-SCNC: 12 MMOL/L (ref 4–13)
ANION GAP SERPL CALCULATED.3IONS-SCNC: 15 MMOL/L (ref 4–13)
APTT PPP: 28 SECONDS (ref 23–34)
AST SERPL W P-5'-P-CCNC: 22 U/L (ref 13–39)
AST SERPL W P-5'-P-CCNC: 24 U/L (ref 13–39)
BACTERIA UR QL AUTO: ABNORMAL /HPF
BASOPHILS # BLD AUTO: 0.03 THOUSANDS/ΜL (ref 0–0.1)
BASOPHILS # BLD AUTO: 0.04 THOUSANDS/ΜL (ref 0–0.1)
BASOPHILS NFR BLD AUTO: 0 % (ref 0–1)
BASOPHILS NFR BLD AUTO: 0 % (ref 0–1)
BILIRUB SERPL-MCNC: 0.29 MG/DL (ref 0.2–1)
BILIRUB SERPL-MCNC: 0.31 MG/DL (ref 0.2–1)
BILIRUB UR QL STRIP: NEGATIVE
BLD GP AB SCN SERPL QL: NEGATIVE
BUN SERPL-MCNC: 69 MG/DL (ref 5–25)
BUN SERPL-MCNC: 73 MG/DL (ref 5–25)
CALCIUM SERPL-MCNC: 8.1 MG/DL (ref 8.4–10.2)
CALCIUM SERPL-MCNC: 8.1 MG/DL (ref 8.4–10.2)
CHLORIDE SERPL-SCNC: 106 MMOL/L (ref 96–108)
CHLORIDE SERPL-SCNC: 108 MMOL/L (ref 96–108)
CHOLEST SERPL-MCNC: 83 MG/DL
CLARITY UR: CLEAR
CO2 SERPL-SCNC: 17 MMOL/L (ref 21–32)
CO2 SERPL-SCNC: 18 MMOL/L (ref 21–32)
COLOR UR: ABNORMAL
CREAT SERPL-MCNC: 2.44 MG/DL (ref 0.6–1.3)
CREAT SERPL-MCNC: 2.69 MG/DL (ref 0.6–1.3)
EOSINOPHIL # BLD AUTO: 0.01 THOUSAND/ΜL (ref 0–0.61)
EOSINOPHIL # BLD AUTO: 0.1 THOUSAND/ΜL (ref 0–0.61)
EOSINOPHIL NFR BLD AUTO: 0 % (ref 0–6)
EOSINOPHIL NFR BLD AUTO: 1 % (ref 0–6)
ERYTHROCYTE [DISTWIDTH] IN BLOOD BY AUTOMATED COUNT: 16.3 % (ref 11.6–15.1)
ERYTHROCYTE [DISTWIDTH] IN BLOOD BY AUTOMATED COUNT: 16.9 % (ref 11.6–15.1)
EST. AVERAGE GLUCOSE BLD GHB EST-MCNC: 157 MG/DL
GFR SERPL CREATININE-BSD FRML MDRD: 20 ML/MIN/1.73SQ M
GFR SERPL CREATININE-BSD FRML MDRD: 22 ML/MIN/1.73SQ M
GLUCOSE P FAST SERPL-MCNC: 269 MG/DL (ref 65–99)
GLUCOSE SERPL-MCNC: 346 MG/DL (ref 65–140)
GLUCOSE UR STRIP-MCNC: NEGATIVE MG/DL
HBA1C MFR BLD: 7.1 %
HCT VFR BLD AUTO: 15.3 % (ref 36.5–49.3)
HCT VFR BLD AUTO: 16 % (ref 36.5–49.3)
HDLC SERPL-MCNC: 22 MG/DL
HGB BLD-MCNC: 4.5 G/DL (ref 12–17)
HGB BLD-MCNC: 4.7 G/DL (ref 12–17)
HGB UR QL STRIP.AUTO: NEGATIVE
IMM GRANULOCYTES # BLD AUTO: 0.08 THOUSAND/UL (ref 0–0.2)
IMM GRANULOCYTES # BLD AUTO: 0.11 THOUSAND/UL (ref 0–0.2)
IMM GRANULOCYTES NFR BLD AUTO: 1 % (ref 0–2)
IMM GRANULOCYTES NFR BLD AUTO: 1 % (ref 0–2)
INR PPP: 1.3 (ref 0.85–1.19)
KETONES UR STRIP-MCNC: NEGATIVE MG/DL
LDLC SERPL CALC-MCNC: 13 MG/DL (ref 0–100)
LEUKOCYTE ESTERASE UR QL STRIP: NEGATIVE
LIPASE SERPL-CCNC: 128 U/L (ref 11–82)
LYMPHOCYTES # BLD AUTO: 1.32 THOUSANDS/ΜL (ref 0.6–4.47)
LYMPHOCYTES # BLD AUTO: 1.86 THOUSANDS/ΜL (ref 0.6–4.47)
LYMPHOCYTES NFR BLD AUTO: 11 % (ref 14–44)
LYMPHOCYTES NFR BLD AUTO: 14 % (ref 14–44)
MCH RBC QN AUTO: 19.8 PG (ref 26.8–34.3)
MCH RBC QN AUTO: 20.2 PG (ref 26.8–34.3)
MCHC RBC AUTO-ENTMCNC: 29.4 G/DL (ref 31.4–37.4)
MCHC RBC AUTO-ENTMCNC: 29.4 G/DL (ref 31.4–37.4)
MCV RBC AUTO: 67 FL (ref 82–98)
MCV RBC AUTO: 69 FL (ref 82–98)
MONOCYTES # BLD AUTO: 0.78 THOUSAND/ΜL (ref 0.17–1.22)
MONOCYTES # BLD AUTO: 0.97 THOUSAND/ΜL (ref 0.17–1.22)
MONOCYTES NFR BLD AUTO: 7 % (ref 4–12)
MONOCYTES NFR BLD AUTO: 7 % (ref 4–12)
NEUTROPHILS # BLD AUTO: 9.35 THOUSANDS/ΜL (ref 1.85–7.62)
NEUTROPHILS # BLD AUTO: 9.97 THOUSANDS/ΜL (ref 1.85–7.62)
NEUTS SEG NFR BLD AUTO: 77 % (ref 43–75)
NEUTS SEG NFR BLD AUTO: 81 % (ref 43–75)
NITRITE UR QL STRIP: NEGATIVE
NON-SQ EPI CELLS URNS QL MICRO: ABNORMAL /HPF
NONHDLC SERPL-MCNC: 61 MG/DL
NRBC BLD AUTO-RTO: 0 /100 WBCS
NRBC BLD AUTO-RTO: 0 /100 WBCS
PH UR STRIP.AUTO: 5.5 [PH]
PLATELET # BLD AUTO: 242 THOUSANDS/UL (ref 149–390)
PLATELET # BLD AUTO: 266 THOUSANDS/UL (ref 149–390)
PMV BLD AUTO: 10.3 FL (ref 8.9–12.7)
PMV BLD AUTO: 11.4 FL (ref 8.9–12.7)
POTASSIUM SERPL-SCNC: 5 MMOL/L (ref 3.5–5.3)
POTASSIUM SERPL-SCNC: 5.2 MMOL/L (ref 3.5–5.3)
PROT SERPL-MCNC: 6.2 G/DL (ref 6.4–8.4)
PROT SERPL-MCNC: 6.3 G/DL (ref 6.4–8.4)
PROT UR STRIP-MCNC: ABNORMAL MG/DL
PROTHROMBIN TIME: 16.3 SECONDS (ref 12.3–15)
RBC # BLD AUTO: 2.27 MILLION/UL (ref 3.88–5.62)
RBC # BLD AUTO: 2.33 MILLION/UL (ref 3.88–5.62)
RBC #/AREA URNS AUTO: ABNORMAL /HPF
RH BLD: POSITIVE
RH BLD: POSITIVE
SODIUM SERPL-SCNC: 135 MMOL/L (ref 135–147)
SODIUM SERPL-SCNC: 141 MMOL/L (ref 135–147)
SP GR UR STRIP.AUTO: 1.02 (ref 1–1.03)
SPECIMEN EXPIRATION DATE: NORMAL
T4 FREE SERPL-MCNC: 1.1 NG/DL (ref 0.61–1.12)
TRIGL SERPL-MCNC: 241 MG/DL
TSH SERPL DL<=0.05 MIU/L-ACNC: 5.87 UIU/ML (ref 0.45–4.5)
UROBILINOGEN UR STRIP-ACNC: <2 MG/DL
WBC # BLD AUTO: 11.57 THOUSAND/UL (ref 4.31–10.16)
WBC # BLD AUTO: 13.05 THOUSAND/UL (ref 4.31–10.16)
WBC #/AREA URNS AUTO: ABNORMAL /HPF

## 2024-10-15 PROCEDURE — 86850 RBC ANTIBODY SCREEN: CPT | Performed by: EMERGENCY MEDICINE

## 2024-10-15 PROCEDURE — 84439 ASSAY OF FREE THYROXINE: CPT

## 2024-10-15 PROCEDURE — 99223 1ST HOSP IP/OBS HIGH 75: CPT | Performed by: INTERNAL MEDICINE

## 2024-10-15 PROCEDURE — 83540 ASSAY OF IRON: CPT | Performed by: INTERNAL MEDICINE

## 2024-10-15 PROCEDURE — 99285 EMERGENCY DEPT VISIT HI MDM: CPT | Performed by: EMERGENCY MEDICINE

## 2024-10-15 PROCEDURE — 86923 COMPATIBILITY TEST ELECTRIC: CPT

## 2024-10-15 PROCEDURE — 85025 COMPLETE CBC W/AUTO DIFF WBC: CPT | Performed by: EMERGENCY MEDICINE

## 2024-10-15 PROCEDURE — 36430 TRANSFUSION BLD/BLD COMPNT: CPT

## 2024-10-15 PROCEDURE — 83550 IRON BINDING TEST: CPT | Performed by: INTERNAL MEDICINE

## 2024-10-15 PROCEDURE — 93005 ELECTROCARDIOGRAM TRACING: CPT

## 2024-10-15 PROCEDURE — 84443 ASSAY THYROID STIM HORMONE: CPT

## 2024-10-15 PROCEDURE — 85025 COMPLETE CBC W/AUTO DIFF WBC: CPT

## 2024-10-15 PROCEDURE — 85730 THROMBOPLASTIN TIME PARTIAL: CPT | Performed by: EMERGENCY MEDICINE

## 2024-10-15 PROCEDURE — 86901 BLOOD TYPING SEROLOGIC RH(D): CPT | Performed by: EMERGENCY MEDICINE

## 2024-10-15 PROCEDURE — 83036 HEMOGLOBIN GLYCOSYLATED A1C: CPT

## 2024-10-15 PROCEDURE — 36415 COLL VENOUS BLD VENIPUNCTURE: CPT

## 2024-10-15 PROCEDURE — 96366 THER/PROPH/DIAG IV INF ADDON: CPT

## 2024-10-15 PROCEDURE — 86900 BLOOD TYPING SEROLOGIC ABO: CPT | Performed by: EMERGENCY MEDICINE

## 2024-10-15 PROCEDURE — 85610 PROTHROMBIN TIME: CPT | Performed by: EMERGENCY MEDICINE

## 2024-10-15 PROCEDURE — 81001 URINALYSIS AUTO W/SCOPE: CPT

## 2024-10-15 PROCEDURE — 74176 CT ABD & PELVIS W/O CONTRAST: CPT

## 2024-10-15 PROCEDURE — 82728 ASSAY OF FERRITIN: CPT | Performed by: INTERNAL MEDICINE

## 2024-10-15 PROCEDURE — 30233N1 TRANSFUSION OF NONAUTOLOGOUS RED BLOOD CELLS INTO PERIPHERAL VEIN, PERCUTANEOUS APPROACH: ICD-10-PCS | Performed by: EMERGENCY MEDICINE

## 2024-10-15 PROCEDURE — 83690 ASSAY OF LIPASE: CPT

## 2024-10-15 PROCEDURE — 99285 EMERGENCY DEPT VISIT HI MDM: CPT

## 2024-10-15 PROCEDURE — P9016 RBC LEUKOCYTES REDUCED: HCPCS

## 2024-10-15 PROCEDURE — 80053 COMPREHEN METABOLIC PANEL: CPT | Performed by: EMERGENCY MEDICINE

## 2024-10-15 PROCEDURE — 80061 LIPID PANEL: CPT

## 2024-10-15 PROCEDURE — 96365 THER/PROPH/DIAG IV INF INIT: CPT

## 2024-10-15 RX ORDER — FEXOFENADINE HCL 180 MG/1
180 TABLET ORAL DAILY PRN
COMMUNITY

## 2024-10-15 RX ORDER — TAMSULOSIN HYDROCHLORIDE 0.4 MG/1
0.4 CAPSULE ORAL ONCE
Status: COMPLETED | OUTPATIENT
Start: 2024-10-15 | End: 2024-10-15

## 2024-10-15 RX ORDER — ATORVASTATIN CALCIUM 20 MG/1
20 TABLET, FILM COATED ORAL
Status: DISCONTINUED | OUTPATIENT
Start: 2024-10-16 | End: 2024-10-20 | Stop reason: HOSPADM

## 2024-10-15 RX ORDER — LEVOTHYROXINE SODIUM 50 UG/1
50 TABLET ORAL
Status: DISCONTINUED | OUTPATIENT
Start: 2024-10-16 | End: 2024-10-20 | Stop reason: HOSPADM

## 2024-10-15 RX ADMIN — SODIUM CHLORIDE 8 MG/HR: 9 INJECTION, SOLUTION INTRAVENOUS at 19:25

## 2024-10-15 RX ADMIN — TAMSULOSIN HYDROCHLORIDE 0.4 MG: 0.4 CAPSULE ORAL at 23:59

## 2024-10-15 RX ADMIN — SODIUM CHLORIDE 1000 ML: 0.9 INJECTION, SOLUTION INTRAVENOUS at 17:47

## 2024-10-15 RX ADMIN — SODIUM CHLORIDE 80 MG: 9 INJECTION, SOLUTION INTRAVENOUS at 18:07

## 2024-10-15 NOTE — TELEPHONE ENCOUNTER
Pt returning callback regarding labs. Warm transferred pt to [facility] to go over provider's message.

## 2024-10-15 NOTE — TELEPHONE ENCOUNTER
----- Message from Mega Hodges DO sent at 10/15/2024  4:26 PM EDT -----  Call patient immediately.  Patient's hemoglobin 4.7.  Patient also with acute kidney injury.  Patient to go to emergency room for evaluation and admission at this time.

## 2024-10-15 NOTE — TELEPHONE ENCOUNTER
Patient returned call. Read Dr. Hodges's message to him: Call patient immediately.  Patient's hemoglobin 4.7.  Patient also with acute kidney injury.  Patient to go to emergency room for evaluation and admission at this time.     Advised patient on the importance of getting to the hospital right now. He said he didn't know how he was going to get there. Told him that if he doesn't have anyone to take him immediately, he should call 911.  Patient was SOB with conversation.  Briefly explained what a hemoglobin is and what an PAWEL is. Patient understood the importance of going to the ED now.

## 2024-10-15 NOTE — ED PROVIDER NOTES
"Pt Name: Jose Zamora  MRN: [id number]  Birthdate 1935  Age/Sex: 88 y.o. male  Date of evaluation: 10/15/2024  PCP: Scott Hodges,         FINAL IMPRESSION    Final diagnoses:   Symptomatic anemia   Pancreatic mass   PAWEL (acute kidney injury) (HCC)         DISPOSITION/PLAN      Time reflects when diagnosis was documented in both MDM as applicable and the Disposition within this note       Time User Action Codes Description Comment    10/15/2024  8:49 PM Avis Baum Add [D64.9] Symptomatic anemia     10/15/2024  8:49 PM Avis Baum Add [K86.89] Pancreatic mass     10/15/2024  8:49 PM Avis Bamu Add [N17.9] PAWEL (acute kidney injury) (HCC)           ED Disposition       ED Disposition   Admit    Condition   Stable    Date/Time   Tue Oct 15, 2024  8:50 PM    Comment   Case was discussed with ALESIA and the patient's admission status was agreed to be Admission Status: inpatient status.               Follow-up Information    None           PATIENT REFERRED TO:    No follow-up provider specified.    DISCHARGE MEDICATIONS:    Patient's Medications   Discharge Prescriptions    No medications on file       No discharge procedures on file.          CHIEF COMPLAINT    Chief Complaint   Patient presents with    Abnormal Lab     Pt reports SOB, dizziness and weakness for the past \"couple of months.\" Hemoglobin results of 4.7 per labs today. Pt denies blood in stool or known origin for abnormal labs         HPI    Jose presents to the Emergency Department complaining of weakness.  He had been to see his PCP complaining of feeling fatigued and back pain.  Outpatient labs were ordered and he was scheduled for outpatient US of his abdomen which is upcoming.  His Hg was resulted at 4.7 and he had an elevated BUN/Cr.   He was called and told to come to to the ER.         HPI      Past Medical and Surgical History    Past Medical History:   Diagnosis Date    Allergic     Anemia     Arthritis "     Diabetes mellitus (HCC)     Disease of thyroid gland     Hyperlipemia     Hypertension        Past Surgical History:   Procedure Laterality Date    COLONOSCOPY      PROSTATE BIOPSY      needle,  resolved may 2005       Family History   Problem Relation Age of Onset    No Known Problems Mother        Social History     Tobacco Use    Smoking status: Former     Types: Cigarettes    Smokeless tobacco: Never   Vaping Use    Vaping status: Never Used   Substance Use Topics    Alcohol use: Not Currently     Comment: social/rarely    Drug use: Never         .    Allergies    Allergies   Allergen Reactions    Lisinopril     Candesartan Rash    Penicillins Rash       Home Medications    Prior to Admission medications    Medication Sig Start Date End Date Taking? Authorizing Provider   amLODIPine (NORVASC) 10 mg tablet take 1 tablet by mouth once daily 4/3/23   Mega Hodges DO   aspirin (ECOTRIN LOW STRENGTH) 81 mg EC tablet Take 81 mg by mouth daily    Historical Provider, MD   atorvastatin (LIPITOR) 20 mg tablet Take 1 tablet by mouth daily 10/23/12   Historical Provider, MD   cholecalciferol (VITAMIN D3) 1,000 units tablet Take by mouth    Historical Provider, MD   cyclobenzaprine (FLEXERIL) 10 mg tablet Take by mouth daily    Historical Provider, MD   Diclofenac Sodium (VOLTAREN) 1 % Apply 2 g topically 4 (four) times a day 8/4/22   Mega Hodges DO   folic acid (FOLVITE) 1 mg tablet Take 1 tablet by mouth daily 10/23/12   Historical Provider, MD   levofloxacin (LEVAQUIN) 250 mg tablet Take 1 tablet (250 mg total) by mouth daily for 7 days 10/14/24 10/21/24  Mega Hodges DO   levothyroxine 50 mcg tablet Take 1 tablet (50 mcg total) by mouth daily 5/14/24   Mega Hodges DO   metoprolol tartrate (LOPRESSOR) 50 mg tablet Take 1 tablet (50 mg total) by mouth 2 (two) times a day 6/17/24   Mega Hodges DO   montelukast (SINGULAIR) 10 mg tablet Take by mouth 5/7/14   Historical Provider, MD   Multiple Vitamins-Minerals  (CENTRUM SILVER) tablet Take by mouth 5/14/15   Historical Provider, MD   olmesartan-hydrochlorothiazide (BENICAR HCT) 40-25 MG per tablet Take 1 tablet by mouth daily 7/15/24   Mega Hodges DO   Omega-3 Fatty Acids (FISH OIL) 1000 MG CPDR Take by mouth 5/14/15   Historical Provider, MD   omeprazole (PriLOSEC) 40 MG capsule Take 1 capsule (40 mg total) by mouth daily before breakfast 10/14/24 10/9/25  Mega Hodges DO   ondansetron (ZOFRAN) 4 mg tablet Take 1 tablet (4 mg total) by mouth every 8 (eight) hours as needed for nausea or vomiting 10/14/24   Mega Hodges DO   Potassium 99 MG TABS Take by mouth 5/14/15   Historical Provider, MD [place] 160 MG CAPS Take by mouth    Historical Provider, MD   sulfaSALAzine (AZULFIDINE) 500 mg tablet  1/16/18   Historical Provider, MD   zinc gluconate 50 mg tablet Take by mouth 5/14/15   Historical Provider, MD           Review of Systems    Review of Systems   Constitutional:  Positive for activity change and fatigue. Negative for chills and fever.   HENT:  Negative for ear pain and sore throat.    Eyes:  Negative for pain and visual disturbance.   Respiratory:  Positive for shortness of breath. Negative for cough.    Cardiovascular:  Negative for chest pain and palpitations.   Gastrointestinal:  Positive for blood in stool and diarrhea. Negative for abdominal pain and vomiting.   Genitourinary:  Negative for dysuria and hematuria.   Musculoskeletal:  Positive for back pain. Negative for arthralgias.   Skin:  Negative for color change and rash.   Neurological:  Positive for dizziness, weakness and light-headedness. Negative for seizures and syncope.   All other systems reviewed and are negative.        Physical Exam      ED Triage Vitals   Temperature Pulse Respirations Blood Pressure SpO2   10/15/24 1731 10/15/24 1731 10/15/24 1731 10/15/24 1731 10/15/24 1731   98.1 °F (36.7 °C) 103 20 140/64 98 %      Temp Source Heart Rate Source Patient Position - Orthostatic VS  BP Location FiO2 (%)   10/15/24 1731 10/15/24 1731 10/15/24 1930 10/15/24 1731 --   Oral Monitor Lying Left arm       Pain Score       --                      Physical Exam  Vitals and nursing note reviewed.   Constitutional:       General: He is not in acute distress.     Appearance: He is well-developed. He is not diaphoretic.   HENT:      Head: Normocephalic and atraumatic.      Nose: Nose normal.   Eyes:      Conjunctiva/sclera: Conjunctivae normal.      Pupils: Pupils are equal, round, and reactive to light.   Cardiovascular:      Rate and Rhythm: Normal rate and regular rhythm.      Heart sounds: Murmur heard.      Systolic murmur is present.      No friction rub. No gallop.   Pulmonary:      Effort: Pulmonary effort is normal. No respiratory distress.      Breath sounds: Normal breath sounds. No wheezing or rales.   Abdominal:      General: Bowel sounds are normal.      Palpations: Abdomen is soft.      Tenderness: There is no abdominal tenderness. There is no guarding or rebound.   Musculoskeletal:         General: Normal range of motion.      Cervical back: Normal range of motion and neck supple.   Skin:     General: Skin is warm and dry.      Coloration: Skin is pale.   Neurological:      Mental Status: He is alert and oriented to person, place, and time.   Psychiatric:         Behavior: Behavior normal.                Assessment and Plan    Jose Zamora is a 88 y.o. male who presents with symptomatic anemia diagnosed as an outpatient. Physical examination remarkable for pallor. Differential diagnosis (not completely inclusive) includes GI bleeding. Plan will be to perform diagnostic testing and treat symptomatically.      MDM      Diagnostic Results        Labs:    Results for orders placed or performed during the hospital encounter of 10/15/24   CBC and differential    Collection Time: 10/15/24  5:46 PM   Result Value Ref Range    WBC 11.57 (H) 4.31 - 10.16 Thousand/uL    RBC 2.27 (L) 3.88 - 5.62  Million/uL    Hemoglobin 4.5 (LL) 12.0 - 17.0 g/dL    Hematocrit 15.3 (L) 36.5 - 49.3 %    MCV 67 (L) 82 - 98 fL    MCH 19.8 (L) 26.8 - 34.3 pg    MCHC 29.4 (L) 31.4 - 37.4 g/dL    RDW 16.3 (H) 11.6 - 15.1 %    MPV 10.3 8.9 - 12.7 fL    Platelets 242 149 - 390 Thousands/uL    nRBC 0 /100 WBCs    Segmented % 81 (H) 43 - 75 %    Immature Grans % 1 0 - 2 %    Lymphocytes % 11 (L) 14 - 44 %    Monocytes % 7 4 - 12 %    Eosinophils Relative 0 0 - 6 %    Basophils Relative 0 0 - 1 %    Absolute Neutrophils 9.35 (H) 1.85 - 7.62 Thousands/µL    Absolute Immature Grans 0.08 0.00 - 0.20 Thousand/uL    Absolute Lymphocytes 1.32 0.60 - 4.47 Thousands/µL    Absolute Monocytes 0.78 0.17 - 1.22 Thousand/µL    Eosinophils Absolute 0.01 0.00 - 0.61 Thousand/µL    Basophils Absolute 0.03 0.00 - 0.10 Thousands/µL   Comprehensive metabolic panel    Collection Time: 10/15/24  5:46 PM   Result Value Ref Range    Sodium 135 135 - 147 mmol/L    Potassium 5.0 3.5 - 5.3 mmol/L    Chloride 106 96 - 108 mmol/L    CO2 17 (L) 21 - 32 mmol/L    ANION GAP 12 4 - 13 mmol/L    BUN 73 (H) 5 - 25 mg/dL    Creatinine 2.69 (H) 0.60 - 1.30 mg/dL    Glucose 346 (H) 65 - 140 mg/dL    Calcium 8.1 (L) 8.4 - 10.2 mg/dL    AST 24 13 - 39 U/L    ALT 39 7 - 52 U/L    Alkaline Phosphatase 171 (H) 34 - 104 U/L    Total Protein 6.3 (L) 6.4 - 8.4 g/dL    Albumin 4.0 3.5 - 5.0 g/dL    Total Bilirubin 0.31 0.20 - 1.00 mg/dL    eGFR 20 ml/min/1.73sq m   Protime-INR    Collection Time: 10/15/24  5:46 PM   Result Value Ref Range    Protime 16.3 (H) 12.3 - 15.0 seconds    INR 1.30 (H) 0.85 - 1.19   APTT    Collection Time: 10/15/24  5:46 PM   Result Value Ref Range    PTT 28 23 - 34 seconds   Type and screen    Collection Time: 10/15/24  5:46 PM   Result Value Ref Range    ABO Grouping B     Rh Factor Positive     Antibody Screen Negative     Specimen Expiration Date [id number]    [facility] Recheck - Contact Blood Bank Prior to Collection    Collection Time: 10/15/24  6:07 PM    Result Value Ref Range    ABO Grouping B     Rh Factor Positive    Prepare Leukoreduced RBC: 2 Units    Collection Time: 10/15/24  7:37 PM   Result Value Ref Range    Unit Product Code J7771V38     Unit Number [id number]     Unit ABO B     Unit RH POS     Crossmatch Compatible     Unit Dispense Status Crossmatched     Unit Product Volume 300 ml    Unit Product Code X0824A31     Unit Number [id number]     Unit ABO B     Unit RH POS     Crossmatch Compatible     Unit Dispense Status Issued     Unit Product Volume 300 ml       All labs reviewed and utilized in the medical decision making process    Radiology:    CT abdomen pelvis wo contrast   Final Result      Pancreatic mass with evidence of secondary bile duct obstruction. Most commonly adenocarcinoma. Further evaluation with pancreas MR and MRCP recommended.      Small indeterminate left renal nodule, may be a complicated cyst. This can also be evaluated with MR without and with IV contrast.      Other nonemergent findings above.            Workstation performed: [id number]             All radiology studies independently viewed by me and interpreted by the radiologist.    Procedure    Procedures      ED Course of Care and Re-Assessments        Medications   pantoprazole (PROTONIX) 80 mg in sodium chloride 0.9 % 100 mL infusion (8 mg/hr Intravenous New Bag 10/15/24 1925)   sodium chloride 0.9 % bolus 1,000 mL (0 mL Intravenous Stopped 10/15/24 1848)   pantoprazole (PROTONIX) 80 mg in sodium chloride 0.9 % 100 mL IVPB (0 mg Intravenous Stopped 10/15/24 1824)                  Avis Baum, DO Avis Baum,   10/17/24 0057

## 2024-10-15 NOTE — TELEPHONE ENCOUNTER
L/m for patient to contact office back asap, patient needs to go to ER for evaluation, patients hemoglobin was 4.7.

## 2024-10-16 ENCOUNTER — APPOINTMENT (INPATIENT)
Dept: MRI IMAGING | Facility: HOSPITAL | Age: 89
DRG: 378 | End: 2024-10-16
Payer: MEDICARE

## 2024-10-16 PROBLEM — M54.50 LOWER BACK PAIN: Status: ACTIVE | Noted: 2024-10-16

## 2024-10-16 PROBLEM — R33.9 URINARY RETENTION: Status: ACTIVE | Noted: 2024-10-16

## 2024-10-16 PROBLEM — N17.9 AKI (ACUTE KIDNEY INJURY) (HCC): Status: ACTIVE | Noted: 2024-10-16

## 2024-10-16 PROBLEM — K86.89 PANCREATIC MASS: Status: ACTIVE | Noted: 2024-10-16

## 2024-10-16 LAB
ABO GROUP BLD BPU: NORMAL
ABO GROUP BLD BPU: NORMAL
ANION GAP SERPL CALCULATED.3IONS-SCNC: 9 MMOL/L (ref 4–13)
ATRIAL RATE: 102 BPM
BPU ID: NORMAL
BPU ID: NORMAL
BUN SERPL-MCNC: 63 MG/DL (ref 5–25)
CALCIUM SERPL-MCNC: 8.2 MG/DL (ref 8.4–10.2)
CHLORIDE SERPL-SCNC: 112 MMOL/L (ref 96–108)
CO2 SERPL-SCNC: 20 MMOL/L (ref 21–32)
CREAT SERPL-MCNC: 2.33 MG/DL (ref 0.6–1.3)
CROSSMATCH: NORMAL
CROSSMATCH: NORMAL
ERYTHROCYTE [DISTWIDTH] IN BLOOD BY AUTOMATED COUNT: 22.8 % (ref 11.6–15.1)
FERRITIN SERPL-MCNC: 25 NG/ML (ref 24–336)
GFR SERPL CREATININE-BSD FRML MDRD: 24 ML/MIN/1.73SQ M
GLUCOSE SERPL-MCNC: 172 MG/DL (ref 65–140)
GLUCOSE SERPL-MCNC: 184 MG/DL (ref 65–140)
GLUCOSE SERPL-MCNC: 196 MG/DL (ref 65–140)
GLUCOSE SERPL-MCNC: 262 MG/DL (ref 65–140)
HCT VFR BLD AUTO: 21.3 % (ref 36.5–49.3)
HGB BLD-MCNC: 6 G/DL (ref 12–17)
HGB BLD-MCNC: 6.7 G/DL (ref 12–17)
HGB BLD-MCNC: 7 G/DL (ref 12–17)
IRON SATN MFR SERPL: 24 % (ref 15–50)
IRON SERPL-MCNC: 64 UG/DL (ref 50–212)
MCH RBC QN AUTO: 23.4 PG (ref 26.8–34.3)
MCHC RBC AUTO-ENTMCNC: 31.5 G/DL (ref 31.4–37.4)
MCV RBC AUTO: 75 FL (ref 82–98)
P AXIS: 69 DEGREES
PLATELET # BLD AUTO: 184 THOUSANDS/UL (ref 149–390)
PMV BLD AUTO: 11.3 FL (ref 8.9–12.7)
POTASSIUM SERPL-SCNC: 4.2 MMOL/L (ref 3.5–5.3)
PR INTERVAL: 154 MS
QRS AXIS: 69 DEGREES
QRSD INTERVAL: 80 MS
QT INTERVAL: 348 MS
QTC INTERVAL: 453 MS
RBC # BLD AUTO: 2.86 MILLION/UL (ref 3.88–5.62)
SODIUM SERPL-SCNC: 141 MMOL/L (ref 135–147)
T WAVE AXIS: 57 DEGREES
TIBC SERPL-MCNC: 271 UG/DL (ref 250–450)
UIBC SERPL-MCNC: 207 UG/DL (ref 155–355)
UNIT DISPENSE STATUS: NORMAL
UNIT DISPENSE STATUS: NORMAL
UNIT PRODUCT CODE: NORMAL
UNIT PRODUCT CODE: NORMAL
UNIT PRODUCT VOLUME: 300 ML
UNIT PRODUCT VOLUME: 300 ML
UNIT RH: NORMAL
UNIT RH: NORMAL
VENTRICULAR RATE: 102 BPM
WBC # BLD AUTO: 9.98 THOUSAND/UL (ref 4.31–10.16)

## 2024-10-16 PROCEDURE — 97162 PT EVAL MOD COMPLEX 30 MIN: CPT

## 2024-10-16 PROCEDURE — 85018 HEMOGLOBIN: CPT | Performed by: INTERNAL MEDICINE

## 2024-10-16 PROCEDURE — 97166 OT EVAL MOD COMPLEX 45 MIN: CPT

## 2024-10-16 PROCEDURE — 82948 REAGENT STRIP/BLOOD GLUCOSE: CPT

## 2024-10-16 PROCEDURE — 99232 SBSQ HOSP IP/OBS MODERATE 35: CPT | Performed by: STUDENT IN AN ORGANIZED HEALTH CARE EDUCATION/TRAINING PROGRAM

## 2024-10-16 PROCEDURE — 85027 COMPLETE CBC AUTOMATED: CPT | Performed by: INTERNAL MEDICINE

## 2024-10-16 PROCEDURE — 99223 1ST HOSP IP/OBS HIGH 75: CPT | Performed by: INTERNAL MEDICINE

## 2024-10-16 PROCEDURE — 80048 BASIC METABOLIC PNL TOTAL CA: CPT | Performed by: INTERNAL MEDICINE

## 2024-10-16 PROCEDURE — 93010 ELECTROCARDIOGRAM REPORT: CPT | Performed by: INTERNAL MEDICINE

## 2024-10-16 PROCEDURE — P9016 RBC LEUKOCYTES REDUCED: HCPCS

## 2024-10-16 PROCEDURE — 74181 MRI ABDOMEN W/O CONTRAST: CPT

## 2024-10-16 RX ORDER — ACETAMINOPHEN 325 MG/1
975 TABLET ORAL EVERY 8 HOURS PRN
Status: DISCONTINUED | OUTPATIENT
Start: 2024-10-16 | End: 2024-10-20 | Stop reason: HOSPADM

## 2024-10-16 RX ORDER — POLYETHYLENE GLYCOL 3350 17 G/17G
17 POWDER, FOR SOLUTION ORAL 2 TIMES DAILY
Status: DISCONTINUED | OUTPATIENT
Start: 2024-10-16 | End: 2024-10-20 | Stop reason: HOSPADM

## 2024-10-16 RX ORDER — INSULIN LISPRO 100 [IU]/ML
1-6 INJECTION, SOLUTION INTRAVENOUS; SUBCUTANEOUS EVERY 6 HOURS SCHEDULED
Status: DISCONTINUED | OUTPATIENT
Start: 2024-10-16 | End: 2024-10-17

## 2024-10-16 RX ORDER — SODIUM CHLORIDE, SODIUM GLUCONATE, SODIUM ACETATE, POTASSIUM CHLORIDE, MAGNESIUM CHLORIDE, SODIUM PHOSPHATE, DIBASIC, AND POTASSIUM PHOSPHATE .53; .5; .37; .037; .03; .012; .00082 G/100ML; G/100ML; G/100ML; G/100ML; G/100ML; G/100ML; G/100ML
100 INJECTION, SOLUTION INTRAVENOUS CONTINUOUS
Status: DISPENSED | OUTPATIENT
Start: 2024-10-16 | End: 2024-10-17

## 2024-10-16 RX ADMIN — INSULIN LISPRO 3 UNITS: 100 INJECTION, SOLUTION INTRAVENOUS; SUBCUTANEOUS at 17:29

## 2024-10-16 RX ADMIN — INSULIN LISPRO 1 UNITS: 100 INJECTION, SOLUTION INTRAVENOUS; SUBCUTANEOUS at 06:13

## 2024-10-16 RX ADMIN — Medication 12.5 MG: at 09:12

## 2024-10-16 RX ADMIN — SODIUM CHLORIDE 8 MG/HR: 9 INJECTION, SOLUTION INTRAVENOUS at 16:17

## 2024-10-16 RX ADMIN — POLYETHYLENE GLYCOL 3350 17 G: 17 POWDER, FOR SOLUTION ORAL at 09:12

## 2024-10-16 RX ADMIN — SODIUM CHLORIDE 8 MG/HR: 9 INJECTION, SOLUTION INTRAVENOUS at 05:58

## 2024-10-16 RX ADMIN — Medication 12.5 MG: at 17:28

## 2024-10-16 RX ADMIN — LEVOTHYROXINE SODIUM 50 MCG: 50 TABLET ORAL at 06:13

## 2024-10-16 RX ADMIN — SODIUM CHLORIDE, SODIUM GLUCONATE, SODIUM ACETATE, POTASSIUM CHLORIDE, MAGNESIUM CHLORIDE, SODIUM PHOSPHATE, DIBASIC, AND POTASSIUM PHOSPHATE 100 ML/HR: .53; .5; .37; .037; .03; .012; .00082 INJECTION, SOLUTION INTRAVENOUS at 17:33

## 2024-10-16 RX ADMIN — INSULIN LISPRO 2 UNITS: 100 INJECTION, SOLUTION INTRAVENOUS; SUBCUTANEOUS at 01:19

## 2024-10-16 RX ADMIN — INSULIN LISPRO 3 UNITS: 100 INJECTION, SOLUTION INTRAVENOUS; SUBCUTANEOUS at 12:22

## 2024-10-16 NOTE — ASSESSMENT & PLAN NOTE
Acute on superimposed chronic kidney disease  Isolyte, urinary retention protocol  Nephrology consulted    Recent Labs     10/15/24  0714 10/15/24  1746 10/16/24  0603   BUN 69* 73* 63*   CREATININE 2.44* 2.69* 2.33*   EGFR 22 20 24       Results from last 7 days   Lab Units 10/15/24  0714   BLOOD UA  Negative   PROTEIN UA mg/dl Trace*

## 2024-10-16 NOTE — CONSULTS
Consultation -  [facility]  Patient: Jose Zamora 88 y.o. male   MRN: [id number]  PCP: Scott Hodges DO  Unit/Bed#: [place] [address] -01 Encounter: [id number]  Length of Stay: 1 days        Inpatient consult to gastroenterology     Date/Time  10/15/2024 5:26 PM     Performed by  Pool Chiang MD   Authorized by  Wanda Bauer MD         Assessment/Plan:  Jose Zamora is a 88 y.o. 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 Found to have anemia and new HOP mass.    # weakness, fatigue  # weight loss  # melena  # panc mass  # IHDD/CBDD   - Hb 4.5 on admission, baseline appears to be 8-10. Suspect chronic slow blood loss. Coupled with recent reported melena and PAWEL on admission (likely from poor PO intake), he could be having a slow UGIB from PUD, esophagitis, MWT, gastritis, varices, GAVE, Dieulafoy lesion, AVMs, small bowel Crohn's. Otherwise he may be having decreased erythropoiesis given active malignancy.   - Hb > 7 (trend daily), plt > 50, INR < 1.5, T&S q3d, 2 large bore IV access   - hold AC, antiplatelets and BB   - we discussed his imaging findings and his overall presentation c/f panc RUTHANN.. Surprisingly his LFTs are fairly unremarkable despite significant biliary dilatation. Hhe is amenable to proceed w/ EGD for UGIB eval and EUS w/ FNA for sampling of mass. We will d/w his daughter too.   - pending improvement of his Cr he can get contrast-enhanced imaging for staging purposes. Initial non-con CTAP is limited in evaluating for mets.   - await MRCP   - he needs at least another unit of pRBC, so we can aim for procedures tomorrow after improvement in his blood counts.   - resume diet. NPO amn   - PPI bid    # constipation   - significant stool burden noted on CT.   - aggressive bowel regimen    # pan UC   - no records. Chart review shows last colon 07/19/12 no details. Prior colon in 2004 showed L-sided  colitis   - c/w home 5-ASA    History of Present Illness:  Jose Zamora is a 88 y.o. 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 Found to have anemia and new HOP mass.    For several weeks if not 1-2 months, he has been having progressively worsening epigastric pain, intermittent N/V, loss of appetite. He's lost about 10-12lb from not eating.    He also reports feeling more fatigued than usual, having to stop and take breaks from his ADLs. He is unable to go up a flight of stairs without having to take a breather. This has been more noticeable in the past week. He says his daughter told him he was looking more pale than usual.    He says he was started on Abx last month for a sinus infxn and noted darker stools thereafter. He attributed it to his medications.     UC was diagnosed many decades ago. He was having severe symptoms w/ bloody diarrhea then. Last colonoscopy in the chart 2012 showed mild L-sided colitis. He has been maintained on sulfasalazine for many years.     GI HPI NEGATIVES:  Denies any recent N/V/D/C, hematemesis, heartburn, dysphagia, odynophagia, abdominal pain, hematochezia, melena, stool incontinence, jaundice, pruritis, anorexia, weight loss, weakness, or fatigue.    REVIEW OF SYSTEMS:  Otherwise, pt denies any fevers/chills, lightheadedness, dizziness, CP, SOB, urinary symptoms, weakness/numbness/tingling.    Past Medical History:   Diagnosis Date    Allergic     Anemia     Arthritis     Diabetes mellitus (HCC)     Disease of thyroid gland     Hyperlipemia     Hypertension      Past Surgical History:   Procedure Laterality Date    COLONOSCOPY      PROSTATE BIOPSY      needle,  resolved may 2005     Prior to Admission medications    Medication Sig Start Date End Date Taking? Authorizing Provider   amLODIPine (NORVASC) 10 mg tablet take 1 tablet by mouth once daily 4/3/23  Yes Mega Hodges, DO   aspirin  (ECOTRIN LOW STRENGTH) 81 mg EC tablet Take 81 mg by mouth daily   Yes Historical Provider, MD   atorvastatin (LIPITOR) 20 mg tablet Take 1 tablet by mouth daily 10/23/12  Yes Historical Provider, MD   cholecalciferol (VITAMIN D3) 1,000 units tablet Take by mouth   Yes Historical Provider, MD   folic acid (FOLVITE) 1 mg tablet Take 1 tablet by mouth daily 10/23/12  Yes Historical Provider, MD   levofloxacin (LEVAQUIN) 250 mg tablet Take 1 tablet (250 mg total) by mouth daily for 7 days 10/14/24 10/21/24 Yes Mega Hodges DO   levothyroxine 50 mcg tablet Take 1 tablet (50 mcg total) by mouth daily 5/14/24  Yes Mega Hodges DO   metoprolol tartrate (LOPRESSOR) 50 mg tablet Take 1 tablet (50 mg total) by mouth 2 (two) times a day 6/17/24  Yes Mega Hodges DO   montelukast (SINGULAIR) 10 mg tablet Take by mouth 5/7/14  Yes Historical Provider, MD   Multiple Vitamins-Minerals (CENTRUM SILVER) tablet Take by mouth 5/14/15  Yes Historical Provider, MD   olmesartan-hydrochlorothiazide (BENICAR HCT) 40-25 MG per tablet Take 1 tablet by mouth daily 7/15/24  Yes Mega Hogdes DO   Omega-3 Fatty Acids (FISH OIL) 1000 MG CPDR Take by mouth 5/14/15  Yes Historical Provider, MD   omeprazole (PriLOSEC) 40 MG capsule Take 1 capsule (40 mg total) by mouth daily before breakfast 10/14/24 10/9/25 Yes Mega Hodges DO   ondansetron (ZOFRAN) 4 mg tablet Take 1 tablet (4 mg total) by mouth every 8 (eight) hours as needed for nausea or vomiting 10/14/24  Yes Mega Hodges DO   Potassium 99 MG TABS Take by mouth 5/14/15  Yes Historical Provider, MD   Saw [place] 160 MG CAPS Take by mouth   Yes Historical Provider, MD   sulfaSALAzine (AZULFIDINE) 500 mg tablet  1/16/18  Yes Historical Provider, MD   zinc gluconate 50 mg tablet Take by mouth 5/14/15  Yes Historical Provider, MD   fexofenadine (Allegra Allergy) 180 MG tablet Take 180 mg by mouth daily as needed (allergies)    Historical Provider, MD     Allergies   Allergen Reactions     "Lisinopril     Candesartan Rash    Penicillins Rash     Social History     Tobacco Use    Smoking status: Former     Types: Cigarettes    Smokeless tobacco: Never   Vaping Use    Vaping status: Never Used   Substance Use Topics    Alcohol use: Not Currently     Comment: social/rarely    Drug use: Never       Social History     Tobacco Use    Smoking status: Former     Types: Cigarettes    Smokeless tobacco: Never   Vaping Use    Vaping status: Never Used   Substance Use Topics    Alcohol use: Not Currently     Comment: social/rarely    Drug use: Never     Family History   Problem Relation Age of Onset    No Known Problems Mother        Objective:  /65   Pulse 95   Temp 98.1 °F (36.7 °C) (Oral)   Resp 18   Ht 5' 9\" (1.753 m)   Wt 70.6 kg (155 lb 10.3 oz)   SpO2 97%   BMI 22.98 kg/m²     Intake/Output Summary (Last 24 hours) at 10/16/2024 0723  Last data filed at 10/16/2024 0601  Gross per 24 hour   Intake 1837.92 ml   Output 700 ml   Net 1137.92 ml     Body mass index is 22.98 kg/m².  Physical Exam  Constitutional:       General: He is not in acute distress.     Appearance: Normal appearance.   HENT:      Head: Normocephalic and atraumatic.      Nose: Nose normal.      Mouth/Throat:      Mouth: Mucous membranes are moist.   Eyes:      General: No scleral icterus.     Extraocular Movements: Extraocular movements intact.      Conjunctiva/sclera: Conjunctivae normal.      Pupils: Pupils are equal, round, and reactive to light.   Cardiovascular:      Rate and Rhythm: Normal rate and regular rhythm.   Pulmonary:      Effort: Pulmonary effort is normal.   Abdominal:      General: Abdomen is flat. There is no distension.      Palpations: There is no mass.      Tenderness: There is abdominal tenderness (epigastric).   Musculoskeletal:         General: No swelling. Normal range of motion.   Skin:     General: Skin is warm and dry.      Capillary Refill: Capillary refill takes less than 2 seconds.   Neurological: "      General: No focal deficit present.      Mental Status: He is alert.   Psychiatric:         Mood and Affect: Mood normal.         Labs:  I have reviewed all available labs and imaging results in Epic.  Results from last 7 days   Lab Units 10/16/24  0603 10/15/24  1746 10/15/24  0714   SODIUM mmol/L 141 135 141   POTASSIUM mmol/L 4.2 5.0 5.2   CHLORIDE mmol/L 112* 106 108   CO2 mmol/L 20* 17* 18*   BUN mg/dL 63* 73* 69*   CREATININE mg/dL 2.33* 2.69* 2.44*   GLUCOSE RANDOM mg/dL 184* 346*  --    CALCIUM mg/dL 8.2* 8.1* 8.1*   ALBUMIN g/dL  --  4.0 3.7   ALK PHOS U/L  --  171* 174*   ALT U/L  --  39 40   AST U/L  --  24 22   EGFR ml/min/1.73sq m 24 20 22     Results from last 7 days   Lab Units 10/16/24  0603 10/16/24  0012 10/15/24  1746 10/15/24  0714   WBC Thousand/uL 9.98  --  11.57* 13.05*   HEMOGLOBIN g/dL 6.7* 6.0* 4.5* 4.7*   HEMATOCRIT % 21.3*  --  15.3* 16.0*   PLATELETS Thousands/uL 184  --  242 266   SEGS PCT %  --   --  81* 77*   LYMPHO PCT %  --   --  11* 14   MONO PCT %  --   --  7 7   EOS PCT %  --   --  0 1   BASOS PCT %  --   --  0 0   TOTAL NEUT ABS Thousands/µL  --   --  9.35* 9.97*   LYMPHS ABS Thousands/µL  --   --  1.32 1.86   MONOS ABS Thousand/µL  --   --  0.78 0.97   EOS ABS Thousand/µL  --   --  0.01 0.10   BASOS ABS Thousands/µL  --   --  0.03 0.04     Results from last 7 days   Lab Units 10/15/24  1746   PROTIME seconds 16.3*   INR  1.30*   PTT seconds 28           Additional Labs:  Results from last 7 days   Lab Units 10/15/24  0714   LIPASE u/L 128*          Results from last 7 days   Lab Units 10/15/24  0714   HEMOGLOBIN A1C % 7.1*   TRIGLYCERIDES mg/dL 241*   HDL mg/dL 22*   LDL CALC mg/dL 13   TSH 3RD GENERATON uIU/mL 5.865*   FREE T4 ng/dL 1.10       Imaging:  CT abdomen pelvis wo contrast   Final Result by Alexia Gordon MD (10/15 2003)      Pancreatic mass with evidence of secondary bile duct obstruction. Most commonly adenocarcinoma. Further evaluation with pancreas MR and  MRCP recommended.      Small indeterminate left renal nodule, may be a complicated cyst. This can also be evaluated with MR without and with IV contrast.      Other nonemergent findings above.            Workstation performed: [id number]         MRI inpatient order    (Results Pending)       Medications:   Current Facility-Administered Medications   Medication Dose Route Frequency Provider Last Rate    acetaminophen  975 mg Oral Q8H PRN Wanda Bauer MD      [Held by provider] atorvastatin  20 mg Oral Daily With Dinner Wanda Bauer MD      insulin lispro  1-6 Units Subcutaneous Q6H EMMANUELLE Wanda Bauer MD      levothyroxine  50 mcg Oral Daily Before Breakfast Wanda Bauer MD      metoprolol tartrate  12.5 mg Oral BID Wanda aBuer MD      pantoprazole (PROTONIX) 80 mg in sodium chloride 0.9 % 100 mL infusion  8 mg/hr Intravenous Continuous Avis Baum DO 8 mg/hr (10/16/24 0558)       Problem List:  Patient Active Problem List   Diagnosis    Anemia    Atherosclerotic heart disease of native coronary artery without angina pectoris    DMII (diabetes mellitus, type 2) (HCC)    Hyperlipidemia    Hypertension    Hypothyroidism    PAD (peripheral artery disease) (HCC)    Skin neoplasm    Squamous cell carcinoma    Dermatitis    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)    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)    Beta thalassemia minor    Elevated PSA    Abnormal prostate exam    Lumbar strain    Acute pain of right knee    Strain of calf muscle    Localized osteoarthritis of right knee    Acute pain of both shoulders    Right hand pain    Generalized abdominal pain    Weight loss    Acute bilateral thoracic back pain    Nausea    UGIB (upper gastrointestinal bleed)    Urinary retention    PAWEL (acute kidney injury) (HCC)    Pancreatic mass    Lower back pain       Case discussed with attending physician Dr. Baires. All recs are preliminary pending  final attestation.    Pool Chiang, PGY-5

## 2024-10-16 NOTE — ASSESSMENT & PLAN NOTE
Lab Results   Component Value Date    HGBA1C 7.1 (H) 10/15/2024       Recent Labs     10/16/24  0033   POCGLU 196*       Blood Sugar Average: Last 72 hrs:  (P) 196    ISS as needed

## 2024-10-16 NOTE — PLAN OF CARE
Problem: Potential for Falls  Goal: Patient will remain free of falls  Description: INTERVENTIONS:  - Educate patient/family on patient safety including physical limitations  - Instruct patient to call for assistance with activity   - Consult OT/PT to assist with strengthening/mobility   - Keep Call bell within reach  - Keep bed low and locked with side rails adjusted as appropriate  - Keep care items and personal belongings within reach  - Initiate and maintain comfort rounds  - Make Fall Risk Sign visible to staff  - Offer Toileting every 2 Hours, in advance of need  - Initiate/Maintain bed alarm  - Obtain necessary fall risk management equipment: non skid socks  - Apply yellow socks and bracelet for high fall risk patients  - Consider moving patient to room near nurses station  Outcome: Progressing     Problem: SAFETY ADULT  Goal: Patient will remain free of falls  Description: INTERVENTIONS:  - Educate patient/family on patient safety including physical limitations  - Instruct patient to call for assistance with activity   - Consult OT/PT to assist with strengthening/mobility   - Keep Call bell within reach  - Keep bed low and locked with side rails adjusted as appropriate  - Keep care items and personal belongings within reach  - Initiate and maintain comfort rounds  - Make Fall Risk Sign visible to staff  - Offer Toileting every 2 Hours, in advance of need  - Initiate/Maintain bed alarm  - Obtain necessary fall risk management equipment: non skid socks  - Apply yellow socks and bracelet for high fall risk patients  - Consider moving patient to room near nurses station  Outcome: Progressing  Goal: Maintain or return to baseline ADL function  Description: INTERVENTIONS:  -  Assess patient's ability to carry out ADLs; assess patient's baseline for ADL function and identify physical deficits which impact ability to perform ADLs (bathing, care of mouth/teeth, toileting, grooming, dressing, etc.)  -  Assess/evaluate cause of self-care deficits   - Assess range of motion  - Assess patient's mobility; develop plan if impaired  - Assess patient's need for assistive devices and provide as appropriate  - Encourage maximum independence but intervene and supervise when necessary  - Involve family in performance of ADLs  - Assess for home care needs following discharge   - Consider OT consult to assist with ADL evaluation and planning for discharge  - Provide patient education as appropriate  Outcome: Progressing  Goal: Maintains/Returns to pre admission functional level  Description: INTERVENTIONS:  - Perform AM-PAC 6 Click Basic Mobility/ Daily Activity assessment daily.  - Set and communicate daily mobility goal to care team and patient/family/caregiver.   - Collaborate with rehabilitation services on mobility goals if consulted  - Perform Range of Motion 3 times a day.  - Reposition patient every 3 hours.  - Dangle patient 3 times a day  - Stand patient 3 times a day  - Ambulate patient 3 times a day  - Out of bed to chair 3 times a day   - Out of bed for meals 3 times a day  - Out of bed for toileting  - Record patient progress and toleration of activity level   Outcome: Progressing     Problem: HEMATOLOGIC - ADULT  Goal: Maintains hematologic stability  Description: INTERVENTIONS  - Assess for signs and symptoms of bleeding or hemorrhage  - Monitor labs  - Administer supportive blood products/factors as ordered and appropriate  Outcome: Progressing

## 2024-10-16 NOTE — ASSESSMENT & PLAN NOTE
Patient reports difficulty urinating today   Was started on levaquin 10/14 outpatient for UTI   UA today only notable for trace proteinuria  Will hold further antibiotics   Urinary retention protocol   Patient has prostatomegaly - trial flomax

## 2024-10-16 NOTE — ASSESSMENT & PLAN NOTE
Lab Results   Component Value Date    HGBA1C 7.1 (H) 10/15/2024     Recent Labs     10/16/24  0033 10/16/24  0601 10/16/24  1207   POCGLU 196* 172* 262*       Blood Sugar Average: Last 72 hrs:  (P) 210    Sliding scale

## 2024-10-16 NOTE — PROGRESS NOTES
Progress Note - Hospitalist   Name: Jose Zamora 88 y.o. male I MRN: [id number]  Unit/Bed#: [place] [address] -01 I Date of Admission: 10/15/2024   Date of Service: 10/16/2024 I Hospital Day: 1    Assessment & Plan  UGIB (upper gastrointestinal bleed)  88 year old male presented with melena, weakness, and epigastric discomfort. He was sent in her due to outpatient labs showing a hemoglobin of 4.5. Concern for potential GI bleed.  Aspirin on hold  Received 2U pRBC, will order a 3rd to optimize her further for a planned procedure tomorrow.   Continue PPI Drip  GI recommendations appreciated  Urinary retention  Difficulty with urination prior to admission.   Was started on levaquin 10/14 outpatient for UTI   UA today only notable for trace proteinuria  Urinary retention protocol  Patient has prostatomegaly - trial flomax  PAWEL (acute kidney injury) (Formerly [facility])  Acute on superimposed chronic kidney disease  Isolyte, urinary retention protocol  Nephrology consulted    Recent Labs     10/15/24  0714 10/15/24  1746 10/16/24  0603   BUN 69* 73* 63*   CREATININE 2.44* 2.69* 2.33*   EGFR 22 20 24       Results from last 7 days   Lab Units 10/15/24  0714   BLOOD UA  Negative   PROTEIN UA mg/dl Trace*     Pancreatic mass  CT Abdomen pelvis showing pancreatic mass.  MRI Abdomen showing enlarged pancreatic head likely harboring an underlying infiltrative pancreatic head mass, intra/extrahepatic biliary dilatation and pancreatic ductal dilatation  GI Recommendations appreciated. EGD for UGIB and EUS with FNA for sampling of mass.    Recent Labs     10/15/24  0714 10/15/24  1746   AST 22 24   ALT 40 39   TBILI 0.29 0.31   ALKPHOS 174* 171*   INR  --  1.30*     DMII (diabetes mellitus, type 2) (Formerly [facility])  Lab Results   Component Value Date    HGBA1C 7.1 (H) 10/15/2024     Recent Labs     10/16/24  0033 10/16/24  0601 10/16/24  1207   POCGLU 196* 172* 262*       Blood Sugar Average: Last 72 hrs:  (P) 210    Sliding scale  Hyperlipidemia  Lipitor  held on admission  PAD (peripheral artery disease) (HCC)  Aspirin on hold due to blood loss.   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   PT/OT    VTE Pharmacologic Prophylaxis:   held due to bleeding    Mobility:   Basic Mobility Inpatient Raw Score: 20  JH-HLM Goal: 6: Walk 10 steps or more  JH-HLM Achieved: 7: Walk 25 feet or more    Discussions with Specialists or Other Care Team Provider: GI    Education and Discussions with Family / Patient: patient, attempted to call [facility] / Parul both didn't     Current Length of Stay: 1 day(s)  Current Patient Status: Inpatient   Certification Statement: The patient will continue to require additional inpatient hospital stay due to gi reevaluation, EGD / EUS  Discharge Plan: TBD    Code Status: Level 1 - Full Code    Subjective   Patient seen and examined. Denies chest pain, shortness of breath, abdominal pain.    Objective   Vitals:   Temp (24hrs), Av.3 °F (36.8 °C), Min:97.9 °F (36.6 °C), Max:98.8 °F (37.1 °C)    Temp:  [97.9 °F (36.6 °C)-98.8 °F (37.1 °C)] 98.4 °F (36.9 °C)  HR:  [] 94  Resp:  [16-23] 20  BP: (117-154)/(59-79) 140/64  SpO2:  [94 %-100 %] 99 %  Body mass index is 22.98 kg/m².     Input and Output Summary (last 24 hours):     Intake/Output Summary (Last 24 hours) at 10/16/2024 1507  Last data filed at 10/16/2024 1000  Gross per 24 hour   Intake 1897.92 ml   Output 950 ml   Net 947.92 ml       Physical Exam  Vitals reviewed.   HENT:      Head: Normocephalic.      Nose: Nose normal.      Mouth/Throat:      Mouth: Mucous membranes are moist.   Eyes:      General: No scleral icterus.  Cardiovascular:      Rate and Rhythm: Normal rate.   Pulmonary:      Effort: Pulmonary effort is  normal. No respiratory distress.      Breath sounds: No wheezing.   Abdominal:      General: There is no distension.      Palpations: Abdomen is soft.      Tenderness: There is no abdominal tenderness.   Skin:     General: Skin is warm.   Neurological:      Mental Status: He is alert.   Psychiatric:         Mood and Affect: Mood normal.         Behavior: Behavior normal.       Lines/Drains:              Lab Results: I have reviewed the following results:   Results from last 7 days   Lab Units 10/16/24  1159 10/16/24  0603 10/16/24  0012 10/15/24  1746 10/15/24  0714   WBC Thousand/uL  --  9.98  --  11.57* 13.05*   HEMOGLOBIN g/dL 7.0* 6.7* 6.0* 4.5* 4.7*   PLATELETS Thousands/uL  --  184  --  242 266   MCV fL  --  75*  --  67* 69*   INR   --   --   --  1.30*  --      Results from last 7 days   Lab Units 10/16/24  0603 10/15/24  1746 10/15/24  0714   SODIUM mmol/L 141 135 141   POTASSIUM mmol/L 4.2 5.0 5.2   CHLORIDE mmol/L 112* 106 108   CO2 mmol/L 20* 17* 18*   ANION GAP mmol/L 9 12 15*   BUN mg/dL 63* 73* 69*   CREATININE mg/dL 2.33* 2.69* 2.44*   CALCIUM mg/dL 8.2* 8.1* 8.1*   ALBUMIN g/dL  --  4.0 3.7   TOTAL BILIRUBIN mg/dL  --  0.31 0.29   ALK PHOS U/L  --  171* 174*   ALT U/L  --  39 40   AST U/L  --  24 22   EGFR ml/min/1.73sq m 24 20 22   GLUCOSE RANDOM mg/dL 184* 346*  --                           Results from last 7 days   Lab Units 10/16/24  1207 10/16/24  0601 10/16/24  0033   POC GLUCOSE mg/dl 262* 172* 196*     Results from last 7 days   Lab Units 10/15/24  0714   HEMOGLOBIN A1C % 7.1*     Results from last 7 days   Lab Units 10/15/24  0714   TSH 3RD GENERATON uIU/mL 5.865*   FREE T4 ng/dL 1.10       Recent Cultures (last 7 days):         Imaging:  Reviewed radiology reports from this admission: ct a/p, mri abdomen    Last 24 Hours Medication List:     Current Facility-Administered Medications:     acetaminophen (TYLENOL) tablet 975 mg, Q8H PRN    [Held by provider] atorvastatin (LIPITOR) tablet 20  mg, Daily With Dinner    insulin lispro (HumALOG/ADMELOG) 100 units/mL subcutaneous injection 1-6 Units, Q6H EMMANUELLE **AND** Fingerstick Glucose (POCT), Q6H    levothyroxine tablet 50 mcg, Daily Before Breakfast    metoprolol tartrate (LOPRESSOR) partial tablet 12.5 mg, BID    pantoprazole (PROTONIX) 80 mg in sodium chloride 0.9 % 100 mL infusion, Continuous, Last Rate: 8 mg/hr (10/16/24 0558)    polyethylene glycol (MIRALAX) packet 17 g, BID      **Please Note: This note may have been constructed using a voice recognition system.**

## 2024-10-16 NOTE — H&P
H&P - Hospitalist   Name: Jose Zamora 88 y.o. male I MRN: [id number]  Unit/Bed#: [place] [address] -01 I Date of Admission: 10/15/2024   Date of Service: 10/16/2024 I Hospital Day: 1     Assessment & Plan  UGIB (upper gastrointestinal bleed)  Presents with black colored stools, malaise, weakness, pallor, epigastric discomfort   On baby aspirin daily  Routine labs outpatient showed hemoglobin of 4.5  Suspect anemia due to UGIB and chronic blood loss iso malignancy   S/p 2u prbc ordered in ER   Check hemoglobin after 1 unit - improved to 6  Continue to monitor hemoglobin q6, transfuse to keep > 7   On protonix drip   Hold aspirin and AC  Check iron panel (add on sample)  GI consult for further recommendations     Urinary retention  Patient reports difficulty urinating today   Was started on levaquin 10/14 outpatient for UTI   UA today only notable for trace proteinuria  Will hold further antibiotics   Urinary retention protocol   Patient has prostatomegaly - trial flomax    PAWEL (acute kidney injury) (Formerly [facility])  Creatinine in may was 1.56 and now > 2  Patient was also receiving levaquin outpatient for UTI   UA results reviewed - no UTI, trace proteinuria  CT shows no hydro  Will hold on nephrotoxins at this time   Also with normal anion gap metabolic acidosis   If no improvement consider nephrology input   Pancreatic mass  Noted incidentally on CT abd, suspicious for malignancy   MRCP ordered  Follow up GI input   DMII (diabetes mellitus, type 2) (Formerly [facility])  Lab Results   Component Value Date    HGBA1C 7.1 (H) 10/15/2024       Recent Labs     10/16/24  0033   POCGLU 196*       Blood Sugar Average: Last 72 hrs:  (P) 196    ISS as needed  Hyperlipidemia  Hold lipitor due to pawel   PAD (peripheral artery disease) (Formerly [facility])  Hold aspirin, lipitor   Hypertension  Continue with metoprolol BID   Hold ARB/Thiazide  Medication reconciliation in AM   Hypothyroidism  Continue with levothyroxine daily  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  (HCC)  Continue with sulfasalazine   Lower back pain  No acute pathology noted on CT abd/pelvis   Suspect related to pancreatic mass based on location and timeline of pain   PT/OT input   Tylenol as needed       VTE Pharmacologic Prophylaxis:   Moderate Risk (Score 3-4) - Pharmacological DVT Prophylaxis Contraindicated. Sequential Compression Devices Ordered.  Code Status: Level 1 - Full Code   Discussion with family: Updated  (daughter, niece, and grandson) at bedside.    Anticipated Length of Stay: Patient will be admitted on an inpatient basis with an anticipated length of stay of greater than 2 midnights secondary to above.    History of Present Illness   Chief Complaint: abnormal labs    Jose Zamora is a 88 y.o. male with a PMH of PAD, CKD 3, Hypothyroidism, HTN, Type II DM, Ulcerative colitis, Dyslipidemia presenting with complaints of abnormal labs and black stools. Patient reports for the past 3 days, he has noticed dark to black colored stools. He usually has a BM once daily however has had increased bowel movements, up to 3 times a day in the last few days. He also reports epigastric discomfort, described mainly as nausea, which tends to be worse with eating. For the past 1 week he has been increasingly tired and has had very poor appetite, feels like he has lost weight during this time, cannot quantify how many lbs. Patient also noted feeling lightheaded with positional changes. He has also been having back pain, located in mid to lower back, extending across, relieved with not moving. Patient has been taking a baby aspirin daily, denies recent increase in use. Back pain has been relieve by tylenol. Per patients family members at bedside, he has been weak for the past few months, not as active as he used to be, not walking as much. He has had not vomiting, chest pains, diarrhea, fevers, chills, headaches/. Admits to increased sinus congestion over the last month, has been taking allegra  and flonase. He is a former smoker, quit 30 yrs ago, denies alcohol use.   He lives with his wife at home.     Patient went to his PCP yesterday for evaluation, had routine blood word done and was found to have hemoglobin of 4.5    He was ordered for 2u prbc, started on Protonix infusion and will be admitted for further management.     Review of Systems   Constitutional:  Positive for appetite change, fatigue and unexpected weight change. Negative for chills, diaphoresis and fever.   HENT:  Positive for congestion and postnasal drip. Negative for ear pain and sore throat.    Eyes:  Negative for pain and visual disturbance.   Respiratory:  Negative for cough and shortness of breath.    Cardiovascular:  Negative for chest pain and palpitations.   Gastrointestinal:  Negative for abdominal pain and vomiting.   Endocrine: Negative for polyuria.   Genitourinary:  Negative for dysuria and hematuria.   Musculoskeletal:  Negative for arthralgias and back pain.   Skin:  Negative for color change and rash.   Neurological:  Positive for weakness and light-headedness. Negative for dizziness, seizures and syncope.   Psychiatric/Behavioral:  Negative for sleep disturbance.    All other systems reviewed and are negative.      Historical Information   Past Medical History:   Diagnosis Date    Allergic     Anemia     Arthritis     Diabetes mellitus (HCC)     Disease of thyroid gland     Hyperlipemia     Hypertension      Past Surgical History:   Procedure Laterality Date    COLONOSCOPY      PROSTATE BIOPSY      needle,  resolved may 2005     Social History     Tobacco Use    Smoking status: Former     Types: Cigarettes    Smokeless tobacco: Never   Vaping Use    Vaping status: Never Used   Substance and Sexual Activity    Alcohol use: Not Currently     Comment: social/rarely    Drug use: Never    Sexual activity: Not Currently     E-Cigarette/Vaping    E-Cigarette Use Never User      E-Cigarette/Vaping Substances    Nicotine No      THC No     CBD No     Flavoring No     Other No     Unknown No      Family History   Problem Relation Age of Onset    No Known Problems Mother      Social History:  Marital Status: /Civil Union       Meds/Allergies   I have reviewed home medications with patient family member.  Prior to Admission medications    Medication Sig Start Date End Date Taking? Authorizing Provider   amLODIPine (NORVASC) 10 mg tablet take 1 tablet by mouth once daily 4/3/23  Yes Mega Hodges DO   aspirin (ECOTRIN LOW STRENGTH) 81 mg EC tablet Take 81 mg by mouth daily   Yes Historical Provider, MD   atorvastatin (LIPITOR) 20 mg tablet Take 1 tablet by mouth daily 10/23/12  Yes Historical Provider, MD   cholecalciferol (VITAMIN D3) 1,000 units tablet Take by mouth   Yes Historical Provider, MD   folic acid (FOLVITE) 1 mg tablet Take 1 tablet by mouth daily 10/23/12  Yes Historical Provider, MD   levofloxacin (LEVAQUIN) 250 mg tablet Take 1 tablet (250 mg total) by mouth daily for 7 days 10/14/24 10/21/24 Yes Mega Hodges DO   levothyroxine 50 mcg tablet Take 1 tablet (50 mcg total) by mouth daily 5/14/24  Yes Mega Hodges DO   metoprolol tartrate (LOPRESSOR) 50 mg tablet Take 1 tablet (50 mg total) by mouth 2 (two) times a day 6/17/24  Yes Mega Hodges DO   montelukast (SINGULAIR) 10 mg tablet Take by mouth 5/7/14  Yes Historical Provider, MD   Multiple Vitamins-Minerals (CENTRUM SILVER) tablet Take by mouth 5/14/15  Yes Historical Provider, MD   olmesartan-hydrochlorothiazide (BENICAR HCT) 40-25 MG per tablet Take 1 tablet by mouth daily 7/15/24  Yes Mega Hodges DO   Omega-3 Fatty Acids (FISH OIL) 1000 MG CPDR Take by mouth 5/14/15  Yes Historical Provider, MD   omeprazole (PriLOSEC) 40 MG capsule Take 1 capsule (40 mg total) by mouth daily before breakfast 10/14/24 10/9/25 Yes Mega Hodges DO   ondansetron (ZOFRAN) 4 mg tablet Take 1 tablet (4 mg total) by mouth every 8 (eight) hours as needed for nausea or vomiting 10/14/24   Yes Mega Hodges DO   Potassium 99 MG TABS Take by mouth 5/14/15  Yes Historical Provider, MD   Joe [place] 160 MG CAPS Take by mouth   Yes Historical Provider, MD   sulfaSALAzine (AZULFIDINE) 500 mg tablet  1/16/18  Yes Historical Provider, MD   zinc gluconate 50 mg tablet Take by mouth 5/14/15  Yes Historical Provider, MD   fexofenadine (Allegra Allergy) 180 MG tablet Take 180 mg by mouth daily as needed (allergies)    Historical Provider, MD   cyclobenzaprine (FLEXERIL) 10 mg tablet Take by mouth daily  Patient not taking: Reported on 10/15/2024  10/15/24  Historical Provider, MD   Diclofenac Sodium (VOLTAREN) 1 % Apply 2 g topically 4 (four) times a day  Patient not taking: Reported on 10/15/2024 8/4/22 10/15/24  Mega Hodges DO     Allergies   Allergen Reactions    Lisinopril     Candesartan Rash    Penicillins Rash       Objective :  Temp:  [97.9 °F (36.6 °C)-98.8 °F (37.1 °C)] 98.1 °F (36.7 °C)  HR:  [] 95  BP: (117-154)/(59-79) 117/65  Resp:  [17-23] 18  SpO2:  [94 %-100 %] 97 %  O2 Device: None (Room air)    Physical Exam  Vitals and nursing note reviewed.   Constitutional:       General: He is not in acute distress.     Appearance: He is well-developed and normal weight. He is ill-appearing. He is not toxic-appearing or diaphoretic.   HENT:      Head: Normocephalic and atraumatic.      Nose: Congestion and rhinorrhea present.   Eyes:      General: No scleral icterus.     Conjunctiva/sclera: Conjunctivae normal.   Cardiovascular:      Rate and Rhythm: Normal rate and regular rhythm.      Heart sounds: Murmur heard.   Pulmonary:      Effort: Pulmonary effort is normal. No respiratory distress.      Breath sounds: Normal breath sounds. No wheezing or rales.   Abdominal:      General: Bowel sounds are normal. There is no distension.      Palpations: Abdomen is soft.      Tenderness: There is no abdominal tenderness. There is no guarding.   Musculoskeletal:         General: No swelling.      Cervical  back: Neck supple.      Right lower leg: No edema.      Left lower leg: No edema.   Skin:     General: Skin is warm and dry.      Capillary Refill: Capillary refill takes less than 2 seconds.      Findings: No rash.   Neurological:      General: No focal deficit present.      Mental Status: He is alert and oriented to person, place, and time.      Cranial Nerves: No cranial nerve deficit.      Motor: Tremor present.      Comments: Lower jaw tremor   Psychiatric:         Mood and Affect: Mood normal.          Lines/Drains:            Lab Results: I have reviewed the following results:  Results from last 7 days   Lab Units 10/16/24  0012 10/15/24  1746   WBC Thousand/uL  --  11.57*   HEMOGLOBIN g/dL 6.0* 4.5*   HEMATOCRIT %  --  15.3*   PLATELETS Thousands/uL  --  242   SEGS PCT %  --  81*   LYMPHO PCT %  --  11*   MONO PCT %  --  7   EOS PCT %  --  0     Results from last 7 days   Lab Units 10/15/24  1746   SODIUM mmol/L 135   POTASSIUM mmol/L 5.0   CHLORIDE mmol/L 106   CO2 mmol/L 17*   BUN mg/dL 73*   CREATININE mg/dL 2.69*   ANION GAP mmol/L 12   CALCIUM mg/dL 8.1*   ALBUMIN g/dL 4.0   TOTAL BILIRUBIN mg/dL 0.31   ALK PHOS U/L 171*   ALT U/L 39   AST U/L 24   GLUCOSE RANDOM mg/dL 346*     Results from last 7 days   Lab Units 10/15/24  1746   INR  1.30*     Results from last 7 days   Lab Units 10/16/24  0033   POC GLUCOSE mg/dl 196*     Lab Results   Component Value Date    HGBA1C 7.1 (H) 10/15/2024    HGBA1C 7.1 (H) 05/06/2024    HGBA1C 5.7 08/30/2023           Imaging Results Review: I reviewed radiology reports from this admission including: CT abdomen/pelvis.  Other Study Results Review: No additional pertinent studies reviewed.    Administrative Statements   I have spent a total time of 60 minutes in caring for this patient on the day of the visit/encounter including Instructions for management.    ** Please Note: This note has been constructed using a voice recognition system. **

## 2024-10-16 NOTE — CASE MANAGEMENT
Case Management Assessment & Discharge Planning Note    Patient name Jose Zamora  Location [facility] 2 /[facility] 2 M* MRN [id number]  : 1935 Date 10/16/2024       Current Admission Date: 10/15/2024  Current Admission Diagnosis:UGIB (upper gastrointestinal bleed)   Patient Active Problem List    Diagnosis Date Noted Date Diagnosed    Urinary retention 10/16/2024     PAWEL (acute kidney injury) ([facility]) 10/16/2024     Pancreatic mass 10/16/2024     Lower back pain 10/16/2024     UGIB (upper gastrointestinal bleed) 10/15/2024     Generalized abdominal pain 10/14/2024     Weight loss 10/14/2024     Acute bilateral thoracic back pain 10/14/2024     Nausea 10/14/2024     Right hand pain 2022     Acute pain of both shoulders 2022     Localized osteoarthritis of right knee 2020     Acute pain of right knee 2020     Strain of calf muscle 2020     Lumbar strain 10/25/2019     Abnormal prostate exam 2019     Beta thalassemia minor 05/15/2019     Elevated PSA 05/15/2019     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 ([facility]) 2019     Type 2 diabetes mellitus with stage 3 chronic kidney disease ([facility]) 2019     Squamous cell carcinoma 2019     Dermatitis 2019     Skin neoplasm 2019     Anemia 2017     Hypothyroidism 10/19/2016     PAD (peripheral artery disease) ([facility]) 2015     Hyperlipidemia 2013     Atherosclerotic heart disease of native coronary artery without angina pectoris 09/10/2012     DMII (diabetes mellitus, type 2) ([facility]) 09/10/2012     Hypertension 09/10/2012       LOS (days): 1  Geometric Mean LOS (GMLOS) (days): 2.9  Days to GMLOS:2.1     OBJECTIVE:    Risk of Unplanned Readmission Score: 16.66         Current admission status: Inpatient       Preferred Pharmacy:   [id number] - [place] - [address]  [address]  [place] [address]  Phone: [phone] Fax: [phone]    [id number]  - [place] - [address]  [address]  [place] [address]  Phone: [phone] Fax: [phone]    [id number] - [place] - [address]  [address] [address]  Phone: [phone] Fax: [phone]    Primary Care Provider: Scott Hodges DO    Primary Insurance: MEDICARE  Secondary Insurance: BLUE CROSS    ASSESSMENT:  Active Health Care Proxies       Yolanda Zamora [facility] Care Representative - Spouse   Primary Phone: [phone] (Home)                           Readmission Root Cause  30 Day Readmission: No    Patient Information  Admitted from:: Home  Mental Status: Alert  During Assessment patient was accompanied by: Daughter  Assessment information provided by:: Patient  Primary Caregiver: Self  Support Systems: Self, Family members  County of Residence: [place]  What city do you live in?: [place]  Home entry access options. Select all that apply.: No steps to enter home  Type of Current Residence: [facility]  Living Arrangements: Lives w/ Spouse/significant other  Is patient a ?: No    Activities of Daily Living Prior to Admission  Functional Status: Independent  Completes ADLs independently?: Yes  Ambulates independently?: Yes  Does patient use assisted devices?: No  Does patient currently own DME?: No  Does patient have a history of Outpatient Therapy (PT/OT)?: No  Does the patient have a history of Short-Term Rehab?: No  Does patient have a history of HHC?: No  Does patient currently have HHC?: No         Patient Information Continued  Income Source: Pension/custodial  Does patient have prescription coverage?: Yes  Does patient receive dialysis treatments?: No  Does patient have a history of substance abuse?: No  Does patient have a history of Mental Health Diagnosis?: No         Means of Transportation  Means of Transport to Appts:: Drives Self      Social Determinants of Health (SDOH)      Flowsheet Row Most Recent Value    Housing Stability    In the last 12 months, was there a time when you were not able to pay the mortgage or rent on time? N   At any time in the past 12 months, were you homeless or living in a shelter (including now)? N   Transportation Needs    In the past 12 months, has lack of transportation kept you from medical appointments or from getting medications? no   In the past 12 months, has lack of transportation kept you from meetings, work, or from getting things needed for daily living? No   Food Insecurity    Within the past 12 months, you worried that your food would run out before you got the money to buy more. Never true   Within the past 12 months, the food you bought just didn't last and you didn't have money to get more. Never true   [employer]    In the past 12 months has the electric, gas, oil, or water company threatened to shut off services in your home? No            DISCHARGE DETAILS:    Discharge planning discussed with:: patient, daughter at bedside  Freedom of Choice: Yes     CM contacted family/caregiver?: Yes  Were Treatment Team discharge recommendations reviewed with patient/caregiver?: Yes  Did patient/caregiver verbalize understanding of patient care needs?: Yes  Were patient/caregiver advised of the risks associated with not following Treatment Team discharge recommendations?: Yes    Contacts  Patient Contacts: Yolanda Hayward, spouse  Relationship to Patient:: Family  Contact Method: Phone  Phone Number: [phone]  Reason/Outcome: Emergency Contact                        Treatment Team Recommendation: Home  Discharge Destination Plan:: Home  Transport at Discharge : Family                                      Additional Comments: Introduced self and role to patient and daughter Parul at bedside.   Patient independent at baseline, no use of DME, or history of HHC/STR.  Patient family supportive and will be able to stay with patient/wife after discharge.  CM will continue to follow.

## 2024-10-16 NOTE — OCCUPATIONAL THERAPY NOTE
Occupational Therapy Evaluation     Patient Name: Jose Zamora  Today's Date: 10/16/2024  Problem List  Principal Problem:    UGIB (upper gastrointestinal bleed)  Active Problems:    DMII (diabetes mellitus, type 2) (HCC)    Hyperlipidemia    Hypertension    Hypothyroidism    PAD (peripheral artery disease) (HCC)    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)    Urinary retention    PAWEL (acute kidney injury) (HCC)    Pancreatic mass    Lower back pain    Past Medical History  Past Medical History:   Diagnosis Date    Allergic     Anemia     Arthritis     Diabetes mellitus (HCC)     Disease of thyroid gland     Hyperlipemia     Hypertension      Past Surgical History  Past Surgical History:   Procedure Laterality Date    COLONOSCOPY      PROSTATE BIOPSY      needle,  resolved may 2005         10/16/24 1103   OT Last Visit   OT Visit Date 10/16/24   Note Type   Note type Evaluation   Pain Assessment   Pain Assessment Tool 0-10   Pain Score No Pain   Restrictions/Precautions   Weight Bearing Precautions Per Order No   Home Living   Type of Home House   Home Layout One level;Stairs to enter with rails  (3 LONI)   Bathroom Shower/Tub Tub/shower unit   Bathroom Toilet Standard   Bathroom Equipment Shower chair   Home Equipment   (no DME)   Prior Function   Level of [place] Independent with ADLs;Independent with functional mobility;Independent with IADLS   Lives With Spouse   Receives Help From Family   IADLs Independent with driving;Independent with meal prep;Independent with medication management   Falls in the last 6 months 0   Vocational Retired   ADL   Eating Assistance 7  Independent   Grooming Assistance 7  Independent   UB Bathing Assistance 6  Modified Independent   LB Bathing Assistance 6  Modified Independent   UB Dressing Assistance 6  Modified independent   LB Dressing Assistance 6  Modified independent   Toileting Assistance  6  Modified independent   Bed Mobility   Supine to Sit 6  Modified  independent   Additional items HOB elevated   Transfers   Sit to Stand 5  Supervision   Stand to Sit 5  Supervision   Functional Mobility   Functional Mobility 6  Modified independent   Additional Comments no device. household distance   Balance   Static Sitting Normal   Dynamic Sitting Good   Static Standing Fair   Dynamic Standing Fair -   Ambulatory Fair -   Activity Tolerance   Activity Tolerance Patient tolerated treatment well   Medical Staff Made Aware PT Mehdi   Nurse Made Aware RN   RUE Assessment   RUE Assessment WFL   LUE Assessment   LUE Assessment WFL   Hand Function   Gross Motor Coordination Functional   Fine Motor Coordination Functional   Psychosocial   Psychosocial (WDL) WDL   Cognition   Overall Cognitive Status WFL   Arousal/Participation Cooperative   Attention Within functional limits   Memory Within functional limits   Following Commands Follows all commands and directions without difficulty   Comments pleasant and cooperative.   Assessment   Assessment Jose Zamora is a 88 y.o. male seen for OT evaluation s/p admit to [facility] on 10/15/2024 w/ UGIB (upper gastrointestinal bleed).  Comorbidities affecting pt's functional performance at time of assessment include: DM and HTN. Pt with active OT orders and activity orders for Up and OOB as tolerated. Personal factors affecting pt at time of IE include:LONI home environment. Prior to admission, pt lives with wife in a single level home with 3 LONI. I with ADLS, IADLs and mobility. 0 falls. Drives. Upon evaluation: Pt currently requires dorinda for UB ADLs, dorinda for LB ADLs, dorinda for toileting, dorinda for bed mobility, supervision for functional transfers, and supervision  mobility 2* the following deficits impacting occupational performance:weakness. Pt is currently at their functional baseline and no skilled OT is warranted at this time. From OT standpoint, recommendation at time of d/c would be no rehab needs.   Plan   OT Frequency Eval only  (DC OT)    Discharge Recommendation   Rehab Resource Intensity Level, OT No post-acute rehabilitation needs   Additional Comments  The patient's raw score on the AM-PAC Daily Activity Inpatient Short Form is 24. A raw score of greater than or equal to 19 suggests the patient may benefit from discharge to home. Please refer to the recommendation of the Occupational Therapist for safe discharge planning.   AM-PAC Daily Activity Inpatient   Lower Body Dressing 4   Bathing 4   Toileting 4   Upper Body Dressing 4   Grooming 4   Eating 4   Daily Activity Raw Score 24   Daily Activity Standardized Score (Calc for Raw Score >=11) 57.54   -PAC Applied Cognition Inpatient   Following a Speech/Presentation 4   Understanding Ordinary Conversation 4   Taking Medications 4   Remembering Where Things Are Placed or Put Away 4   Remembering List of 4-5 Errands 4   Taking Care of Complicated Tasks 4   Applied Cognition Raw Score 24   Applied Cognition Standardized Score 62.21   Mrary Willoughby, OT

## 2024-10-16 NOTE — ASSESSMENT & PLAN NOTE
88 year old male presented with melena, weakness, and epigastric discomfort. He was sent in her due to outpatient labs showing a hemoglobin of 4.5. Concern for potential GI bleed.  Aspirin on hold  Received 2U pRBC, will order a 3rd to optimize her further for a planned procedure tomorrow.   Continue PPI Drip  GI recommendations appreciated

## 2024-10-16 NOTE — ASSESSMENT & PLAN NOTE
Patient has known CKD 3 due to DM with baseline creatinine 1.5 - 1.6  UA is somewhat bland with only trace proteinuria  CT abd shows no hydro  Will avoid nephrotoxins, renally dose meds  Repeat renal function daily

## 2024-10-16 NOTE — ASSESSMENT & PLAN NOTE
No acute pathology noted on CT abd/pelvis   Suspect related to pancreatic mass based on location and timeline of pain   PT/OT input   Tylenol as needed

## 2024-10-16 NOTE — ASSESSMENT & PLAN NOTE
Difficulty with urination prior to admission.   Was started on levaquin 10/14 outpatient for UTI   UA today only notable for trace proteinuria  Urinary retention protocol  Patient has prostatomegaly - trial flomax

## 2024-10-16 NOTE — ASSESSMENT & PLAN NOTE
CT Abdomen pelvis showing pancreatic mass.  MRI Abdomen showing enlarged pancreatic head likely harboring an underlying infiltrative pancreatic head mass, intra/extrahepatic biliary dilatation and pancreatic ductal dilatation  GI Recommendations appreciated. EGD for UGIB and EUS with FNA for sampling of mass.    Recent Labs     10/15/24  0714 10/15/24  1746   AST 22 24   ALT 40 39   TBILI 0.29 0.31   ALKPHOS 174* 171*   INR  --  1.30*

## 2024-10-16 NOTE — PLAN OF CARE
Problem: Potential for Falls  Goal: Patient will remain free of falls  Description: INTERVENTIONS:  - Educate patient/family on patient safety including physical limitations  - Instruct patient to call for assistance with activity   - Consult OT/PT to assist with strengthening/mobility   - Keep Call bell within reach  - Keep bed low and locked with side rails adjusted as appropriate  - Keep care items and personal belongings within reach  - Initiate and maintain comfort rounds  - Make Fall Risk Sign visible to staff  - Offer Toileting every 2 Hours, in advance of need  - Initiate/Maintain bed alarm  - Obtain necessary fall risk management equipment: alarms  - Apply yellow socks and bracelet for high fall risk patients  - Consider moving patient to room near nurses station  Outcome: Progressing     Problem: SAFETY ADULT  Goal: Patient will remain free of falls  Description: INTERVENTIONS:  - Educate patient/family on patient safety including physical limitations  - Instruct patient to call for assistance with activity   - Consult OT/PT to assist with strengthening/mobility   - Keep Call bell within reach  - Keep bed low and locked with side rails adjusted as appropriate  - Keep care items and personal belongings within reach  - Initiate and maintain comfort rounds  - Make Fall Risk Sign visible to staff  - Offer Toileting every 2 Hours, in advance of need  - Initiate/Maintain bed alarm  - Obtain necessary fall risk management equipment: alarms  - Apply yellow socks and bracelet for high fall risk patients  - Consider moving patient to room near nurses station  Outcome: Progressing  Goal: Maintain or return to baseline ADL function  Description: INTERVENTIONS:  -  Assess patient's ability to carry out ADLs; assess patient's baseline for ADL function and identify physical deficits which impact ability to perform ADLs (bathing, care of mouth/teeth, toileting, grooming, dressing, etc.)  - Assess/evaluate cause of  self-care deficits   - Assess range of motion  - Assess patient's mobility; develop plan if impaired  - Assess patient's need for assistive devices and provide as appropriate  - Encourage maximum independence but intervene and supervise when necessary  - Involve family in performance of ADLs  - Assess for home care needs following discharge   - Consider OT consult to assist with ADL evaluation and planning for discharge  - Provide patient education as appropriate  Outcome: Progressing  Goal: Maintains/Returns to pre admission functional level  Description: INTERVENTIONS:  - Perform AM-PAC 6 Click Basic Mobility/ Daily Activity assessment daily.  - Set and communicate daily mobility goal to care team and patient/family/caregiver.   - Collaborate with rehabilitation services on mobility goals if consulted  - Perform Range of Motion 3 times a day.  - Reposition patient every 2 hours.  - Dangle patient 3 times a day  - Stand patient 3 times a day  - Ambulate patient 3 times a day  - Out of bed to chair 3 times a day   - Out of bed for meals 3 times a day  - Out of bed for toileting  - Record patient progress and toleration of activity level   Outcome: Progressing     Problem: HEMATOLOGIC - ADULT  Goal: Maintains hematologic stability  Description: INTERVENTIONS  - Assess for signs and symptoms of bleeding or hemorrhage  - Monitor labs  - Administer supportive blood products/factors as ordered and appropriate  Outcome: Progressing

## 2024-10-16 NOTE — ASSESSMENT & PLAN NOTE
Presents with black colored stools, malaise, weakness, pallor, epigastric discomfort   On baby aspirin daily  Routine labs outpatient showed hemoglobin of 4.5  Suspect anemia due to UGIB and chronic blood loss iso malignancy   S/p 2u prbc ordered in ER   Check hemoglobin after 1 unit - improved to 6  Continue to monitor hemoglobin q6, transfuse to keep > 7   On protonix drip   Hold aspirin and AC  Check iron panel (add on sample)  GI consult for further recommendations

## 2024-10-16 NOTE — ASSESSMENT & PLAN NOTE
Creatinine in may was 1.56 and now > 2  Patient was also receiving levaquin outpatient for UTI   UA results reviewed - no UTI, trace proteinuria  CT shows no hydro  Will hold on nephrotoxins at this time   Also with normal anion gap metabolic acidosis   If no improvement consider nephrology input

## 2024-10-17 ENCOUNTER — ANESTHESIA (INPATIENT)
Dept: GASTROENTEROLOGY | Facility: HOSPITAL | Age: 89
DRG: 378 | End: 2024-10-17
Payer: MEDICARE

## 2024-10-17 ENCOUNTER — APPOINTMENT (INPATIENT)
Dept: GASTROENTEROLOGY | Facility: HOSPITAL | Age: 89
DRG: 378 | End: 2024-10-17
Payer: MEDICARE

## 2024-10-17 ENCOUNTER — ANESTHESIA EVENT (INPATIENT)
Dept: GASTROENTEROLOGY | Facility: HOSPITAL | Age: 89
DRG: 378 | End: 2024-10-17
Payer: MEDICARE

## 2024-10-17 PROBLEM — N18.9 CKD (CHRONIC KIDNEY DISEASE): Status: ACTIVE | Noted: 2024-10-17

## 2024-10-17 LAB
ABO GROUP BLD BPU: NORMAL
ALBUMIN SERPL BCG-MCNC: 3.6 G/DL (ref 3.5–5)
ALP SERPL-CCNC: 122 U/L (ref 34–104)
ALT SERPL W P-5'-P-CCNC: 30 U/L (ref 7–52)
ANION GAP SERPL CALCULATED.3IONS-SCNC: 8 MMOL/L (ref 4–13)
AST SERPL W P-5'-P-CCNC: 24 U/L (ref 13–39)
BASOPHILS # BLD AUTO: 0.07 THOUSANDS/ΜL (ref 0–0.1)
BASOPHILS NFR BLD AUTO: 1 % (ref 0–1)
BILIRUB SERPL-MCNC: 0.49 MG/DL (ref 0.2–1)
BPU ID: NORMAL
BUN SERPL-MCNC: 48 MG/DL (ref 5–25)
CALCIUM SERPL-MCNC: 8.1 MG/DL (ref 8.4–10.2)
CHLORIDE SERPL-SCNC: 111 MMOL/L (ref 96–108)
CO2 SERPL-SCNC: 23 MMOL/L (ref 21–32)
CREAT SERPL-MCNC: 1.85 MG/DL (ref 0.6–1.3)
CROSSMATCH: NORMAL
EOSINOPHIL # BLD AUTO: 0.19 THOUSAND/ΜL (ref 0–0.61)
EOSINOPHIL NFR BLD AUTO: 2 % (ref 0–6)
ERYTHROCYTE [DISTWIDTH] IN BLOOD BY AUTOMATED COUNT: 22.5 % (ref 11.6–15.1)
GFR SERPL CREATININE-BSD FRML MDRD: 31 ML/MIN/1.73SQ M
GLUCOSE SERPL-MCNC: 160 MG/DL (ref 65–140)
GLUCOSE SERPL-MCNC: 161 MG/DL (ref 65–140)
GLUCOSE SERPL-MCNC: 178 MG/DL (ref 65–140)
GLUCOSE SERPL-MCNC: 194 MG/DL (ref 65–140)
GLUCOSE SERPL-MCNC: 219 MG/DL (ref 65–140)
HCT VFR BLD AUTO: 25 % (ref 36.5–49.3)
HGB BLD-MCNC: 7.8 G/DL (ref 12–17)
HGB BLD-MCNC: 8 G/DL (ref 12–17)
IMM GRANULOCYTES # BLD AUTO: 0.1 THOUSAND/UL (ref 0–0.2)
IMM GRANULOCYTES NFR BLD AUTO: 1 % (ref 0–2)
LYMPHOCYTES # BLD AUTO: 1.41 THOUSANDS/ΜL (ref 0.6–4.47)
LYMPHOCYTES NFR BLD AUTO: 13 % (ref 14–44)
MAGNESIUM SERPL-MCNC: 2.1 MG/DL (ref 1.9–2.7)
MCH RBC QN AUTO: 24.6 PG (ref 26.8–34.3)
MCHC RBC AUTO-ENTMCNC: 31.2 G/DL (ref 31.4–37.4)
MCV RBC AUTO: 79 FL (ref 82–98)
MONOCYTES # BLD AUTO: 1.03 THOUSAND/ΜL (ref 0.17–1.22)
MONOCYTES NFR BLD AUTO: 10 % (ref 4–12)
NEUTROPHILS # BLD AUTO: 7.78 THOUSANDS/ΜL (ref 1.85–7.62)
NEUTS SEG NFR BLD AUTO: 73 % (ref 43–75)
NRBC BLD AUTO-RTO: 0 /100 WBCS
PLATELET # BLD AUTO: 176 THOUSANDS/UL (ref 149–390)
PMV BLD AUTO: 10.9 FL (ref 8.9–12.7)
POTASSIUM SERPL-SCNC: 3.8 MMOL/L (ref 3.5–5.3)
PROT SERPL-MCNC: 5.9 G/DL (ref 6.4–8.4)
RBC # BLD AUTO: 3.17 MILLION/UL (ref 3.88–5.62)
SODIUM SERPL-SCNC: 142 MMOL/L (ref 135–147)
UNIT DISPENSE STATUS: NORMAL
UNIT PRODUCT CODE: NORMAL
UNIT PRODUCT VOLUME: 350 ML
UNIT RH: NORMAL
WBC # BLD AUTO: 10.58 THOUSAND/UL (ref 4.31–10.16)

## 2024-10-17 PROCEDURE — 88305 TISSUE EXAM BY PATHOLOGIST: CPT | Performed by: STUDENT IN AN ORGANIZED HEALTH CARE EDUCATION/TRAINING PROGRAM

## 2024-10-17 PROCEDURE — 82948 REAGENT STRIP/BLOOD GLUCOSE: CPT

## 2024-10-17 PROCEDURE — 0DB68ZX EXCISION OF STOMACH, VIA NATURAL OR ARTIFICIAL OPENING ENDOSCOPIC, DIAGNOSTIC: ICD-10-PCS | Performed by: INTERNAL MEDICINE

## 2024-10-17 PROCEDURE — 88342 IMHCHEM/IMCYTCHM 1ST ANTB: CPT | Performed by: STUDENT IN AN ORGANIZED HEALTH CARE EDUCATION/TRAINING PROGRAM

## 2024-10-17 PROCEDURE — 85018 HEMOGLOBIN: CPT | Performed by: STUDENT IN AN ORGANIZED HEALTH CARE EDUCATION/TRAINING PROGRAM

## 2024-10-17 PROCEDURE — 88172 CYTP DX EVAL FNA 1ST EA SITE: CPT | Performed by: STUDENT IN AN ORGANIZED HEALTH CARE EDUCATION/TRAINING PROGRAM

## 2024-10-17 PROCEDURE — 0F9G4ZX DRAINAGE OF PANCREAS, PERCUTANEOUS ENDOSCOPIC APPROACH, DIAGNOSTIC: ICD-10-PCS | Performed by: INTERNAL MEDICINE

## 2024-10-17 PROCEDURE — C1889 IMPLANT/INSERT DEVICE, NOC: HCPCS | Performed by: STUDENT IN AN ORGANIZED HEALTH CARE EDUCATION/TRAINING PROGRAM

## 2024-10-17 PROCEDURE — 88341 IMHCHEM/IMCYTCHM EA ADD ANTB: CPT | Performed by: STUDENT IN AN ORGANIZED HEALTH CARE EDUCATION/TRAINING PROGRAM

## 2024-10-17 PROCEDURE — 80053 COMPREHEN METABOLIC PANEL: CPT | Performed by: STUDENT IN AN ORGANIZED HEALTH CARE EDUCATION/TRAINING PROGRAM

## 2024-10-17 PROCEDURE — 99233 SBSQ HOSP IP/OBS HIGH 50: CPT | Performed by: STUDENT IN AN ORGANIZED HEALTH CARE EDUCATION/TRAINING PROGRAM

## 2024-10-17 PROCEDURE — 83735 ASSAY OF MAGNESIUM: CPT | Performed by: STUDENT IN AN ORGANIZED HEALTH CARE EDUCATION/TRAINING PROGRAM

## 2024-10-17 PROCEDURE — 85025 COMPLETE CBC W/AUTO DIFF WBC: CPT | Performed by: STUDENT IN AN ORGANIZED HEALTH CARE EDUCATION/TRAINING PROGRAM

## 2024-10-17 PROCEDURE — 43242 EGD US FINE NEEDLE BX/ASPIR: CPT | Performed by: INTERNAL MEDICINE

## 2024-10-17 PROCEDURE — 43239 EGD BIOPSY SINGLE/MULTIPLE: CPT | Performed by: INTERNAL MEDICINE

## 2024-10-17 PROCEDURE — 88360 TUMOR IMMUNOHISTOCHEM/MANUAL: CPT | Performed by: STUDENT IN AN ORGANIZED HEALTH CARE EDUCATION/TRAINING PROGRAM

## 2024-10-17 PROCEDURE — 88313 SPECIAL STAINS GROUP 2: CPT | Performed by: STUDENT IN AN ORGANIZED HEALTH CARE EDUCATION/TRAINING PROGRAM

## 2024-10-17 PROCEDURE — 99223 1ST HOSP IP/OBS HIGH 75: CPT | Performed by: INTERNAL MEDICINE

## 2024-10-17 RX ORDER — FENTANYL CITRATE 50 UG/ML
INJECTION, SOLUTION INTRAMUSCULAR; INTRAVENOUS AS NEEDED
Status: DISCONTINUED | OUTPATIENT
Start: 2024-10-17 | End: 2024-10-17

## 2024-10-17 RX ORDER — INSULIN LISPRO 100 [IU]/ML
1-5 INJECTION, SOLUTION INTRAVENOUS; SUBCUTANEOUS
Status: DISCONTINUED | OUTPATIENT
Start: 2024-10-17 | End: 2024-10-20 | Stop reason: HOSPADM

## 2024-10-17 RX ORDER — HYDRALAZINE HYDROCHLORIDE 20 MG/ML
10 INJECTION INTRAMUSCULAR; INTRAVENOUS ONCE
Status: COMPLETED | OUTPATIENT
Start: 2024-10-17 | End: 2024-10-17

## 2024-10-17 RX ORDER — SODIUM CHLORIDE, SODIUM GLUCONATE, SODIUM ACETATE, POTASSIUM CHLORIDE, MAGNESIUM CHLORIDE, SODIUM PHOSPHATE, DIBASIC, AND POTASSIUM PHOSPHATE .53; .5; .37; .037; .03; .012; .00082 G/100ML; G/100ML; G/100ML; G/100ML; G/100ML; G/100ML; G/100ML
75 INJECTION, SOLUTION INTRAVENOUS CONTINUOUS
Status: DISCONTINUED | OUTPATIENT
Start: 2024-10-17 | End: 2024-10-18

## 2024-10-17 RX ORDER — ONDANSETRON 2 MG/ML
4 INJECTION INTRAMUSCULAR; INTRAVENOUS EVERY 6 HOURS PRN
Status: DISCONTINUED | OUTPATIENT
Start: 2024-10-17 | End: 2024-10-20 | Stop reason: HOSPADM

## 2024-10-17 RX ORDER — SODIUM CHLORIDE 9 MG/ML
125 INJECTION, SOLUTION INTRAVENOUS CONTINUOUS
Status: DISCONTINUED | OUTPATIENT
Start: 2024-10-17 | End: 2024-10-17

## 2024-10-17 RX ORDER — LIDOCAINE HYDROCHLORIDE 20 MG/ML
INJECTION, SOLUTION EPIDURAL; INFILTRATION; INTRACAUDAL; PERINEURAL AS NEEDED
Status: DISCONTINUED | OUTPATIENT
Start: 2024-10-17 | End: 2024-10-17

## 2024-10-17 RX ORDER — PHENYLEPHRINE HCL IN 0.9% NACL 1 MG/10 ML
SYRINGE (ML) INTRAVENOUS AS NEEDED
Status: DISCONTINUED | OUTPATIENT
Start: 2024-10-17 | End: 2024-10-17

## 2024-10-17 RX ORDER — PROPOFOL 10 MG/ML
INJECTION, EMULSION INTRAVENOUS AS NEEDED
Status: DISCONTINUED | OUTPATIENT
Start: 2024-10-17 | End: 2024-10-17

## 2024-10-17 RX ORDER — HYDRALAZINE HYDROCHLORIDE 20 MG/ML
10 INJECTION INTRAMUSCULAR; INTRAVENOUS EVERY 6 HOURS PRN
Status: DISCONTINUED | OUTPATIENT
Start: 2024-10-17 | End: 2024-10-17

## 2024-10-17 RX ORDER — SODIUM CHLORIDE, SODIUM LACTATE, POTASSIUM CHLORIDE, CALCIUM CHLORIDE 600; 310; 30; 20 MG/100ML; MG/100ML; MG/100ML; MG/100ML
INJECTION, SOLUTION INTRAVENOUS CONTINUOUS PRN
Status: DISCONTINUED | OUTPATIENT
Start: 2024-10-17 | End: 2024-10-17

## 2024-10-17 RX ADMIN — INSULIN LISPRO 1 UNITS: 100 INJECTION, SOLUTION INTRAVENOUS; SUBCUTANEOUS at 17:30

## 2024-10-17 RX ADMIN — POLYETHYLENE GLYCOL 3350 17 G: 17 POWDER, FOR SOLUTION ORAL at 20:14

## 2024-10-17 RX ADMIN — SODIUM CHLORIDE, SODIUM GLUCONATE, SODIUM ACETATE, POTASSIUM CHLORIDE, MAGNESIUM CHLORIDE, SODIUM PHOSPHATE, DIBASIC, AND POTASSIUM PHOSPHATE 75 ML/HR: .53; .5; .37; .037; .03; .012; .00082 INJECTION, SOLUTION INTRAVENOUS at 20:13

## 2024-10-17 RX ADMIN — PROPOFOL 120 MG: 10 INJECTION, EMULSION INTRAVENOUS at 14:40

## 2024-10-17 RX ADMIN — HYDRALAZINE HYDROCHLORIDE 10 MG: 20 INJECTION, SOLUTION INTRAMUSCULAR; INTRAVENOUS at 20:09

## 2024-10-17 RX ADMIN — SODIUM CHLORIDE, SODIUM GLUCONATE, SODIUM ACETATE, POTASSIUM CHLORIDE, MAGNESIUM CHLORIDE, SODIUM PHOSPHATE, DIBASIC, AND POTASSIUM PHOSPHATE 100 ML/HR: .53; .5; .37; .037; .03; .012; .00082 INJECTION, SOLUTION INTRAVENOUS at 03:14

## 2024-10-17 RX ADMIN — SODIUM CHLORIDE 8 MG/HR: 9 INJECTION, SOLUTION INTRAVENOUS at 18:32

## 2024-10-17 RX ADMIN — FENTANYL CITRATE 25 MCG: 50 INJECTION INTRAMUSCULAR; INTRAVENOUS at 14:54

## 2024-10-17 RX ADMIN — ACETAMINOPHEN 325MG 975 MG: 325 TABLET ORAL at 19:38

## 2024-10-17 RX ADMIN — LIDOCAINE HYDROCHLORIDE 100 MG: 20 INJECTION, SOLUTION EPIDURAL; INFILTRATION; INTRACAUDAL at 14:40

## 2024-10-17 RX ADMIN — Medication 200 MCG: at 15:18

## 2024-10-17 RX ADMIN — Medication 12.5 MG: at 17:30

## 2024-10-17 RX ADMIN — PROPOFOL 140 MCG/KG/MIN: 10 INJECTION, EMULSION INTRAVENOUS at 14:42

## 2024-10-17 RX ADMIN — SODIUM CHLORIDE, SODIUM LACTATE, POTASSIUM CHLORIDE, AND CALCIUM CHLORIDE: .6; .31; .03; .02 INJECTION, SOLUTION INTRAVENOUS at 14:34

## 2024-10-17 RX ADMIN — INSULIN LISPRO 3 UNITS: 100 INJECTION, SOLUTION INTRAVENOUS; SUBCUTANEOUS at 00:07

## 2024-10-17 RX ADMIN — SODIUM CHLORIDE 8 MG/HR: 9 INJECTION, SOLUTION INTRAVENOUS at 03:13

## 2024-10-17 NOTE — CONSULTS
Consultation - Nephrology   Jose ZULMA Zamora 88 y.o. male MRN: [id number]  Unit/Bed#: [place] [address] -[id number] Encounter: [id number]    ASSESSMENT and PLAN:  Assessment & Plan  PAWEL (acute kidney injury) ([facility])  Admitted with a creatinine of 2.44 on 10/15, increased to 2.69.  PAWEL in the setting of symptomatic severe anemia with a hemoglobin of 4.7 on admission as well as loss of autoregulation due to ARB use as well as diuretic use.  Kidney function improving,  creatinine down to 1.85.  Keep holding olmesartan with HCTZ.  Avoid relative hypotension.  Follow daily labs.    CKD (chronic kidney disease)  Lab Results   Component Value Date    EGFR 31 10/17/2024    EGFR 24 10/16/2024    EGFR 20 10/15/2024    CREATININE 1.85 (H) 10/17/2024    CREATININE 2.33 (H) 10/16/2024    CREATININE 2.69 (H) 10/15/2024   Baseline creatinine around 1.4-1.6 back in 2016.  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    UGIB (upper gastrointestinal bleed)  Present with melena, weakness, symptomatic anemia with a hemoglobin of 4.5 status post blood transfusion.  GI on board, plan for EGD and EUS today  DMII (diabetes mellitus, type 2) ([facility])  Lab Results   Component Value Date    HGBA1C 7.1 (H) 10/15/2024       Recent Labs     10/16/24  0033 10/16/24  0601 10/16/24  1207 10/17/24  0549   POCGLU 196* 172* 262* 161*       Blood Sugar Average: Last 72 hrs:  (P) 197.75    Hypertension  Blood pressure is stable, borderline hypotension.  Keep holding olmesartan with HCTZ given PAWEL  PAD (peripheral artery disease) ([facility])    Urinary retention  Follow urinary retention protocol  Pancreatic mass  Plan for EGD and EUS for sampling of mass      SUMMARY OF RECOMMENDATIONS:  Keep holding olmesartan with HCTZ.  Monitor H&H recommend blood transfusion for hemoglobin less than 7.  Avoid relative hypotension.  Kidney function improving.  Follow daily labs      HISTORY OF PRESENT ILLNESS:  Requesting  Physician: Jose Dozier MD  Reason for Consult: PAWEL on top of CKD    Jose Zamora is a 88 y.o. male who was admitted to [facility] after presenting with normal blood test showing hemoglobin of 4.7 per report weakness, abdominal pain. A renal consultation is requested today for assistance in the management of PAWEL on top of CKD.  Patient with history of hypertension, diabetes, hyperlipidemia, CKD with previous creatinine around 1.4-1.6 back in 2016 who presents to the hospital with abnormal labs showing a hemoglobin of 4.7 and reported weakness and abdominal pain.  During my evaluation patient in general is feeling better, patient's daughter is at bedside.  Currently denies any chest pain or shortness of breath and some nasal congestion and sinus problems reports abdominal pain improving, denies nausea, no vomiting, no diarrhea  Denies prior NSAID used      PAST MEDICAL HISTORY:  Past Medical History:   Diagnosis Date    Allergic     Anemia     Arthritis     Diabetes mellitus (HCC)     Disease of thyroid gland     Hyperlipemia     Hypertension        PAST SURGICAL HISTORY:  Past Surgical History:   Procedure Laterality Date    COLONOSCOPY      PROSTATE BIOPSY      needle,  resolved may 2005       SOCIAL HISTORY:  Social History     Substance and Sexual Activity   Alcohol Use Not Currently    Comment: social/rarely     Social History     Substance and Sexual Activity   Drug Use Never     Social History     Tobacco Use   Smoking Status Former    Types: Cigarettes   Smokeless Tobacco Never       FAMILY HISTORY:  Family History   Problem Relation Age of Onset    No Known Problems Mother        ALLERGIES:  Allergies   Allergen Reactions    Lisinopril     Candesartan Rash    Penicillins Rash       MEDICATIONS:    Current Facility-Administered Medications:     acetaminophen (TYLENOL) tablet 975 mg, 975 mg, Oral, Q8H PRN, Wanda Bauer MD    [Held by provider] atorvastatin (LIPITOR) tablet 20 mg, 20  mg, Oral, Daily With Dinner, Wanda Bauer MD    insulin lispro (HumALOG/ADMELOG) 100 units/mL subcutaneous injection 1-6 Units, 1-6 Units, Subcutaneous, Q6H EMMANUELLE, 3 Units at 10/17/24 0007 **AND** Fingerstick Glucose (POCT), , , Q6H, Wanda Bauer MD    levothyroxine tablet 50 mcg, 50 mcg, Oral, Daily Before Breakfast, Wanda Bauer MD, 50 mcg at 10/16/24 0613    metoprolol tartrate (LOPRESSOR) partial tablet 12.5 mg, 12.5 mg, Oral, BID, Wanda Bauer MD, 12.5 mg at 10/16/24 1728    pantoprazole (PROTONIX) 80 mg in sodium chloride 0.9 % 100 mL infusion, 8 mg/hr, Intravenous, Continuous, Avis Baum DO, Last Rate: 10 mL/hr at 10/17/24 0313, 8 mg/hr at 10/17/24 0313    polyethylene glycol (MIRALAX) packet 17 g, 17 g, Oral, BID, Pool Chiang MD, 17 g at 10/16/24 0912    REVIEW OF SYSTEMS:  All the systems were reviewed and were negative except as documented on the HPI.    PHYSICAL EXAM:  Current Weight: Weight - Scale: 70.6 kg (155 lb 10.3 oz)  First Weight: Weight - Scale: 70.6 kg (155 lb 10.3 oz)  Vitals:    10/16/24 1726 10/16/24 2124 10/17/24 0716 10/17/24 1040   BP: 160/73 152/70 (!) 172/90 (!) 172/88   BP Location:       Pulse: 102 94 (!) 114 (!) 120   Resp: 20 20 20    Temp: 98.2 °F (36.8 °C) 98.6 °F (37 °C) 98.2 °F (36.8 °C)    TempSrc:   Oral    SpO2:  97% 98% 98%   Weight:       Height:           Intake/Output Summary (Last 24 hours) at 10/17/2024 1128  Last data filed at 10/17/2024 1101  Gross per 24 hour   Intake 912.5 ml   Output 1850 ml   Net -937.5 ml       General: conscious, cooperative, in not acute distress  Eyes: conjunctivae pale, anicteric sclerae  ENT: lips and mucous membranes moist  Neck: supple, no JVD  Chest: clear breath sounds bilateral, no crackles, ronchus or wheezings  CVS: distinct S1 & S2, normal rate, regular rhythm  Abdomen: soft, non-tender, non-distended, normoactive bowel sounds  Extremities: no edema of both legs  Skin: no rash  Neuro: awake, alert,  "oriented      Invasive Devices:        Lab Results:   Results from last 7 days   Lab Units 10/17/24  0525 10/17/24  0008 10/16/24  1159 10/16/24  0603 10/16/24  0012 10/15/24  1746 10/15/24  0714   WBC Thousand/uL 10.58*  --   --  9.98  --  11.57* 13.05*   HEMOGLOBIN g/dL 7.8* 8.0* 7.0* 6.7*   < > 4.5* 4.7*   HEMATOCRIT % 25.0*  --   --  21.3*  --  15.3* 16.0*   PLATELETS Thousands/uL 176  --   --  184  --  242 266   SODIUM mmol/L 142  --   --  141  --  135 141   POTASSIUM mmol/L 3.8  --   --  4.2  --  5.0 5.2   CHLORIDE mmol/L 111*  --   --  112*  --  106 108   CO2 mmol/L 23  --   --  20*  --  17* 18*   BUN mg/dL 48*  --   --  63*  --  73* 69*   CREATININE mg/dL 1.85*  --   --  2.33*  --  2.69* 2.44*   CALCIUM mg/dL 8.1*  --   --  8.2*  --  8.1* 8.1*   MAGNESIUM mg/dL 2.1  --   --   --   --   --   --    ALK PHOS U/L 122*  --   --   --   --  171* 174*   ALT U/L 30  --   --   --   --  39 40   AST U/L 24  --   --   --   --  24 22    < > = values in this interval not displayed.       Other Studies:   CT scan of the abdomen and pelvis without contrast on 10/15 pancreatic mass with evidence of secondary bile duct obstruction most commonly adenocarcinoma.    Urinalysis on 10/15, no RBCs, 1-2 WBCs, trace protein      Portions of the record may have been created with voice recognition software. Occasional wrong word or \"sound a like\" substitutions may have occurred due to the inherent limitations of voice recognition software. Read the chart carefully and recognize, using context, where substitutions have occurred.If you have any questions, please contact the dictating provider.  "

## 2024-10-17 NOTE — PLAN OF CARE
Problem: Potential for Falls  Goal: Patient will remain free of falls  Description: INTERVENTIONS:  - Educate patient/family on patient safety including physical limitations  - Instruct patient to call for assistance with activity   - Consult OT/PT to assist with strengthening/mobility   - Keep Call bell within reach  - Keep bed low and locked with side rails adjusted as appropriate  - Keep care items and personal belongings within reach  - Initiate and maintain comfort rounds  - Make Fall Risk Sign visible to staff  - Offer Toileting every Hours, in advance of need  - Initiate/Maintain alarm  - Obtain necessary fall risk management equipment:  - Apply yellow socks and bracelet for high fall risk patients  - Consider moving patient to room near nurses station  Outcome: Progressing     Problem: SAFETY ADULT  Goal: Patient will remain free of falls  Description: INTERVENTIONS:  - Educate patient/family on patient safety including physical limitations  - Instruct patient to call for assistance with activity   - Consult OT/PT to assist with strengthening/mobility   - Keep Call bell within reach  - Keep bed low and locked with side rails adjusted as appropriate  - Keep care items and personal belongings within reach  - Initiate and maintain comfort rounds  - Make Fall Risk Sign visible to staff  - Offer Toileting every  Hours, in advance of need  - Initiate/Maintainalarm  - Obtain necessary fall risk management equipment:   - Apply yellow socks and bracelet for high fall risk patients  - Consider moving patient to room near nurses station  Outcome: Progressing  Goal: Maintain or return to baseline ADL function  Description: INTERVENTIONS:  -  Assess patient's ability to carry out ADLs; assess patient's baseline for ADL function and identify physical deficits which impact ability to perform ADLs (bathing, care of mouth/teeth, toileting, grooming, dressing, etc.)  - Assess/evaluate cause of self-care deficits   - Assess  range of motion  - Assess patient's mobility; develop plan if impaired  - Assess patient's need for assistive devices and provide as appropriate  - Encourage maximum independence but intervene and supervise when necessary  - Involve family in performance of ADLs  - Assess for home care needs following discharge   - Consider OT consult to assist with ADL evaluation and planning for discharge  - Provide patient education as appropriate  Outcome: Progressing  Goal: Maintains/Returns to pre admission functional level  Description: INTERVENTIONS:  - Perform AM-PAC 6 Click Basic Mobility/ Daily Activity assessment daily.  - Set and communicate daily mobility goal to care team and patient/family/caregiver.   - Collaborate with rehabilitation services on mobility goals if consulted  - Perform Range of Motion times a day.  - Reposition patient every  hours.  - Dangle patient  times a day  - Stand patient times a day  - Ambulate patient  times a day  - Out of bed to chair  times a day   - Out of bed for meals  times a day  - Out of bed for toileting  - Record patient progress and toleration of activity level   Outcome: Progressing     Problem: HEMATOLOGIC - ADULT  Goal: Maintains hematologic stability  Description: INTERVENTIONS  - Assess for signs and symptoms of bleeding or hemorrhage  - Monitor labs  - Administer supportive blood products/factors as ordered and appropriate  Outcome: Progressing

## 2024-10-17 NOTE — ASSESSMENT & PLAN NOTE
Lab Results   Component Value Date    HGBA1C 7.1 (H) 10/15/2024       Recent Labs     10/16/24  0033 10/16/24  0601 10/16/24  1207 10/17/24  0549   POCGLU 196* 172* 262* 161*       Blood Sugar Average: Last 72 hrs:  (P) 197.75

## 2024-10-17 NOTE — ASSESSMENT & PLAN NOTE
Present with melena, weakness, symptomatic anemia with a hemoglobin of 4.5 status post blood transfusion.  GI on board, plan for EGD and EUS today

## 2024-10-17 NOTE — ANESTHESIA POSTPROCEDURE EVALUATION
Post-Op Assessment Note    CV Status:  Stable    Pain management: adequate       Mental Status:  Alert and awake   Hydration Status:  Euvolemic   PONV Controlled:  Controlled   Airway Patency:  Patent  Two or more mitigation strategies used for obstructive sleep apnea   Post Op Vitals Reviewed: Yes    No anethesia notable event occurred.    Staff: Anesthesiologist           Last Filed PACU Vitals:  Vitals Value Taken Time   Temp 97.7 °F (36.5 °C) 10/17/24 1539   Pulse 95 10/17/24 1605   /69 10/17/24 1605   Resp 18 10/17/24 1605   SpO2 94 % 10/17/24 1605       Modified Gen:  Activity: 2 (10/17/2024  4:05 PM)  Respiration: 2 (10/17/2024  4:05 PM)  Circulation: 2 (10/17/2024  4:05 PM)  Consciousness: 2 (10/17/2024  4:05 PM)  Oxygen Saturation: 2 (10/17/2024  4:05 PM)  Modified Gen Score: 10 (10/17/2024  4:05 PM)

## 2024-10-17 NOTE — ASSESSMENT & PLAN NOTE
CT Abdomen pelvis showing pancreatic mass.  MRI Abdomen showing enlarged pancreatic head likely harboring an underlying infiltrative pancreatic head mass, intra/extrahepatic biliary dilatation and pancreatic ductal dilatation  GI Recommendations appreciated. EGD for UGIB and EUS with FNA for sampling of mass.  Spoke to our patient and his daughter about our concerns for malignancy.  Awaiting further guidance from GI post-procedure    Recent Labs     10/15/24  0714 10/15/24  1746 10/17/24  0525   AST 22 24 24   ALT 40 39 30   TBILI 0.29 0.31 0.49   ALKPHOS 174* 171* 122*   INR  --  1.30*  --

## 2024-10-17 NOTE — PLAN OF CARE
Problem: HEMATOLOGIC - ADULT  Goal: Maintains hematologic stability  Description: INTERVENTIONS  - Assess for signs and symptoms of bleeding or hemorrhage  - Monitor labs  - Administer supportive blood products/factors as ordered and appropriate  Outcome: Progressing     Problem: Potential for Falls  Goal: Patient will remain free of falls  Description: INTERVENTIONS:  - Educate patient/family on patient safety including physical limitations  - Instruct patient to call for assistance with activity   - Consult OT/PT to assist with strengthening/mobility   - Keep Call bell within reach  - Keep bed low and locked with side rails adjusted as appropriate  - Keep care items and personal belongings within reach  - Initiate and maintain comfort rounds  - Make Fall Risk Sign visible to staff  - Offer Toileting every  Hours, in advance of need  - Initiate/Maintain alarm  - Obtain necessary fall risk management equipment:   - Apply yellow socks and bracelet for high fall risk patients  - Consider moving patient to room near nurses station  Outcome: Progressing

## 2024-10-17 NOTE — ASSESSMENT & PLAN NOTE
Admitted with a creatinine of 2.44 on 10/15, increased to 2.69.  PAWEL in the setting of symptomatic severe anemia with a hemoglobin of 4.7 on admission as well as loss of autoregulation due to ARB use as well as diuretic use.  Kidney function improving,  creatinine down to 1.85.  Keep holding olmesartan with HCTZ.  Avoid relative hypotension.  Follow daily labs.

## 2024-10-17 NOTE — ASSESSMENT & PLAN NOTE
88 year old male presented with melena, weakness, and epigastric discomfort. He was sent in her due to outpatient labs showing a hemoglobin of 4.5. Concern for potential GI bleed.  Aspirin on hold  Received 3U pRBC  Continue PPI Drip  GI recommendations appreciated. For EGD and EUS today    Results from last 7 days   Lab Units 10/17/24  0525 10/17/24  0008 10/16/24  1159 10/16/24  0603 10/16/24  0012 10/15/24  1746   HEMOGLOBIN g/dL 7.8* 8.0* 7.0* 6.7* 6.0* 4.5*   MCV fL 79*  --   --  75*  --  67*   RDW % 22.5*  --   --  22.8*  --  16.3*   IRON ug/dL  --   --   --   --   --  64   TIBC ug/dL  --   --   --   --   --  271   FERRITIN ng/mL  --   --   --   --   --  25

## 2024-10-17 NOTE — ANESTHESIA POSTPROCEDURE EVALUATION
Post-Op Assessment Note    CV Status:  Stable  Pain Score: 0    Pain management: adequate       Mental Status:  Sleepy and arousable   Hydration Status:  Euvolemic and stable   PONV Controlled:  Controlled   Airway Patency:  Patent and adequate     Post Op Vitals Reviewed: Yes    No anethesia notable event occurred.    Staff: Anesthesiologist, CRNA           Last Filed PACU Vitals:  Vitals Value Taken Time   Temp 97.7 °F (36.5 °C) 10/17/24 1539   Pulse 85 10/17/24 1539   /60 10/17/24 1539   Resp 18 10/17/24 1539   SpO2 98 % 10/17/24 1539       Modified Gen:  Activity: 0 (10/17/2024  3:39 PM)  Respiration: 2 (10/17/2024  3:39 PM)  Circulation: 1 (10/17/2024  3:39 PM)  Consciousness: 0 (10/17/2024  3:39 PM)  Oxygen Saturation: 2 (10/17/2024  3:39 PM)  Modified Gen Score: 5 (10/17/2024  3:39 PM)

## 2024-10-17 NOTE — ASSESSMENT & PLAN NOTE
Acute on superimposed chronic kidney disease  [place], urinary retention protocol  Nephrology consulted, Maintain IV hydration.  Depending on Gi's EUS today, might require staging / contrast administration    Recent Labs     10/15/24  1746 10/16/24  0603 10/17/24  0525   BUN 73* 63* 48*   CREATININE 2.69* 2.33* 1.85*   EGFR 20 24 31       Results from last 7 days   Lab Units 10/15/24  0714   BLOOD UA  Negative   PROTEIN UA mg/dl Trace*

## 2024-10-17 NOTE — PROGRESS NOTES
Progress Note - Hospitalist   Name: Jose Zamora 88 y.o. male I MRN: [id number]  Unit/Bed#: [place] [address] -01 I Date of Admission: 10/15/2024   Date of Service: 10/17/2024 I Hospital Day: 2    Assessment & Plan  UGIB (upper gastrointestinal bleed)  88 year old male presented with melena, weakness, and epigastric discomfort. He was sent in her due to outpatient labs showing a hemoglobin of 4.5. Concern for potential GI bleed.  Aspirin on hold  Received 3U pRBC  Continue PPI Drip  GI recommendations appreciated. For EGD and EUS today    Results from last 7 days   Lab Units 10/17/24  0525 10/17/24  0008 10/16/24  1159 10/16/24  0603 10/16/24  0012 10/15/24  1746   HEMOGLOBIN g/dL 7.8* 8.0* 7.0* 6.7* 6.0* 4.5*   MCV fL 79*  --   --  75*  --  67*   RDW % 22.5*  --   --  22.8*  --  16.3*   IRON ug/dL  --   --   --   --   --  64   TIBC ug/dL  --   --   --   --   --  271   FERRITIN ng/mL  --   --   --   --   --  25       Urinary retention  Difficulty with urination prior to admission.   Was started on levaquin 10/14 outpatient for UTI   UA today only notable for trace proteinuria  Urinary retention protocol  Patient has prostatomegaly - trial flomax  PAWEL (acute kidney injury) (HCC)  Acute on superimposed chronic kidney disease  Isolyte, urinary retention protocol  Nephrology consulted, Maintain IV hydration.  Depending on Gi's EUS today, might require staging / contrast administration    Recent Labs     10/15/24  1746 10/16/24  0603 10/17/24  0525   BUN 73* 63* 48*   CREATININE 2.69* 2.33* 1.85*   EGFR 20 24 31       Results from last 7 days   Lab Units 10/15/24  0714   BLOOD UA  Negative   PROTEIN UA mg/dl Trace*     Pancreatic mass  CT Abdomen pelvis showing pancreatic mass.  MRI Abdomen showing enlarged pancreatic head likely harboring an underlying infiltrative pancreatic head mass, intra/extrahepatic biliary dilatation and pancreatic ductal dilatation  GI Recommendations appreciated. EGD for UGIB and EUS with FNA for  sampling of mass.  Spoke to our patient and his daughter about our concerns for malignancy.  Awaiting further guidance from GI post-procedure    Recent Labs     10/15/24  0714 10/15/24  1746 10/17/24  0525   AST 22 24 24   ALT 40 39 30   TBILI 0.29 0.31 0.49   ALKPHOS 174* 171* 122*   INR  --  1.30*  --      DMII (diabetes mellitus, type 2) (HCC)  Lab Results   Component Value Date    HGBA1C 7.1 (H) 10/15/2024     Recent Labs     10/16/24  0033 10/16/24  0601 10/16/24  1207 10/17/24  0549   POCGLU 196* 172* 262* 161*       Blood Sugar Average: Last 72 hrs:  (P) 197.75    Sliding scale  Hyperlipidemia  Lipitor held on admission  PAD (peripheral artery disease) (HCC)  Aspirin on hold due to blood loss.   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   PT/OT    VTE Pharmacologic Prophylaxis:   c/I due to anemia    Mobility:   Basic Mobility Inpatient Raw Score: 20  -[facility] Goal: 6: Walk 10 steps or more  -HL Achieved: 1: Laying in bed    Discussions with Specialists or Other Care Team Provider: gi    Education and Discussions with Family / Patient: patient, daughter    Current Length of Stay: 2 day(s)  Current Patient Status: Inpatient   Certification Statement: The patient will continue to require additional inpatient hospital stay due to egd, eus, hemoglobin monitoring  Discharge Plan: >48 hours    Code Status: Level 1 - Full Code    Subjective   Patient seen and examined.  I discussed extensively with our patient and her daughter the ramifications of his current imaging findings that this might potentially be malignant in nature.  They are aware that we still have yet to obtain a biopsy to confirm the suspicions.  Patient currently denies any chest pain, shortness  of breath, abdominal pain, fever, or chills at this time.    Objective   Vitals:   Temp (24hrs), Av.3 °F (36.8 °C), Min:98 °F (36.7 °C), Max:98.6 °F (37 °C)    Temp:  [98 °F (36.7 °C)-98.6 °F (37 °C)] 98.2 °F (36.8 °C)  HR:  [] 114  Resp:  [17-20] 20  BP: (137-172)/(64-90) 172/90  SpO2:  [97 %-100 %] 98 %  Body mass index is 22.98 kg/m².     Input and Output Summary (last 24 hours):     Intake/Output Summary (Last 24 hours) at 10/17/2024 1037  Last data filed at 10/17/2024 1016  Gross per 24 hour   Intake 912.5 ml   Output 1550 ml   Net -637.5 ml       Physical Exam  Vitals reviewed.   Constitutional:       General: He is not in acute distress.  HENT:      Head: Normocephalic.      Nose: Nose normal.      Mouth/Throat:      Mouth: Mucous membranes are moist.   Eyes:      General: No scleral icterus.  Cardiovascular:      Rate and Rhythm: Normal rate and regular rhythm.   Pulmonary:      Effort: Pulmonary effort is normal. No respiratory distress.      Breath sounds: No wheezing or rales.   Abdominal:      General: There is no distension.      Palpations: Abdomen is soft.      Tenderness: There is no abdominal tenderness.   Skin:     General: Skin is warm.   Neurological:      Mental Status: He is alert.   Psychiatric:         Mood and Affect: Mood normal.         Behavior: Behavior normal.       Lines/Drains:              Lab Results: I have reviewed the following results:   Results from last 7 days   Lab Units 10/17/24  0525 10/17/24  0008 10/16/24  1159 10/16/24  0603 10/16/24  0012 10/15/24  1746   WBC Thousand/uL 10.58*  --   --  9.98  --  11.57*   HEMOGLOBIN g/dL 7.8* 8.0* 7.0* 6.7*   < > 4.5*   PLATELETS Thousands/uL 176  --   --  184  --  242   MCV fL 79*  --   --  75*  --  67*   INR   --   --   --   --   --  1.30*    < > = values in this interval not displayed.     Results from last 7 days   Lab Units 10/17/24  0525 10/16/24  0603 10/15/24  1746 10/15/24  0714   SODIUM mmol/L 142 141 135 141    POTASSIUM mmol/L 3.8 4.2 5.0 5.2   CHLORIDE mmol/L 111* 112* 106 108   CO2 mmol/L 23 20* 17* 18*   ANION GAP mmol/L 8 9 12 15*   BUN mg/dL 48* 63* 73* 69*   CREATININE mg/dL 1.85* 2.33* 2.69* 2.44*   CALCIUM mg/dL 8.1* 8.2* 8.1* 8.1*   ALBUMIN g/dL 3.6  --  4.0 3.7   TOTAL BILIRUBIN mg/dL 0.49  --  0.31 0.29   ALK PHOS U/L 122*  --  171* 174*   ALT U/L 30  --  39 40   AST U/L 24  --  24 22   EGFR ml/min/1.73sq m 31 24 20 22   GLUCOSE RANDOM mg/dL 160* 184* 346*  --      Results from last 7 days   Lab Units 10/17/24  0525   MAGNESIUM mg/dL 2.1                      Results from last 7 days   Lab Units 10/17/24  0549 10/16/24  1207 10/16/24  0601 10/16/24  0033   POC GLUCOSE mg/dl 161* 262* 172* 196*     Results from last 7 days   Lab Units 10/15/24  0714   HEMOGLOBIN A1C % 7.1*     Results from last 7 days   Lab Units 10/15/24  0714   TSH 3RD GENERATON uIU/mL 5.865*   FREE T4 ng/dL 1.10       Recent Cultures (last 7 days):         Imaging:  Reviewed radiology reports from this admission: ct a/p, mri abdomen    Last 24 Hours Medication List:     Current Facility-Administered Medications:     acetaminophen (TYLENOL) tablet 975 mg, Q8H PRN    [Held by provider] atorvastatin (LIPITOR) tablet 20 mg, Daily With Dinner    insulin lispro (HumALOG/ADMELOG) 100 units/mL subcutaneous injection 1-6 Units, Q6H EMMANUELLE **AND** Fingerstick Glucose (POCT), Q6H    levothyroxine tablet 50 mcg, Daily Before Breakfast    metoprolol tartrate (LOPRESSOR) partial tablet 12.5 mg, BID    pantoprazole (PROTONIX) 80 mg in sodium chloride 0.9 % 100 mL infusion, Continuous, Last Rate: 8 mg/hr (10/17/24 0313)    polyethylene glycol (MIRALAX) packet 17 g, BID    Administrative Statements   Today, Patient Was Seen By: Jose Dozier MD  I have spent a total time of 45 minutes in caring for this patient on the day of the visit/encounter including Diagnostic results, Risks and benefits of tx options, Patient and family education, Impressions,  Counseling / Coordination of care, Documenting in the medical record, Obtaining or reviewing history  , and Communicating with other healthcare professionals .    **Please Note: This note may have been constructed using a voice recognition system.**

## 2024-10-17 NOTE — ASSESSMENT & PLAN NOTE
Lab Results   Component Value Date    HGBA1C 7.1 (H) 10/15/2024     Recent Labs     10/16/24  0033 10/16/24  0601 10/16/24  1207 10/17/24  0549   POCGLU 196* 172* 262* 161*       Blood Sugar Average: Last 72 hrs:  (P) 197.75    Sliding scale

## 2024-10-17 NOTE — ANESTHESIA PREPROCEDURE EVALUATION
Procedure:  EGD  ENDOSCOPIC ULTRASOUND (UPPER)    Relevant Problems   ANESTHESIA (within normal limits)      CARDIO   (+) Atherosclerotic heart disease of native coronary artery without angina pectoris   (+) Hyperlipidemia   (+) Hypertension      ENDO   (+) DMII (diabetes mellitus, type 2) (HCC)   (+) Hypothyroidism   (+) 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)      GI/HEPATIC   (+) UGIB (upper gastrointestinal bleed)      /RENAL   (+) PAWEL (acute kidney injury) (HCC)   (+) Abnormal prostate exam   (+) CKD (chronic kidney disease)      HEMATOLOGY   (+) Anemia   (+) Beta thalassemia minor      MUSCULOSKELETAL   (+) Acute bilateral thoracic back pain   (+) Localized osteoarthritis of right knee   (+) Lower back pain   (+) Lumbar strain   (+) Strain of calf muscle      Urinary   (+) Urinary retention      Other   (+) Pancreatic mass   (+) Weight loss        Physical Exam    Airway    Mallampati score: II         Dental       Cardiovascular  Cardiovascular exam normal    Pulmonary  Pulmonary exam normal Breath sounds clear to auscultation    Other Findings        Anesthesia Plan  ASA Score- 3     Anesthesia Type- IV sedation with anesthesia with ASA Monitors.         Additional Monitors:     Airway Plan:            Plan Factors-    Chart reviewed.   Existing labs reviewed. Patient summary reviewed.    Patient is not a current smoker.              Induction- intravenous.    Postoperative Plan-     Perioperative Resuscitation Plan - Level 1 - Full Code.       Informed Consent- Anesthetic plan and risks discussed with patient.

## 2024-10-18 LAB
ALBUMIN SERPL BCG-MCNC: 3.2 G/DL (ref 3.5–5)
ALP SERPL-CCNC: 107 U/L (ref 34–104)
ALT SERPL W P-5'-P-CCNC: 23 U/L (ref 7–52)
ANION GAP SERPL CALCULATED.3IONS-SCNC: 9 MMOL/L (ref 4–13)
AST SERPL W P-5'-P-CCNC: 16 U/L (ref 13–39)
BILIRUB SERPL-MCNC: 0.49 MG/DL (ref 0.2–1)
BUN SERPL-MCNC: 46 MG/DL (ref 5–25)
CALCIUM ALBUM COR SERPL-MCNC: 8.2 MG/DL (ref 8.3–10.1)
CALCIUM SERPL-MCNC: 7.6 MG/DL (ref 8.4–10.2)
CHLORIDE SERPL-SCNC: 110 MMOL/L (ref 96–108)
CO2 SERPL-SCNC: 21 MMOL/L (ref 21–32)
CREAT SERPL-MCNC: 1.86 MG/DL (ref 0.6–1.3)
DME PARACHUTE DELIVERY DATE REQUESTED: NORMAL
DME PARACHUTE DELIVERY NOTE: NORMAL
DME PARACHUTE ITEM DESCRIPTION: NORMAL
DME PARACHUTE ORDER STATUS: NORMAL
DME PARACHUTE SUPPLIER NAME: NORMAL
DME PARACHUTE SUPPLIER PHONE: NORMAL
ERYTHROCYTE [DISTWIDTH] IN BLOOD BY AUTOMATED COUNT: 23.1 % (ref 11.6–15.1)
GFR SERPL CREATININE-BSD FRML MDRD: 31 ML/MIN/1.73SQ M
GLUCOSE SERPL-MCNC: 160 MG/DL (ref 65–140)
GLUCOSE SERPL-MCNC: 163 MG/DL (ref 65–140)
GLUCOSE SERPL-MCNC: 173 MG/DL (ref 65–140)
GLUCOSE SERPL-MCNC: 180 MG/DL (ref 65–140)
GLUCOSE SERPL-MCNC: 182 MG/DL (ref 65–140)
GLUCOSE SERPL-MCNC: 245 MG/DL (ref 65–140)
HCT VFR BLD AUTO: 22.3 % (ref 36.5–49.3)
HGB BLD-MCNC: 7.1 G/DL (ref 12–17)
INR PPP: 1.25 (ref 0.85–1.19)
MAGNESIUM SERPL-MCNC: 1.8 MG/DL (ref 1.9–2.7)
MCH RBC QN AUTO: 24.5 PG (ref 26.8–34.3)
MCHC RBC AUTO-ENTMCNC: 31.8 G/DL (ref 31.4–37.4)
MCV RBC AUTO: 77 FL (ref 82–98)
PLATELET # BLD AUTO: 180 THOUSANDS/UL (ref 149–390)
PMV BLD AUTO: 10.9 FL (ref 8.9–12.7)
POTASSIUM SERPL-SCNC: 3.7 MMOL/L (ref 3.5–5.3)
PROT SERPL-MCNC: 5.3 G/DL (ref 6.4–8.4)
PROTHROMBIN TIME: 15.9 SECONDS (ref 12.3–15)
RBC # BLD AUTO: 2.9 MILLION/UL (ref 3.88–5.62)
SODIUM SERPL-SCNC: 140 MMOL/L (ref 135–147)
WBC # BLD AUTO: 17.29 THOUSAND/UL (ref 4.31–10.16)

## 2024-10-18 PROCEDURE — 99223 1ST HOSP IP/OBS HIGH 75: CPT | Performed by: INTERNAL MEDICINE

## 2024-10-18 PROCEDURE — 85610 PROTHROMBIN TIME: CPT | Performed by: STUDENT IN AN ORGANIZED HEALTH CARE EDUCATION/TRAINING PROGRAM

## 2024-10-18 PROCEDURE — 82948 REAGENT STRIP/BLOOD GLUCOSE: CPT

## 2024-10-18 PROCEDURE — 99232 SBSQ HOSP IP/OBS MODERATE 35: CPT | Performed by: INTERNAL MEDICINE

## 2024-10-18 PROCEDURE — 80053 COMPREHEN METABOLIC PANEL: CPT | Performed by: STUDENT IN AN ORGANIZED HEALTH CARE EDUCATION/TRAINING PROGRAM

## 2024-10-18 PROCEDURE — 83735 ASSAY OF MAGNESIUM: CPT | Performed by: STUDENT IN AN ORGANIZED HEALTH CARE EDUCATION/TRAINING PROGRAM

## 2024-10-18 PROCEDURE — 99232 SBSQ HOSP IP/OBS MODERATE 35: CPT | Performed by: STUDENT IN AN ORGANIZED HEALTH CARE EDUCATION/TRAINING PROGRAM

## 2024-10-18 PROCEDURE — 85027 COMPLETE CBC AUTOMATED: CPT | Performed by: STUDENT IN AN ORGANIZED HEALTH CARE EDUCATION/TRAINING PROGRAM

## 2024-10-18 PROCEDURE — P9040 RBC LEUKOREDUCED IRRADIATED: HCPCS

## 2024-10-18 RX ORDER — PANTOPRAZOLE SODIUM 40 MG/1
40 TABLET, DELAYED RELEASE ORAL
Status: DISCONTINUED | OUTPATIENT
Start: 2024-10-18 | End: 2024-10-20 | Stop reason: HOSPADM

## 2024-10-18 RX ADMIN — SODIUM CHLORIDE 8 MG/HR: 9 INJECTION, SOLUTION INTRAVENOUS at 04:19

## 2024-10-18 RX ADMIN — INSULIN LISPRO 1 UNITS: 100 INJECTION, SOLUTION INTRAVENOUS; SUBCUTANEOUS at 17:07

## 2024-10-18 RX ADMIN — Medication 12.5 MG: at 17:04

## 2024-10-18 RX ADMIN — POLYETHYLENE GLYCOL 3350 17 G: 17 POWDER, FOR SOLUTION ORAL at 21:33

## 2024-10-18 RX ADMIN — POLYETHYLENE GLYCOL 3350 17 G: 17 POWDER, FOR SOLUTION ORAL at 07:49

## 2024-10-18 RX ADMIN — INSULIN LISPRO 2 UNITS: 100 INJECTION, SOLUTION INTRAVENOUS; SUBCUTANEOUS at 13:04

## 2024-10-18 RX ADMIN — PANTOPRAZOLE SODIUM 40 MG: 40 TABLET, DELAYED RELEASE ORAL at 17:04

## 2024-10-18 RX ADMIN — LEVOTHYROXINE SODIUM 50 MCG: 50 TABLET ORAL at 06:04

## 2024-10-18 RX ADMIN — Medication 12.5 MG: at 07:49

## 2024-10-18 RX ADMIN — INSULIN LISPRO 1 UNITS: 100 INJECTION, SOLUTION INTRAVENOUS; SUBCUTANEOUS at 07:51

## 2024-10-18 RX ADMIN — IRON SUCROSE 300 MG: 20 INJECTION, SOLUTION INTRAVENOUS at 13:04

## 2024-10-18 NOTE — ASSESSMENT & PLAN NOTE
Present with melena, weakness, symptomatic anemia with a hemoglobin of 4.5 status post blood transfusion.  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

## 2024-10-18 NOTE — ASSESSMENT & PLAN NOTE
Acute on superimposed chronic kidney disease  Isolyte, urinary retention protocol  Management per nephrology    Recent Labs     10/16/24  0603 10/17/24  0525 10/18/24  0445   BUN 63* 48* 46*   CREATININE 2.33* 1.85* 1.86*   EGFR 24 31 31       Results from last 7 days   Lab Units 10/15/24  0714   BLOOD UA  Negative   PROTEIN UA mg/dl Trace*

## 2024-10-18 NOTE — PLAN OF CARE
Problem: Potential for Falls  Goal: Patient will remain free of falls  Description: INTERVENTIONS:  - Educate patient/family on patient safety including physical limitations  - Instruct patient to call for assistance with activity   - Consult OT/PT to assist with strengthening/mobility   - Keep Call bell within reach  - Keep bed low and locked with side rails adjusted as appropriate  - Keep care items and personal belongings within reach  - Initiate and maintain comfort rounds  - Make Fall Risk Sign visible to staff  - Offer Toileting every  Hours, in advance of need  - Initiate/Maintain alarm  - Obtain necessary fall risk management equipment:   - Apply yellow socks and bracelet for high fall risk patients  - Consider moving patient to room near nurses station  Outcome: Progressing     Problem: SAFETY ADULT  Goal: Patient will remain free of falls  Description: INTERVENTIONS:  - Educate patient/family on patient safety including physical limitations  - Instruct patient to call for assistance with activity   - Consult OT/PT to assist with strengthening/mobility   - Keep Call bell within reach  - Keep bed low and locked with side rails adjusted as appropriate  - Keep care items and personal belongings within reach  - Initiate and maintain comfort rounds  - Make Fall Risk Sign visible to staff  - Offer Toileting every  Hours, in advance of need  - Initiate/Maintain alarm  - Obtain necessary fall risk management equipment:   - Apply yellow socks and bracelet for high fall risk patients  - Consider moving patient to room near nurses station  Outcome: Progressing  Goal: Maintain or return to baseline ADL function  Description: INTERVENTIONS:  -  Assess patient's ability to carry out ADLs; assess patient's baseline for ADL function and identify physical deficits which impact ability to perform ADLs (bathing, care of mouth/teeth, toileting, grooming, dressing, etc.)  - Assess/evaluate cause of self-care deficits   -  Assess range of motion  - Assess patient's mobility; develop plan if impaired  - Assess patient's need for assistive devices and provide as appropriate  - Encourage maximum independence but intervene and supervise when necessary  - Involve family in performance of ADLs  - Assess for home care needs following discharge   - Consider OT consult to assist with ADL evaluation and planning for discharge  - Provide patient education as appropriate  Outcome: Progressing  Goal: Maintains/Returns to pre admission functional level  Description: INTERVENTIONS:  - Perform AM-PAC 6 Click Basic Mobility/ Daily Activity assessment daily.  - Set and communicate daily mobility goal to care team and patient/family/caregiver.   - Collaborate with rehabilitation services on mobility goals if consulted  - Perform Range of Motion  times a day.  - Reposition patient every  hours.  - Dangle patient  times a day  - Stand patient  times a day  - Ambulate patient  times a day  - Out of bed to chair  times a day   - Out of bed for meals  times a day  - Out of bed for toileting  - Record patient progress and toleration of activity level   Outcome: Progressing     Problem: HEMATOLOGIC - ADULT  Goal: Maintains hematologic stability  Description: INTERVENTIONS  - Assess for signs and symptoms of bleeding or hemorrhage  - Monitor labs  - Administer supportive blood products/factors as ordered and appropriate  Outcome: Progressing

## 2024-10-18 NOTE — ASSESSMENT & PLAN NOTE
Lab Results   Component Value Date    HGBA1C 7.1 (H) 10/15/2024     Recent Labs     10/17/24  1654 10/17/24  2200 10/18/24  0743 10/18/24  1114   POCGLU 194* 178* 163* 245*       Blood Sugar Average: Last 72 hrs:  (P) 197.2    Sliding scale

## 2024-10-18 NOTE — ASSESSMENT & PLAN NOTE
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  - Will plan for outpatient follow-up upon discharge  - Will order outpatient ultrasound in the next 3 months to follow the cyst as outlined above on MRI

## 2024-10-18 NOTE — ASSESSMENT & PLAN NOTE
CT Abdomen pelvis showing pancreatic mass.  MRI Abdomen showing enlarged pancreatic head likely harboring an underlying infiltrative pancreatic head mass, intra/extrahepatic biliary dilatation and pancreatic ductal dilatation  GI Recommendations appreciated. S/p EUS. Awaiting biopsy results.   Spoke to our patient and his daughter about our concerns for malignancy.  Awaiting further guidance from GI post-procedure    Recent Labs     10/15/24  1746 10/17/24  0525 10/18/24  0445   AST 24 24 16   ALT 39 30 23   TBILI 0.31 0.49 0.49   ALKPHOS 171* 122* 107*   INR 1.30*  --  1.25*

## 2024-10-18 NOTE — ASSESSMENT & PLAN NOTE
Lab Results   Component Value Date    HGBA1C 7.1 (H) 10/15/2024       Recent Labs     10/17/24  1654 10/17/24  2200 10/18/24  0743 10/18/24  1114   POCGLU 194* 178* 163* 245*       Blood Sugar Average: Last 72 hrs:  (P) 197.2  Per primary service

## 2024-10-18 NOTE — ASSESSMENT & PLAN NOTE
Lab Results   Component Value Date    EGFR 31 10/18/2024    EGFR 31 10/17/2024    EGFR 24 10/16/2024    CREATININE 1.86 (H) 10/18/2024    CREATININE 1.85 (H) 10/17/2024    CREATININE 2.33 (H) 10/16/2024

## 2024-10-18 NOTE — CASE MANAGEMENT
Case Management Discharge Planning Note    Patient name Jose Zamora  Location [facility] M* MRN [id number]  : 1935 Date 10/18/2024       Current Admission Date: 10/15/2024  Current Admission Diagnosis:UGIB (upper gastrointestinal bleed)   Patient Active Problem List    Diagnosis Date Noted Date Diagnosed    CKD (chronic kidney disease) 10/17/2024     Urinary retention 10/16/2024     PAWEL (acute kidney injury) ([facility]) 10/16/2024     Pancreatic mass 10/16/2024     Lower back pain 10/16/2024     UGIB (upper gastrointestinal bleed) 10/15/2024     Generalized abdominal pain 10/14/2024     Weight loss 10/14/2024     Acute bilateral thoracic back pain 10/14/2024     Nausea 10/14/2024     Right hand pain 2022     Acute pain of both shoulders 2022     Localized osteoarthritis of right knee 2020     Acute pain of right knee 2020     Strain of calf muscle 2020     Lumbar strain 10/25/2019     Abnormal prostate exam 2019     Beta thalassemia minor 05/15/2019     Elevated PSA 05/15/2019     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 ([facility]) 2019     Type 2 diabetes mellitus with stage 3 chronic kidney disease ([facility]) 2019     Squamous cell carcinoma 2019     Dermatitis 2019     Skin neoplasm 2019     Anemia 2017     Hypothyroidism 10/19/2016     PAD (peripheral artery disease) ([facility]) 2015     Hyperlipidemia 2013     Atherosclerotic heart disease of native coronary artery without angina pectoris 09/10/2012     DMII (diabetes mellitus, type 2) ([facility]) 09/10/2012     Hypertension 09/10/2012       LOS (days): 3  Geometric Mean LOS (GMLOS) (days): 4.1  Days to GMLOS:1.5     OBJECTIVE:  Risk of Unplanned Readmission Score: 18.16         Current admission status: Inpatient   Preferred Pharmacy:   [id number] - [place] - [address] [facility]  [address]  [place] [address]  Phone: [phone] Fax:  [phone]    [id number] - [place] - [address]  [address]  [place] [address]  Phone: [phone] Fax: [phone]    [id number] - [place] - [address]  [address]  [place] [address]  Phone: [phone] Fax: [phone]    Primary Care Provider: Scott Hodges DO    Primary Insurance: MEDICARE  Secondary Insurance: BLUE CROSS    DISCHARGE DETAILS:    Discharge planning discussed with:: Patient and patient's daughter at bedside        CM contacted family/caregiver?: Yes (Patient's daughter at bedside)  Were Treatment Team discharge recommendations reviewed with patient/caregiver?: Yes  Did patient/caregiver verbalize understanding of patient care needs?: Yes    Contacts  Patient Contacts: TEENA JARAMILLO  Relationship to Patient:: Family  Contact Method: In Person  Reason/Outcome: Emergency Contact, Continuity of Care, Discharge Planning    Requested Home Health Care         Is the patient interested in HHC at discharge?: No    DME Referral Provided  Referral made for DME?: Yes  DME referral completed for the following items:: Walker  DME Supplier Name:: [employer]    Other Referral/Resources/Interventions Provided:  Interventions: DME  Referral Comments: DME: [employer] [facility]    Treatment Team Recommendation: Home  Discharge Destination Plan:: Home       IMM Given (Date):: 10/18/24  IMM Given to:: Patient     Additional Comments: CM met with patient and patient's daughter at bedside. CM provided patient with IMM; patient agreeable and understood right to appeal. CM placed IMM in scan bin. Patient's daughter requested RW for patient to be delivered home; CM requested in [place]. CM to follow for further discharge planning needs.

## 2024-10-18 NOTE — PROGRESS NOTES
Progress Note - Hospitalist   Name: Jose Zamora 88 y.o. male I MRN: [id number]  Unit/Bed#: [place] [address] -01 I Date of Admission: 10/15/2024   Date of Service: 10/18/2024 I Hospital Day: 3    Assessment & Plan  UGIB (upper gastrointestinal bleed)  88 year old male presented with melena, weakness, and epigastric discomfort. He was sent in her due to outpatient labs showing a hemoglobin of 4.5. Concern for potential GI bleed.  Aspirin on hold  Received 3U pRBC, for one more unit today  Continue PPI BID  S/p EGD which showed: Single ulcer in the ampullary region with clean base (John III). Ulcerated area at the ampulla is likely tumor protruding through the ampulla  S/p EUS: Heterogeneous and hypoechoic mass measuring 46 mm x 30 mm with well-defined margins was visualized in the head of the pancreas; 4 fine needle biopsy passes were taken. An adequate sample was obtained. Cytology analysis was performed. Onsite cytologist was present. There were no cysts in the pancreas. There were no lesions in the examined part of the liver. There was a gallstone in the gallbladder. The CBD was dilated to 10mm and PD was dilated to 8mm. There were no enlarged lymph nodes in the peripancreatic, gastrohepatic, or celiac areas.  Oncology consulted, awaiting biopsy results.    Results from last 7 days   Lab Units 10/18/24  0445 10/17/24  0525 10/17/24  0008 10/16/24  1159 10/16/24  0603 10/16/24  0012 10/15/24  1746   HEMOGLOBIN g/dL 7.1* 7.8* 8.0* 7.0* 6.7*   < > 4.5*   MCV fL 77* 79*  --   --  75*  --  67*   RDW % 23.1* 22.5*  --   --  22.8*  --  16.3*   IRON ug/dL  --   --   --   --   --   --  64   TIBC ug/dL  --   --   --   --   --   --  271   FERRITIN ng/mL  --   --   --   --   --   --  25    < > = values in this interval not displayed.     Urinary retention  Difficulty with urination prior to admission.   Was started on levaquin 10/14 outpatient for UTI   UA today only notable for trace proteinuria  Urinary retention  protocol  Patient has prostatomegaly - trial flomax  PAWEL (acute kidney injury) (HCC)  Acute on superimposed chronic kidney disease  Isolyte, urinary retention protocol  Management per nephrology    Recent Labs     10/16/24  0603 10/17/24  0525 10/18/24  0445   BUN 63* 48* 46*   CREATININE 2.33* 1.85* 1.86*   EGFR 24 31 31       Results from last 7 days   Lab Units 10/15/24  0714   BLOOD UA  Negative   PROTEIN UA mg/dl Trace*     Pancreatic mass  CT Abdomen pelvis showing pancreatic mass.  MRI Abdomen showing enlarged pancreatic head likely harboring an underlying infiltrative pancreatic head mass, intra/extrahepatic biliary dilatation and pancreatic ductal dilatation  GI Recommendations appreciated. S/p EUS. Awaiting biopsy results.   Spoke to our patient and his daughter about our concerns for malignancy.  Awaiting further guidance from GI post-procedure    Recent Labs     10/15/24  1746 10/17/24  0525 10/18/24  0445   AST 24 24 16   ALT 39 30 23   TBILI 0.31 0.49 0.49   ALKPHOS 171* 122* 107*   INR 1.30*  --  1.25*     DMII (diabetes mellitus, type 2) (HCC)  Lab Results   Component Value Date    HGBA1C 7.1 (H) 10/15/2024     Recent Labs     10/17/24  1654 10/17/24  2200 10/18/24  0743 10/18/24  1114   POCGLU 194* 178* 163* 245*       Blood Sugar Average: Last 72 hrs:  (P) 197.2    Sliding scale  Hyperlipidemia  Lipitor held on admission  PAD (peripheral artery disease) (HCC)  Aspirin on hold due to blood loss.   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   PT/OT  CKD (chronic kidney disease)  Lab Results   Component Value Date    EGFR 31 10/18/2024    EGFR 31 10/17/2024    EGFR 24 10/16/2024    CREATININE 1.86 (H) 10/18/2024    CREATININE 1.85 (H) 10/17/2024     CREATININE 2.33 (H) 10/16/2024       VTE Pharmacologic Prophylaxis:   c/I due to anemia    Mobility:   Basic Mobility Inpatient Raw Score: 20  JH-HLM Goal: 6: Walk 10 steps or more  JH-HLM Achieved: 6: Walk 10 steps or more    Discussions with Specialists or Other Care Team Provider: GI    Education and Discussions with Family / Patient: patient, daughter    Current Length of Stay: 3 day(s)  Current Patient Status: Inpatient   Certification Statement: The patient will continue to require additional inpatient hospital stay due to blood transfusion  Discharge Plan: 24-48 hours    Code Status: Level 1 - Full Code    Subjective   Patient seen and examined. Denies any bloody stools, chest pain, abdominal pain.    Objective   Vitals:   Temp (24hrs), Av.8 °F (37.1 °C), Min:97.7 °F (36.5 °C), Max:99.9 °F (37.7 °C)    Temp:  [97.7 °F (36.5 °C)-99.9 °F (37.7 °C)] 98.5 °F (36.9 °C)  HR:  [] 100  Resp:  [16-20] 18  BP: (124-204)/() 130/60  SpO2:  [92 %-98 %] 95 %  Body mass index is 22.89 kg/m².     Input and Output Summary (last 24 hours):     Intake/Output Summary (Last 24 hours) at 10/18/2024 1135  Last data filed at 10/18/2024 0741  Gross per 24 hour   Intake 2430 ml   Output 1450 ml   Net 980 ml       Physical Exam  Vitals reviewed.   HENT:      Head: Normocephalic.      Nose: Nose normal.      Mouth/Throat:      Mouth: Mucous membranes are moist.   Eyes:      General: No scleral icterus.  Cardiovascular:      Rate and Rhythm: Normal rate and regular rhythm.   Pulmonary:      Effort: Pulmonary effort is normal. No respiratory distress.      Breath sounds: No wheezing.   Abdominal:      General: There is no distension.      Palpations: Abdomen is soft.      Tenderness: There is no abdominal tenderness.   Skin:     General: Skin is warm.   Neurological:      Mental Status: He is alert.   Psychiatric:         Mood and Affect: Mood normal.         Behavior: Behavior normal.       Lines/Drains:              Lab  Results: I have reviewed the following results:   Results from last 7 days   Lab Units 10/18/24  0445 10/17/24  0525 10/17/24  0008 10/16/24  1159 10/16/24  0603 10/16/24  0012 10/15/24  1746   WBC Thousand/uL 17.29* 10.58*  --   --  9.98  --  11.57*   HEMOGLOBIN g/dL 7.1* 7.8* 8.0*   < > 6.7*   < > 4.5*   PLATELETS Thousands/uL 180 176  --   --  184  --  242   MCV fL 77* 79*  --   --  75*  --  67*   INR  1.25*  --   --   --   --   --  1.30*    < > = values in this interval not displayed.     Results from last 7 days   Lab Units 10/18/24  0445 10/17/24  0525 10/16/24  0603 10/15/24  1746   SODIUM mmol/L 140 142 141 135   POTASSIUM mmol/L 3.7 3.8 4.2 5.0   CHLORIDE mmol/L 110* 111* 112* 106   CO2 mmol/L 21 23 20* 17*   ANION GAP mmol/L 9 8 9 12   BUN mg/dL 46* 48* 63* 73*   CREATININE mg/dL 1.86* 1.85* 2.33* 2.69*   CALCIUM mg/dL 7.6* 8.1* 8.2* 8.1*   ALBUMIN g/dL 3.2* 3.6  --  4.0   TOTAL BILIRUBIN mg/dL 0.49 0.49  --  0.31   ALK PHOS U/L 107* 122*  --  171*   ALT U/L 23 30  --  39   AST U/L 16 24  --  24   EGFR ml/min/1.73sq m 31 31 24 20   GLUCOSE RANDOM mg/dL 173* 160* 184* 346*     Results from last 7 days   Lab Units 10/18/24  0445 10/17/24  0525   MAGNESIUM mg/dL 1.8* 2.1                      Results from last 7 days   Lab Units 10/18/24  1114 10/18/24  0743 10/17/24  2200 10/17/24  1654 10/17/24  1310 10/17/24  0549 10/17/24  0004 10/16/24  1207 10/16/24  0601 10/16/24  0033   POC GLUCOSE mg/dl 245* 163* 178* 194* 219* 161* 182* 262* 172* 196*     Results from last 7 days   Lab Units 10/15/24  0714   HEMOGLOBIN A1C % 7.1*     Results from last 7 days   Lab Units 10/15/24  0714   TSH 3RD GENERATON uIU/mL 5.865*   FREE T4 ng/dL 1.10       Recent Cultures (last 7 days):         Imaging:  Reviewed radiology reports from this admission: ct a/p, mri abdomen    Last 24 Hours Medication List:     Current Facility-Administered Medications:     acetaminophen (TYLENOL) tablet 975 mg, Q8H PRN    [Held by provider]  atorvastatin (LIPITOR) tablet 20 mg, Daily With Dinner    insulin lispro (HumALOG/ADMELOG) 100 units/mL subcutaneous injection 1-5 Units, TID AC **AND** Fingerstick Glucose (POCT), TID AC    levothyroxine tablet 50 mcg, Daily Before Breakfast    metoprolol tartrate (LOPRESSOR) partial tablet 12.5 mg, BID    multi-electrolyte (PLASMALYTE-A/ISOLYTE-S PH 7.4) IV solution, Continuous, Last Rate: 75 mL/hr (10/17/24 2013)    ondansetron (ZOFRAN) injection 4 mg, Q6H PRN    pantoprazole (PROTONIX) 80 mg in sodium chloride 0.9 % 100 mL infusion, Continuous, Last Rate: 8 mg/hr (10/18/24 0419)    polyethylene glycol (MIRALAX) packet 17 g, BID      **Please Note: This note may have been constructed using a voice recognition system.**

## 2024-10-18 NOTE — PROGRESS NOTES
Progress Note - Nephrology   Name: Jose Zamora 88 y.o. male I MRN: [id number]  Unit/Bed#: [place] [address] -01 I Date of Admission: 10/15/2024   Date of Service: 10/18/2024 I Hospital Day: 3     Assessment & Plan  PAWEL (acute kidney injury) (HCC)  Admitted with a creatinine of 2.44 on 10/15, increased to 2.69.  Etiology: PAWEL in the setting of symptomatic severe anemia with a hemoglobin of 4.7 on admission as well as loss of autoregulation due to ARB use as well as diuretic use.    Workup:  - MRI of kidneys: No hydronephrosis, slight hyperdense left renal lesion seen on CT scan not well-characterized secondary to motion artifact and lack of IV contrast most likely represents proteinaceous cyst; scattered bilateral renal simple cysts  - UA: Negative heme/trace proteinuria/no RBCs/1-2 WBCs    Current creatinine: 1.86 stable.  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  - Will plan for outpatient follow-up upon discharge  - Will order outpatient ultrasound in the next 3 months to follow the cyst as outlined above on MRI    UGIB (upper gastrointestinal bleed)  Present with melena, weakness, symptomatic anemia with a hemoglobin of 4.5 status post blood transfusion.  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  - Hemoglobin 7.1 per primary service potential transfusion  - Iron saturation acceptable 24% but ferritin only 25 will give Venofer/intravenous  iron  DMII (diabetes mellitus, type 2) (HCC)  Lab Results   Component Value Date    HGBA1C 7.1 (H) 10/15/2024       Recent Labs     10/17/24  1654 10/17/24  2200 10/18/24  0743 10/18/24  1114   POCGLU 194* 178* 163* 245*       Blood Sugar Average: Last 72 hrs:  (P) 197.2  Per primary service    Hypertension  Blood pressure is stable, borderline hypotension.  Maintain metoprolol with hold parameters  Keep holding olmesartan with HCTZ and relatively good blood pressure  - If needed could add small dose of amlodipine  PAD (peripheral artery disease) (HCC)  Asymptomatic per primary service  Urinary retention  Follow urinary retention protocol  Pancreatic mass  Please see above regarding potential cancer followed by GI    I have reviewed the nephrology recommendations including CKD management and intravenous iron for iron deficiency, with SLIM attending, and we are in agreement with renal plan including the information outlined above.     Subjective   Brief History of Admission -88-year-old male with a history of hypertension/dyslipidemia/hypothyroidism/diabetes mellitus who presents with severe anemia 4.7.  We are seeing for acute on top of chronic kidney disease.    He feels great today no nausea vomiting or diarrhea  No chest pain or shortness of breath  Urinating well    Objective :  Temp:  [97.7 °F (36.5 °C)-99.9 °F (37.7 °C)] 98.2 °F (36.8 °C)  HR:  [] 100  BP: (124-204)/() 131/59  Resp:  [16-20] 18  SpO2:  [92 %-98 %] 97 %  O2 Device: None (Room air)    Current Weight: Weight - Scale: 70.3 kg (155 lb)  First Weight: Weight - Scale: 70.6 kg (155 lb 10.3 oz)  I/O         10/16 0701  10/17 0700 10/17 0701  10/18 0700 10/18 0701  10/19 0700    P.O. 620 480     I.V. (mL/kg)  1950 (27.7)     Blood 352.5      IV Piggyback       Total Intake(mL/kg) 972.5 (13.8) 2430 (34.6)     Urine (mL/kg/hr) 1400 (0.8) 1750 (1) 100 (0.3)    Total Output 1400 1750 100    Net -427.5 +893 -100                 Physical  Exam  Physical Exam: General:  No acute distress  Skin:  No acute rash  Eyes:  No scleral icterus and noninjected  ENT:  Moist mucous membranes  Neck:  Supple, no jugular venous distention, trachea midline, overall appearance is normal  Chest:  Clear to auscultation  CVS:  Regular rate and rhythm, without a rub or gallops  Abdomen:  Normal bowel sounds, soft and nontender and nondistended  Extremities:  No edema, and no cyanosis, no significant arthritic changes  Neuro:  No gross focality  Psych:  Alert and oriented and appropriate    Medications:    Current Facility-Administered Medications:     acetaminophen (TYLENOL) tablet 975 mg, 975 mg, Oral, Q8H PRN, Wanda Bauer MD, 975 mg at 10/17/24 1938    [Held by provider] atorvastatin (LIPITOR) tablet 20 mg, 20 mg, Oral, Daily With Dinner, Wanda Bauer MD    insulin lispro (HumALOG/ADMELOG) 100 units/mL subcutaneous injection 1-5 Units, 1-5 Units, Subcutaneous, TID AC, 1 Units at 10/18/24 0751 **AND** Fingerstick Glucose (POCT), , , TID Jose MARIANO MD    levothyroxine tablet 50 mcg, 50 mcg, Oral, Daily Before Breakfast, Wanda Bauer MD, 50 mcg at 10/18/24 0604    metoprolol tartrate (LOPRESSOR) partial tablet 12.5 mg, 12.5 mg, Oral, BID, Wanda Bauer MD, 12.5 mg at 10/18/24 0749    multi-electrolyte (PLASMALYTE-A/ISOLYTE-S PH 7.4) IV solution, 75 mL/hr, Intravenous, Continuous, KAREL Maloney, Last Rate: 75 mL/hr at 10/17/24 2013, 75 mL/hr at 10/17/24 2013    ondansetron (ZOFRAN) injection 4 mg, 4 mg, Intravenous, Q6H PRN, Sunil Orozco DO    pantoprazole (PROTONIX) EC tablet 40 mg, 40 mg, Oral, BID Jose MRAIANO MD    polyethylene glycol (MIRALAX) packet 17 g, 17 g, Oral, BID, Pool Chiang MD, 17 g at 10/18/24 0749      Lab Results: I have reviewed the following results:  Results from last 7 days   Lab Units 10/18/24  0445 10/17/24  0525 10/17/24  0008 10/16/24  1159 10/16/24  0603 10/16/24  0012 10/15/24  1746  "10/15/24  0714   WBC Thousand/uL 17.29* 10.58*  --   --  9.98  --  11.57* 13.05*   HEMOGLOBIN g/dL 7.1* 7.8* 8.0* 7.0* 6.7* 6.0* 4.5* 4.7*   HEMATOCRIT % 22.3* 25.0*  --   --  21.3*  --  15.3* 16.0*   PLATELETS Thousands/uL 180 176  --   --  184  --  242 266   POTASSIUM mmol/L 3.7 3.8  --   --  4.2  --  5.0 5.2   CHLORIDE mmol/L 110* 111*  --   --  112*  --  106 108   CO2 mmol/L 21 23  --   --  20*  --  17* 18*   BUN mg/dL 46* 48*  --   --  63*  --  73* 69*   CREATININE mg/dL 1.86* 1.85*  --   --  2.33*  --  2.69* 2.44*   CALCIUM mg/dL 7.6* 8.1*  --   --  8.2*  --  8.1* 8.1*   MAGNESIUM mg/dL 1.8* 2.1  --   --   --   --   --   --    ALBUMIN g/dL 3.2* 3.6  --   --   --   --  4.0 3.7       Administrative Statements     Portions of the record may have been created with voice recognition software. Occasional wrong word or \"sound a like\" substitutions may have occurred due to the inherent limitations of voice recognition software. Read the chart carefully and recognize, using context, where substitutions have occurred.If you have any questions, please contact the dictating provider.  "

## 2024-10-18 NOTE — ASSESSMENT & PLAN NOTE
Admitted with a creatinine of 2.44 on 10/15, increased to 2.69.  Etiology: PAWEL in the setting of symptomatic severe anemia with a hemoglobin of 4.7 on admission as well as loss of autoregulation due to ARB use as well as diuretic use.    Workup:  - MRI of kidneys: No hydronephrosis, slight hyperdense left renal lesion seen on CT scan not well-characterized secondary to motion artifact and lack of IV contrast most likely represents proteinaceous cyst; scattered bilateral renal simple cysts  - UA: Negative heme/trace proteinuria/no RBCs/1-2 WBCs    Current creatinine: 1.86 stable.  Approaching baseline    Recommend  - Oral intake  - Avoid nephrotoxic agents and hypotension

## 2024-10-18 NOTE — PROGRESS NOTES
"Progress Note -  [facility]  Patient: Jose Zamora 88 y.o. male   MRN: [id number]  PCP: Scott Hodges DO  Unit/Bed#: [place] [address] -[id number] Encounter: [id number]  Length of Stay: 3 days    Assessment/Plan  Jose Zamora is a 88 y.o. 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 Found to have anemia and new HOP mass.     # pancreatic ca  # IHDD/CBDD  # ampullary ulceration   - s/p EGD/EUS 10/17/24. He has a 1cm ulcerated ampulla due to mass effect. The mass itself was biopsied. Prelim cyto suspicious for NET vs acinar cell ca, less likely RUTHANN.   - await final path   - messaged the onc nurse navigator to coordinate care moving forward.    - may need additional images for staging prior to treatment. He may benefit from chemo. Not the best surgical candidate    - c/w diet and PPI bid   - nutrition supplementation w/ meals   - okay from GI perspective to discharge     # constipation   - significant stool burden noted on CT.   - aggressive bowel regimen     # pan UC   - no records. Chart review shows last colon 07/19/12 no details. Prior colon in 2004 showed L-sided colitis   - c/w home 5-ASA     Subjective:  Interval History:   NAEO. Ate dinner w/o issue. Hoping to go home soon.    Otherwise, pt denies any fevers/chills, lightheadedness, dizziness, CP, SOB, N/V/D/C, abdom pain, urinary symptoms, weakness/numbness/tingling.    ROS otherwise as covered in Interval History.    Objective:  /63   Pulse (!) 115   Temp 98.7 °F (37.1 °C)   Resp 20   Ht 5' 9\" (1.753 m)   Wt 70.3 kg (155 lb)   SpO2 95%   BMI 22.89 kg/m²     Intake/Output Summary (Last 24 hours) at 10/18/2024 0805  Last data filed at 10/18/2024 0001  Gross per 24 hour   Intake 2430 ml   Output 1750 ml   Net 680 ml     Physical Exam  Constitutional:       General: He is not in acute distress.     Appearance: Normal appearance.   HENT:      Head: " Normocephalic and atraumatic.      Nose: Nose normal.      Mouth/Throat:      Mouth: Mucous membranes are moist.   Eyes:      General: No scleral icterus.     Extraocular Movements: Extraocular movements intact.      Conjunctiva/sclera: Conjunctivae normal.      Pupils: Pupils are equal, round, and reactive to light.   Cardiovascular:      Rate and Rhythm: Normal rate and regular rhythm.   Pulmonary:      Effort: Pulmonary effort is normal.   Abdominal:      General: Abdomen is flat. There is no distension.      Palpations: There is no mass.      Tenderness: There is abdominal tenderness (epigastric).   Musculoskeletal:         General: No swelling. Normal range of motion.   Skin:     General: Skin is warm and dry.      Capillary Refill: Capillary refill takes less than 2 seconds.   Neurological:      General: No focal deficit present.      Mental Status: He is alert.   Psychiatric:         Mood and Affect: Mood normal.         Labs:  I have reviewed all available labs and imaging results in Epic.  Results from last 7 days   Lab Units 10/18/24  0445 10/17/24  0525 10/16/24  0603 10/15/24  1746   SODIUM mmol/L 140 142 141 135   POTASSIUM mmol/L 3.7 3.8 4.2 5.0   CHLORIDE mmol/L 110* 111* 112* 106   CO2 mmol/L 21 23 20* 17*   BUN mg/dL 46* 48* 63* 73*   CREATININE mg/dL 1.86* 1.85* 2.33* 2.69*   GLUCOSE RANDOM mg/dL 173* 160* 184* 346*   CALCIUM mg/dL 7.6* 8.1* 8.2* 8.1*   ALBUMIN g/dL 3.2* 3.6  --  4.0   ALK PHOS U/L 107* 122*  --  171*   ALT U/L 23 30  --  39   AST U/L 16 24  --  24   EGFR ml/min/1.73sq m 31 31 24 20     Results from last 7 days   Lab Units 10/18/24  0445 10/17/24  0525 10/17/24  0008 10/16/24  1159 10/16/24  0603 10/16/24  0012 10/15/24  1746 10/15/24  0714   WBC Thousand/uL 17.29* 10.58*  --   --  9.98  --  11.57* 13.05*   HEMOGLOBIN g/dL 7.1* 7.8* 8.0*   < > 6.7*   < > 4.5* 4.7*   HEMATOCRIT % 22.3* 25.0*  --   --  21.3*  --  15.3* 16.0*   PLATELETS Thousands/uL 180 176  --   --  184  --  242 266    SEGS PCT %  --  73  --   --   --   --  81* 77*   LYMPHO PCT %  --  13*  --   --   --   --  11* 14   MONO PCT %  --  10  --   --   --   --  7 7   EOS PCT %  --  2  --   --   --   --  0 1   BASOS PCT %  --  1  --   --   --   --  0 0   TOTAL NEUT ABS Thousands/µL  --  7.78*  --   --   --   --  9.35* 9.97*   LYMPHS ABS Thousands/µL  --  1.41  --   --   --   --  1.32 1.86   MONOS ABS Thousand/µL  --  1.03  --   --   --   --  0.78 0.97   EOS ABS Thousand/µL  --  0.19  --   --   --   --  0.01 0.10   BASOS ABS Thousands/µL  --  0.07  --   --   --   --  0.03 0.04    < > = values in this interval not displayed.     Results from last 7 days   Lab Units 10/18/24  0445 10/15/24  1746   PROTIME seconds 15.9* 16.3*   INR  1.25* 1.30*   PTT seconds  --  28           Additional Labs:  Results from last 7 days   Lab Units 10/18/24  0445 10/17/24  0525 10/15/24  0714   LIPASE u/L  --   --  128*   MAGNESIUM mg/dL 1.8* 2.1  --           Results from last 7 days   Lab Units 10/15/24  0714   HEMOGLOBIN A1C % 7.1*   TRIGLYCERIDES mg/dL 241*   HDL mg/dL 22*   LDL CALC mg/dL 13   TSH 3RD GENERATON uIU/mL 5.865*   FREE T4 ng/dL 1.10       Imaging:  MRI abdomen wo contrast and mrcp   Final Result by Bob Mota MD (10/16 1121)      Limited by motion artifact and lack of IV contrast.      Enlarged pancreatic head measuring 4.7 x 2.7 cm, #2/27, likely harboring an underlying infiltrative pancreatic head mass. The remainder of the pancreatic parenchyma is atrophic.      Intra/extrahepatic biliary dilation and pancreatic ductal dilation with caliber changes of these ducts at the site of suspected pancreatic head mass.         Workstation performed: [id number]         CT abdomen pelvis wo contrast   Final Result by Alexia Gordon MD (10/15 2003)      Pancreatic mass with evidence of secondary bile duct obstruction. Most commonly adenocarcinoma. Further evaluation with pancreas MR and MRCP recommended.      Small indeterminate  left renal nodule, may be a complicated cyst. This can also be evaluated with MR without and with IV contrast.      Other nonemergent findings above.            Workstation performed: [id number]             Medications:   Current Facility-Administered Medications   Medication Dose Route Frequency Provider Last Rate    acetaminophen  975 mg Oral Q8H PRN Wanda Bauer MD      [Held by provider] atorvastatin  20 mg Oral Daily With Dinner Wanda Bauer MD      insulin lispro  1-5 Units Subcutaneous TID AC Jose Dozier MD      levothyroxine  50 mcg Oral Daily Before Breakfast Wanda Bauer MD      metoprolol tartrate  12.5 mg Oral BID Wanda Bauer MD      multi-electrolyte  75 mL/hr Intravenous Continuous KAREL Maloney 75 mL/hr (10/17/24 2013)    ondansetron  4 mg Intravenous Q6H PRN Sunil Orozco DO      pantoprazole (PROTONIX) 80 mg in sodium chloride 0.9 % 100 mL infusion  8 mg/hr Intravenous Continuous Avis Baum DO 8 mg/hr (10/18/24 0419)    polyethylene glycol  17 g Oral BID Pool Chiang MD         Problem List:  Patient Active Problem List   Diagnosis    Anemia    Atherosclerotic heart disease of native coronary artery without angina pectoris    DMII (diabetes mellitus, type 2) (HCC)    Hyperlipidemia    Hypertension    Hypothyroidism    PAD (peripheral artery disease) (HCC)    Skin neoplasm    Squamous cell carcinoma    Dermatitis    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)    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)    Beta thalassemia minor    Elevated PSA    Abnormal prostate exam    Lumbar strain    Acute pain of right knee    Strain of calf muscle    Localized osteoarthritis of right knee    Acute pain of both shoulders    Right hand pain    Generalized abdominal pain    Weight loss    Acute bilateral thoracic back pain    Nausea    UGIB (upper gastrointestinal bleed)    Urinary retention    PAWEL (acute kidney injury)  (HCC)    Pancreatic mass    Lower back pain    CKD (chronic kidney disease)       Case discussed with attending physician Dr. Baires. All recs are preliminary pending final attestation.    Pool Chiang, PGY-5

## 2024-10-18 NOTE — ASSESSMENT & PLAN NOTE
Blood pressure is stable, borderline hypotension.  Maintain metoprolol with hold parameters  Keep holding olmesartan with HCTZ and relatively good blood pressure  - If needed could add small dose of amlodipine

## 2024-10-18 NOTE — ASSESSMENT & PLAN NOTE
88 year old male presented with melena, weakness, and epigastric discomfort. He was sent in her due to outpatient labs showing a hemoglobin of 4.5. Concern for potential GI bleed.  Aspirin on hold  Received 3U pRBC, for one more unit today  Continue PPI BID  S/p EGD which showed: Single ulcer in the ampullary region with clean base (John III). Ulcerated area at the ampulla is likely tumor protruding through the ampulla  S/p EUS: Heterogeneous and hypoechoic mass measuring 46 mm x 30 mm with well-defined margins was visualized in the head of the pancreas; 4 fine needle biopsy passes were taken. An adequate sample was obtained. Cytology analysis was performed. Onsite cytologist was present. There were no cysts in the pancreas. There were no lesions in the examined part of the liver. There was a gallstone in the gallbladder. The CBD was dilated to 10mm and PD was dilated to 8mm. There were no enlarged lymph nodes in the peripancreatic, gastrohepatic, or celiac areas.  Oncology consulted, awaiting biopsy results.    Results from last 7 days   Lab Units 10/18/24  0445 10/17/24  0525 10/17/24  0008 10/16/24  1159 10/16/24  0603 10/16/24  0012 10/15/24  1746   HEMOGLOBIN g/dL 7.1* 7.8* 8.0* 7.0* 6.7*   < > 4.5*   MCV fL 77* 79*  --   --  75*  --  67*   RDW % 23.1* 22.5*  --   --  22.8*  --  16.3*   IRON ug/dL  --   --   --   --   --   --  64   TIBC ug/dL  --   --   --   --   --   --  271   FERRITIN ng/mL  --   --   --   --   --   --  25    < > = values in this interval not displayed.

## 2024-10-18 NOTE — CONSULTS
Medical Oncology/Hematology Consult Note  Jose Zamora, male, 88 y.o., 1935,  [place] [facility] /[facility] 2 M*, [id number]     Reason for consultation: pancreatic mass    Assessment/plan:     1. Pancreatic head mass highly suspicious for malignancy   2. UGIB  3. Acute on chronic anemia  4. CKD  5. Beta thalassemia minor    Jose Zamora is an 87 yo male who initially presented with melena, weakness and epigastric discomfort.  EGD showed a single ulcer in the ampullary region with clean base.  EUS noted heterogeneous and hypoechoic mass measuring 46 mm x 30 mm in the head of the pancreas s/p biopsy.  Final pathology remains pending.  Lengthy discussion with the patient and his daughter (Parul) at bedside.  Discussed concern for underlying malignancy.  Recommend checking CA 19-9, CT chest to complete staging.  He will need surgical oncology evaluation if no evidence of metastatic disease which can be set up after discharge.  He will need oncology follow-up after discharge.    Regarding his acute on chronic anemia.  Recommend continued supportive care.  He is receiving IV iron.  PRBC transfusion if hemoglobin less than 7 g as deciliter or patient symptomatic.      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  He was found to have anemia and new head of pancreas mass.  Patient notes progressively worsening epigastric pain, intermittent nausea/vomiting, loss of appetite and 10-12 pound weight loss.    10/15/2024: CT abd/pelv:   Pancreatic mass with evidence of secondary bile duct obstruction. Most commonly adenocarcinoma. Further evaluation with pancreas MR and MRCP recommended.   Small indeterminate left renal nodule, may be a complicated cyst. This can also be evaluated with MR without and with IV  contrast.   Other nonemergent findings above.    10/16/2024: MRCP:   Limited by motion artifact and lack of IV contrast.   Enlarged pancreatic head measuring 4.7 x 2.7 cm, #2/27, likely harboring an underlying infiltrative pancreatic head mass. The remainder of the pancreatic parenchyma is atrophic.  Intra/extrahepatic biliary dilation and pancreatic ductal dilation with caliber changes of these ducts at the site of suspected pancreatic head mass.    10/16/2024: EGD:  Single ulcer in the ampullary region with clean base (John III)  Edematous, erythematous mucosa with erosion in the body of the stomach; performed cold forceps biopsy  3 cm type I hiatal hernia  The upper third of the esophagus, middle third of the esophagus and lower third of the esophagus appeared normal.  The duodenal bulb and 1st part of the duodenum appeared normal.  Ulcerated area at the ampulla is likely tumor protruding through the ampulla    10/16/2024: EUS:  Heterogeneous and hypoechoic mass measuring 46 mm x 30 mm with well-defined margins was visualized in the head of the pancreas; 4 fine needle biopsy passes were taken. An adequate sample was obtained. Cytology analysis was performed. Onsite cytologist was present  There were no cysts in the pancreas. There were no lesions in the examined part of the liver. There was a gallstone in the gallbladder. The CBD was dilated to 10mm and PD was dilated to 8mm. There were no enlarged lymph nodes in the peripancreatic, gastrohepatic, or celiac areas.     His hemoglobin on admission was 4.5 g/deciliter (baseline appears to be 8-10 g as deciliter).  He required 3 units PRBC transfusion.  Currently receiving IV iron.  Hematology consulted for pancreatic mass.  Patient resting comfortably with daughter at bedside.  Patient states he feels better after blood transfusion.    Review of Systems:   Review of Systems   Constitutional:  Positive for fatigue. Negative for chills and fever.   HENT:  Negative  for ear pain and sore throat.    Eyes:  Negative for pain and visual disturbance.   Respiratory:  Negative for cough and shortness of breath.    Cardiovascular:  Negative for chest pain and palpitations.   Gastrointestinal:  Positive for abdominal pain. Negative for vomiting.   Genitourinary:  Negative for dysuria and hematuria.   Musculoskeletal:  Negative for arthralgias and back pain.   Skin:  Negative for color change and rash.   Neurological:  Negative for seizures and syncope.   All other systems reviewed and are negative.      Past Medical History:   Diagnosis Date    Allergic     Anemia     Arthritis     Diabetes mellitus (HCC)     Disease of thyroid gland     Hyperlipemia     Hypertension        Past Surgical History:   Procedure Laterality Date    COLONOSCOPY      PROSTATE BIOPSY      needle,  resolved may 2005       Family History   Problem Relation Age of Onset    No Known Problems Mother        Social History     Socioeconomic History    Marital status: /Civil Union     Spouse name: None    Number of children: None    Years of education: None    Highest education level: None   Occupational History    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[employer]   Tobacco Use    Smoking status: Former     Types: Cigarettes    Smokeless tobacco: Never   Vaping Use    Vaping status: Never Used   Substance and Sexual Activity    Alcohol use: Not Currently     Comment: social/rarely    Drug use: Never    Sexual activity: Not Currently   Other Topics Concern    None   Social History Narrative    None     Social Determinants of Health     Financial Resource Strain: Low Risk  (8/30/2023)    Overall Financial Resource Strain (CARDIA)     Difficulty of Paying Living Expenses: Not hard at all   Food Insecurity: No Food Insecurity (10/16/2024)    Hunger Vital Sign     Worried About Running Out of Food in the Last Year: Never true     Ran Out of Food in the Last Year: Never true   Transportation Needs: No Transportation Needs  (10/16/2024)    PRAPARE - Transportation     Lack of Transportation (Medical): No     Lack of Transportation (Non-Medical): No   Physical Activity: Not on file   Stress: Not on file   Social Connections: Not on file   Intimate Partner Violence: Not on file   Housing Stability: Unknown (10/16/2024)    Housing Stability Vital Sign     Unable to Pay for Housing in the Last Year: No     Number of Times Moved in the Last Year: Not on file     Homeless in the Last Year: No         Current Facility-Administered Medications:     acetaminophen (TYLENOL) tablet 975 mg, 975 mg, Oral, Q8H PRN, Wanda Bauer MD, 975 mg at 10/17/24 1938    [Held by provider] atorvastatin (LIPITOR) tablet 20 mg, 20 mg, Oral, Daily With Dinner, Wanda Bauer MD    insulin lispro (HumALOG/ADMELOG) 100 units/mL subcutaneous injection 1-5 Units, 1-5 Units, Subcutaneous, TID AC, 2 Units at 10/18/24 1304 **AND** Fingerstick Glucose (POCT), , , TID AC, Jose Dozier MD    iron sucrose (VENOFER) 300 mg in sodium chloride 0.9 % 250 mL IVPB, 300 mg, Intravenous, Daily, Neil Paz MD, Last Rate: 166.7 mL/hr at 10/18/24 1304, 300 mg at 10/18/24 1304    levothyroxine tablet 50 mcg, 50 mcg, Oral, Daily Before Breakfast, Wanda Bauer MD, 50 mcg at 10/18/24 0604    metoprolol tartrate (LOPRESSOR) partial tablet 12.5 mg, 12.5 mg, Oral, BID, Wanda Bauer MD, 12.5 mg at 10/18/24 0749    ondansetron (ZOFRAN) injection 4 mg, 4 mg, Intravenous, Q6H PRN, Sunil Orozco DO    pantoprazole (PROTONIX) EC tablet 40 mg, 40 mg, Oral, BID ACJose MD    polyethylene glycol (MIRALAX) packet 17 g, 17 g, Oral, BID, Pool Chiang MD, 17 g at 10/18/24 0749      Medications Prior to Admission:     amLODIPine (NORVASC) 10 mg tablet    aspirin (ECOTRIN LOW STRENGTH) 81 mg EC tablet    atorvastatin (LIPITOR) 20 mg tablet    cholecalciferol (VITAMIN D3) 1,000 units tablet    folic acid (FOLVITE) 1 mg tablet    levofloxacin (LEVAQUIN) 250 mg tablet     "levothyroxine 50 mcg tablet    metoprolol tartrate (LOPRESSOR) 50 mg tablet    montelukast (SINGULAIR) 10 mg tablet    Multiple Vitamins-Minerals (CENTRUM SILVER) tablet    olmesartan-hydrochlorothiazide (BENICAR HCT) 40-25 MG per tablet    Omega-3 Fatty Acids (FISH OIL) 1000 MG CPDR    omeprazole (PriLOSEC) 40 MG capsule    ondansetron (ZOFRAN) 4 mg tablet    Potassium 99 MG TABS    Saw Palmetto 160 MG CAPS    sulfaSALAzine (AZULFIDINE) 500 mg tablet    zinc gluconate 50 mg tablet    fexofenadine (Allegra Allergy) 180 MG tablet    Allergies   Allergen Reactions    Lisinopril     Candesartan Rash    Penicillins Rash       Physical Exam:   /59   Pulse 100   Temp 98.2 °F (36.8 °C)   Resp 18   Ht 5' 9\" (1.753 m)   Wt 70.3 kg (155 lb)   SpO2 97%   BMI 22.89 kg/m²     Physical Exam  Vitals reviewed.   Constitutional:       General: He is not in acute distress.     Appearance: Normal appearance.   HENT:      Head: Normocephalic and atraumatic.      Mouth/Throat:      Mouth: Mucous membranes are moist.   Eyes:      Extraocular Movements: Extraocular movements intact.      Conjunctiva/sclera: Conjunctivae normal.   Cardiovascular:      Rate and Rhythm: Normal rate.   Pulmonary:      Effort: Pulmonary effort is normal. No respiratory distress.   Abdominal:      General: There is no distension.   Musculoskeletal:      Cervical back: Normal range of motion.   Skin:     General: Skin is warm.      Coloration: Skin is pale. Skin is not jaundiced.   Neurological:      Mental Status: He is alert and oriented to person, place, and time. Mental status is at baseline.   Psychiatric:         Thought Content: Thought content normal.         Recent Results (from the past 48 hour(s))   Fingerstick Glucose (POCT)    Collection Time: 10/17/24 12:04 AM   Result Value Ref Range    POC Glucose 182 (H) 65 - 140 mg/dl   Serial Hemoglobin Q6hrs    Collection Time: 10/17/24 12:08 AM   Result Value Ref Range    Hemoglobin 8.0 (L) 12.0 " - 17.0 g/dL   CBC and differential    Collection Time: 10/17/24  5:25 AM   Result Value Ref Range    WBC 10.58 (H) 4.31 - 10.16 Thousand/uL    RBC 3.17 (L) 3.88 - 5.62 Million/uL    Hemoglobin 7.8 (L) 12.0 - 17.0 g/dL    Hematocrit 25.0 (L) 36.5 - 49.3 %    MCV 79 (L) 82 - 98 fL    MCH 24.6 (L) 26.8 - 34.3 pg    MCHC 31.2 (L) 31.4 - 37.4 g/dL    RDW 22.5 (H) 11.6 - 15.1 %    MPV 10.9 8.9 - 12.7 fL    Platelets 176 149 - 390 Thousands/uL    nRBC 0 /100 WBCs    Segmented % 73 43 - 75 %    Immature Grans % 1 0 - 2 %    Lymphocytes % 13 (L) 14 - 44 %    Monocytes % 10 4 - 12 %    Eosinophils Relative 2 0 - 6 %    Basophils Relative 1 0 - 1 %    Absolute Neutrophils 7.78 (H) 1.85 - 7.62 Thousands/µL    Absolute Immature Grans 0.10 0.00 - 0.20 Thousand/uL    Absolute Lymphocytes 1.41 0.60 - 4.47 Thousands/µL    Absolute Monocytes 1.03 0.17 - 1.22 Thousand/µL    Eosinophils Absolute 0.19 0.00 - 0.61 Thousand/µL    Basophils Absolute 0.07 0.00 - 0.10 Thousands/µL   Comprehensive metabolic panel    Collection Time: 10/17/24  5:25 AM   Result Value Ref Range    Sodium 142 135 - 147 mmol/L    Potassium 3.8 3.5 - 5.3 mmol/L    Chloride 111 (H) 96 - 108 mmol/L    CO2 23 21 - 32 mmol/L    ANION GAP 8 4 - 13 mmol/L    BUN 48 (H) 5 - 25 mg/dL    Creatinine 1.85 (H) 0.60 - 1.30 mg/dL    Glucose 160 (H) 65 - 140 mg/dL    Calcium 8.1 (L) 8.4 - 10.2 mg/dL    AST 24 13 - 39 U/L    ALT 30 7 - 52 U/L    Alkaline Phosphatase 122 (H) 34 - 104 U/L    Total Protein 5.9 (L) 6.4 - 8.4 g/dL    Albumin 3.6 3.5 - 5.0 g/dL    Total Bilirubin 0.49 0.20 - 1.00 mg/dL    eGFR 31 ml/min/1.73sq m   Magnesium    Collection Time: 10/17/24  5:25 AM   Result Value Ref Range    Magnesium 2.1 1.9 - 2.7 mg/dL   Prepare Leukoreduced RBC: 1 Units    Collection Time: 10/17/24  5:30 AM   Result Value Ref Range    Unit Product Code D8938X13     Unit Number [id number]     Unit ABO B     Unit RH POS     Crossmatch Compatible     Unit Dispense Status Presumed  Trans     Unit Product Volume 350 mL   Fingerstick Glucose (POCT)    Collection Time: 10/17/24  5:49 AM   Result Value Ref Range    POC Glucose 161 (H) 65 - 140 mg/dl   Fingerstick Glucose (POCT)    Collection Time: 10/17/24  1:10 PM   Result Value Ref Range    POC Glucose 219 (H) 65 - 140 mg/dl   Fingerstick Glucose (POCT)    Collection Time: 10/17/24  4:54 PM   Result Value Ref Range    POC Glucose 194 (H) 65 - 140 mg/dl   Fingerstick Glucose (POCT)    Collection Time: 10/17/24 10:00 PM   Result Value Ref Range    POC Glucose 178 (H) 65 - 140 mg/dl   CBC    Collection Time: 10/18/24  4:45 AM   Result Value Ref Range    WBC 17.29 (H) 4.31 - 10.16 Thousand/uL    RBC 2.90 (L) 3.88 - 5.62 Million/uL    Hemoglobin 7.1 (L) 12.0 - 17.0 g/dL    Hematocrit 22.3 (L) 36.5 - 49.3 %    MCV 77 (L) 82 - 98 fL    MCH 24.5 (L) 26.8 - 34.3 pg    MCHC 31.8 31.4 - 37.4 g/dL    RDW 23.1 (H) 11.6 - 15.1 %    Platelets 180 149 - 390 Thousands/uL    MPV 10.9 8.9 - 12.7 fL   Magnesium    Collection Time: 10/18/24  4:45 AM   Result Value Ref Range    Magnesium 1.8 (L) 1.9 - 2.7 mg/dL   Comprehensive metabolic panel    Collection Time: 10/18/24  4:45 AM   Result Value Ref Range    Sodium 140 135 - 147 mmol/L    Potassium 3.7 3.5 - 5.3 mmol/L    Chloride 110 (H) 96 - 108 mmol/L    CO2 21 21 - 32 mmol/L    ANION GAP 9 4 - 13 mmol/L    BUN 46 (H) 5 - 25 mg/dL    Creatinine 1.86 (H) 0.60 - 1.30 mg/dL    Glucose 173 (H) 65 - 140 mg/dL    Calcium 7.6 (L) 8.4 - 10.2 mg/dL    Corrected Calcium 8.2 (L) 8.3 - 10.1 mg/dL    AST 16 13 - 39 U/L    ALT 23 7 - 52 U/L    Alkaline Phosphatase 107 (H) 34 - 104 U/L    Total Protein 5.3 (L) 6.4 - 8.4 g/dL    Albumin 3.2 (L) 3.5 - 5.0 g/dL    Total Bilirubin 0.49 0.20 - 1.00 mg/dL    eGFR 31 ml/min/1.73sq m   Protime-INR    Collection Time: 10/18/24  4:45 AM   Result Value Ref Range    Protime 15.9 (H) 12.3 - 15.0 seconds    INR 1.25 (H) 0.85 - 1.19   Fingerstick Glucose (POCT)    Collection Time: 10/18/24   7:43 AM   Result Value Ref Range    POC Glucose 163 (H) 65 - 140 mg/dl   Fingerstick Glucose (POCT)    Collection Time: 10/18/24 11:14 AM   Result Value Ref Range    POC Glucose 245 (H) 65 - 140 mg/dl   Prepare Leukoreduced RBC: 1 Units    Collection Time: 10/18/24 11:16 AM   Result Value Ref Range    Unit Product Code D5234A41     Unit Number [id number]     Unit ABO B     Unit RH POS     Crossmatch Compatible     Unit Dispense Status Issued     Unit Product Volume 350 mL   Nanda Walker    Collection Time: 10/18/24 12:23 PM   Result Value Ref Range    Supplier Name [employer]     Supplier Phone Number [phone]     Order Status Contacting Patient     Delivery Note TEENA [phone]     Delivery Request Date 10/18/2024     Item Description Wheeled Walker, Adult        EGD    Result Date: 10/17/2024  Narrative: Table formatting from the original result was not included. [facility] [address] [facility] [address] [phone] DATE OF SERVICE: 10/17/24 PHYSICIAN(S): Attending: Phil Baires MD Fellow: Pool Chiang MD INDICATION: Symptomatic anemia POST-OP DIAGNOSIS: See the impression below. PREPROCEDURE: Informed consent was obtained for the procedure, including sedation.  Risks of perforation, hemorrhage, adverse drug reaction and aspiration were discussed. The patient was placed in the left lateral decubitus position. Patient was explained about the risks and benefits of the procedure. Risks including but not limited to bleeding, infection, and perforation were explained in detail. Also explained about less than 100% sensitivity with the exam and other alternatives. PROCEDURE: EGD DETAILS OF PROCEDURE: Patient was taken to the procedure room where a time out was performed to confirm correct patient and correct procedure. The patient underwent monitored anesthesia care, which was administered by an anesthesia professional. The patient's blood  pressure, heart rate, level of consciousness, respirations, oxygen, ECG and ETCO2 were monitored throughout the procedure. The scope was introduced through the mouth and advanced to the second part of the duodenum. Retroflexion was performed in the fundus. The patient experienced no blood loss. The procedure was not difficult. The patient tolerated the procedure well. There were no apparent adverse events. ANESTHESIA INFORMATION: ASA: III Anesthesia Type: IV Sedation with Anesthesia MEDICATIONS: No administrations occurring from 1434 to 1450 on 10/17/24 FINDINGS: Single round ulcer in the ampullary region with clean base (John III) Edematous and erythematous mucosa with erosion in the body of the stomach; performed cold forceps biopsy to rule out H. pylori 3 cm sliding hiatal hernia (type I hiatal hernia) without Nael lesions present - GE junction 42 cm from the incisors, diaphragmatic impression 45 cm from the incisors, confirmed by retroflexion:  Hill classification: Grade II The upper third of the esophagus, middle third of the esophagus and lower third of the esophagus appeared normal. The duodenal bulb and 1st part of the duodenum appeared normal. SPECIMENS: ID Type Source Tests Collected by Time Destination 1 : r/o h pylori Tissue Stomach TISSUE EXAM Phil Baires MD 10/17/2024  2:49 PM      Impression: Single ulcer in the ampullary region with clean base (John III) Edematous, erythematous mucosa with erosion in the body of the stomach; performed cold forceps biopsy 3 cm type I hiatal hernia The upper third of the esophagus, middle third of the esophagus and lower third of the esophagus appeared normal. The duodenal bulb and 1st part of the duodenum appeared normal. Ulcerated area at the ampulla is likely tumor protruding through the ampulla RECOMMENDATION: Await pathology results Proceed with EUS    Phil Baires MD     Endoscopic ultrasonography, GI (Upper)    Result Date: 10/17/2024  Narrative:  Table formatting from the original result was not included. [facility] End[facility] [address] [facility] [address] [phone] DATE OF SERVICE: 10/17/24 PHYSICIAN(S): Attending: Phil Biares MD Fellow: No Staff Documented INDICATION: Pancreatic mass POST-OP DIAGNOSIS: See the impression below. PREPROCEDURE: Informed consent was obtained for the procedure, including sedation.  Risks of perforation, hemorrhage, adverse drug reaction and aspiration were discussed. The patient was placed in the left lateral decubitus position. Patient was explained about the risks and benefits of the procedure. Risks including but not limited to bleeding, infection, and perforation were explained in detail. Also explained about less than 100% sensitivity with the exam and other alternatives. PROCEDURE: EUS UPPER DETAILS OF PROCEDURE: Patient was taken to the procedure room where a time out was performed to confirm correct patient and correct procedure. The patient underwent monitored anesthesia care, which was administered by an anesthesia professional. The patient's blood pressure, heart rate, oxygen, respirations, level of consciousness, ECG and ETCO2 were monitored throughout the procedure. The linear scope was introduced through the mouth and advanced to the third part of the duodenum. Retroflexion was performed in the fundus. The patient experienced no blood loss. The procedure was not difficult. The patient tolerated the procedure well. There were no apparent adverse events. ANESTHESIA INFORMATION: ASA: III Anesthesia Type: IV Sedation with Anesthesia MEDICATIONS: No administrations occurring from 1434 to 1537 on 10/17/24 FINDINGS: Heterogeneous and hypoechoic mass measuring 46 mm x 30 mm with well-defined margins was visualized in the head of the pancreas; 4 successful fine needle biopsy passes were taken with a 25 gauge needle using a transduodenal approach guided by Doppler. An adequate sample was  obtained. Cytology analysis was performed. Onsite cytologist was present There were no cysts in the pancreas. There were no lesions in the examined part of the liver. There was a gallstone in the gallbladder. The CBD was dilated to 10mm and PD was dilated to 8mm. There were no enlarged lymph nodes in the peripancreatic, gastrohepatic, or celiac areas. SPECIMENS: ID Type Source Tests Collected by Time Destination 1 : r/o h pylori Tissue Stomach TISSUE EXAM Phil Baires MD 10/17/2024  2:49 PM  2 : pancreatic head mass FNA Pancreas FINE NEEDLE ASPIRATION/BIOPSY Phil Baires MD 10/17/2024  3:14 PM       Impression: Heterogeneous and hypoechoic mass measuring 46 mm x 30 mm with well-defined margins was visualized in the head of the pancreas; 4 fine needle biopsy passes were taken. An adequate sample was obtained. Cytology analysis was performed. Onsite cytologist was present There were no cysts in the pancreas. There were no lesions in the examined part of the liver. There was a gallstone in the gallbladder. The CBD was dilated to 10mm and PD was dilated to 8mm. There were no enlarged lymph nodes in the peripancreatic, gastrohepatic, or celiac areas. RECOMMENDATION: Await cytology results Return to floor and resume diet Consult Oncology    Phil Baires MD     MRI abdomen wo contrast and mrcp    Result Date: 10/16/2024  Narrative: MRI OF THE ABDOMEN WITHOUT CONTRAST WITH MRCP INDICATION: 88 years / Male. Patient incidentally noted to have Pancreatic mass. Please assess further, no contrast per ordering md due to gage. COMPARISON: CT abdomen pelvis performed yesterday. TECHNIQUE: Multiplanar/multisequence MRI of the abdomen with 3D MRCP was performed without the administration of contrast. DIAGNOSTIC QUALITY: Limited by motion and the lack of IV contrast. FINDINGS: LOWER CHEST: Unremarkable. LIVER: Normal in size and configuration. No suspicious mass. Limited evaluation of hepatic and portal veins on this non-contrast  MRI is unremarkable. BILE DUCTS: Intra and extrahepatic biliary dilation with maximum CBD diameter of 10 mm. Caliber change of the CBD at the site of suspected pancreatic head mass. GALLBLADDER: 15 mm dependent gallstone. Gallbladder distention without wall thickening or pericholecystic inflammatory changes. PANCREAS: Diffuse pancreatic parenchymal atrophy with enlarged pancreatic head measuring 4.7 x 2.7 cm #2/27 likely harboring an underlying infiltrative pancreatic head mass. Diffuse pancreatic ductal dilation measuring up to 9 mm with abrupt caliber change within the pancreatic head at the site of suspected mass. Simple exophytic pancreatic neck cyst measuring 19 mm #15/17. 11 mm pancreatic body cyst #15/14. ADRENAL GLANDS: Bilateral adrenal hyperplasia more apparent on the left. SPLEEN: Normal. KIDNEYS/PROXIMAL URETERS: No hydroureteronephrosis. The slightly hyperdense left renal lesion seen on CT is not well characterized on this study secondary to motion artifact and lack of IV contrast however this is likely to represent a proteinaceous cyst  #15/19. Scattered bilateral simple renal cysts. BOWEL: No dilated loops of bowel. PERITONEUM/RETROPERITONEUM: No ascites. LYMPH NODES: No abdominal lymphadenopathy. VESSELS: No aneurysm. ABDOMINAL WALL: Unremarkable BONES: No suspicious osseous lesion.     Impression: Limited by motion artifact and lack of IV contrast. Enlarged pancreatic head measuring 4.7 x 2.7 cm, #2/27, likely harboring an underlying infiltrative pancreatic head mass. The remainder of the pancreatic parenchyma is atrophic. Intra/extrahepatic biliary dilation and pancreatic ductal dilation with caliber changes of these ducts at the site of suspected pancreatic head mass. Workstation performed: [id number]     CT abdomen pelvis wo contrast    Result Date: 10/15/2024  Narrative: CT ABDOMEN AND PELVIS WITHOUT IV CONTRAST INDICATION:   pain. Per ED notes, shortness of breath, dizziness and weakness for a  couple of months. Hemoglobin 4.7. COMPARISON: 7/17/2019 prostate MRI TECHNIQUE:  CT examination of the abdomen and pelvis was performed.   Axial, sagittal, and coronal 2D reformatted images were created from the source data and submitted for interpretation. Radiation dose length product (DLP) for this visit:  423 mGy-cm .  This examination, like all CT scans performed in the [facility], was performed utilizing techniques to minimize radiation dose exposure, including the use of iterative reconstruction and automated exposure control. IV Contrast: Not given. Enteric Contrast:  Enteric contrast was not administered. FINDINGS: ABDOMEN LOWER CHEST: Emphysema, dependent atelectasis and atherosclerosis. LIVER/BILIARY TREE: Liver is within normal limits. Intrahepatic and extrahepatic duct dilation to the level of a mass to be described below. GALLBLADDER: 1.2 cm proximal calcified gallstone and mild gallbladder distention. No wall thickening or pericholecystic fatty stranding or fluid. SPLEEN:  Within normal limits. PANCREAS: 3.4 x 3.3 cm width by depth pancreatic head/neck mass (2/67), isointense to parenchyma. Downstream pancreatic atrophy and duct dilation. 2.0 cm simple appearing cyst in the proximal pancreatic body/neck (2/59).. Probably additional tiny cysts in the distal pancreas. No calcification or secondary signs of acute pancreatitis. ADRENAL GLANDS: Mild bilateral thickening without discrete mass. KIDNEYS/URETERS: Small right cortical cyst. 1.1 cm exophytic left lower pole nodule, indeterminate by Hounsfield units (2/69). STOMACH AND BOWEL:  Within normal limits. APPENDIX:  Within normal limits. ABDOMINOPELVIC CAVITY:  No abnormal air, fluid or enlarged lymph nodes. VESSELS: Limited evaluation without IV contrast.  Normal aorta caliber. PELVIS: REPRODUCTIVE ORGANS: Similar enlarged prostate. Seminal vesicles are within normal limits. URINARY BLADDER: Prostatic ingrowth. Otherwise within normal  limits. ABDOMINAL WALL/INGUINAL REGIONS:  Within normal limits. BONES:  Degenerative changes. Small left iliac bone island. The study was marked in EPIC for immediate notification.     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 Further evaluation with pancreas MR and MRCP recommended. Small indeterminate left renal nodule, may be a complicated cyst. This can also be evaluated with MR without and with IV contrast. Other nonemergent findings above. Workstation performed: [id number]       Labs and pertinent reports reviewed.      This note has been generated by voice recognition software system.  Therefore, there may be spelling, grammar, and or syntax errors. Please contact if questions arise.

## 2024-10-19 ENCOUNTER — APPOINTMENT (INPATIENT)
Dept: CT IMAGING | Facility: HOSPITAL | Age: 89
DRG: 378 | End: 2024-10-19
Payer: MEDICARE

## 2024-10-19 PROBLEM — N18.32 CKD STAGE 3B, GFR 30-44 ML/MIN (HCC): Status: ACTIVE | Noted: 2024-10-19

## 2024-10-19 LAB
ABO GROUP BLD BPU: NORMAL
ALBUMIN SERPL BCG-MCNC: 3.3 G/DL (ref 3.5–5)
ALP SERPL-CCNC: 103 U/L (ref 34–104)
ALT SERPL W P-5'-P-CCNC: 20 U/L (ref 7–52)
ANION GAP SERPL CALCULATED.3IONS-SCNC: 8 MMOL/L (ref 4–13)
AST SERPL W P-5'-P-CCNC: 13 U/L (ref 13–39)
BASOPHILS # BLD AUTO: 0.04 THOUSANDS/ΜL (ref 0–0.1)
BASOPHILS NFR BLD AUTO: 0 % (ref 0–1)
BILIRUB SERPL-MCNC: 0.47 MG/DL (ref 0.2–1)
BPU ID: NORMAL
BUN SERPL-MCNC: 42 MG/DL (ref 5–25)
CALCIUM ALBUM COR SERPL-MCNC: 8.7 MG/DL (ref 8.3–10.1)
CALCIUM SERPL-MCNC: 8.1 MG/DL (ref 8.4–10.2)
CHLORIDE SERPL-SCNC: 110 MMOL/L (ref 96–108)
CO2 SERPL-SCNC: 23 MMOL/L (ref 21–32)
CREAT SERPL-MCNC: 1.83 MG/DL (ref 0.6–1.3)
CROSSMATCH: NORMAL
EOSINOPHIL # BLD AUTO: 0.25 THOUSAND/ΜL (ref 0–0.61)
EOSINOPHIL NFR BLD AUTO: 3 % (ref 0–6)
ERYTHROCYTE [DISTWIDTH] IN BLOOD BY AUTOMATED COUNT: 22.5 % (ref 11.6–15.1)
GFR SERPL CREATININE-BSD FRML MDRD: 32 ML/MIN/1.73SQ M
GLUCOSE SERPL-MCNC: 178 MG/DL (ref 65–140)
GLUCOSE SERPL-MCNC: 179 MG/DL (ref 65–140)
GLUCOSE SERPL-MCNC: 249 MG/DL (ref 65–140)
GLUCOSE SERPL-MCNC: 257 MG/DL (ref 65–140)
GLUCOSE SERPL-MCNC: 257 MG/DL (ref 65–140)
HCT VFR BLD AUTO: 28.3 % (ref 36.5–49.3)
HGB BLD-MCNC: 9 G/DL (ref 12–17)
IMM GRANULOCYTES # BLD AUTO: 0.05 THOUSAND/UL (ref 0–0.2)
IMM GRANULOCYTES NFR BLD AUTO: 1 % (ref 0–2)
LYMPHOCYTES # BLD AUTO: 1.06 THOUSANDS/ΜL (ref 0.6–4.47)
LYMPHOCYTES NFR BLD AUTO: 12 % (ref 14–44)
MAGNESIUM SERPL-MCNC: 1.9 MG/DL (ref 1.9–2.7)
MCH RBC QN AUTO: 24.5 PG (ref 26.8–34.3)
MCHC RBC AUTO-ENTMCNC: 31.8 G/DL (ref 31.4–37.4)
MCV RBC AUTO: 77 FL (ref 82–98)
MONOCYTES # BLD AUTO: 0.83 THOUSAND/ΜL (ref 0.17–1.22)
MONOCYTES NFR BLD AUTO: 9 % (ref 4–12)
NEUTROPHILS # BLD AUTO: 6.93 THOUSANDS/ΜL (ref 1.85–7.62)
NEUTS SEG NFR BLD AUTO: 75 % (ref 43–75)
NRBC BLD AUTO-RTO: 0 /100 WBCS
PLATELET # BLD AUTO: 178 THOUSANDS/UL (ref 149–390)
PMV BLD AUTO: 10.4 FL (ref 8.9–12.7)
POTASSIUM SERPL-SCNC: 3.5 MMOL/L (ref 3.5–5.3)
PROT SERPL-MCNC: 5.6 G/DL (ref 6.4–8.4)
RBC # BLD AUTO: 3.67 MILLION/UL (ref 3.88–5.62)
SODIUM SERPL-SCNC: 141 MMOL/L (ref 135–147)
UNIT DISPENSE STATUS: NORMAL
UNIT PRODUCT CODE: NORMAL
UNIT PRODUCT VOLUME: 350 ML
UNIT RH: NORMAL
WBC # BLD AUTO: 9.16 THOUSAND/UL (ref 4.31–10.16)

## 2024-10-19 PROCEDURE — 99232 SBSQ HOSP IP/OBS MODERATE 35: CPT | Performed by: STUDENT IN AN ORGANIZED HEALTH CARE EDUCATION/TRAINING PROGRAM

## 2024-10-19 PROCEDURE — 80053 COMPREHEN METABOLIC PANEL: CPT | Performed by: STUDENT IN AN ORGANIZED HEALTH CARE EDUCATION/TRAINING PROGRAM

## 2024-10-19 PROCEDURE — 83735 ASSAY OF MAGNESIUM: CPT | Performed by: STUDENT IN AN ORGANIZED HEALTH CARE EDUCATION/TRAINING PROGRAM

## 2024-10-19 PROCEDURE — 85025 COMPLETE CBC W/AUTO DIFF WBC: CPT | Performed by: STUDENT IN AN ORGANIZED HEALTH CARE EDUCATION/TRAINING PROGRAM

## 2024-10-19 PROCEDURE — 82948 REAGENT STRIP/BLOOD GLUCOSE: CPT

## 2024-10-19 PROCEDURE — 71260 CT THORAX DX C+: CPT

## 2024-10-19 RX ORDER — SODIUM CHLORIDE 9 MG/ML
75 INJECTION, SOLUTION INTRAVENOUS CONTINUOUS
Status: DISCONTINUED | OUTPATIENT
Start: 2024-10-19 | End: 2024-10-20

## 2024-10-19 RX ADMIN — Medication 12.5 MG: at 08:52

## 2024-10-19 RX ADMIN — INSULIN LISPRO 2 UNITS: 100 INJECTION, SOLUTION INTRAVENOUS; SUBCUTANEOUS at 12:21

## 2024-10-19 RX ADMIN — IRON SUCROSE 300 MG: 20 INJECTION, SOLUTION INTRAVENOUS at 08:52

## 2024-10-19 RX ADMIN — Medication 12.5 MG: at 17:03

## 2024-10-19 RX ADMIN — IOHEXOL 85 ML: 350 INJECTION, SOLUTION INTRAVENOUS at 14:27

## 2024-10-19 RX ADMIN — LEVOTHYROXINE SODIUM 50 MCG: 50 TABLET ORAL at 05:48

## 2024-10-19 RX ADMIN — PANTOPRAZOLE SODIUM 40 MG: 40 TABLET, DELAYED RELEASE ORAL at 05:48

## 2024-10-19 RX ADMIN — POLYETHYLENE GLYCOL 3350 17 G: 17 POWDER, FOR SOLUTION ORAL at 09:11

## 2024-10-19 RX ADMIN — POLYETHYLENE GLYCOL 3350 17 G: 17 POWDER, FOR SOLUTION ORAL at 21:34

## 2024-10-19 RX ADMIN — INSULIN LISPRO 2 UNITS: 100 INJECTION, SOLUTION INTRAVENOUS; SUBCUTANEOUS at 17:03

## 2024-10-19 RX ADMIN — INSULIN LISPRO 1 UNITS: 100 INJECTION, SOLUTION INTRAVENOUS; SUBCUTANEOUS at 08:51

## 2024-10-19 RX ADMIN — SODIUM CHLORIDE 75 ML/HR: 0.9 INJECTION, SOLUTION INTRAVENOUS at 09:47

## 2024-10-19 RX ADMIN — PANTOPRAZOLE SODIUM 40 MG: 40 TABLET, DELAYED RELEASE ORAL at 17:03

## 2024-10-19 RX ADMIN — ATORVASTATIN CALCIUM 20 MG: 20 TABLET, FILM COATED ORAL at 17:03

## 2024-10-19 NOTE — ASSESSMENT & PLAN NOTE
Acute on superimposed chronic kidney disease  Isolyte, urinary retention protocol  Management per nephrology    Recent Labs     10/17/24  0525 10/18/24  0445 10/19/24  0547   BUN 48* 46* 42*   CREATININE 1.85* 1.86* 1.83*   EGFR 31 31 32       Results from last 7 days   Lab Units 10/15/24  0714   BLOOD UA  Negative   PROTEIN UA mg/dl Trace*

## 2024-10-19 NOTE — ASSESSMENT & PLAN NOTE
Present with melena, weakness, symptomatic anemia with a hemoglobin of 4.5 status post blood transfusion.  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

## 2024-10-19 NOTE — ASSESSMENT & PLAN NOTE
Lab Results   Component Value Date    HGBA1C 7.1 (H) 10/15/2024     Recent Labs     10/18/24  1603 10/18/24  2108 10/19/24  0744 10/19/24  1144   POCGLU 160* 180* 178* 257*       Blood Sugar Average: Last 72 hrs:  (P) [id number]    Sliding scale

## 2024-10-19 NOTE — ASSESSMENT & PLAN NOTE
Lab Results   Component Value Date    EGFR 32 10/19/2024    EGFR 31 10/18/2024    EGFR 31 10/17/2024    CREATININE 1.83 (H) 10/19/2024    CREATININE 1.86 (H) 10/18/2024    CREATININE 1.85 (H) 10/17/2024

## 2024-10-19 NOTE — ASSESSMENT & PLAN NOTE
Etiology:  Suspect secondary to symptomatic severe anemia with a hemoglobin of 4.7 on admission as well as loss of autoregulation due to ARB use as well as diuretic use.  Admission creatinine of 2.44 on 10/15, increased to 2.69.  Baseline creatinine 1.4-1.66 back to 2016  olmesartan-HCTZ currently on hold  Status post IV fluids  Creatinine improving currently 1.3 and appears to have plateaued  Workup:  MRI of kidneys: No hydronephrosis, slight hyperdense left renal lesion seen on CT scan not well-characterized secondary to motion artifact and lack of IV contrast most likely represents proteinaceous cyst; scattered bilateral renal simple cysts  UA: Negative heme/trace proteinuria/no RBCs/1-2 WBCs  Plan  Continue to hold olmesartan-HCTZ for now  Avoid hypotension or perturbations of blood pressure  Avoid NSAIDs, nephrotoxins and limit IV contrast  Patient does require CTA to evaluate pancreatic mass-with stability of renal function recommend IV fluids 75 cc an hour 4 hours before and 4 hours post imaging  Patient is at risk for acute kidney injury given recent PAWEL was discussed with patient and is aware IV fluids as needed for PAWEL risk reduction.  Okay for discharge from nephrology standpoint when medically stable per primary team  Will need follow-up in the outpatient office patient prefers [place] office  BMP in a.m. if remains hospitalized

## 2024-10-19 NOTE — ASSESSMENT & PLAN NOTE
Patient requires CTA for staging  With renal function stable for the last 2 days okay to proceed with CTA however patient will require hydration pre and post managing  Recommend fluids 75 cc an hour 4 hours before and 4 hours after  Discussed with primary team

## 2024-10-19 NOTE — ASSESSMENT & PLAN NOTE
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

## 2024-10-19 NOTE — ASSESSMENT & PLAN NOTE
88 year old male presented with melena, weakness, and epigastric discomfort. He was sent in her due to outpatient labs showing a hemoglobin of 4.5. Concern for potential GI bleed.  Aspirin on hold  Received 3U pRBC, for one more unit today  Continue PPI BID  S/p EGD which showed: Single ulcer in the ampullary region with clean base (John III). Ulcerated area at the ampulla is likely tumor protruding through the ampulla  S/p EUS: Heterogeneous and hypoechoic mass measuring 46 mm x 30 mm with well-defined margins was visualized in the head of the pancreas; 4 fine needle biopsy passes were taken. An adequate sample was obtained. Cytology analysis was performed. Onsite cytologist was present. There were no cysts in the pancreas. There were no lesions in the examined part of the liver. There was a gallstone in the gallbladder. The CBD was dilated to 10mm and PD was dilated to 8mm. There were no enlarged lymph nodes in the peripancreatic, gastrohepatic, or celiac areas.  Oncology consulted, awaiting biopsy results.    Results from last 7 days   Lab Units 10/19/24  0547 10/18/24  0445 10/17/24  0525 10/17/24  0008 10/16/24  1159 10/16/24  0012 10/15/24  1746   HEMOGLOBIN g/dL 9.0* 7.1* 7.8* 8.0* 7.0*   < > 4.5*   MCV fL 77* 77* 79*  --   --    < > 67*   RDW % 22.5* 23.1* 22.5*  --   --    < > 16.3*   IRON ug/dL  --   --   --   --   --   --  64   TIBC ug/dL  --   --   --   --   --   --  271   FERRITIN ng/mL  --   --   --   --   --   --  25    < > = values in this interval not displayed.

## 2024-10-19 NOTE — ASSESSMENT & PLAN NOTE
88 year old male presented with melena, weakness, and epigastric discomfort. He was sent in her due to outpatient labs showing a hemoglobin of 4.5. Concern for potential GI bleed.  Aspirin on hold  Received 4U pRBC, for one more unit today  Continue PPI BID  S/p EGD which showed: Single ulcer in the ampullary region with clean base (John III). Ulcerated area at the ampulla is likely tumor protruding through the ampulla  S/p EUS: Heterogeneous and hypoechoic mass measuring 46 mm x 30 mm with well-defined margins was visualized in the head of the pancreas; 4 fine needle biopsy passes were taken. An adequate sample was obtained. Cytology analysis was performed. Onsite cytologist was present. There were no cysts in the pancreas. There were no lesions in the examined part of the liver. There was a gallstone in the gallbladder. The CBD was dilated to 10mm and PD was dilated to 8mm. There were no enlarged lymph nodes in the peripancreatic, gastrohepatic, or celiac areas.    Results from last 7 days   Lab Units 10/19/24  0547 10/18/24  0445 10/17/24  0525 10/17/24  0008 10/16/24  1159 10/16/24  0012 10/15/24  1746   HEMOGLOBIN g/dL 9.0* 7.1* 7.8* 8.0* 7.0*   < > 4.5*   MCV fL 77* 77* 79*  --   --    < > 67*   RDW % 22.5* 23.1* 22.5*  --   --    < > 16.3*   IRON ug/dL  --   --   --   --   --   --  64   TIBC ug/dL  --   --   --   --   --   --  271   FERRITIN ng/mL  --   --   --   --   --   --  25    < > = values in this interval not displayed.

## 2024-10-19 NOTE — ASSESSMENT & PLAN NOTE
Lab Results   Component Value Date    HGBA1C 7.1 (H) 10/15/2024     Recent Labs     10/18/24  1603 10/18/24  2108 10/19/24  0744 10/19/24  1144   POCGLU 160* 180* 178* 257*       Blood Sugar Average: Last 72 hrs:  (P) [id number]    Sliding scale

## 2024-10-19 NOTE — ASSESSMENT & PLAN NOTE
- Hemoglobin as low as 4.5 with baseline 9-10 requiring 2 units packed red blood cells  - Continue to trend hemoglobin  - Monitor stool output  - Continue PPI twice daily  - Progress diet from GI standpoint  - Okay for discharge from GI standpoint

## 2024-10-19 NOTE — ASSESSMENT & PLAN NOTE
CT Abdomen pelvis showing pancreatic mass.  MRI Abdomen showing enlarged pancreatic head likely harboring an underlying infiltrative pancreatic head mass, intra/extrahepatic biliary dilatation and pancreatic ductal dilatation  GI Recommendations appreciated. S/p EUS. Awaiting biopsy results.   Spoke to our patient and his daughter about our concerns for malignancy.  Awaiting further guidance from GI post-procedure    Recent Labs     10/17/24  0525 10/18/24  0445 10/19/24  0547   AST 24 16 13   ALT 30 23 20   TBILI 0.49 0.49 0.47   ALKPHOS 122* 107* 103   INR  --  1.25*  --       moderate assist with  ADLs

## 2024-10-19 NOTE — PROGRESS NOTES
Progress Note - Hospitalist   Name: Jose Zamora 88 y.o. male I MRN: [id number]  Unit/Bed#: [place] [address] -01 I Date of Admission: 10/15/2024   Date of Service: 10/19/2024 I Hospital Day: 4    Assessment & Plan  UGIB (upper gastrointestinal bleed)  88 year old male presented with melena, weakness, and epigastric discomfort. He was sent in her due to outpatient labs showing a hemoglobin of 4.5. Concern for potential GI bleed.  Aspirin on hold  Received 4U pRBC, for one more unit today  Continue PPI BID  S/p EGD which showed: Single ulcer in the ampullary region with clean base (John III). Ulcerated area at the ampulla is likely tumor protruding through the ampulla  S/p EUS: Heterogeneous and hypoechoic mass measuring 46 mm x 30 mm with well-defined margins was visualized in the head of the pancreas; 4 fine needle biopsy passes were taken. An adequate sample was obtained. Cytology analysis was performed. Onsite cytologist was present. There were no cysts in the pancreas. There were no lesions in the examined part of the liver. There was a gallstone in the gallbladder. The CBD was dilated to 10mm and PD was dilated to 8mm. There were no enlarged lymph nodes in the peripancreatic, gastrohepatic, or celiac areas.    Results from last 7 days   Lab Units 10/19/24  0547 10/18/24  0445 10/17/24  0525 10/17/24  0008 10/16/24  1159 10/16/24  0012 10/15/24  1746   HEMOGLOBIN g/dL 9.0* 7.1* 7.8* 8.0* 7.0*   < > 4.5*   MCV fL 77* 77* 79*  --   --    < > 67*   RDW % 22.5* 23.1* 22.5*  --   --    < > 16.3*   IRON ug/dL  --   --   --   --   --   --  64   TIBC ug/dL  --   --   --   --   --   --  271   FERRITIN ng/mL  --   --   --   --   --   --  25    < > = values in this interval not displayed.     Urinary retention  Difficulty with urination prior to admission.   Was started on levaquin 10/14 outpatient for UTI   UA today only notable for trace proteinuria  Urinary retention protocol  Patient has prostatomegaly - trial  flomax  PAWEL (acute kidney injury) (HCC)  Acute on superimposed chronic kidney disease  Isolyte, urinary retention protocol  Management per nephrology    Recent Labs     10/17/24  0525 10/18/24  0445 10/19/24  0547   BUN 48* 46* 42*   CREATININE 1.85* 1.86* 1.83*   EGFR 31 31 32       Results from last 7 days   Lab Units 10/15/24  0714   BLOOD UA  Negative   PROTEIN UA mg/dl Trace*     Pancreatic mass  CT Abdomen pelvis showing pancreatic mass.  MRI Abdomen showing enlarged pancreatic head likely harboring an underlying infiltrative pancreatic head mass, intra/extrahepatic biliary dilatation and pancreatic ductal dilatation  GI Recommendations appreciated. S/p EUS. Awaiting biopsy results.   Cleared by GI for discharge, awaiting Chest CT to be completed today. Anticipate discharge tomorrow if hemodynamically stable. Cleared by nephrology to proceed with contrast administration for CT scan.    Recent Labs     10/17/24  0525 10/18/24  0445 10/19/24  0547   AST 24 16 13   ALT 30 23 20   TBILI 0.49 0.49 0.47   ALKPHOS 122* 107* 103   INR  --  1.25*  --      DMII (diabetes mellitus, type 2) ([facility])  Lab Results   Component Value Date    HGBA1C 7.1 (H) 10/15/2024     Recent Labs     10/18/24  1603 10/18/24  2108 10/19/24  0744 10/19/24  1144   POCGLU 160* 180* 178* 257*       Blood Sugar Average: Last 72 hrs:  (P) [id number]    Sliding scale  Hyperlipidemia  Lipitor held on admission  PAD (peripheral artery disease) ([facility])  Aspirin on hold due to blood loss.   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 ([facility])  Lab Results   Component Value Date    EGFR 32 10/19/2024    EGFR 31 10/18/2024    EGFR 31 10/17/2024    CREATININE  1.83 (H) 10/19/2024    CREATININE 1.86 (H) 10/18/2024    CREATININE 1.85 (H) 10/17/2024       VTE Pharmacologic Prophylaxis:   c/I due to bleeding    Mobility:   Basic Mobility Inpatient Raw Score: 17  -[facility] Goal: 5: Stand one or more mins  -HLM Achieved: 6: Walk 10 steps or more    Discussions with Specialists or Other Care Team Provider: GI, oncology    Education and Discussions with Family / Patient: patient, daughter    Current Length of Stay: 4 day(s)  Current Patient Status: Inpatient   Certification Statement: The patient will continue to require additional inpatient hospital stay due to chest ct, dispo planning  Discharge Plan: ya to home    Code Status: Level 1 - Full Code    Subjective   Patient seen and examined. No new complaints overnight. Denies chest pain, shortness of breath, abdominal pain, fever, or chills.    Objective   Vitals:   Temp (24hrs), Av.5 °F (36.9 °C), Min:98.3 °F (36.8 °C), Max:98.8 °F (37.1 °C)    Temp:  [98.3 °F (36.8 °C)-98.8 °F (37.1 °C)] 98.3 °F (36.8 °C)  HR:  [103-107] 105  Resp:  [18-20] 18  BP: (137-168)/(62-73) 167/73  SpO2:  [94 %-97 %] 94 %  Body mass index is 22.89 kg/m².     Input and Output Summary (last 24 hours):     Intake/Output Summary (Last 24 hours) at 10/19/2024 1336  Last data filed at 10/19/2024 1306  Gross per 24 hour   Intake 1070 ml   Output 600 ml   Net 470 ml       Physical Exam  Vitals reviewed.   Constitutional:       General: He is not in acute distress.  HENT:      Head: Normocephalic.      Nose: Nose normal.      Mouth/Throat:      Mouth: Mucous membranes are moist.   Eyes:      General: No scleral icterus.  Cardiovascular:      Rate and Rhythm: Normal rate and regular rhythm.   Pulmonary:      Effort: Pulmonary effort is normal. No respiratory distress.      Breath sounds: No wheezing.   Abdominal:      General: There is no distension.      Palpations: Abdomen is soft.      Tenderness: There is no abdominal tenderness.   Skin:     General:  Skin is warm.   Neurological:      Mental Status: He is alert.   Psychiatric:         Mood and Affect: Mood normal.         Behavior: Behavior normal.       Lines/Drains:              Lab Results: I have reviewed the following results:   Results from last 7 days   Lab Units 10/19/24  0547 10/18/24  0445 10/17/24  0525 10/16/24  0012 10/15/24  1746   WBC Thousand/uL 9.16 17.29* 10.58*   < > 11.57*   HEMOGLOBIN g/dL 9.0* 7.1* 7.8*   < > 4.5*   PLATELETS Thousands/uL 178 180 176   < > 242   MCV fL 77* 77* 79*   < > 67*   INR   --  1.25*  --   --  1.30*    < > = values in this interval not displayed.     Results from last 7 days   Lab Units 10/19/24  0547 10/18/24  0445 10/17/24  0525   SODIUM mmol/L 141 140 142   POTASSIUM mmol/L 3.5 3.7 3.8   CHLORIDE mmol/L 110* 110* 111*   CO2 mmol/L 23 21 23   ANION GAP mmol/L 8 9 8   BUN mg/dL 42* 46* 48*   CREATININE mg/dL 1.83* 1.86* 1.85*   CALCIUM mg/dL 8.1* 7.6* 8.1*   ALBUMIN g/dL 3.3* 3.2* 3.6   TOTAL BILIRUBIN mg/dL 0.47 0.49 0.49   ALK PHOS U/L 103 107* 122*   ALT U/L 20 23 30   AST U/L 13 16 24   EGFR ml/min/1.73sq m 32 31 31   GLUCOSE RANDOM mg/dL 179* 173* 160*     Results from last 7 days   Lab Units 10/19/24  0547 10/18/24  0445 10/17/24  0525   MAGNESIUM mg/dL 1.9 1.8* 2.1                      Results from last 7 days   Lab Units 10/19/24  1144 10/19/24  0744 10/18/24  2108 10/18/24  1603 10/18/24  1114 10/18/24  0743 10/17/24  2200 10/17/24  1654 10/17/24  1310 10/17/24  0549 10/17/24  0004 10/16/24  1207   POC GLUCOSE mg/dl 257* 178* 180* 160* 245* 163* 178* 194* 219* 161* 182* 262*     Results from last 7 days   Lab Units 10/15/24  0714   HEMOGLOBIN A1C % 7.1*     Results from last 7 days   Lab Units 10/15/24  0714   TSH 3RD GENERATON uIU/mL 5.865*   FREE T4 ng/dL 1.10       Recent Cultures (last 7 days):         Imaging:  Reviewed radiology reports from this admission: no new imaging    Last 24 Hours Medication List:     Current Facility-Administered  Medications:     acetaminophen (TYLENOL) tablet 975 mg, Q8H PRN    atorvastatin (LIPITOR) tablet 20 mg, Daily With Dinner    insulin lispro (HumALOG/ADMELOG) 100 units/mL subcutaneous injection 1-5 Units, TID AC **AND** Fingerstick Glucose (POCT), TID AC    iron sucrose (VENOFER) 300 mg in sodium chloride 0.9 % 250 mL IVPB, Daily, Last Rate: 300 mg (10/19/24 0820)    levothyroxine tablet 50 mcg, Daily Before Breakfast    metoprolol tartrate (LOPRESSOR) partial tablet 12.5 mg, BID    ondansetron (ZOFRAN) injection 4 mg, Q6H PRN    pantoprazole (PROTONIX) EC tablet 40 mg, BID AC    polyethylene glycol (MIRALAX) packet 17 g, BID    sodium chloride 0.9 % infusion, Continuous, Last Rate: 75 mL/hr (10/19/24 0959)    **Please Note: This note may have been constructed using a voice recognition system.**

## 2024-10-19 NOTE — PROGRESS NOTES
Progress Note - Gastroenterology   Name: Jose Zamora 88 y.o. male I MRN: [id number]  Unit/Bed#: [place] [address] -01 I Date of Admission: 10/15/2024   Date of Service: 10/19/2024 I Hospital Day: 4       88-year-old male with history of type 2 diabetes mellitus, hypertension, hypothyroidism and pancolonic ulcerative colitis who presented on 10/15 due to melena, weight loss and weakness found to have new pancreatic head mass and acute blood loss anemia with EUS performed on 10/17 showing large pancreatic head mass extending ampulla with evidence of ampullary ulcer  Assessment & Plan  UGIB (upper gastrointestinal bleed)  - Hemoglobin as low as 4.5 with baseline 9-10 requiring 2 units packed red blood cells  - Continue to trend hemoglobin  - Monitor stool output  - Continue PPI twice daily  - Progress diet from GI standpoint  - Okay for discharge from GI standpoint  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)  - Continue mesalamine outpatient  - Follow-up outpatient  Pancreatic mass  - Agree with oncology recommendations for lab work and CT chest for staging prior to discharge  - Follow-up with oncology  - Biopsies pending      GI will sign off at this time.  Please reach out to on-call GI fellow for further GI needs.  ______________________________________________________________________    Subjective:  Patient without bowel movement since admission.  No melena or hematochezia.  Denies nausea or vomiting.  Denies abdominal pain.  Overall appears quite well.  Tolerating diet    Medication Administration - last 24 hours from 10/18/2024 1057 to 10/19/2024 1057         Date/Time Order Dose Route Action Action by     10/18/2024 1300 EDT pantoprazole (PROTONIX) 80 mg in sodium chloride 0.9 % 100 mL infusion 0 mg/hr Intravenous Stopped Shavon Padgett RN     10/19/2024 0430 EDT atorvastatin (LIPITOR) tablet 20 mg -- Oral Unheld by provider Wanda Bauer MD     10/18/2024 1630 EDT atorvastatin (LIPITOR) tablet 20 mg  0 mg Oral Hold Shavon Padgett RN     10/19/2024 0700 EDT levothyroxine tablet 50 mcg -- Oral Canceled Entry Martha Marquez RN     10/19/2024 0548 EDT levothyroxine tablet 50 mcg 50 mcg Oral Given Martha Marquez RN     10/19/2024 0852 EDT metoprolol tartrate (LOPRESSOR) partial tablet 12.5 mg 12.5 mg Oral Given Keegan Multani     10/18/2024 1704 EDT metoprolol tartrate (LOPRESSOR) partial tablet 12.5 mg 12.5 mg Oral Given Shavon Padgett RN     10/19/2024 0911 EDT polyethylene glycol (MIRALAX) packet 17 g 17 g Oral Given Keegan Multani     10/18/2024 2133 EDT polyethylene glycol (MIRALAX) packet 17 g 17 g Oral Given Martha Marquez RN     10/19/2024 0851 EDT insulin lispro (HumALOG/ADMELOG) 100 units/mL subcutaneous injection 1-5 Units 1 Units Subcutaneous Given Keegan Multani     10/18/2024 1707 EDT insulin lispro (HumALOG/ADMELOG) 100 units/mL subcutaneous injection 1-5 Units 1 Units Subcutaneous Given Shavon Padgett RN     10/18/2024 1304 EDT insulin lispro (HumALOG/ADMELOG) 100 units/mL subcutaneous injection 1-5 Units 2 Units Subcutaneous Given Shavon Padgett RN     10/18/2024 1300 EDT multi-electrolyte (PLASMALYTE-A/ISOLYTE-S PH 7.4) IV solution 0 mL/hr Intravenous Stopped Shavon Padgett RN     10/19/2024 0700 EDT pantoprazole (PROTONIX) EC tablet 40 mg -- Oral Canceled Entry Martha Marquez RN     10/19/2024 0548 EDT pantoprazole (PROTONIX) EC tablet 40 mg 40 mg Oral Given Martha Marquez RN     10/18/2024 1704 EDT pantoprazole (PROTONIX) EC tablet 40 mg 40 mg Oral Given Shavon Padgett RN     10/19/2024 0852 EDT iron sucrose (VENOFER) 300 mg in sodium chloride 0.9 % 250 mL IVPB 300 mg Intravenous New Bag Keegan Multani     10/18/2024 1304 EDT iron sucrose (VENOFER) 300 mg in sodium chloride 0.9 % 250 mL IVPB 300 mg Intravenous New Bag Shavon Padgett RN     10/19/2024 0947 EDT sodium chloride 0.9 % infusion 75 mL/hr Intravenous New Bag Keegan Multani            Objective:     Vitals: Blood pressure  "167/73, pulse 105, temperature 98.3 °F (36.8 °C), temperature source Oral, resp. rate 18, height 5' 9\" (1.753 m), weight 70.3 kg (155 lb), SpO2 94%.,Body mass index is 22.89 kg/m².      Intake/Output Summary (Last 24 hours) at 10/19/2024 1057  Last data filed at 10/18/2024 1705  Gross per 24 hour   Intake 590 ml   Output 600 ml   Net -10 ml       Physical Exam:   General Appearance: Awake and alert, in no acute distress  Abdomen: Soft, non-tender, non-distended; bowel sounds normal; no masses or no organomegaly    Invasive Devices       Peripheral Intravenous Line  Duration             Peripheral IV 10/15/24 Right;Upper;Ventral (anterior) Arm 3 days    Peripheral IV 10/17/24 Distal;Right;Ventral (anterior) Forearm 1 day    Peripheral IV 10/18/24 Left Antecubital <1 day                    Lab Results:  No results displayed because visit has over 200 results.          Imaging Studies: I have personally reviewed pertinent imaging studies.      "

## 2024-10-19 NOTE — PLAN OF CARE
Problem: Potential for Falls  Goal: Patient will remain free of falls  Description: INTERVENTIONS:  - Educate patient/family on patient safety including physical limitations  - Instruct patient to call for assistance with activity   - Consult OT/PT to assist with strengthening/mobility   - Keep Call bell within reach  - Keep bed low and locked with side rails adjusted as appropriate  - Keep care items and personal belongings within reach  - Initiate and maintain comfort rounds  - Make Fall Risk Sign visible to staff  - Offer Toileting every 2 Hours, in advance of need  - Initiate/Maintain alarm  - Obtain necessary fall risk management equipment  - Apply yellow socks and bracelet for high fall risk patients  - Consider moving patient to room near nurses station  Outcome: Progressing     Problem: SAFETY ADULT  Goal: Patient will remain free of falls  Description: INTERVENTIONS:  - Educate patient/family on patient safety including physical limitations  - Instruct patient to call for assistance with activity   - Consult OT/PT to assist with strengthening/mobility   - Keep Call bell within reach  - Keep bed low and locked with side rails adjusted as appropriate  - Keep care items and personal belongings within reach  - Initiate and maintain comfort rounds  - Make Fall Risk Sign visible to staff  - Offer Toileting every 2 Hours, in advance of need  - Initiate/Maintain alarm  - Obtain necessary fall risk management equipment  - Apply yellow socks and bracelet for high fall risk patients  - Consider moving patient to room near nurses station  Outcome: Progressing     Problem: HEMATOLOGIC - ADULT  Goal: Maintains hematologic stability  Description: INTERVENTIONS  - Assess for signs and symptoms of bleeding or hemorrhage  - Monitor labs  - Administer supportive blood products/factors as ordered and appropriate  Outcome: Progressing

## 2024-10-19 NOTE — ASSESSMENT & PLAN NOTE
CT Abdomen pelvis showing pancreatic mass.  MRI Abdomen showing enlarged pancreatic head likely harboring an underlying infiltrative pancreatic head mass, intra/extrahepatic biliary dilatation and pancreatic ductal dilatation  GI Recommendations appreciated. S/p EUS. Awaiting biopsy results.   Cleared by GI for discharge, awaiting Chest CT to be completed today. Anticipate discharge tomorrow if hemodynamically stable. Cleared by nephrology to proceed with contrast administration for CT scan.    Recent Labs     10/17/24  0525 10/18/24  0445 10/19/24  0547   AST 24 16 13   ALT 30 23 20   TBILI 0.49 0.49 0.47   ALKPHOS 122* 107* 103   INR  --  1.25*  --

## 2024-10-19 NOTE — PROGRESS NOTES
Progress Note - Nephrology   Name: Jose Zamora 88 y.o. male I MRN: [id number]  Unit/Bed#: [place] [address] -01 I Date of Admission: 10/15/2024   Date of Service: 10/19/2024 I Hospital Day: 4     Assessment & Plan  PAWEL (acute kidney injury) ([facility])  Etiology:  Suspect secondary to symptomatic severe anemia with a hemoglobin of 4.7 on admission as well as loss of autoregulation due to ARB use as well as diuretic use.  Admission creatinine of 2.44 on 10/15, increased to 2.69.  Baseline creatinine 1.4-1.66 back to 2016  olmesartan-HCTZ currently on hold  Status post IV fluids  Creatinine improving currently 1.3 and appears to have plateaued  Workup:  MRI of kidneys: No hydronephrosis, slight hyperdense left renal lesion seen on CT scan not well-characterized secondary to motion artifact and lack of IV contrast most likely represents proteinaceous cyst; scattered bilateral renal simple cysts  UA: Negative heme/trace proteinuria/no RBCs/1-2 WBCs  Plan  Continue to hold olmesartan-HCTZ for now  Avoid hypotension or perturbations of blood pressure  Avoid NSAIDs, nephrotoxins and limit IV contrast  Patient does require CTA to evaluate pancreatic mass-with stability of renal function recommend IV fluids 75 cc an hour 4 hours before and 4 hours post imaging  Patient is at risk for acute kidney injury given recent PAWEL was discussed with patient and is aware IV fluids as needed for PAWEL risk reduction.  Okay for discharge from nephrology standpoint when medically stable per primary team  Will need follow-up in the outpatient office patient prefers [facility] in a.m. 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  Hemoglobin 9.0 this morning  Receiving IV Venofer low ferritin at 25 -have acceptable iron saturation of 24% f   Hypertension  Current blood pressure 130s  Currently on metoprolol 12.5 mg 2 times daily  As above keep holding olmesartan with HCTZ and relatively good blood pressure   If needed could add small dose of amlodipine  DMII (diabetes mellitus, type 2) (HCC)  Lab Results   Component Value Date    HGBA1C 7.1 (H) 10/15/2024   Management per primary team    PAD (peripheral artery disease) (HCC)  Asymptomatic per primary service  Urinary retention  Follow urinary retention protocol  Management per primary team  Pancreatic mass  Patient requires CTA for staging  With renal function stable for the last 2 days okay to proceed with CTA however patient will require hydration pre and post managing  Recommend fluids 75 cc an hour 4 hours before and 4 hours after  Discussed with primary team     Overall recommendations and plans which has been reviewed with the primary team in agreement with the plan as stated below  Continue to hold olmesartan and HCTZ  Consider adding amlodipine low-dose if needed for blood pressure control  Check BMP in a.m. if remains hospitalized  Continue to receive IV iron for low ferritin  Okay for CTA with pre and post IV hydration given stable renal function for staging of  pancreatic mass recently seen  Okay for discharge from nephrology standpoint when medically ready for primary team  Nephrology office messaged to arrange hospital follow-up in the [place] office for CKD management    Review of systems  Patient seen and examined at bedside:  Review of Systems   Constitutional: Negative.  Negative for activity change, appetite change, chills, diaphoresis, fatigue and fever.   HENT: Negative.  Negative for congestion and facial swelling.    Respiratory: Negative.     Cardiovascular: Negative.    Gastrointestinal: Negative.    Endocrine: Negative.    Genitourinary: Negative.    Musculoskeletal: Negative.    Skin: Negative.    Allergic/Immunologic: Negative.    Neurological: Negative.    Hematological: Negative.    Psychiatric/Behavioral: Negative.            Physical exam:  Current Weight: Weight - Scale: 70.3 kg (155 lb)  Vitals:    10/18/24 1427 10/18/24 1705 10/18/24 2110 10/19/24 0744   BP:  137/62 168/73 167/73   BP Location:    Right arm   Pulse:  (!) 106 (!) 107 105   Resp: 18 20 18   Temp:   98.8 °F (37.1 °C) 98.3 °F (36.8 °C)   TempSrc: Oral   Oral   SpO2:  96% 95% 94%   Weight:       Height:           Intake/Output Summary (Last 24 hours) at 10/19/2024 0856  Last data filed at 10/18/2024 1705  Gross per 24 hour   Intake 830 ml   Output 600 ml   Net 230 ml       Physical Exam  Vitals and nursing note reviewed.   Constitutional:       Appearance: Normal appearance.   HENT:      Head: Normocephalic.      Nose: Nose normal.      Mouth/Throat:      Mouth: Mucous membranes are moist.      Pharynx: Oropharynx is clear.   Eyes:      Extraocular Movements: Extraocular movements intact.      Conjunctiva/sclera: Conjunctivae normal.   Cardiovascular:      Rate and Rhythm: Normal rate and regular rhythm.      Pulses: Normal pulses.   Pulmonary:      Breath sounds: Normal breath sounds.   Abdominal:      General: Bowel sounds are normal.      Palpations: Abdomen is soft.    Musculoskeletal:         General: Normal range of motion.      Cervical back: Normal range of motion.   Skin:     General: Skin is warm.   Neurological:      General: No focal deficit present.      Mental Status: He is alert.   Psychiatric:         Mood and Affect: Mood normal.         Behavior: Behavior normal.            Medications:    Current Facility-Administered Medications:     acetaminophen (TYLENOL) tablet 975 mg, 975 mg, Oral, Q8H PRN, Wanda Bauer MD, 975 mg at 10/17/24 1938    atorvastatin (LIPITOR) tablet 20 mg, 20 mg, Oral, Daily With Dinner, Wanda Bauer MD    insulin lispro (HumALOG/ADMELOG) 100 units/mL subcutaneous injection 1-5 Units, 1-5 Units, Subcutaneous, TID AC, 1 Units at 10/19/24 0851 **AND** Fingerstick Glucose (POCT), , , TID AC, Jose Dozier MD    iron sucrose (VENOFER) 300 mg in sodium chloride 0.9 % 250 mL IVPB, 300 mg, Intravenous, Daily, Neil Paz MD, Last Rate: 166.7 mL/hr at 10/18/24 1304, 300 mg at 10/18/24 1304    levothyroxine tablet 50 mcg, 50 mcg, Oral, Daily Before Breakfast, Wanda Bauer MD, 50 mcg at 10/19/24 0548    metoprolol tartrate (LOPRESSOR) partial tablet 12.5 mg, 12.5 mg, Oral, BID, Wanda Bauer MD, 12.5 mg at 10/18/24 1704    ondansetron (ZOFRAN) injection 4 mg, 4 mg, Intravenous, Q6H PRN, Sunil Orozco DO    pantoprazole (PROTONIX) EC tablet 40 mg, 40 mg, Oral, BID AC, Jose Dozier MD, 40 mg at 10/19/24 0548    polyethylene glycol (MIRALAX) packet 17 g, 17 g, Oral, BID, Pool Chiang MD, 17 g at 10/18/24 6473    Laboratory Results:  Results from last 7 days   Lab Units 10/19/24  0547 10/18/24  0445 10/17/24  0525 10/17/24  0008 10/16/24  1159 10/16/24  0603 10/16/24  0012 10/15/24  1746 10/15/24  0714   WBC Thousand/uL 9.16 17.29* 10.58*  --   --  9.98  --  11.57* 13.05*   HEMOGLOBIN g/dL 9.0* 7.1* 7.8* 8.0* 7.0* 6.7* 6.0* 4.5* 4.7*   HEMATOCRIT % 28.3* 22.3* 25.0*  --   --  21.3*  --  15.3* 16.0*   PLATELETS Thousands/uL 178 180  "176  --   --  184  --  242 266   SODIUM mmol/L 141 140 142  --   --  141  --  135 141   POTASSIUM mmol/L 3.5 3.7 3.8  --   --  4.2  --  5.0 5.2   CHLORIDE mmol/L 110* 110* 111*  --   --  112*  --  106 108   CO2 mmol/L 23 21 23  --   --  20*  --  17* 18*   BUN mg/dL 42* 46* 48*  --   --  63*  --  73* 69*   CREATININE mg/dL 1.83* 1.86* 1.85*  --   --  2.33*  --  2.69* 2.44*   CALCIUM mg/dL 8.1* 7.6* 8.1*  --   --  8.2*  --  8.1* 8.1*   MAGNESIUM mg/dL 1.9 1.8* 2.1  --   --   --   --   --   --          Medical records through [facility] and care everywhere has been reviewed for this encounter    Portions of the record may have been created with voice recognition software. Occasional wrong word or \"sound a like\" substitutions may have occurred due to the inherent limitations of voice recognition software. Read the chart carefully and recognize,   "

## 2024-10-19 NOTE — ASSESSMENT & PLAN NOTE
- Agree with oncology recommendations for lab work and CT chest for staging prior to discharge  - Follow-up with oncology  - Biopsies pending

## 2024-10-19 NOTE — ASSESSMENT & PLAN NOTE
Current blood pressure 130s  Currently on metoprolol 12.5 mg 2 times daily  As above keep holding olmesartan with HCTZ and relatively good blood pressure   If needed could add small dose of amlodipine

## 2024-10-20 PROBLEM — E83.42 HYPOMAGNESEMIA: Status: ACTIVE | Noted: 2024-10-20

## 2024-10-20 PROBLEM — E87.6 HYPOKALEMIA: Status: ACTIVE | Noted: 2024-10-20

## 2024-10-20 LAB
ANION GAP SERPL CALCULATED.3IONS-SCNC: 8 MMOL/L (ref 4–13)
BUN SERPL-MCNC: 33 MG/DL (ref 5–25)
CALCIUM SERPL-MCNC: 8 MG/DL (ref 8.4–10.2)
CANCER AG125 SERPL-ACNC: 23.4 U/ML (ref 0–35)
CEA SERPL-MCNC: 3.6 NG/ML (ref 0–3)
CHLORIDE SERPL-SCNC: 111 MMOL/L (ref 96–108)
CO2 SERPL-SCNC: 23 MMOL/L (ref 21–32)
CREAT SERPL-MCNC: 1.63 MG/DL (ref 0.6–1.3)
ERYTHROCYTE [DISTWIDTH] IN BLOOD BY AUTOMATED COUNT: 22.4 % (ref 11.6–15.1)
GFR SERPL CREATININE-BSD FRML MDRD: 37 ML/MIN/1.73SQ M
GLUCOSE SERPL-MCNC: 188 MG/DL (ref 65–140)
GLUCOSE SERPL-MCNC: 196 MG/DL (ref 65–140)
GLUCOSE SERPL-MCNC: 198 MG/DL (ref 65–140)
HCT VFR BLD AUTO: 27.6 % (ref 36.5–49.3)
HGB BLD-MCNC: 8.8 G/DL (ref 12–17)
MAGNESIUM SERPL-MCNC: 1.8 MG/DL (ref 1.9–2.7)
MCH RBC QN AUTO: 25.4 PG (ref 26.8–34.3)
MCHC RBC AUTO-ENTMCNC: 31.9 G/DL (ref 31.4–37.4)
MCV RBC AUTO: 80 FL (ref 82–98)
PLATELET # BLD AUTO: 196 THOUSANDS/UL (ref 149–390)
PMV BLD AUTO: 11.2 FL (ref 8.9–12.7)
POTASSIUM SERPL-SCNC: 3.5 MMOL/L (ref 3.5–5.3)
RBC # BLD AUTO: 3.47 MILLION/UL (ref 3.88–5.62)
SODIUM SERPL-SCNC: 142 MMOL/L (ref 135–147)
WBC # BLD AUTO: 9.75 THOUSAND/UL (ref 4.31–10.16)

## 2024-10-20 PROCEDURE — 85027 COMPLETE CBC AUTOMATED: CPT | Performed by: STUDENT IN AN ORGANIZED HEALTH CARE EDUCATION/TRAINING PROGRAM

## 2024-10-20 PROCEDURE — 99222 1ST HOSP IP/OBS MODERATE 55: CPT | Performed by: SURGERY

## 2024-10-20 PROCEDURE — 86304 IMMUNOASSAY TUMOR CA 125: CPT

## 2024-10-20 PROCEDURE — 82948 REAGENT STRIP/BLOOD GLUCOSE: CPT

## 2024-10-20 PROCEDURE — 80048 BASIC METABOLIC PNL TOTAL CA: CPT | Performed by: STUDENT IN AN ORGANIZED HEALTH CARE EDUCATION/TRAINING PROGRAM

## 2024-10-20 PROCEDURE — 82378 CARCINOEMBRYONIC ANTIGEN: CPT

## 2024-10-20 PROCEDURE — 99239 HOSP IP/OBS DSCHRG MGMT >30: CPT | Performed by: STUDENT IN AN ORGANIZED HEALTH CARE EDUCATION/TRAINING PROGRAM

## 2024-10-20 PROCEDURE — 86301 IMMUNOASSAY TUMOR CA 19-9: CPT

## 2024-10-20 PROCEDURE — 83735 ASSAY OF MAGNESIUM: CPT | Performed by: STUDENT IN AN ORGANIZED HEALTH CARE EDUCATION/TRAINING PROGRAM

## 2024-10-20 PROCEDURE — 99232 SBSQ HOSP IP/OBS MODERATE 35: CPT | Performed by: STUDENT IN AN ORGANIZED HEALTH CARE EDUCATION/TRAINING PROGRAM

## 2024-10-20 RX ORDER — AMLODIPINE BESYLATE 5 MG/1
5 TABLET ORAL DAILY
Status: DISCONTINUED | OUTPATIENT
Start: 2024-10-20 | End: 2024-10-20 | Stop reason: HOSPADM

## 2024-10-20 RX ORDER — AMLODIPINE BESYLATE 5 MG/1
5 TABLET ORAL DAILY
Qty: 30 TABLET | Refills: 0 | Status: SHIPPED | OUTPATIENT
Start: 2024-10-21 | End: 2024-11-20

## 2024-10-20 RX ORDER — PANTOPRAZOLE SODIUM 40 MG/1
40 TABLET, DELAYED RELEASE ORAL
Qty: 60 TABLET | Refills: 0 | Status: SHIPPED | OUTPATIENT
Start: 2024-10-20 | End: 2024-11-19

## 2024-10-20 RX ORDER — MAGNESIUM SULFATE HEPTAHYDRATE 40 MG/ML
2 INJECTION, SOLUTION INTRAVENOUS ONCE
Status: COMPLETED | OUTPATIENT
Start: 2024-10-20 | End: 2024-10-20

## 2024-10-20 RX ORDER — METOPROLOL TARTRATE 25 MG/1
12.5 TABLET, FILM COATED ORAL 2 TIMES DAILY
Qty: 30 TABLET | Refills: 0 | Status: SHIPPED | OUTPATIENT
Start: 2024-10-20 | End: 2024-11-19

## 2024-10-20 RX ORDER — POTASSIUM CHLORIDE 1500 MG/1
20 TABLET, EXTENDED RELEASE ORAL ONCE
Status: COMPLETED | OUTPATIENT
Start: 2024-10-20 | End: 2024-10-20

## 2024-10-20 RX ADMIN — POLYETHYLENE GLYCOL 3350 17 G: 17 POWDER, FOR SOLUTION ORAL at 08:00

## 2024-10-20 RX ADMIN — IRON SUCROSE 300 MG: 20 INJECTION, SOLUTION INTRAVENOUS at 08:00

## 2024-10-20 RX ADMIN — AMLODIPINE BESYLATE 5 MG: 5 TABLET ORAL at 09:07

## 2024-10-20 RX ADMIN — POTASSIUM CHLORIDE 20 MEQ: 1500 TABLET, EXTENDED RELEASE ORAL at 09:07

## 2024-10-20 RX ADMIN — INSULIN LISPRO 1 UNITS: 100 INJECTION, SOLUTION INTRAVENOUS; SUBCUTANEOUS at 07:56

## 2024-10-20 RX ADMIN — Medication 12.5 MG: at 08:00

## 2024-10-20 RX ADMIN — LEVOTHYROXINE SODIUM 50 MCG: 50 TABLET ORAL at 06:09

## 2024-10-20 RX ADMIN — MAGNESIUM SULFATE HEPTAHYDRATE 2 G: 40 INJECTION, SOLUTION INTRAVENOUS at 10:20

## 2024-10-20 RX ADMIN — PANTOPRAZOLE SODIUM 40 MG: 40 TABLET, DELAYED RELEASE ORAL at 06:09

## 2024-10-20 RX ADMIN — INSULIN LISPRO 1 UNITS: 100 INJECTION, SOLUTION INTRAVENOUS; SUBCUTANEOUS at 11:47

## 2024-10-20 NOTE — PLAN OF CARE
Problem: SAFETY ADULT  Goal: Patient will remain free of falls  Description: INTERVENTIONS:  - Educate patient/family on patient safety including physical limitations  - Instruct patient to call for assistance with activity   - Consult OT/PT to assist with strengthening/mobility   - Keep Call bell within reach  - Keep bed low and locked with side rails adjusted as appropriate  - Keep care items and personal belongings within reach  - Initiate and maintain comfort rounds  - Make Fall Risk Sign visible to staff  - Offer Toileting every 2 Hours, in advance of need  - Initiate/Maintain alarm  - Obtain necessary fall risk management equipment  - Apply yellow socks and bracelet for high fall risk patients  - Consider moving patient to room near nurses station  Outcome: Progressing     Problem: HEMATOLOGIC - ADULT  Goal: Maintains hematologic stability  Description: INTERVENTIONS  - Assess for signs and symptoms of bleeding or hemorrhage  - Monitor labs  - Administer supportive blood products/factors as ordered and appropriate  Outcome: Progressing

## 2024-10-20 NOTE — ASSESSMENT & PLAN NOTE
Acute on superimposed chronic kidney disease  Improved, cleared by nephrology for discharge    Recent Labs     10/18/24  0445 10/19/24  0547 10/20/24  0603   BUN 46* 42* 33*   CREATININE 1.86* 1.83* 1.63*   EGFR 31 32 37       Results from last 7 days   Lab Units 10/15/24  0714   BLOOD UA  Negative   PROTEIN UA mg/dl Trace*

## 2024-10-20 NOTE — ASSESSMENT & PLAN NOTE
88 year old male presented with melena, weakness, and epigastric discomfort. He was sent in her due to outpatient labs showing a hemoglobin of 4.5. Concern for potential GI bleed.  Aspirin on hold, GI recommends against NSAIDS   Received 4U pRBC,   S/p EGD and EUS. Outpatient GI follow-up. Continue PPI BID.    Results from last 7 days   Lab Units 10/20/24  0603 10/19/24  0547 10/18/24  0445 10/17/24  0525 10/17/24  0008 10/16/24  0012 10/15/24  1746   HEMOGLOBIN g/dL 8.8* 9.0* 7.1* 7.8* 8.0*   < > 4.5*   MCV fL 80* 77* 77* 79*  --    < > 67*   RDW % 22.4* 22.5* 23.1* 22.5*  --    < > 16.3*   IRON ug/dL  --   --   --   --   --   --  64   TIBC ug/dL  --   --   --   --   --   --  271   FERRITIN ng/mL  --   --   --   --   --   --  25    < > = values in this interval not displayed.

## 2024-10-20 NOTE — ASSESSMENT & PLAN NOTE
Will give magnesium 2 g IV x 1 today for hypomagnesia mag 1.8  Check magnesium in a.m. if remains hospitalized

## 2024-10-20 NOTE — ASSESSMENT & PLAN NOTE
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 Surgery consulted on day of discharge for streamlining outpatient follow up.    -GI consulted s/p EUS Bx of lesion on 10/17, awaiting pathology  -Med Onc consulted   -collect tumor markers (recommend CEA, CA 19-9, )   -follow path report   -Chest CT 10/19 negative for metastatic nodules  -patient will need referral for outpatient follow up with Dr. Rodriguez after pathology results (estimate date for 2 weeks from discharge)  -recommend obtaining outpatient CT w/ pancreatic protocol before appointment   -if unable to get IV contrast for CT, would recommend MRI w/ contrast  -further surgical plan to be discussed at outpatient follow up

## 2024-10-20 NOTE — ASSESSMENT & PLAN NOTE
Lab Results   Component Value Date    HGBA1C 7.1 (H) 10/15/2024       Recent Labs     10/19/24  1144 10/19/24  1626 10/19/24  2103 10/20/24  0744   POCGLU 257* 249* 257* 196*       Blood Sugar Average: Last 72 hrs:  (P) [id number]

## 2024-10-20 NOTE — ASSESSMENT & PLAN NOTE
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

## 2024-10-20 NOTE — ASSESSMENT & PLAN NOTE
Etiology:  Suspect secondary to symptomatic severe anemia,  loss of autoregulation due to ARB use as well as diuretic use.  Admission creatinine of 2.44 on 10/15, increased to 2.69.  Baseline creatinine 1.4-1.66 back to 2016  olmesartan-HCTZ currently on hold  Status post IV fluids  Creatinine improving currently 1.63  S/p CTA 10/19/2024  Workup:  MRI of kidneys: No hydronephrosis, slight hyperdense left renal lesion seen on CT scan not well-characterized secondary to motion artifact and lack of IV contrast most likely represents proteinaceous cyst; scattered bilateral renal simple cysts  UA: Negative heme/trace proteinuria/no RBCs/1-2 WBCs  Plan  Continue to hold olmesartan-HCTZ for now  Avoid hypotension or perturbations of blood pressure  Avoid NSAIDs, nephrotoxins and limit IV contrast  Okay for discharge from nephrology standpoint when medically stable per primary team  Will need follow-up in the outpatient office patient prefers [place] office  BMP in a.m. if remains hospitalized

## 2024-10-20 NOTE — ASSESSMENT & PLAN NOTE
Lab Results   Component Value Date    HGBA1C 7.1 (H) 10/15/2024     Recent Labs     10/19/24  1626 10/19/24  2103 10/20/24  0744 10/20/24  1124   POCGLU 249* 257* 196* 198*       Blood Sugar Average: Last 72 hrs:  (P) [id number]  Outpatient PCP follow-up

## 2024-10-20 NOTE — ASSESSMENT & PLAN NOTE
Present with melena, weakness, symptomatic anemia with a hemoglobin of 4.5 status post blood transfusion.  Status post EGD/EUS 10/17: 1 cm ulcerated ampulla due to mass effect biopsy pending   Hemoglobin 8.8  this morning  Receiving IV Venofer low ferritin at 25 -have acceptable iron saturation of 24%

## 2024-10-20 NOTE — DISCHARGE SUMMARY
Discharge Summary - Hospitalist   Name: Jose Zamora 88 y.o. male I MRN: [id number]  Unit/Bed#: [place] [address] -01 I Date of Admission: 10/15/2024   Date of Service: 10/20/2024 I Hospital Day: 5     Assessment & Plan  UGIB (upper gastrointestinal bleed)  88 year old male presented with melena, weakness, and epigastric discomfort. He was sent in her due to outpatient labs showing a hemoglobin of 4.5. Concern for potential GI bleed.  Aspirin on hold, GI recommends against NSAIDS   Received 4U pRBC,   S/p EGD and EUS. Outpatient GI follow-up. Continue PPI BID.    Results from last 7 days   Lab Units 10/20/24  0603 10/19/24  0547 10/18/24  0445 10/17/24  0525 10/17/24  0008 10/16/24  0012 10/15/24  1746   HEMOGLOBIN g/dL 8.8* 9.0* 7.1* 7.8* 8.0*   < > 4.5*   MCV fL 80* 77* 77* 79*  --    < > 67*   RDW % 22.4* 22.5* 23.1* 22.5*  --    < > 16.3*   IRON ug/dL  --   --   --   --   --   --  64   TIBC ug/dL  --   --   --   --   --   --  271   FERRITIN ng/mL  --   --   --   --   --   --  25    < > = values in this interval not displayed.     Urinary retention  Difficulty with urination prior to admission.   Was started on levaquin 10/14 outpatient for UTI   PAWEL (acute kidney injury) (HCC)  Acute on superimposed chronic kidney disease  Improved, cleared by nephrology for discharge    Recent Labs     10/18/24  0445 10/19/24  0547 10/20/24  0603   BUN 46* 42* 33*   CREATININE 1.86* 1.83* 1.63*   EGFR 31 32 37       Results from last 7 days   Lab Units 10/15/24  0714   BLOOD UA  Negative   PROTEIN UA mg/dl Trace*     Pancreatic mass  CT Abdomen pelvis showing pancreatic mass.  MRI Abdomen showing enlarged pancreatic head likely harboring an underlying infiltrative pancreatic head mass, intra/extrahepatic biliary dilatation and pancreatic ductal dilatation  GI Recommendations appreciated. S/p EGD and EUS. Awaiting biopsy results.  Follow-up with the following services outpatient: Oncology, surgical oncology, gastroenterology.    DMII (diabetes mellitus, type 2) (Formerly [facility])  Lab Results   Component Value Date    HGBA1C 7.1 (H) 10/15/2024     Recent Labs     10/19/24  1626 10/19/24  2103 10/20/24  0744 10/20/24  1124   POCGLU 249* 257* 196* 198*       Blood Sugar Average: Last 72 hrs:  (P) [id number]  Outpatient PCP follow-up  Hyperlipidemia  Continue statin  PAD (peripheral artery disease) (Formerly [facility])  Aspirin on hold given GI bleed and PAWEL  Continue statin  Hypertension  Above goal  Continue metoprolol and amlodipine  ARB / Thiazide on hold  Hypothyroidism  Continue levothyroxine  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 (Formerly [facility])  Continue with sulfasalazine   Lower back pain  Possibly related to pancreatic mass.  CKD stage 3b, 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 (Formerly [facility])  Lab Results   Component Value Date    EGFR 37 10/20/2024    EGFR 32 10/19/2024    EGFR 31 10/18/2024    CREATININE 1.63 (H) 10/20/2024    CREATININE 1.83 (H) 10/19/2024    CREATININE 1.86 (H) 10/18/2024       Discharging Physician / Practitioner: Jose Dozier MD  PCP: Scott Hodges DO  Admission Date:   Admission Orders (From admission, onward)       Ordered        10/15/24 2050  INPATIENT ADMISSION  Once                          Discharge Date: 10/20/24    Medical Problems       Resolved Problems  Date Reviewed: 10/14/2024   None         Consultations During Hospital Stay:  Oncology, surgical oncology, nephrology, GI    Procedures Performed:   EGD      IMPRESSION:  Single ulcer in the ampullary region with clean base (John III)  Edematous, erythematous mucosa with erosion in the body of the stomach; performed cold forceps biopsy  3 cm type I hiatal hernia  The upper third of the esophagus, middle third of the esophagus and lower third of the esophagus appeared normal.  The duodenal bulb and 1st part of the duodenum appeared normal.  Ulcerated area at the ampulla is likely tumor protruding through the ampulla     RECOMMENDATION:  Await pathology results   Proceed with EUS      EUS      IMPRESSION:  Heterogeneous and hypoechoic mass measuring 46 mm x 30 mm with well-defined margins was visualized in the head of the pancreas; 4 fine needle biopsy passes were taken. An adequate sample was obtained. Cytology analysis was performed. Onsite cytologist was present  There were no cysts in the pancreas. There were no lesions in the examined part of the liver. There was a gallstone in the gallbladder. The CBD was dilated to 10mm and PD was dilated to 8mm. There were no enlarged lymph nodes in the peripancreatic, gastrohepatic, or celiac areas.        RECOMMENDATION:  Await cytology results  Return to floor and resume diet  Consult Oncology       Significant Findings / Test Results:   Imaging  CT A/p:    Pancreatic mass with evidence of secondary bile duct obstruction. Most commonly adenocarcinoma. Further evaluation with pancreas MR and MRCP recommended.     Small indeterminate left renal nodule, may be a complicated cyst. This can also be evaluated with MR without and with IV contrast.    Mri abdomen:    Limited by motion artifact and lack of IV contrast.     Enlarged pancreatic head measuring 4.7 x 2.7 cm, #2/27, likely harboring an underlying infiltrative pancreatic head mass. The remainder of the pancreatic parenchyma is atrophic.     Intra/extrahepatic biliary dilation and pancreatic ductal dilation with caliber changes of these ducts at the site of suspected pancreatic head mass.    CT chest:    No lung metastases.     Mild patchy consolidation and groundglass opacity in the left lower lobe which could be due to pneumonia in the appropriate clinical setting.     Trace left pleural effusion.     Pulmonary artery enlargement which can be seen with pulmonary hypertension.     Incidental Findings:   As written above     Test Results Pending at Discharge (will require follow up):   , CEA, CA 19-9, pathology, tissue exam     Outpatient Tests Requested:  CBC, CMP  Follow-up: PCP, GI,  Surgical oncology, Hematology oncology  Outpatient CT w/ pancreatic protocol before appointment (Surgical oncology)    Complications:  none    Reason for Admission: [facility] [place]    Hospital Course:     Jose Zamora is a 88 y.o. male patient who originally presented to the hospital on 10/15/2024 due to black stools.    Patient is an 88-year-old female with history of PAD, CKD, hypothyroidism, hypertension, type 2 diabetes mellitus, ulcerative colitis, and hyperlipidemia presented institution due to abnormal labs and black stools.  There was concern for upper GI bleed.  He was transfused a total of 4 units during this admission.  Gastroenterology was consulted.  An EGD was performed which showed a clean-based ulcer.  Fortunately, he continues to remain hemodynamically stable after he received transfusion.  GI recommended continued PPI twice daily upon discharge.    Major incidental concern during this admission is his pancreatic mass.  Oncology, surgical oncology, and GI were all consulted.  EUS was performed which findings they stated are concerning for malignancy.  Pathology and additional serologies are still pending.  Patient and his daughter are both aware that this is likely secondary to cancer but will still need confirmation.  Oncology, surgical oncology will assist in arranging his outpatient follow-up for neck step in management.    Patient and daughter are both aware of the need to follow-up with his primary care provider within a week from discharge for repeat blood work and transition of care.    Patient agrees to follow-up with his providers as scheduled and to take his medications as prescribed. All questions were addressed.    he understood the need to seek immediate medical attention should he develop any chest pain, shortness of breath, severe pain, fever, chills, or any other concerning symptoms.    Please see above list of diagnoses and related plan for additional information.     Condition at Discharge:  "good     Discharge Day Visit / Exam:     Subjective:  patient seen and examined at bedside. Comfortable, no new complaints overnight.   Vitals: Blood Pressure: 168/71 (10/20/24 0742)  Pulse: 100 (10/20/24 0742)  Temperature: 98 °F (36.7 °C) (10/20/24 0742)  Temp Source: Oral (10/19/24 0744)  Respirations: 18 (10/20/24 0742)  Height: 5' 9\" (175.3 cm) (10/17/24 1429)  Weight - Scale: 70.3 kg (155 lb) (10/17/24 1429)  SpO2: 94 % (10/20/24 0742)  Exam:   Physical Exam  Vitals reviewed.   Constitutional:       General: He is not in acute distress.  HENT:      Head: Normocephalic.      Nose: Nose normal.      Mouth/Throat:      Mouth: Mucous membranes are moist.   Eyes:      General: No scleral icterus.  Cardiovascular:      Rate and Rhythm: Normal rate and regular rhythm.   Pulmonary:      Effort: Pulmonary effort is normal. No respiratory distress.      Breath sounds: No wheezing or rales.   Abdominal:      General: There is no distension.      Palpations: Abdomen is soft.      Tenderness: There is no abdominal tenderness.   Skin:     General: Skin is warm.   Neurological:      Mental Status: He is alert and oriented to person, place, and time.   Psychiatric:         Mood and Affect: Mood normal.         Behavior: Behavior normal.       Discussion with Family: daughter    Discharge instructions/Information to patient and family:   See after visit summary for information provided to patient and family.      Provisions for Follow-Up Care:  See after visit summary for information related to follow-up care and any pertinent home health orders.      Disposition:     Home    Planned Readmission: No     Discharge Statement:  I spent 40 minutes discharging the patient. This time was spent on the day of discharge. I had direct contact with the patient on the day of discharge. Greater than 50% of the total time was spent examining patient, answering all patient questions, arranging and discussing plan of care with patient as well " as directly providing post-discharge instructions.  Additional time then spent on discharge activities.    Discharge Medications:  See after visit summary for reconciled discharge medications provided to patient and family.      ** Please Note: This note has been constructed using a voice recognition system **

## 2024-10-20 NOTE — CONSULTS
Consultation - Oncology-Surgical   Name: Jose Zamora 88 y.o. male I MRN: [id number]  Unit/Bed#: [place] [address] -01 I Date of Admission: 10/15/2024   Date of Service: 10/20/2024 I Hospital Day: 5   Inpatient Consult to Surgical Oncology  Consult performed by: Nnamdi Chen MD  Consult ordered by: Jose Dozier MD      Physician Requesting Evaluation: Jose Dozier MD   Reason for Evaluation / Principal Problem: new pancreatic mass    Assessment & Plan  Pancreatic mass  3.4 x 3.3 cm pancreatic head/neck mass identified incidentally on ED imaging, with 2 cm simple appearing cyst in proximal pancreatic body/neck without signs of acute pancreatitis. Surgery consulted on day of discharge for streamlining outpatient follow up.    -GI consulted s/p EUS Bx of lesion on 10/17, awaiting pathology  -Med Onc consulted   -collect tumor markers (recommend CEA, CA 19-9, )   -follow path report   -Chest CT 10/19 negative for metastatic nodules  -patient will need referral for outpatient follow up with Dr. Rodriguez after pathology results (estimate date for 2 weeks from discharge)  -recommend obtaining outpatient CT w/ pancreatic protocol before appointment   -if unable to get IV contrast for CT, would recommend MRI w/ contrast  -further surgical plan to be discussed at outpatient follow up  Ok for discharge from Oncology-Surgical service perspective.    Nnamdi Chen MD  General Surgery  10/20/24  1:48 PM      History of Present Illness   Jose Zamora is a 88 y.o. male w/ PMHx of DM, HTN, hypothyroidism, and UC who presented to [facility] and admitted to medicine service for upper GI bleed with ongoing melena and hgb=4.5 requiring transfusion. Incidentally, patient found to have a new pancreatic head lesion mass in his admission CT scan. GI consulted for EUS biopsy which was completed on 10/17--final path pending. Medical Oncology consulted for new mass and providing recommendations for systemic workup. Primary team reached  out to surgical team day of consult (10/20) to assist with arranging outpatient follow up appointment with the surgical oncology team.    Review of Systems   Constitutional:  Positive for fatigue (resolved). Negative for activity change, appetite change, chills and fever.   HENT: Negative.     Eyes: Negative.    Respiratory:  Negative for chest tightness, shortness of breath and wheezing.    Cardiovascular:  Negative for chest pain and palpitations.   Gastrointestinal:  Positive for blood in stool. Negative for abdominal distention, abdominal pain, constipation, nausea and vomiting.   Genitourinary:  Negative for difficulty urinating, dysuria and urgency.   Musculoskeletal:  Negative for arthralgias, neck pain and neck stiffness.   Skin:  Negative for color change and wound.   Allergic/Immunologic: Negative.    Neurological:  Positive for weakness (resolved). Negative for dizziness and seizures.   Psychiatric/Behavioral:  Negative for agitation and confusion.      Historical Information   Past Medical History:   Diagnosis Date    Allergic     Anemia     Arthritis     Diabetes mellitus (HCC)     Disease of thyroid gland     Hyperlipemia     Hypertension      Past Surgical History:   Procedure Laterality Date    COLONOSCOPY      PROSTATE BIOPSY      needle,  resolved may 2005     Social History     Tobacco Use    Smoking status: Former     Types: Cigarettes    Smokeless tobacco: Never   Vaping Use    Vaping status: Never Used   Substance and Sexual Activity    Alcohol use: Not Currently     Comment: social/rarely    Drug use: Never    Sexual activity: Not Currently     E-Cigarette/Vaping    E-Cigarette Use Never User      E-Cigarette/Vaping Substances    Nicotine No     THC No     CBD No     Flavoring No     Other No     Unknown No      Family History   Problem Relation Age of Onset    No Known Problems Mother      Social History     Tobacco Use    Smoking status: Former     Types: Cigarettes    Smokeless tobacco:  Never   Vaping Use    Vaping status: Never Used   Substance and Sexual Activity    Alcohol use: Not Currently     Comment: social/rarely    Drug use: Never    Sexual activity: Not Currently       Current Facility-Administered Medications:     acetaminophen (TYLENOL) tablet 975 mg, Q8H PRN    amLODIPine (NORVASC) tablet 5 mg, Daily    atorvastatin (LIPITOR) tablet 20 mg, Daily With Dinner    insulin lispro (HumALOG/ADMELOG) 100 units/mL subcutaneous injection 1-5 Units, TID AC **AND** Fingerstick Glucose (POCT), TID AC    levothyroxine tablet 50 mcg, Daily Before Breakfast    metoprolol tartrate (LOPRESSOR) partial tablet 12.5 mg, BID    ondansetron (ZOFRAN) injection 4 mg, Q6H PRN    pantoprazole (PROTONIX) EC tablet 40 mg, BID AC    polyethylene glycol (MIRALAX) packet 17 g, BID    sodium chloride 0.9 % infusion, Continuous, Last Rate: 75 mL/hr (10/19/24 0947)  Prior to Admission Medications   Prescriptions Last Dose Informant Patient Reported? Taking?   Multiple Vitamins-Minerals (CENTRUM SILVER) tablet  Self Yes Yes   Sig: Take by mouth   Omega-3 Fatty Acids (FISH OIL) 1000 MG CPDR  Self Yes Yes   Sig: Take by mouth   Potassium 99 MG TABS  Self Yes Yes   Sig: Take by mouth   Saw Palmetto 160 MG CAPS  Self Yes Yes   Sig: Take by mouth   amLODIPine (NORVASC) 10 mg tablet   No Yes   Sig: take 1 tablet by mouth once daily   aspirin (ECOTRIN LOW STRENGTH) 81 mg EC tablet  Self Yes Yes   Sig: Take 81 mg by mouth daily   atorvastatin (LIPITOR) 20 mg tablet  Self Yes Yes   Sig: Take 1 tablet by mouth daily   cholecalciferol (VITAMIN D3) 1,000 units tablet  Self Yes Yes   Sig: Take by mouth   fexofenadine (Allegra Allergy) 180 MG tablet   Yes No   Sig: Take 180 mg by mouth daily as needed (allergies)   folic acid (FOLVITE) 1 mg tablet  Self Yes Yes   Sig: Take 1 tablet by mouth daily   levofloxacin (LEVAQUIN) 250 mg tablet   No Yes   Sig: Take 1 tablet (250 mg total) by mouth daily for 7 days   levothyroxine 50 mcg tablet    No Yes   Sig: Take 1 tablet (50 mcg total) by mouth daily   metoprolol tartrate (LOPRESSOR) 50 mg tablet   No Yes   Sig: Take 1 tablet (50 mg total) by mouth 2 (two) times a day   montelukast (SINGULAIR) 10 mg tablet  Self Yes Yes   Sig: Take by mouth   olmesartan-hydrochlorothiazide (BENICAR HCT) 40-25 MG per tablet   No Yes   Sig: Take 1 tablet by mouth daily   omeprazole (PriLOSEC) 40 MG capsule   No Yes   Sig: Take 1 capsule (40 mg total) by mouth daily before breakfast   ondansetron (ZOFRAN) 4 mg tablet   No Yes   Sig: Take 1 tablet (4 mg total) by mouth every 8 (eight) hours as needed for nausea or vomiting   sulfaSALAzine (AZULFIDINE) 500 mg tablet  Self Yes Yes   zinc gluconate 50 mg tablet  Self Yes Yes   Sig: Take by mouth      Facility-Administered Medications: None     Lisinopril, Candesartan, and Penicillins    Objective :  Temp:  [98 °F (36.7 °C)-98.9 °F (37.2 °C)] 98 °F (36.7 °C)  HR:  [100-103] 100  BP: (166-170)/(71-73) 168/71  Resp:  [18-19] 18  SpO2:  [94 %-95 %] 94 %    Physical Exam:  GENERAL APPEARANCE: well developed, well nourished elderly male in NAD. Sitting in bedside chair.  HEENT: NCAT; EOMI; normal external nose & ears; MMM  NECK: Supple, normal ROM.  CARDIOVASCULAR: Regular rate. Grossly well perfused.  LUNGS/CHEST: Symmetric chest rise/fall with respirations.  ABD: Soft; non-distended; non-tender.  EXT: Normal ROM. No observable deformities in 4/4 extremities. No edema.  NEURO: AAOx4. No focal neurologic deficits. Distally neurovascularly intact.  SKIN: Warm, dry and well perfused; no rash; no jaundice.      Lab Results: I have reviewed the following results:  Recent Labs     10/18/24  0445 10/19/24  0547 10/20/24  0603   WBC 17.29* 9.16 9.75   HGB 7.1* 9.0* 8.8*   HCT 22.3* 28.3* 27.6*    178 196   SODIUM 140 141 142   K 3.7 3.5 3.5   * 110* 111*   CO2 21 23 23   BUN 46* 42* 33*   CREATININE 1.86* 1.83* 1.63*   GLUC 173* 179* 188*   MG 1.8* 1.9 1.8*   AST 16 13  --     ALT 23 20  --    ALB 3.2* 3.3*  --    TBILI 0.49 0.47  --    ALKPHOS 107* 103  --    INR 1.25*  --   --      Imaging Results Review: I reviewed radiology reports from this admission including: CT chest, CT abdomen/pelvis, and MRI abdomen/MRCP.  CT chest w contrast   Final Result      No lung metastases.      Mild patchy consolidation and groundglass opacity in the left lower lobe which could be due to pneumonia in the appropriate clinical setting.      Trace left pleural effusion.      Pulmonary artery enlargement which can be seen with pulmonary hypertension.         Workstation performed: [id number]         MRI abdomen wo contrast and mrcp   Final Result      Limited by motion artifact and lack of IV contrast.      Enlarged pancreatic head measuring 4.7 x 2.7 cm, #2/27, likely harboring an underlying infiltrative pancreatic head mass. The remainder of the pancreatic parenchyma is atrophic.      Intra/extrahepatic biliary dilation and pancreatic ductal dilation with caliber changes of these ducts at the site of suspected pancreatic head mass.         Workstation performed: [id number]         CT abdomen pelvis wo contrast   Final Result      Pancreatic mass with evidence of secondary bile duct obstruction. Most commonly adenocarcinoma. Further evaluation with pancreas MR and MRCP recommended.      Small indeterminate left renal nodule, may be a complicated cyst. This can also be evaluated with MR without and with IV contrast.      Other nonemergent findings above.            Workstation performed: [id number]         CT abdomen pelvis w contrast    (Results Pending)     Other Study Results Review: EKG was reviewed.     VTE Pharmacologic Prophylaxis: VTE covered by:    None    VTE Mechanical Prophylaxis: sequential compression device

## 2024-10-20 NOTE — DISCHARGE INSTR - AVS FIRST PAGE
Would like you to obtain these with your primary care provider within a week from discharge: Complete blood count, comprehensive metabolic panel  Follow-up: PCP, GI, Surgical oncology, Hematology oncology  Outpatient CT w/ pancreatic protocol before appointment (Surgical oncology)  Pending bloodwork results

## 2024-10-20 NOTE — ASSESSMENT & PLAN NOTE
Lab Results   Component Value Date    EGFR 37 10/20/2024    EGFR 32 10/19/2024    EGFR 31 10/18/2024    CREATININE 1.63 (H) 10/20/2024    CREATININE 1.83 (H) 10/19/2024    CREATININE 1.86 (H) 10/18/2024

## 2024-10-20 NOTE — NURSING NOTE
Pt discharge to home accompanied by daughter. No s/s of any distress noted. Discharge instructions given to pt and daughter and they stated that they understood instructions. Pt took all personal belongings.

## 2024-10-20 NOTE — ASSESSMENT & PLAN NOTE
Current blood pressure 160's overnight  Currently on metoprolol 12.5 mg 2 times daily  As above keep holding olmesartan with HCTZ-will evaluate pt in office to restart   Will add amlodipine 5 mg daily for now

## 2024-10-20 NOTE — PROGRESS NOTES
Progress Note - Nephrology   Name: Jose Zamora 88 y.o. male I MRN: [id number]  Unit/Bed#: [place] [address] -01 I Date of Admission: 10/15/2024   Date of Service: 10/20/2024 I Hospital Day: 5     Assessment & Plan  PAWEL (acute kidney injury) ([facility])  Etiology:  Suspect secondary to symptomatic severe anemia,  loss of autoregulation due to ARB use as well as diuretic use.  Admission creatinine of 2.44 on 10/15, increased to 2.69.  Baseline creatinine 1.4-1.66 back to 2016  olmesartan-HCTZ currently on hold  Status post IV fluids  Creatinine improving currently 1.63  S/p CTA 10/19/2024  Workup:  MRI of kidneys: No hydronephrosis, slight hyperdense left renal lesion seen on CT scan not well-characterized secondary to motion artifact and lack of IV contrast most likely represents proteinaceous cyst; scattered bilateral renal simple cysts  UA: Negative heme/trace proteinuria/no RBCs/1-2 WBCs  Plan  Continue to hold olmesartan-HCTZ for now  Avoid hypotension or perturbations of blood pressure  Avoid NSAIDs, nephrotoxins and limit IV contrast  Okay for discharge from nephrology standpoint when medically stable per primary team  Will need follow-up in the outpatient office patient prefers [place] office  BMP in a.m. 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 ([facility])  Lab Results   Component Value Date    EGFR 37 10/20/2024    EGFR 32 10/19/2024    EGFR 31 10/18/2024    CREATININE 1.63 (H) 10/20/2024    CREATININE 1.83 (H) 10/19/2024    CREATININE 1.86 (H) 10/18/2024   Etiology; suspect secondary to diabetic kidney disease hypertensive nephrosclerosis, arterionephrosclerosis and age-related nephron loss  Baseline creatinine 1.4-1.66 dating back to 2016  Post recent UACR 352 mg/grams on 5/6/2024  Not follow with nephrology as outpatient  Recommend follow-up on discharge [facility] to establish CKD management-office messaged too arrange appt  Recommend renal ultrasound in the next 3 months to follow-up cysts  noted on MRI  UGIB (upper gastrointestinal bleed)  Present with melena, weakness, symptomatic anemia with a hemoglobin of 4.5 status post blood transfusion.  Status post EGD/EUS 10/17: 1 cm ulcerated ampulla due to mass effect biopsy pending   Hemoglobin 8.8  this morning  Receiving IV Venofer low ferritin at 25 -have acceptable iron saturation of 24%   Hypertension  Current blood pressure 160's overnight  Currently on metoprolol 12.5 mg 2 times daily  As above keep holding olmesartan with HCTZ-will evaluate pt in office to restart   Will add amlodipine 5 mg daily for now  DMII (diabetes mellitus, type 2) (HCC)  Lab Results   Component Value Date    HGBA1C 7.1 (H) 10/15/2024   Management per primary team    PAD (peripheral artery disease) (HCC)  Asymptomatic per primary service  Urinary retention  Follow urinary retention protocol  Management per primary team  Pancreatic mass  S/p CTA 10/19/2024  S/P IVF 75 cc   Discussed with primary team   Hypokalemia  Will give K-Dur 20 mEq x 1 for potassium of 3.5  Repeat BMP in a.m. if remains hospitalized  Hypomagnesemia  Will give magnesium 2 g IV x 1 today for hypomagnesia mag 1.8  Check magnesium in a.m. if remains hospitalized    Overall recommendations and plans which has been reviewed with the primary team in agreement with the plan as stated below  Renal function stable okay for discharge and medically ready per primary team  Will add amlodipine 5 mg daily for now  Need to hold losartan/HCTZ on discharge until patient reevaluated and recent CTA  NP in a.m. if remains hospitalized  Will give K. Dur 20M EQ x 1 for potassium 3.5  Will give magnesium 2 g IV x 1 today    Review of systems  Patient seen and examined at bedside: patient reports feeling good.  Anxious to go home  Review of Systems   Constitutional: Negative.  Negative for activity change, appetite change, chills, diaphoresis, fatigue and fever.   HENT: Negative.  Negative for congestion and facial  swelling.    Respiratory: Negative.     Cardiovascular: Negative.    Gastrointestinal: Negative.    Endocrine: Negative.    Genitourinary: Negative.    Musculoskeletal: Negative.    Skin: Negative.    Allergic/Immunologic: Negative.    Neurological: Negative.    Hematological: Negative.    Psychiatric/Behavioral: Negative.            Physical exam:  Current Weight: Weight - Scale: 70.3 kg (155 lb)  Vitals:    10/19/24 0744 10/19/24 1547 10/19/24 2105 10/20/24 0742   BP: 167/73 170/73 166/71 168/71   BP Location: Right arm      Pulse: 105 103 103 100   Resp: 18  19 18   Temp: 98.3 °F (36.8 °C) 98.1 °F (36.7 °C) 98.9 °F (37.2 °C) 98 °F (36.7 °C)   TempSrc: Oral      SpO2: 94% 94% 95% 94%   Weight:       Height:           Intake/Output Summary (Last 24 hours) at 10/20/2024 0825  Last data filed at 10/20/2024 0746  Gross per 24 hour   Intake 1140 ml   Output 2375 ml   Net -1235 ml       Physical Exam  Vitals reviewed.   Constitutional:       Appearance: Normal appearance.   HENT:      Head: Normocephalic and atraumatic.      Nose: Nose normal.      Mouth/Throat:      Mouth: Mucous membranes are moist.      Pharynx: Oropharynx is clear.   Eyes:      Extraocular Movements: Extraocular movements intact.      Conjunctiva/sclera: Conjunctivae normal.   Cardiovascular:      Rate and Rhythm: Normal rate and regular rhythm.      Pulses: Normal pulses.      Heart sounds: Normal heart sounds.   Pulmonary:      Effort: Pulmonary effort is normal.   Abdominal:      General: Bowel sounds are normal.      Palpations: Abdomen is soft.   Musculoskeletal:         General: Normal range of motion.      Cervical back: Normal range of motion and neck supple.   Skin:     General: Skin is warm and dry.      Coloration: Skin is pale.   Neurological:      General: No focal deficit present.      Mental Status: He is alert and oriented to person, place, and time.   Psychiatric:         Mood and Affect: Mood normal.         Behavior: Behavior  normal.            Medications:    Current Facility-Administered Medications:     acetaminophen (TYLENOL) tablet 975 mg, 975 mg, Oral, Q8H PRN, Wanda Bauer MD, 975 mg at 10/17/24 1938    amLODIPine (NORVASC) tablet 5 mg, 5 mg, Oral, Daily, Karon GRANADOS'Javier, CRNP    atorvastatin (LIPITOR) tablet 20 mg, 20 mg, Oral, Daily With Dinner, Wanda Bauer MD, 20 mg at 10/19/24 1703    insulin lispro (HumALOG/ADMELOG) 100 units/mL subcutaneous injection 1-5 Units, 1-5 Units, Subcutaneous, TID AC, 1 Units at 10/20/24 0756 **AND** Fingerstick Glucose (POCT), , , TID AC, Jose Dozier MD    iron sucrose (VENOFER) 300 mg in sodium chloride 0.9 % 250 mL IVPB, 300 mg, Intravenous, Daily, Neil Paz MD, Last Rate: 166.7 mL/hr at 10/20/24 0800, 300 mg at 10/20/24 0800    levothyroxine tablet 50 mcg, 50 mcg, Oral, Daily Before Breakfast, Wanda Bauer MD, 50 mcg at 10/20/24 0609    metoprolol tartrate (LOPRESSOR) partial tablet 12.5 mg, 12.5 mg, Oral, BID, Wanda Bauer MD, 12.5 mg at 10/20/24 0800    ondansetron (ZOFRAN) injection 4 mg, 4 mg, Intravenous, Q6H PRN, Sunil Orozco DO    pantoprazole (PROTONIX) EC tablet 40 mg, 40 mg, Oral, BID Jose MARIANO MD, 40 mg at 10/20/24 0609    polyethylene glycol (MIRALAX) packet 17 g, 17 g, Oral, BID, Pool Chiang MD, 17 g at 10/20/24 0800    sodium chloride 0.9 % infusion, 75 mL/hr, Intravenous, Continuous, Karon GRANADOS'KAREL Herrera, Last Rate: 75 mL/hr at 10/19/24 0947, 75 mL/hr at 10/19/24 0947    Laboratory Results:  Results from last 7 days   Lab Units 10/20/24  0603 10/19/24  0547 10/18/24  0445 10/17/24  0525 10/17/24  0008 10/16/24  1159 10/16/24  0603 10/16/24  0012 10/15/24  1746 10/15/24  0714   WBC Thousand/uL 9.75 9.16 17.29* 10.58*  --   --  9.98  --  11.57* 13.05*   HEMOGLOBIN g/dL 8.8* 9.0* 7.1* 7.8* 8.0* 7.0* 6.7*   < > 4.5* 4.7*   HEMATOCRIT % 27.6* 28.3* 22.3* 25.0*  --   --  21.3*  --  15.3* 16.0*   PLATELETS Thousands/uL 196 178 180 176  --   --  184   "--  242 266   SODIUM mmol/L 142 141 140 142  --   --  141  --  135 141   POTASSIUM mmol/L 3.5 3.5 3.7 3.8  --   --  4.2  --  5.0 5.2   CHLORIDE mmol/L 111* 110* 110* 111*  --   --  112*  --  106 108   CO2 mmol/L 23 23 21 23  --   --  20*  --  17* 18*   BUN mg/dL 33* 42* 46* 48*  --   --  63*  --  73* 69*   CREATININE mg/dL 1.63* 1.83* 1.86* 1.85*  --   --  2.33*  --  2.69* 2.44*   CALCIUM mg/dL 8.0* 8.1* 7.6* 8.1*  --   --  8.2*  --  8.1* 8.1*   MAGNESIUM mg/dL 1.8* 1.9 1.8* 2.1  --   --   --   --   --   --     < > = values in this interval not displayed.         Medical records through [facility] and care everywhere has been reviewed for this encounter    Portions of the record may have been created with voice recognition software. Occasional wrong word or \"sound a like\" substitutions may have occurred due to the inherent limitations of voice recognition software. Read the chart carefully and recognize,   "

## 2024-10-20 NOTE — ASSESSMENT & PLAN NOTE
CT Abdomen pelvis showing pancreatic mass.  MRI Abdomen showing enlarged pancreatic head likely harboring an underlying infiltrative pancreatic head mass, intra/extrahepatic biliary dilatation and pancreatic ductal dilatation  GI Recommendations appreciated. S/p EGD and EUS. Awaiting biopsy results.  Follow-up with the following services outpatient: Oncology, surgical oncology, gastroenterology.

## 2024-10-21 ENCOUNTER — TELEPHONE (OUTPATIENT)
Age: 89
End: 2024-10-21

## 2024-10-21 ENCOUNTER — TELEPHONE (OUTPATIENT)
Dept: NEPHROLOGY | Facility: CLINIC | Age: 89
End: 2024-10-21

## 2024-10-21 ENCOUNTER — TRANSITIONAL CARE MANAGEMENT (OUTPATIENT)
Age: 89
End: 2024-10-21

## 2024-10-21 VITALS
TEMPERATURE: 98 F | HEIGHT: 69 IN | RESPIRATION RATE: 18 BRPM | WEIGHT: 155 LBS | HEART RATE: 100 BPM | BODY MASS INDEX: 22.96 KG/M2 | SYSTOLIC BLOOD PRESSURE: 168 MMHG | DIASTOLIC BLOOD PRESSURE: 71 MMHG | OXYGEN SATURATION: 94 %

## 2024-10-21 DIAGNOSIS — N18.32 CKD STAGE 3B, GFR 30-44 ML/MIN (HCC): ICD-10-CM

## 2024-10-21 DIAGNOSIS — E11.22 TYPE 2 DIABETES MELLITUS WITH STAGE 3 CHRONIC KIDNEY DISEASE, WITHOUT LONG-TERM CURRENT USE OF INSULIN, UNSPECIFIED WHETHER STAGE 3A OR 3B CKD (HCC): Primary | ICD-10-CM

## 2024-10-21 DIAGNOSIS — N17.9 AKI (ACUTE KIDNEY INJURY) (HCC): ICD-10-CM

## 2024-10-21 DIAGNOSIS — N18.30 TYPE 2 DIABETES MELLITUS WITH STAGE 3 CHRONIC KIDNEY DISEASE, WITHOUT LONG-TERM CURRENT USE OF INSULIN, UNSPECIFIED WHETHER STAGE 3A OR 3B CKD (HCC): Primary | ICD-10-CM

## 2024-10-21 DIAGNOSIS — I10 PRIMARY HYPERTENSION: ICD-10-CM

## 2024-10-21 NOTE — TELEPHONE ENCOUNTER
Called and spoke with Parul and was able to schedule hospital follow up in [place] with Dr. Sorensen 11/6

## 2024-10-21 NOTE — TELEPHONE ENCOUNTER
I scheduled patient for a TCM on 11/1.  He was in the hospital for upper GI bleed.  He is feeling well but his last 2 BM's were dark black.  He wanted to let you know and ask if this is anything to worry about?

## 2024-10-21 NOTE — TELEPHONE ENCOUNTER
----- Message from Joselyn Reyes Bahamonde, MD sent at 10/20/2024  1:20 PM EDT -----  Hi,     This pat will be discharged from [place] the next 48 hours and will need to have follow-up appointment with BMP, UACR, CBC.  Can be seen by AP in the next 3 to 4 weeks    Thanks    JIM

## 2024-10-21 NOTE — TELEPHONE ENCOUNTER
----- Message from KAREL Amanda sent at 10/19/2024  8:58 AM EDT -----  Patient to be discharged from [facility] next 24 to 48 hours.  Please monitor and arrange hospital follow-up in 6 to 8 weeks in the [place] office  Check BMP, magnesium, Phos, urinalysis with micro and UACR with upcoming office visit  linda

## 2024-10-22 ENCOUNTER — PATIENT OUTREACH (OUTPATIENT)
Dept: HEMATOLOGY ONCOLOGY | Facility: CLINIC | Age: 89
End: 2024-10-22

## 2024-10-22 ENCOUNTER — HOSPITAL ENCOUNTER (OUTPATIENT)
Dept: RADIOLOGY | Facility: HOSPITAL | Age: 89
Discharge: HOME | End: 2024-10-22
Payer: MEDICARE

## 2024-10-22 ENCOUNTER — TELEPHONE (OUTPATIENT)
Age: 89
End: 2024-10-22

## 2024-10-22 ENCOUNTER — OFFICE VISIT (OUTPATIENT)
Age: 89
End: 2024-10-22
Payer: MEDICARE

## 2024-10-22 ENCOUNTER — TELEPHONE (OUTPATIENT)
Dept: HEMATOLOGY ONCOLOGY | Facility: CLINIC | Age: 89
End: 2024-10-22

## 2024-10-22 ENCOUNTER — DOCUMENTATION (OUTPATIENT)
Dept: HEMATOLOGY ONCOLOGY | Facility: CLINIC | Age: 89
End: 2024-10-22

## 2024-10-22 VITALS
TEMPERATURE: 98 F | HEART RATE: 98 BPM | DIASTOLIC BLOOD PRESSURE: 60 MMHG | RESPIRATION RATE: 20 BRPM | SYSTOLIC BLOOD PRESSURE: 138 MMHG | HEIGHT: 69 IN | BODY MASS INDEX: 22.96 KG/M2 | WEIGHT: 155 LBS | OXYGEN SATURATION: 98 %

## 2024-10-22 DIAGNOSIS — E11.00 TYPE 2 DIABETES MELLITUS WITH HYPEROSMOLARITY WITHOUT COMA, WITHOUT LONG-TERM CURRENT USE OF INSULIN (HCC): ICD-10-CM

## 2024-10-22 DIAGNOSIS — I80.8 SUPERFICIAL PHLEBITIS OF ARM: Primary | ICD-10-CM

## 2024-10-22 DIAGNOSIS — D3A.8 PRIMARY PANCREATIC NEUROENDOCRINE TUMOR: Primary | ICD-10-CM

## 2024-10-22 DIAGNOSIS — I73.9 PAD (PERIPHERAL ARTERY DISEASE) (HCC): ICD-10-CM

## 2024-10-22 DIAGNOSIS — K86.89 PANCREATIC MASS: ICD-10-CM

## 2024-10-22 PROCEDURE — 88305 TISSUE EXAM BY PATHOLOGIST: CPT | Performed by: STUDENT IN AN ORGANIZED HEALTH CARE EDUCATION/TRAINING PROGRAM

## 2024-10-22 PROCEDURE — 74177 CT ABD & PELVIS W/CONTRAST: CPT

## 2024-10-22 PROCEDURE — 88360 TUMOR IMMUNOHISTOCHEM/MANUAL: CPT | Performed by: STUDENT IN AN ORGANIZED HEALTH CARE EDUCATION/TRAINING PROGRAM

## 2024-10-22 PROCEDURE — G2211 COMPLEX E/M VISIT ADD ON: HCPCS | Performed by: FAMILY MEDICINE

## 2024-10-22 PROCEDURE — 99214 OFFICE O/P EST MOD 30 MIN: CPT | Performed by: FAMILY MEDICINE

## 2024-10-22 PROCEDURE — 88342 IMHCHEM/IMCYTCHM 1ST ANTB: CPT | Performed by: STUDENT IN AN ORGANIZED HEALTH CARE EDUCATION/TRAINING PROGRAM

## 2024-10-22 PROCEDURE — 88172 CYTP DX EVAL FNA 1ST EA SITE: CPT | Performed by: STUDENT IN AN ORGANIZED HEALTH CARE EDUCATION/TRAINING PROGRAM

## 2024-10-22 PROCEDURE — 88341 IMHCHEM/IMCYTCHM EA ADD ANTB: CPT | Performed by: STUDENT IN AN ORGANIZED HEALTH CARE EDUCATION/TRAINING PROGRAM

## 2024-10-22 RX ORDER — CEPHALEXIN 500 MG/1
500 CAPSULE ORAL 2 TIMES DAILY
Qty: 14 CAPSULE | Refills: 0 | Status: SHIPPED | OUTPATIENT
Start: 2024-10-22 | End: 2024-10-29

## 2024-10-22 RX ADMIN — IOHEXOL 50 ML: 350 INJECTION, SOLUTION INTRAVENOUS at 13:22

## 2024-10-22 NOTE — PROGRESS NOTES
"Ambulatory Visit  Name: Jose Zamora      : 1935      MRN: [id number]  Encounter Provider: Jett Murray DO  Encounter Date: 10/22/2024   Encounter department: [facility] PRIMARY CARE    Assessment & Plan  PAD (peripheral artery disease) ([facility])  Follow up with cardiology.       Type 2 diabetes mellitus with hyperosmolarity without coma, without long-term current use of insulin ([facility])    Lab Results   Component Value Date    HGBA1C 7.1 (H) 10/15/2024            Superficial phlebitis of arm    Orders:    cephalexin (KEFLEX) 500 mg capsule; Take 1 capsule (500 mg total) by mouth 2 (two) times a day for 7 days  We discussed further treatment with the patient his son and daughter-in-law.  I do recommend warm compresses a few times a day.  Return if symptoms worsen.     History of Present Illness     Patient was recently discharged from the hospital.  He went for a CAT scan today and the nurse there noticed left forearm.    Arm Pain           Review of Systems   Constitutional: Negative.    HENT: Negative.     Respiratory: Negative.     Cardiovascular: Negative.    Skin:         As noted in HPI.           Objective     /60 (BP Location: Right arm, Patient Position: Sitting, Cuff Size: Adult)   Pulse 98   Temp 98 °F (36.7 °C) (Temporal)   Resp 20   Ht 5' 9\" (1.753 m)   Wt 70.3 kg (155 lb)   SpO2 98%   BMI 22.89 kg/m²     Physical Exam  Constitutional:       Appearance: He is well-developed.   HENT:      Head: Normocephalic and atraumatic.   Eyes:      Pupils: Pupils are equal, round, and reactive to light.   Cardiovascular:      Rate and Rhythm: Normal rate.   Pulmonary:      Effort: Pulmonary effort is normal.   Abdominal:      Palpations: Abdomen is soft.   Musculoskeletal:         General: Normal range of motion.      Cervical back: Normal range of motion and neck supple.   Lymphadenopathy:      Cervical: No cervical adenopathy.   Skin:     General: Skin is warm.      Comments: As " noted swelling and some tenderness in his right upper arm or forearm.  Does seem to be a firm vein from phlebitis.   Neurological:      Mental Status: He is alert and oriented to person, place, and time.   Psychiatric:         Behavior: Behavior normal.

## 2024-10-22 NOTE — PROGRESS NOTES
Initial outreach. Spoke to patient's daughter Parul.  Introduced myself and explained the role of an Oncology Nurse Navigator to assist with coordination of cancer care, preparation for upcoming appointment, be a point of contact prior to oncology consult, provide support and connect her with available resources. Discussed Surgical Oncology referral placed by Dr. Baires.  Explained I will be their main contact until they are established with their team and a treatment plan is established.     Introduced Catie, and explained she will be the patient's patient navigator, who will reach out after the first consult to introduce themselves. The PN will provide support, make sure there is a good understanding of the plan and schedule and to connect with additional resources if/when needed.     Assessed for barriers of care. Patient lives with alone with his wife.  He does drive but his adult children have been taking him to his recent appointments.  Parul lives in [place] and patient's son lives in [place].  Parul stated the patient told her he lost 10-12 lbs based on the way his clothes are fitting.  Parul said the patient's appetite has improved since his hospitalization.      I provided my direct contact information for questions or concerns.  Parul was very appreciative.

## 2024-10-22 NOTE — TELEPHONE ENCOUNTER
Spoke with son who said that patient was hospitalized last week. Patient has 2 areas on each arm that are red, warm and swollen from IV sticks in the hospital. Son sent pictures to [facility]. OV scheduled for today for evaluation.

## 2024-10-22 NOTE — TELEPHONE ENCOUNTER
"Soft Intake Form   Patient Details   Jose Zamora     1935     Reason For Appointment   Who is Calling? Magali Melton   If not patient, Name?  NA   DID YOU CONFIRM INSURANCE WITH PATIENT? E verified, Routed to finance   Who is the Referring Doctor? Dr. Fuentes   What is the diagnosis? 1. Pancreatic head mass highly suspicious for malignancy 2. UGIB 3. Acute on chronic anemia 4. CKD 5. Beta thalassemia minor   Has this diagnosis been confirmed by a biopsy/surgery?    If yes, what is the date it was done? Pancreatic bx taken on 10/16   Biopsy done at [facility]?  If not, where was it done? Yes   Was imaging done, and was it done at [facility]?  If not, where was it done? Yes-[facility]   Have you been seen by another Oncologist?  If so, who and where (name of facility, city and state) No   For 2nd Opinions Only: Are you currently undergoing treatment, or are you scheduled to start treatment?  If yes, name of facility, city and state  NA   For \"History Of\" only: Have you completed treatment?  NA   Have you had Genetic Testing done in the past?  If so, advise to bring test results to their visit No   Record Gathering Information   Did you advise to have records faxed to [phone]?  NA   Did you advise to have disks sent to the proper address with imaging? (\"History of\" Patients)  5 years of imaging for breast patients-Mammos, US etc NA   Scheduling Information   Did you send new patient paperwork?  Email or mail? NA   What is the best call back number?   (If the [facility] is calling, please use their number) NA   Miscellaneous Information      The patient is scheduled for his HFU appointment with Dr. Fuentes on 10/29 at 1540 in the [facility].      "

## 2024-10-22 NOTE — RESULT ENCOUNTER NOTE
I called him with his results and left a voicemail to call me back.  Will refer to oncology for further evaluation and management.

## 2024-10-22 NOTE — PROGRESS NOTES
All records needed are in patients chart. No records retrieval needed at this time.     Referral received/ Chart reviewed for work up completed     Imaging completed:    [] PET/CT   [x] MRI   [x] CT   [] US   [] Mammo   [] Bone scan   [] N/A    Pathology completed:    Date:  10/17/24    Location:  [facility]   []N/A    Additional records needed:   [] Genomic report   [] Genetic testing results   [] Office Note   [] Procedure/ Operative note   [] Lab results   [] N/A      [] Radiation Oncology records retrieval needed (PN to route to rad/onc clerical pool once scheduled)  Date:  Location:

## 2024-10-22 NOTE — NURSING NOTE
Called by CT technologist Ede today for patient who arrived for a CT scan of abdomen and pelvis with contrast upon attempting to look for an access site they noted that patient's left arm at the posterior forearm and anterior AC the skin was hard, hot to touch and red. Per patient he just got out of the hospital.  Arrived to find patient on the table, after identifing myself I noticed the left arm was red, warmer than rest of arm and hard to touch, per patient he is has an appointment with his primary next week. Area was marked with skin marker and dated. I also spoke son and daughter-in-law who stated that he was since last Tuesday to Sunday hospitalized at the [facility] and that during his hospital stay they had a difficult time with IV access. Son was made aware that skin was marked and instructed to keep an eye on the area and to take a picture and upload it to my chart so the primary MD can see the area also. They will call the primary should the area grow out of the lines, to get a sooner appointment.

## 2024-10-23 PROBLEM — D3A.8 PRIMARY PANCREATIC NEUROENDOCRINE TUMOR: Status: ACTIVE | Noted: 2024-10-23

## 2024-10-23 LAB — CANCER AG19-9 SERPL-ACNC: 45 U/ML (ref 0–35)

## 2024-10-24 ENCOUNTER — OFFICE VISIT (OUTPATIENT)
Dept: SURGICAL ONCOLOGY | Facility: CLINIC | Age: 89
End: 2024-10-24
Payer: MEDICARE

## 2024-10-24 ENCOUNTER — RA CDI HCC (OUTPATIENT)
Dept: OTHER | Facility: HOSPITAL | Age: 89
End: 2024-10-24

## 2024-10-24 VITALS
HEIGHT: 69 IN | OXYGEN SATURATION: 98 % | SYSTOLIC BLOOD PRESSURE: 156 MMHG | RESPIRATION RATE: 20 BRPM | WEIGHT: 158 LBS | TEMPERATURE: 97.5 F | HEART RATE: 101 BPM | BODY MASS INDEX: 23.4 KG/M2 | DIASTOLIC BLOOD PRESSURE: 70 MMHG

## 2024-10-24 DIAGNOSIS — C25.0 MALIGNANT NEOPLASM OF HEAD OF PANCREAS (HCC): ICD-10-CM

## 2024-10-24 DIAGNOSIS — D3A.8 PRIMARY PANCREATIC NEUROENDOCRINE TUMOR: Primary | ICD-10-CM

## 2024-10-24 LAB
DME PARACHUTE DELIVERY DATE ACTUAL: NORMAL
DME PARACHUTE DELIVERY DATE REQUESTED: NORMAL
DME PARACHUTE DELIVERY NOTE: NORMAL
DME PARACHUTE ITEM DESCRIPTION: NORMAL
DME PARACHUTE ORDER STATUS: NORMAL
DME PARACHUTE SUPPLIER NAME: NORMAL
DME PARACHUTE SUPPLIER PHONE: NORMAL

## 2024-10-24 PROCEDURE — 99215 OFFICE O/P EST HI 40 MIN: CPT | Performed by: SURGERY

## 2024-10-24 NOTE — ASSESSMENT & PLAN NOTE
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  This is intermediate grade with a Ki-67 of 20%.  We had a long discussion regarding treatment options.  There is no evidence of metastasis based on his CT or MRI.  I would recommend getting a dotatate PET to see if there is really no evidence of disease.  If there is no distant disease I would recommend that he consider surgical resection.  This would require pancreaticoduodenectomy.  I did discuss the risks associated with the procedure.  We also discussed if there is metastatic disease octreotide would be the mainstay of treatment.  Of concern is that he likely had GI bleeding from this tumor.  And without some type of treatment this could recur.  I will see him back once we have the results of the PET/CT.  He is agreeable to this plan.  All his questions were answered.

## 2024-10-24 NOTE — PROGRESS NOTES
Surgical Oncology Follow Up       [facility][note 3] [facility] [place]  [address] [facility] [address]  [phone]    Jose Zamora  1935  [id number]  [facility][note 3] [facility] [place]  [address] [facility] [address]  [phone]    1. 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  This is intermediate grade with a Ki-67 of 20%.  We had a long discussion regarding treatment options.  There is no evidence of metastasis based on his CT or MRI.  I would recommend getting a dotatate PET to see if there is really no evidence of disease.  If there is no distant disease I would recommend that he consider surgical resection.  This would require pancreaticoduodenectomy.  I did discuss the risks associated with the procedure.  We also discussed if there is metastatic disease octreotide would be the mainstay of treatment.  Of concern is that he likely had GI bleeding from this tumor.  And without some type of treatment this could recur.  I will see him back once we have the results of the PET/CT.  He is agreeable to this plan.  All his questions were answered.  Orders:  -     Ambulatory Referral to Surgical Oncology  -     NM PET CT skull base to mid thigh; Future; Expected date: 10/24/2024  2. Malignant neoplasm of head of pancreas (HCC)  -     NM PET CT skull base to mid thigh; Future; Expected date: 10/24/2024         Chief Complaint   Patient presents with    office visit       Return in about 2 weeks (around 11/7/2024) for Office visit long.      Oncology History   Primary pancreatic neuroendocrine tumor   10/17/2024 Biopsy    Endoscopic ultrasound  - Pancreas, Head Mass, FNA:  Positive for malignancy.  - Pancreatic neuroendocrine tumor (PanNET), favor at least well-differentiated neuroendocrine  tumor, WHO grade 2 of 3, in this limited sample.     10/24/2024 -  Cancer Staged    Staging form: Neuroendocrine Tumor - Pancreas, AJCC V9  - Clinical: Stage II (cT3, cN0, cM0) - Signed by Nba Rodriguez MD on 10/24/2024           Staging: Neuroendocrine tumor of the pancreas, October 2024  Treatment history:    Current treatment:    Disease status:      History of Present Illness: Patient returns after his hospitalization for fatigue, weight loss and GI bleeding.  He was initially found to have a hemoglobin of 4.5 that required transfusion.  Imaging revealed a pancreatic head mass.  He then underwent an EUS.  Pathology revealed pancreatic neuroendocrine tumor that was well-differentiated, grade 2.  Ki-67 was approximately 19%.  Noncontrast MRI while he was hospitalized shows no evidence of metastasis.  Pancreas protocol CT on October 22, 2024 revealed a 4.4 cm mass in the pancreatic head.  There was no vessel involvement.  There was no evidence of metastatic disease to the liver.  I personally reviewed the films.  He has lost approximately 15 pounds over the last 2 months.  He has a loss of appetite.    Review of Systems  Complete ROS Surg Onc:   Complete ROS Surg Onc:   Constitutional: The patient has fatigue, weight loss and change in appetite.  Eyes: No complaints of visual problems, no scleral icterus.   ENT: no complaints of ear pain, no hoarseness, no difficulty swallowing,  no tinnitus and no new masses in head, oral cavity, or neck.   Cardiovascular: No complaints of chest pain, no palpitations, no ankle edema.   Respiratory: No complaints of shortness of breath, no cough.   Gastrointestinal: No complaints of jaundice, no bloody stools, no pale stools.   Genitourinary: No complaints of dysuria, no hematuria, no nocturia, no frequent urination, no urethral discharge.   Musculoskeletal: No complaints of weakness, paralysis, joint stiffness or arthralgias.  Integumentary: No complaints of rash, no new lesions.    Neurological: No complaints of convulsions, no seizures, no dizziness.   Hematologic/Lymphatic: No complaints of easy bruising.   Endocrine:  No hot or cold intolerance.  No polydipsia, polyphagia, or polyuria.  Allergy/immunology:  No environmental allergies.  No food allergies.  Not immunocompromised.  Skin:  No pallor or rash.  No wound.        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)    Beta thalassemia minor    Elevated PSA    Abnormal prostate exam    Lumbar strain    Acute pain of right knee    Strain of calf muscle    Localized osteoarthritis of right knee    Acute pain of both shoulders    Right hand pain    Generalized abdominal pain    Weight loss    Acute bilateral thoracic back pain    Nausea    UGIB (upper gastrointestinal bleed)    Urinary retention    PAWEL (acute kidney injury) (HCC)    Pancreatic mass    Lower back pain    CKD stage 3b, 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)    Hypokalemia    Hypomagnesemia    Primary pancreatic neuroendocrine tumor     Past Medical History:   Diagnosis Date    Allergic     Anemia     Arthritis     Diabetes mellitus (HCC)     Disease of thyroid gland     Hyperlipemia     Hypertension      Past Surgical History:   Procedure Laterality Date    COLONOSCOPY      PROSTATE BIOPSY      needle,  resolved may 2005     Family History   Problem Relation Age of Onset    No Known Problems Mother      Social History     Socioeconomic History    Marital status: /Civil Union     Spouse name: Not on file    Number of children: Not on file    Years of education: Not on file    Highest  education level: Not on file   Occupational History    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[employer]   Tobacco Use    Smoking status: Former     Types: Cigarettes    Smokeless tobacco: Never   Vaping Use    Vaping status: Never Used   Substance and Sexual Activity    Alcohol use: Not Currently     Comment: social/rarely    Drug use: Never    Sexual activity: Not Currently   Other Topics Concern    Not on file   Social History Narrative    Not on file     Social Determinants of Health     Financial Resource Strain: Low Risk  (8/30/2023)    Overall Financial Resource Strain (CARDIA)     Difficulty of Paying Living Expenses: Not hard at all   Food Insecurity: No Food Insecurity (10/16/2024)    Nursing - Inadequate Food Risk Classification     Worried About Running Out of Food in the Last Year: Never true     Ran Out of Food in the Last Year: Never true     Ran Out of Food in the Last Year: Not on file   Transportation Needs: No Transportation Needs (10/16/2024)    PRAPARE - Transportation     Lack of Transportation (Medical): No     Lack of Transportation (Non-Medical): No   Physical Activity: Not on file   Stress: Not on file   Social Connections: Not on file   Intimate Partner Violence: Not on file   Housing Stability: Unknown (10/16/2024)    Housing Stability Vital Sign     Unable to Pay for Housing in the Last Year: No     Number of Times Moved in the Last Year: Not on file     Homeless in the Last Year: No       Current Outpatient Medications:     amLODIPine (NORVASC) 5 mg tablet, Take 1 tablet (5 mg total) by mouth daily, Disp: 30 tablet, Rfl: 0    atorvastatin (LIPITOR) 20 mg tablet, Take 1 tablet by mouth daily, Disp: , Rfl:     cephalexin (KEFLEX) 500 mg capsule, Take 1 capsule (500 mg total) by mouth 2 (two) times a day for 7 days, Disp: 14 capsule, Rfl: 0    cholecalciferol (VITAMIN D3) 1,000 units tablet, Take by mouth, Disp: , Rfl:     fexofenadine (Allegra Allergy) 180 MG tablet, Take 180 mg by mouth daily as  needed (allergies), Disp: , Rfl:     folic acid (FOLVITE) 1 mg tablet, Take 1 tablet by mouth daily, Disp: , Rfl:     levothyroxine 50 mcg tablet, Take 1 tablet (50 mcg total) by mouth daily, Disp: 90 tablet, Rfl: 1    metoprolol tartrate (LOPRESSOR) 25 mg tablet, Take 0.5 tablets (12.5 mg total) by mouth 2 (two) times a day, Disp: 30 tablet, Rfl: 0    montelukast (SINGULAIR) 10 mg tablet, Take by mouth, Disp: , Rfl:     Multiple Vitamins-Minerals (CENTRUM SILVER) tablet, Take by mouth, Disp: , Rfl:     Omega-3 Fatty Acids (FISH OIL) 1000 MG CPDR, Take by mouth, Disp: , Rfl:     ondansetron (ZOFRAN) 4 mg tablet, Take 1 tablet (4 mg total) by mouth every 8 (eight) hours as needed for nausea or vomiting, Disp: 20 tablet, Rfl: 0    pantoprazole (PROTONIX) 40 mg tablet, Take 1 tablet (40 mg total) by mouth 2 (two) times a day before meals, Disp: 60 tablet, Rfl: 0    Potassium 99 MG TABS, Take by mouth, Disp: , Rfl:     Saw Palmetto 160 MG CAPS, Take by mouth, Disp: , Rfl:     sulfaSALAzine (AZULFIDINE) 500 mg tablet, , Disp: , Rfl: 0    zinc gluconate 50 mg tablet, Take by mouth, Disp: , Rfl:   Allergies   Allergen Reactions    Lisinopril     Candesartan Rash    Penicillins Rash     Vitals:    10/24/24 1415   BP: 156/70   Pulse: 101   Resp: 20   Temp: 97.5 °F (36.4 °C)   SpO2: 98%       Physical Exam  Constitutional: General appearance: The Patient is well-developed and well-nourished who appears the stated age in no acute distress. Patient is pleasant and talkative.     HEENT:  Normocephalic.  Sclerae are anicteric. Mucous membranes are moist. Neck is supple without adenopathy. No JVD.     Chest: The lungs are clear to auscultation.     Cardiac: Heart is regular rate.     Abdomen: Abdomen is soft, non-tender, non-distended and without masses.     Extremities: There is no clubbing or cyanosis. There is no edema.  Symmetric.  Neuro: Grossly nonfocal. Gait is normal.     Lymphatic: No evidence of cervical adenopathy  bilaterally.   No evidence of axillary adenopathy bilaterally.  Skin: Warm, anicteric.    Psych:  Patient is pleasant and talkative.  Breasts:        Pathology:  Final Diagnosis   A-B. Pancreas, Head Mass, FNA (Cell Block and Smear preparations):  Positive for malignancy.  - Pancreatic neuroendocrine tumor (PanNET), favor at least well-differentiated neuroendocrine tumor, WHO grade 2 of 3, in this limited sample.     Satisfactory for evaluation.      Immunohistochemistry performed at [facility] demonstrates the following staining profile in the lesional cells:               Positive: Cam 5.2, synaptophysin, chromogranin (focal), CD56               Negative: Beta-catenin (cytoplasmic)     A Ki-67 immunostain shows a proliferative activity up to ~19% in this limited sample.     Note: As reported in the “WHO Reporting System for Pancreaticobiliary Cytopathology *” the diagnostic category of “malignant” carries a % risk of malignancy being found in subsequent FNAB or resection.  The usual management following an initial diagnosis of “malignant” is per clinical stage. Ultimately clinical and radiologic correlation is needed for this patient in arriving at the actual management plan.      *Asael FAY, Field ANDERSEN, Audra ARCHIBALD (Eds). (2022). WHO Reporting System for Pancreaticobiliary Cytopathology. [facility].  FNAB - fine needle aspiration/biopsy      Electronically signed by Joseluis Schulz DO on 10/22/2024 at 11:35 AM       Labs:  Component  Ref Range & Units 10/20/24  3:28 PM   CA 19-9  0 - 35 U/mL 45 High        Imaging  CT abdomen pelvis w contrast    Result Date: 10/24/2024  Narrative: CT ABDOMEN AND PELVIS WITH IV CONTRAST INDICATION: K86.89: Other specified diseases of pancreas. . COMPARISON: CT abdomen/pelvis dated 10/15/2024. TECHNIQUE: CT examination of the abdomen and pelvis was performed. Multiplanar 2D reformatted images were created from the source data. This examination, like all CT scans performed in the [facility], was performed utilizing techniques to minimize radiation dose exposure, including the use of iterative reconstruction and automated exposure control. Radiation dose length product (DLP) for this visit: 971.15 mGy-cm IV Contrast: 50 mL of iohexol (OMNIPAQUE) Enteric Contrast: Not administered. FINDINGS: ABDOMEN LOWER CHEST: Trace left pleural effusion with subjacent atelectasis. LIVER/BILIARY TREE: Redemonstrated mild to moderate intra and extrahepatic biliary ductal dilatation with maximal common bile duct diameter of 0.9 cm at the site of the obstructing pancreatic head mass. No focal liver mass is identified. GALLBLADDER: Cholelithiasis. Gallbladder distention without pericholecystic inflammatory changes. SPLEEN: Unremarkable. PANCREAS: There is a lesion suspicious for pancreatic cancer, which will be described based on Society of Abdominal Radiologists and American Pancreatic Association recommendations: MORPHOLOGIC EVALUATION: Appearance: Isoattenuating, infiltrating mass Size: 4.4 cm in maximal axial dimension Location: Pancreatic head. Pancreatic duct narrowing/abrupt cut-off with or without upstream dilatation: Diffuse upstream ductal dilatation with abrupt cut off at the level of the pancreatic head Biliary tree abrupt cut-off with or without upstream dilatation: Mild to moderate intra and extrahepatic biliary ductal dilatation with maximal common bile duct diameter of 0.9 cm at the site of the obstructing pancreatic head mass. ARTERIAL EVALUATION: Superior Mesenteric Artery Involvement: Absent. Celiac Axis Involvement: Absent. Common Hepatic Artery Involvement: Absent. Arterial Variant: Type: Replaced right hepatic artery. Tumor involvement: Absent. VENOUS EVALUATION: Main Portal Vein Involvement: Absent. Superior Mesenteric Vein Involvement: Absent. Diffuse pancreatic atrophy distal to the mass. 1.1 cm cystic lesion in the pancreatic tail. ADRENAL GLANDS: Diffuse bilateral adrenal gland  thickening, left greater than right. KIDNEYS/URETERS: 1.7 cm right renal cyst. 1.3 cm complex posterior left renal cyst, likely hemorrhagic/proteinaceous. Subcentimeter hypoattenuating renal lesion(s), too small to characterize but statistically likely benign, which do not warrant follow-up  (Radiology June 2019). No hydronephrosis. STOMACH AND BOWEL: No evidence for bowel obstruction. Colonic diverticulosis without evidence for acute diverticulitis. APPENDIX: No findings to suggest appendicitis. ABDOMINOPELVIC CAVITY: No ascites. No pneumoperitoneum. No lymphadenopathy. VESSELS: Atherosclerotic disease. No abdominal aortic aneurysm. PELVIS REPRODUCTIVE ORGANS: The prostate gland is markedly enlarged measuring 8.5 x 7.6 x 6.8 cm and indents the urinary bladder base. URINARY BLADDER: Unremarkable. ABDOMINAL WALL/INGUINAL REGIONS: Unremarkable. BONES: No acute fracture or suspicious osseous lesion.     Impression: Redemonstrated findings of a 4.4 cm infiltrative pancreatic head mass. No evidence for vascular involvement. No evidence for metastatic disease in the abdomen or pelvis. Uncomplicated colonic diverticulosis. Marked prostatomegaly indenting the urinary bladder base. Trace left pleural effusion with subjacent atelectasis. Workstation performed: [id number]     CT chest w contrast    Result Date: 10/19/2024  Narrative: CT CHEST WITH IV CONTRAST INDICATION:   staging. Per my review of the medical record, presented with melena, weakness, and epigastric discomfort. Pancreatic head mass on CT, status post endoscopic ultrasound and biopsy 10/17/2024, pathology pending. COMPARISON: CXR 10/3/2012, abdomen CT 10/15/2024. TECHNIQUE: Chest CT with intravenous contrast.  Axial, sagittal, coronal 2D reformats and coronal MIPS from source data. Radiation dose length product (DLP):  397.78 mGy-cm . Radiation dose exposure minimized using iterative reconstruction and automated exposure control. IV Contrast:  85 mL of iohexol  (OMNIPAQUE) FINDINGS: LUNGS: No metastases. Mild patchy consolidation and groundglass opacity in the left lower lobe. AIRWAYS: No significant filling defects. PLEURA: Trace left pleural effusion. HEART/GREAT VESSELS: Normal heart size. Mild coronary artery calcification indicating atherosclerotic heart disease. Pulmonary artery enlargement. MEDIASTINUM AND DELIA:  Unremarkable. CHEST WALL AND LOWER NECK: Unremarkable. UPPER ABDOMEN: Intrahepatic biliary dilation. Dilation of the pancreatic duct with atrophy of the body of the pancreas. Right renal cyst. Left adrenal hyperplasia. OSSEOUS STRUCTURES: Mild degenerative disease in the spine.     Impression: No lung metastases. Mild patchy consolidation and groundglass opacity in the left lower lobe which could be due to pneumonia in the appropriate clinical setting. Trace left pleural effusion. Pulmonary artery enlargement which can be seen with pulmonary hypertension. Workstation performed: [id number]     EGD    Result Date: 10/17/2024  Narrative: Table formatting from the original result was not included. [facility] End[facility] [address] [facility] [address] [phone] DATE OF SERVICE: 10/17/24 PHYSICIAN(S): Attending: Phil Baires MD Fellow: Pool Chiang MD INDICATION: Symptomatic anemia POST-OP DIAGNOSIS: See the impression below. PREPROCEDURE: Informed consent was obtained for the procedure, including sedation.  Risks of perforation, hemorrhage, adverse drug reaction and aspiration were discussed. The patient was placed in the left lateral decubitus position. Patient was explained about the risks and benefits of the procedure. Risks including but not limited to bleeding, infection, and perforation were explained in detail. Also explained about less than 100% sensitivity with the exam and other alternatives. PROCEDURE: EGD DETAILS OF PROCEDURE: Patient was taken to the procedure room where a time out was performed to confirm correct patient  and correct procedure. The patient underwent monitored anesthesia care, which was administered by an anesthesia professional. The patient's blood pressure, heart rate, level of consciousness, respirations, oxygen, ECG and ETCO2 were monitored throughout the procedure. The scope was introduced through the mouth and advanced to the second part of the duodenum. Retroflexion was performed in the fundus. The patient experienced no blood loss. The procedure was not difficult. The patient tolerated the procedure well. There were no apparent adverse events. ANESTHESIA INFORMATION: ASA: III Anesthesia Type: IV Sedation with Anesthesia MEDICATIONS: No administrations occurring from 1434 to 1450 on 10/17/24 FINDINGS: Single round ulcer in the ampullary region with clean base (John III) Edematous and erythematous mucosa with erosion in the body of the stomach; performed cold forceps biopsy to rule out H. pylori 3 cm sliding hiatal hernia (type I hiatal hernia) without Nael lesions present - GE junction 42 cm from the incisors, diaphragmatic impression 45 cm from the incisors, confirmed by retroflexion:  Hill classification: Grade II The upper third of the esophagus, middle third of the esophagus and lower third of the esophagus appeared normal. The duodenal bulb and 1st part of the duodenum appeared normal. SPECIMENS: ID Type Source Tests Collected by Time Destination 1 : r/o h pylori Tissue Stomach TISSUE EXAM Phil Baires MD 10/17/2024  2:49 PM      Impression: Single ulcer in the ampullary region with clean base (John III) Edematous, erythematous mucosa with erosion in the body of the stomach; performed cold forceps biopsy 3 cm type I hiatal hernia The upper third of the esophagus, middle third of the esophagus and lower third of the esophagus appeared normal. The duodenal bulb and 1st part of the duodenum appeared normal. Ulcerated area at the ampulla is likely tumor protruding through the ampulla RECOMMENDATION:  Await pathology results Proceed with EUS    Phil Baires MD     Endoscopic ultrasonography, GI (Upper)    Result Date: 10/17/2024  Narrative: Table formatting from the original result was not included. [facility] Endoscopy [address] [facility] [address] [phone] DATE OF SERVICE: 10/17/24 PHYSICIAN(S): Attending: Phil Baires MD Fellow: No Staff Documented INDICATION: Pancreatic mass POST-OP DIAGNOSIS: See the impression below. PREPROCEDURE: Informed consent was obtained for the procedure, including sedation.  Risks of perforation, hemorrhage, adverse drug reaction and aspiration were discussed. The patient was placed in the left lateral decubitus position. Patient was explained about the risks and benefits of the procedure. Risks including but not limited to bleeding, infection, and perforation were explained in detail. Also explained about less than 100% sensitivity with the exam and other alternatives. PROCEDURE: EUS UPPER DETAILS OF PROCEDURE: Patient was taken to the procedure room where a time out was performed to confirm correct patient and correct procedure. The patient underwent monitored anesthesia care, which was administered by an anesthesia professional. The patient's blood pressure, heart rate, oxygen, respirations, level of consciousness, ECG and ETCO2 were monitored throughout the procedure. The linear scope was introduced through the mouth and advanced to the third part of the duodenum. Retroflexion was performed in the fundus. The patient experienced no blood loss. The procedure was not difficult. The patient tolerated the procedure well. There were no apparent adverse events. ANESTHESIA INFORMATION: ASA: III Anesthesia Type: IV Sedation with Anesthesia MEDICATIONS: No administrations occurring from 1434 to 1537 on 10/17/24 FINDINGS: Heterogeneous and hypoechoic mass measuring 46 mm x 30 mm with well-defined margins was visualized in the head of the pancreas; 4  successful fine needle biopsy passes were taken with a 25 gauge needle using a transduodenal approach guided by Doppler. An adequate sample was obtained. Cytology analysis was performed. Onsite cytologist was present There were no cysts in the pancreas. There were no lesions in the examined part of the liver. There was a gallstone in the gallbladder. The CBD was dilated to 10mm and PD was dilated to 8mm. There were no enlarged lymph nodes in the peripancreatic, gastrohepatic, or celiac areas. SPECIMENS: ID Type Source Tests Collected by Time Destination 1 : r/o h pylori Tissue Stomach TISSUE EXAM Phil Baires MD 10/17/2024  2:49 PM  2 : pancreatic head mass FNA Pancreas FINE NEEDLE ASPIRATION/BIOPSY Phil Baires MD 10/17/2024  3:14 PM       Impression: Heterogeneous and hypoechoic mass measuring 46 mm x 30 mm with well-defined margins was visualized in the head of the pancreas; 4 fine needle biopsy passes were taken. An adequate sample was obtained. Cytology analysis was performed. Onsite cytologist was present There were no cysts in the pancreas. There were no lesions in the examined part of the liver. There was a gallstone in the gallbladder. The CBD was dilated to 10mm and PD was dilated to 8mm. There were no enlarged lymph nodes in the peripancreatic, gastrohepatic, or celiac areas. RECOMMENDATION: Await cytology results Return to floor and resume diet Consult Oncology    Phil Baires MD     MRI abdomen wo contrast and mrcp    Result Date: 10/16/2024  Narrative: MRI OF THE ABDOMEN WITHOUT CONTRAST WITH MRCP INDICATION: 88 years / Male. Patient incidentally noted to have Pancreatic mass. Please assess further, no contrast per ordering md due to gage. COMPARISON: CT abdomen pelvis performed yesterday. TECHNIQUE: Multiplanar/multisequence MRI of the abdomen with 3D MRCP was performed without the administration of contrast. DIAGNOSTIC QUALITY: Limited by motion and the lack of IV contrast. FINDINGS: LOWER CHEST:  Unremarkable. LIVER: Normal in size and configuration. No suspicious mass. Limited evaluation of hepatic and portal veins on this non-contrast MRI is unremarkable. BILE DUCTS: Intra and extrahepatic biliary dilation with maximum CBD diameter of 10 mm. Caliber change of the CBD at the site of suspected pancreatic head mass. GALLBLADDER: 15 mm dependent gallstone. Gallbladder distention without wall thickening or pericholecystic inflammatory changes. PANCREAS: Diffuse pancreatic parenchymal atrophy with enlarged pancreatic head measuring 4.7 x 2.7 cm #2/27 likely harboring an underlying infiltrative pancreatic head mass. Diffuse pancreatic ductal dilation measuring up to 9 mm with abrupt caliber change within the pancreatic head at the site of suspected mass. Simple exophytic pancreatic neck cyst measuring 19 mm #15/17. 11 mm pancreatic body cyst #15/14. ADRENAL GLANDS: Bilateral adrenal hyperplasia more apparent on the left. SPLEEN: Normal. KIDNEYS/PROXIMAL URETERS: No hydroureteronephrosis. The slightly hyperdense left renal lesion seen on CT is not well characterized on this study secondary to motion artifact and lack of IV contrast however this is likely to represent a proteinaceous cyst  #15/19. Scattered bilateral simple renal cysts. BOWEL: No dilated loops of bowel. PERITONEUM/RETROPERITONEUM: No ascites. LYMPH NODES: No abdominal lymphadenopathy. VESSELS: No aneurysm. ABDOMINAL WALL: Unremarkable BONES: No suspicious osseous lesion.     Impression: Limited by motion artifact and lack of IV contrast. Enlarged pancreatic head measuring 4.7 x 2.7 cm, #2/27, likely harboring an underlying infiltrative pancreatic head mass. The remainder of the pancreatic parenchyma is atrophic. Intra/extrahepatic biliary dilation and pancreatic ductal dilation with caliber changes of these ducts at the site of suspected pancreatic head mass. Workstation performed: [id number]     CT abdomen pelvis wo contrast    Result Date:  10/15/2024  Narrative: CT ABDOMEN AND PELVIS WITHOUT IV CONTRAST INDICATION:   pain. Per ED notes, shortness of breath, dizziness and weakness for a couple of months. Hemoglobin 4.7. COMPARISON: 7/17/2019 prostate MRI TECHNIQUE:  CT examination of the abdomen and pelvis was performed.   Axial, sagittal, and coronal 2D reformatted images were created from the source data and submitted for interpretation. Radiation dose length product (DLP) for this visit:  423 mGy-cm .  This examination, like all CT scans performed in the [facility], was performed utilizing techniques to minimize radiation dose exposure, including the use of iterative reconstruction and automated exposure control. IV Contrast: Not given. Enteric Contrast:  Enteric contrast was not administered. FINDINGS: ABDOMEN LOWER CHEST: Emphysema, dependent atelectasis and atherosclerosis. LIVER/BILIARY TREE: Liver is within normal limits. Intrahepatic and extrahepatic duct dilation to the level of a mass to be described below. GALLBLADDER: 1.2 cm proximal calcified gallstone and mild gallbladder distention. No wall thickening or pericholecystic fatty stranding or fluid. SPLEEN:  Within normal limits. PANCREAS: 3.4 x 3.3 cm width by depth pancreatic head/neck mass (2/67), isointense to parenchyma. Downstream pancreatic atrophy and duct dilation. 2.0 cm simple appearing cyst in the proximal pancreatic body/neck (2/59).. Probably additional tiny cysts in the distal pancreas. No calcification or secondary signs of acute pancreatitis. ADRENAL GLANDS: Mild bilateral thickening without discrete mass. KIDNEYS/URETERS: Small right cortical cyst. 1.1 cm exophytic left lower pole nodule, indeterminate by Hounsfield units (2/69). STOMACH AND BOWEL:  Within normal limits. APPENDIX:  Within normal limits. ABDOMINOPELVIC CAVITY:  No abnormal air, fluid or enlarged lymph nodes. VESSELS: Limited evaluation without IV contrast.  Normal aorta caliber. PELVIS:  REPRODUCTIVE ORGANS: Similar enlarged prostate. Seminal vesicles are within normal limits. URINARY BLADDER: Prostatic ingrowth. Otherwise within normal limits. ABDOMINAL WALL/INGUINAL REGIONS:  Within normal limits. BONES:  Degenerative changes. Small left iliac bone island. The study was marked in EPIC for immediate notification.     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 Further evaluation with pancreas MR and MRCP recommended. Small indeterminate left renal nodule, may be a complicated cyst. This can also be evaluated with MR without and with IV contrast. Other nonemergent findings above. Workstation performed: [id number]     I personally reviewed and interpreted the above laboratory and imaging data.

## 2024-10-24 NOTE — LETTER
October 24, 2024     Mega Hodges DO  [address]  [address]  [place] [place] [address]    Patient: Jose Zamora   YOB: 1935   Date of Visit: 10/24/2024       Dear Dr. Hodges:    Thank you for referring Jose Zamora to me for evaluation. Below are my notes for this consultation.    If you have questions, please do not hesitate to call me. I look forward to following your patient along with you.         Sincerely,        Nba Rodriguez MD        CC: MD Nba Orourke MD  10/24/2024  2:56 PM  Sign when Signing Visit               Surgical Oncology Follow Up       [facility] [place]  [address] [address]  [phone]    Jose Zamora  1935  [id number]  [facility]  [address] [address]  [phone]    1. 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  This is intermediate grade with a Ki-67 of 20%.  We had a long discussion regarding treatment options.  There is no evidence of metastasis based on his CT or MRI.  I would recommend getting a dotatate PET to see if there is really no evidence of disease.  If there is no distant disease I would recommend that he consider surgical resection.  This would require pancreaticoduodenectomy.  I did discuss the risks associated with the procedure.  We also discussed if there is metastatic disease octreotide would be the mainstay of treatment.  Of concern is that he likely had GI bleeding from this tumor.  And without some type of treatment this could recur.  I will see him back once we have the results of the PET/CT.  He is agreeable to this plan.  All his questions were answered.  Orders:  -     Ambulatory Referral to Surgical Oncology  -     NM PET CT skull base to mid thigh;  Future; Expected date: 10/24/2024  2. Malignant neoplasm of head of pancreas (HCC)  -     NM PET CT skull base to mid thigh; Future; Expected date: 10/24/2024         Chief Complaint   Patient presents with   • office visit       Return in about 2 weeks (around 11/7/2024) for Office visit long.      Oncology History   Primary pancreatic neuroendocrine tumor   10/17/2024 Biopsy    Endoscopic ultrasound  - Pancreas, Head Mass, FNA:  Positive for malignancy.  - Pancreatic neuroendocrine tumor (PanNET), favor at least well-differentiated neuroendocrine tumor, WHO grade 2 of 3, in this limited sample.     10/24/2024 -  Cancer Staged    Staging form: Neuroendocrine Tumor - Pancreas, AJCC V9  - Clinical: Stage II (cT3, cN0, cM0) - Signed by Nba Rodriguez MD on 10/24/2024           Staging: Neuroendocrine tumor of the pancreas, October 2024  Treatment history:    Current treatment:    Disease status:      History of Present Illness: Patient returns after his hospitalization for fatigue, weight loss and GI bleeding.  He was initially found to have a hemoglobin of 4.5 that required transfusion.  Imaging revealed a pancreatic head mass.  He then underwent an EUS.  Pathology revealed pancreatic neuroendocrine tumor that was well-differentiated, grade 2.  Ki-67 was approximately 19%.  Noncontrast MRI while he was hospitalized shows no evidence of metastasis.  Pancreas protocol CT on October 22, 2024 revealed a 4.4 cm mass in the pancreatic head.  There was no vessel involvement.  There was no evidence of metastatic disease to the liver.  I personally reviewed the films.  He has lost approximately 15 pounds over the last 2 months.  He has a loss of appetite.    Review of Systems  Complete ROS Surg Onc:   Complete ROS Surg Onc:   Constitutional: The patient has fatigue, weight loss and change in appetite.  Eyes: No complaints of visual problems, no scleral icterus.   ENT: no complaints of ear pain, no hoarseness, no difficulty  swallowing,  no tinnitus and no new masses in head, oral cavity, or neck.   Cardiovascular: No complaints of chest pain, no palpitations, no ankle edema.   Respiratory: No complaints of shortness of breath, no cough.   Gastrointestinal: No complaints of jaundice, no bloody stools, no pale stools.   Genitourinary: No complaints of dysuria, no hematuria, no nocturia, no frequent urination, no urethral discharge.   Musculoskeletal: No complaints of weakness, paralysis, joint stiffness or arthralgias.  Integumentary: No complaints of rash, no new lesions.   Neurological: No complaints of convulsions, no seizures, no dizziness.   Hematologic/Lymphatic: No complaints of easy bruising.   Endocrine:  No hot or cold intolerance.  No polydipsia, polyphagia, or polyuria.  Allergy/immunology:  No environmental allergies.  No food allergies.  Not immunocompromised.  Skin:  No pallor or rash.  No wound.        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 ([facility]ly [facility])   • Type 2 diabetes mellitus with stage 3 chronic kidney disease (Formerly [facility])   • Beta thalassemia minor   • Elevated PSA   • Abnormal prostate exam   • Lumbar strain   • Acute pain of right knee   • Strain of calf muscle   • Localized osteoarthritis of right knee   • Acute pain of both shoulders   • Right hand pain   • Generalized abdominal pain   • Weight loss   • Acute bilateral thoracic back pain   • Nausea   • UGIB (upper gastrointestinal bleed)   • Urinary retention   • PAWEL (acute kidney injury) ([facility])   • Pancreatic mass   • Lower back pain   • CKD stage 3b, 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 ([facility])   • Hypokalemia   • Hypomagnesemia   • Primary pancreatic  neuroendocrine tumor     Past Medical History:   Diagnosis Date   • Allergic    • Anemia    • Arthritis    • Diabetes mellitus (HCC)    • Disease of thyroid gland    • Hyperlipemia    • Hypertension      Past Surgical History:   Procedure Laterality Date   • COLONOSCOPY     • PROSTATE BIOPSY      needle,  resolved may 2005     Family History   Problem Relation Age of Onset   • No Known Problems Mother      Social History     Socioeconomic History   • Marital status: /Civil Union     Spouse name: Not on file   • Number of children: Not on file   • Years of education: Not on file   • Highest education level: Not on file   Occupational History   • 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[employer]   Tobacco Use   • Smoking status: Former     Types: Cigarettes   • Smokeless tobacco: Never   Vaping Use   • Vaping status: Never Used   Substance and Sexual Activity   • Alcohol use: Not Currently     Comment: social/rarely   • Drug use: Never   • Sexual activity: Not Currently   Other Topics Concern   • Not on file   Social History Narrative   • Not on file     Social Determinants of Health     Financial Resource Strain: Low Risk  (8/30/2023)    Overall Financial Resource Strain (CARDIA)    • Difficulty of Paying Living Expenses: Not hard at all   Food Insecurity: No Food Insecurity (10/16/2024)    Nursing - Inadequate Food Risk Classification    • Worried About Running Out of Food in the Last Year: Never true    • Ran Out of Food in the Last Year: Never true    • Ran Out of Food in the Last Year: Not on file   Transportation Needs: No Transportation Needs (10/16/2024)    PRAPARE - Transportation    • Lack of Transportation (Medical): No    • Lack of Transportation (Non-Medical): No   Physical Activity: Not on file   Stress: Not on file   Social Connections: Not on file   Intimate Partner Violence: Not on file   Housing Stability: Unknown (10/16/2024)    Housing Stability Vital Sign    • Unable to Pay for Housing in the Last  Year: No    • Number of Times Moved in the Last Year: Not on file    • Homeless in the Last Year: No       Current Outpatient Medications:   •  amLODIPine (NORVASC) 5 mg tablet, Take 1 tablet (5 mg total) by mouth daily, Disp: 30 tablet, Rfl: 0  •  atorvastatin (LIPITOR) 20 mg tablet, Take 1 tablet by mouth daily, Disp: , Rfl:   •  cephalexin (KEFLEX) 500 mg capsule, Take 1 capsule (500 mg total) by mouth 2 (two) times a day for 7 days, Disp: 14 capsule, Rfl: 0  •  cholecalciferol (VITAMIN D3) 1,000 units tablet, Take by mouth, Disp: , Rfl:   •  fexofenadine (Allegra Allergy) 180 MG tablet, Take 180 mg by mouth daily as needed (allergies), Disp: , Rfl:   •  folic acid (FOLVITE) 1 mg tablet, Take 1 tablet by mouth daily, Disp: , Rfl:   •  levothyroxine 50 mcg tablet, Take 1 tablet (50 mcg total) by mouth daily, Disp: 90 tablet, Rfl: 1  •  metoprolol tartrate (LOPRESSOR) 25 mg tablet, Take 0.5 tablets (12.5 mg total) by mouth 2 (two) times a day, Disp: 30 tablet, Rfl: 0  •  montelukast (SINGULAIR) 10 mg tablet, Take by mouth, Disp: , Rfl:   •  Multiple Vitamins-Minerals (CENTRUM SILVER) tablet, Take by mouth, Disp: , Rfl:   •  Omega-3 Fatty Acids (FISH OIL) 1000 MG CPDR, Take by mouth, Disp: , Rfl:   •  ondansetron (ZOFRAN) 4 mg tablet, Take 1 tablet (4 mg total) by mouth every 8 (eight) hours as needed for nausea or vomiting, Disp: 20 tablet, Rfl: 0  •  pantoprazole (PROTONIX) 40 mg tablet, Take 1 tablet (40 mg total) by mouth 2 (two) times a day before meals, Disp: 60 tablet, Rfl: 0  •  Potassium 99 MG TABS, Take by mouth, Disp: , Rfl:   •  Saw Palmetto 160 MG CAPS, Take by mouth, Disp: , Rfl:   •  sulfaSALAzine (AZULFIDINE) 500 mg tablet, , Disp: , Rfl: 0  •  zinc gluconate 50 mg tablet, Take by mouth, Disp: , Rfl:   Allergies   Allergen Reactions   • Lisinopril    • Candesartan Rash   • Penicillins Rash     Vitals:    10/24/24 1415   BP: 156/70   Pulse: 101   Resp: 20   Temp: 97.5 °F (36.4 °C)   SpO2: 98%        Physical Exam  Constitutional: General appearance: The Patient is well-developed and well-nourished who appears the stated age in no acute distress. Patient is pleasant and talkative.     HEENT:  Normocephalic.  Sclerae are anicteric. Mucous membranes are moist. Neck is supple without adenopathy. No JVD.     Chest: The lungs are clear to auscultation.     Cardiac: Heart is regular rate.     Abdomen: Abdomen is soft, non-tender, non-distended and without masses.     Extremities: There is no clubbing or cyanosis. There is no edema.  Symmetric.  Neuro: Grossly nonfocal. Gait is normal.     Lymphatic: No evidence of cervical adenopathy bilaterally.   No evidence of axillary adenopathy bilaterally.  Skin: Warm, anicteric.    Psych:  Patient is pleasant and talkative.  Breasts:        Pathology:  Final Diagnosis   A-B. Pancreas, Head Mass, FNA (Cell Block and Smear preparations):  Positive for malignancy.  - Pancreatic neuroendocrine tumor (PanNET), favor at least well-differentiated neuroendocrine tumor, WHO grade 2 of 3, in this limited sample.     Satisfactory for evaluation.      Immunohistochemistry performed at [facility] demonstrates the following staining profile in the lesional cells:               Positive: Cam 5.2, synaptophysin, chromogranin (focal), CD56               Negative: Beta-catenin (cytoplasmic)     A Ki-67 immunostain shows a proliferative activity up to ~19% in this limited sample.     Note: As reported in the “WHO Reporting System for Pancreaticobiliary Cytopathology *” the diagnostic category of “malignant” carries a % risk of malignancy being found in subsequent FNAB or resection.  The usual management following an initial diagnosis of “malignant” is per clinical stage. Ultimately clinical and radiologic correlation is needed for this patient in arriving at the actual management plan.      *Asael FAY, Field ANDERSEN, Audra ARCHIBALD (Eds). (2022). WHO Reporting System for Pancreaticobiliary Cytopathology.  IA.  FNAB - fine needle aspiration/biopsy      Electronically signed by Joseluis Schulz DO on 10/22/2024 at 11:35 AM       Labs:  Component  Ref Range & Units 10/20/24  3:28 PM   CA 19-9  0 - 35 U/mL 45 High        Imaging  CT abdomen pelvis w contrast    Result Date: 10/24/2024  Narrative: CT ABDOMEN AND PELVIS WITH IV CONTRAST INDICATION: K86.89: Other specified diseases of pancreas. . COMPARISON: CT abdomen/pelvis dated 10/15/2024. TECHNIQUE: CT examination of the abdomen and pelvis was performed. Multiplanar 2D reformatted images were created from the source data. This examination, like all CT scans performed in the [facility], was performed utilizing techniques to minimize radiation dose exposure, including the use of iterative reconstruction and automated exposure control. Radiation dose length product (DLP) for this visit: 971.15 mGy-cm IV Contrast: 50 mL of iohexol (OMNIPAQUE) Enteric Contrast: Not administered. FINDINGS: ABDOMEN LOWER CHEST: Trace left pleural effusion with subjacent atelectasis. LIVER/BILIARY TREE: Redemonstrated mild to moderate intra and extrahepatic biliary ductal dilatation with maximal common bile duct diameter of 0.9 cm at the site of the obstructing pancreatic head mass. No focal liver mass is identified. GALLBLADDER: Cholelithiasis. Gallbladder distention without pericholecystic inflammatory changes. SPLEEN: Unremarkable. PANCREAS: There is a lesion suspicious for pancreatic cancer, which will be described based on Society of Abdominal Radiologists and American Pancreatic Association recommendations: MORPHOLOGIC EVALUATION: Appearance: Isoattenuating, infiltrating mass Size: 4.4 cm in maximal axial dimension Location: Pancreatic head. Pancreatic duct narrowing/abrupt cut-off with or without upstream dilatation: Diffuse upstream ductal dilatation with abrupt cut off at the level of the pancreatic head Biliary tree abrupt cut-off with or without upstream  dilatation: Mild to moderate intra and extrahepatic biliary ductal dilatation with maximal common bile duct diameter of 0.9 cm at the site of the obstructing pancreatic head mass. ARTERIAL EVALUATION: Superior Mesenteric Artery Involvement: Absent. Celiac Axis Involvement: Absent. Common Hepatic Artery Involvement: Absent. Arterial Variant: Type: Replaced right hepatic artery. Tumor involvement: Absent. VENOUS EVALUATION: Main Portal Vein Involvement: Absent. Superior Mesenteric Vein Involvement: Absent. Diffuse pancreatic atrophy distal to the mass. 1.1 cm cystic lesion in the pancreatic tail. ADRENAL GLANDS: Diffuse bilateral adrenal gland thickening, left greater than right. KIDNEYS/URETERS: 1.7 cm right renal cyst. 1.3 cm complex posterior left renal cyst, likely hemorrhagic/proteinaceous. Subcentimeter hypoattenuating renal lesion(s), too small to characterize but statistically likely benign, which do not warrant follow-up  (Radiology June 2019). No hydronephrosis. STOMACH AND BOWEL: No evidence for bowel obstruction. Colonic diverticulosis without evidence for acute diverticulitis. APPENDIX: No findings to suggest appendicitis. ABDOMINOPELVIC CAVITY: No ascites. No pneumoperitoneum. No lymphadenopathy. VESSELS: Atherosclerotic disease. No abdominal aortic aneurysm. PELVIS REPRODUCTIVE ORGANS: The prostate gland is markedly enlarged measuring 8.5 x 7.6 x 6.8 cm and indents the urinary bladder base. URINARY BLADDER: Unremarkable. ABDOMINAL WALL/INGUINAL REGIONS: Unremarkable. BONES: No acute fracture or suspicious osseous lesion.     Impression: Redemonstrated findings of a 4.4 cm infiltrative pancreatic head mass. No evidence for vascular involvement. No evidence for metastatic disease in the abdomen or pelvis. Uncomplicated colonic diverticulosis. Marked prostatomegaly indenting the urinary bladder base. Trace left pleural effusion with subjacent atelectasis. Workstation performed: [id number]     CT chest w  contrast    Result Date: 10/19/2024  Narrative: CT CHEST WITH IV CONTRAST INDICATION:   staging. Per my review of the medical record, presented with melena, weakness, and epigastric discomfort. Pancreatic head mass on CT, status post endoscopic ultrasound and biopsy 10/17/2024, pathology pending. COMPARISON: CXR 10/3/2012, abdomen CT 10/15/2024. TECHNIQUE: Chest CT with intravenous contrast.  Axial, sagittal, coronal 2D reformats and coronal MIPS from source data. Radiation dose length product (DLP):  397.78 mGy-cm . Radiation dose exposure minimized using iterative reconstruction and automated exposure control. IV Contrast:  85 mL of iohexol (OMNIPAQUE) FINDINGS: LUNGS: No metastases. Mild patchy consolidation and groundglass opacity in the left lower lobe. AIRWAYS: No significant filling defects. PLEURA: Trace left pleural effusion. HEART/GREAT VESSELS: Normal heart size. Mild coronary artery calcification indicating atherosclerotic heart disease. Pulmonary artery enlargement. MEDIASTINUM AND DELIA:  Unremarkable. CHEST WALL AND LOWER NECK: Unremarkable. UPPER ABDOMEN: Intrahepatic biliary dilation. Dilation of the pancreatic duct with atrophy of the body of the pancreas. Right renal cyst. Left adrenal hyperplasia. OSSEOUS STRUCTURES: Mild degenerative disease in the spine.     Impression: No lung metastases. Mild patchy consolidation and groundglass opacity in the left lower lobe which could be due to pneumonia in the appropriate clinical setting. Trace left pleural effusion. Pulmonary artery enlargement which can be seen with pulmonary hypertension. Workstation performed: [id number]     EGD    Result Date: 10/17/2024  Narrative: Table formatting from the original result was not included. [facility] [address] [address] [phone] DATE OF SERVICE: 10/17/24 PHYSICIAN(S): Attending: Phil Baires MD Fellow: Pool Chiang MD INDICATION: Symptomatic anemia POST-OP DIAGNOSIS:  See the impression below. PREPROCEDURE: Informed consent was obtained for the procedure, including sedation.  Risks of perforation, hemorrhage, adverse drug reaction and aspiration were discussed. The patient was placed in the left lateral decubitus position. Patient was explained about the risks and benefits of the procedure. Risks including but not limited to bleeding, infection, and perforation were explained in detail. Also explained about less than 100% sensitivity with the exam and other alternatives. PROCEDURE: EGD DETAILS OF PROCEDURE: Patient was taken to the procedure room where a time out was performed to confirm correct patient and correct procedure. The patient underwent monitored anesthesia care, which was administered by an anesthesia professional. The patient's blood pressure, heart rate, level of consciousness, respirations, oxygen, ECG and ETCO2 were monitored throughout the procedure. The scope was introduced through the mouth and advanced to the second part of the duodenum. Retroflexion was performed in the fundus. The patient experienced no blood loss. The procedure was not difficult. The patient tolerated the procedure well. There were no apparent adverse events. ANESTHESIA INFORMATION: ASA: III Anesthesia Type: IV Sedation with Anesthesia MEDICATIONS: No administrations occurring from 1434 to 1450 on 10/17/24 FINDINGS: Single round ulcer in the ampullary region with clean base (John III) Edematous and erythematous mucosa with erosion in the body of the stomach; performed cold forceps biopsy to rule out H. pylori 3 cm sliding hiatal hernia (type I hiatal hernia) without Nael lesions present - GE junction 42 cm from the incisors, diaphragmatic impression 45 cm from the incisors, confirmed by retroflexion:  Hill classification: Grade II The upper third of the esophagus, middle third of the esophagus and lower third of the esophagus appeared normal. The duodenal bulb and 1st part of the  duodenum appeared normal. SPECIMENS: ID Type Source Tests Collected by Time Destination 1 : r/o h pylori Tissue Stomach TISSUE EXAM Phil Baires MD 10/17/2024  2:49 PM      Impression: Single ulcer in the ampullary region with clean base (John III) Edematous, erythematous mucosa with erosion in the body of the stomach; performed cold forceps biopsy 3 cm type I hiatal hernia The upper third of the esophagus, middle third of the esophagus and lower third of the esophagus appeared normal. The duodenal bulb and 1st part of the duodenum appeared normal. Ulcerated area at the ampulla is likely tumor protruding through the ampulla RECOMMENDATION: Await pathology results Proceed with EUS    Phil Baires MD     Endoscopic ultrasonography, GI (Upper)    Result Date: 10/17/2024  Narrative: Table formatting from the original result was not included. [facility] [address] [address] [phone] DATE OF SERVICE: 10/17/24 PHYSICIAN(S): Attending: Phil Baires MD Fellow: No Staff Documented INDICATION: Pancreatic mass POST-OP DIAGNOSIS: See the impression below. PREPROCEDURE: Informed consent was obtained for the procedure, including sedation.  Risks of perforation, hemorrhage, adverse drug reaction and aspiration were discussed. The patient was placed in the left lateral decubitus position. Patient was explained about the risks and benefits of the procedure. Risks including but not limited to bleeding, infection, and perforation were explained in detail. Also explained about less than 100% sensitivity with the exam and other alternatives. PROCEDURE: EUS UPPER DETAILS OF PROCEDURE: Patient was taken to the procedure room where a time out was performed to confirm correct patient and correct procedure. The patient underwent monitored anesthesia care, which was administered by an anesthesia professional. The patient's blood pressure, heart rate, oxygen, respirations, level of  consciousness, ECG and ETCO2 were monitored throughout the procedure. The linear scope was introduced through the mouth and advanced to the third part of the duodenum. Retroflexion was performed in the fundus. The patient experienced no blood loss. The procedure was not difficult. The patient tolerated the procedure well. There were no apparent adverse events. ANESTHESIA INFORMATION: ASA: III Anesthesia Type: IV Sedation with Anesthesia MEDICATIONS: No administrations occurring from 1434 to 1537 on 10/17/24 FINDINGS: Heterogeneous and hypoechoic mass measuring 46 mm x 30 mm with well-defined margins was visualized in the head of the pancreas; 4 successful fine needle biopsy passes were taken with a 25 gauge needle using a transduodenal approach guided by Doppler. An adequate sample was obtained. Cytology analysis was performed. Onsite cytologist was present There were no cysts in the pancreas. There were no lesions in the examined part of the liver. There was a gallstone in the gallbladder. The CBD was dilated to 10mm and PD was dilated to 8mm. There were no enlarged lymph nodes in the peripancreatic, gastrohepatic, or celiac areas. SPECIMENS: ID Type Source Tests Collected by Time Destination 1 : r/o h pylori Tissue Stomach TISSUE EXAM Phil Baires MD 10/17/2024  2:49 PM  2 : pancreatic head mass FNA Pancreas FINE NEEDLE ASPIRATION/BIOPSY Phil Baires MD 10/17/2024  3:14 PM       Impression: Heterogeneous and hypoechoic mass measuring 46 mm x 30 mm with well-defined margins was visualized in the head of the pancreas; 4 fine needle biopsy passes were taken. An adequate sample was obtained. Cytology analysis was performed. Onsite cytologist was present There were no cysts in the pancreas. There were no lesions in the examined part of the liver. There was a gallstone in the gallbladder. The CBD was dilated to 10mm and PD was dilated to 8mm. There were no enlarged lymph nodes in the peripancreatic, gastrohepatic, or  celiac areas. RECOMMENDATION: Await cytology results Return to floor and resume diet Consult Oncology    Phil Baires MD     MRI abdomen wo contrast and mrcp    Result Date: 10/16/2024  Narrative: MRI OF THE ABDOMEN WITHOUT CONTRAST WITH MRCP INDICATION: 88 years / Male. Patient incidentally noted to have Pancreatic mass. Please assess further, no contrast per ordering md due to gage. COMPARISON: CT abdomen pelvis performed yesterday. TECHNIQUE: Multiplanar/multisequence MRI of the abdomen with 3D MRCP was performed without the administration of contrast. DIAGNOSTIC QUALITY: Limited by motion and the lack of IV contrast. FINDINGS: LOWER CHEST: Unremarkable. LIVER: Normal in size and configuration. No suspicious mass. Limited evaluation of hepatic and portal veins on this non-contrast MRI is unremarkable. BILE DUCTS: Intra and extrahepatic biliary dilation with maximum CBD diameter of 10 mm. Caliber change of the CBD at the site of suspected pancreatic head mass. GALLBLADDER: 15 mm dependent gallstone. Gallbladder distention without wall thickening or pericholecystic inflammatory changes. PANCREAS: Diffuse pancreatic parenchymal atrophy with enlarged pancreatic head measuring 4.7 x 2.7 cm #2/27 likely harboring an underlying infiltrative pancreatic head mass. Diffuse pancreatic ductal dilation measuring up to 9 mm with abrupt caliber change within the pancreatic head at the site of suspected mass. Simple exophytic pancreatic neck cyst measuring 19 mm #15/17. 11 mm pancreatic body cyst #15/14. ADRENAL GLANDS: Bilateral adrenal hyperplasia more apparent on the left. SPLEEN: Normal. KIDNEYS/PROXIMAL URETERS: No hydroureteronephrosis. The slightly hyperdense left renal lesion seen on CT is not well characterized on this study secondary to motion artifact and lack of IV contrast however this is likely to represent a proteinaceous cyst  #15/19. Scattered bilateral simple renal cysts. BOWEL: No dilated loops of bowel.  PERITONEUM/RETROPERITONEUM: No ascites. LYMPH NODES: No abdominal lymphadenopathy. VESSELS: No aneurysm. ABDOMINAL WALL: Unremarkable BONES: No suspicious osseous lesion.     Impression: Limited by motion artifact and lack of IV contrast. Enlarged pancreatic head measuring 4.7 x 2.7 cm, #2/27, likely harboring an underlying infiltrative pancreatic head mass. The remainder of the pancreatic parenchyma is atrophic. Intra/extrahepatic biliary dilation and pancreatic ductal dilation with caliber changes of these ducts at the site of suspected pancreatic head mass. Workstation performed: [id number]     CT abdomen pelvis wo contrast    Result Date: 10/15/2024  Narrative: CT ABDOMEN AND PELVIS WITHOUT IV CONTRAST INDICATION:   pain. Per ED notes, shortness of breath, dizziness and weakness for a couple of months. Hemoglobin 4.7. COMPARISON: 7/17/2019 prostate MRI TECHNIQUE:  CT examination of the abdomen and pelvis was performed.   Axial, sagittal, and coronal 2D reformatted images were created from the source data and submitted for interpretation. Radiation dose length product (DLP) for this visit:  423 mGy-cm .  This examination, like all CT scans performed in the [facility], was performed utilizing techniques to minimize radiation dose exposure, including the use of iterative reconstruction and automated exposure control. IV Contrast: Not given. Enteric Contrast:  Enteric contrast was not administered. FINDINGS: ABDOMEN LOWER CHEST: Emphysema, dependent atelectasis and atherosclerosis. LIVER/BILIARY TREE: Liver is within normal limits. Intrahepatic and extrahepatic duct dilation to the level of a mass to be described below. GALLBLADDER: 1.2 cm proximal calcified gallstone and mild gallbladder distention. No wall thickening or pericholecystic fatty stranding or fluid. SPLEEN:  Within normal limits. PANCREAS: 3.4 x 3.3 cm width by depth pancreatic head/neck mass (2/67), isointense to parenchyma.  Downstream pancreatic atrophy and duct dilation. 2.0 cm simple appearing cyst in the proximal pancreatic body/neck (2/59).. Probably additional tiny cysts in the distal pancreas. No calcification or secondary signs of acute pancreatitis. ADRENAL GLANDS: Mild bilateral thickening without discrete mass. KIDNEYS/URETERS: Small right cortical cyst. 1.1 cm exophytic left lower pole nodule, indeterminate by Hounsfield units (2/69). STOMACH AND BOWEL:  Within normal limits. APPENDIX:  Within normal limits. ABDOMINOPELVIC CAVITY:  No abnormal air, fluid or enlarged lymph nodes. VESSELS: Limited evaluation without IV contrast.  Normal aorta caliber. PELVIS: REPRODUCTIVE ORGANS: Similar enlarged prostate. Seminal vesicles are within normal limits. URINARY BLADDER: Prostatic ingrowth. Otherwise within normal limits. ABDOMINAL WALL/INGUINAL REGIONS:  Within normal limits. BONES:  Degenerative changes. Small left iliac bone island. The study was marked in EPIC for immediate notification.     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 Further evaluation with pancreas MR and MRCP recommended. Small indeterminate left renal nodule, may be a complicated cyst. This can also be evaluated with MR without and with IV contrast. Other nonemergent findings above. Workstation performed: [id number]     I personally reviewed and interpreted the above laboratory and imaging data.

## 2024-10-25 ENCOUNTER — TELEPHONE (OUTPATIENT)
Age: 89
End: 2024-10-25

## 2024-10-25 PROCEDURE — 88342 IMHCHEM/IMCYTCHM 1ST ANTB: CPT | Performed by: STUDENT IN AN ORGANIZED HEALTH CARE EDUCATION/TRAINING PROGRAM

## 2024-10-25 PROCEDURE — 88313 SPECIAL STAINS GROUP 2: CPT | Performed by: STUDENT IN AN ORGANIZED HEALTH CARE EDUCATION/TRAINING PROGRAM

## 2024-10-25 PROCEDURE — 88341 IMHCHEM/IMCYTCHM EA ADD ANTB: CPT | Performed by: STUDENT IN AN ORGANIZED HEALTH CARE EDUCATION/TRAINING PROGRAM

## 2024-10-25 PROCEDURE — 88305 TISSUE EXAM BY PATHOLOGIST: CPT | Performed by: STUDENT IN AN ORGANIZED HEALTH CARE EDUCATION/TRAINING PROGRAM

## 2024-10-25 NOTE — TELEPHONE ENCOUNTER
Spoke with HARVEY Serna [phone].  She needed info so that they can contact someone who might help Jose since they live out of town.  I gave her the # for  [facility].  [phone]

## 2024-10-28 ENCOUNTER — APPOINTMENT (OUTPATIENT)
Age: 89
End: 2024-10-28
Payer: MEDICARE

## 2024-10-28 DIAGNOSIS — I10 PRIMARY HYPERTENSION: ICD-10-CM

## 2024-10-28 DIAGNOSIS — E11.22 TYPE 2 DIABETES MELLITUS WITH STAGE 3 CHRONIC KIDNEY DISEASE, WITHOUT LONG-TERM CURRENT USE OF INSULIN, UNSPECIFIED WHETHER STAGE 3A OR 3B CKD (HCC): ICD-10-CM

## 2024-10-28 DIAGNOSIS — N17.9 AKI (ACUTE KIDNEY INJURY) (HCC): ICD-10-CM

## 2024-10-28 DIAGNOSIS — N18.32 CKD STAGE 3B, GFR 30-44 ML/MIN (HCC): ICD-10-CM

## 2024-10-28 DIAGNOSIS — N18.30 TYPE 2 DIABETES MELLITUS WITH STAGE 3 CHRONIC KIDNEY DISEASE, WITHOUT LONG-TERM CURRENT USE OF INSULIN, UNSPECIFIED WHETHER STAGE 3A OR 3B CKD (HCC): ICD-10-CM

## 2024-10-28 LAB
ANION GAP SERPL CALCULATED.3IONS-SCNC: 13 MMOL/L (ref 4–13)
BUN SERPL-MCNC: 26 MG/DL (ref 5–25)
CALCIUM SERPL-MCNC: 8.5 MG/DL (ref 8.4–10.2)
CHLORIDE SERPL-SCNC: 106 MMOL/L (ref 96–108)
CO2 SERPL-SCNC: 23 MMOL/L (ref 21–32)
CREAT SERPL-MCNC: 1.34 MG/DL (ref 0.6–1.3)
GFR SERPL CREATININE-BSD FRML MDRD: 46 ML/MIN/1.73SQ M
GLUCOSE P FAST SERPL-MCNC: 187 MG/DL (ref 65–99)
POTASSIUM SERPL-SCNC: 4.3 MMOL/L (ref 3.5–5.3)
SODIUM SERPL-SCNC: 142 MMOL/L (ref 135–147)

## 2024-10-28 PROCEDURE — 36415 COLL VENOUS BLD VENIPUNCTURE: CPT

## 2024-10-28 PROCEDURE — 80048 BASIC METABOLIC PNL TOTAL CA: CPT

## 2024-10-29 ENCOUNTER — OFFICE VISIT (OUTPATIENT)
Dept: HEMATOLOGY ONCOLOGY | Facility: CLINIC | Age: 89
End: 2024-10-29
Payer: MEDICARE

## 2024-10-29 VITALS
SYSTOLIC BLOOD PRESSURE: 152 MMHG | OXYGEN SATURATION: 98 % | BODY MASS INDEX: 23.25 KG/M2 | HEIGHT: 69 IN | DIASTOLIC BLOOD PRESSURE: 70 MMHG | WEIGHT: 157 LBS | TEMPERATURE: 97.4 F | HEART RATE: 93 BPM

## 2024-10-29 DIAGNOSIS — D3A.8 PRIMARY PANCREATIC NEUROENDOCRINE TUMOR: Primary | ICD-10-CM

## 2024-10-29 DIAGNOSIS — D50.8 OTHER IRON DEFICIENCY ANEMIA: ICD-10-CM

## 2024-10-29 PROCEDURE — 99215 OFFICE O/P EST HI 40 MIN: CPT | Performed by: INTERNAL MEDICINE

## 2024-10-29 PROCEDURE — G2211 COMPLEX E/M VISIT ADD ON: HCPCS | Performed by: INTERNAL MEDICINE

## 2024-10-29 NOTE — PROGRESS NOTES
Hematology/Oncology Outpatient Office Note  Date of Service: 10/29/2024    [facility] [address]  [place] [address]  [phone]    Reason for Consultation:   Chief Complaint   Patient presents with    Follow-up     Referral Physician: No ref. provider found  Primary Care Physician:  Scott Hodges DO     Assessment/plan:   1. 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  2. Acute on chronic anemia  3. CKD  4. Beta thalassemia minor    Jose Zamora is an 88-year-old male with recent diagnosis of neuroendocrine tumor of the pancreatic head (4 cm), intermediate grade with a Ki-67 of 20%.  Recent hospitalization for fatigue, weight loss and GI bleeding.  He was initially found to have a hemoglobin of 4.5 g/deciliter requiring PRBC transfusion and IV iron.  Imaging revealed a pancreatic head mass.  He underwent EUS pancreatic head mass biopsy consistent with pancreatic neuroendocrine tumor with.  He has been evaluated by surgical oncology (Dr. Rodriguez) and was recommended pancreaticoduodenectomy pending dotatate PET scan.    I discussed with the patient and her daughter the clinical course leading up to their cancer diagnosis. I reviewed relevant office notes, imaging reports and pathology result as well. I personally reviewed the lab results, the imaging studies, pathology, other specialty/physicians consult notes and recommendations, and outside medical records. I had a lengthy discussion with the patient and shared the work-up findings. We discussed the diagnosis and management plan.  We discussed if there is evidence of metastatic disease on the dotatate PET scan then he will likely be considered for systemic treatment (octreotide/lanreotide).  If localized disease, then he will consider resection.  He is following with surgical oncology.    Regarding his  anemia, recommend checking CBC and iron panel.  Patient understands if his iron stores are low then he will be recommended IV iron to optimize counts prior to surgery.    Orders Placed This Encounter   Procedures    Chromogranin A    CBC and differential      Diagnosis ICD-10-CM Associated Orders   1. Primary pancreatic neuroendocrine tumor  D3A.8 Chromogranin A     CBC and differential     Iron Panel (Includes Ferritin, Iron Sat%, Iron, and TIBC)      2. Other iron deficiency anemia  D50.8 Chromogranin A     CBC and differential     Iron Panel (Includes Ferritin, Iron Sat%, Iron, and TIBC)          No follow-ups on file.    Shoshana Fuentes, DO 10/29/2024   Hematology & Medical Oncology Physician    Oncology history:     Oncology History   Primary pancreatic neuroendocrine tumor   10/17/2024 Biopsy    Endoscopic ultrasound  - Pancreas, Head Mass, FNA:  Positive for malignancy.  - Pancreatic neuroendocrine tumor (PanNET), favor at least well-differentiated neuroendocrine tumor, WHO grade 2 of 3, in this limited sample.     10/24/2024 -  Cancer Staged    Staging form: Neuroendocrine Tumor - Pancreas, AJCC V9  - Clinical: Stage II (cT3, cN0, cM0) - Signed by Nba Rodriguez MD on 10/24/2024         History of present illness:   Jose Zamora is an 89 yo male with past medical history significant for HTN, DM2, thyroidism, pan UC on sulfasalazine and beta thalassemia minor.  He was recently hospitalized for fatigue, weight loss and GI bleeding. His hemoglobin on recent admission was 4.5 g/deciliter (baseline appears to be 8-10 g/dL).  He required 3 units PRBC transfusion.  Received IV iron. Imaging revealed a pancreatic head mass.  He underwent EUS pancreatic head mass biopsy consistent with pancreatic neuroendocrine tumor with.  He has been evaluated by surgical oncology (Dr. Rodriguez) and was recommended pancreaticoduodenectomy pending dotatate PET scan.  He presents for hospital follow-up visit accompanied by his daughter.   Patient notes improvement in his symptoms.  Denies any abdominal pain, nausea, vomiting, diarrhea.  Denies any blood in the stools.  Fatigue improving.    Review of systems:   Review of Systems   Constitutional: Positive for malaise/fatigue. Negative for fever.   HENT: Negative.     Cardiovascular: Negative.  Negative for chest pain, dyspnea on exertion and palpitations.   Respiratory: Negative.  Negative for shortness of breath.    Hematologic/Lymphatic: Negative.  Does not bruise/bleed easily.   Skin: Negative.    Musculoskeletal: Negative.    Gastrointestinal: Negative.  Negative for abdominal pain.   Neurological: Negative.    Psychiatric/Behavioral: Negative.          A 10-point review of system was performed, pertinent positive and negative were detailed as above. Otherwise, the 10-point review of system was negative.    Past Medical History:   Diagnosis Date    Allergic     Anemia     Arthritis     Diabetes mellitus (HCC)     Disease of thyroid gland     Hyperlipemia     Hypertension        Past Surgical History:   Procedure Laterality Date    COLONOSCOPY      PROSTATE BIOPSY      needle,  resolved may 2005       Family History   Problem Relation Age of Onset    No Known Problems Mother        Social History     Socioeconomic History    Marital status: /Civil Union     Spouse name: Not on file    Number of children: Not on file    Years of education: Not on file    Highest education level: Not on file   Occupational History    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[employer]   Tobacco Use    Smoking status: Former     Types: Cigarettes    Smokeless tobacco: Never   Vaping Use    Vaping status: Never Used   Substance and Sexual Activity    Alcohol use: Not Currently     Comment: social/rarely    Drug use: Never    Sexual activity: Not Currently   Other Topics Concern    Not on file   Social History Narrative    Not on file     Social Determinants of Health     Financial Resource Strain: Low Risk  (8/30/2023)     Overall Financial Resource Strain (CARDIA)     Difficulty of Paying Living Expenses: Not hard at all   Food Insecurity: No Food Insecurity (10/16/2024)    Nursing - Inadequate Food Risk Classification     Worried About Running Out of Food in the Last Year: Never true     Ran Out of Food in the Last Year: Never true     Ran Out of Food in the Last Year: Not on file   Transportation Needs: No Transportation Needs (10/16/2024)    PRAPARE - Transportation     Lack of Transportation (Medical): No     Lack of Transportation (Non-Medical): No   Physical Activity: Not on file   Stress: Not on file   Social Connections: Not on file   Intimate Partner Violence: Not on file   Housing Stability: Unknown (10/16/2024)    Housing Stability Vital Sign     Unable to Pay for Housing in the Last Year: No     Number of Times Moved in the Last Year: Not on file     Homeless in the Last Year: No       Allergies   Allergen Reactions    Lisinopril     Candesartan Rash    Penicillins Rash       Current Outpatient Medications   Medication Sig Dispense Refill    amLODIPine (NORVASC) 5 mg tablet Take 1 tablet (5 mg total) by mouth daily 30 tablet 0    atorvastatin (LIPITOR) 20 mg tablet Take 1 tablet by mouth daily      cholecalciferol (VITAMIN D3) 1,000 units tablet Take by mouth      fexofenadine (Allegra Allergy) 180 MG tablet Take 180 mg by mouth daily as needed (allergies)      folic acid (FOLVITE) 1 mg tablet Take 1 tablet by mouth daily      levothyroxine 50 mcg tablet Take 1 tablet (50 mcg total) by mouth daily 90 tablet 1    metoprolol tartrate (LOPRESSOR) 25 mg tablet Take 0.5 tablets (12.5 mg total) by mouth 2 (two) times a day 30 tablet 0    montelukast (SINGULAIR) 10 mg tablet Take by mouth      Multiple Vitamins-Minerals (CENTRUM SILVER) tablet Take by mouth      Omega-3 Fatty Acids (FISH OIL) 1000 MG CPDR Take by mouth      ondansetron (ZOFRAN) 4 mg tablet Take 1 tablet (4 mg total) by mouth every 8 (eight) hours as needed for  nausea or vomiting 20 tablet 0    pantoprazole (PROTONIX) 40 mg tablet Take 1 tablet (40 mg total) by mouth 2 (two) times a day before meals 60 tablet 0    Potassium 99 MG TABS Take by mouth      Saw Palmetto 160 MG CAPS Take by mouth      sulfaSALAzine (AZULFIDINE) 500 mg tablet   0    zinc gluconate 50 mg tablet Take by mouth      cephalexin (KEFLEX) 500 mg capsule Take 1 capsule (500 mg total) by mouth 2 (two) times a day for 7 days (Patient not taking: Reported on 10/29/2024) 14 capsule 0     No current facility-administered medications for this visit.       (Not in a hospital admission)      I have reviewed the relevant past medical, surgical, social and family history. I have also reviewed allergies and medications for this patient.    Objective:      Vitals:    10/29/24 1534   BP: 152/70   Pulse: 93   Temp: (!) 97.4 °F (36.3 °C)   SpO2: 98%       Wt Readings from Last 3 Encounters:   10/29/24 71.2 kg (157 lb)   10/24/24 71.7 kg (158 lb)   10/22/24 70.3 kg (155 lb)       Performance status: ECOG PS: 1  Physical Exam  Vitals and nursing note reviewed.   Constitutional:       General: He is not in acute distress.     Appearance: He is well-developed.   HENT:      Head: Normocephalic and atraumatic.   Eyes:      Conjunctiva/sclera: Conjunctivae normal.   Cardiovascular:      Rate and Rhythm: Normal rate.   Pulmonary:      Effort: Pulmonary effort is normal. No respiratory distress.   Musculoskeletal:         General: No swelling.      Cervical back: Neck supple.   Skin:     General: Skin is warm and dry.      Coloration: Skin is pale.   Neurological:      Mental Status: He is alert and oriented to person, place, and time.      Gait: Gait normal.   Psychiatric:         Mood and Affect: Mood normal.         Data Review:  Pathology Result:  Final Diagnosis   Date Value Ref Range Status   10/17/2024   Final    A-B. Pancreas, Head Mass, FNA (Cell Block and Smear preparations):  Positive for malignancy.  - Pancreatic  neuroendocrine tumor (PanNET), favor at least well-differentiated neuroendocrine tumor, WHO grade 2 of 3, in this limited sample.    Satisfactory for evaluation.     Immunohistochemistry performed at [facility] demonstrates the following staining profile in the lesional cells:   Positive: Cam 5.2, synaptophysin, chromogranin (focal), CD56   Negative: Beta-catenin (cytoplasmic)    A Ki-67 immunostain shows a proliferative activity up to ~19% in this limited sample.    Note: As reported in the “WHO Reporting System for Pancreaticobiliary Cytopathology *” the diagnostic category of “malignant” carries a % risk of malignancy being found in subsequent FNAB or resection.  The usual management following an initial diagnosis of “malignant” is per clinical stage. Ultimately clinical and radiologic correlation is needed for this patient in arriving at the actual management plan.     *Asael FAY, uAdra Glynn (Eds). (2022). WHO Reporting System for Pancreaticobiliary Cytopathology. [facility].  FNAB - fine needle aspiration/biopsy       10/17/2024   Final    A. Stomach, biopsy:  -   Gastric oxyntic/transitional mucosa with superficial erosion and acute gastritis.  -   Negative for dysplasia and intestinal metaplasia (AB/PAS negative).  -   Helicobacter pylori immunostain is negative.    Comment: There is no evidence of malignancy is the reviewed material. It is noted the patient has a recently diagnosed malignancy of the pancreas. AE1/3 is negative for carcinoma in this sample. Clinical correlation is advised.      01/11/2019   Final    A. 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    -- Entire base of lesion not visualized; underlying invasion cannot be excluded.       Interpretation performed at [facility], [address] [address].           Image Results:  Image result are reviewed and documented in Hematology/Oncology  history    10/15/2024: CT abd/pelv:   Pancreatic mass with evidence of secondary bile duct obstruction. Most commonly adenocarcinoma. Further evaluation with pancreas MR and MRCP recommended.   Small indeterminate left renal nodule, may be a complicated cyst. This can also be evaluated with MR without and with IV contrast.   Other nonemergent findings above.     10/16/2024: MRCP:   Limited by motion artifact and lack of IV contrast.   Enlarged pancreatic head measuring 4.7 x 2.7 cm, #2/27, likely harboring an underlying infiltrative pancreatic head mass. The remainder of the pancreatic parenchyma is atrophic.  Intra/extrahepatic biliary dilation and pancreatic ductal dilation with caliber changes of these ducts at the site of suspected pancreatic head mass.     10/16/2024: EGD:  Single ulcer in the ampullary region with clean base (John III)  Edematous, erythematous mucosa with erosion in the body of the stomach; performed cold forceps biopsy  3 cm type I hiatal hernia  The upper third of the esophagus, middle third of the esophagus and lower third of the esophagus appeared normal.  The duodenal bulb and 1st part of the duodenum appeared normal.  Ulcerated area at the ampulla is likely tumor protruding through the ampulla     10/16/2024: EUS:  Heterogeneous and hypoechoic mass measuring 46 mm x 30 mm with well-defined margins was visualized in the head of the pancreas; 4 fine needle biopsy passes were taken. An adequate sample was obtained. Cytology analysis was performed. Onsite cytologist was present  There were no cysts in the pancreas. There were no lesions in the examined part of the liver. There was a gallstone in the gallbladder. The CBD was dilated to 10mm and PD was dilated to 8mm. There were no enlarged lymph nodes in the peripancreatic, gastrohepatic, or celiac areas.    Labs:  Lab data are [facility] and [facility].     Lab Results   Component Value Date    HGB 8.8 (L) 10/20/2024     HCT 27.6 (L) 10/20/2024    MCV 80 (L) 10/20/2024     10/20/2024    WBC 9.75 10/20/2024    NRBC 0 10/19/2024     Lab Results   Component Value Date     02/05/2015    K 4.3 10/28/2024     10/28/2024    CO2 23 10/28/2024    ANIONGAP 9 02/05/2015    BUN 26 (H) 10/28/2024    CREATININE 1.34 (H) 10/28/2024    GLUCOSE 130 02/05/2015    GLUF 187 (H) 10/28/2024    CALCIUM 8.5 10/28/2024    CORRECTEDCA 8.7 10/19/2024    AST 13 10/19/2024    ALT 20 10/19/2024    ALKPHOS 103 10/19/2024    PROT 7.6 02/05/2015    BILITOT 0.52 02/05/2015    EGFR 46 10/28/2024       Lab Results   Component Value Date    IRON 64 10/15/2024    TIBC 271 10/15/2024    FERRITIN 25 10/15/2024    FERRITIN 120 01/31/2018    FERRITIN 108 08/02/2017     Disclaimer: This document was prepared using [employer] technology. If a word or phrase is confusing, or does not make sense, this is likely due to recognition error which was not discovered during this clinician's review. If you believe an error has occurred, please contact me through [facility] service line for vignesh?cation.

## 2024-10-31 ENCOUNTER — APPOINTMENT (OUTPATIENT)
Age: 89
End: 2024-10-31
Payer: MEDICARE

## 2024-10-31 DIAGNOSIS — D3A.8 PRIMARY PANCREATIC NEUROENDOCRINE TUMOR: ICD-10-CM

## 2024-10-31 DIAGNOSIS — D50.8 OTHER IRON DEFICIENCY ANEMIA: ICD-10-CM

## 2024-10-31 LAB
BASOPHILS # BLD AUTO: 0.1 THOUSANDS/ΜL (ref 0–0.1)
BASOPHILS NFR BLD AUTO: 1 % (ref 0–1)
EOSINOPHIL # BLD AUTO: 0.13 THOUSAND/ΜL (ref 0–0.61)
EOSINOPHIL NFR BLD AUTO: 2 % (ref 0–6)
ERYTHROCYTE [DISTWIDTH] IN BLOOD BY AUTOMATED COUNT: 20.8 % (ref 11.6–15.1)
FERRITIN SERPL-MCNC: 396 NG/ML (ref 24–336)
HCT VFR BLD AUTO: 26.1 % (ref 36.5–49.3)
HGB BLD-MCNC: 7.8 G/DL (ref 12–17)
IMM GRANULOCYTES # BLD AUTO: 0.05 THOUSAND/UL (ref 0–0.2)
IMM GRANULOCYTES NFR BLD AUTO: 1 % (ref 0–2)
IRON SATN MFR SERPL: 43 % (ref 15–50)
IRON SERPL-MCNC: 95 UG/DL (ref 50–212)
LYMPHOCYTES # BLD AUTO: 1.18 THOUSANDS/ΜL (ref 0.6–4.47)
LYMPHOCYTES NFR BLD AUTO: 16 % (ref 14–44)
MCH RBC QN AUTO: 23.5 PG (ref 26.8–34.3)
MCHC RBC AUTO-ENTMCNC: 29.9 G/DL (ref 31.4–37.4)
MCV RBC AUTO: 79 FL (ref 82–98)
MONOCYTES # BLD AUTO: 0.53 THOUSAND/ΜL (ref 0.17–1.22)
MONOCYTES NFR BLD AUTO: 7 % (ref 4–12)
NEUTROPHILS # BLD AUTO: 5.46 THOUSANDS/ΜL (ref 1.85–7.62)
NEUTS SEG NFR BLD AUTO: 73 % (ref 43–75)
NRBC BLD AUTO-RTO: 0 /100 WBCS
PLATELET # BLD AUTO: 277 THOUSANDS/UL (ref 149–390)
PMV BLD AUTO: 11.1 FL (ref 8.9–12.7)
RBC # BLD AUTO: 3.32 MILLION/UL (ref 3.88–5.62)
TIBC SERPL-MCNC: 220 UG/DL (ref 250–450)
UIBC SERPL-MCNC: 125 UG/DL (ref 155–355)
WBC # BLD AUTO: 7.45 THOUSAND/UL (ref 4.31–10.16)

## 2024-10-31 PROCEDURE — 83540 ASSAY OF IRON: CPT

## 2024-10-31 PROCEDURE — 86316 IMMUNOASSAY TUMOR OTHER: CPT

## 2024-10-31 PROCEDURE — 85025 COMPLETE CBC W/AUTO DIFF WBC: CPT

## 2024-10-31 PROCEDURE — 83550 IRON BINDING TEST: CPT

## 2024-10-31 PROCEDURE — 36415 COLL VENOUS BLD VENIPUNCTURE: CPT

## 2024-10-31 PROCEDURE — 82728 ASSAY OF FERRITIN: CPT

## 2024-11-01 ENCOUNTER — OFFICE VISIT (OUTPATIENT)
Age: 89
End: 2024-11-01
Payer: MEDICARE

## 2024-11-01 VITALS
TEMPERATURE: 97.5 F | HEIGHT: 69 IN | HEART RATE: 107 BPM | OXYGEN SATURATION: 94 % | SYSTOLIC BLOOD PRESSURE: 152 MMHG | WEIGHT: 156 LBS | DIASTOLIC BLOOD PRESSURE: 70 MMHG | BODY MASS INDEX: 23.11 KG/M2

## 2024-11-01 DIAGNOSIS — I73.9 PAD (PERIPHERAL ARTERY DISEASE) (HCC): ICD-10-CM

## 2024-11-01 DIAGNOSIS — N18.30 TYPE 2 DIABETES MELLITUS WITH STAGE 3 CHRONIC KIDNEY DISEASE, WITHOUT LONG-TERM CURRENT USE OF INSULIN, UNSPECIFIED WHETHER STAGE 3A OR 3B CKD (HCC): ICD-10-CM

## 2024-11-01 DIAGNOSIS — D3A.8 PRIMARY PANCREATIC NEUROENDOCRINE TUMOR: Primary | ICD-10-CM

## 2024-11-01 DIAGNOSIS — E78.2 MIXED HYPERLIPIDEMIA: ICD-10-CM

## 2024-11-01 DIAGNOSIS — K92.2 UGIB (UPPER GASTROINTESTINAL BLEED): ICD-10-CM

## 2024-11-01 DIAGNOSIS — I10 PRIMARY HYPERTENSION: ICD-10-CM

## 2024-11-01 DIAGNOSIS — E11.22 TYPE 2 DIABETES MELLITUS WITH STAGE 3 CHRONIC KIDNEY DISEASE, WITHOUT LONG-TERM CURRENT USE OF INSULIN, UNSPECIFIED WHETHER STAGE 3A OR 3B CKD (HCC): ICD-10-CM

## 2024-11-01 PROCEDURE — 99495 TRANSJ CARE MGMT MOD F2F 14D: CPT | Performed by: FAMILY MEDICINE

## 2024-11-01 NOTE — PROGRESS NOTES
TCM Call       Date and time call was made  10/21/2024 11:00 AM    Patient was hospitialized at  [facility]    Date of Admission  10/15/24    Date of discharge  10/20/24    Diagnosis  UPPER GI BLEED    Disposition  Home    Were the patients medications reviewed and updated  Yes    Current Symptoms  None          TCM Call       Post hospital issues  None    Should patient be enrolled in anticoag monitoring?  No    Scheduled for follow up?  Yes    Did you obtain your prescribed medications  Yes    Do you need help managing your prescriptions or medications  No    Is transportation to your appointment needed  No    I have advised the patient to call PCP with any new or worsening symptoms  GUDELIA VIEIRA CMA    Living Arrangements  Spouse or Significiant other    Support System  Spouse    The type of support provided  Emotional; Financial; Physical    Do you have social support  Yes, as much as I need    Are you recieving any outpatient services  No    Are you recieving home care services  No    Are you using any community resources  No    Current waiver services  No    Have you fallen in the last 12 months  No    Interperter language line needed  No    Counseling  Patient

## 2024-11-01 NOTE — PROGRESS NOTES
Assessment/Plan: Guidance given overall.  Patient will be going for PET scan on the seventh.  Guidance given in this regard and possible follow-up/treatment based on these results.  Patient will continue with other medications as listed for chronic conditions noted below.  Acute kidney injury has improved.  Urinary retention has resolved.  Patient will follow with surgical oncology as well as hematology oncology appropriately.  No further melena or upper GI bleed.  Diabetes stable.  Blood pressure stable.  Will recheck blood pressure at follow-up.  Patient will follow-up with all specialist appropriately.  Patient will see me for routine visit.       Diagnoses and all orders for this visit:    Primary pancreatic neuroendocrine tumor    UGIB (upper gastrointestinal bleed)    Primary hypertension    PAD (peripheral artery disease) (HCC)    Type 2 diabetes mellitus with stage 3 chronic kidney disease, without long-term current use of insulin, unspecified whether stage 3a or 3b CKD (HCC)    Mixed hyperlipidemia            Subjective:        Patient ID: Jose Zamora is a 88 y.o. male.      Patient is here status post hospitalization from October 15 through 20.  Patient admitted with upper GI bleed.  Hemoglobin at that time was 4.5.  Patient does have history of thalassemia.  Patient did have black stools/melena.  Patient had multiple specialists seen had multiple CAT scans and MRIs which ultimately showed pancreatic cancer.  Other diagnoses include hypertension hyperlipidemia diabetes anemia urinary retention and acute kidney injury.  Patient feeling better at this time but still has some fatigue and tiredness.  No new chest pain shortness of breath or difficulty with defecation or urination at the present time.  Patient did receive 4 units packed red blood cells.  All hospital records reviewed at this time.          The following portions of the patient's history were reviewed and updated as appropriate: allergies,  "current medications, past family history, past medical history, past social history, past surgical history and problem list.      Review of Systems   Constitutional:  Positive for fatigue.   HENT: Negative.     Eyes: Negative.    Respiratory: Negative.     Cardiovascular: Negative.    Gastrointestinal: Negative.    Endocrine: Negative.    Genitourinary: Negative.    Musculoskeletal: Negative.    Skin: Negative.    Allergic/Immunologic: Negative.    Neurological: Negative.    Hematological: Negative.    Psychiatric/Behavioral: Negative.             Objective:               /70 (BP Location: Right arm, Patient Position: Sitting, Cuff Size: Standard)   Pulse (!) 107   Temp 97.5 °F (36.4 °C) (Temporal)   Ht 5' 9\" (1.753 m)   Wt 70.8 kg (156 lb)   SpO2 94%   BMI 23.04 kg/m²          Physical Exam  Vitals and nursing note reviewed.   Constitutional:       General: He is not in acute distress.     Appearance: He is ill-appearing. He is not toxic-appearing or diaphoretic.   HENT:      Head: Normocephalic and atraumatic.      Right Ear: Tympanic membrane, ear canal and external ear normal. There is no impacted cerumen.      Left Ear: Tympanic membrane, ear canal and external ear normal. There is no impacted cerumen.      Nose: Nose normal. No congestion or rhinorrhea.      Mouth/Throat:      Mouth: Mucous membranes are moist.      Pharynx: No oropharyngeal exudate or posterior oropharyngeal erythema.   Eyes:      General: No scleral icterus.        Right eye: No discharge.         Left eye: No discharge.   Neck:      Vascular: No carotid bruit.   Cardiovascular:      Rate and Rhythm: Normal rate and regular rhythm.      Pulses: Normal pulses.      Heart sounds: Normal heart sounds. No murmur heard.     No friction rub. No gallop.   Pulmonary:      Effort: Pulmonary effort is normal. No respiratory distress.      Breath sounds: Normal breath sounds. No stridor. No wheezing, rhonchi or rales.   Chest:      Chest " wall: No tenderness.   Abdominal:      General: Abdomen is flat. Bowel sounds are normal. There is no distension.      Palpations: Abdomen is soft.      Tenderness: There is no abdominal tenderness. There is no guarding or rebound.   Musculoskeletal:         General: No swelling, tenderness, deformity or signs of injury. Normal range of motion.      Cervical back: Normal range of motion and neck supple. No rigidity. No muscular tenderness.      Right lower leg: No edema.      Left lower leg: No edema.   Lymphadenopathy:      Cervical: No cervical adenopathy.   Skin:     General: Skin is warm and dry.      Capillary Refill: Capillary refill takes less than 2 seconds.      Coloration: Skin is not jaundiced.      Findings: No bruising, erythema, lesion or rash.   Neurological:      Mental Status: He is alert and oriented to person, place, and time. Mental status is at baseline.      Gait: Gait normal.   Psychiatric:         Mood and Affect: Mood normal.         Behavior: Behavior normal.         Thought Content: Thought content normal.         Judgment: Judgment normal.

## 2024-11-06 ENCOUNTER — OFFICE VISIT (OUTPATIENT)
Dept: NEPHROLOGY | Facility: CLINIC | Age: 89
End: 2024-11-06
Payer: MEDICARE

## 2024-11-06 VITALS — BODY MASS INDEX: 22.81 KG/M2 | HEIGHT: 69 IN | WEIGHT: 154 LBS

## 2024-11-06 DIAGNOSIS — N17.9 AKI (ACUTE KIDNEY INJURY) (HCC): Primary | ICD-10-CM

## 2024-11-06 LAB — CGA SERPL-MCNC: 847.8 NG/ML (ref 0–101.8)

## 2024-11-06 PROCEDURE — 99214 OFFICE O/P EST MOD 30 MIN: CPT | Performed by: INTERNAL MEDICINE

## 2024-11-06 NOTE — LETTER
November 6, 2024     Mega Hodges DO  [address]  [address]  [facility] [address]    Patient: Jose Zamora   YOB: 1935   Date of Visit: 11/6/2024       Dear Dr. Hodges:    Thank you for referring Jose Zamora to me for evaluation. Below are my notes for this consultation.    If you have questions, please do not hesitate to call me. I look forward to following your patient along with you.         Sincerely,        Freeman Sorensen MD        CC: No Recipients    Freeman Sorensen MD  11/6/2024  8:23 AM  Sign when Signing Visit  NEPHROLOGY PROGRESS NOTE    Jose Zamora 88 y.o. male MRN: [id number]  Unit/Bed#:  Encounter: [id number]  Reason for Consult: Acute kidney injury    The patient is here for follow-up with his children he looks well says he is doing well since he got out of the hospital couple weeks ago.  His stool is now normal.  He had been admitted for anemia with GI bleeding also discovered to have pancreatic mass and suspected to be a neuroendocrine tumor going through a workup there.  He looks well has no complaints stools back to normal he is here for hospital follow-up as he had acute kidney injury.    ASSESSMENT/PLAN:      1.  Renal    Patient does appear to have mild chronic renal insufficiency with very low levels of proteinuria so he likely has underlying nephrosclerosis as opposed to an intrinsic disease chronically.  Going back to 2019 there were times creatinine tended to run around 1.5.  Most recent creatinine after the hospitalization is down to 1.3 so he is recovered back to his baseline.  The etiology as I explained to them in detail was related to the anemia and bleeding that in the sense causes dehydration and volume depletion.    At this point he stable back on his medications he says he did not take his antihypertensives today but given he has nephrosclerosis back to his baseline he can follow-up with his primary doctor with no changes in medications.  The doctors been  "following it appropriately and I told them if there is ever a problem in the future feel free to give me a call and I be happy to see him.    Continue routine follow-up and if there is a problem contact me please.    I have spent a total time of 30 minutes in caring for this patient on the day of the visit/encounter including Diagnostic results, Prognosis, Impressions, Reviewing / ordering tests, medicine, procedures  , and Obtaining or reviewing history  .     SUBJECTIVE:  Review of Systems   Constitutional: Negative for chills, diaphoresis and fever.   HENT: Negative.     Eyes: Negative.    Cardiovascular:  Negative for chest pain, dyspnea on exertion and leg swelling.   Respiratory:  Negative for cough, shortness of breath and snoring.    Gastrointestinal:  Negative for abdominal pain, diarrhea, melena, nausea and vomiting.   Genitourinary: Negative.    Neurological:  Negative for dizziness and headaches.   Psychiatric/Behavioral:  Negative for altered mental status.        OBJECTIVE:  Current Weight: Weight - Scale: 69.9 kg (154 lb)  Vitals:@TMAX(24)@Current:     Height 5' 9\" (1.753 m), weight 69.9 kg (154 lb). , Body mass index is 22.74 kg/m².    [unfilled]    Physical Exam: Ht 5' 9\" (1.753 m)   Wt 69.9 kg (154 lb)   BMI 22.74 kg/m²   Physical Exam  Constitutional:       General: He is not in acute distress.     Appearance: He is not toxic-appearing.   HENT:      Head: Normocephalic and atraumatic.      Nose: Nose normal.      Mouth/Throat:      Mouth: Mucous membranes are dry.   Eyes:      General: No scleral icterus.     Extraocular Movements: Extraocular movements intact.   Cardiovascular:      Rate and Rhythm: Normal rate and regular rhythm.      Heart sounds: Murmur heard.      No gallop.      Comments: No edema.  Pulmonary:      Effort: Pulmonary effort is normal. No respiratory distress.      Breath sounds: No wheezing or rales.   Abdominal:      General: Bowel sounds are normal. There is no distension. "      Palpations: Abdomen is soft.      Tenderness: There is no abdominal tenderness.   Neurological:      Mental Status: He is alert and oriented to person, place, and time.      Comments: Positive tremor.   Psychiatric:         Mood and Affect: Mood normal.         Behavior: Behavior normal.         Medications:    Current Outpatient Medications:   •  amLODIPine (NORVASC) 5 mg tablet, Take 1 tablet (5 mg total) by mouth daily, Disp: 30 tablet, Rfl: 0  •  atorvastatin (LIPITOR) 20 mg tablet, Take 1 tablet by mouth daily, Disp: , Rfl:   •  cholecalciferol (VITAMIN D3) 1,000 units tablet, Take by mouth, Disp: , Rfl:   •  fexofenadine (Allegra Allergy) 180 MG tablet, Take 180 mg by mouth daily as needed (allergies), Disp: , Rfl:   •  folic acid (FOLVITE) 1 mg tablet, Take 1 tablet by mouth daily, Disp: , Rfl:   •  levothyroxine 50 mcg tablet, Take 1 tablet (50 mcg total) by mouth daily, Disp: 90 tablet, Rfl: 1  •  metoprolol tartrate (LOPRESSOR) 25 mg tablet, Take 0.5 tablets (12.5 mg total) by mouth 2 (two) times a day, Disp: 30 tablet, Rfl: 0  •  montelukast (SINGULAIR) 10 mg tablet, Take by mouth, Disp: , Rfl:   •  Multiple Vitamins-Minerals (CENTRUM SILVER) tablet, Take by mouth, Disp: , Rfl:   •  ondansetron (ZOFRAN) 4 mg tablet, Take 1 tablet (4 mg total) by mouth every 8 (eight) hours as needed for nausea or vomiting, Disp: 20 tablet, Rfl: 0  •  pantoprazole (PROTONIX) 40 mg tablet, Take 1 tablet (40 mg total) by mouth 2 (two) times a day before meals, Disp: 60 tablet, Rfl: 0  •  Potassium 99 MG TABS, Take by mouth, Disp: , Rfl:   •  Saw Palmetto 160 MG CAPS, Take by mouth, Disp: , Rfl:   •  sulfaSALAzine (AZULFIDINE) 500 mg tablet, , Disp: , Rfl: 0  •  zinc gluconate 50 mg tablet, Take by mouth, Disp: , Rfl:     Laboratory Results:  Lab Results   Component Value Date    WBC 7.45 10/31/2024    HGB 7.8 (L) 10/31/2024    HCT 26.1 (L) 10/31/2024    MCV 79 (L) 10/31/2024     10/31/2024     Lab Results  "  Component Value Date    SODIUM 142 10/28/2024    K 4.3 10/28/2024     10/28/2024    CO2 23 10/28/2024    BUN 26 (H) 10/28/2024    CREATININE 1.34 (H) 10/28/2024    GLUC 188 (H) 10/20/2024    CALCIUM 8.5 10/28/2024     Lab Results   Component Value Date    CALCIUM 8.5 10/28/2024     No results found for: \"LABPROT\"      "

## 2024-11-06 NOTE — PATIENT INSTRUCTIONS
You are here for a hospital follow-up with me and this was the first time I have met you.  Thank you for being patient with me and asking all these questions.  The kidney function is measured by the blood test called creatinine looking back even 5 years ago you tend to run around 1.5 normal being 1.  It was elevated in the hospital but you went in with severe anemia and after giving your blood and the bleeding stopped the number improved and was down to 1.3 even a week after you left the hospital so your kidney function recovered back to your baseline.    Saying that you do not have severe kidney disease it is a little bit high likely from something called nephrosclerosis or aging close I do not see that you have signs of an aggressive kidney disease.  For now I would just follow-up with your family doctor I will let them know to continue to watch it and if there is ever a problem in the future please feel free to contact me and I will tell them the same.    Good luck with your other testing and have a good holidays.

## 2024-11-06 NOTE — PROGRESS NOTES
NEPHROLOGY PROGRESS NOTE    Jose Zamora 88 y.o. male MRN: [id number]  Unit/Bed#:  Encounter: [id number]  Reason for Consult: Acute kidney injury    The patient is here for follow-up with his children he looks well says he is doing well since he got out of the hospital couple weeks ago.  His stool is now normal.  He had been admitted for anemia with GI bleeding also discovered to have pancreatic mass and suspected to be a neuroendocrine tumor going through a workup there.  He looks well has no complaints stools back to normal he is here for hospital follow-up as he had acute kidney injury.    ASSESSMENT/PLAN:      1.  Renal    Patient does appear to have mild chronic renal insufficiency with very low levels of proteinuria so he likely has underlying nephrosclerosis as opposed to an intrinsic disease chronically.  Going back to 2019 there were times creatinine tended to run around 1.5.  Most recent creatinine after the hospitalization is down to 1.3 so he is recovered back to his baseline.  The etiology as I explained to them in detail was related to the anemia and bleeding that in the sense causes dehydration and volume depletion.    At this point he stable back on his medications he says he did not take his antihypertensives today but given he has nephrosclerosis back to his baseline he can follow-up with his primary doctor with no changes in medications.  The doctors been following it appropriately and I told them if there is ever a problem in the future feel free to give me a call and I be happy to see him.    Continue routine follow-up and if there is a problem contact me please.    I have spent a total time of 30 minutes in caring for this patient on the day of the visit/encounter including Diagnostic results, Prognosis, Impressions, Reviewing / ordering tests, medicine, procedures  , and Obtaining or reviewing history  .     SUBJECTIVE:  Review of Systems   Constitutional: Negative for chills, diaphoresis  "and fever.   HENT: Negative.     Eyes: Negative.    Cardiovascular:  Negative for chest pain, dyspnea on exertion and leg swelling.   Respiratory:  Negative for cough, shortness of breath and snoring.    Gastrointestinal:  Negative for abdominal pain, diarrhea, melena, nausea and vomiting.   Genitourinary: Negative.    Neurological:  Negative for dizziness and headaches.   Psychiatric/Behavioral:  Negative for altered mental status.        OBJECTIVE:  Current Weight: Weight - Scale: 69.9 kg (154 lb)  Vitals:@TMAX(24)@Current:     Height 5' 9\" (1.753 m), weight 69.9 kg (154 lb). , Body mass index is 22.74 kg/m².    [unfilled]    Physical Exam: Ht 5' 9\" (1.753 m)   Wt 69.9 kg (154 lb)   BMI 22.74 kg/m²   Physical Exam  Constitutional:       General: He is not in acute distress.     Appearance: He is not toxic-appearing.   HENT:      Head: Normocephalic and atraumatic.      Nose: Nose normal.      Mouth/Throat:      Mouth: Mucous membranes are dry.   Eyes:      General: No scleral icterus.     Extraocular Movements: Extraocular movements intact.   Cardiovascular:      Rate and Rhythm: Normal rate and regular rhythm.      Heart sounds: Murmur heard.      No gallop.      Comments: No edema.  Pulmonary:      Effort: Pulmonary effort is normal. No respiratory distress.      Breath sounds: No wheezing or rales.   Abdominal:      General: Bowel sounds are normal. There is no distension.      Palpations: Abdomen is soft.      Tenderness: There is no abdominal tenderness.   Neurological:      Mental Status: He is alert and oriented to person, place, and time.      Comments: Positive tremor.   Psychiatric:         Mood and Affect: Mood normal.         Behavior: Behavior normal.         Medications:    Current Outpatient Medications:     amLODIPine (NORVASC) 5 mg tablet, Take 1 tablet (5 mg total) by mouth daily, Disp: 30 tablet, Rfl: 0    atorvastatin (LIPITOR) 20 mg tablet, Take 1 tablet by mouth daily, Disp: , Rfl:     " "cholecalciferol (VITAMIN D3) 1,000 units tablet, Take by mouth, Disp: , Rfl:     fexofenadine (Allegra Allergy) 180 MG tablet, Take 180 mg by mouth daily as needed (allergies), Disp: , Rfl:     folic acid (FOLVITE) 1 mg tablet, Take 1 tablet by mouth daily, Disp: , Rfl:     levothyroxine 50 mcg tablet, Take 1 tablet (50 mcg total) by mouth daily, Disp: 90 tablet, Rfl: 1    metoprolol tartrate (LOPRESSOR) 25 mg tablet, Take 0.5 tablets (12.5 mg total) by mouth 2 (two) times a day, Disp: 30 tablet, Rfl: 0    montelukast (SINGULAIR) 10 mg tablet, Take by mouth, Disp: , Rfl:     Multiple Vitamins-Minerals (CENTRUM SILVER) tablet, Take by mouth, Disp: , Rfl:     ondansetron (ZOFRAN) 4 mg tablet, Take 1 tablet (4 mg total) by mouth every 8 (eight) hours as needed for nausea or vomiting, Disp: 20 tablet, Rfl: 0    pantoprazole (PROTONIX) 40 mg tablet, Take 1 tablet (40 mg total) by mouth 2 (two) times a day before meals, Disp: 60 tablet, Rfl: 0    Potassium 99 MG TABS, Take by mouth, Disp: , Rfl:     Saw Palmetto 160 MG CAPS, Take by mouth, Disp: , Rfl:     sulfaSALAzine (AZULFIDINE) 500 mg tablet, , Disp: , Rfl: 0    zinc gluconate 50 mg tablet, Take by mouth, Disp: , Rfl:     Laboratory Results:  Lab Results   Component Value Date    WBC 7.45 10/31/2024    HGB 7.8 (L) 10/31/2024    HCT 26.1 (L) 10/31/2024    MCV 79 (L) 10/31/2024     10/31/2024     Lab Results   Component Value Date    SODIUM 142 10/28/2024    K 4.3 10/28/2024     10/28/2024    CO2 23 10/28/2024    BUN 26 (H) 10/28/2024    CREATININE 1.34 (H) 10/28/2024    GLUC 188 (H) 10/20/2024    CALCIUM 8.5 10/28/2024     Lab Results   Component Value Date    CALCIUM 8.5 10/28/2024     No results found for: \"LABPROT\"      "

## 2024-11-07 ENCOUNTER — TELEPHONE (OUTPATIENT)
Dept: HEMATOLOGY ONCOLOGY | Facility: CLINIC | Age: 89
End: 2024-11-07

## 2024-11-07 ENCOUNTER — HOSPITAL ENCOUNTER (OUTPATIENT)
Dept: RADIOLOGY | Age: 89
Discharge: HOME | End: 2024-11-07
Payer: MEDICARE

## 2024-11-07 DIAGNOSIS — C25.0 MALIGNANT NEOPLASM OF HEAD OF PANCREAS (HCC): ICD-10-CM

## 2024-11-07 DIAGNOSIS — D50.8 OTHER IRON DEFICIENCY ANEMIA: Primary | ICD-10-CM

## 2024-11-07 DIAGNOSIS — D3A.8 PRIMARY PANCREATIC NEUROENDOCRINE TUMOR: ICD-10-CM

## 2024-11-07 PROCEDURE — A9587 GALLIUM GA-68: HCPCS

## 2024-11-07 PROCEDURE — 78815 PET IMAGE W/CT SKULL-THIGH: CPT

## 2024-11-07 NOTE — TELEPHONE ENCOUNTER
Called patient's daughter to review the information below. Provided the mobile lab number since she drives a far distance to take patient to lab. Patient can decide if he would like to pursue this. Also informed her we offer [facility] for patients with a cancer diagnosis. She will call back in the future if patient would like to utilize this.     ----- Message -----  From: Shoshana Fuentes DO  Sent: 11/7/2024   9:24 AM EST  To: Parul Fischer RN    Please notify patient that his iron stores are adequate at this time.  His hemoglobin remains low at 7.8 g/dL.  Recommend repeat hemoglobin in 1 week to assess for ongoing need for PRBC transfusion.

## 2024-11-11 ENCOUNTER — PATIENT OUTREACH (OUTPATIENT)
Dept: HEMATOLOGY ONCOLOGY | Facility: CLINIC | Age: 89
End: 2024-11-11

## 2024-11-11 ENCOUNTER — APPOINTMENT (OUTPATIENT)
Age: 89
End: 2024-11-11
Payer: MEDICARE

## 2024-11-11 DIAGNOSIS — E11.22 TYPE 2 DIABETES MELLITUS WITH STAGE 3 CHRONIC KIDNEY DISEASE, WITHOUT LONG-TERM CURRENT USE OF INSULIN, UNSPECIFIED WHETHER STAGE 3A OR 3B CKD (HCC): ICD-10-CM

## 2024-11-11 DIAGNOSIS — N18.30 TYPE 2 DIABETES MELLITUS WITH STAGE 3 CHRONIC KIDNEY DISEASE, WITHOUT LONG-TERM CURRENT USE OF INSULIN, UNSPECIFIED WHETHER STAGE 3A OR 3B CKD (HCC): ICD-10-CM

## 2024-11-11 DIAGNOSIS — I10 PRIMARY HYPERTENSION: ICD-10-CM

## 2024-11-11 DIAGNOSIS — D50.8 OTHER IRON DEFICIENCY ANEMIA: ICD-10-CM

## 2024-11-11 DIAGNOSIS — N17.9 AKI (ACUTE KIDNEY INJURY) (HCC): ICD-10-CM

## 2024-11-11 DIAGNOSIS — N18.32 CKD STAGE 3B, GFR 30-44 ML/MIN (HCC): ICD-10-CM

## 2024-11-11 LAB
BACTERIA UR QL AUTO: ABNORMAL /HPF
BASOPHILS # BLD AUTO: 0.09 THOUSANDS/ÂΜL (ref 0–0.1)
BASOPHILS NFR BLD AUTO: 1 % (ref 0–1)
BILIRUB UR QL STRIP: NEGATIVE
CLARITY UR: CLEAR
COLOR UR: YELLOW
CREAT UR-MCNC: 71.9 MG/DL
EOSINOPHIL # BLD AUTO: 0.09 THOUSAND/ÂΜL (ref 0–0.61)
EOSINOPHIL NFR BLD AUTO: 1 % (ref 0–6)
ERYTHROCYTE [DISTWIDTH] IN BLOOD BY AUTOMATED COUNT: 20 % (ref 11.6–15.1)
GLUCOSE UR STRIP-MCNC: ABNORMAL MG/DL
HCT VFR BLD AUTO: 26.5 % (ref 36.5–49.3)
HGB BLD-MCNC: 7.8 G/DL (ref 12–17)
HGB UR QL STRIP.AUTO: NEGATIVE
IMM GRANULOCYTES # BLD AUTO: 0.05 THOUSAND/UL (ref 0–0.2)
IMM GRANULOCYTES NFR BLD AUTO: 1 % (ref 0–2)
KETONES UR STRIP-MCNC: NEGATIVE MG/DL
LEUKOCYTE ESTERASE UR QL STRIP: NEGATIVE
LYMPHOCYTES # BLD AUTO: 1.2 THOUSANDS/ÂΜL (ref 0.6–4.47)
LYMPHOCYTES NFR BLD AUTO: 18 % (ref 14–44)
MAGNESIUM SERPL-MCNC: 1.9 MG/DL (ref 1.9–2.7)
MCH RBC QN AUTO: 22.7 PG (ref 26.8–34.3)
MCHC RBC AUTO-ENTMCNC: 29.4 G/DL (ref 31.4–37.4)
MCV RBC AUTO: 77 FL (ref 82–98)
MICROALBUMIN UR-MCNC: 369.4 MG/L
MICROALBUMIN/CREAT 24H UR: 514 MG/G CREATININE (ref 0–30)
MONOCYTES # BLD AUTO: 0.6 THOUSAND/ÂΜL (ref 0.17–1.22)
MONOCYTES NFR BLD AUTO: 9 % (ref 4–12)
NEUTROPHILS # BLD AUTO: 4.6 THOUSANDS/ÂΜL (ref 1.85–7.62)
NEUTS SEG NFR BLD AUTO: 70 % (ref 43–75)
NITRITE UR QL STRIP: NEGATIVE
NON-SQ EPI CELLS URNS QL MICRO: ABNORMAL /HPF
NRBC BLD AUTO-RTO: 0 /100 WBCS
PH UR STRIP.AUTO: 6 [PH]
PHOSPHATE SERPL-MCNC: 2.9 MG/DL (ref 2.3–4.1)
PLATELET # BLD AUTO: 247 THOUSANDS/UL (ref 149–390)
PMV BLD AUTO: 11.4 FL (ref 8.9–12.7)
PROT UR STRIP-MCNC: ABNORMAL MG/DL
RBC # BLD AUTO: 3.44 MILLION/UL (ref 3.88–5.62)
RBC #/AREA URNS AUTO: ABNORMAL /HPF
SP GR UR STRIP.AUTO: 1.02 (ref 1–1.03)
UROBILINOGEN UR STRIP-ACNC: <2 MG/DL
WBC # BLD AUTO: 6.63 THOUSAND/UL (ref 4.31–10.16)
WBC #/AREA URNS AUTO: ABNORMAL /HPF

## 2024-11-11 PROCEDURE — 82043 UR ALBUMIN QUANTITATIVE: CPT

## 2024-11-11 PROCEDURE — 84100 ASSAY OF PHOSPHORUS: CPT

## 2024-11-11 PROCEDURE — 85025 COMPLETE CBC W/AUTO DIFF WBC: CPT

## 2024-11-11 PROCEDURE — 82570 ASSAY OF URINE CREATININE: CPT

## 2024-11-11 PROCEDURE — 81001 URINALYSIS AUTO W/SCOPE: CPT

## 2024-11-11 PROCEDURE — 36415 COLL VENOUS BLD VENIPUNCTURE: CPT

## 2024-11-11 PROCEDURE — 83735 ASSAY OF MAGNESIUM: CPT

## 2024-11-11 NOTE — PROGRESS NOTES
I reached out and spoke with FOUZIA (DAUGHTER) now that consults have been completed with the oncology teams to review for any barriers to care and offer supportive services as needed. Distress Thermometer completed at this time. Patient scored 2/10. Based on responses to DT, no indication for referral to SW needed at this time.  I reviewed and updated the members assigned to the care team in [facility].   He knows the members of the care team as well as how and when to contact them with any needs.   He verbalizes managing the schedules well.   He is currently able to drive and denies any transportation needs.    He denies any uncontrolled symptoms. Discussed role of Palliative Care in symptom and side effect management. Declined referral at this time.  He states that he is eating and drinking as per usual with no unintentional weight loss.     Patient does not smoke.   He states he is well supported by family and friends.  Community support groups discussed including the [facility] Support [facility] of the [facility]. Patient declined information at this time.   He feels he has adequate insurance coverage and denies any financial concerns at this time.     Based on individual needs I will follow up in about 3 weeks. I have provided my direct contact information and welcome them to contact me if needs as discussed above change. He was appreciative for the call.

## 2024-11-21 ENCOUNTER — OFFICE VISIT (OUTPATIENT)
Dept: SURGICAL ONCOLOGY | Facility: CLINIC | Age: 89
End: 2024-11-21
Payer: MEDICARE

## 2024-11-21 VITALS
RESPIRATION RATE: 20 BRPM | HEART RATE: 62 BPM | HEIGHT: 69 IN | SYSTOLIC BLOOD PRESSURE: 140 MMHG | DIASTOLIC BLOOD PRESSURE: 68 MMHG | OXYGEN SATURATION: 97 % | WEIGHT: 155 LBS | BODY MASS INDEX: 22.96 KG/M2 | TEMPERATURE: 97.5 F

## 2024-11-21 DIAGNOSIS — D3A.8 PRIMARY PANCREATIC NEUROENDOCRINE TUMOR: Primary | ICD-10-CM

## 2024-11-21 PROCEDURE — 99215 OFFICE O/P EST HI 40 MIN: CPT | Performed by: SURGERY

## 2024-11-21 NOTE — ASSESSMENT & PLAN NOTE
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 This is intermediate grade with a Ki-67 of 20%. We had a long discussion regarding treatment options. There is no evidence of metastasis based on his CT, MRI or dotatate PET.  I have recommended that he consider surgical resection.  This would be a Whipple procedure.  I explained the risks including bleeding, infection, recurrence, need for further surgery, wound complications, adjacent organ injury, anastomotic leak, sepsis, MI, DVT, stroke, pulmonary embolism and death.  Given his age, he is not inclined to have any surgical intervention.  We discussed other options including observation or somatostatin analogs.  He would like to try somatostatin analogs.  He understands this would hopefully just keep his disease at bay and not be tumorcidal.  We will set him up with medical oncology to discuss.  I will see him again in 4 months with repeat imaging.  Should he develop any worsening pain or if the lesion would increase in size, have jaundice or cause recurrent GI bleeding, I would recommend surgery.  Should there be progression of disease, Lutathera can be offered in the future. He is agreeable to this plan. All his questions were answered.

## 2024-11-21 NOTE — LETTER
November 21, 2024     Mega Hodges DO  [address]  [address]  [facility] [address]    Patient: Jose Zamora   YOB: 1935   Date of Visit: 11/21/2024       Dear Dr. Hodges:    Thank you for referring Jose Zamora to me for evaluation. Below are my notes for this consultation.    If you have questions, please do not hesitate to call me. I look forward to following your patient along with you.         Sincerely,        Nba Rodriguez MD        CC: MD Shoshana Orourke DO Darius Desai, MD  11/21/2024  2:29 PM  Sign when Signing Visit               Surgical Oncology Follow Up       [facility] [place]  [address] [address]  [phone]    Jose Zamora  1935  [id number]  [facility]  [address] [address]  [phone]    1. 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 This is intermediate grade with a Ki-67 of 20%. We had a long discussion regarding treatment options. There is no evidence of metastasis based on his CT, MRI or dotatate PET.  I have recommended that he consider surgical resection.  This would be a Whipple procedure.  I explained the risks including bleeding, infection, recurrence, need for further surgery, wound complications, adjacent organ injury, anastomotic leak, sepsis, MI, DVT, stroke, pulmonary embolism and death.  Given his age, he is not inclined to have any surgical intervention.  We discussed other options including observation or somatostatin analogs.  He would like to try somatostatin analogs.  He understands this would hopefully just keep his disease at bay and not be tumorcidal.  We will set him up with medical oncology to discuss.  I will see him again in 4 months with repeat imaging.   Should he develop any worsening pain or if the lesion would increase in size, have jaundice or cause recurrent GI bleeding, I would recommend surgery.  Should there be progression of disease, Lutathera can be offered in the future. He is agreeable to this plan. All his questions were answered.   Orders:  -     CT chest abdomen pelvis w contrast; Future; Expected date: 03/01/2025  -     BUN; Future; Expected date: 03/01/2025  -     Creatinine, serum; Future; Expected date: 03/01/2025  -     Ambulatory referral to Hematology / Oncology; Future         Chief Complaint   Patient presents with   • office visit       Return in about 4 months (around 3/21/2025) for Ofice visit short, Imaging - See orders.      Oncology History   Primary pancreatic neuroendocrine tumor   10/17/2024 Biopsy    Endoscopic ultrasound  - Pancreas, Head Mass, FNA:  Positive for malignancy.  - Pancreatic neuroendocrine tumor (PanNET), favor at least well-differentiated neuroendocrine tumor, WHO grade 2 of 3, in this limited sample.     10/24/2024 -  Cancer Staged    Staging form: Neuroendocrine Tumor - Pancreas, AJCC V9  - Clinical: Stage II (cT3, cN0, cM0) - Signed by Nba Rodriguez MD on 10/24/2024           Staging: Grade 2 neuroendocrine tumor of the pancreas, October 2024  Treatment history: Octreotide as he declined surgery  Current treatment: Octreotide  Disease status:      History of Present Illness: Patient returns in follow-up.  He continues to feel well.  Follow-up PET/CT from November 7, 2024 reveals activity in the pancreatic head mass.  This had an SUV of 21.  No evidence of metastatic disease.  I personally reviewed the films.    Review of Systems  Complete ROS Surg Onc:   Complete ROS Surg Onc:   Constitutional: The patient denies new or recent history of general fatigue, no recent weight loss, no change in appetite.   Eyes: No complaints of visual problems, no scleral icterus.   ENT: no complaints of ear pain, no hoarseness, no  difficulty swallowing,  no tinnitus and no new masses in head, oral cavity, or neck.   Cardiovascular: No complaints of chest pain, no palpitations, no ankle edema.   Respiratory: No complaints of shortness of breath, no cough.   Gastrointestinal: No complaints of jaundice, no bloody stools, no pale stools.   Genitourinary: No complaints of dysuria, no hematuria, no nocturia, no frequent urination, no urethral discharge.   Musculoskeletal: No complaints of weakness, paralysis, joint stiffness or arthralgias.  Integumentary: No complaints of rash, no new lesions.   Neurological: No complaints of convulsions, no seizures, no dizziness.   Hematologic/Lymphatic: No complaints of easy bruising.   Endocrine:  No hot or cold intolerance.  No polydipsia, polyphagia, or polyuria.  Allergy/immunology:  No environmental allergies.  No food allergies.  Not immunocompromised.  Skin:  No pallor or rash.  No wound.        Patient Active Problem List   Diagnosis   • Anemia   • Atherosclerotic heart disease of native coronary artery without angina pectoris   • DMII (diabetes mellitus, type 2) ([facility])   • Hyperlipidemia   • Hypertension   • Hypothyroidism   • PAD (peripheral artery disease) (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)   • Type 2 diabetes mellitus with stage 3 chronic kidney disease ([facility])   • Beta thalassemia minor   • Elevated PSA   • Abnormal prostate exam   • Lumbar strain   • Acute pain of right knee   • Strain of calf muscle   • Localized osteoarthritis of right knee   • Acute pain of both shoulders   • Right hand pain   • Generalized abdominal pain   • Weight loss   • Acute bilateral thoracic back pain   • Nausea   • UGIB (upper gastrointestinal bleed)   • Urinary retention   • PAWEL (acute kidney injury) (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)   • Hypokalemia   • Hypomagnesemia   • Primary  pancreatic neuroendocrine tumor     Past Medical History:   Diagnosis Date   • Allergic    • Anemia    • Arthritis    • Diabetes mellitus ([facility])    • Disease of thyroid gland    • Hyperlipemia    • Hypertension      Past Surgical History:   Procedure Laterality Date   • COLONOSCOPY     • PROSTATE BIOPSY      needle,  resolved may 2005     Family History   Problem Relation Age of Onset   • No Known Problems Mother      Social History     Socioeconomic History   • Marital status: /Civil Union     Spouse name: Not on file   • Number of children: Not on file   • Years of education: Not on file   • Highest education level: Not on file   Occupational History   • 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[employer]   Tobacco Use   • Smoking status: Former     Current packs/day: 1.00     Average packs/day: 1 pack/day for 54.0 years (54.0 ttl pk-yrs)     Types: Cigarettes     Start date: 12/4/1970   • Smokeless tobacco: Never   Vaping Use   • Vaping status: Never Used   Substance and Sexual Activity   • Alcohol use: Not Currently     Comment: social/rarely   • Drug use: Never   • Sexual activity: Not Currently   Other Topics Concern   • Not on file   Social History Narrative   • Not on file     Social Drivers of Health     Financial Resource Strain: Low Risk  (8/30/2023)    Overall Financial Resource Strain (CARDIA)    • Difficulty of Paying Living Expenses: Not hard at all   Food Insecurity: No Food Insecurity (10/16/2024)    Nursing - Inadequate Food Risk Classification    • Worried About Running Out of Food in the Last Year: Never true    • Ran Out of Food in the Last Year: Never true    • Ran Out of Food in the Last Year: Not on file   Transportation Needs: No Transportation Needs (10/16/2024)    PRAPARE - Transportation    • Lack of Transportation (Medical): No    • Lack of Transportation (Non-Medical): No   Physical Activity: Not on file   Stress: Not on file   Social Connections: Not on file   Intimate Partner Violence: Not on  file   Housing Stability: Unknown (10/16/2024)    Housing Stability Vital Sign    • Unable to Pay for Housing in the Last Year: No    • Number of Times Moved in the Last Year: Not on file    • Homeless in the Last Year: No       Current Outpatient Medications:   •  atorvastatin (LIPITOR) 20 mg tablet, Take 1 tablet by mouth daily, Disp: , Rfl:   •  cholecalciferol (VITAMIN D3) 1,000 units tablet, Take by mouth, Disp: , Rfl:   •  fexofenadine (Allegra Allergy) 180 MG tablet, Take 180 mg by mouth daily as needed (allergies), Disp: , Rfl:   •  folic acid (FOLVITE) 1 mg tablet, Take 1 tablet by mouth daily, Disp: , Rfl:   •  levothyroxine 50 mcg tablet, Take 1 tablet (50 mcg total) by mouth daily, Disp: 90 tablet, Rfl: 1  •  montelukast (SINGULAIR) 10 mg tablet, Take by mouth, Disp: , Rfl:   •  Multiple Vitamins-Minerals (CENTRUM SILVER) tablet, Take by mouth, Disp: , Rfl:   •  ondansetron (ZOFRAN) 4 mg tablet, Take 1 tablet (4 mg total) by mouth every 8 (eight) hours as needed for nausea or vomiting, Disp: 20 tablet, Rfl: 0  •  Potassium 99 MG TABS, Take by mouth, Disp: , Rfl:   •  Saw Palmetto 160 MG CAPS, Take by mouth, Disp: , Rfl:   •  sulfaSALAzine (AZULFIDINE) 500 mg tablet, , Disp: , Rfl: 0  •  zinc gluconate 50 mg tablet, Take by mouth, Disp: , Rfl:   •  amLODIPine (NORVASC) 5 mg tablet, Take 1 tablet (5 mg total) by mouth daily, Disp: 30 tablet, Rfl: 0  •  metoprolol tartrate (LOPRESSOR) 25 mg tablet, Take 0.5 tablets (12.5 mg total) by mouth 2 (two) times a day, Disp: 30 tablet, Rfl: 0  •  pantoprazole (PROTONIX) 40 mg tablet, Take 1 tablet (40 mg total) by mouth 2 (two) times a day before meals, Disp: 60 tablet, Rfl: 0  Allergies   Allergen Reactions   • Lisinopril    • Candesartan Rash   • Penicillins Rash     Vitals:    11/21/24 1400   BP: 140/68   Pulse: 62   Resp: 20   Temp: 97.5 °F (36.4 °C)   SpO2: 97%       Physical Exam  Constitutional: General appearance: The Patient is well-developed and  well-nourished who appears the stated age in no acute distress. Patient is pleasant and talkative.     HEENT:  Normocephalic.  Sclerae are anicteric. Mucous membranes are moist.   Abdomen: Abdomen is soft, non-tender, non-distended and without masses.     Extremities: There is no clubbing or cyanosis. There is no edema.  Symmetric.  Neuro: Grossly nonfocal. Gait is normal.      Skin: Warm, anicteric.    Psych:  Patient is pleasant and talkative.  Breasts:        Pathology:  [unfilled]    Labs:      Imaging  NM PET CT skull base to mid thigh  Result Date: 11/7/2024  Narrative: NETSPOT PET/CT SCAN INDICATION:   Newly diagnosed NET head of the pancreas. D3A.8: Other benign neuroendocrine tumors C25.0: Malignant neoplasm of head of pancreas MODIFIER: PI COMPARISON: CT abdomen pelvis 10/22/2024, CT chest 10/19/2024 and additional priors, MRI abdomen 10/16/2024 CELL TYPE: Pancreatic neuroendocrine tumor TECHNIQUE:   3.9 mCi Gallium-68 Dotatate Netspot administered IV. Multiplanar attenuation corrected and non-attenuation corrected PET images were acquired 60 minutes post injection. Contiguous, low dose, axial CT sections were obtained from the vertex through the femurs for anatomic localization. Intravenous contrast was not utilized. FINDINGS: BRAIN:    Normal pituitary gland uptake is demonstrated.  No acute abnormalities are seen. HEAD/NECK:  There is a physiologic distribution of the radiotracer. Normal salivary gland and thyroid uptake is demonstrated. CT images:  Unremarkable. CHEST:   No suspicious Dotatate avid lesions. Mild hilar activity bilaterally, SUV max of 2.1 on the right likely reactive. CT images: Extensive coronary artery calcifications. Scattered pleural calcifications of the left. ABDOMEN: Intense focal radiotracer uptake at the level of the pancreatic head mass, SUV max of 21. This measured up to 4.7 cm in size on prior CT. Secondary biliary and pancreatic ductal dilatation. Asymmetrically  increased radiotracer uptake in the left adrenal gland, SUV max of 16 compared to the right, SUV max of 12.0. Asymmetric adrenal hyperplasia suggested on the prior imaging which may explain these findings. There are no Dotatate avid lymph nodes. CT images: Gallbladder is distended with small gallstone dependently. Moderate fecal retention in the colon. Stable small isodense left renal lesion at the midpole posteriorly probably proteinaceous cyst. PELVIS: Diffusely enlarged prostate with heterogeneous uptake, SUV max of 9.0. No Dotatate avid lymph nodes. CT images: No additional significant findings. OSSEOUS STRUCTURES:   There is a physiologic distribution of the radiotracer. CT images: No significant findings.     Impression: 1. Intense radiotracer uptake at the pancreatic head mass compatible with the known neuroendocrine tumor. 2. No Dotatate avid lesions suspicious for metastasis. 3. Diffusely enlarged prostate with heterogeneous uptake may be related to prostatitis. Workstation performed: [employer]     I personally reviewed and interpreted the above laboratory and imaging data.

## 2024-11-21 NOTE — PROGRESS NOTES
Surgical Oncology Follow Up       [facility] [place]  [address] [facility] [address]  [phone]    Jose Zamora  1935  [id number]  [facility] [place]  [address] [facility] [address]  [phone]    1. 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 This is intermediate grade with a Ki-67 of 20%. We had a long discussion regarding treatment options. There is no evidence of metastasis based on his CT, MRI or dotatate PET.  I have recommended that he consider surgical resection.  This would be a Whipple procedure.  I explained the risks including bleeding, infection, recurrence, need for further surgery, wound complications, adjacent organ injury, anastomotic leak, sepsis, MI, DVT, stroke, pulmonary embolism and death.  Given his age, he is not inclined to have any surgical intervention.  We discussed other options including observation or somatostatin analogs.  He would like to try somatostatin analogs.  He understands this would hopefully just keep his disease at bay and not be tumorcidal.  We will set him up with medical oncology to discuss.  I will see him again in 4 months with repeat imaging.  Should he develop any worsening pain or if the lesion would increase in size, have jaundice or cause recurrent GI bleeding, I would recommend surgery.  Should there be progression of disease, Lutathera can be offered in the future. He is agreeable to this plan. All his questions were answered.   Orders:  -     CT chest abdomen pelvis w contrast; Future; Expected date: 03/01/2025  -     BUN; Future; Expected date: 03/01/2025  -     Creatinine, serum; Future; Expected date: 03/01/2025  -     Ambulatory referral to Hematology / Oncology; Future         Chief Complaint    Patient presents with    office visit       Return in about 4 months (around 3/21/2025) for Ofice visit short, Imaging - See orders.      Oncology History   Primary pancreatic neuroendocrine tumor   10/17/2024 Biopsy    Endoscopic ultrasound  - Pancreas, Head Mass, FNA:  Positive for malignancy.  - Pancreatic neuroendocrine tumor (PanNET), favor at least well-differentiated neuroendocrine tumor, WHO grade 2 of 3, in this limited sample.     10/24/2024 -  Cancer Staged    Staging form: Neuroendocrine Tumor - Pancreas, AJCC V9  - Clinical: Stage II (cT3, cN0, cM0) - Signed by Nba Rodriguez MD on 10/24/2024           Staging: Grade 2 neuroendocrine tumor of the pancreas, October 2024  Treatment history: Octreotide as he declined surgery  Current treatment: Octreotide  Disease status:      History of Present Illness: Patient returns in follow-up.  He continues to feel well.  Follow-up PET/CT from November 7, 2024 reveals activity in the pancreatic head mass.  This had an SUV of 21.  No evidence of metastatic disease.  I personally reviewed the films.    Review of Systems  Complete ROS Surg Onc:   Complete ROS Surg Onc:   Constitutional: The patient denies new or recent history of general fatigue, no recent weight loss, no change in appetite.   Eyes: No complaints of visual problems, no scleral icterus.   ENT: no complaints of ear pain, no hoarseness, no difficulty swallowing,  no tinnitus and no new masses in head, oral cavity, or neck.   Cardiovascular: No complaints of chest pain, no palpitations, no ankle edema.   Respiratory: No complaints of shortness of breath, no cough.   Gastrointestinal: No complaints of jaundice, no bloody stools, no pale stools.   Genitourinary: No complaints of dysuria, no hematuria, no nocturia, no frequent urination, no urethral discharge.   Musculoskeletal: No complaints of weakness, paralysis, joint stiffness or arthralgias.  Integumentary: No complaints of rash, no new lesions.    Neurological: No complaints of convulsions, no seizures, no dizziness.   Hematologic/Lymphatic: No complaints of easy bruising.   Endocrine:  No hot or cold intolerance.  No polydipsia, polyphagia, or polyuria.  Allergy/immunology:  No environmental allergies.  No food allergies.  Not immunocompromised.  Skin:  No pallor or rash.  No wound.        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)    Beta thalassemia minor    Elevated PSA    Abnormal prostate exam    Lumbar strain    Acute pain of right knee    Strain of calf muscle    Localized osteoarthritis of right knee    Acute pain of both shoulders    Right hand pain    Generalized abdominal pain    Weight loss    Acute bilateral thoracic back pain    Nausea    UGIB (upper gastrointestinal bleed)    Urinary retention    PAWEL (acute kidney injury) (HCC)    Pancreatic mass    Lower back pain    CKD stage 3b, 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)    Hypokalemia    Hypomagnesemia    Primary pancreatic neuroendocrine tumor     Past Medical History:   Diagnosis Date    Allergic     Anemia     Arthritis     Diabetes mellitus (HCC)     Disease of thyroid gland     Hyperlipemia     Hypertension      Past Surgical History:   Procedure Laterality Date    COLONOSCOPY      PROSTATE BIOPSY      needle,  resolved may 2005     Family History   Problem Relation Age of Onset    No Known Problems Mother      Social History     Socioeconomic History    Marital status: /Civil Union     Spouse name: Not on file    Number of children: Not on file    Years of education: Not on file    Highest  education level: Not on file   Occupational History    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[employer]   Tobacco Use    Smoking status: Former     Current packs/day: 1.00     Average packs/day: 1 pack/day for 54.0 years (54.0 ttl pk-yrs)     Types: Cigarettes     Start date: 12/4/1970    Smokeless tobacco: Never   Vaping Use    Vaping status: Never Used   Substance and Sexual Activity    Alcohol use: Not Currently     Comment: social/rarely    Drug use: Never    Sexual activity: Not Currently   Other Topics Concern    Not on file   Social History Narrative    Not on file     Social Drivers of Health     Financial Resource Strain: Low Risk  (8/30/2023)    Overall Financial Resource Strain (CARDIA)     Difficulty of Paying Living Expenses: Not hard at all   Food Insecurity: No Food Insecurity (10/16/2024)    Nursing - Inadequate Food Risk Classification     Worried About Running Out of Food in the Last Year: Never true     Ran Out of Food in the Last Year: Never true     Ran Out of Food in the Last Year: Not on file   Transportation Needs: No Transportation Needs (10/16/2024)    PRAPARE - Transportation     Lack of Transportation (Medical): No     Lack of Transportation (Non-Medical): No   Physical Activity: Not on file   Stress: Not on file   Social Connections: Not on file   Intimate Partner Violence: Not on file   Housing Stability: Unknown (10/16/2024)    Housing Stability Vital Sign     Unable to Pay for Housing in the Last Year: No     Number of Times Moved in the Last Year: Not on file     Homeless in the Last Year: No       Current Outpatient Medications:     atorvastatin (LIPITOR) 20 mg tablet, Take 1 tablet by mouth daily, Disp: , Rfl:     cholecalciferol (VITAMIN D3) 1,000 units tablet, Take by mouth, Disp: , Rfl:     fexofenadine (Allegra Allergy) 180 MG tablet, Take 180 mg by mouth daily as needed (allergies), Disp: , Rfl:     folic acid (FOLVITE) 1 mg tablet, Take 1 tablet by mouth daily, Disp: , Rfl:      levothyroxine 50 mcg tablet, Take 1 tablet (50 mcg total) by mouth daily, Disp: 90 tablet, Rfl: 1    montelukast (SINGULAIR) 10 mg tablet, Take by mouth, Disp: , Rfl:     Multiple Vitamins-Minerals (CENTRUM SILVER) tablet, Take by mouth, Disp: , Rfl:     ondansetron (ZOFRAN) 4 mg tablet, Take 1 tablet (4 mg total) by mouth every 8 (eight) hours as needed for nausea or vomiting, Disp: 20 tablet, Rfl: 0    Potassium 99 MG TABS, Take by mouth, Disp: , Rfl:     Saw Palmetto 160 MG CAPS, Take by mouth, Disp: , Rfl:     sulfaSALAzine (AZULFIDINE) 500 mg tablet, , Disp: , Rfl: 0    zinc gluconate 50 mg tablet, Take by mouth, Disp: , Rfl:     amLODIPine (NORVASC) 5 mg tablet, Take 1 tablet (5 mg total) by mouth daily, Disp: 30 tablet, Rfl: 0    metoprolol tartrate (LOPRESSOR) 25 mg tablet, Take 0.5 tablets (12.5 mg total) by mouth 2 (two) times a day, Disp: 30 tablet, Rfl: 0    pantoprazole (PROTONIX) 40 mg tablet, Take 1 tablet (40 mg total) by mouth 2 (two) times a day before meals, Disp: 60 tablet, Rfl: 0  Allergies   Allergen Reactions    Lisinopril     Candesartan Rash    Penicillins Rash     Vitals:    11/21/24 1400   BP: 140/68   Pulse: 62   Resp: 20   Temp: 97.5 °F (36.4 °C)   SpO2: 97%       Physical Exam  Constitutional: General appearance: The Patient is well-developed and well-nourished who appears the stated age in no acute distress. Patient is pleasant and talkative.     HEENT:  Normocephalic.  Sclerae are anicteric. Mucous membranes are moist.   Abdomen: Abdomen is soft, non-tender, non-distended and without masses.     Extremities: There is no clubbing or cyanosis. There is no edema.  Symmetric.  Neuro: Grossly nonfocal. Gait is normal.      Skin: Warm, anicteric.    Psych:  Patient is pleasant and talkative.  Breasts:        Pathology:  [unfilled]    Labs:      Imaging  NM PET CT skull base to mid thigh  Result Date: 11/7/2024  Narrative: NETSPOT PET/CT SCAN INDICATION:   Newly diagnosed NET head of the  pancreas. D3A.8: Other benign neuroendocrine tumors C25.0: Malignant neoplasm of head of pancreas MODIFIER: PI COMPARISON: CT abdomen pelvis 10/22/2024, CT chest 10/19/2024 and additional priors, MRI abdomen 10/16/2024 CELL TYPE: Pancreatic neuroendocrine tumor TECHNIQUE:   3.9 mCi Gallium-68 Dotatate Netspot administered IV. Multiplanar attenuation corrected and non-attenuation corrected PET images were acquired 60 minutes post injection. Contiguous, low dose, axial CT sections were obtained from the vertex through the femurs for anatomic localization. Intravenous contrast was not utilized. FINDINGS: BRAIN:    Normal pituitary gland uptake is demonstrated.  No acute abnormalities are seen. HEAD/NECK:  There is a physiologic distribution of the radiotracer. Normal salivary gland and thyroid uptake is demonstrated. CT images:  Unremarkable. CHEST:   No suspicious Dotatate avid lesions. Mild hilar activity bilaterally, SUV max of 2.1 on the right likely reactive. CT images: Extensive coronary artery calcifications. Scattered pleural calcifications of the left. ABDOMEN: Intense focal radiotracer uptake at the level of the pancreatic head mass, SUV max of 21. This measured up to 4.7 cm in size on prior CT. Secondary biliary and pancreatic ductal dilatation. Asymmetrically increased radiotracer uptake in the left adrenal gland, SUV max of 16 compared to the right, SUV max of 12.0. Asymmetric adrenal hyperplasia suggested on the prior imaging which may explain these findings. There are no Dotatate avid lymph nodes. CT images: Gallbladder is distended with small gallstone dependently. Moderate fecal retention in the colon. Stable small isodense left renal lesion at the midpole posteriorly probably proteinaceous cyst. PELVIS: Diffusely enlarged prostate with heterogeneous uptake, SUV max of 9.0. No Dotatate avid lymph nodes. CT images: No additional significant findings. OSSEOUS STRUCTURES:   There is a physiologic  distribution of the radiotracer. CT images: No significant findings.     Impression: 1. Intense radiotracer uptake at the pancreatic head mass compatible with the known neuroendocrine tumor. 2. No Dotatate avid lesions suspicious for metastasis. 3. Diffusely enlarged prostate with heterogeneous uptake may be related to prostatitis. Workstation performed: LLLer     I personally reviewed and interpreted the above laboratory and imaging data.

## 2024-11-22 ENCOUNTER — DOCUMENTATION (OUTPATIENT)
Dept: HEMATOLOGY ONCOLOGY | Facility: CLINIC | Age: 89
End: 2024-11-22

## 2024-11-22 NOTE — PROGRESS NOTES
Hemonc referral placed by surgonc. Pt already established with oncology team, guidelines sent for scheduling.

## 2024-11-23 DIAGNOSIS — E03.9 PRIMARY HYPOTHYROIDISM: ICD-10-CM

## 2024-11-25 RX ORDER — LEVOTHYROXINE SODIUM 50 UG/1
50 TABLET ORAL DAILY
Qty: 90 TABLET | Refills: 1 | Status: ON HOLD | OUTPATIENT
Start: 2024-11-25

## 2024-11-26 ENCOUNTER — TELEPHONE (OUTPATIENT)
Age: 89
End: 2024-11-26

## 2024-11-26 NOTE — TELEPHONE ENCOUNTER
I called Jose in response to a referral that was received for patient to establish care with Medical Oncology    Outreach was made to schedule a consultation.    A consultation was scheduled for patient during this call. Patient is scheduled on 12/12/24 at 2:20 PM with Dr Fuentes at the [facility]    Schedule within: Other 7 days     Schedule with: Shoshana Fuentes,      Pt is currently established with Dr Fuentes, scheduled for first available OVS appt. Please advise if this appt can be moved any sooner to meet 7 day scheduling recommendations.

## 2024-12-03 DIAGNOSIS — I10 PRIMARY HYPERTENSION: ICD-10-CM

## 2024-12-03 DIAGNOSIS — D64.9 SYMPTOMATIC ANEMIA: ICD-10-CM

## 2024-12-03 RX ORDER — METOPROLOL TARTRATE 25 MG/1
12.5 TABLET, FILM COATED ORAL 2 TIMES DAILY
Qty: 90 TABLET | Refills: 1 | Status: SHIPPED | OUTPATIENT
Start: 2024-12-03 | End: 2025-06-01

## 2024-12-03 RX ORDER — PANTOPRAZOLE SODIUM 40 MG/1
40 TABLET, DELAYED RELEASE ORAL
Qty: 180 TABLET | Refills: 1 | Status: SHIPPED | OUTPATIENT
Start: 2024-12-03 | End: 2025-06-01

## 2024-12-03 NOTE — TELEPHONE ENCOUNTER
Reason for call:   [x] Refill   [] Prior Auth  [] Other:     Office:   [x] PCP/Provider - Dr judge  [] Specialty/Provider -     Medication:   Metoprolol 25 mg, 0.5 tab bid, 90  Pantoprazole 40 mg, 1 bid, 180    Pharmacy:   [facility]    Does the patient have enough for 3 days?   [] Yes   [x] No - Send as HP to POD

## 2024-12-05 ENCOUNTER — HOSPITAL ENCOUNTER (EMERGENCY)
Facility: HOSPITAL | Age: 89
DRG: 378 | End: 2024-12-05
Payer: MEDICARE

## 2024-12-05 ENCOUNTER — LAB (OUTPATIENT)
Age: 89
DRG: 378 | End: 2024-12-05
Payer: MEDICARE

## 2024-12-05 ENCOUNTER — HOSPITAL ENCOUNTER (OUTPATIENT)
Dept: CT IMAGING | Facility: HOSPITAL | Age: 89
End: 2024-12-05
Attending: FAMILY MEDICINE
Payer: MEDICARE

## 2024-12-05 ENCOUNTER — OFFICE VISIT (OUTPATIENT)
Age: 89
End: 2024-12-05
Payer: MEDICARE

## 2024-12-05 ENCOUNTER — HOSPITAL ENCOUNTER (INPATIENT)
Facility: HOSPITAL | Age: 89
LOS: 2 days | DRG: 378 | End: 2024-12-07
Attending: INTERNAL MEDICINE | Admitting: INTERNAL MEDICINE
Payer: MEDICARE

## 2024-12-05 ENCOUNTER — RESULTS FOLLOW-UP (OUTPATIENT)
Age: 89
End: 2024-12-05

## 2024-12-05 VITALS
HEART RATE: 116 BPM | WEIGHT: 158.73 LBS | SYSTOLIC BLOOD PRESSURE: 201 MMHG | DIASTOLIC BLOOD PRESSURE: 80 MMHG | TEMPERATURE: 98.7 F | RESPIRATION RATE: 18 BRPM | OXYGEN SATURATION: 98 % | BODY MASS INDEX: 23.44 KG/M2

## 2024-12-05 VITALS
BODY MASS INDEX: 22.66 KG/M2 | DIASTOLIC BLOOD PRESSURE: 58 MMHG | WEIGHT: 153 LBS | HEART RATE: 104 BPM | TEMPERATURE: 98.2 F | SYSTOLIC BLOOD PRESSURE: 122 MMHG | OXYGEN SATURATION: 92 % | HEIGHT: 69 IN

## 2024-12-05 DIAGNOSIS — N18.30 TYPE 2 DIABETES MELLITUS WITH STAGE 3 CHRONIC KIDNEY DISEASE, WITHOUT LONG-TERM CURRENT USE OF INSULIN, UNSPECIFIED WHETHER STAGE 3A OR 3B CKD (HCC): ICD-10-CM

## 2024-12-05 DIAGNOSIS — E11.22 TYPE 2 DIABETES MELLITUS WITH STAGE 3 CHRONIC KIDNEY DISEASE, WITHOUT LONG-TERM CURRENT USE OF INSULIN, UNSPECIFIED WHETHER STAGE 3A OR 3B CKD (HCC): ICD-10-CM

## 2024-12-05 DIAGNOSIS — R07.89 CHEST TIGHTNESS: ICD-10-CM

## 2024-12-05 DIAGNOSIS — D64.9 SYMPTOMATIC ANEMIA: Primary | ICD-10-CM

## 2024-12-05 DIAGNOSIS — I25.10 ATHEROSCLEROSIS OF NATIVE CORONARY ARTERY OF NATIVE HEART WITHOUT ANGINA PECTORIS: ICD-10-CM

## 2024-12-05 DIAGNOSIS — R07.89 CHEST TIGHTNESS: Primary | ICD-10-CM

## 2024-12-05 DIAGNOSIS — R07.9 CHEST PAIN: ICD-10-CM

## 2024-12-05 DIAGNOSIS — D50.8 OTHER IRON DEFICIENCY ANEMIA: ICD-10-CM

## 2024-12-05 DIAGNOSIS — D3A.8 PRIMARY PANCREATIC NEUROENDOCRINE TUMOR: ICD-10-CM

## 2024-12-05 DIAGNOSIS — R33.9 URINARY RETENTION: ICD-10-CM

## 2024-12-05 DIAGNOSIS — E03.9 ACQUIRED HYPOTHYROIDISM: ICD-10-CM

## 2024-12-05 PROBLEM — I16.0 HYPERTENSIVE URGENCY: Status: ACTIVE | Noted: 2024-12-05

## 2024-12-05 PROBLEM — R93.89 ABNORMAL CT SCAN: Status: ACTIVE | Noted: 2024-12-05

## 2024-12-05 PROBLEM — R79.89 ELEVATED TROPONIN: Status: ACTIVE | Noted: 2024-12-05

## 2024-12-05 LAB
2HR DELTA HS TROPONIN: 8 NG/L
4HR DELTA HS TROPONIN: 38 NG/L
ABO GROUP BLD: NORMAL
ABO GROUP BLD: NORMAL
ALBUMIN SERPL BCG-MCNC: 3.6 G/DL (ref 3.5–5)
ALP SERPL-CCNC: 68 U/L (ref 34–104)
ALT SERPL W P-5'-P-CCNC: 16 U/L (ref 7–52)
ANION GAP SERPL CALCULATED.3IONS-SCNC: 11 MMOL/L (ref 4–13)
ANION GAP SERPL CALCULATED.3IONS-SCNC: 7 MMOL/L (ref 4–13)
AST SERPL W P-5'-P-CCNC: 15 U/L (ref 13–39)
ATRIAL RATE: 108 BPM
BACTERIA UR QL AUTO: ABNORMAL /HPF
BASOPHILS # BLD AUTO: 0.04 THOUSANDS/ÂΜL (ref 0–0.1)
BASOPHILS # BLD AUTO: 0.05 THOUSANDS/ÂΜL (ref 0–0.1)
BASOPHILS # BLD AUTO: 0.07 THOUSANDS/ÂΜL (ref 0–0.1)
BASOPHILS NFR BLD AUTO: 0 % (ref 0–1)
BASOPHILS NFR BLD AUTO: 1 % (ref 0–1)
BASOPHILS NFR BLD AUTO: 1 % (ref 0–1)
BILIRUB SERPL-MCNC: 0.63 MG/DL (ref 0.2–1)
BILIRUB UR QL STRIP: NEGATIVE
BLD GP AB SCN SERPL QL: NEGATIVE
BLD GP AB SCN SERPL QL: NEGATIVE
BNP SERPL-MCNC: 208 PG/ML (ref 0–100)
BUN SERPL-MCNC: 50 MG/DL (ref 5–25)
BUN SERPL-MCNC: 56 MG/DL (ref 5–25)
CALCIUM SERPL-MCNC: 8.3 MG/DL (ref 8.4–10.2)
CALCIUM SERPL-MCNC: 8.5 MG/DL (ref 8.4–10.2)
CARDIAC TROPONIN I PNL SERPL HS: 101 NG/L (ref ?–50)
CARDIAC TROPONIN I PNL SERPL HS: 131 NG/L (ref ?–50)
CARDIAC TROPONIN I PNL SERPL HS: 154 NG/L (ref 8–18)
CARDIAC TROPONIN I PNL SERPL HS: 93 NG/L (ref ?–50)
CHLORIDE SERPL-SCNC: 107 MMOL/L (ref 96–108)
CHLORIDE SERPL-SCNC: 113 MMOL/L (ref 96–108)
CLARITY UR: CLEAR
CO2 SERPL-SCNC: 17 MMOL/L (ref 21–32)
CO2 SERPL-SCNC: 20 MMOL/L (ref 21–32)
COLOR UR: YELLOW
CREAT SERPL-MCNC: 1.45 MG/DL (ref 0.6–1.3)
CREAT SERPL-MCNC: 1.62 MG/DL (ref 0.6–1.3)
EOSINOPHIL # BLD AUTO: 0.04 THOUSAND/ÂΜL (ref 0–0.61)
EOSINOPHIL # BLD AUTO: 0.05 THOUSAND/ÂΜL (ref 0–0.61)
EOSINOPHIL # BLD AUTO: 0.06 THOUSAND/ÂΜL (ref 0–0.61)
EOSINOPHIL NFR BLD AUTO: 0 % (ref 0–6)
EOSINOPHIL NFR BLD AUTO: 1 % (ref 0–6)
EOSINOPHIL NFR BLD AUTO: 1 % (ref 0–6)
ERYTHROCYTE [DISTWIDTH] IN BLOOD BY AUTOMATED COUNT: 18.3 % (ref 11.6–15.1)
ERYTHROCYTE [DISTWIDTH] IN BLOOD BY AUTOMATED COUNT: 18.4 % (ref 11.6–15.1)
ERYTHROCYTE [DISTWIDTH] IN BLOOD BY AUTOMATED COUNT: 20.1 % (ref 11.6–15.1)
FERRITIN SERPL-MCNC: 124 NG/ML (ref 24–336)
GFR SERPL CREATININE-BSD FRML MDRD: 37 ML/MIN/1.73SQ M
GFR SERPL CREATININE-BSD FRML MDRD: 42 ML/MIN/1.73SQ M
GLUCOSE SERPL-MCNC: 191 MG/DL (ref 65–140)
GLUCOSE SERPL-MCNC: 294 MG/DL (ref 65–140)
GLUCOSE UR STRIP-MCNC: ABNORMAL MG/DL
HCT VFR BLD AUTO: 16.4 % (ref 36.5–49.3)
HCT VFR BLD AUTO: 19.2 % (ref 36.5–49.3)
HCT VFR BLD AUTO: 19.3 % (ref 36.5–49.3)
HGB BLD-MCNC: 4.8 G/DL (ref 12–17)
HGB BLD-MCNC: 5.6 G/DL (ref 12–17)
HGB BLD-MCNC: 5.8 G/DL (ref 12–17)
HGB UR QL STRIP.AUTO: NEGATIVE
IMM GRANULOCYTES # BLD AUTO: 0.1 THOUSAND/UL (ref 0–0.2)
IMM GRANULOCYTES # BLD AUTO: 0.17 THOUSAND/UL (ref 0–0.2)
IMM GRANULOCYTES # BLD AUTO: 0.19 THOUSAND/UL (ref 0–0.2)
IMM GRANULOCYTES NFR BLD AUTO: 1 % (ref 0–2)
IMM GRANULOCYTES NFR BLD AUTO: 2 % (ref 0–2)
IMM GRANULOCYTES NFR BLD AUTO: 2 % (ref 0–2)
KETONES UR STRIP-MCNC: NEGATIVE MG/DL
LEUKOCYTE ESTERASE UR QL STRIP: NEGATIVE
LYMPHOCYTES # BLD AUTO: 1.17 THOUSANDS/ÂΜL (ref 0.6–4.47)
LYMPHOCYTES # BLD AUTO: 1.22 THOUSANDS/ÂΜL (ref 0.6–4.47)
LYMPHOCYTES # BLD AUTO: 1.47 THOUSANDS/ÂΜL (ref 0.6–4.47)
LYMPHOCYTES NFR BLD AUTO: 12 % (ref 14–44)
LYMPHOCYTES NFR BLD AUTO: 13 % (ref 14–44)
LYMPHOCYTES NFR BLD AUTO: 14 % (ref 14–44)
MCH RBC QN AUTO: 21.1 PG (ref 26.8–34.3)
MCH RBC QN AUTO: 21.2 PG (ref 26.8–34.3)
MCH RBC QN AUTO: 23.1 PG (ref 26.8–34.3)
MCHC RBC AUTO-ENTMCNC: 28.5 G/DL (ref 31.4–37.4)
MCHC RBC AUTO-ENTMCNC: 29.3 G/DL (ref 31.4–37.4)
MCHC RBC AUTO-ENTMCNC: 30.2 G/DL (ref 31.4–37.4)
MCV RBC AUTO: 72 FL (ref 82–98)
MCV RBC AUTO: 74 FL (ref 82–98)
MCV RBC AUTO: 77 FL (ref 82–98)
MONOCYTES # BLD AUTO: 0.69 THOUSAND/ÂΜL (ref 0.17–1.22)
MONOCYTES # BLD AUTO: 0.8 THOUSAND/ÂΜL (ref 0.17–1.22)
MONOCYTES # BLD AUTO: 0.86 THOUSAND/ÂΜL (ref 0.17–1.22)
MONOCYTES NFR BLD AUTO: 7 % (ref 4–12)
MONOCYTES NFR BLD AUTO: 8 % (ref 4–12)
MONOCYTES NFR BLD AUTO: 9 % (ref 4–12)
MUCOUS THREADS UR QL AUTO: ABNORMAL
NEUTROPHILS # BLD AUTO: 7.11 THOUSANDS/ÂΜL (ref 1.85–7.62)
NEUTROPHILS # BLD AUTO: 8.08 THOUSANDS/ÂΜL (ref 1.85–7.62)
NEUTROPHILS # BLD AUTO: 8.2 THOUSANDS/ÂΜL (ref 1.85–7.62)
NEUTS SEG NFR BLD AUTO: 75 % (ref 43–75)
NEUTS SEG NFR BLD AUTO: 76 % (ref 43–75)
NEUTS SEG NFR BLD AUTO: 77 % (ref 43–75)
NITRITE UR QL STRIP: NEGATIVE
NON-SQ EPI CELLS URNS QL MICRO: ABNORMAL /HPF
NRBC BLD AUTO-RTO: 1 /100 WBCS
P AXIS: 63 DEGREES
PH UR STRIP.AUTO: 5 [PH]
PLATELET # BLD AUTO: 227 THOUSANDS/UL (ref 149–390)
PLATELET # BLD AUTO: 255 THOUSANDS/UL (ref 149–390)
PLATELET # BLD AUTO: 285 THOUSANDS/UL (ref 149–390)
PMV BLD AUTO: 10.6 FL (ref 8.9–12.7)
PMV BLD AUTO: 10.9 FL (ref 8.9–12.7)
PMV BLD AUTO: 11.5 FL (ref 8.9–12.7)
POTASSIUM SERPL-SCNC: 4 MMOL/L (ref 3.5–5.3)
POTASSIUM SERPL-SCNC: 4.8 MMOL/L (ref 3.5–5.3)
PR INTERVAL: 156 MS
PROT SERPL-MCNC: 5.8 G/DL (ref 6.4–8.4)
PROT UR STRIP-MCNC: ABNORMAL MG/DL
QRS AXIS: 32 DEGREES
QRSD INTERVAL: 82 MS
QT INTERVAL: 338 MS
QTC INTERVAL: 453 MS
RBC # BLD AUTO: 2.28 MILLION/UL (ref 3.88–5.62)
RBC # BLD AUTO: 2.51 MILLION/UL (ref 3.88–5.62)
RBC # BLD AUTO: 2.6 MILLION/UL (ref 3.88–5.62)
RBC #/AREA URNS AUTO: ABNORMAL /HPF
RH BLD: POSITIVE
RH BLD: POSITIVE
SODIUM SERPL-SCNC: 135 MMOL/L (ref 135–147)
SODIUM SERPL-SCNC: 140 MMOL/L (ref 135–147)
SP GR UR STRIP.AUTO: 1.02 (ref 1–1.03)
SPECIMEN EXPIRATION DATE: NORMAL
SPECIMEN EXPIRATION DATE: NORMAL
T WAVE AXIS: 80 DEGREES
T4 FREE SERPL-MCNC: 1.24 NG/DL (ref 0.61–1.12)
TSH SERPL DL<=0.05 MIU/L-ACNC: 6.44 UIU/ML (ref 0.45–4.5)
UROBILINOGEN UR STRIP-ACNC: <2 MG/DL
VENTRICULAR RATE: 108 BPM
WBC # BLD AUTO: 10.4 THOUSAND/UL (ref 4.31–10.16)
WBC # BLD AUTO: 10.65 THOUSAND/UL (ref 4.31–10.16)
WBC # BLD AUTO: 9.32 THOUSAND/UL (ref 4.31–10.16)
WBC #/AREA URNS AUTO: ABNORMAL /HPF

## 2024-12-05 PROCEDURE — 99214 OFFICE O/P EST MOD 30 MIN: CPT | Performed by: FAMILY MEDICINE

## 2024-12-05 PROCEDURE — P9016 RBC LEUKOCYTES REDUCED: HCPCS

## 2024-12-05 PROCEDURE — 80053 COMPREHEN METABOLIC PANEL: CPT

## 2024-12-05 PROCEDURE — 36430 TRANSFUSION BLD/BLD COMPNT: CPT

## 2024-12-05 PROCEDURE — 80053 COMPREHEN METABOLIC PANEL: CPT | Performed by: NURSE PRACTITIONER

## 2024-12-05 PROCEDURE — 84439 ASSAY OF FREE THYROXINE: CPT

## 2024-12-05 PROCEDURE — 99285 EMERGENCY DEPT VISIT HI MDM: CPT

## 2024-12-05 PROCEDURE — 85025 COMPLETE CBC W/AUTO DIFF WBC: CPT

## 2024-12-05 PROCEDURE — 93005 ELECTROCARDIOGRAM TRACING: CPT

## 2024-12-05 PROCEDURE — 81001 URINALYSIS AUTO W/SCOPE: CPT | Performed by: NURSE PRACTITIONER

## 2024-12-05 PROCEDURE — G2211 COMPLEX E/M VISIT ADD ON: HCPCS | Performed by: FAMILY MEDICINE

## 2024-12-05 PROCEDURE — 36415 COLL VENOUS BLD VENIPUNCTURE: CPT

## 2024-12-05 PROCEDURE — 99284 EMERGENCY DEPT VISIT MOD MDM: CPT

## 2024-12-05 PROCEDURE — 84484 ASSAY OF TROPONIN QUANT: CPT

## 2024-12-05 PROCEDURE — 86923 COMPATIBILITY TEST ELECTRIC: CPT

## 2024-12-05 PROCEDURE — 86920 COMPATIBILITY TEST SPIN: CPT

## 2024-12-05 PROCEDURE — 86900 BLOOD TYPING SEROLOGIC ABO: CPT

## 2024-12-05 PROCEDURE — 71275 CT ANGIOGRAPHY CHEST: CPT

## 2024-12-05 PROCEDURE — 86850 RBC ANTIBODY SCREEN: CPT | Performed by: NURSE PRACTITIONER

## 2024-12-05 PROCEDURE — 83550 IRON BINDING TEST: CPT

## 2024-12-05 PROCEDURE — 99223 1ST HOSP IP/OBS HIGH 75: CPT | Performed by: NURSE PRACTITIONER

## 2024-12-05 PROCEDURE — 93010 ELECTROCARDIOGRAM REPORT: CPT

## 2024-12-05 PROCEDURE — 86900 BLOOD TYPING SEROLOGIC ABO: CPT | Performed by: NURSE PRACTITIONER

## 2024-12-05 PROCEDURE — 86901 BLOOD TYPING SEROLOGIC RH(D): CPT | Performed by: NURSE PRACTITIONER

## 2024-12-05 PROCEDURE — 80048 BASIC METABOLIC PNL TOTAL CA: CPT

## 2024-12-05 PROCEDURE — 82728 ASSAY OF FERRITIN: CPT

## 2024-12-05 PROCEDURE — 83540 ASSAY OF IRON: CPT

## 2024-12-05 PROCEDURE — 83880 ASSAY OF NATRIURETIC PEPTIDE: CPT | Performed by: NURSE PRACTITIONER

## 2024-12-05 PROCEDURE — 84443 ASSAY THYROID STIM HORMONE: CPT

## 2024-12-05 PROCEDURE — 96374 THER/PROPH/DIAG INJ IV PUSH: CPT

## 2024-12-05 PROCEDURE — 85025 COMPLETE CBC W/AUTO DIFF WBC: CPT | Performed by: NURSE PRACTITIONER

## 2024-12-05 PROCEDURE — 30233N1 TRANSFUSION OF NONAUTOLOGOUS RED BLOOD CELLS INTO PERIPHERAL VEIN, PERCUTANEOUS APPROACH: ICD-10-PCS | Performed by: NURSE PRACTITIONER

## 2024-12-05 PROCEDURE — 86850 RBC ANTIBODY SCREEN: CPT

## 2024-12-05 PROCEDURE — 86901 BLOOD TYPING SEROLOGIC RH(D): CPT

## 2024-12-05 PROCEDURE — 84484 ASSAY OF TROPONIN QUANT: CPT | Performed by: NURSE PRACTITIONER

## 2024-12-05 RX ORDER — INSULIN LISPRO 100 [IU]/ML
1-5 INJECTION, SOLUTION INTRAVENOUS; SUBCUTANEOUS EVERY 6 HOURS SCHEDULED
Status: DISCONTINUED | OUTPATIENT
Start: 2024-12-05 | End: 2024-12-06

## 2024-12-05 RX ORDER — AMLODIPINE BESYLATE 5 MG/1
5 TABLET ORAL DAILY
Status: DISCONTINUED | OUTPATIENT
Start: 2024-12-06 | End: 2024-12-07 | Stop reason: HOSPADM

## 2024-12-05 RX ORDER — MAGNESIUM HYDROXIDE/ALUMINUM HYDROXICE/SIMETHICONE 120; 1200; 1200 MG/30ML; MG/30ML; MG/30ML
30 SUSPENSION ORAL EVERY 6 HOURS PRN
Status: DISCONTINUED | OUTPATIENT
Start: 2024-12-05 | End: 2024-12-07 | Stop reason: HOSPADM

## 2024-12-05 RX ORDER — SIMETHICONE 80 MG
80 TABLET,CHEWABLE ORAL 4 TIMES DAILY PRN
Status: DISCONTINUED | OUTPATIENT
Start: 2024-12-05 | End: 2024-12-07 | Stop reason: HOSPADM

## 2024-12-05 RX ORDER — ATORVASTATIN CALCIUM 20 MG/1
20 TABLET, FILM COATED ORAL
Status: DISCONTINUED | OUTPATIENT
Start: 2024-12-06 | End: 2024-12-07 | Stop reason: HOSPADM

## 2024-12-05 RX ORDER — PANTOPRAZOLE SODIUM 40 MG/10ML
40 INJECTION, POWDER, LYOPHILIZED, FOR SOLUTION INTRAVENOUS 2 TIMES DAILY
Status: DISCONTINUED | OUTPATIENT
Start: 2024-12-05 | End: 2024-12-07

## 2024-12-05 RX ORDER — FOLIC ACID 1 MG/1
1000 TABLET ORAL DAILY
Status: DISCONTINUED | OUTPATIENT
Start: 2024-12-06 | End: 2024-12-07 | Stop reason: HOSPADM

## 2024-12-05 RX ORDER — ONDANSETRON 2 MG/ML
4 INJECTION INTRAMUSCULAR; INTRAVENOUS EVERY 6 HOURS PRN
Status: DISCONTINUED | OUTPATIENT
Start: 2024-12-05 | End: 2024-12-07 | Stop reason: HOSPADM

## 2024-12-05 RX ORDER — PANTOPRAZOLE SODIUM 40 MG/10ML
40 INJECTION, POWDER, LYOPHILIZED, FOR SOLUTION INTRAVENOUS ONCE
Status: COMPLETED | OUTPATIENT
Start: 2024-12-05 | End: 2024-12-05

## 2024-12-05 RX ORDER — LEVOTHYROXINE SODIUM 50 UG/1
50 TABLET ORAL
Status: DISCONTINUED | OUTPATIENT
Start: 2024-12-06 | End: 2024-12-07 | Stop reason: HOSPADM

## 2024-12-05 RX ADMIN — PANTOPRAZOLE SODIUM 40 MG: 40 INJECTION, POWDER, FOR SOLUTION INTRAVENOUS at 15:26

## 2024-12-05 RX ADMIN — PANTOPRAZOLE SODIUM 40 MG: 40 INJECTION, POWDER, FOR SOLUTION INTRAVENOUS at 21:26

## 2024-12-05 RX ADMIN — IOHEXOL 70 ML: 350 INJECTION, SOLUTION INTRAVENOUS at 13:51

## 2024-12-05 RX ADMIN — Medication 12.5 MG: at 21:26

## 2024-12-05 NOTE — EMTALA/ACUTE CARE TRANSFER
[facility] EMERGENCY DEPARTMENT  [address] [place] [address]  [phone]  Dept: [phone]      EMTALA TRANSFER CONSENT    NAME Jose Zamora                                         1935                              MRN [id number]    I have been informed of my rights regarding examination, treatment, and transfer   by Dr. Harish Cho, *    Benefits: Specialized equipment and/or services available at the receiving facility (Include comment)________________________ (GI)    Risks: Potential for delay in receiving treatment, Potential deterioration of medical condition      Consent for Transfer:  I acknowledge that my medical condition has been evaluated and explained to me by the emergency department physician or other qualified medical person and/or my attending physician, who has recommended that I be transferred to the service of  Accepting Physician: Dr. Nash at Accepting Facility Name, City & State : [facility]. The above potential benefits of such transfer, the potential risks associated with such transfer, and the probable risks of not being transferred have been explained to me, and I fully understand them.  The doctor has explained that, in my case, the benefits of transfer outweigh the risks.  I agree to be transferred.    I authorize the performance of emergency medical procedures and treatments upon me in both transit and upon arrival at the receiving facility.  Additionally, I authorize the release of any and all medical records to the receiving facility and request they be transported with me, if possible.  I understand that the safest mode of transportation during a medical emergency is an ambulance and that the Hospital advocates the use of this mode of transport. Risks of traveling to the receiving facility by car, including absence of medical control, life sustaining equipment, such as oxygen, and medical personnel has been explained to me and I  fully understand them.    (PREM CORRECT BOX BELOW)  [  ]  I consent to the stated transfer and to be transported by ambulance/helicopter.  [  ]  I consent to the stated transfer, but refuse transportation by ambulance and accept full responsibility for my transportation by car.  I understand the risks of non-ambulance transfers and I exonerate the Hospital and its staff from any deterioration in my condition that results from this refusal.    X___________________________________________    DATE  24  TIME________  Signature of patient or legally responsible individual signing on patient behalf           RELATIONSHIP TO PATIENT_________________________          Provider Certification    NAME Jose Zamora                                         1935                              MRN [id number]    A medical screening exam was performed on the above named patient.  Based on the examination:    Condition Necessitating Transfer The primary encounter diagnosis was Symptomatic anemia. Diagnoses of Chest pain and Primary pancreatic neuroendocrine tumor were also pertinent to this visit.    Patient Condition: The patient has been stabilized such that within reasonable medical probability, no material deterioration of the patient condition or the condition of the unborn child(ivet) is likely to result from the transfer    Reason for Transfer: Level of Care needed not available at this facility    Transfer Requirements: Facility SLA   Space available and qualified personnel available for treatment as acknowledged by    Agreed to accept transfer and to provide appropriate medical treatment as acknowledged by       Dr. Nash  Appropriate medical records of the examination and treatment of the patient are provided at the time of transfer   STAFF INITIAL WHEN COMPLETED _______  Transfer will be performed by qualified personnel from    and appropriate transfer equipment as required, including the use of necessary  and appropriate life support measures.    Provider Certification: I have examined the patient and explained the following risks and benefits of being transferred/refusing transfer to the patient/family:  General risk, such as traffic hazards, adverse weather conditions, rough terrain or turbulence, possible failure of equipment (including vehicle or aircraft), or consequences of actions of persons outside the control of the transport personnel      Based on these reasonable risks and benefits to the patient and/or the unborn child(ivet), and based upon the information available at the time of the patient’s examination, I certify that the medical benefits reasonably to be expected from the provision of appropriate medical treatments at another medical facility outweigh the increasing risks, if any, to the individual’s medical condition, and in the case of labor to the unborn child, from effecting the transfer.    X____________________________________________ DATE 12/05/24        TIME_______      ORIGINAL - SEND TO MEDICAL RECORDS   COPY - SEND WITH PATIENT DURING TRANSFER

## 2024-12-05 NOTE — PROGRESS NOTES
Assessment/Plan:, CAT scan of the chest as well as laboratory studies reviewed at this time.  EKG done at this time which is relatively unchanged.  Patient to go for CTA of the chest.  Patient go for laboratory studies.  Follow-up next week.  I       Diagnoses and all orders for this visit:    Chest tightness  Comments:  EKG shows sinus tachycardia with heart rate 104.  Patient has findings consistent with septal infarct which is age-indeterminate .  Patient will go CTA chest  Orders:  -     Comprehensive metabolic panel; Future  -     CBC and differential; Future  -     TSH, 3rd generation with Free T4 reflex; Future  -     TIBC Panel (incl. Iron, TIBC, % Iron Saturation); Future  -     Ferritin; Future  -     CTA chest pe study; Future    Atherosclerosis of native coronary artery of native heart without angina pectoris  Comments:  EKG stable    Primary pancreatic neuroendocrine tumor  -     Comprehensive metabolic panel; Future  -     CBC and differential; Future  -     TSH, 3rd generation with Free T4 reflex; Future  -     TIBC Panel (incl. Iron, TIBC, % Iron Saturation); Future  -     Ferritin; Future    Type 2 diabetes mellitus with stage 3 chronic kidney disease, without long-term current use of insulin, unspecified whether stage 3a or 3b CKD (HCC)  Comments:  Continue current treatment  Orders:  -     Comprehensive metabolic panel; Future  -     CBC and differential; Future  -     TSH, 3rd generation with Free T4 reflex; Future  -     TIBC Panel (incl. Iron, TIBC, % Iron Saturation); Future  -     Ferritin; Future    Acquired hypothyroidism  -     Comprehensive metabolic panel; Future  -     CBC and differential; Future  -     TSH, 3rd generation with Free T4 reflex; Future  -     TIBC Panel (incl. Iron, TIBC, % Iron Saturation); Future  -     Ferritin; Future    Other iron deficiency anemia  Comments:  Labs  Orders:  -     Comprehensive metabolic panel; Future  -     CBC and differential; Future  -     TSH,  "3rd generation with Free T4 reflex; Future  -     TIBC Panel (incl. Iron, TIBC, % Iron Saturation); Future  -     Ferritin; Future            Subjective:        Patient ID: Jose Zamora is a 89 y.o. male.      Patient is here with some chest tightness over the past few days.  Chest pain worse when lying down.  Better when sitting forward.  Anxiety increased.  Occasional shortness of breath.  No diaphoresis or nausea vomiting associate with this.  No fevers noted.  Some swelling of feet.  No calf tenderness.  Chest pain seems to resolve with relaxing.  Some chest tightness worse with ambulation.  Appetite has been decreased.    Chest Pain   Associated symptoms include shortness of breath.         The following portions of the patient's history were reviewed and updated as appropriate: allergies, current medications, past family history, past medical history, past social history, past surgical history and problem list.      Review of Systems   Constitutional:  Positive for activity change.   HENT: Negative.     Eyes: Negative.    Respiratory:  Positive for chest tightness and shortness of breath.    Cardiovascular:  Positive for chest pain.   Gastrointestinal: Negative.    Endocrine: Negative.    Genitourinary: Negative.    Musculoskeletal: Negative.    Skin: Negative.    Allergic/Immunologic: Negative.    Neurological: Negative.    Hematological: Negative.    Psychiatric/Behavioral: Negative.             Objective:        Depression Screening and Follow-up Plan: Patient was screened for depression during today's encounter. They screened negative with a PHQ-2 score of 0.            /58 (BP Location: Left arm, Patient Position: Sitting, Cuff Size: Standard)   Pulse 104   Temp 98.2 °F (36.8 °C) (Temporal)   Ht 5' 9\" (1.753 m)   Wt 69.4 kg (153 lb)   SpO2 92%   BMI 22.59 kg/m²          Physical Exam  Vitals and nursing note reviewed.   Constitutional:       General: He is not in acute distress.     " Appearance: He is not ill-appearing, toxic-appearing or diaphoretic.   HENT:      Head: Normocephalic and atraumatic.      Right Ear: Tympanic membrane, ear canal and external ear normal. There is no impacted cerumen.      Left Ear: Tympanic membrane, ear canal and external ear normal. There is no impacted cerumen.      Nose: Nose normal. No congestion or rhinorrhea.      Mouth/Throat:      Mouth: Mucous membranes are moist.      Pharynx: No oropharyngeal exudate or posterior oropharyngeal erythema.   Eyes:      General: No scleral icterus.        Right eye: No discharge.         Left eye: No discharge.   Neck:      Vascular: No carotid bruit.   Cardiovascular:      Rate and Rhythm: Regular rhythm. Tachycardia present.      Pulses: Normal pulses.      Heart sounds: Normal heart sounds. No murmur heard.     No friction rub. No gallop.   Pulmonary:      Effort: Pulmonary effort is normal. No respiratory distress.      Breath sounds: Normal breath sounds. No stridor. No wheezing, rhonchi or rales.   Chest:      Chest wall: No tenderness.   Musculoskeletal:         General: No swelling, tenderness, deformity or signs of injury. Normal range of motion.      Cervical back: Normal range of motion and neck supple. No rigidity. No muscular tenderness.      Right lower leg: No edema.      Left lower leg: No edema.   Lymphadenopathy:      Cervical: No cervical adenopathy.   Skin:     General: Skin is warm and dry.      Capillary Refill: Capillary refill takes less than 2 seconds.      Coloration: Skin is not jaundiced.      Findings: No bruising, erythema, lesion or rash.   Neurological:      Mental Status: He is alert and oriented to person, place, and time. Mental status is at baseline.      Cranial Nerves: No cranial nerve deficit.      Sensory: No sensory deficit.      Motor: No weakness.      Coordination: Coordination normal.      Gait: Gait normal.   Psychiatric:         Behavior: Behavior normal.         Thought  Content: Thought content normal.         Judgment: Judgment normal.      Comments:  anxious

## 2024-12-05 NOTE — Clinical Note
Case was discussed with ALESIA and the patient's admission status was agreed to be Admission Status: observation status to the service of Dr. DUMAS .

## 2024-12-05 NOTE — ED CARE HANDOFF
Emergency Department Sign Out Note        Sign out and transfer of care from Dr. Trevino. See Separate Emergency Department note.     The patient, Jose Zamora, was evaluated by the previous provider for acute blood loss anemia.    Workup Completed:  CT, labs    ED Course / Workup Pending (followup):  Hemoglobin 4.8, likely cause of symptoms.  Troponin mildly elevated, likely secondary to severe anemia.  Case discussed with GI Dr. Jaiyeola who recommends transfer to [facility] for further management as patient high risk for procedural complications.      HEART Risk Score      Flowsheet Row Most Recent Value   Heart Score Risk Calculator    History 1 Filed at: 12/05/2024 1430   ECG 2 Filed at: 12/05/2024 1430   Age 2 Filed at: 12/05/2024 1430   Risk Factors 1 Filed at: 12/05/2024 1430   Troponin --   HEART Score --                                       Procedures  MDM        Disposition  Final diagnoses:   Chest pain   Symptomatic anemia   Primary pancreatic neuroendocrine tumor     Time reflects when diagnosis was documented in both MDM as applicable and the Disposition within this note       Time User Action Codes Description Comment    12/5/2024  2:30 PM Rd Trevino Add [R07.9] Chest pain     12/5/2024  3:06 PM Rd Trevino Add [D64.9] Symptomatic anemia     12/5/2024  3:06 PM Rd Trevino Modify [R07.9] Chest pain     12/5/2024  3:06 PM Rd Trevino Modify [D64.9] Symptomatic anemia     12/5/2024  3:07 PM Rd Trevino Add [D3A.8] Primary pancreatic neuroendocrine tumor           ED Disposition       ED Disposition   Transfer to Another Facility-In Network    Condition   --    Date/Time   Thu Dec 5, 2024 1625    Comment   Jose Zamora should be transferred out to [facility].               MD Documentation      Flowsheet Row Most Recent Value   Patient Condition The patient has been stabilized such that within reasonable medical probability, no material deterioration of the patient condition  or the condition of the unborn child(ivet) is likely to result from the transfer   Reason for Transfer Level of Care needed not available at this facility   Benefits of Transfer Specialized equipment and/or services available at the receiving facility (Include comment)________________________  [GI]   Risks of Transfer Potential for delay in receiving treatment, Potential deterioration of medical condition   Accepting Physician Dr. Nash   Accepting Facility Name, [facility]   Sending MD Harish Cho MD   Provider Certification General risk, such as traffic hazards, adverse weather conditions, rough terrain or turbulence, possible failure of equipment (including vehicle or aircraft), or consequences of actions of persons outside the control of the transport personnel          RN Documentation      Flowsheet Row Most Recent Value   [facility]          Follow-up Information       Follow up With Specialties Details Why Contact Info    Mega Hodges DO Family Medicine   [address] [place] [address]  [address] [place] [id number]  [phone]            Patient's Medications   Discharge Prescriptions    No medications on file     No discharge procedures on file.       ED Provider  Electronically Signed by     Harish Cho MD  12/05/24 [id number]

## 2024-12-05 NOTE — ED PROVIDER NOTES
Time reflects when diagnosis was documented in both MDM as applicable and the Disposition within this note       Time User Action Codes Description Comment    12/5/2024  2:30 PM Rd Trevino Add [R07.9] Chest pain     12/5/2024  3:06 PM Rd Trevino Add [D64.9] Symptomatic anemia     12/5/2024  3:06 PM Rd Trevino Modify [R07.9] Chest pain     12/5/2024  3:06 PM Rd Trevino Modify [D64.9] Symptomatic anemia     12/5/2024  3:07 PM Rd Trevino Add [D3A.8] Primary pancreatic neuroendocrine tumor           ED Disposition       ED Disposition   Transfer to Another Facility-In Network    Condition   --    Date/Time   u Dec 5, 2024  4:25 PM    Comment   Jose Zamora should be transferred out to [facility].               Assessment & Plan       Medical Decision Making  Amount and/or Complexity of Data Reviewed  Labs: ordered. Decision-making details documented in ED Course.  ECG/medicine tests:  Decision-making details documented in ED Course.    Risk  Prescription drug management.      88 y/o M medical emergency for cp and sob. Arrives tachycardic otherwise appears well and HDS. CTA PE study to be read while pt in the ED. Lab eval with delta troponin ordered. EKG shows V5-V6 STD. Posterior EKG with no ST elevations. Pt without active chest pain. Will wait for troponin x2 to make decision regarding disposition. Additionally concerned for acute on chronic anemia pending CBC as pt is very pale appearing and tachycardic at rest.     Pt acutely anemic to 4.8. Pt has similar episode and was hospitalized in 10/2024 for similar, thought 2/2 GI bleed. Starting protonix. 2 units prbc ordered.     Troponin elevation likely subendocardial ischemia 2/2 anemia and should improve with blood transfusion.     Pt agreeable for admission.     Pt signed out to evening provider pending SLIM review for admission.       ED Course as of 12/05/24 2022   Thu Dec 05, 2024   1413 ECG 12 lead  Procedure Note: EKG  Date/Time: 12/05/24  2:13 PM   Interpreted by: Rd Trevino MD  Indications / Diagnosis: CP  ECG reviewed by me, the ED Provider: yes   The EKG demonstrates:  Rhythm: normal sinus  Intervals: normal intervals  Axis: normal axis  QRS/Blocks: normal QRS  ST Changes: Significant ST depression in V5 and V6     1430 HEART score is 6 pending trop -- if trops negative could dc with cards referral for HEART score 4-6.    1435 CTA PE read by rads: No pulmonary embolus.     No acute pulmonary disease.     Redemonstration of cystic lesions in the pancreas with pancreatic head only partially imaged and pancreatic head mass not visible     Redemonstration of intrahepatic biliary dilation, distended gallbladder, and left adrenal hyperplasia.     1451 hs TnI 0hr(!): 93       Medications   pantoprazole (PROTONIX) injection 40 mg (40 mg Intravenous Given 12/5/24 1526)       ED Risk Strat Scores   HEART Risk Score      Flowsheet Row Most Recent Value   Heart Score Risk Calculator    History 1 Filed at: 12/05/2024 1430   ECG 2 Filed at: 12/05/2024 1430   Age 2 Filed at: 12/05/2024 1430   Risk Factors 1 Filed at: 12/05/2024 1430   Troponin 2 Filed at: 12/05/2024 1430   HEART Score 8 Filed at: 12/05/2024 1430                               SBIRT 20yo+      Flowsheet Row Most Recent Value   Initial Alcohol Screen: US AUDIT-C     1. How often do you have a drink containing alcohol? 0 Filed at: 12/05/2024 1402   2. How many drinks containing alcohol do you have on a typical day you are drinking?  0 Filed at: 12/05/2024 1402   3a. Male UNDER 65: How often do you have five or more drinks on one occasion? 0 Filed at: 12/05/2024 1402   3b. FEMALE Any Age, or MALE 65+: How often do you have 4 or more drinks on one occassion? 0 Filed at: 12/05/2024 1402   Audit-C Score 0 Filed at: 12/05/2024 1402   DAISY: How many times in the past year have you...    Used an illegal drug or used a prescription medication for non-medical reasons? Never Filed at: 12/05/2024 1402                             History of Present Illness       Chief Complaint   Patient presents with    Medical Problem     Patient was a medical emergency from radiology. Report chest tightness - new onset today with excerption.        Past Medical History:   Diagnosis Date    Allergic     Anemia     Arthritis     Diabetes mellitus (HCC)     Disease of thyroid gland     Hyperlipemia     Hypertension       Past Surgical History:   Procedure Laterality Date    COLONOSCOPY      PROSTATE BIOPSY      needle,  resolved may 2005      Family History   Problem Relation Age of Onset    No Known Problems Mother       Social History     Tobacco Use    Smoking status: Former     Current packs/day: 1.00     Average packs/day: 1 pack/day for 54.0 years (54.0 ttl pk-yrs)     Types: Cigarettes     Start date: 12/4/1970    Smokeless tobacco: Never   Vaping Use    Vaping status: Never Used   Substance Use Topics    Alcohol use: Not Currently     Comment: social/rarely    Drug use: Never      E-Cigarette/Vaping    E-Cigarette Use Never User       E-Cigarette/Vaping Substances    Nicotine No     THC No     CBD No     Flavoring No     Other No     Unknown No       I have reviewed and agree with the history as documented.     HPI    88 y/o M with pancreatic neuroendocrine tumor, T2DM presents from medical emergency for chest tightness and shortness of breath. Seen by PCP today for chest tightness, sent to hospital for PE CTA, felt sob and chest tightness after scan and now arrives to ED for further evaluation. Pt currently states he feels fine. Chronic b/l leg edema, no changes. At rest he feels fine, mostly sx on exertion. No known melena or hematochezia. No blood thinners.     Review of Systems   Constitutional:  Negative for chills and fever.   HENT:  Negative for ear pain and sore throat.    Eyes:  Negative for pain and visual disturbance.   Respiratory:  Positive for shortness of breath. Negative for cough.    Cardiovascular:   Positive for chest pain. Negative for palpitations.   Gastrointestinal:  Negative for abdominal pain and vomiting.   Genitourinary:  Negative for dysuria and hematuria.   Musculoskeletal:  Negative for arthralgias and back pain.   Skin:  Negative for color change and rash.   Neurological:  Negative for seizures and syncope.   All other systems reviewed and are negative.          Objective       ED Triage Vitals   Temperature Pulse Blood Pressure Respirations SpO2 Patient Position - Orthostatic VS   12/05/24 1409 12/05/24 1409 12/05/24 1409 12/05/24 1409 12/05/24 1409 12/05/24 1409   98.1 °F (36.7 °C) (!) 112 163/66 (!) 24 100 % Sitting      Temp Source Heart Rate Source BP Location FiO2 (%) Pain Score    12/05/24 1409 12/05/24 1409 12/05/24 1409 -- 12/05/24 1612    Oral Monitor Left arm  No Pain      Vitals      Date and Time Temp Pulse SpO2 Resp BP Pain Score FACES Pain Rating User   12/05/24 1910 98.7 °F (37.1 °C) 116 98 % -- 201/80 pt moving on transport bed -- -- RS   12/05/24 1858 -- 105 100 % 18 171/78 -- -- RS   12/05/24 1839 98.1 °F (36.7 °C) 115 100 % 20 -- -- -- RS   12/05/24 1834 -- 114 100 % 20 189/89 -- -- RS   12/05/24 1800 -- 106 99 % -- 180/94 -- -- RS   12/05/24 1739 98.2 °F (36.8 °C) 106 99 % 15 171/90 -- -- LB   12/05/24 1709 98.8 °F (37.1 °C) 106 99 % 17 179/72 -- -- MG   12/05/24 1654 97.8 °F (36.6 °C) 109 99 % 18 176/74 -- -- MG   12/05/24 1644 98 °F (36.7 °C) 119 94 % 21 150/83 -- -- MG   12/05/24 1612 98.2 °F (36.8 °C) 106 99 % 20 149/59 No Pain -- MG   12/05/24 1534 -- 110 98 % 21 141/65 -- -- RS   12/05/24 1450 -- 113 98 % 17 189/82 -- -- RS   12/05/24 1409 98.1 °F (36.7 °C) 112 100 % 24 163/66 -- -- JN            Physical Exam  Vitals and nursing note reviewed.   Constitutional:       General: He is not in acute distress.     Appearance: He is well-developed. He is not toxic-appearing.   HENT:      Head: Normocephalic and atraumatic.      Right Ear: External ear normal.      Left Ear:  External ear normal.      Nose: Nose normal.      Mouth/Throat:      Pharynx: Oropharynx is clear. No oropharyngeal exudate or posterior oropharyngeal erythema.   Eyes:      Extraocular Movements: Extraocular movements intact.      Conjunctiva/sclera: Conjunctivae normal.      Pupils: Pupils are equal, round, and reactive to light.   Cardiovascular:      Rate and Rhythm: Regular rhythm. Tachycardia present.      Pulses: Normal pulses.      Heart sounds: Normal heart sounds. No murmur heard.     No friction rub. No gallop.   Pulmonary:      Effort: Pulmonary effort is normal. No respiratory distress.      Breath sounds: Normal breath sounds. No wheezing, rhonchi or rales.   Abdominal:      General: Abdomen is flat.      Palpations: Abdomen is soft.      Tenderness: There is no abdominal tenderness. There is no guarding or rebound.   Musculoskeletal:         General: Normal range of motion.      Cervical back: Normal range of motion. No rigidity.      Right lower leg: Edema present.      Left lower leg: Edema present.      Comments: 2+ pitting edema b/l   Skin:     General: Skin is warm and dry.      Capillary Refill: Capillary refill takes less than 2 seconds.      Coloration: Skin is pale.   Neurological:      General: No focal deficit present.      Mental Status: He is alert.   Psychiatric:         Mood and Affect: Mood normal.         Results Reviewed       Procedure Component Value Units Date/Time    HS Troponin I 4hr [[id number]]  (Abnormal) Collected: 12/05/24 1816    Lab Status: Final result Specimen: Blood from Arm, Right Updated: 12/05/24 1843     hs TnI 4hr 131 ng/L      Delta 4hr hsTnI 38 ng/L     HS Troponin I 2hr [[id number]]  (Abnormal) Collected: 12/05/24 1614    Lab Status: Final result Specimen: Blood from Arm, Right Updated: 12/05/24 1644     hs TnI 2hr 101 ng/L      Delta 2hr hsTnI 8 ng/L     HS Troponin 0hr (reflex protocol) [[id number]]  (Abnormal) Collected: 12/05/24 1418    Lab Status: Final  result Specimen: Blood from Arm, Right Updated: 12/05/24 1449     hs TnI 0hr 93 ng/L     CBC and differential [[id number]]  (Abnormal) Collected: 12/05/24 1418    Lab Status: Final result Specimen: Blood from Arm, Right Updated: 12/05/24 1443     WBC 10.40 Thousand/uL      RBC 2.28 Million/uL      Hemoglobin 4.8 g/dL      Hematocrit 16.4 %      MCV 72 fL      MCH 21.1 pg      MCHC 29.3 g/dL      RDW 18.3 %      MPV 10.6 fL      Platelets 255 Thousands/uL      nRBC 1 /100 WBCs      Segmented % 76 %      Immature Grans % 2 %      Lymphocytes % 12 %      Monocytes % 8 %      Eosinophils Relative 1 %      Basophils Relative 1 %      Absolute Neutrophils 8.08 Thousands/µL      Absolute Immature Grans 0.19 Thousand/uL      Absolute Lymphocytes 1.22 Thousands/µL      Absolute Monocytes 0.80 Thousand/µL      Eosinophils Absolute 0.06 Thousand/µL      Basophils Absolute 0.05 Thousands/µL     Narrative:      This is an appended report.  These results have been appended to a previously verified report.    Basic metabolic panel [[id number]]  (Abnormal) Collected: 12/05/24 1418    Lab Status: Final result Specimen: Blood from Arm, Right Updated: 12/05/24 1441     Sodium 135 mmol/L      Potassium 4.8 mmol/L      Chloride 107 mmol/L      CO2 17 mmol/L      ANION GAP 11 mmol/L      BUN 56 mg/dL      Creatinine 1.62 mg/dL      Glucose 294 mg/dL      Calcium 8.5 mg/dL      eGFR 37 ml/min/1.73sq m     Narrative:      [employer] guidelines for Chronic Kidney Disease (CKD):     Stage 1 with normal or high GFR (GFR > 90 mL/min/1.73 square meters)    Stage 2 Mild CKD (GFR = 60-89 mL/min/1.73 square meters)    Stage 3A Moderate CKD (GFR = 45-59 mL/min/1.73 square meters)    Stage 3B Moderate CKD (GFR = 30-44 mL/min/1.73 square meters)    Stage 4 Severe CKD (GFR = 15-29 mL/min/1.73 square meters)    Stage 5 End Stage CKD (GFR <15 mL/min/1.73 square meters)  Note: GFR calculation is accurate only with a steady state  creatinine            No orders to display       Procedures    ED Medication and Procedure Management   Prior to Admission Medications   Prescriptions Last Dose Informant Patient Reported? Taking?   Multiple Vitamins-Minerals (CENTRUM SILVER) tablet  Self Yes No   Sig: Take by mouth   Potassium 99 MG TABS  Self Yes No   Sig: Take by mouth   Saw Palmetto 160 MG CAPS  Self Yes No   Sig: Take by mouth   Patient not taking: Reported on 12/5/2024   amLODIPine (NORVASC) 5 mg tablet   No No   Sig: Take 1 tablet (5 mg total) by mouth daily   atorvastatin (LIPITOR) 20 mg tablet  Self Yes No   Sig: Take 1 tablet by mouth daily   cholecalciferol (VITAMIN D3) 1,000 units tablet  Self Yes No   Sig: Take by mouth   fexofenadine (Allegra Allergy) 180 MG tablet   Yes No   Sig: Take 180 mg by mouth daily as needed (allergies)   folic acid (FOLVITE) 1 mg tablet  Self Yes No   Sig: Take 1 tablet by mouth daily   levothyroxine 50 mcg tablet   No No   Sig: TAKE 1 TABLET BY MOUTH EVERY DAY   metoprolol tartrate (LOPRESSOR) 25 mg tablet   No No   Sig: Take 0.5 tablets (12.5 mg total) by mouth 2 (two) times a day   montelukast (SINGULAIR) 10 mg tablet  Self Yes No   Sig: Take by mouth   ondansetron (ZOFRAN) 4 mg tablet   No No   Sig: Take 1 tablet (4 mg total) by mouth every 8 (eight) hours as needed for nausea or vomiting   pantoprazole (PROTONIX) 40 mg tablet   No No   Sig: Take 1 tablet (40 mg total) by mouth 2 (two) times a day before meals   sulfaSALAzine (AZULFIDINE) 500 mg tablet  Self Yes No   zinc gluconate 50 mg tablet  Self Yes No   Sig: Take by mouth      Facility-Administered Medications: None     Discharge Medication List as of 12/5/2024  7:21 PM        CONTINUE these medications which have NOT CHANGED    Details   amLODIPine (NORVASC) 5 mg tablet Take 1 tablet (5 mg total) by mouth daily, Starting Mon 10/21/2024, Until Thu 12/5/2024, Normal      atorvastatin (LIPITOR) 20 mg tablet Take 1 tablet by mouth daily, Starting Tue  10/23/2012, Historical Med      cholecalciferol (VITAMIN D3) 1,000 units tablet Take by mouth, Historical Med      fexofenadine (Allegra Allergy) 180 MG tablet Take 180 mg by mouth daily as needed (allergies), Historical Med      folic acid (FOLVITE) 1 mg tablet Take 1 tablet by mouth daily, Starting Tue 10/23/2012, Historical Med      levothyroxine 50 mcg tablet TAKE 1 TABLET BY MOUTH EVERY DAY, Starting Mon 11/25/2024, Normal      metoprolol tartrate (LOPRESSOR) 25 mg tablet Take 0.5 tablets (12.5 mg total) by mouth 2 (two) times a day, Starting Tue 12/3/2024, Until Sun 6/1/2025, Normal      montelukast (SINGULAIR) 10 mg tablet Take by mouth, Starting Wed 5/7/2014, Historical Med      Multiple Vitamins-Minerals (CENTRUM SILVER) tablet Take by mouth, Starting Thu 5/14/2015, Historical Med      ondansetron (ZOFRAN) 4 mg tablet Take 1 tablet (4 mg total) by mouth every 8 (eight) hours as needed for nausea or vomiting, Starting Mon 10/14/2024, Normal      pantoprazole (PROTONIX) 40 mg tablet Take 1 tablet (40 mg total) by mouth 2 (two) times a day before meals, Starting Tue 12/3/2024, Until Sun 6/1/2025, Normal      Potassium 99 MG TABS Take by mouth, Starting Thu 5/14/2015, Historical Med      Saw Dora 160 MG CAPS Take by mouth, Historical Med      sulfaSALAzine (AZULFIDINE) 500 mg tablet Starting Tue 1/16/2018, Historical Med      zinc gluconate 50 mg tablet Take by mouth, Starting Thu 5/14/2015, Historical Med           No discharge procedures on file.  ED SEPSIS DOCUMENTATION   Time reflects when diagnosis was documented in both MDM as applicable and the Disposition within this note       Time User Action Codes Description Comment    12/5/2024  2:30 PM Rd Trevino [R07.9] Chest pain     12/5/2024  3:06 PM Rd Trevino Add [D64.9] Symptomatic anemia     12/5/2024  3:06 PM Rd Trevino Modify [R07.9] Chest pain     12/5/2024  3:06 PM Rd Trevino Modify [D64.9] Symptomatic anemia     12/5/2024   3:07 PM Rd Trevino Add [D3A.8] Primary pancreatic neuroendocrine tumor                  Rd Trevino MD  12/05/24 2022       Rd Trevino MD  12/05/24 2022

## 2024-12-05 NOTE — RESULT ENCOUNTER NOTE
Spoke with pt daughter Parul, informed him CT of the chest shows no PE. Daughter understood and will informed the pt. No questions or concerns cc

## 2024-12-06 ENCOUNTER — ANESTHESIA EVENT (INPATIENT)
Dept: GASTROENTEROLOGY | Facility: HOSPITAL | Age: 89
DRG: 378 | End: 2024-12-06
Payer: MEDICARE

## 2024-12-06 ENCOUNTER — TELEPHONE (OUTPATIENT)
Dept: OTHER | Facility: HOSPITAL | Age: 89
End: 2024-12-06

## 2024-12-06 ENCOUNTER — APPOINTMENT (INPATIENT)
Dept: GASTROENTEROLOGY | Facility: HOSPITAL | Age: 89
DRG: 378 | End: 2024-12-06
Payer: MEDICARE

## 2024-12-06 ENCOUNTER — ANESTHESIA (INPATIENT)
Dept: GASTROENTEROLOGY | Facility: HOSPITAL | Age: 89
DRG: 378 | End: 2024-12-06
Payer: MEDICARE

## 2024-12-06 LAB
ABO GROUP BLD BPU: NORMAL
ALBUMIN SERPL BCG-MCNC: 4 G/DL (ref 3.5–5)
ALP SERPL-CCNC: 80 U/L (ref 34–104)
ALT SERPL W P-5'-P-CCNC: 18 U/L (ref 7–52)
ANION GAP SERPL CALCULATED.3IONS-SCNC: 7 MMOL/L (ref 4–13)
ANION GAP SERPL CALCULATED.3IONS-SCNC: 9 MMOL/L (ref 4–13)
AST SERPL W P-5'-P-CCNC: 16 U/L (ref 13–39)
BASOPHILS # BLD AUTO: 0.05 THOUSANDS/ÂΜL (ref 0–0.1)
BASOPHILS NFR BLD AUTO: 1 % (ref 0–1)
BILIRUB SERPL-MCNC: 0.3 MG/DL (ref 0.2–1)
BPU ID: NORMAL
BUN SERPL-MCNC: 48 MG/DL (ref 5–25)
BUN SERPL-MCNC: 58 MG/DL (ref 5–25)
CALCIUM SERPL-MCNC: 8.1 MG/DL (ref 8.4–10.2)
CALCIUM SERPL-MCNC: 8.5 MG/DL (ref 8.4–10.2)
CARDIAC TROPONIN I PNL SERPL HS: 149 NG/L (ref 8–18)
CARDIAC TROPONIN I PNL SERPL HS: 172 NG/L (ref 8–18)
CHLORIDE SERPL-SCNC: 109 MMOL/L (ref 96–108)
CHLORIDE SERPL-SCNC: 116 MMOL/L (ref 96–108)
CO2 SERPL-SCNC: 19 MMOL/L (ref 21–32)
CO2 SERPL-SCNC: 19 MMOL/L (ref 21–32)
CREAT SERPL-MCNC: 1.51 MG/DL (ref 0.6–1.3)
CREAT SERPL-MCNC: 1.53 MG/DL (ref 0.6–1.3)
CROSSMATCH: NORMAL
EOSINOPHIL # BLD AUTO: 0.11 THOUSAND/ÂΜL (ref 0–0.61)
EOSINOPHIL NFR BLD AUTO: 2 % (ref 0–6)
ERYTHROCYTE [DISTWIDTH] IN BLOOD BY AUTOMATED COUNT: 19.7 % (ref 11.6–15.1)
GFR SERPL CREATININE-BSD FRML MDRD: 39 ML/MIN/1.73SQ M
GFR SERPL CREATININE-BSD FRML MDRD: 40 ML/MIN/1.73SQ M
GLUCOSE P FAST SERPL-MCNC: 321 MG/DL (ref 65–99)
GLUCOSE SERPL-MCNC: 123 MG/DL (ref 65–140)
GLUCOSE SERPL-MCNC: 134 MG/DL (ref 65–140)
GLUCOSE SERPL-MCNC: 136 MG/DL (ref 65–140)
GLUCOSE SERPL-MCNC: 142 MG/DL (ref 65–140)
GLUCOSE SERPL-MCNC: 145 MG/DL (ref 65–140)
GLUCOSE SERPL-MCNC: 149 MG/DL (ref 65–140)
GLUCOSE SERPL-MCNC: 169 MG/DL (ref 65–140)
GLUCOSE SERPL-MCNC: 195 MG/DL (ref 65–140)
HCT VFR BLD AUTO: 21 % (ref 36.5–49.3)
HGB BLD-MCNC: 6.5 G/DL (ref 12–17)
HGB BLD-MCNC: 8.9 G/DL (ref 12–17)
IMM GRANULOCYTES # BLD AUTO: 0.05 THOUSAND/UL (ref 0–0.2)
IMM GRANULOCYTES NFR BLD AUTO: 1 % (ref 0–2)
IRON SATN MFR SERPL: 30 % (ref 15–50)
IRON SERPL-MCNC: 77 UG/DL (ref 50–212)
LYMPHOCYTES # BLD AUTO: 1.27 THOUSANDS/ÂΜL (ref 0.6–4.47)
LYMPHOCYTES NFR BLD AUTO: 17 % (ref 14–44)
MAGNESIUM SERPL-MCNC: 1.9 MG/DL (ref 1.9–2.7)
MCH RBC QN AUTO: 24.2 PG (ref 26.8–34.3)
MCHC RBC AUTO-ENTMCNC: 31 G/DL (ref 31.4–37.4)
MCV RBC AUTO: 78 FL (ref 82–98)
MONOCYTES # BLD AUTO: 0.66 THOUSAND/ÂΜL (ref 0.17–1.22)
MONOCYTES NFR BLD AUTO: 9 % (ref 4–12)
NEUTROPHILS # BLD AUTO: 5.44 THOUSANDS/ÂΜL (ref 1.85–7.62)
NEUTS SEG NFR BLD AUTO: 70 % (ref 43–75)
NRBC BLD AUTO-RTO: 0 /100 WBCS
PLATELET # BLD AUTO: 196 THOUSANDS/UL (ref 149–390)
PMV BLD AUTO: 11 FL (ref 8.9–12.7)
POTASSIUM SERPL-SCNC: 3.9 MMOL/L (ref 3.5–5.3)
POTASSIUM SERPL-SCNC: 4.9 MMOL/L (ref 3.5–5.3)
PROT SERPL-MCNC: 6.6 G/DL (ref 6.4–8.4)
RBC # BLD AUTO: 2.69 MILLION/UL (ref 3.88–5.62)
SODIUM SERPL-SCNC: 137 MMOL/L (ref 135–147)
SODIUM SERPL-SCNC: 142 MMOL/L (ref 135–147)
TIBC SERPL-MCNC: 253 UG/DL (ref 250–450)
UIBC SERPL-MCNC: 176 UG/DL (ref 155–355)
UNIT DISPENSE STATUS: NORMAL
UNIT PRODUCT CODE: NORMAL
UNIT PRODUCT VOLUME: 350 ML
UNIT RH: NORMAL
WBC # BLD AUTO: 7.58 THOUSAND/UL (ref 4.31–10.16)

## 2024-12-06 PROCEDURE — 97162 PT EVAL MOD COMPLEX 30 MIN: CPT

## 2024-12-06 PROCEDURE — 82948 REAGENT STRIP/BLOOD GLUCOSE: CPT

## 2024-12-06 PROCEDURE — NC001 PR NO CHARGE: Performed by: SURGERY

## 2024-12-06 PROCEDURE — 99222 1ST HOSP IP/OBS MODERATE 55: CPT | Performed by: UROLOGY

## 2024-12-06 PROCEDURE — 80048 BASIC METABOLIC PNL TOTAL CA: CPT | Performed by: NURSE PRACTITIONER

## 2024-12-06 PROCEDURE — 85018 HEMOGLOBIN: CPT | Performed by: NURSE PRACTITIONER

## 2024-12-06 PROCEDURE — 99223 1ST HOSP IP/OBS HIGH 75: CPT | Performed by: INTERNAL MEDICINE

## 2024-12-06 PROCEDURE — 99448 NTRPROF PH1/NTRNET/EHR 21-30: CPT | Performed by: INTERNAL MEDICINE

## 2024-12-06 PROCEDURE — 30233N1 TRANSFUSION OF NONAUTOLOGOUS RED BLOOD CELLS INTO PERIPHERAL VEIN, PERCUTANEOUS APPROACH: ICD-10-PCS | Performed by: INTERNAL MEDICINE

## 2024-12-06 PROCEDURE — 99233 SBSQ HOSP IP/OBS HIGH 50: CPT | Performed by: INTERNAL MEDICINE

## 2024-12-06 PROCEDURE — 97166 OT EVAL MOD COMPLEX 45 MIN: CPT

## 2024-12-06 PROCEDURE — 84484 ASSAY OF TROPONIN QUANT: CPT

## 2024-12-06 PROCEDURE — 0DJ08ZZ INSPECTION OF UPPER INTESTINAL TRACT, VIA NATURAL OR ARTIFICIAL OPENING ENDOSCOPIC: ICD-10-PCS | Performed by: INTERNAL MEDICINE

## 2024-12-06 PROCEDURE — 83735 ASSAY OF MAGNESIUM: CPT | Performed by: NURSE PRACTITIONER

## 2024-12-06 PROCEDURE — P9040 RBC LEUKOREDUCED IRRADIATED: HCPCS

## 2024-12-06 PROCEDURE — 84484 ASSAY OF TROPONIN QUANT: CPT | Performed by: NURSE PRACTITIONER

## 2024-12-06 PROCEDURE — 85025 COMPLETE CBC W/AUTO DIFF WBC: CPT | Performed by: NURSE PRACTITIONER

## 2024-12-06 PROCEDURE — 43235 EGD DIAGNOSTIC BRUSH WASH: CPT | Performed by: INTERNAL MEDICINE

## 2024-12-06 RX ORDER — SODIUM CHLORIDE 9 MG/ML
125 INJECTION, SOLUTION INTRAVENOUS CONTINUOUS
Status: CANCELLED | OUTPATIENT
Start: 2024-12-06

## 2024-12-06 RX ORDER — FINASTERIDE 5 MG/1
5 TABLET, FILM COATED ORAL DAILY
Status: DISCONTINUED | OUTPATIENT
Start: 2024-12-06 | End: 2024-12-07 | Stop reason: HOSPADM

## 2024-12-06 RX ORDER — SODIUM CHLORIDE 9 MG/ML
INJECTION, SOLUTION INTRAVENOUS CONTINUOUS PRN
Status: DISCONTINUED | OUTPATIENT
Start: 2024-12-06 | End: 2024-12-06

## 2024-12-06 RX ORDER — INSULIN LISPRO 100 [IU]/ML
1-5 INJECTION, SOLUTION INTRAVENOUS; SUBCUTANEOUS
Status: DISCONTINUED | OUTPATIENT
Start: 2024-12-06 | End: 2024-12-07 | Stop reason: HOSPADM

## 2024-12-06 RX ORDER — LIDOCAINE HYDROCHLORIDE 10 MG/ML
INJECTION, SOLUTION EPIDURAL; INFILTRATION; INTRACAUDAL; PERINEURAL AS NEEDED
Status: DISCONTINUED | OUTPATIENT
Start: 2024-12-06 | End: 2024-12-06

## 2024-12-06 RX ORDER — PROPOFOL 10 MG/ML
INJECTION, EMULSION INTRAVENOUS AS NEEDED
Status: DISCONTINUED | OUTPATIENT
Start: 2024-12-06 | End: 2024-12-06

## 2024-12-06 RX ORDER — TAMSULOSIN HYDROCHLORIDE 0.4 MG/1
0.4 CAPSULE ORAL
Status: DISCONTINUED | OUTPATIENT
Start: 2024-12-06 | End: 2024-12-07 | Stop reason: HOSPADM

## 2024-12-06 RX ADMIN — FOLIC ACID 1000 MCG: 1 TABLET ORAL at 10:16

## 2024-12-06 RX ADMIN — PROPOFOL 50 MG: 10 INJECTION, EMULSION INTRAVENOUS at 15:07

## 2024-12-06 RX ADMIN — FINASTERIDE 5 MG: 5 TABLET, FILM COATED ORAL at 13:19

## 2024-12-06 RX ADMIN — SODIUM CHLORIDE: 0.9 INJECTION, SOLUTION INTRAVENOUS at 15:01

## 2024-12-06 RX ADMIN — LIDOCAINE HYDROCHLORIDE 100 MG: 10 INJECTION, SOLUTION EPIDURAL; INFILTRATION; INTRACAUDAL; PERINEURAL at 15:01

## 2024-12-06 RX ADMIN — LEVOTHYROXINE SODIUM 50 MCG: 50 TABLET ORAL at 05:57

## 2024-12-06 RX ADMIN — Medication 12.5 MG: at 21:00

## 2024-12-06 RX ADMIN — PANTOPRAZOLE SODIUM 40 MG: 40 INJECTION, POWDER, FOR SOLUTION INTRAVENOUS at 10:17

## 2024-12-06 RX ADMIN — PROPOFOL 50 MG: 10 INJECTION, EMULSION INTRAVENOUS at 15:05

## 2024-12-06 RX ADMIN — AMLODIPINE BESYLATE 5 MG: 5 TABLET ORAL at 10:16

## 2024-12-06 RX ADMIN — Medication 12.5 MG: at 10:16

## 2024-12-06 RX ADMIN — PROPOFOL 50 MG: 10 INJECTION, EMULSION INTRAVENOUS at 15:09

## 2024-12-06 RX ADMIN — PROPOFOL 50 MG: 10 INJECTION, EMULSION INTRAVENOUS at 15:01

## 2024-12-06 RX ADMIN — LIDOCAINE HYDROCHLORIDE 100 MG: 10 INJECTION, SOLUTION EPIDURAL; INFILTRATION; INTRACAUDAL; PERINEURAL at 15:09

## 2024-12-06 RX ADMIN — INSULIN LISPRO 1 UNITS: 100 INJECTION, SOLUTION INTRAVENOUS; SUBCUTANEOUS at 21:04

## 2024-12-06 RX ADMIN — PANTOPRAZOLE SODIUM 40 MG: 40 INJECTION, POWDER, FOR SOLUTION INTRAVENOUS at 21:00

## 2024-12-06 RX ADMIN — TAMSULOSIN HYDROCHLORIDE 0.4 MG: 0.4 CAPSULE ORAL at 16:36

## 2024-12-06 RX ADMIN — ATORVASTATIN CALCIUM 20 MG: 20 TABLET, FILM COATED ORAL at 16:36

## 2024-12-06 RX ADMIN — INSULIN LISPRO 1 UNITS: 100 INJECTION, SOLUTION INTRAVENOUS; SUBCUTANEOUS at 00:19

## 2024-12-06 RX ADMIN — PROPOFOL 50 MG: 10 INJECTION, EMULSION INTRAVENOUS at 15:03

## 2024-12-06 NOTE — ASSESSMENT & PLAN NOTE
Patient was seen by PMD today and reported exertional chest pain.  Was sent for CTA then to ED for further evaluation of chest pain  CTA negative for PE, no acute cardiopulmonary disease  Troponin 93--> 101--> 131. Elevated troponin likely secondary to severe anemia.  Chest pain resolved after receiving 1 unit PRBC  EKG similar to prior, no acute ischemic changes  A1c and lipid panel up-to-date.  BNP mildly elevated at 208  Monitor on telemetry  Repeat troponin 12/6 172, continue to follow until peaks

## 2024-12-06 NOTE — ASSESSMENT & PLAN NOTE
Lab Results   Component Value Date    HGBA1C 7.1 (H) 10/15/2024       Recent Labs     12/06/24  0019 12/06/24  0305 12/06/24  0556 12/06/24  0747   POCGLU 169* 149* 142* 145*       Blood Sugar Average: Last 72 hrs:  (P) 151.25  Component of neurogenic bladder should be considered if retention worsens

## 2024-12-06 NOTE — ANESTHESIA POSTPROCEDURE EVALUATION
Post-Op Assessment Note    CV Status:  Stable  Pain Score: 0    Pain management: adequate       Mental Status:  Alert and awake   Hydration Status:  Euvolemic   PONV Controlled:  Controlled   Airway Patency:  Patent     Post Op Vitals Reviewed: Yes    No anethesia notable event occurred.    Staff: CRNA           Last Filed PACU Vitals:  Vitals Value Taken Time   Temp     Pulse     BP     Resp     SpO2         Modified Gen:  No data recorded

## 2024-12-06 NOTE — ASSESSMENT & PLAN NOTE
Transferred to ED due to complaint of exertional chest pain.  Noted with severe anemia, Hg 4.8  Recent admission 10/15-10/20 for upper GI bleed, s/p EGD and EUS 10/16 notable for a large pancreatic head mass extending to the ampulla with evidence of this lesion bleeding through the ampulla which appeared to be stretched by the tumor. Received 4 units PRBC during this admission  Bowel rest: NPO at mn  IV PPI b.i.d.  Check type & screen. Monitor Hg/Hct   PRBC transfusion if Hg <7 g  or symptomatic  Consult to GI and hematology

## 2024-12-06 NOTE — ASSESSMENT & PLAN NOTE
CTA negative for PE, no acute cardiopulmonary disease  Troponin 93--> 101--> 131. Elevated troponin likely secondary to severe anemia  Chest pain resolved after receiving 1 unit PRBC  EKG similar to prior, no acute ischemic changes  Management per SLIM - telemetry ongoing

## 2024-12-06 NOTE — ASSESSMENT & PLAN NOTE
Elevated BP prior to transport 189/89, 180/94  Continue PTA amlodipine and metoprolol     Close BP monitoring in setting of anemia

## 2024-12-06 NOTE — ASSESSMENT & PLAN NOTE
Prior episode during last admission October 2024 requiring dumont insertion   Successful trial of void   MRI of the prostate (June 2019) revealed the prostate is significantly enlarged--134 mL at that time  Etiology multifactorial -- BPH, neurogenic bladder, body stress 2/2 anemia   Presented to [facility] ED 12/5 with ches pain and SOB  Bladder scan > 1L discovered per nursing   Straight cath x1 -- 1000 ml output   Continues urinating small amounts independently   Patient feels as if his urinary symptoms (hesitancy, weak stream, incomplete emptying) have gotten worse over the last 5 years   Denies symptoms of UTI   UA negative for infection      Plan:  Continue urinary retention protocol + intermittent bladder scans  If repeat straight cath is needed, recommend indwelling dumont is placed to decrease infection risk   If dumont is inserted, maintain for 14 days and return to the office for TOV  Recommend initiating the patient on Flomax 0.4 mg daily and finasteride 5 mg daily to ensure he tolerates both medications prior to discharge  Should continue in outpatient setting and follow up with our office for further discussion   Urology will sign off but remain available for further recommendations

## 2024-12-06 NOTE — ASSESSMENT & PLAN NOTE
Lab Results   Component Value Date    EGFR 40 12/06/2024    EGFR 42 12/05/2024    EGFR 37 12/05/2024    CREATININE 1.51 (H) 12/06/2024    CREATININE 1.45 (H) 12/05/2024    CREATININE 1.62 (H) 12/05/2024   Creatinine remains overall stable despite incomplete bladder emptying   Denies flank pain   Continue rentention protocol and place dumont catheter if warranted to avoid further kidney damage

## 2024-12-06 NOTE — PROGRESS NOTES
Progress Note - Hospitalist   Name: Jose Zamora 89 y.o. male I MRN: [id number]  Unit/Bed#: E5 -01 I Date of Admission: 12/5/2024   Date of Service: 12/6/2024 I Hospital Day: 1     Assessment & Plan  Symptomatic anemia  Presented to [facility] ED due to complaint of exertional chest pain  On arrival noted to have severe anemia with hemoglobin 4.8, transfused 1 unit PRBCs and then transferred to 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  Was sent for CTA then to ED for further evaluation of chest pain  CTA negative for PE, no acute cardiopulmonary disease  Troponin 93--> 101--> 131. Elevated troponin likely secondary to severe anemia.  Chest pain resolved after receiving 1 unit PRBC  EKG similar to prior, no acute ischemic changes  A1c and lipid panel up-to-date.  BNP mildly elevated at 208  Monitor on telemetry  Repeat troponin 12/6 172, continue to follow until peaks  Urinary retention  Notified by RN, bladder scan 1000 mL  During previous admission in Oct patient had urinary retention  Add Urinary retention protocol. 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  Follows with  nephrology  Monitor creatinine closely, patient had PAWEL during previous admission in October.  Per nephrology, \"PAWEL in the setting of symptomatic severe anemia with a hemoglobin of 4.7 on admission as well as loss of autoregulation due to ARB use as well as diuretic use.\"  Hypertensive urgency  Elevated BP prior to transport 189/89, 180/94  Continue PTA amlodipine and metoprolol     Close BP monitoring in setting of anemia  Atherosclerotic heart disease of native coronary artery without angina pectoris  S/p LAD IRIS 2005.  Continue atorvastatin and metoprolol  Follows with [facility]ology at [facility]  Acquired hypothyroidism  Levothyroxine 50 mcg qd  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)  Lab Results   Component Value Date    HGBA1C 7.1 (H) 10/15/2024       Recent Labs     12/06/24  0305 12/06/24  0556 12/06/24  0747 12/06/24  1201   POCGLU 149* 142* 145* 136     Controlled without medications  SSI while inpatient, carb controlled diet  Primary pancreatic neuroendocrine tumor  Follows with surgical oncology, declines Eduin.  Has appointment with medical oncology next week  Will consult oncology inpatient for recommendations  Follow-up GI recommendations    24 Hour Events : Patient admitted overnight, see H&P.  Transfused 1 unit PRBCs at [facility].  Hemoglobin this morning still low so transfused additional unit this morning.  Remains HD stable.  No recurrences of chest pain.  Subjective : Patient overall feels well this morning.  Denies any current symptoms including lightheadedness, chest pain, shortness of breath, abdominal pain.    Called patient's daughter Parul to update them on patient's current condition and plan of care. All of their questions were answered to their satisfaction and they are appreciative of call.       Objective :  Temp:  [97.2 °F (36.2 °C)-98.8 °F (37.1 °C)] 98.1 °F (36.7 °C)  HR:  [] 87  BP: (120-201)/(56-94) " 142/69  Resp:  [15-24] 15  SpO2:  [94 %-100 %] 97 %  O2 Device: None (Room air)    Physical Exam  Vitals reviewed.   Constitutional:       General: He is not in acute distress.  HENT:      Head: Normocephalic and atraumatic.      Right Ear: External ear normal.      Left Ear: External ear normal.      Nose: Nose normal.      Mouth/Throat:      Mouth: Mucous membranes are moist.   Eyes:      Conjunctiva/sclera: Conjunctivae normal.   Cardiovascular:      Rate and Rhythm: Normal rate and regular rhythm.      Heart sounds: Normal heart sounds.   Pulmonary:      Effort: Pulmonary effort is normal. No respiratory distress.      Breath sounds: Normal breath sounds.   Abdominal:      General: Bowel sounds are normal. There is no distension.      Palpations: Abdomen is soft.      Tenderness: There is no abdominal tenderness. There is no guarding.   Musculoskeletal:         General: No swelling.      Right lower leg: No edema.      Left lower leg: No edema.   Skin:     General: Skin is warm and dry.      Coloration: Skin is pale.   Neurological:      Mental Status: He is alert and oriented to person, place, and time. Mental status is at baseline.      Cranial Nerves: No cranial nerve deficit.   Psychiatric:         Mood and Affect: Mood normal.         Behavior: Behavior normal.         Lab Results: I have reviewed the following results:CBC/BMP:   .     12/06/24  0510   WBC 7.58   HGB 6.5*   HCT 21.0*      SODIUM 142   K 3.9   *   CO2 19*   BUN 48*   CREATININE 1.51*   GLUC 134   MG 1.9        Imaging Results Review: No pertinent imaging studies reviewed.  Other Study Results Review: No additional pertinent studies reviewed.    VTE Pharmacologic Prophylaxis: hold due to GIB  VTE Mechanical Prophylaxis: sequential compression device    Administrative Statements   I have spent a total time of 45 minutes in caring for this patient on the day of the visit/encounter including Diagnostic results, Prognosis, Risks and  benefits of tx options, Instructions for management, Patient and family education, Importance of tx compliance, Risk factor reductions, Impressions, Counseling / Coordination of care, Documenting in the medical record, Reviewing / ordering tests, medicine, procedures  , Obtaining or reviewing history  , and Communicating with other healthcare professionals .

## 2024-12-06 NOTE — PLAN OF CARE
Problem: PAIN - ADULT  Goal: Verbalizes/displays adequate comfort level or baseline comfort level  Description: Interventions:  - Encourage patient to monitor pain and request assistance  - Assess pain using appropriate pain scale  - Administer analgesics based on type and severity of pain and evaluate response  - Implement non-pharmacological measures as appropriate and evaluate response  - Consider cultural and social influences on pain and pain management  - Notify physician/advanced practitioner if interventions unsuccessful or patient reports new pain  Outcome: Progressing     Problem: INFECTION - ADULT  Goal: Absence or prevention of progression during hospitalization  Description: INTERVENTIONS:  - Assess and monitor for signs and symptoms of infection  - Monitor lab/diagnostic results  - Monitor all insertion sites, i.e. indwelling lines, tubes, and drains  - Monitor endotracheal if appropriate and nasal secretions for changes in amount and color  - [place] appropriate cooling/warming therapies per order  - Administer medications as ordered  - Instruct and encourage patient and family to use good hand hygiene technique  - Identify and instruct in appropriate isolation precautions for identified infection/condition  Outcome: Progressing     Problem: SAFETY ADULT  Goal: Patient will remain free of falls  Description: INTERVENTIONS:  - Educate patient/family on patient safety including physical limitations  - Instruct patient to call for assistance with activity   - Consult OT/PT to assist with strengthening/mobility   - Keep Call bell within reach  - Keep bed low and locked with side rails adjusted as appropriate  - Keep care items and personal belongings within reach  - Initiate and maintain comfort rounds  - Make Fall Risk Sign visible to staff  - Offer Toileting every 2 Hours, in advance of need  - Initiate/Maintain bed alarm  - Obtain necessary fall risk management equipment  - Apply yellow socks and  bracelet for high fall risk patients  - Consider moving patient to room near nurses station  Outcome: Progressing  Goal: Maintain or return to baseline ADL function  Description: INTERVENTIONS:  -  Assess patient's ability to carry out ADLs; assess patient's baseline for ADL function and identify physical deficits which impact ability to perform ADLs (bathing, care of mouth/teeth, toileting, grooming, dressing, etc.)  - Assess/evaluate cause of self-care deficits   - Assess range of motion  - Assess patient's mobility; develop plan if impaired  - Assess patient's need for assistive devices and provide as appropriate  - Encourage maximum independence but intervene and supervise when necessary  - Involve family in performance of ADLs  - Assess for home care needs following discharge   - Consider OT consult to assist with ADL evaluation and planning for discharge  - Provide patient education as appropriate  Outcome: Progressing  Goal: Maintains/Returns to pre admission functional level  Description: INTERVENTIONS:  - Perform AM-PAC 6 Click Basic Mobility/ Daily Activity assessment daily.  - Set and communicate daily mobility goal to care team and patient/family/caregiver.   - Collaborate with rehabilitation services on mobility goals if consulted  - Perform Range of Motion 3 times a day.  - Reposition patient every 2 hours.  - Dangle patient 3 times a day  - Stand patient 3 times a day  - Ambulate patient 3 times a day  - Out of bed to chair 3 times a day   - Out of bed for meals 3 times a day  - Out of bed for toileting  - Record patient progress and toleration of activity level   Outcome: Progressing     Problem: DISCHARGE PLANNING  Goal: Discharge to home or other facility with appropriate resources  Description: INTERVENTIONS:  - Identify barriers to discharge w/patient and caregiver  - Arrange for needed discharge resources and transportation as appropriate  - Identify discharge learning needs (meds, wound care,  etc.)  - Arrange for interpretive services to assist at discharge as needed  - Refer to Case Management Department for coordinating discharge planning if the patient needs post-hospital services based on physician/advanced practitioner order or complex needs related to functional status, cognitive ability, or social support system  Outcome: Progressing     Problem: CARDIOVASCULAR - ADULT  Goal: Maintains optimal cardiac output and hemodynamic stability  Description: INTERVENTIONS:  - Monitor I/O, vital signs and rhythm  - Monitor for S/S and trends of decreased cardiac output  - Administer and titrate ordered vasoactive medications to optimize hemodynamic stability  - Assess quality of pulses, skin color and temperature  - Assess for signs of decreased coronary artery perfusion  - Instruct patient to report change in severity of symptoms  Outcome: Progressing  Goal: Absence of cardiac dysrhythmias or at baseline rhythm  Description: INTERVENTIONS:  - Continuous cardiac monitoring, vital signs, obtain 12 lead EKG if ordered  - Administer antiarrhythmic and heart rate control medications as ordered  - Monitor electrolytes and administer replacement therapy as ordered  Outcome: Progressing     Problem: RESPIRATORY - ADULT  Goal: Achieves optimal ventilation and oxygenation  Description: INTERVENTIONS:  - Assess for changes in respiratory status  - Assess for changes in mentation and behavior  - Position to facilitate oxygenation and minimize respiratory effort  - Oxygen administered by appropriate delivery if ordered  - Initiate smoking cessation education as indicated  - Encourage broncho-pulmonary hygiene including cough, deep breathe, Incentive Spirometry  - Assess the need for suctioning and aspirate as needed  - Assess and instruct to report SOB or any respiratory difficulty  - Respiratory Therapy support as indicated  Outcome: Progressing     Problem: GASTROINTESTINAL - ADULT  Goal: Minimal or absence of nausea  and/or vomiting  Description: INTERVENTIONS:  - Administer IV fluids if ordered to ensure adequate hydration  - Maintain NPO status until nausea and vomiting are resolved  - Nasogastric tube if ordered  - Administer ordered antiemetic medications as needed  - Provide nonpharmacologic comfort measures as appropriate  - Advance diet as tolerated, if ordered  - Consider nutrition services referral to assist patient with adequate nutrition and appropriate food choices  Outcome: Progressing  Goal: Maintains or returns to baseline bowel function  Description: INTERVENTIONS:  - Assess bowel function  - Encourage oral fluids to ensure adequate hydration  - Administer IV fluids if ordered to ensure adequate hydration  - Administer ordered medications as needed  - Encourage mobilization and activity  - Consider nutritional services referral to assist patient with adequate nutrition and appropriate food choices  Outcome: Progressing  Goal: Maintains adequate nutritional intake  Description: INTERVENTIONS:  - Monitor percentage of each meal consumed  - Identify factors contributing to decreased intake, treat as appropriate  - Assist with meals as needed  - Monitor I&O, weight, and lab values if indicated  - Obtain nutrition services referral as needed  Outcome: Progressing     Problem: GENITOURINARY - ADULT  Goal: Maintains or returns to baseline urinary function  Description: INTERVENTIONS:  - Assess urinary function  - Encourage oral fluids to ensure adequate hydration if ordered  - Administer IV fluids as ordered to ensure adequate hydration  - Administer ordered medications as needed  - Offer frequent toileting  - Follow urinary retention protocol if ordered  Outcome: Progressing  Goal: Absence of urinary retention  Description: INTERVENTIONS:  - Assess patient’s ability to void and empty bladder  - Monitor I/O  - Bladder scan as needed  - Discuss with physician/AP medications to alleviate retention as needed  - Discuss  catheterization for long term situations as appropriate  Outcome: Progressing     Problem: METABOLIC, FLUID AND ELECTROLYTES - ADULT  Goal: Electrolytes maintained within normal limits  Description: INTERVENTIONS:  - Monitor labs and assess patient for signs and symptoms of electrolyte imbalances  - Administer electrolyte replacement as ordered  - Monitor response to electrolyte replacements, including repeat lab results as appropriate  - Instruct patient on fluid and nutrition as appropriate  Outcome: Progressing  Goal: Fluid balance maintained  Description: INTERVENTIONS:  - Monitor labs   - Monitor I/O and WT  - Instruct patient on fluid and nutrition as appropriate  - Assess for signs & symptoms of volume excess or deficit  Outcome: Progressing     Problem: HEMATOLOGIC - ADULT  Goal: Maintains hematologic stability  Description: INTERVENTIONS  - Assess for signs and symptoms of bleeding or hemorrhage  - Monitor labs  - Administer supportive blood products/factors as ordered and appropriate  Outcome: Progressing     Problem: MUSCULOSKELETAL - ADULT  Goal: Maintain or return mobility to safest level of function  Description: INTERVENTIONS:  - Assess patient's ability to carry out ADLs; assess patient's baseline for ADL function and identify physical deficits which impact ability to perform ADLs (bathing, care of mouth/teeth, toileting, grooming, dressing, etc.)  - Assess/evaluate cause of self-care deficits   - Assess range of motion  - Assess patient's mobility  - Assess patient's need for assistive devices and provide as appropriate  - Encourage maximum independence but intervene and supervise when necessary  - Involve family in performance of ADLs  - Assess for home care needs following discharge   - Consider OT consult to assist with ADL evaluation and planning for discharge  - Provide patient education as appropriate  Outcome: Progressing  Goal: Maintain proper alignment of affected body part  Description:  INTERVENTIONS:  - Support, maintain and protect limb and body alignment  - Provide patient/ family with appropriate education  Outcome: Progressing

## 2024-12-06 NOTE — PLAN OF CARE
Problem: PAIN - ADULT  Goal: Verbalizes/displays adequate comfort level or baseline comfort level  Description: Interventions:  - Encourage patient to monitor pain and request assistance  - Assess pain using appropriate pain scale  - Administer analgesics based on type and severity of pain and evaluate response  - Implement non-pharmacological measures as appropriate and evaluate response  - Consider cultural and social influences on pain and pain management  - Notify physician/advanced practitioner if interventions unsuccessful or patient reports new pain  Outcome: Progressing     Problem: INFECTION - ADULT  Goal: Absence or prevention of progression during hospitalization  Description: INTERVENTIONS:  - Assess and monitor for signs and symptoms of infection  - Monitor lab/diagnostic results  - Monitor all insertion sites, i.e. indwelling lines, tubes, and drains  - Monitor endotracheal if appropriate and nasal secretions for changes in amount and color  - [place] appropriate cooling/warming therapies per order  - Administer medications as ordered  - Instruct and encourage patient and family to use good hand hygiene technique  - Identify and instruct in appropriate isolation precautions for identified infection/condition  Outcome: Progressing     Problem: SAFETY ADULT  Goal: Patient will remain free of falls  Description: INTERVENTIONS:  - Educate patient/family on patient safety including physical limitations  - Instruct patient to call for assistance with activity   - Consult OT/PT to assist with strengthening/mobility   - Keep Call bell within reach  - Keep bed low and locked with side rails adjusted as appropriate  - Keep care items and personal belongings within reach  - Initiate and maintain comfort rounds  - Make Fall Risk Sign visible to staff  - Offer Toileting every 2 Hours, in advance of need  - Initiate/Maintain alarm  - Obtain necessary fall risk management equipment  - Apply yellow socks and  bracelet for high fall risk patients  - Consider moving patient to room near nurses station  Outcome: Progressing  Goal: Maintain or return to baseline ADL function  Description: INTERVENTIONS:  -  Assess patient's ability to carry out ADLs; assess patient's baseline for ADL function and identify physical deficits which impact ability to perform ADLs (bathing, care of mouth/teeth, toileting, grooming, dressing, etc.)  - Assess/evaluate cause of self-care deficits   - Assess range of motion  - Assess patient's mobility; develop plan if impaired  - Assess patient's need for assistive devices and provide as appropriate  - Encourage maximum independence but intervene and supervise when necessary  - Involve family in performance of ADLs  - Assess for home care needs following discharge   - Consider OT consult to assist with ADL evaluation and planning for discharge  - Provide patient education as appropriate  Outcome: Progressing  Goal: Maintains/Returns to pre admission functional level  Description: INTERVENTIONS:  - Perform AM-PAC 6 Click Basic Mobility/ Daily Activity assessment daily.  - Set and communicate daily mobility goal to care team and patient/family/caregiver.   - Collaborate with rehabilitation services on mobility goals if consulted  - Perform Range of Motion 3 times a day.  - Reposition patient every 2 hours.  - Dangle patient 3 times a day  - Stand patient 3 times a day  - Ambulate patient 3 times a day  - Out of bed to chair 3 times a day   - Out of bed for meals 3 times a day  - Out of bed for toileting  - Record patient progress and toleration of activity level   Outcome: Progressing     Problem: DISCHARGE PLANNING  Goal: Discharge to home or other facility with appropriate resources  Description: INTERVENTIONS:  - Identify barriers to discharge w/patient and caregiver  - Arrange for needed discharge resources and transportation as appropriate  - Identify discharge learning needs (meds, wound care,  etc.)  - Arrange for interpretive services to assist at discharge as needed  - Refer to Case Management Department for coordinating discharge planning if the patient needs post-hospital services based on physician/advanced practitioner order or complex needs related to functional status, cognitive ability, or social support system  Outcome: Progressing     Problem: CARDIOVASCULAR - ADULT  Goal: Maintains optimal cardiac output and hemodynamic stability  Description: INTERVENTIONS:  - Monitor I/O, vital signs and rhythm  - Monitor for S/S and trends of decreased cardiac output  - Administer and titrate ordered vasoactive medications to optimize hemodynamic stability  - Assess quality of pulses, skin color and temperature  - Assess for signs of decreased coronary artery perfusion  - Instruct patient to report change in severity of symptoms  Outcome: Progressing  Goal: Absence of cardiac dysrhythmias or at baseline rhythm  Description: INTERVENTIONS:  - Continuous cardiac monitoring, vital signs, obtain 12 lead EKG if ordered  - Administer antiarrhythmic and heart rate control medications as ordered  - Monitor electrolytes and administer replacement therapy as ordered  Outcome: Progressing     Problem: RESPIRATORY - ADULT  Goal: Achieves optimal ventilation and oxygenation  Description: INTERVENTIONS:  - Assess for changes in respiratory status  - Assess for changes in mentation and behavior  - Position to facilitate oxygenation and minimize respiratory effort  - Oxygen administered by appropriate delivery if ordered  - Initiate smoking cessation education as indicated  - Encourage broncho-pulmonary hygiene including cough, deep breathe, Incentive Spirometry  - Assess the need for suctioning and aspirate as needed  - Assess and instruct to report SOB or any respiratory difficulty  - Respiratory Therapy support as indicated  Outcome: Progressing     Problem: GASTROINTESTINAL - ADULT  Goal: Minimal or absence of nausea  and/or vomiting  Description: INTERVENTIONS:  - Administer IV fluids if ordered to ensure adequate hydration  - Maintain NPO status until nausea and vomiting are resolved  - Nasogastric tube if ordered  - Administer ordered antiemetic medications as needed  - Provide nonpharmacologic comfort measures as appropriate  - Advance diet as tolerated, if ordered  - Consider nutrition services referral to assist patient with adequate nutrition and appropriate food choices  Outcome: Progressing  Goal: Maintains or returns to baseline bowel function  Description: INTERVENTIONS:  - Assess bowel function  - Encourage oral fluids to ensure adequate hydration  - Administer IV fluids if ordered to ensure adequate hydration  - Administer ordered medications as needed  - Encourage mobilization and activity  - Consider nutritional services referral to assist patient with adequate nutrition and appropriate food choices  Outcome: Progressing  Goal: Maintains adequate nutritional intake  Description: INTERVENTIONS:  - Monitor percentage of each meal consumed  - Identify factors contributing to decreased intake, treat as appropriate  - Assist with meals as needed  - Monitor I&O, weight, and lab values if indicated  - Obtain nutrition services referral as needed  Outcome: Progressing     Problem: GENITOURINARY - ADULT  Goal: Maintains or returns to baseline urinary function  Description: INTERVENTIONS:  - Assess urinary function  - Encourage oral fluids to ensure adequate hydration if ordered  - Administer IV fluids as ordered to ensure adequate hydration  - Administer ordered medications as needed  - Offer frequent toileting  - Follow urinary retention protocol if ordered  Outcome: Progressing  Goal: Absence of urinary retention  Description: INTERVENTIONS:  - Assess patient’s ability to void and empty bladder  - Monitor I/O  - Bladder scan as needed  - Discuss with physician/AP medications to alleviate retention as needed  - Discuss  catheterization for long term situations as appropriate  Outcome: Progressing     Problem: METABOLIC, FLUID AND ELECTROLYTES - ADULT  Goal: Electrolytes maintained within normal limits  Description: INTERVENTIONS:  - Monitor labs and assess patient for signs and symptoms of electrolyte imbalances  - Administer electrolyte replacement as ordered  - Monitor response to electrolyte replacements, including repeat lab results as appropriate  - Instruct patient on fluid and nutrition as appropriate  Outcome: Progressing  Goal: Fluid balance maintained  Description: INTERVENTIONS:  - Monitor labs   - Monitor I/O and WT  - Instruct patient on fluid and nutrition as appropriate  - Assess for signs & symptoms of volume excess or deficit  Outcome: Progressing     Problem: HEMATOLOGIC - ADULT  Goal: Maintains hematologic stability  Description: INTERVENTIONS  - Assess for signs and symptoms of bleeding or hemorrhage  - Monitor labs  - Administer supportive blood products/factors as ordered and appropriate  Outcome: Progressing     Problem: MUSCULOSKELETAL - ADULT  Goal: Maintain or return mobility to safest level of function  Description: INTERVENTIONS:  - Assess patient's ability to carry out ADLs; assess patient's baseline for ADL function and identify physical deficits which impact ability to perform ADLs (bathing, care of mouth/teeth, toileting, grooming, dressing, etc.)  - Assess/evaluate cause of self-care deficits   - Assess range of motion  - Assess patient's mobility  - Assess patient's need for assistive devices and provide as appropriate  - Encourage maximum independence but intervene and supervise when necessary  - Involve family in performance of ADLs  - Assess for home care needs following discharge   - Consider OT consult to assist with ADL evaluation and planning for discharge  - Provide patient education as appropriate  Outcome: Progressing  Goal: Maintain proper alignment of affected body part  Description:  INTERVENTIONS:  - Support, maintain and protect limb and body alignment  - Provide patient/ family with appropriate education  Outcome: Progressing

## 2024-12-06 NOTE — ASSESSMENT & PLAN NOTE
EUS (10/17/24) showed heterogenous/hypoechoic mass (46 mm x 30 mm) with well-defined margins, CBD dilated to 10 mm and PD dilated to 8 mm.  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    NM PET/CT did not show mets  Saw surgical oncology on 11/21/24, Dr. Rodriguez, who recommended surgical resection with Whipple procedure.  Patient declined surgical intervention given his age, but was interested in trying somatostatin analogs.  Has not yet started this.  CA 19-9 45, CEA 3.6, CgA 847.8    Plan:  -will proceed with EGD as above  -oncology following while inpatient   -cont to follow with surg onc/med onc outpatient

## 2024-12-06 NOTE — ASSESSMENT & PLAN NOTE
Incidental finding on CTA PE study: Redemonstration of cystic lesions in the pancreas with pancreatic head only partially imaged and pancreatic head mass not visible

## 2024-12-06 NOTE — OCCUPATIONAL THERAPY NOTE
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)    Primary pancreatic neuroendocrine tumor    Hypertensive urgency    Elevated troponin    Past Medical History  Past Medical History:   Diagnosis Date    Allergic     Anemia     Arthritis     Diabetes mellitus (HCC)     Disease of thyroid gland     Hyperlipemia     Hypertension      Past Surgical History  Past Surgical History:   Procedure Laterality Date    COLONOSCOPY      PROSTATE BIOPSY      needle,  resolved may 2005        12/06/24 0923   OT Last Visit   OT Visit Date 12/06/24   Note Type   Note type Evaluation   Pain Assessment   Pain Assessment Tool 0-10   Pain Score No Pain   Restrictions/Precautions   Weight Bearing Precautions Per Order No   Other Precautions Bed Alarm;Telemetry;Fall Risk   Home Living   Type of Home House   Home Layout One level;Stairs to enter with rails;Stairs to enter without rails   Bathroom Shower/Tub Tub/shower unit   Bathroom Toilet Standard   Bathroom Equipment Grab bars in shower;Shower chair   Home Equipment Walker  (x2)   Prior Function   Level of [place] Independent with functional mobility;Independent with ADLs;Independent with IADLS   Lives With Spouse   IADLs Independent with driving;Independent with meal prep;Independent with medication management   Falls in the last 6 months 0   Vocational Retired   Lifestyle   Autonomy PTA, was (I) with ADLs and was (I) with IADLs. Patient lives in a 1 SH w/ 3 LONI. Tub/shower w/ SC, standard toilet. Has 2 RWs, but no AD at baseline. Denies falls.   Reciprocal Relationships Spouse   Service to Others Retired   Intrinsic  "Gratification Gardening   General   Additional Pertinent History Comorbidities affecting pt’s functional performance include a significant PMH of: pancreatic neuroendocrine tumor, urinary retention, DM2, DM2, HTN, PAD, squamous cell carcinoma, HLD, OA R knee, PAWEL.  Patient with active OT orders and activity orders for OOB to chair.   Family/Caregiver Present No   Subjective   Subjective \"I'm feeling better than when I came in\"   ADL   Where Assessed Edge of bed   Eating Assistance 7  Independent   Grooming Assistance 6  Modified Independent   UB Bathing Assistance 5  Supervision/Setup   LB Bathing Assistance 5  Supervision/Setup   UB Dressing Assistance 5  Supervision/Setup   LB Dressing Assistance 5  Supervision/Setup   Toileting Assistance  5  Supervision/Setup   Bed Mobility   Supine to Sit 6  Modified independent   Additional items HOB elevated;Increased time required   Sit to Supine 6  Modified independent   Additional items HOB elevated;Increased time required   Transfers   Sit to Stand 5  Supervision   Stand to Sit 5  Supervision   Functional Mobility   Functional Mobility 5  Supervision   Additional Comments no AD use, community distances   Balance   Static Sitting Good   Dynamic Sitting Fair +   Static Standing Fair   Dynamic Standing Fair -   Ambulatory Fair -   Activity Tolerance   Activity Tolerance Patient tolerated treatment well   Medical Staff Made Aware PT Liss   Nurse Made Aware yes   RUE Assessment   RUE Assessment WFL   LUE Assessment   LUE Assessment WFL   Hand Function   Gross Motor Coordination Functional   Fine Motor Coordination Functional   Sensation   Light Touch No apparent deficits   Proprioception   Proprioception No apparent deficits   Vision-Basic Assessment   Current Vision Wears glasses all the time   Vision - Complex Assessment   Ocular Range of Motion Intact   Perception   Inattention/Neglect Appears intact   Cognition   Overall Cognitive Status WFL   Arousal/Participation " Alert;Responsive;Cooperative   Attention Within functional limits   Orientation Level Oriented X4   Memory Within functional limits   Following Commands Follows all commands and directions without difficulty   Assessment   Assessment Patient is a 89 y.o. year old male seen for OT eval s/p admit to [facility] on 12/5/2024 with symptomatic anemia, elevated troponin, hypertensive urgency.  OT consulted to assess ADLs/IADLs/functional mobility and assist w/ D/C planning. Pt A&OX4, maintains appropriate conversation, follows all commands. Pt currently functioning at/close baseline w ADLs and functional mobility without AD. Given pt's functional performance, will DC from OT caseload and maintain on restorative schedule.   Goals   Patient Goals to return home   LTG Time Frame 10-14   Plan   OT Frequency Eval only   Discharge Recommendation   Rehab Resource Intensity Level, OT No post-acute rehabilitation needs   Additional Comments  Co treatment with PT secondary to complex medical condition of pt, possible A of 2 required to achieve and maintain transitional movements, requiring the need of skilled therapeutic intervention of 2 therapists to achieve delivery of services.   AM-PAC Daily Activity Inpatient   Lower Body Dressing 3   Bathing 3   Toileting 3   Upper Body Dressing 4   Grooming 4   Eating 4   Daily Activity Raw Score 21   Daily Activity Standardized Score (Calc for Raw Score >=11) 44.27   AM-PAC Applied Cognition Inpatient   Following a Speech/Presentation 4   Understanding Ordinary Conversation 4   Taking Medications 4   Remembering Where Things Are Placed or Put Away 4   Remembering List of 4-5 Errands 4   Taking Care of Complicated Tasks 4   Applied Cognition Raw Score 24   Applied Cognition Standardized Score 62.21     Elsa Mcclendon

## 2024-12-06 NOTE — ASSESSMENT & PLAN NOTE
Follows with surgical oncology, lonnie Denny.  Has appointment with medical oncology next week  Will consult oncology inpatient for recommendations  Follow-up GI recommendations

## 2024-12-06 NOTE — ASSESSMENT & PLAN NOTE
Patient was seen by PMD today and reported exertional chest pain.  Was sent for CTA then to ED for further evaluation of chest pain  CTA negative for PE, no acute cardiopulmonary disease  Troponin 93--> 101--> 131. Elevated troponin likely secondary to severe anemia.  Chest pain resolved after receiving 1 unit PRBC  EKG similar to prior, no acute ischemic changes  A1c and lipid panel up-to-date. Check BNP  Monitor on telemetry  Repeat troponin

## 2024-12-06 NOTE — ASSESSMENT & PLAN NOTE
Notified by RN, bladder scan 1000 mL  During previous admission in Oct patient had urinary retention  Add Urinary retention protocol. Monitor I/O  Check UA  Urology consult

## 2024-12-06 NOTE — CASE MANAGEMENT
Case Management Assessment & Discharge Planning Note    Patient name Jose Zamora  Location [facility] /[facility] -* MRN [id number]  : 1935 Date 2024       Current Admission Date: 2024  Current Admission Diagnosis:Symptomatic anemia   Patient Active Problem List    Diagnosis Date Noted Date Diagnosed    Chest tightness 2024     Hypertensive urgency 2024     Elevated troponin 2024     Primary pancreatic neuroendocrine tumor 10/23/2024     Hypokalemia 10/20/2024     Hypomagnesemia 10/20/2024     CKD stage 3b, 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 ([facility]) 10/19/2024     Urinary retention 10/16/2024     PAWEL (acute kidney injury) ([facility]) 10/16/2024     Pancreatic mass 10/16/2024     Lower back pain 10/16/2024     UGIB (upper gastrointestinal bleed) 10/15/2024     Generalized abdominal pain 10/14/2024     Weight loss 10/14/2024     Acute bilateral thoracic back pain 10/14/2024     Nausea 10/14/2024     Right hand pain 2022     Acute pain of both shoulders 2022     Localized osteoarthritis of right knee 2020     Acute pain of right knee 2020     Strain of calf muscle 2020     Lumbar strain 10/25/2019     Abnormal prostate exam 2019     Beta thalassemia minor 05/15/2019     Elevated PSA 05/15/2019     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 ([facility]) 2019     Type 2 diabetes mellitus with stage 3 chronic kidney disease ([facility]) 2019     Squamous cell carcinoma 2019     Dermatitis 2019     Skin neoplasm 2019     Symptomatic anemia 2017     Acquired hypothyroidism 10/19/2016     PAD (peripheral artery disease) ([facility]) 2015     Hyperlipidemia 2013     Atherosclerotic heart disease of native coronary artery without angina pectoris 09/10/2012     DMII (diabetes mellitus, type 2) ([facility]) 09/10/2012     Hypertension 09/10/2012       LOS (days): 1  Geometric Mean LOS (GMLOS) (days): 2  Days to GMLOS:1.2     OBJECTIVE:    Risk of Unplanned  Readmission Score: 19.76         Current admission status: Inpatient       Preferred Pharmacy:   [facility]/pharm[id number] - [place] - [address]  [address] [address]  Phone: [phone] Fax: [phone]    Primary Care Provider: Scott Hodges DO    Primary Insurance: MEDICARE  Secondary Insurance: BLUE CROSS    ASSESSMENT:  Active Health Care Proxies       Yolanda Zamora Health Care Representative - Spouse   Primary Phone: [phone] (Mobile)  Home Phone: [phone]                           Readmission Root Cause  30 Day Readmission: No    Patient Information  Admitted from:: Home  Mental Status: Alert  During Assessment patient was accompanied by: Not accompanied during assessment  Assessment information provided by:: Patient  Primary Caregiver: Self  Support Systems: Spouse/significant other, Family members  County of Residence: [place]  What city do you live in?: [place]  Home entry access options. Select all that apply.: Stairs  Number of steps to enter home.: 3  Do the steps have railings?: No  Type of Current Residence: [facility]  Living Arrangements: Lives w/ Spouse/significant other  Is patient a ?: No    Activities of Daily Living Prior to Admission  Functional Status: Independent  Completes ADLs independently?: Yes  Ambulates independently?: Yes  Does patient use assisted devices?: No  Does patient currently own DME?: Yes  What DME does the patient currently own?: Walker  Does patient have a history of Outpatient Therapy (PT/OT)?: No  Does the patient have a history of Short-Term Rehab?: No  Does patient have a history of HHC?: No  Does patient currently have HHC?: No         Patient Information Continued  Income Source: SSI/SSD  Does patient have prescription coverage?: Yes  Does patient receive dialysis treatments?: No  Does patient have a history of substance abuse?: No  Does patient have a history of Mental Health Diagnosis?: No         Means of Transportation  Means  of Transport to Appts:: Drives Self      Social Determinants of Health (SDOH)      Flowsheet Row Most Recent Value   Housing Stability    In the last 12 months, was there a time when you were not able to pay the mortgage or rent on time? N   In the past 12 months, how many times have you moved where you were living? 0   At any time in the past 12 months, were you homeless or living in a shelter (including now)? N   Transportation Needs    In the past 12 months, has lack of transportation kept you from medical appointments or from getting medications? no   In the past 12 months, has lack of transportation kept you from meetings, work, or from getting things needed for daily living? No   Food Insecurity    Within the past 12 months, you worried that your food would run out before you got the money to buy more. Never true   Within the past 12 months, the food you bought just didn't last and you didn't have money to get more. Never true   Utilities    In the past 12 months has the electric, gas, oil, or water company threatened to shut off services in your home? No            DISCHARGE DETAILS:    Discharge planning discussed with:: Patient at bedside.  Freedom of Choice: Yes  Comments - Freedom of Choice: No rehab needs.     Were Treatment Team discharge recommendations reviewed with patient/caregiver?: Yes  Did patient/caregiver verbalize understanding of patient care needs?: Yes  Were patient/caregiver advised of the risks associated with not following Treatment Team discharge recommendations?: Yes         Requested Home Health Care         Is the patient interested in HHC at discharge?: No    DME Referral Provided  Referral made for DME?: No    Other Referral/Resources/Interventions Provided:  Referral Comments: No referrals made at this time. No rehab needs.    Additional Comments: CM met with patient at bedside and introduced self and role. Pt resides with his spouse in a 1-story home. Pt is IPTA with ADL's and  ambulation. Pt owns a walker, but does not need to use it at this time. Pt denied hx of STR, HHC, and OPPT. Pt denied hx of MH and substance abuse. No rehab needs identified at this time.  department to remain available for any discharge planning needs throughout this admission.

## 2024-12-06 NOTE — ASSESSMENT & PLAN NOTE
"Lab Results   Component Value Date    EGFR 40 12/06/2024    EGFR 42 12/05/2024    EGFR 37 12/05/2024    CREATININE 1.51 (H) 12/06/2024    CREATININE 1.45 (H) 12/05/2024    CREATININE 1.62 (H) 12/05/2024     CKD 3 at baseline.  Follows with  nephrology  Monitor creatinine closely, patient had PAWEL during previous admission in October.  Per nephrology, \"PAWEL in the setting of symptomatic severe anemia with a hemoglobin of 4.7 on admission as well as loss of autoregulation due to ARB use as well as diuretic use.\"  "

## 2024-12-06 NOTE — ANESTHESIA PREPROCEDURE EVALUATION
Procedure:  EGD    Relevant Problems   ANESTHESIA (within normal limits)      CARDIO   (+) Atherosclerotic heart disease of native coronary artery without angina pectoris   (+) Hyperlipidemia   (+) Hypertension   (+) Hypertensive urgency   (+) PAD (peripheral artery disease) ([facility])      ENDO   (+) Acquired hypothyroidism   (+) DMII (diabetes mellitus, type 2) ([facility])   (+) Type 2 diabetes mellitus with stage 3 chronic kidney disease ([facility])      GI/HEPATIC   (+) Primary pancreatic neuroendocrine tumor   (+) UGIB (upper gastrointestinal bleed)      /RENAL   (+) PAWEL (acute kidney injury) (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)      HEMATOLOGY   (+) Beta thalassemia minor   (+) Symptomatic anemia      MUSCULOSKELETAL   (+) Acute bilateral thoracic back pain   (+) Localized osteoarthritis of right knee   (+) Lower back pain   (+) Lumbar strain   (+) Strain of calf muscle        Physical Exam    Airway    Mallampati score: II  TM Distance: >3 FB  Neck ROM: full     Dental       Cardiovascular  Cardiovascular exam normal    Pulmonary  Pulmonary exam normal     Other Findings        Anesthesia Plan  ASA Score- 3 Emergent    Anesthesia Type- IV sedation with anesthesia with ASA Monitors.         Additional Monitors:     Airway Plan:     Comment: Hgb 8.9 after 3rd unit PRBCs.       Plan Factors-    Chart reviewed.   Existing labs reviewed. Patient summary reviewed.                  Induction- intravenous.    Postoperative Plan-         Informed Consent- Anesthetic plan and risks discussed with patient.

## 2024-12-06 NOTE — H&P
"H&P - Hospitalist   Name: Jose Zamora 89 y.o. male I MRN: [id number]  Unit/Bed#: E5 -01 I Date of Admission: 12/5/2024   Date of Service: 12/5/2024 I Hospital Day: 0     Assessment & Plan  Symptomatic anemia  Transferred to ED due to complaint of exertional chest pain.  Noted with severe anemia, Hg 4.8  Recent admission 10/15-10/20 for upper GI bleed, s/p EGD and EUS 10/16 notable for a large pancreatic head mass extending to the ampulla with evidence of this lesion bleeding through the ampulla which appeared to be stretched by the tumor. Received 4 units PRBC during this admission  Bowel rest: NPO at mn  IV PPI b.i.d.  Check type & screen. Monitor Hg/Hct   PRBC transfusion if Hg <7 g  or symptomatic  Consult to GI and hematology   Elevated troponin  Patient was seen by PMD today and reported exertional chest pain.  Was sent for CTA then to ED for further evaluation of chest pain  CTA negative for PE, no acute cardiopulmonary disease  Troponin 93--> 101--> 131. Elevated troponin likely secondary to severe anemia.  Chest pain resolved after receiving 1 unit PRBC  EKG similar to prior, no acute ischemic changes  A1c and lipid panel up-to-date. Check BNP  Monitor on telemetry  Repeat troponin  Urinary retention  Notified by RN, bladder scan 1000 mL  During previous admission in Oct patient had urinary retention  Add Urinary retention protocol. 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  Follows with [facility]  Monitor creatinine closely, patient had PAWEL during previous admission in October.  Per nephrology, \"PAWEL in the setting of symptomatic severe anemia with a hemoglobin of 4.7 on admission as well as loss of autoregulation due to ARB use as well " "as diuretic use.\"  Hypertensive urgency  Elevated BP prior to transport 189/89, 180/94  Continue PTA amlodipine and metoprolol     Close BP monitoring in setting of anemia  Atherosclerotic heart disease of native coronary artery without angina pectoris  S/p LAD IRIS 2005.  Continue atorvastatin and metoprolol  Follow[facility]  Acquired hypothyroidism  Levothyroxine 50 mcg qd  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)  Lab Results   Component Value Date    HGBA1C 7.1 (H) 10/15/2024     Diet-controlled DM. Add accuchecks and sliding-scale insulin while inpatient    No results for input(s): \"POCGLU\" in the last 72 hours.    Blood Sugar Average: Last 72 hrs:      Primary pancreatic neuroendocrine tumor  Follows with surgical oncology, declines Eduin.  Has appointment with medical oncology next week      VTE Pharmacologic Prophylaxis: VTE Score: 6 Moderate Risk (Score 3-4) - Pharmacological DVT Prophylaxis Contraindicated. Sequential Compression Devices Ordered.  Code Status: Level 3 - DNAR and DNI DNR/DNI  Discussion with family:     Anticipated Length of Stay: Patient will be admitted on an inpatient basis with an anticipated length of stay of greater than 2 midnights secondary to GIB, symptomatic severe anemia.    History of Present Illness   Chief Complaint:     Jose Zamora is a 89 y.o. male with a PMH as above who was transferred from [place] due to anemia.  He was seen outpatient by PMD and reported exertional chest pain.  He was sent to ED for CTA and labs.  Radiology transported patient to ED for further evaluation of chest pain.  Noted to have severe anemia.  ER discussed with GI who requested transfer to [facility].    Patient reports decreased energy and lack of appetite.  Reports melena, denies abdominal pain, nausea, emesis, constipation, diarrhea, hematochezia or hematemesis. Reports dyspnea and nonradiating midsternal chest pain exacerbated by activity, symptoms " resolve with rest.  States chest pain resolved after PRBC.    Noted with urinary retention 1L, states he has difficulty voiding, has urge to void but voids very little.  Denies fevers, dysuria, pelvic pain, back pain or hematuria.    Review of Systems   Constitutional:  Positive for appetite change and fatigue.        Loss of appetite   HENT: Negative.     Respiratory:  Positive for shortness of breath.         Dyspnea with activity   Cardiovascular:  Positive for chest pain.        Chest pain resolved   Gastrointestinal: Negative.         Dark stools   Genitourinary:  Positive for difficulty urinating. Negative for hematuria.   Musculoskeletal: Negative.    Skin: Negative.    Neurological:  Positive for weakness.   Psychiatric/Behavioral:  Negative for confusion.        Historical Information   Past Medical History:   Diagnosis Date    Allergic     Anemia     Arthritis     Diabetes mellitus (HCC)     Disease of thyroid gland     Hyperlipemia     Hypertension      Past Surgical History:   Procedure Laterality Date    COLONOSCOPY      PROSTATE BIOPSY      needle,  resolved may 2005     Social History     Tobacco Use    Smoking status: Former     Current packs/day: 1.00     Average packs/day: 1 pack/day for 54.0 years (54.0 ttl pk-yrs)     Types: Cigarettes     Start date: 12/4/1970    Smokeless tobacco: Never   Vaping Use    Vaping status: Never Used   Substance and Sexual Activity    Alcohol use: Not Currently     Comment: social/rarely    Drug use: Never    Sexual activity: Not Currently     E-Cigarette/Vaping    E-Cigarette Use Never User      E-Cigarette/Vaping Substances    Nicotine No     THC No     CBD No     Flavoring No     Other No     Unknown No      Family History   Problem Relation Age of Onset    No Known Problems Mother      Social History:  Marital Status: /Civil Union   Occupation: retired  Patient Pre-hospital Living Situation: Resides at home with spouse  Patient Pre-hospital Level of  Mobility: Ambulatory  Patient Pre-hospital Diet Restrictions:     Meds/Allergies   I have reviewed home medications using recent Epic encounter.  Prior to Admission medications    Medication Sig Start Date End Date Taking? Authorizing Provider   amLODIPine (NORVASC) 5 mg tablet Take 1 tablet (5 mg total) by mouth daily 10/21/24 12/5/24  Jose Dozier MD   atorvastatin (LIPITOR) 20 mg tablet Take 1 tablet by mouth daily 10/23/12   Historical Provider, MD   cholecalciferol (VITAMIN D3) 1,000 units tablet Take by mouth    Historical Provider, MD   fexofenadine (Allegra Allergy) 180 MG tablet Take 180 mg by mouth daily as needed (allergies)    Historical Provider, MD   folic acid (FOLVITE) 1 mg tablet Take 1 tablet by mouth daily 10/23/12   Historical Provider, MD   levothyroxine 50 mcg tablet TAKE 1 TABLET BY MOUTH EVERY DAY 11/25/24   Mega Hodges DO   metoprolol tartrate (LOPRESSOR) 25 mg tablet Take 0.5 tablets (12.5 mg total) by mouth 2 (two) times a day 12/3/24 6/1/25  Mega Hodges DO   montelukast (SINGULAIR) 10 mg tablet Take by mouth 5/7/14   Historical Provider, MD   Multiple Vitamins-Minerals (CENTRUM SILVER) tablet Take by mouth 5/14/15   Historical Provider, MD   ondansetron (ZOFRAN) 4 mg tablet Take 1 tablet (4 mg total) by mouth every 8 (eight) hours as needed for nausea or vomiting 10/14/24   Mega Hodges DO   pantoprazole (PROTONIX) 40 mg tablet Take 1 tablet (40 mg total) by mouth 2 (two) times a day before meals 12/3/24 6/1/25  Mega Hodges DO   Potassium 99 MG TABS Take by mouth 5/14/15   Historical Provider, MD Diaz Shelby 160 MG CAPS Take by mouth  Patient not taking: Reported on 12/5/2024    Historical Provider, MD   sulfaSALAzine (AZULFIDINE) 500 mg tablet  1/16/18   Historical Provider, MD   zinc gluconate 50 mg tablet Take by mouth 5/14/15   Historical Provider, MD     Allergies   Allergen Reactions    Lisinopril     Candesartan Rash    Penicillins Rash       Objective :  Temp:  [97.2 °F  (36.2 °C)-98.8 °F (37.1 °C)] 97.2 °F (36.2 °C)  HR:  [104-119] 110  BP: (122-201)/(58-94) 176/85  Resp:  [15-24] 18  SpO2:  [92 %-100 %] 98 %  O2 Device: None (Room air)    Physical Exam  Constitutional:       General: He is not in acute distress.     Appearance: Normal appearance. He is normal weight. He is not ill-appearing, toxic-appearing or diaphoretic.   HENT:      Head: Normocephalic and atraumatic.      Mouth/Throat:      Mouth: Mucous membranes are dry.   Eyes:      Comments: Eyeglasses   Cardiovascular:      Rate and Rhythm: Normal rate and regular rhythm.      Heart sounds: Murmur heard.   Pulmonary:      Effort: Pulmonary effort is normal.      Breath sounds: Normal breath sounds.   Abdominal:      General: Bowel sounds are normal.      Palpations: Abdomen is soft.   Genitourinary:     Comments: Urinary retention 1 L  Musculoskeletal:      Right lower leg: Edema present.      Left lower leg: Edema present.      Comments: 2+ B/LLE edema   Skin:     Coloration: Skin is not pale.   Neurological:      Mental Status: He is alert and oriented to person, place, and time.   Psychiatric:         Mood and Affect: Mood normal.         Behavior: Behavior normal.         Thought Content: Thought content normal.         Judgment: Judgment normal.          Lines/Drains:            Lab Results: I have reviewed the following results:  Results from last 7 days   Lab Units 12/05/24  2113   WBC Thousand/uL 9.32   HEMOGLOBIN g/dL 5.8*   HEMATOCRIT % 19.2*   PLATELETS Thousands/uL 227   SEGS PCT % 77*   LYMPHO PCT % 13*   MONO PCT % 9   EOS PCT % 0     Results from last 7 days   Lab Units 12/05/24  2113   SODIUM mmol/L 140   POTASSIUM mmol/L 4.0   CHLORIDE mmol/L 113*   CO2 mmol/L 20*   BUN mg/dL 50*   CREATININE mg/dL 1.45*   ANION GAP mmol/L 7   CALCIUM mg/dL 8.3*   ALBUMIN g/dL 3.6   TOTAL BILIRUBIN mg/dL 0.63   ALK PHOS U/L 68   ALT U/L 16   AST U/L 15   GLUCOSE RANDOM mg/dL 191*             Lab Results   Component Value  Date    HGBA1C 7.1 (H) 10/15/2024    HGBA1C 7.1 (H) 05/06/2024    HGBA1C 5.7 08/30/2023           Imaging Results Review: I reviewed radiology reports from this admission including: CT chest.  EUS EGD  Other Study Results Review: EKG was reviewed.     Administrative Statements       ** Please Note: This note has been constructed using a voice recognition system. **

## 2024-12-06 NOTE — ASSESSMENT & PLAN NOTE
Presenting with worsening weakness, tiredness, and loss of appetite.   Saw PCP earlier that morning on 12/5 who ordered blood work and sent him to [place] for CTA PE study.    Vitals significant for tachycardia to 116.    Labs with BUN 58, creatinine 1.53, glucose 321, hemoglobin 5.6, WBC 10.65, and mild troponin elevation up to 172.   CT PE study showed no PE or acute pulmonary disease, redemonstration of cystic lesions in the pancreas and pancreatic head only partially imaged, and redemonstration of intrahepatic biliary dilation, distended gallbladder and left adrenal hyperplasia.    Case was discussed with Dr. Jaiyeola who recommended transfer to [facility].    Repeat labs showed further drop in hemoglobin to 4.8.  S/p 2 units PRBCs.  Had recent hospitalization from 10/15/24 to 10/20/24 for melena, fatigue, and weakness.  At that time he also had a hemoglobin of 4.5 and was found to have a mass in the head of the pancreas, anemia, and constipation.    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    EUS (10/17/24) showed heterogenous/hypoechoic mass (46 mm x 30 mm) with well-defined margins, CBD dilated to 10 mm and PD dilated to 8 mm.  Biopsy showed pancreatic neuroendocrine tumor , WHO grade 2.    NM PET/CT which showed intense radiotracer uptake at the pancreatic head mass, no lesions suspicious for mets, and diffusely enlarged prostate.   Saw surgical oncology on 11/21/24, Dr. Rodriguez.  He recommended surgical resection with Whipple procedure.  Patient declined surgical intervention given his age, but was interested in trying somatostatin analogs.  Has not yet started this.  Reports feeling ok this am, still tired; having dark brown stools; abd exam benign, Hgb 6.5 from 5.8     Ddx: symptomatic anemia may be 2/2 chronic bleeding possibly from previous ulcer in ampullary region; other etiologies can include gastritis, AVMs,  esophagitis, gastritis, new ulcers etc.     Plan:  -EGD today  -remain NPO for procedure  -maintain 2 large bore Ivs  -cont IV PPI BID   -trend H&H, transfuse for Hgb <7  -agree with oncology consult while inpatient

## 2024-12-06 NOTE — PHYSICAL THERAPY NOTE
PHYSICAL THERAPY EVALUATION          Patient Name: Jose Zamora  Today's Date: 12/6/2024 12/06/24 0934   PT Last Visit   PT Visit Date 12/06/24   Note Type   Note type Evaluation   Pain Assessment   Pain Assessment Tool 0-10   Pain Score No Pain   Restrictions/Precautions   Other Precautions Bed Alarm;Telemetry;Fall Risk   Home Living   Type of Home House   Home Layout One level;Stairs to enter with rails;Stairs to enter without rails   Bathroom Shower/Tub Tub/shower unit   Bathroom Toilet Standard   Bathroom Equipment Grab bars in shower;Shower chair   Home Equipment Walker  (x2 RW)   Additional Comments 3 LONI no HR in the front vs 3 LONI w HR alternate entrance   Prior Function   Level of [place] Independent with functional mobility;Independent with ADLs;Independent with IADLS   Lives With Spouse   IADLs Independent with driving;Independent with meal prep;Independent with medication management   Vocational Retired   Comments indep without an AD.   General   Additional Pertinent History pt admitted 12/5/24 for symptomatic anemia. PMHx significant for heart disease, T2DM, CKD, pancreatic tumor   Cognition   Overall Cognitive Status WFL   Arousal/Participation Cooperative   Orientation Level Oriented X4   Memory Within functional limits   Following Commands Follows all commands and directions without difficulty   Bed Mobility   Supine to Sit 6  Modified independent   Additional items HOB elevated;Increased time required   Sit to Supine 6  Modified independent   Additional items HOB elevated;Increased time required   Transfers   Sit to Stand 5  Supervision   Stand to Sit 5  Supervision   Ambulation/Elevation   Gait pattern Improper Weight shift;Short stride;Excessively slow   Gait Assistance 5  Supervision   Additional items Assist x 1   Assistive Device None   Distance about 250'   Stair Management Assistance 6  Modified independent   Additional items Assist x 1   Stair Management Technique Two  rails;Alternating pattern;Foreward   Number of Stairs 5   Ambulation/Elevation Additional Comments  steps   Balance   Static Standing Fair   Dynamic Standing Fair -   Ambulatory Fair -   Endurance Deficit   Endurance Deficit No   Activity Tolerance   Activity Tolerance Patient tolerated treatment well   Medical Staff Made Aware OT   Nurse Made Aware yes   Assessment   Prognosis Good   Assessment Jose Zamora is a 89 y.o. male admitted to [facility] on 12/5/2024 for Symptomatic anemia. PT was consulted and pt was seen on 12/6/2024 for mobility assessment and d/c planning. Pt presents w telemetry, fall risk. At baseline is indep without an AD. Pt is currently functioning at a mod I- S for bed mobility, transfers, ambulation and stair negotiation. Pt demonstrated ability to ambulate community distances and negotiate steps to simulate access to home environment. No acute deficits impacting function. No further acute PT needs identified. Pt will benefit from continued mobilization via nsg/restorative. The patient's AM-PAC Basic Mobility Inpatient Short Form Raw Score is 21. A Raw score of greater than 16 suggests the patient may benefit from discharge to home.   Barriers to Discharge None   Plan   PT Frequency   (d/c PT: maintain on restorative)   Discharge Recommendation   Rehab Resource Intensity Level, PT No post-acute rehabilitation needs   AM-PAC Basic Mobility Inpatient   Turning in Flat Bed Without Bedrails 4   Lying on Back to Sitting on Edge of Flat Bed Without Bedrails 4   Moving Bed to Chair 3   Standing Up From Chair Using Arms 3   Walk in Room 3   Climb 3-5 Stairs With Railing 4   Basic Mobility Inpatient Raw Score 21   Basic Mobility Standardized Score 45.55   [facility] Highest Level Of Mobility   JH-HLM Goal 6: Walk 10 steps or more   -HLM Achieved 8: Walk 250 feet ot more   End of Consult   Patient Position at End of Consult Supine;All needs within reach   History: co - morbidities including age,  current experience including fall risk  Exam: impairments in systems including multiple body structures involved; neuromuscular (balance, gait, transfers), cognition; activity limitations (difficulties executing an action); participation restrictions (problems associated w involvement in life situations)  Clinical: stable/unpredictable; abn labs, ongoing medical management  Complexity: moderate    Liss Barakat, PT

## 2024-12-06 NOTE — TELEPHONE ENCOUNTER
Patient seen in consultation for repeat urinary retention.  Currently Mccormack catheter has NOT been placed and urinary retention protocol is ongoing.  If Mccormack catheter is needed he will maintain it for 14 days and return to our office for trial of void.      Either way, patient should be scheduled for follow-up to discuss BPH type symptoms and worsening retention. Lives near [place] office. Thank you!

## 2024-12-06 NOTE — CONSULTS
Consultation - Gastroenterology   Name: Jose Zamora 89 y.o. male I MRN: [id number]  Unit/Bed#: E5 -01 I Date of Admission: 12/5/2024   Date of Service: 12/6/2024 I Hospital Day: 1       Inpatient consult to gastroenterology     Date/Time  12/6/2024 9:16 AM     Performed by  Sandie Nunez DO   Authorized by  KAREL Myo           Physician Requesting Evaluation: Bill Cavazos DO   Reason for Evaluation / Principal Problem: symptomatic anemia      Jose Zamora is a 89 y.o. 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   Assessment & Plan  Symptomatic anemia  Presenting with worsening weakness, tiredness, and loss of appetite.   Saw PCP earlier that morning on 12/5 who ordered blood work and sent him to [facility] for CTA PE study.    Vitals significant for tachycardia to 116.    Labs with BUN 58, creatinine 1.53, glucose 321, hemoglobin 5.6, WBC 10.65, and mild troponin elevation up to 172.   CT PE study showed no PE or acute pulmonary disease, redemonstration of cystic lesions in the pancreas and pancreatic head only partially imaged, and redemonstration of intrahepatic biliary dilation, distended gallbladder and left adrenal hyperplasia.    Case was discussed with Dr. Jaiyeola who recommended transfer to [facility].    Repeat labs showed further drop in hemoglobin to 4.8.  S/p 2 units PRBCs.  Had recent hospitalization from 10/15/24 to 10/20/24 for melena, fatigue, and weakness.  At that time he also had a hemoglobin of 4.5 and was found to have a mass in the head of the pancreas, anemia, and constipation.    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    EUS (10/17/24) showed heterogenous/hypoechoic mass (46 mm x 30 mm) with well-defined margins, CBD dilated to 10 mm and PD dilated  to 8 mm.  Biopsy showed pancreatic neuroendocrine tumor , WHO grade 2.    NM PET/CT which showed intense radiotracer uptake at the pancreatic head mass, no lesions suspicious for mets, and diffusely enlarged prostate.   Saw surgical oncology on 11/21/24, Dr. Rodriguez.  He recommended surgical resection with Whipple procedure.  Patient declined surgical intervention given his age, but was interested in trying somatostatin analogs.  Has not yet started this.  Reports feeling ok this am, still tired; having dark brown stools; abd exam benign, Hgb 6.5 from 5.8     Ddx: symptomatic anemia may be 2/2 chronic bleeding possibly from previous ulcer in ampullary region; other etiologies can include gastritis, AVMs, esophagitis, gastritis, new ulcers etc.     Plan:  -EGD today  -remain NPO for procedure  -maintain 2 large bore Ivs  -cont IV PPI BID   -trend H&H, transfuse for Hgb <7  -agree with oncology consult while inpatient     Primary pancreatic neuroendocrine tumor  EUS (10/17/24) showed heterogenous/hypoechoic mass (46 mm x 30 mm) with well-defined margins, CBD dilated to 10 mm and PD dilated to 8 mm.  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    NM PET/CT did not show mets  Saw surgical oncology on 11/21/24, Dr. Rodriguez, who recommended surgical resection with Whipple procedure.  Patient declined surgical intervention given his age, but was interested in trying somatostatin analogs.  Has not yet started this.  CA 19-9 45, CEA 3.6, CgA 847.8    Plan:  -will proceed with EGD as above  -oncology following while inpatient   -cont to follow with surg onc/med onc outpatient       HISTORY OF PRESENT ILLNESS      Jose Zamora is a 89 y.o. 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 Made an appointment with his primary care doctor on 12/5 who ordered  blood work and sent him to [place] for CTA PE study.  Upon arrival to [place], patient was afebrile and tachycardic to 116.  Labs were significant for BUN 58, creatinine 1.53, glucose 321, hemoglobin 5.6, WBC 10.65, and mild troponin elevation up to 172.  CT PE study showed no PE or acute pulmonary disease, redemonstration of cystic lesions in the pancreas and pancreatic head only partially imaged, and redemonstration of intrahepatic biliary dilation, distended gallbladder and left adrenal hyperplasia.  Case was discussed with Dr. Jaiyeola who recommended transfer to [facility].  Repeat labs showed further drop in hemoglobin to 4.8.  Patient received 1 unit pRBCs.     Had recent hospitalization from 10/15/24 to 10/20/24 for melena, fatigue, and weakness.  At that time he also had a hemoglobin of 4.5 and was found to have a mass in the head of the pancreas, anemia, and constipation.  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  EUS (10/17/24) showed heterogenous/hypoechoic mass (46 mm x 30 mm) with well-defined margins, CBD dilated to 10 mm and PD dilated to 8 mm.  Biopsy showed pancreatic neuroendocrine tumor , WHO grade 2.  Patient underwent NM PET/CT which showed intense radiotracer uptake at the pancreatic head mass, no lesions suspicious for mets, and diffusely enlarged prostate. Patient then saw surgical oncology on 11/21/24, Dr. Rodriguez.  He recommended surgical resection with Whipple procedure.  Patient declined surgical intervention given his age, but was interested in trying somatostatin analogs.  Has not yet started this.    Upon my evaluation, patient was seen and examined at bedside this morning.  He reports feeling well, apart from the lousiness.  Reports that in October he did see black stools prior to hospitalization, but this time he only saw dark brown, with the last stool being yesterday.  No other complaints  this morning.  Does endorse change in bowel habits over the last couple months.  He has been having more frequent stools, compared to his typical 1 a day.  It is some weight loss over the last few months.  Denies fevers, chills, nausea, vomiting, hematuria, hematochezia, melena, hematemesis, pruritus, or abdominal pain.        REVIEW OF SYSTEMS     CONSTITUTIONAL: Denies any fever, chills, rigors, +weight loss  HEENT: No earache or tinnitus, denies hearing loss or visual disturbances  CARDIOVASCULAR: No chest pain or palpitations   RESPIRATORY: Denies any cough, hemoptysis, shortness of breath or dyspnea on exertion  GASTROINTESTINAL: As noted in the History of Present Illness   GENITOURINARY: No problems with urination, denies any hematuria or dysuria  NEUROLOGIC: No dizziness or vertigo, denies headaches   MUSCULOSKELETAL: Denies any muscle or joint pain   SKIN: Denies skin rashes or itching   ENDOCRINE: Denies excessive thirst, denies intolerance to heat or cold  PSYCHOSOCIAL: Denies depression or anxiety, denies any recent memory loss     PAST MEDICAL & SURGICAL HISTORY      Past Medical History:   Diagnosis Date    Allergic     Anemia     Arthritis     Diabetes mellitus (HCC)     Disease of thyroid gland     Hyperlipemia     Hypertension        Past Surgical History:   Procedure Laterality Date    COLONOSCOPY      PROSTATE BIOPSY      needle,  resolved may 2005       MEDICATIONS & ALLERGIES       Medications:     Medications Prior to Admission:     amLODIPine (NORVASC) 5 mg tablet    atorvastatin (LIPITOR) 20 mg tablet    cholecalciferol (VITAMIN D3) 1,000 units tablet    fexofenadine (Allegra Allergy) 180 MG tablet    folic acid (FOLVITE) 1 mg tablet    levothyroxine 50 mcg tablet    metoprolol tartrate (LOPRESSOR) 25 mg tablet    montelukast (SINGULAIR) 10 mg tablet    Multiple Vitamins-Minerals (CENTRUM SILVER) tablet    ondansetron (ZOFRAN) 4 mg tablet    pantoprazole (PROTONIX) 40 mg tablet    Potassium 99  MG TABS    Saw Palmetto 160 MG CAPS    sulfaSALAzine (AZULFIDINE) 500 mg tablet    zinc gluconate 50 mg tablet    Current Facility-Administered Medications:     aluminum-magnesium hydroxide-simethicone (MAALOX) oral suspension 30 mL, Q6H PRN    amLODIPine (NORVASC) tablet 5 mg, Daily    atorvastatin (LIPITOR) tablet 20 mg, Daily With Dinner    folic acid (FOLVITE) tablet 1,000 mcg, Daily    insulin lispro (HumALOG/ADMELOG) 100 units/mL subcutaneous injection 1-5 Units, Q6H EMMANUELLE **AND** Fingerstick Glucose (POCT), Q6H    levothyroxine tablet 50 mcg, Early Morning    metoprolol tartrate (LOPRESSOR) partial tablet 12.5 mg, BID    ondansetron (ZOFRAN) injection 4 mg, Q6H PRN    pantoprazole (PROTONIX) injection 40 mg, BID    simethicone (MYLICON) chewable tablet 80 mg, 4x Daily PRN    Allergies:   Allergies   Allergen Reactions    Lisinopril     Candesartan Rash    Penicillins Rash       SOCIAL HISTORY      Social History     Marital Status: /Civil Union    Substance Use History:   Social History     Substance and Sexual Activity   Alcohol Use Not Currently    Comment: social/rarely     Social History     Tobacco Use   Smoking Status Former    Current packs/day: 1.00    Average packs/day: 1 pack/day for 54.0 years (54.0 ttl pk-yrs)    Types: Cigarettes    Start date: 12/4/1970   Smokeless Tobacco Never     Social History     Substance and Sexual Activity   Drug Use Never       FAMILY HISTORY      Family History   Problem Relation Age of Onset    No Known Problems Mother        PHYSICAL EXAM     Objective   Blood pressure 146/70, pulse 96, temperature 98 °F (36.7 °C), resp. rate 21, SpO2 96%. There is no height or weight on file to calculate BMI.    Intake/Output Summary (Last 24 hours) at 12/6/2024 0916  Last data filed at 12/6/2024 0715  Gross per 24 hour   Intake 381.25 ml   Output 1625 ml   Net -1243.75 ml       General Appearance:   Alert, cooperative, no distress   HEENT:   Normocephalic, atraumatic,  "anicteric     Neck:   Supple, symmetrical, trachea midline   Lungs:   Equal chest rise, respirations unlabored    Heart:   Regular rate and rhythm, +murmur   Abdomen:   Soft, non-tender, non-distended; normal bowel sounds; no masses, no organomegaly   Rectal:   Deferred    Extremities:   No cyanosis, clubbing or edema    Neuro:   Moves all 4 extremities    Skin:   No jaundice, rashes, or lesions      ADDITIONAL DATA     Lab Results:     Results from last 7 days   Lab Units 12/06/24  0510   WBC Thousand/uL 7.58   HEMOGLOBIN g/dL 6.5*   HEMATOCRIT % 21.0*   PLATELETS Thousands/uL 196   SEGS PCT % 70   LYMPHO PCT % 17   MONO PCT % 9   EOS PCT % 2     Results from last 7 days   Lab Units 12/06/24  0510 12/05/24  2113   POTASSIUM mmol/L 3.9 4.0   CHLORIDE mmol/L 116* 113*   CO2 mmol/L 19* 20*   BUN mg/dL 48* 50*   CREATININE mg/dL 1.51* 1.45*   CALCIUM mg/dL 8.1* 8.3*   ALK PHOS U/L  --  68   ALT U/L  --  16   AST U/L  --  15           Imaging:    CTA chest pe study  Result Date: 12/5/2024  Narrative: CTA - CHEST WITH IV CONTRAST - PULMONARY ANGIOGRAM INDICATION:   R07.89: Other chest pain. \"Chest tightness, hypoxia, EKG shows sinus tachycardia with heart rate of 104.\" Per my review of the medical record, neuroendocrine tumor in the pancreatic head. COMPARISON: PET/CT 11/07/2024, chest CT 10/19/2024. Abdomen CT 10/22/2024. TECHNIQUE: CT angiogram timed for optimal opacification of the pulmonary arteries.  Axial, sagittal, and coronal 2D reformats created from source data.  Coronal 3D MIP postprocessing on the acquisition scanner. Radiation dose length product (DLP):  344 mGy-cm .  Radiation dose exposure minimized using iterative reconstruction and automated exposure control. IV Contrast:  70 mL of iohexol (OMNIPAQUE) FINDINGS: PULMONARY ARTERIES:  No pulmonary embolus. LUNGS: No acute disease. Minimal juxtapleural reticulation in the right lower lobe, possibly age-related fibrosis. 3 mm posterior basal right lower lobe " nodule, of doubtful significance, likely a benign intrapulmonary lymph node (6/141). AIRWAYS: No significant filling defects. PLEURA:  Unremarkable. HEART/GREAT VESSELS: Normal heart size. Severe coronary artery calcification indicating atherosclerotic heart disease. Moderate calcification of the aortic valve leaflets which can contribute to aortic stenosis. MEDIASTINUM AND DELIA:  Unremarkable. CHEST WALL AND LOWER NECK: Unremarkable. UPPER ABDOMEN: Redemonstration of intrahepatic biliary dilation and dilation of the pancreatic duct with atrophy of the pancreas. Redemonstration of 1.8 cm cystic lesion in the neck of the pancreas (4/232) and 9 mm cystic lesion in the tail of the pancreas (4/215). Pancreatic head incompletely imaged with mass not conspicuous redemonstration of distended gallbladder with cholelithiasis. Right renal cyst. Left adrenal hyperplasia. OSSEOUS STRUCTURES: Moderate degenerative disease in the spine.     Impression: No pulmonary embolus. No acute pulmonary disease. Redemonstration of cystic lesions in the pancreas with pancreatic head only partially imaged and pancreatic head mass not visible Redemonstration of intrahepatic biliary dilation, distended gallbladder, and left adrenal hyperplasia. Workstation performed: [id number]     NM PET CT skull base to mid thigh  Result Date: 11/7/2024  Narrative: NETSPOT PET/CT SCAN INDICATION:   Newly diagnosed NET head of the pancreas. D3A.8: Other benign neuroendocrine tumors C25.0: Malignant neoplasm of head of pancreas MODIFIER: PI COMPARISON: CT abdomen pelvis 10/22/2024, CT chest 10/19/2024 and additional priors, MRI abdomen 10/16/2024 CELL TYPE: Pancreatic neuroendocrine tumor TECHNIQUE:   3.9 mCi Gallium-68 Dotatate Netspot administered IV. Multiplanar attenuation corrected and non-attenuation corrected PET images were acquired 60 minutes post injection. Contiguous, low dose, axial CT sections were obtained from the vertex through the femurs for  anatomic localization. Intravenous contrast was not utilized. FINDINGS: BRAIN:    Normal pituitary gland uptake is demonstrated.  No acute abnormalities are seen. HEAD/NECK:  There is a physiologic distribution of the radiotracer. Normal salivary gland and thyroid uptake is demonstrated. CT images:  Unremarkable. CHEST:   No suspicious Dotatate avid lesions. Mild hilar activity bilaterally, SUV max of 2.1 on the right likely reactive. CT images: Extensive coronary artery calcifications. Scattered pleural calcifications of the left. ABDOMEN: Intense focal radiotracer uptake at the level of the pancreatic head mass, SUV max of 21. This measured up to 4.7 cm in size on prior CT. Secondary biliary and pancreatic ductal dilatation. Asymmetrically increased radiotracer uptake in the left adrenal gland, SUV max of 16 compared to the right, SUV max of 12.0. Asymmetric adrenal hyperplasia suggested on the prior imaging which may explain these findings. There are no Dotatate avid lymph nodes. CT images: Gallbladder is distended with small gallstone dependently. Moderate fecal retention in the colon. Stable small isodense left renal lesion at the midpole posteriorly probably proteinaceous cyst. PELVIS: Diffusely enlarged prostate with heterogeneous uptake, SUV max of 9.0. No Dotatate avid lymph nodes. CT images: No additional significant findings. OSSEOUS STRUCTURES:   There is a physiologic distribution of the radiotracer. CT images: No significant findings.     Impression: 1. Intense radiotracer uptake at the pancreatic head mass compatible with the known neuroendocrine tumor. 2. No Dotatate avid lesions suspicious for metastasis. 3. Diffusely enlarged prostate with heterogeneous uptake may be related to prostatitis. Workstation performed: [id number]       EKG, Pathology, and Other Studies Reviewed on Admission:   EKG: Reviewed      Counseling / Coordination of Care Time: 30 total mins spent in consult. Greater than 50% of  total time spent on patient counseling and coordination of care.    Sandie Nunez DO  PGY-2  Internal Medicine  [facility]    ...............................................................................................................................................  ** Please Note: This note is constructed using a voice recognition dictation system. **

## 2024-12-06 NOTE — ANESTHESIA POSTPROCEDURE EVALUATION
Post-Op Assessment Note    CV Status:  Stable    Pain management: adequate       Mental Status:  Alert and awake   Hydration Status:  Euvolemic   PONV Controlled:  Controlled   Airway Patency:  Patent     Post Op Vitals Reviewed: Yes    No anethesia notable event occurred.    Staff: Anesthesiologist           Last Filed PACU Vitals:  Vitals Value Taken Time   Temp 98.2 °F (36.8 °C) 12/06/24 1520   Pulse 86 12/06/24 1520   /60 12/06/24 1520   Resp 15 12/06/24 1520   SpO2 96 % 12/06/24 1520       Modified Gen:  Activity: 2 (12/6/2024  3:25 PM)  Respiration: 2 (12/6/2024  3:25 PM)  Circulation: 1 (12/6/2024  3:25 PM)  Consciousness: 1 (12/6/2024  3:25 PM)  Oxygen Saturation: 2 (12/6/2024  3:25 PM)  Modified Gen Score: 8 (12/6/2024  3:25 PM)

## 2024-12-06 NOTE — CONSULTS
Consult - Urology   Jose Zamora 1935, 89 y.o. male MRN: [id number]    Unit/Bed#: E5 -01 Encounter: [id number]      Elevated troponin  Assessment & Plan  CTA negative for PE, no acute cardiopulmonary disease  Troponin 93--> 101--> 131. 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)  Assessment & Plan  Lab Results   Component Value Date    EGFR 40 12/06/2024    EGFR 42 12/05/2024    EGFR 37 12/05/2024    CREATININE 1.51 (H) 12/06/2024    CREATININE 1.45 (H) 12/05/2024    CREATININE 1.62 (H) 12/05/2024   Creatinine remains overall stable despite incomplete bladder emptying   Denies flank pain   Continue rentention protocol and place dumont catheter if warranted to avoid further kidney damage    Urinary retention  Assessment & Plan  Prior episode during last admission October 2024 requiring dumont insertion   Successful trial of void   MRI of the prostate (June 2019) revealed the prostate is significantly enlarged--134 mL at that time  Etiology multifactorial -- BPH, neurogenic bladder, body stress 2/2 anemia   Presented to [facility] ED 12/5 with ches pain and SOB  Bladder scan > 1L discovered per nursing   Straight cath x1 -- 1000 ml output   Continues urinating small amounts independently   Patient feels as if his urinary symptoms (hesitancy, weak stream, incomplete emptying) have gotten worse over the last 5 years   Denies symptoms of UTI   UA negative for infection      Plan:  Continue urinary retention protocol + intermittent bladder scans  If repeat straight cath is needed, recommend indwelling dumont is placed to decrease infection risk   If dumont is inserted, maintain for 14 days and return to the office for TOV  Recommend initiating the patient on Flomax 0.4 mg daily and finasteride 5 mg daily to ensure he tolerates both  medications prior to discharge  Should continue in outpatient setting and follow up with our office for further discussion   Urology will sign off but remain available for further recommendations     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)  Assessment & Plan  Lab Results   Component Value Date    HGBA1C 7.1 (H) 10/15/2024       Recent Labs     12/06/24  0019 12/06/24  0305 12/06/24  0556 12/06/24  0747   POCGLU 169* 149* 142* 145*       Blood Sugar Average: Last 72 hrs:  (P) 151.25  Component of neurogenic bladder should be considered if retention worsens      Subjective:   Jose is an 89-year-old male with PMX of pancreatic neuroendocrine tumor and type 2 diabetes -- who presented to [facility] ED 12/5 with chest pain and SOB.  At presentation he is acutely anemic with an Hgb of 4.8, 2 units of PRBC transfused.  Troponins returned elevated, likely secondary to anemia.  Patient admitted for ongoing intervention under internal medicine service. Urology was consulted soon after admission as nursing noted bladder scan greater than 1 L.  According to chart review, during previous admission in October 2024 patient also experienced urinary retention.  He followed with our office in the past, last seen in June 2019, for elevated PSA. mpMRI of the prostate returned as a PI-RADS category 2 score and the prostate was measured at 134 mL -- he did not return to the office for follow-up.    UA does not appear infected, he denies symptoms of UTI. Creatinine remains overall stable at 1.51.  Today in consultation the patient appears well, sitting in bedside comfortably in no acute distress.  He reports his urinary pattern getting worse over the last 5 years and he has been meaning to set up further appointments with urology to discuss further intervention.  There has been multiple times where he feels as if he is not emptying completely, but denies pain in the bladder and or bilateral flanks.  Previous MRI of the  prostate revealed it is significantly enlarged, recommend initiating the patient on Flomax and finasteride while inpatient to see if he tolerates the medications, patient is agreeable.     Nursing performed straight catheterization late last night for greater than 1 L of urine return. Since then, Jose feels as if he is emptying much better. Bladder scan following straight catheterization was 312 mL.  The patient has been urinating small amounts independently since (150-300cc).  Urinary retention protocol ongoing.  If dumont catheter is replaced, recommend maintaining for 14 days before repeating trial of void. No further  intervention is needed at this time, we will sign off.     Urology will sign off but remain available for any further inpatient needs. Please feel free to contact the provider currently covering the Urology Epic Chat role for this campus with questions or concerns.    Review of Systems   Constitutional:  Negative for activity change, chills, fatigue and fever.   Respiratory:  Negative for apnea, cough and shortness of breath.    Cardiovascular:  Negative for chest pain and leg swelling.   Gastrointestinal:  Negative for abdominal distention, abdominal pain, constipation, diarrhea, nausea and vomiting.   Genitourinary:  Positive for difficulty urinating. Negative for decreased urine volume, dysuria, flank pain, frequency, hematuria, penile pain, testicular pain and urgency.   Neurological:  Negative for dizziness and headaches.   Psychiatric/Behavioral: Negative.         Objective:  Vitals: Blood pressure 149/72, pulse 99, temperature 98 °F (36.7 °C), resp. rate 21, SpO2 97%.,There is no height or weight on file to calculate BMI.    Physical Exam  Vitals and nursing note reviewed.   Constitutional:       General: He is not in acute distress.     Appearance: Normal appearance. He is not ill-appearing.   HENT:      Head: Normocephalic and atraumatic.      Mouth/Throat:      Pharynx: Oropharynx is  "clear.   Eyes:      Extraocular Movements: Extraocular movements intact.      Conjunctiva/sclera: Conjunctivae normal.   Cardiovascular:      Rate and Rhythm: Normal rate.   Pulmonary:      Effort: Pulmonary effort is normal.   Abdominal:      General: Abdomen is flat. There is no distension.      Palpations: Abdomen is soft.      Tenderness: There is no abdominal tenderness. There is no right CVA tenderness or left CVA tenderness.   Musculoskeletal:         General: Normal range of motion.      Cervical back: Normal range of motion.   Skin:     General: Skin is warm and dry.   Neurological:      General: No focal deficit present.      Mental Status: He is alert and oriented to person, place, and time.   Psychiatric:         Mood and Affect: Mood normal.         Behavior: Behavior normal.         Imaging:    CTA - CHEST WITH IV CONTRAST - PULMONARY ANGIOGRAM    INDICATION:   R07.89: Other chest pain. \"Chest tightness, hypoxia, EKG shows sinus tachycardia with heart rate of 104.\" Per my review of the medical record, neuroendocrine tumor in the pancreatic head.    COMPARISON: PET/CT 11/07/2024, chest CT 10/19/2024. Abdomen CT 10/22/2024.    TECHNIQUE: CT angiogram timed for optimal opacification of the pulmonary arteries.  Axial, sagittal, and coronal 2D reformats created from source data.  Coronal 3D MIP postprocessing on the acquisition scanner.    Radiation dose length product (DLP):  344 mGy-cm .  Radiation dose exposure minimized using iterative reconstruction and automated exposure control.    IV Contrast:  70 mL of iohexol (OMNIPAQUE)    FINDINGS:    PULMONARY ARTERIES:  No pulmonary embolus.    LUNGS: No acute disease. Minimal juxtapleural reticulation in the right lower lobe, possibly age-related fibrosis. 3 mm posterior basal right lower lobe nodule, of doubtful significance, likely a benign intrapulmonary lymph node (6/141).    AIRWAYS: No significant filling defects.    PLEURA:  " Unremarkable.    HEART/GREAT VESSELS: Normal heart size. Severe coronary artery calcification indicating atherosclerotic heart disease. Moderate calcification of the aortic valve leaflets which can contribute to aortic stenosis.    MEDIASTINUM AND DELIA:  Unremarkable.    CHEST WALL AND LOWER NECK: Unremarkable.    UPPER ABDOMEN: Redemonstration of intrahepatic biliary dilation and dilation of the pancreatic duct with atrophy of the pancreas. Redemonstration of 1.8 cm cystic lesion in the neck of the pancreas (4/232) and 9 mm cystic lesion in the tail of the  pancreas (4/215). Pancreatic head incompletely imaged with mass not conspicuous redemonstration of distended gallbladder with cholelithiasis. Right renal cyst. Left adrenal hyperplasia.    OSSEOUS STRUCTURES: Moderate degenerative disease in the spine.   Impression:       No pulmonary embolus.    No acute pulmonary disease.    Redemonstration of cystic lesions in the pancreas with pancreatic head only partially imaged and pancreatic head mass not visible    Redemonstration of intrahepatic biliary dilation, distended gallbladder, and left adrenal hyperplasia.     Imaging reviewed - both report and images personally reviewed.     Labs:  Recent Labs     12/05/24  1244 12/05/24  1418 12/05/24 2113 12/06/24  0510   WBC 10.65* 10.40* 9.32 7.58     Recent Labs     12/05/24  1244 12/05/24  1418 12/05/24  2113 12/06/24  0510   HGB 5.6* 4.8* 5.8* 6.5*       Recent Labs     12/05/24  1244 12/05/24  1418 12/05/24 2113 12/06/24  0510   CREATININE 1.53* 1.62* 1.45* 1.51*       History:  Social History     Socioeconomic History    Marital status: /Civil Union     Spouse name: Not on file    Number of children: Not on file    Years of education: Not on file    Highest education level: Not on file   Occupational History    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[employer]   Tobacco Use    Smoking status: Former     Current packs/day: 1.00     Average packs/day: 1 pack/day for 54.0  years (54.0 ttl pk-yrs)     Types: Cigarettes     Start date: 1970    Smokeless tobacco: Never   Vaping Use    Vaping status: Never Used   Substance and Sexual Activity    Alcohol use: Not Currently     Comment: social/rarely    Drug use: Never    Sexual activity: Not Currently   Other Topics Concern    Not on file   Social History Narrative    Not on file     Social Drivers of Health     Financial Resource Strain: Low Risk  (2023)    Overall Financial Resource Strain (CARDIA)     Difficulty of Paying Living Expenses: Not hard at all   Food Insecurity: No Food Insecurity (2024)    Nursing - Inadequate Food Risk Classification     Worried About Running Out of Food in the Last Year: Never true     Ran Out of Food in the Last Year: Never true     Ran Out of Food in the Last Year: 1   Transportation Needs: No Transportation Needs (2024)    Nursing - Transportation Risk Classification     Lack of Transportation: Not on file     Lack of Transportation: 2   Physical Activity: Not on file   Stress: Not on file   Social Connections: Not on file   Intimate Partner Violence: Unknown (2024)    Nursing IPS     Feels Physically and Emotionally Safe: Not on file     Physically Hurt by Someone: Not on file     Humiliated or Emotionally Abused by Someone: Not on file     Physically Hurt by Someone: 2     Hurt or Threatened by Someone: 2   Housing Stability: Unknown (2024)    Nursing: Inadequate Housing Risk Classification     Has Housing: Not on file     Worried About Losing Housing: Not on file     Unable to Get Utilities: Not on file     Unable to Pay for Housing in the Last Year: 2     Has Housin       Past Medical History:   Diagnosis Date    Allergic     Anemia     Arthritis     Diabetes mellitus (HCC)     Disease of thyroid gland     Hyperlipemia     Hypertension      Past Surgical History:   Procedure Laterality Date    COLONOSCOPY      PROSTATE BIOPSY      needle,  resolved may 2005      Family History   Problem Relation Age of Onset    No Known Problems Mother        Lori Dunbar PA-C  Date: 12/6/2024 Time: 11:16 AM

## 2024-12-06 NOTE — ASSESSMENT & PLAN NOTE
"Lab Results   Component Value Date    EGFR 42 12/05/2024    EGFR 37 12/05/2024    EGFR 46 10/28/2024    CREATININE 1.45 (H) 12/05/2024    CREATININE 1.62 (H) 12/05/2024    CREATININE 1.34 (H) 10/28/2024     CKD 3 at baseline.  Follows with  nephrology  Monitor creatinine closely, patient had PAWEL during previous admission in October.  Per nephrology, \"PAWEL in the setting of symptomatic severe anemia with a hemoglobin of 4.7 on admission as well as loss of autoregulation due to ARB use as well as diuretic use.\"  "

## 2024-12-06 NOTE — ASSESSMENT & PLAN NOTE
Notified by RN, bladder scan 1000 mL  During previous admission in Oct patient had urinary retention  Add Urinary retention protocol. Monitor I/O  UA noninfectious  Urology consulted, follow-up recommendations

## 2024-12-06 NOTE — ASSESSMENT & PLAN NOTE
"Lab Results   Component Value Date    HGBA1C 7.1 (H) 10/15/2024     Diet-controlled DM. Add accuchecks and sliding-scale insulin while inpatient    No results for input(s): \"POCGLU\" in the last 72 hours.    Blood Sugar Average: Last 72 hrs:      "

## 2024-12-06 NOTE — ASSESSMENT & PLAN NOTE
Presented to [place] ED due to complaint of exertional chest pain  On arrival noted to have severe anemia with hemoglobin 4.8, transfused 1 unit PRBCs and then transferred to [facility] for GI availability  Recent admission 10/15-10/20 for upper GI bleed, s/p EGD and EUS 10/16 notable for a large pancreatic head mass extending to the ampulla with evidence of this lesion bleeding through the ampulla which appeared to be stretched by the tumor, received 4 units PRBC during this admission  Started on IV PPI twice daily  12/6 hemoglobin remains low at 6.5, transfuse an additional 1 unit PRBCs  Continue to monitor CBC and transfuse for hemoglobin less than 7  GI consulted, planning for EGD this afternoon

## 2024-12-06 NOTE — ASSESSMENT & PLAN NOTE
Lab Results   Component Value Date    HGBA1C 7.1 (H) 10/15/2024       Recent Labs     12/06/24  0305 12/06/24  0556 12/06/24  0747 12/06/24  1201   POCGLU 149* 142* 145* 136     Controlled without medications  SSI while inpatient, carb controlled diet

## 2024-12-06 NOTE — CONSULTS
e-Consult ([facility]) - Oncology-Medical   Name: Jose Zamora 89 y.o. male I MRN: [id number]  Unit/Bed#: E5 -01 I Date of Admission: 12/5/2024   Date of Service: 12/6/2024 I Hospital Day: 1   Inpatient consult to Oncology  Consult performed by: Blanca Demarco MD  Consult ordered by: Magdalene Vazquez MD        Physician Requesting Evaluation: Bill Cavazos DO   Reason for Evaluation / Principal Problem: Severe anemia, upper GI bleeding, well-known diagnosis of well-differentiated neuroendocrine tumor of the pancreas.      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  The patient had prior hospitalization with weight loss and upper GI bleeding.  Dotatate PET CT scan on 11/7/2024 did not reveal any obvious hint of metastatic disease of the pancreatic well-differentiated neuroendocrine tumor.  He was admitted at this point with a very low hemoglobin level of 4.8 g which seems to be related to upper GI bleeding.  Posttransfusion his hemoglobin level today is 6.5 g.    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  He does not seem to be a surgical candidate according to Dr. Rodriguez from the surgical oncology team who saw him on 11/21/2024.  I think he would be definitely a candidate for somatostatin L a lot like lanreotide on every 4-week basis.  Lutathera could be also tried in the future if he remains stable.  The upper GI bleeding needs to be evaluated by the GI team with upper endoscopy to see if there is a source of bleeding from the gastric region.  From medical oncology standpoint, this is usually an indolent type of malignancy.  He will need to follow-up with his primary oncologist for evaluation and treatment discussion as an outpatient as soon as he gets  discharge.  Regarding his anemia, he will need to be transfused and as needed basis to keep his hemoglobin level above 7 g.  Please, consider getting the palliative care team on board as an inpatient and outpatient for symptom management.       21-30 minutes, >50% of the total time devoted to medical consultative verbal/EMR discussion between providers. Written report will be generated in the EMR.

## 2024-12-07 ENCOUNTER — HOSPITAL ENCOUNTER (INPATIENT)
Facility: HOSPITAL | Age: 89
LOS: 4 days | Discharge: HOME HEALTH | DRG: 436 | End: 2024-12-11
Attending: HOSPITALIST | Admitting: HOSPITALIST
Payer: MEDICARE

## 2024-12-07 VITALS
HEART RATE: 94 BPM | DIASTOLIC BLOOD PRESSURE: 76 MMHG | SYSTOLIC BLOOD PRESSURE: 148 MMHG | RESPIRATION RATE: 15 BRPM | TEMPERATURE: 98.1 F | OXYGEN SATURATION: 99 %

## 2024-12-07 DIAGNOSIS — R33.9 URINARY RETENTION: ICD-10-CM

## 2024-12-07 DIAGNOSIS — D3A.8 PRIMARY PANCREATIC NEUROENDOCRINE TUMOR: ICD-10-CM

## 2024-12-07 DIAGNOSIS — K86.89 PANCREATIC MASS: Primary | ICD-10-CM

## 2024-12-07 PROBLEM — D62 ACUTE BLOOD LOSS ANEMIA: Status: ACTIVE | Noted: 2017-08-09

## 2024-12-07 PROBLEM — E87.20 METABOLIC ACIDOSIS: Status: ACTIVE | Noted: 2024-12-07

## 2024-12-07 LAB
ABO GROUP BLD BPU: NORMAL
ANION GAP SERPL CALCULATED.3IONS-SCNC: 8 MMOL/L (ref 4–13)
BPU ID: NORMAL
BUN SERPL-MCNC: 41 MG/DL (ref 5–25)
CALCIUM SERPL-MCNC: 8 MG/DL (ref 8.4–10.2)
CHLORIDE SERPL-SCNC: 116 MMOL/L (ref 96–108)
CO2 SERPL-SCNC: 19 MMOL/L (ref 21–32)
CREAT SERPL-MCNC: 1.54 MG/DL (ref 0.6–1.3)
CROSSMATCH: NORMAL
ERYTHROCYTE [DISTWIDTH] IN BLOOD BY AUTOMATED COUNT: 19.1 % (ref 11.6–15.1)
GFR SERPL CREATININE-BSD FRML MDRD: 39 ML/MIN/1.73SQ M
GLUCOSE SERPL-MCNC: 130 MG/DL (ref 65–140)
GLUCOSE SERPL-MCNC: 147 MG/DL (ref 65–140)
GLUCOSE SERPL-MCNC: 159 MG/DL (ref 65–140)
GLUCOSE SERPL-MCNC: 209 MG/DL (ref 65–140)
GLUCOSE SERPL-MCNC: 221 MG/DL (ref 65–140)
HCT VFR BLD AUTO: 24.4 % (ref 36.5–49.3)
HCT VFR BLD AUTO: 30 % (ref 36.5–49.3)
HGB BLD-MCNC: 7.7 G/DL (ref 12–17)
HGB BLD-MCNC: 9.3 G/DL (ref 12–17)
MCH RBC QN AUTO: 24.9 PG (ref 26.8–34.3)
MCHC RBC AUTO-ENTMCNC: 31.6 G/DL (ref 31.4–37.4)
MCV RBC AUTO: 79 FL (ref 82–98)
PLATELET # BLD AUTO: 192 THOUSANDS/UL (ref 149–390)
PMV BLD AUTO: 10.8 FL (ref 8.9–12.7)
POTASSIUM SERPL-SCNC: 3.8 MMOL/L (ref 3.5–5.3)
RBC # BLD AUTO: 3.09 MILLION/UL (ref 3.88–5.62)
SODIUM SERPL-SCNC: 143 MMOL/L (ref 135–147)
UNIT DISPENSE STATUS: NORMAL
UNIT PRODUCT CODE: NORMAL
UNIT PRODUCT VOLUME: 350 ML
UNIT RH: NORMAL
WBC # BLD AUTO: 7.35 THOUSAND/UL (ref 4.31–10.16)

## 2024-12-07 PROCEDURE — 85018 HEMOGLOBIN: CPT | Performed by: INTERNAL MEDICINE

## 2024-12-07 PROCEDURE — 99222 1ST HOSP IP/OBS MODERATE 55: CPT | Performed by: INTERNAL MEDICINE

## 2024-12-07 PROCEDURE — 80048 BASIC METABOLIC PNL TOTAL CA: CPT | Performed by: INTERNAL MEDICINE

## 2024-12-07 PROCEDURE — 99232 SBSQ HOSP IP/OBS MODERATE 35: CPT | Performed by: INTERNAL MEDICINE

## 2024-12-07 PROCEDURE — 99222 1ST HOSP IP/OBS MODERATE 55: CPT | Performed by: STUDENT IN AN ORGANIZED HEALTH CARE EDUCATION/TRAINING PROGRAM

## 2024-12-07 PROCEDURE — 85014 HEMATOCRIT: CPT | Performed by: INTERNAL MEDICINE

## 2024-12-07 PROCEDURE — 82948 REAGENT STRIP/BLOOD GLUCOSE: CPT

## 2024-12-07 PROCEDURE — 85027 COMPLETE CBC AUTOMATED: CPT | Performed by: INTERNAL MEDICINE

## 2024-12-07 PROCEDURE — 99239 HOSP IP/OBS DSCHRG MGMT >30: CPT | Performed by: INTERNAL MEDICINE

## 2024-12-07 RX ORDER — FINASTERIDE 5 MG/1
5 TABLET, FILM COATED ORAL DAILY
Status: DISCONTINUED | OUTPATIENT
Start: 2024-12-08 | End: 2024-12-11 | Stop reason: HOSPADM

## 2024-12-07 RX ORDER — PANTOPRAZOLE SODIUM 40 MG/1
40 TABLET, DELAYED RELEASE ORAL
Status: DISCONTINUED | OUTPATIENT
Start: 2024-12-08 | End: 2024-12-11 | Stop reason: HOSPADM

## 2024-12-07 RX ORDER — SODIUM BICARBONATE 650 MG/1
650 TABLET ORAL
Status: DISCONTINUED | OUTPATIENT
Start: 2024-12-07 | End: 2024-12-07 | Stop reason: HOSPADM

## 2024-12-07 RX ORDER — ATORVASTATIN CALCIUM 20 MG/1
20 TABLET, FILM COATED ORAL
Status: DISCONTINUED | OUTPATIENT
Start: 2024-12-08 | End: 2024-12-11 | Stop reason: HOSPADM

## 2024-12-07 RX ORDER — INSULIN LISPRO 100 [IU]/ML
1-5 INJECTION, SOLUTION INTRAVENOUS; SUBCUTANEOUS
Status: CANCELLED | OUTPATIENT
Start: 2024-12-07

## 2024-12-07 RX ORDER — AMLODIPINE BESYLATE 5 MG/1
5 TABLET ORAL DAILY
Status: DISCONTINUED | OUTPATIENT
Start: 2024-12-08 | End: 2024-12-11 | Stop reason: HOSPADM

## 2024-12-07 RX ORDER — ONDANSETRON 2 MG/ML
4 INJECTION INTRAMUSCULAR; INTRAVENOUS EVERY 6 HOURS PRN
Status: DISCONTINUED | OUTPATIENT
Start: 2024-12-07 | End: 2024-12-11 | Stop reason: HOSPADM

## 2024-12-07 RX ORDER — LEVOTHYROXINE SODIUM 50 UG/1
50 TABLET ORAL
Status: DISCONTINUED | OUTPATIENT
Start: 2024-12-08 | End: 2024-12-11 | Stop reason: HOSPADM

## 2024-12-07 RX ORDER — PANTOPRAZOLE SODIUM 40 MG/1
40 TABLET, DELAYED RELEASE ORAL
Status: CANCELLED | OUTPATIENT
Start: 2024-12-07

## 2024-12-07 RX ORDER — MAGNESIUM HYDROXIDE/ALUMINUM HYDROXICE/SIMETHICONE 120; 1200; 1200 MG/30ML; MG/30ML; MG/30ML
30 SUSPENSION ORAL EVERY 6 HOURS PRN
Status: CANCELLED | OUTPATIENT
Start: 2024-12-07

## 2024-12-07 RX ORDER — INSULIN LISPRO 100 [IU]/ML
1-5 INJECTION, SOLUTION INTRAVENOUS; SUBCUTANEOUS
Status: DISCONTINUED | OUTPATIENT
Start: 2024-12-07 | End: 2024-12-11 | Stop reason: HOSPADM

## 2024-12-07 RX ORDER — TAMSULOSIN HYDROCHLORIDE 0.4 MG/1
0.4 CAPSULE ORAL
Status: CANCELLED | OUTPATIENT
Start: 2024-12-07

## 2024-12-07 RX ORDER — LEVOTHYROXINE SODIUM 50 UG/1
50 TABLET ORAL
Status: CANCELLED | OUTPATIENT
Start: 2024-12-08

## 2024-12-07 RX ORDER — PANTOPRAZOLE SODIUM 40 MG/1
40 TABLET, DELAYED RELEASE ORAL
Status: DISCONTINUED | OUTPATIENT
Start: 2024-12-07 | End: 2024-12-07 | Stop reason: HOSPADM

## 2024-12-07 RX ORDER — FOLIC ACID 1 MG/1
1000 TABLET ORAL DAILY
Status: DISCONTINUED | OUTPATIENT
Start: 2024-12-08 | End: 2024-12-11 | Stop reason: HOSPADM

## 2024-12-07 RX ORDER — SIMETHICONE 80 MG
80 TABLET,CHEWABLE ORAL 4 TIMES DAILY PRN
Status: DISCONTINUED | OUTPATIENT
Start: 2024-12-07 | End: 2024-12-11 | Stop reason: HOSPADM

## 2024-12-07 RX ORDER — SIMETHICONE 80 MG
80 TABLET,CHEWABLE ORAL 4 TIMES DAILY PRN
Status: CANCELLED | OUTPATIENT
Start: 2024-12-07

## 2024-12-07 RX ORDER — AMLODIPINE BESYLATE 5 MG/1
5 TABLET ORAL DAILY
Status: CANCELLED | OUTPATIENT
Start: 2024-12-08

## 2024-12-07 RX ORDER — SODIUM BICARBONATE 650 MG/1
650 TABLET ORAL
Status: DISCONTINUED | OUTPATIENT
Start: 2024-12-08 | End: 2024-12-11 | Stop reason: HOSPADM

## 2024-12-07 RX ORDER — FOLIC ACID 1 MG/1
1000 TABLET ORAL DAILY
Status: CANCELLED | OUTPATIENT
Start: 2024-12-08

## 2024-12-07 RX ORDER — MAGNESIUM HYDROXIDE/ALUMINUM HYDROXICE/SIMETHICONE 120; 1200; 1200 MG/30ML; MG/30ML; MG/30ML
30 SUSPENSION ORAL EVERY 6 HOURS PRN
Status: DISCONTINUED | OUTPATIENT
Start: 2024-12-07 | End: 2024-12-11 | Stop reason: HOSPADM

## 2024-12-07 RX ORDER — FINASTERIDE 5 MG/1
5 TABLET, FILM COATED ORAL DAILY
Status: CANCELLED | OUTPATIENT
Start: 2024-12-08

## 2024-12-07 RX ORDER — ONDANSETRON 2 MG/ML
4 INJECTION INTRAMUSCULAR; INTRAVENOUS EVERY 6 HOURS PRN
Status: CANCELLED | OUTPATIENT
Start: 2024-12-07

## 2024-12-07 RX ORDER — TAMSULOSIN HYDROCHLORIDE 0.4 MG/1
0.4 CAPSULE ORAL
Status: DISCONTINUED | OUTPATIENT
Start: 2024-12-08 | End: 2024-12-11 | Stop reason: HOSPADM

## 2024-12-07 RX ORDER — ATORVASTATIN CALCIUM 20 MG/1
20 TABLET, FILM COATED ORAL
Status: CANCELLED | OUTPATIENT
Start: 2024-12-07

## 2024-12-07 RX ORDER — SODIUM BICARBONATE 650 MG/1
650 TABLET ORAL
Status: CANCELLED | OUTPATIENT
Start: 2024-12-07

## 2024-12-07 RX ADMIN — LEVOTHYROXINE SODIUM 50 MCG: 50 TABLET ORAL at 05:40

## 2024-12-07 RX ADMIN — INSULIN LISPRO 1 UNITS: 100 INJECTION, SOLUTION INTRAVENOUS; SUBCUTANEOUS at 16:56

## 2024-12-07 RX ADMIN — PANTOPRAZOLE SODIUM 40 MG: 40 INJECTION, POWDER, FOR SOLUTION INTRAVENOUS at 09:55

## 2024-12-07 RX ADMIN — FINASTERIDE 5 MG: 5 TABLET, FILM COATED ORAL at 09:56

## 2024-12-07 RX ADMIN — INSULIN LISPRO 1 UNITS: 100 INJECTION, SOLUTION INTRAVENOUS; SUBCUTANEOUS at 21:14

## 2024-12-07 RX ADMIN — SODIUM BICARBONATE 650 MG TABLET 650 MG: at 09:56

## 2024-12-07 RX ADMIN — INSULIN LISPRO 2 UNITS: 100 INJECTION, SOLUTION INTRAVENOUS; SUBCUTANEOUS at 12:23

## 2024-12-07 RX ADMIN — FOLIC ACID 1000 MCG: 1 TABLET ORAL at 09:56

## 2024-12-07 RX ADMIN — ATORVASTATIN CALCIUM 20 MG: 20 TABLET, FILM COATED ORAL at 16:55

## 2024-12-07 RX ADMIN — Medication 12.5 MG: at 09:56

## 2024-12-07 RX ADMIN — AMLODIPINE BESYLATE 5 MG: 5 TABLET ORAL at 09:56

## 2024-12-07 RX ADMIN — Medication 12.5 MG: at 20:30

## 2024-12-07 RX ADMIN — TAMSULOSIN HYDROCHLORIDE 0.4 MG: 0.4 CAPSULE ORAL at 16:55

## 2024-12-07 RX ADMIN — PANTOPRAZOLE SODIUM 40 MG: 40 TABLET, DELAYED RELEASE ORAL at 16:56

## 2024-12-07 RX ADMIN — SODIUM BICARBONATE 650 MG TABLET 650 MG: at 16:55

## 2024-12-07 NOTE — ASSESSMENT & PLAN NOTE
Presented to [place] ED due to complaint of exertional chest pain  On arrival noted to have severe anemia with hemoglobin 4.8,  then transferred to [facility] for GI availability  Recent admission 10/15-10/20 for upper GI bleed, s/p EGD and EUS 10/16 notable for a large pancreatic head mass extending to the ampulla with evidence of this lesion bleeding through the ampulla which appeared to be stretched by the tumor  Patient received 3 units packed red blood cell on admission  He underwent EGD which showed small clean-based ulcer and large ulcer related to pancreatic head mass erosion  GI recommends continue PPI twice daily  Avoid AP/AC/DVT prophylaxis  Trend hemoglobin transfuse to maintain greater than 7  Monitor bowel movements

## 2024-12-07 NOTE — CONSULTS
Consultation - Surgery-General   Name: Jose Zamora 89 y.o. male I MRN: [id number]  Unit/Bed#: E5 -01 I Date of Admission: 12/5/2024   Date of Service: 12/6/2024 I Hospital Day: 1   Inpatient Consult to Surgical Oncology  Consult performed by: Gaurav Yeh MD  Consult ordered by: Bill Cavazos DO        Physician Requesting Evaluation: Bill Cavazos DO   Reason for Evaluation / Principal Problem: surgical options- Whipple consideration    Assessment & Plan  Primary pancreatic neuroendocrine tumor  89 yo M with known 4cm pancreatic neuroendocrine tumor, admitted for recurrent GI bleed in setting of duodenal ulcer, likely 2/2 tumor erosion.    -recommend GOC with family present  -Consider palliative care consult  -can consider palliative radiation for repeat bleed  -reviewed and discussed risks or his decision including ongoing GI bleed, recurrence etc  -GI bleed management per GI at this time, though appears since EGD no active bleed though sequelae of recent bleed, repeat EGD per GI  -Recommend patient follow-up with medical oncology, recommend initiation of Ocreotide  --can follow up outpt with Dr. Rodriguez for further surgical discussion  -rest of care per primary      History of Present Illness   Jose Zamora is a 89 y.o. male w/ history of T2DM, CAD s/p LAD IRIS 2005, HTN, CKD, urinary retention, symptomatic anemia and known history of a 4 cm intermediate grade (Ki-27 20%) neuroendocrine tumor in the head of the pancreas (stage II) without evidence of metastasis on CT, MRI or dotatate PET. Of note patient was seen by Dr. Rodriguez on 11/21 and patient was offered surgical intervention in the form of Whipple procedure.  The risks and benefits of the procedure were discussed with the patient.  Patient does at that time declined surgical intervention and recommended again surgical intervention should the patient develop pain increase size of his lesion or cause recurrent GI bleeding.  The patient was set  up to see medical oncology in the outpatient setting. On 12/5 the patient presented to his PCP with complaints of chest discomfort, chest tightness with ambulation, shortness of breath and decreased appetite.  The patient had persistent chest pain shortness of breath and so on 12/5 he presented to emergency department.  The patient had a CT PE study which did not reveal significant findings, but blood work significant for acute on chronic anemia with hemoglobin of 4.8 patient received 1 unit of PRBCs while in the ED and was then transferred to [facility].  Of note patient did have recent admission on 10/24 where he was found to have severe anemia in the setting of upper GI bleed for which he did receive 4 units of PRBCs.  The patient received 2 units of PRBCs on 12/6 and had a posttransfusion hemoglobin of 6.5.  Additional follow-up hemoglobin letitia to 8.9 On 12/6 pt underwent EGD which noted to have John class III classification duodenal ulcer without active bleeding low suspicion for duodenal ulcer secondary to tumor erosion.  Surgical oncology is being consulted to reengage for the possibility of a Whipple procedure. Pt denies n/v julius PO. No pain. No reports of additional melena or hematochezia. Feels well. We discussed the risks of non-operative management given the recurrent GI bleed. We discussed that recurrence is likely and could potentially become life threatening. The patient understood and used state back to accurately describe the risks of this. He also expressed that given his age, a complex surgery which in this case would be a whipple would not be his preference. He instead would elect non-operative interventions.    Review of Systems  I have reviewed the patient's PMH, PSH, Social History, Family History, Meds, and Allergies    Objective :  Temp:  [97.2 °F (36.2 °C)-98.2 °F (36.8 °C)] 98.2 °F (36.8 °C)  HR:  [] 99  BP: (120-189)/(56-86) 142/65  Resp:  [15-22] 15  SpO2:  [96 %-98 %] 97 %  O2 Device:  None (Room air)    Lines/Drains/Airways       Active Status       Name Placement date Placement time Site Days    Urethral Catheter 14 Fr. 12/06/24  1807  --  less than 1                  Physical Exam  Physical Exam:  General: No acute distress, alert and oriented  CV: RRR  Lungs: Normal work of breathing   Abdomen: s/nt/nd  Skin: Warm, dry      Lab Results: I have reviewed the following results:  Recent Labs     12/05/24  1418 12/05/24  1614 12/05/24  2113 12/06/24  0510 12/06/24  1425   WBC 10.40*  --  9.32 7.58  --    HGB 4.8*  --  5.8* 6.5* 8.9*   HCT 16.4*  --  19.2* 21.0*  --      --  227 196  --    SODIUM 135  --  140 142  --    K 4.8  --  4.0 3.9  --      --  113* 116*  --    CO2 17*  --  20* 19*  --    BUN 56*  --  50* 48*  --    CREATININE 1.62*  --  1.45* 1.51*  --    GLUC 294*  --  191* 134  --    MG  --   --   --  1.9  --    AST  --   --  15  --   --    ALT  --   --  16  --   --    ALB  --   --  3.6  --   --    TBILI  --   --  0.63  --   --    ALKPHOS  --   --  68  --   --    HSTNI0 93*  --   --   --   --    HSTNI2  --  101*  --   --   --    BNP  --   --  208*  --   --        Imaging Results Review: I reviewed radiology reports from this admission including: CT abdomen/pelvis and procedure reports.  Other Study Results Review: No additional pertinent studies reviewed.    VTE Pharmacologic Prophylaxis: VTE covered by:    None     VTE Mechanical Prophylaxis: sequential compression device

## 2024-12-07 NOTE — ASSESSMENT & PLAN NOTE
EUS (10/17/24) showed heterogenous/hypoechoic mass (46 mm x 30 mm) with well-defined margins, CBD dilated to 10 mm and PD dilated to 8 mm.  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  NM PET/CT did not show mets Saw surgical oncology on 11/21/24, Dr. Rodriguez, who recommended surgical resection with Whipple procedure.  Patient declined surgical intervention given his age, but was interested in trying somatostatin analogs.  Currently seeing medical and surgical oncology while inpatient.  Continued plan to begin medical therapy outpatient.

## 2024-12-07 NOTE — PLAN OF CARE
Problem: PAIN - ADULT  Goal: Verbalizes/displays adequate comfort level or baseline comfort level  Description: Interventions:  - Encourage patient to monitor pain and request assistance  - Assess pain using appropriate pain scale  - Administer analgesics based on type and severity of pain and evaluate response  - Implement non-pharmacological measures as appropriate and evaluate response  - Consider cultural and social influences on pain and pain management  - Notify physician/advanced practitioner if interventions unsuccessful or patient reports new pain  Outcome: Progressing     Problem: INFECTION - ADULT  Goal: Absence or prevention of progression during hospitalization  Description: INTERVENTIONS:  - Assess and monitor for signs and symptoms of infection  - Monitor lab/diagnostic results  - Monitor all insertion sites, i.e. indwelling lines, tubes, and drains  - Monitor endotracheal if appropriate and nasal secretions for changes in amount and color  - [place] appropriate cooling/warming therapies per order  - Administer medications as ordered  - Instruct and encourage patient and family to use good hand hygiene technique  - Identify and instruct in appropriate isolation precautions for identified infection/condition  Outcome: Progressing     Problem: SAFETY ADULT  Goal: Patient will remain free of falls  Description: INTERVENTIONS:  - Educate patient/family on patient safety including physical limitations  - Instruct patient to call for assistance with activity   - Consult OT/PT to assist with strengthening/mobility   - Keep Call bell within reach  - Keep bed low and locked with side rails adjusted as appropriate  - Keep care items and personal belongings within reach  - Initiate and maintain comfort rounds  - Make Fall Risk Sign visible to staff  - Offer Toileting every 2 Hours, in advance of need  - Initiate/Maintain bed alarm  - Obtain necessary fall risk management equipment  - Apply yellow socks and  bracelet for high fall risk patients  - Consider moving patient to room near nurses station  Outcome: Progressing  Goal: Maintain or return to baseline ADL function  Description: INTERVENTIONS:  -  Assess patient's ability to carry out ADLs; assess patient's baseline for ADL function and identify physical deficits which impact ability to perform ADLs (bathing, care of mouth/teeth, toileting, grooming, dressing, etc.)  - Assess/evaluate cause of self-care deficits   - Assess range of motion  - Assess patient's mobility; develop plan if impaired  - Assess patient's need for assistive devices and provide as appropriate  - Encourage maximum independence but intervene and supervise when necessary  - Involve family in performance of ADLs  - Assess for home care needs following discharge   - Consider OT consult to assist with ADL evaluation and planning for discharge  - Provide patient education as appropriate  Outcome: Progressing  Goal: Maintains/Returns to pre admission functional level  Description: INTERVENTIONS:  - Perform AM-PAC 6 Click Basic Mobility/ Daily Activity assessment daily.  - Set and communicate daily mobility goal to care team and patient/family/caregiver.   - Collaborate with rehabilitation services on mobility goals if consulted  - Perform Range of Motion 3 times a day.  - Reposition patient every 2 hours.  - Dangle patient 3 times a day  - Stand patient 3 times a day  - Ambulate patient 3 times a day  - Out of bed to chair 3 times a day   - Out of bed for meals 3 times a day  - Out of bed for toileting  - Record patient progress and toleration of activity level   Outcome: Progressing     Problem: DISCHARGE PLANNING  Goal: Discharge to home or other facility with appropriate resources  Description: INTERVENTIONS:  - Identify barriers to discharge w/patient and caregiver  - Arrange for needed discharge resources and transportation as appropriate  - Identify discharge learning needs (meds, wound care,  etc.)  - Arrange for interpretive services to assist at discharge as needed  - Refer to Case Management Department for coordinating discharge planning if the patient needs post-hospital services based on physician/advanced practitioner order or complex needs related to functional status, cognitive ability, or social support system  Outcome: Progressing     Problem: CARDIOVASCULAR - ADULT  Goal: Maintains optimal cardiac output and hemodynamic stability  Description: INTERVENTIONS:  - Monitor I/O, vital signs and rhythm  - Monitor for S/S and trends of decreased cardiac output  - Administer and titrate ordered vasoactive medications to optimize hemodynamic stability  - Assess quality of pulses, skin color and temperature  - Assess for signs of decreased coronary artery perfusion  - Instruct patient to report change in severity of symptoms  Outcome: Progressing  Goal: Absence of cardiac dysrhythmias or at baseline rhythm  Description: INTERVENTIONS:  - Continuous cardiac monitoring, vital signs, obtain 12 lead EKG if ordered  - Administer antiarrhythmic and heart rate control medications as ordered  - Monitor electrolytes and administer replacement therapy as ordered  Outcome: Progressing     Problem: RESPIRATORY - ADULT  Goal: Achieves optimal ventilation and oxygenation  Description: INTERVENTIONS:  - Assess for changes in respiratory status  - Assess for changes in mentation and behavior  - Position to facilitate oxygenation and minimize respiratory effort  - Oxygen administered by appropriate delivery if ordered  - Initiate smoking cessation education as indicated  - Encourage broncho-pulmonary hygiene including cough, deep breathe, Incentive Spirometry  - Assess the need for suctioning and aspirate as needed  - Assess and instruct to report SOB or any respiratory difficulty  - Respiratory Therapy support as indicated  Outcome: Progressing     Problem: GASTROINTESTINAL - ADULT  Goal: Minimal or absence of nausea  and/or vomiting  Description: INTERVENTIONS:  - Administer IV fluids if ordered to ensure adequate hydration  - Maintain NPO status until nausea and vomiting are resolved  - Nasogastric tube if ordered  - Administer ordered antiemetic medications as needed  - Provide nonpharmacologic comfort measures as appropriate  - Advance diet as tolerated, if ordered  - Consider nutrition services referral to assist patient with adequate nutrition and appropriate food choices  Outcome: Progressing  Goal: Maintains or returns to baseline bowel function  Description: INTERVENTIONS:  - Assess bowel function  - Encourage oral fluids to ensure adequate hydration  - Administer IV fluids if ordered to ensure adequate hydration  - Administer ordered medications as needed  - Encourage mobilization and activity  - Consider nutritional services referral to assist patient with adequate nutrition and appropriate food choices  Outcome: Progressing  Goal: Maintains adequate nutritional intake  Description: INTERVENTIONS:  - Monitor percentage of each meal consumed  - Identify factors contributing to decreased intake, treat as appropriate  - Assist with meals as needed  - Monitor I&O, weight, and lab values if indicated  - Obtain nutrition services referral as needed  Outcome: Progressing     Problem: GENITOURINARY - ADULT  Goal: Maintains or returns to baseline urinary function  Description: INTERVENTIONS:  - Assess urinary function  - Encourage oral fluids to ensure adequate hydration if ordered  - Administer IV fluids as ordered to ensure adequate hydration  - Administer ordered medications as needed  - Offer frequent toileting  - Follow urinary retention protocol if ordered  Outcome: Progressing  Goal: Absence of urinary retention  Description: INTERVENTIONS:  - Assess patient’s ability to void and empty bladder  - Monitor I/O  - Bladder scan as needed  - Discuss with physician/AP medications to alleviate retention as needed  - Discuss  catheterization for long term situations as appropriate  Outcome: Progressing     Problem: METABOLIC, FLUID AND ELECTROLYTES - ADULT  Goal: Electrolytes maintained within normal limits  Description: INTERVENTIONS:  - Monitor labs and assess patient for signs and symptoms of electrolyte imbalances  - Administer electrolyte replacement as ordered  - Monitor response to electrolyte replacements, including repeat lab results as appropriate  - Instruct patient on fluid and nutrition as appropriate  Outcome: Progressing  Goal: Fluid balance maintained  Description: INTERVENTIONS:  - Monitor labs   - Monitor I/O and WT  - Instruct patient on fluid and nutrition as appropriate  - Assess for signs & symptoms of volume excess or deficit  Outcome: Progressing     Problem: HEMATOLOGIC - ADULT  Goal: Maintains hematologic stability  Description: INTERVENTIONS  - Assess for signs and symptoms of bleeding or hemorrhage  - Monitor labs  - Administer supportive blood products/factors as ordered and appropriate  Outcome: Progressing     Problem: MUSCULOSKELETAL - ADULT  Goal: Maintain or return mobility to safest level of function  Description: INTERVENTIONS:  - Assess patient's ability to carry out ADLs; assess patient's baseline for ADL function and identify physical deficits which impact ability to perform ADLs (bathing, care of mouth/teeth, toileting, grooming, dressing, etc.)  - Assess/evaluate cause of self-care deficits   - Assess range of motion  - Assess patient's mobility  - Assess patient's need for assistive devices and provide as appropriate  - Encourage maximum independence but intervene and supervise when necessary  - Involve family in performance of ADLs  - Assess for home care needs following discharge   - Consider OT consult to assist with ADL evaluation and planning for discharge  - Provide patient education as appropriate  Outcome: Progressing  Goal: Maintain proper alignment of affected body part  Description:  INTERVENTIONS:  - Support, maintain and protect limb and body alignment  - Provide patient/ family with appropriate education  Outcome: Progressing     Problem: Potential for Falls  Goal: Patient will remain free of falls  Description: INTERVENTIONS:  - Educate patient/family on patient safety including physical limitations  - Instruct patient to call for assistance with activity   - Consult OT/PT to assist with strengthening/mobility   - Keep Call bell within reach  - Keep bed low and locked with side rails adjusted as appropriate  - Keep care items and personal belongings within reach  - Initiate and maintain comfort rounds  - Make Fall Risk Sign visible to staff  - Offer Toileting every 2 Hours, in advance of need  - Initiate/Maintain bed alarm  - Obtain necessary fall risk management equipment  - Apply yellow socks and bracelet for high fall risk patients  - Consider moving patient to room near nurses station  Outcome: Progressing

## 2024-12-07 NOTE — ASSESSMENT & PLAN NOTE
Evaluated by GI, surgical oncology, medical oncology, radiation oncology  Without intervention patient will continue to have events of bleeding  Patient declined Whipple procedure  We discussed options for therapy and is agreeable to radiation  Plan for transfer to [facility] for inpatient radiation

## 2024-12-07 NOTE — ASSESSMENT & PLAN NOTE
89 yo M with known 4cm pancreatic neuroendocrine tumor, admitted for recurrent GI bleed in setting of duodenal ulcer, likely 2/2 tumor erosion.    -recommend GOC with family present  -Consider palliative care consult  -can consider palliative radiation for repeat bleed  -reviewed and discussed risks or his decision including ongoing GI bleed, recurrence etc  -GI bleed management per GI at this time, though appears since EGD no active bleed though sequelae of recent bleed, repeat EGD per GI  -Recommend patient follow-up with medical oncology, recommend initiation of Ocreotide  --can follow up outpt with Dr. Rodriguez for further surgical discussion  -rest of care per primary

## 2024-12-07 NOTE — TRANSPORTATION MEDICAL NECESSITY
"Section I - General Information    Name of Patient: Jose Zamora                 : 1935    Medicare #: [id number]  Transport Date: 24 (PCS is valid for round trips on this date and for all repetitive trips in the 60-day range as noted below.)  Origin: [place] [address]                                                         Destination: [facility]   Is the pt's stay covered under Medicare Part A (PPS/DRG)   []     Closest appropriate facility? If no, why is transport to more distant facility required? Yes  If hospice pt, is this transport related to pt's terminal illness? No       Section II - Medical Necessity Questionnaire  Ambulance transportation is medically necessary only if other means of transport are contraindicated or would be potentially harmful to the patient. To meet this requirement, the patient must either be \"bed confined\" or suffer from a condition such that transport by means other than ambulance is contraindicated by the patient's condition. The following questions must be answered by the medical professional signing below for this form to be valid:    1)  Describe the MEDICAL CONDITION (physical and/or mental) of this patient AT THE TIME OF AMBULANCE TRANSPORT that requires the patient to be transported in an ambulance and why transport by other means is contraindicated by the patient's condition: Recurrent anemia.     2) Is the patient \"bed confined\" as defined below?     No  To be \"be confined\" the patient must satisfy all three of the following conditions: (1) unable to get up from bed without Assistance; AND (2) unable to ambulate; AND (3) unable to sit in a chair or wheelchair.    3) Can this patient safely be transported by car or wheelchair van (i.e., seated during transport without a medical attendant or monitoring)?   No    4) In addition to completing questions 1-3 above, please check any of the following conditions that apply*:   *Note: " supporting documentation for any boxes checked must be maintained in the patient's medical records.  If hosp-hosp transfer, describe services needed at 2nd facility not available at 1st facility?   Medical attendant required   Hemodynamic monitoring required en route  Other(specify) recurrent Anemia       Section III - Signature of Physician or Healthcare Professional  I certify that the above information is true and correct based on my evaluation of this patient, and represent that the patient requires transport by ambulance and that other forms of transport are contraindicated. I understand that this information will be used by the Centers for Medicare and Medicaid Services (CMS) to support the determination of medical necessity for ambulance services, and I represent that I have personal knowledge of the patient's condition at time of transport.    []  If this box is checked, I also certify that the patient is physically or mentally incapable of signing the ambulance service's claim and that the institution with which I am affiliated has furnished care, services, or assistance to the patient.    My signature below is made on behalf of the patient pursuant to 42 CFR §424.36(b)(4). In accordance with 42 CFR §424.37, the specific reason(s) that the patient is physically or mentally incapable of signing the claim form is as follows:       Signature of Physician* or Healthcare Professional____Deb Jacobo_____________  Signature Date 12/07/24 (For scheduled repetitive transports, this form is not valid for transports performed more than 60 days after this date)    Printed Name & Credentials of Physician or Healthcare Professional (MD, DO, RN, etc.)________________________________  *Form must be signed by patient's attending physician for scheduled, repetitive transports. For non-repetitive, unscheduled ambulance transports, if unable to obtain the signature of the attending physician, any of the following may sign  (choose appropriate option below)  [] Physician Assistant []  Clinical Nurse Specialist []  Registered Nurse  []  Nurse Practitioner  [x] Discharge Planner

## 2024-12-07 NOTE — CASE MANAGEMENT
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 ([facility]) 10/19/2024     Urinary retention 10/16/2024     PAWEL (acute kidney injury) ([facility]) 10/16/2024     Pancreatic mass 10/16/2024     Lower back pain 10/16/2024     UGIB (upper gastrointestinal bleed) 10/15/2024     Generalized abdominal pain 10/14/2024     Weight loss 10/14/2024     Acute bilateral thoracic back pain 10/14/2024     Nausea 10/14/2024     Right hand pain 2022     Acute pain of both shoulders 2022     Localized osteoarthritis of right knee 2020     Acute pain of right knee 2020     Strain of calf muscle 2020     Lumbar strain 10/25/2019     Abnormal prostate exam 2019     Beta thalassemia minor 05/15/2019     Elevated PSA 05/15/2019     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 ([facility]) 2019     Type 2 diabetes mellitus with stage 3 chronic kidney disease ([facility]) 2019     Squamous cell carcinoma 2019     Dermatitis 2019     Skin neoplasm 2019     Symptomatic anemia 2017     Acquired hypothyroidism 10/19/2016     PAD (peripheral artery disease) ([facility]) 2015     Hyperlipidemia 2013     Atherosclerotic heart disease of native coronary artery without angina pectoris 09/10/2012     DMII (diabetes mellitus, type 2) ([facility]) 09/10/2012     Hypertension 09/10/2012       LOS (days): 2  Geometric Mean LOS (GMLOS) (days): 2  Days to GMLOS:0.2     OBJECTIVE:  Risk of Unplanned Readmission  Score: 19.84         Current admission status: Inpatient   Preferred Pharmacy:   [id number] - [place] - [address]  [address] [place] [address]  Phone: [phone] Fax: [phone]    Primary Care Provider: Scott Hodges DO    Primary Insurance: MEDICARE  Secondary Insurance: BLUE CROSS    DISCHARGE DETAILS:    Treatment Team Recommendation: Acute Hospital Transfer  Discharge Destination Plan:: Acute Hospital Transfer        Additional Comments: CM completed 'Medical Necessity' for acute hospital transfer. CM to follow.

## 2024-12-07 NOTE — ASSESSMENT & PLAN NOTE
Patient had significant urinary retention on admission  Failed urinary retention protocol and Mccormack catheter placed  Urology recommends maintaining for 2 weeks with outpatient follow-up to discuss voiding trial

## 2024-12-07 NOTE — DISCHARGE SUMMARY
Discharge Summary - Hospitalist   Name: Jose Zamora 89 y.o. male I MRN: [id number]  Unit/Bed#: E5 -01 I Date of Admission: 12/5/2024   Date of Service: 12/7/2024 I Hospital Day: 2     Assessment & Plan  Acute blood loss anemia  Presented to [place] ED due to complaint of exertional chest pain  On arrival noted to have severe anemia with hemoglobin 4.8,  then transferred to [facility] for GI availability  Recent admission 10/15-10/20 for upper GI bleed, s/p EGD and EUS 10/16 notable for a large pancreatic head mass extending to the ampulla with evidence of this lesion bleeding through the ampulla which appeared to be stretched by the tumor  Patient received 3 units packed red blood cell on admission  He underwent EGD which showed small clean-based ulcer and large ulcer related to pancreatic head mass erosion  GI recommends continue PPI twice daily  Avoid AP/AC/DVT prophylaxis  Trend hemoglobin transfuse to maintain greater than 7  Monitor bowel movements  Elevated troponin  Suspected non-MI related troponin elevation in setting of severe anemia  Urinary retention  Patient had significant urinary retention on admission  Failed urinary retention protocol and Mccormack catheter placed  Urology recommends maintaining for 2 weeks with outpatient follow-up to discuss voiding trial  CKD stage 3b, 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  Continue atorvastatin and metoprolol  Follows with cardiology at [facility]  Acquired hypothyroidism  Continue levothyroxine 50 mcg   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)  Continue sliding scale insulin  Primary pancreatic neuroendocrine tumor  Evaluated by GI, surgical oncology, medical oncology, radiation oncology  Without intervention patient will continue to have events of  bleeding  Patient declined Whipple procedure  We discussed options for therapy and is agreeable to radiation  Plan for transfer to [facility] for inpatient radiation  Metabolic acidosis  In setting of chronic ckd   Start sodium bicarb      Discharging Physician / Practitioner: Bill Cavazos DO  PCP: Scott Hodges DO  Admission Date:   Admission Orders (From admission, onward)       Ordered        12/05/24 1947  INPATIENT ADMISSION  Once                          Discharge Date: 12/07/24    Medical Problems       Resolved Problems  Date Reviewed: 12/5/2024   None         Consultations During Hospital Stay:   IP CONSULT TO GASTROENTEROLOGY  IP CONSULT TO UROLOGY  IP CONSULT TO ONCOLOGY  IP CONSULT TO SURGICAL ONCOLOGY  IP CONSULT TO RADIATION ONCOLOGY    Procedures Performed: EGD    Significant Findings / Test Results:     EGD  Result Date: 12/6/2024  Impression: Single ulcer in the duodenum with clean base (John III) 3 cm hiatal hernia The esophagus appeared normal. Mild erythematous mucosa in the stomach No active bleeding, but the recent bleeding was probably due to the large duodenal ulcer likely due to tumor erosion RECOMMENDATION: Return to floor and resume diet Surg Onc to discuss benefits and risks of surgery with patient If recurrent bleeding, would repeat EGD Follow-up in our office for further evaluation and management. If you don't have an appointment scheduled, please call [phone] to schedule your appointment.    Phil Baires MD     CTA chest pe study  Result Date: 12/5/2024  Impression: No pulmonary embolus. No acute pulmonary disease. Redemonstration of cystic lesions in the pancreas with pancreatic head only partially imaged and pancreatic head mass not visible Redemonstration of intrahepatic biliary dilation, distended gallbladder, and left adrenal hyperplasia. Workstation performed: [id number]       Incidental Findings: None    Test Results Pending at Discharge (will require follow up):  None     Outpatient Tests Requested: None    Reason for Admission: Symptomatic anemia    Hospital Course:     Jose Zamora is a 89 y.o. male With past medical history of pancreatic neuroendocrine tumor, CKD, hypothyroidism, non-insulin-dependent type 2 diabetes presented to hospital with chest discomfort found to have severe anemia.  Patient was transferred to [facility] for evaluation by GI.  He required 3 units packed red blood cell on admission.  Patient underwent EGD with single clean-based ulcer in duodenum and large duodenal ulcer due to tumor erosion.  Multidisciplinary evaluation by medical oncology, surgical oncology, radiation oncology.  Patient declined Whipple procedure but was agreeable to radiation therapy.  Transferred to [facility] for inpatient radiation.    Please see above list of diagnoses and related plan for additional information.     Condition at Discharge: stable     Discharge Day Visit / Exam:     Subjective: Patient seen and evaluated at bedside.  He feels well today.    Vitals: Blood Pressure: 148/76 (12/07/24 1551)  Pulse: 94 (12/07/24 1551)  Temperature: 98.1 °F (36.7 °C) (12/07/24 1551)  Temp Source: Oral (12/07/24 1551)  Respirations: 15 (12/07/24 1551)  SpO2: 99 % (12/07/24 1551)  Exam:   Physical Exam  Vitals reviewed.   Constitutional:       General: He is not in acute distress.     Appearance: He is well-developed. He is not ill-appearing, toxic-appearing or diaphoretic.   HENT:      Head: Normocephalic and atraumatic.      Mouth/Throat:      Mouth: Mucous membranes are moist.   Eyes:      General: No scleral icterus.     Extraocular Movements: Extraocular movements intact.   Cardiovascular:      Rate and Rhythm: Normal rate and regular rhythm.      Heart sounds: Normal heart sounds.   Pulmonary:      Effort: Pulmonary effort is normal. No respiratory distress.      Breath sounds: Normal breath sounds. No wheezing or rales.   Abdominal:      General: There  is no distension.      Palpations: Abdomen is soft.      Tenderness: There is no abdominal tenderness. There is no guarding or rebound.   Musculoskeletal:         General: No swelling, tenderness or deformity.   Skin:     General: Skin is warm and dry.   Neurological:      General: No focal deficit present.      Mental Status: He is alert. Mental status is at baseline.   Psychiatric:         Mood and Affect: Mood normal.         Behavior: Behavior normal.         Thought Content: Thought content normal.         Judgment: Judgment normal.           Discharge instructions/Information to patient and family:   See after visit summary for information provided to patient and family.      Provisions for Follow-Up Care:  See after visit summary for information related to follow-up care and any pertinent home health orders.      Disposition:     Transfer to [facility]     Discharge Statement:  I spent 60 minutes discharging the patient. This time was spent on the day of discharge. I had direct contact with the patient on the day of discharge. Greater than 50% of the total time was spent examining patient, answering all patient questions, arranging and discussing plan of care with patient as well as directly providing post-discharge instructions.  Additional time then spent on discharge activities.    Discharge Medications:  See after visit summary for reconciled discharge medications provided to patient and family.      ** Please Note: This note has been constructed using a voice recognition system **

## 2024-12-07 NOTE — PROGRESS NOTES
Progress Note - Gastroenterology   Name: Jose Zamora 89 y.o. 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  Assessment & Plan  Symptomatic anemia  Patient initially presented with worsening weakness and fatigue and overall failure to thrive.  He was seen by his PCP on 12/5 who had ordered blood work which was notable for acute drop in hemoglobin prompting ED evaluation.  Hemoglobin found to be 4.8.  Of note, patient recently hospitalized 10/15 to 10/24 with weakness and melena with hgb drop. Found to have a mass at the head of the pancreas. 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 Seen by surg onc who recommended whipple procedure but patient declined intervention given age and wanted to try somatostatin analogs per medical oncology recommendations which have not yet been started.  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  Ever, no active bleeding noted and therefore no intervention performed.  Throughout admission, patient has received 2 units PRBC with improvement in hemoglobin.  Today, patient currently asymptomatic and without complaints.  Hemoglobin 7.7 and patient without any further episodes of overt bleeding.  Currently being seen by both medical and surgical oncology and now radiation oncology while inpatient and will defer further management to oncology teams.  If patient were to have any further episodes of recurrent GI bleeding, could certainly consider repeat endoscopic intervention at that time as patient  does remain high risk for rebleeding.  This was discussed with both patient as well as his daughter via telephone at bedside today.    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    EUS (10/17/24) showed heterogenous/hypoechoic mass (46 mm x 30 mm) with well-defined margins, CBD dilated to 10 mm and PD dilated to 8 mm.  Biopsy showed pancreatic neuroendocrine tumor , WHO grade 2.    NM PET/CT (11/7/24) which showed intense radiotracer uptake at the pancreatic head mass, no lesions suspicious for mets, and diffusely enlarged prostate.   EGD (12/6/24) showed John 3 ulcer in the duodenum.    Plan:  No plan for futher endoscopic intervention  Continue on Protonix 40 mg BID - transition from IV to PO  Avoid AP/AC; avoid use of all NSAIDs  Trend H&H; transfuse for goal of >7.0   Monitor hemodynamics; avoid episodes of hypotension   Maintain adequate IV access with 2 large bore Ivs  Monitor bowel movements; alert GI team of signs of overt GIB  Medical, Surgical, and Radiation oncology consulted and appreciate input  Additional pain and symptom management per primary team     Primary pancreatic neuroendocrine tumor  EUS (10/17/24) showed heterogenous/hypoechoic mass (46 mm x 30 mm) with well-defined margins, CBD dilated to 10 mm and PD dilated to 8 mm.  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  NM PET/CT did not show mets Saw surgical oncology on 11/21/24, Dr. Rodriguez, who recommended surgical resection with Whipple procedure.  Patient declined surgical intervention given his age, but was interested in trying somatostatin analogs.  Currently seeing medical and surgical oncology while inpatient.  Continued plan to begin medical therapy outpatient.    Thank you for involving us in the care of Jose Zamora. No further GI intervention warranted at this time. GI will sign off. Please reach out with any  questions or concerns.         Subjective  Patient seen and examined at bedside.  Sitting in bed comfortably in no acute distress or visible discomfort.  Denies abdominal pain.  Currently tolerating diet.  No bowel movements in the last 24 hours.  Asymptomatic and with no complaints.    Objective :  Temp:  [97.8 °F (36.6 °C)-98.6 °F (37 °C)] 98.3 °F (36.8 °C)  HR:  [] 97  BP: ()/(57-86) 151/76  Resp:  [15-20] 18  SpO2:  [96 %-98 %] 96 %  O2 Device: None (Room air)    Physical Exam  Constitutional:       General: He is not in acute distress.     Appearance: He is normal weight. He is not ill-appearing or toxic-appearing.   HENT:      Head: Normocephalic and atraumatic.      Nose: Nose normal.   Eyes:      General: No scleral icterus.  Cardiovascular:      Rate and Rhythm: Normal rate.      Pulses: Normal pulses.   Pulmonary:      Effort: Pulmonary effort is normal.   Abdominal:      General: Abdomen is flat. There is no distension.      Tenderness: There is no abdominal tenderness.   Genitourinary:     Comments: Mccormack catheter in place draining clear yellow urine  Musculoskeletal:         General: Normal range of motion.      Cervical back: Normal range of motion.   Skin:     General: Skin is warm.      Coloration: Skin is not jaundiced or pale.   Neurological:      Mental Status: He is alert and oriented to person, place, and time.   Psychiatric:         Mood and Affect: Mood normal.         Behavior: Behavior normal.         Lab Results: I have reviewed the following results:CBC/BMP:   .     12/07/24  0505   WBC 7.35   HGB 7.7*   HCT 24.4*      SODIUM 143   K 3.8   *   CO2 19*   BUN 41*   CREATININE 1.54*   GLUC 130    , Creatinine Clearance: Estimated Creatinine Clearance: 32.5 mL/min (A) (by C-G formula based on SCr of 1.54 mg/dL (H))., LFTs: No new results in last 24 hours. , PTT/INR:No new results in last 24 hours.     Imaging Results Review: I reviewed radiology reports from this  admission including: procedure reports.  Other Study Results Review: Pathology reports reviewed.

## 2024-12-07 NOTE — PLAN OF CARE
Problem: PAIN - ADULT  Goal: Verbalizes/displays adequate comfort level or baseline comfort level  Description: Interventions:  - Encourage patient to monitor pain and request assistance  - Assess pain using appropriate pain scale  - Administer analgesics based on type and severity of pain and evaluate response  - Implement non-pharmacological measures as appropriate and evaluate response  - Consider cultural and social influences on pain and pain management  - Notify physician/advanced practitioner if interventions unsuccessful or patient reports new pain  Outcome: Progressing     Problem: INFECTION - ADULT  Goal: Absence or prevention of progression during hospitalization  Description: INTERVENTIONS:  - Assess and monitor for signs and symptoms of infection  - Monitor lab/diagnostic results  - Monitor all insertion sites, i.e. indwelling lines, tubes, and drains  - Monitor endotracheal if appropriate and nasal secretions for changes in amount and color  - [place] appropriate cooling/warming therapies per order  - Administer medications as ordered  - Instruct and encourage patient and family to use good hand hygiene technique  - Identify and instruct in appropriate isolation precautions for identified infection/condition  Outcome: Progressing     Problem: SAFETY ADULT  Goal: Patient will remain free of falls  Description: INTERVENTIONS:  - Educate patient/family on patient safety including physical limitations  - Instruct patient to call for assistance with activity   - Consult OT/PT to assist with strengthening/mobility   - Keep Call bell within reach  - Keep bed low and locked with side rails adjusted as appropriate  - Keep care items and personal belongings within reach  - Initiate and maintain comfort rounds  - Make Fall Risk Sign visible to staff  - Offer Toileting every 2 Hours, in advance of need  - Initiate/Maintain bed alarm  - Obtain necessary fall risk management equipment  - Apply yellow socks and  bracelet for high fall risk patients  - Consider moving patient to room near nurses station  Outcome: Progressing  Goal: Maintain or return to baseline ADL function  Description: INTERVENTIONS:  -  Assess patient's ability to carry out ADLs; assess patient's baseline for ADL function and identify physical deficits which impact ability to perform ADLs (bathing, care of mouth/teeth, toileting, grooming, dressing, etc.)  - Assess/evaluate cause of self-care deficits   - Assess range of motion  - Assess patient's mobility; develop plan if impaired  - Assess patient's need for assistive devices and provide as appropriate  - Encourage maximum independence but intervene and supervise when necessary  - Involve family in performance of ADLs  - Assess for home care needs following discharge   - Consider OT consult to assist with ADL evaluation and planning for discharge  - Provide patient education as appropriate  Outcome: Progressing  Goal: Maintains/Returns to pre admission functional level  Description: INTERVENTIONS:  - Perform AM-PAC 6 Click Basic Mobility/ Daily Activity assessment daily.  - Set and communicate daily mobility goal to care team and patient/family/caregiver.   - Collaborate with rehabilitation services on mobility goals if consulted  - Perform Range of Motion 3 times a day.  - Reposition patient every 2 hours.  - Dangle patient 3 times a day  - Stand patient 3 times a day  - Ambulate patient 3 times a day  - Out of bed to chair 3 times a day   - Out of bed for meals 3 times a day  - Out of bed for toileting  - Record patient progress and toleration of activity level   Outcome: Progressing     Problem: DISCHARGE PLANNING  Goal: Discharge to home or other facility with appropriate resources  Description: INTERVENTIONS:  - Identify barriers to discharge w/patient and caregiver  - Arrange for needed discharge resources and transportation as appropriate  - Identify discharge learning needs (meds, wound care,  etc.)  - Arrange for interpretive services to assist at discharge as needed  - Refer to Case Management Department for coordinating discharge planning if the patient needs post-hospital services based on physician/advanced practitioner order or complex needs related to functional status, cognitive ability, or social support system  Outcome: Progressing     Problem: CARDIOVASCULAR - ADULT  Goal: Maintains optimal cardiac output and hemodynamic stability  Description: INTERVENTIONS:  - Monitor I/O, vital signs and rhythm  - Monitor for S/S and trends of decreased cardiac output  - Administer and titrate ordered vasoactive medications to optimize hemodynamic stability  - Assess quality of pulses, skin color and temperature  - Assess for signs of decreased coronary artery perfusion  - Instruct patient to report change in severity of symptoms  Outcome: Progressing  Goal: Absence of cardiac dysrhythmias or at baseline rhythm  Description: INTERVENTIONS:  - Continuous cardiac monitoring, vital signs, obtain 12 lead EKG if ordered  - Administer antiarrhythmic and heart rate control medications as ordered  - Monitor electrolytes and administer replacement therapy as ordered  Outcome: Progressing     Problem: RESPIRATORY - ADULT  Goal: Achieves optimal ventilation and oxygenation  Description: INTERVENTIONS:  - Assess for changes in respiratory status  - Assess for changes in mentation and behavior  - Position to facilitate oxygenation and minimize respiratory effort  - Oxygen administered by appropriate delivery if ordered  - Initiate smoking cessation education as indicated  - Encourage broncho-pulmonary hygiene including cough, deep breathe, Incentive Spirometry  - Assess the need for suctioning and aspirate as needed  - Assess and instruct to report SOB or any respiratory difficulty  - Respiratory Therapy support as indicated  Outcome: Progressing     Problem: GASTROINTESTINAL - ADULT  Goal: Minimal or absence of nausea  and/or vomiting  Description: INTERVENTIONS:  - Administer IV fluids if ordered to ensure adequate hydration  - Maintain NPO status until nausea and vomiting are resolved  - Nasogastric tube if ordered  - Administer ordered antiemetic medications as needed  - Provide nonpharmacologic comfort measures as appropriate  - Advance diet as tolerated, if ordered  - Consider nutrition services referral to assist patient with adequate nutrition and appropriate food choices  Outcome: Progressing  Goal: Maintains or returns to baseline bowel function  Description: INTERVENTIONS:  - Assess bowel function  - Encourage oral fluids to ensure adequate hydration  - Administer IV fluids if ordered to ensure adequate hydration  - Administer ordered medications as needed  - Encourage mobilization and activity  - Consider nutritional services referral to assist patient with adequate nutrition and appropriate food choices  Outcome: Progressing  Goal: Maintains adequate nutritional intake  Description: INTERVENTIONS:  - Monitor percentage of each meal consumed  - Identify factors contributing to decreased intake, treat as appropriate  - Assist with meals as needed  - Monitor I&O, weight, and lab values if indicated  - Obtain nutrition services referral as needed  Outcome: Progressing     Problem: GENITOURINARY - ADULT  Goal: Maintains or returns to baseline urinary function  Description: INTERVENTIONS:  - Assess urinary function  - Encourage oral fluids to ensure adequate hydration if ordered  - Administer IV fluids as ordered to ensure adequate hydration  - Administer ordered medications as needed  - Offer frequent toileting  - Follow urinary retention protocol if ordered  Outcome: Progressing  Goal: Absence of urinary retention  Description: INTERVENTIONS:  - Assess patient’s ability to void and empty bladder  - Monitor I/O  - Bladder scan as needed  - Discuss with physician/AP medications to alleviate retention as needed  - Discuss  catheterization for long term situations as appropriate  Outcome: Progressing     Problem: METABOLIC, FLUID AND ELECTROLYTES - ADULT  Goal: Electrolytes maintained within normal limits  Description: INTERVENTIONS:  - Monitor labs and assess patient for signs and symptoms of electrolyte imbalances  - Administer electrolyte replacement as ordered  - Monitor response to electrolyte replacements, including repeat lab results as appropriate  - Instruct patient on fluid and nutrition as appropriate  Outcome: Progressing  Goal: Fluid balance maintained  Description: INTERVENTIONS:  - Monitor labs   - Monitor I/O and WT  - Instruct patient on fluid and nutrition as appropriate  - Assess for signs & symptoms of volume excess or deficit  Outcome: Progressing     Problem: HEMATOLOGIC - ADULT  Goal: Maintains hematologic stability  Description: INTERVENTIONS  - Assess for signs and symptoms of bleeding or hemorrhage  - Monitor labs  - Administer supportive blood products/factors as ordered and appropriate  Outcome: Progressing     Problem: MUSCULOSKELETAL - ADULT  Goal: Maintain or return mobility to safest level of function  Description: INTERVENTIONS:  - Assess patient's ability to carry out ADLs; assess patient's baseline for ADL function and identify physical deficits which impact ability to perform ADLs (bathing, care of mouth/teeth, toileting, grooming, dressing, etc.)  - Assess/evaluate cause of self-care deficits   - Assess range of motion  - Assess patient's mobility  - Assess patient's need for assistive devices and provide as appropriate  - Encourage maximum independence but intervene and supervise when necessary  - Involve family in performance of ADLs  - Assess for home care needs following discharge   - Consider OT consult to assist with ADL evaluation and planning for discharge  - Provide patient education as appropriate  Outcome: Progressing  Goal: Maintain proper alignment of affected body part  Description:  INTERVENTIONS:  - Support, maintain and protect limb and body alignment  - Provide patient/ family with appropriate education  Outcome: Progressing     Problem: Potential for Falls  Goal: Patient will remain free of falls  Description: INTERVENTIONS:  - Educate patient/family on patient safety including physical limitations  - Instruct patient to call for assistance with activity   - Consult OT/PT to assist with strengthening/mobility   - Keep Call bell within reach  - Keep bed low and locked with side rails adjusted as appropriate  - Keep care items and personal belongings within reach  - Initiate and maintain comfort rounds  - Make Fall Risk Sign visible to staff  - Offer Toileting every 2 Hours, in advance of need  - Initiate/Maintain bed alarm  - Obtain necessary fall risk management equipment  - Apply yellow socks and bracelet for high fall risk patients  - Consider moving patient to room near nurses station  Outcome: Progressing

## 2024-12-07 NOTE — ASSESSMENT & PLAN NOTE
Patient initially presented with worsening weakness and fatigue and overall failure to thrive.  He was seen by his PCP on 12/5 who had ordered blood work which was notable for acute drop in hemoglobin prompting ED evaluation.  Hemoglobin found to be 4.8.  Of note, patient recently hospitalized 10/15 to 10/24 with weakness and melena with hgb drop. Found to have a mass at the head of the pancreas. 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 Seen by surg onc who recommended whipple procedure but patient declined intervention given age and wanted to try somatostatin analogs per medical oncology recommendations which have not yet been started.  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  Ever, no active bleeding noted and therefore no intervention performed.  Throughout admission, patient has received 2 units PRBC with improvement in hemoglobin.  Today, patient currently asymptomatic and without complaints.  Hemoglobin 7.7 and patient without any further episodes of overt bleeding.  Currently being seen by both medical and surgical oncology and now radiation oncology while inpatient and will defer further management to oncology teams.  If patient were to have any further episodes of recurrent GI bleeding, could certainly consider repeat endoscopic intervention at that time as patient does remain high risk for rebleeding.  This was discussed with both patient as well as his daughter via telephone at bedside today.    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    EUS (10/17/24) showed heterogenous/hypoechoic mass (46 mm x 30 mm) with well-defined margins, CBD dilated to 10 mm and PD dilated to 8 mm.  Biopsy showed pancreatic neuroendocrine tumor , WHO grade 2.    NM PET/CT  (11/7/24) which showed intense radiotracer uptake at the pancreatic head mass, no lesions suspicious for mets, and diffusely enlarged prostate.   EGD (12/6/24) showed John 3 ulcer in the duodenum.    Plan:  No plan for futher endoscopic intervention  Continue on Protonix 40 mg BID - transition from IV to PO  Avoid AP/AC; avoid use of all NSAIDs  Trend H&H; transfuse for goal of >7.0   Monitor hemodynamics; avoid episodes of hypotension   Maintain adequate IV access with 2 large bore Ivs  Monitor bowel movements; alert GI team of signs of overt GIB  Medical, Surgical, and Radiation oncology consulted and appreciate input  Additional pain and symptom management per primary team

## 2024-12-07 NOTE — CONSULTS
Radiation Oncology e-Consult    Name: Jose Zamora      : 1935      MRN: [id number]  Encounter Provider: No name on file  Encounter Date: 2024   Encounter department: [place] [address]     Cancer Staging   Primary pancreatic neuroendocrine tumor  Staging form: Neuroendocrine Tumor - Pancreas, AJCC V9  - Clinical: Stage II (cT3, cN0, cM0) - Signed by Nba Rodriguez MD on 10/24/2024    :  Assessment & Plan  Primary pancreatic neuroendocrine tumor    Patient is a 89 y.o. 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 Case was discussed with attending physician and medical oncology. Imaging was personally reviewed. Patient has localized disease. Hemoglobin has improved with transfusion but patient will require continued monitoring for now. Patient has localized disease which is symptomatic with recurrent anemia. Agree with local radiation therapy. Would likely plan for around 45 Gy in 15 fractions; final dose and fractionation to be determined during radiotherapy planning. If patient remains unstable, then recommend transfer to [place] or [facility] for inpatient radiation therapy. If patient is stable for discharge, recommend ambulatory referral for outpatient radiation therapy at [facility].            History of Present Illness     Prior Treatment:  None    Implanted Devices:  none    Jose Zamora is a 89 y.o. 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 Initially presented to the hospital with fatigue, weight loss, and GI bleed and had hemoglobin of 4.5 on 10/15/24. CT abd/pelvis on 10/15/24 showed a 3.4 cm pancreatic head mass. MRI abdomen on 10/16/24 showed 4.7 cm pancreatic head mass. FNA on 10/17/24 showed pancreatic neuroendocrine tumor, at least well-differentiated. Hemoglobin improved with transfusion. CT chest on 10/19/24 showed no chest metastases. Pancreatic CT abdomen/pelvis on 10/22/24 showed 4.4 cm pancreatic head mass, no vascular  involvement or abdominal metastatic disease. Dotatate PET on 11/7/24 showed pancreatic head mass avidity with no evidence of metastases. Patient discussed whipple procedure with surgical oncology and declined any surgical management given his age. He was again admitted to the hospital on 12/5/24 with anemia, hemoglobin down to 4.8, improved with transfusion. CTA chest on 12/5/24 showed no PE. Patient continued to decline surgical approach. Radiation oncology consulted for consideration of palliative local radiotherapy.      Oncology History   Primary pancreatic neuroendocrine tumor   10/17/2024 Biopsy    Endoscopic ultrasound  - Pancreas, Head Mass, FNA:  Positive for malignancy.  - Pancreatic neuroendocrine tumor (PanNET), favor at least well-differentiated neuroendocrine tumor, WHO grade 2 of 3, in this limited sample.     10/24/2024 -  Cancer Staged    Staging form: Neuroendocrine Tumor - Pancreas, AJCC V9  - Clinical: Stage II (cT3, cN0, cM0) - Signed by Nba Rodriguez MD on 10/24/2024         Past Medical History:   Diagnosis Date    Allergic     Anemia     Arthritis     Diabetes mellitus (HCC)     Disease of thyroid gland     Hyperlipemia     Hypertension      Past Surgical History:   Procedure Laterality Date    COLONOSCOPY      PROSTATE BIOPSY      needle,  resolved may 2005     Family History   Problem Relation Age of Onset    No Known Problems Mother      Social History     Tobacco Use    Smoking status: Former     Current packs/day: 1.00     Average packs/day: 1 pack/day for 54.0 years (54.0 ttl pk-yrs)     Types: Cigarettes     Start date: 12/4/1970    Smokeless tobacco: Never   Vaping Use    Vaping status: Never Used   Substance and Sexual Activity    Alcohol use: Not Currently     Comment: social/rarely    Drug use: Never    Sexual activity: Not Currently     No current facility-administered medications on file prior to encounter.     Current Outpatient Medications on File Prior to Encounter    Medication Sig Dispense Refill    amLODIPine (NORVASC) 5 mg tablet Take 1 tablet (5 mg total) by mouth daily 30 tablet 0    atorvastatin (LIPITOR) 20 mg tablet Take 1 tablet by mouth daily      cholecalciferol (VITAMIN D3) 1,000 units tablet Take by mouth      fexofenadine (Allegra Allergy) 180 MG tablet Take 180 mg by mouth daily as needed (allergies)      folic acid (FOLVITE) 1 mg tablet Take 1 tablet by mouth daily      levothyroxine 50 mcg tablet TAKE 1 TABLET BY MOUTH EVERY DAY 90 tablet 1    metoprolol tartrate (LOPRESSOR) 25 mg tablet Take 0.5 tablets (12.5 mg total) by mouth 2 (two) times a day 90 tablet 1    montelukast (SINGULAIR) 10 mg tablet Take by mouth      Multiple Vitamins-Minerals (CENTRUM SILVER) tablet Take by mouth      ondansetron (ZOFRAN) 4 mg tablet Take 1 tablet (4 mg total) by mouth every 8 (eight) hours as needed for nausea or vomiting 20 tablet 0    pantoprazole (PROTONIX) 40 mg tablet Take 1 tablet (40 mg total) by mouth 2 (two) times a day before meals 180 tablet 1    Potassium 99 MG TABS Take by mouth      Saw Palmetto 160 MG CAPS Take by mouth (Patient not taking: Reported on 12/5/2024)      sulfaSALAzine (AZULFIDINE) 500 mg tablet   0    zinc gluconate 50 mg tablet Take by mouth       Allergies   Allergen Reactions    Lisinopril     Candesartan Rash    Penicillins Rash       Objective   /78   Pulse 104   Temp 98.3 °F (36.8 °C) (Oral)   Resp 18   SpO2 96%     No physical exam due to e-consult    Lab Results   Component Value Date    WBC 7.35 12/07/2024    HGB 7.7 (L) 12/07/2024    HCT 24.4 (L) 12/07/2024    MCV 79 (L) 12/07/2024     12/07/2024     Lab Results   Component Value Date    SODIUM 143 12/07/2024    K 3.8 12/07/2024     (H) 12/07/2024    CO2 19 (L) 12/07/2024    AGAP 8 12/07/2024    BUN 41 (H) 12/07/2024    CREATININE 1.54 (H) 12/07/2024    EGFR 39 12/07/2024    GLUC 130 12/07/2024    CALCIUM 8.0 (L) 12/07/2024    AST 15 12/05/2024    ALT 16  12/05/2024    ALKPHOS 68 12/05/2024    TP 5.8 (L) 12/05/2024    ALB 3.6 12/05/2024    TBILI 0.63 12/05/2024       Imaging:  10/15/24 CT ABDOMEN AND PELVIS WITHOUT IV CONTRAST     INDICATION:   pain. Per ED notes, shortness of breath, dizziness and weakness for a couple of months. Hemoglobin 4.7.     COMPARISON: 7/17/2019 prostate MRI     TECHNIQUE:  CT examination of the abdomen and pelvis was performed.   Axial, sagittal, and coronal 2D reformatted images were created from the source data and submitted for interpretation.     Radiation dose length product (DLP) for this visit:  423 mGy-cm .  This examination, like all CT scans performed in the [facility], was performed utilizing techniques to minimize radiation dose exposure, including the use of iterative   reconstruction and automated exposure control.     IV Contrast: Not given.  Enteric Contrast:  Enteric contrast was not administered.     FINDINGS:     ABDOMEN     LOWER CHEST: Emphysema, dependent atelectasis and atherosclerosis.     LIVER/BILIARY TREE:  Liver is within normal limits.     Intrahepatic and extrahepatic duct dilation to the level of a mass to be described below.     GALLBLADDER: 1.2 cm proximal calcified gallstone and mild gallbladder distention. No wall thickening or pericholecystic fatty stranding or fluid.     SPLEEN:  Within normal limits.     PANCREAS:  3.4 x 3.3 cm width by depth pancreatic head/neck mass (2/67), isointense to parenchyma. Downstream pancreatic atrophy and duct dilation.     2.0 cm simple appearing cyst in the proximal pancreatic body/neck (2/59).. Probably additional tiny cysts in the distal pancreas.     No calcification or secondary signs of acute pancreatitis.     ADRENAL GLANDS: Mild bilateral thickening without discrete mass.     KIDNEYS/URETERS:  Small right cortical cyst.     1.1 cm exophytic left lower pole nodule, indeterminate by Hounsfield units (2/69).     STOMACH AND BOWEL:  Within normal  limits.     APPENDIX:  Within normal limits.     ABDOMINOPELVIC CAVITY:  No abnormal air, fluid or enlarged lymph nodes.     VESSELS: Limited evaluation without IV contrast.  Normal aorta caliber.     PELVIS:     REPRODUCTIVE ORGANS: Similar enlarged prostate. Seminal vesicles are within normal limits.     URINARY BLADDER: Prostatic ingrowth. Otherwise within normal limits.     ABDOMINAL WALL/INGUINAL REGIONS:  Within normal limits.     BONES:  Degenerative changes. Small left iliac bone island.     The study was marked in EPIC for immediate notification.        IMPRESSION:     Pancreatic mass with evidence of secondary bile duct obstruction. Most commonly adenocarcinoma. Further evaluation with pancreas MR and MRCP recommended.     Small indeterminate left renal nodule, may be a complicated cyst. This can also be evaluated with MR without and with IV contrast.     Other nonemergent findings above.          10/16/24 MRI OF THE ABDOMEN WITHOUT CONTRAST WITH MRCP     INDICATION: 88 years / Male. Patient incidentally noted to have Pancreatic mass. Please assess further, no contrast per ordering md due to gage.     COMPARISON: CT abdomen pelvis performed yesterday.     TECHNIQUE: Multiplanar/multisequence MRI of the abdomen with 3D MRCP was performed without the administration of contrast.     DIAGNOSTIC QUALITY: Limited by motion and the lack of IV contrast.     FINDINGS:     LOWER CHEST: Unremarkable.     LIVER:  Normal in size and configuration.  No suspicious mass.  Limited evaluation of hepatic and portal veins on this non-contrast MRI is unremarkable.     BILE DUCTS: Intra and extrahepatic biliary dilation with maximum CBD diameter of 10 mm. Caliber change of the CBD at the site of suspected pancreatic head mass.     GALLBLADDER: 15 mm dependent gallstone. Gallbladder distention without wall thickening or pericholecystic inflammatory changes.     PANCREAS: Diffuse pancreatic parenchymal atrophy with enlarged  pancreatic head measuring 4.7 x 2.7 cm #2/27 likely harboring an underlying infiltrative pancreatic head mass.     Diffuse pancreatic ductal dilation measuring up to 9 mm with abrupt caliber change within the pancreatic head at the site of suspected mass.     Simple exophytic pancreatic neck cyst measuring 19 mm #15/17. 11 mm pancreatic body cyst #15/14.     ADRENAL GLANDS: Bilateral adrenal hyperplasia more apparent on the left.     SPLEEN: Normal.     KIDNEYS/PROXIMAL URETERS: No hydroureteronephrosis. The slightly hyperdense left renal lesion seen on CT is not well characterized on this study secondary to motion artifact and lack of IV contrast however this is likely to represent a proteinaceous cyst   #15/19. Scattered bilateral simple renal cysts.     BOWEL: No dilated loops of bowel.     PERITONEUM/RETROPERITONEUM: No ascites.     LYMPH NODES: No abdominal lymphadenopathy.     VESSELS: No aneurysm.     ABDOMINAL WALL: Unremarkable     BONES: No suspicious osseous lesion.     IMPRESSION:     Limited by motion artifact and lack of IV contrast.     Enlarged pancreatic head measuring 4.7 x 2.7 cm, #2/27, likely harboring an underlying infiltrative pancreatic head mass. The remainder of the pancreatic parenchyma is atrophic.     Intra/extrahepatic biliary dilation and pancreatic ductal dilation with caliber changes of these ducts at the site of suspected pancreatic head mass.          10/19/24 CT CHEST WITH IV CONTRAST     INDICATION:   staging. Per my review of the medical record, presented with melena, weakness, and epigastric discomfort. Pancreatic head mass on CT, status post endoscopic ultrasound and biopsy 10/17/2024, pathology pending.     COMPARISON: CXR 10/3/2012, abdomen CT 10/15/2024.     TECHNIQUE: Chest CT with intravenous contrast.  Axial, sagittal, coronal 2D reformats and coronal MIPS from source data.     Radiation dose length product (DLP):  397.78 mGy-cm . Radiation dose exposure minimized using  iterative reconstruction and automated exposure control.     IV Contrast:  85 mL of iohexol (OMNIPAQUE)     FINDINGS:     LUNGS: No metastases. Mild patchy consolidation and groundglass opacity in the left lower lobe.     AIRWAYS: No significant filling defects.     PLEURA: Trace left pleural effusion.     HEART/GREAT VESSELS: Normal heart size. Mild coronary artery calcification indicating atherosclerotic heart disease. Pulmonary artery enlargement.     MEDIASTINUM AND DELIA:  Unremarkable.     CHEST WALL AND LOWER NECK: Unremarkable.     UPPER ABDOMEN: Intrahepatic biliary dilation. Dilation of the pancreatic duct with atrophy of the body of the pancreas. Right renal cyst. Left adrenal hyperplasia.     OSSEOUS STRUCTURES: Mild degenerative disease in the spine.     IMPRESSION:     No lung metastases.     Mild patchy consolidation and groundglass opacity in the left lower lobe which could be due to pneumonia in the appropriate clinical setting.     Trace left pleural effusion.     Pulmonary artery enlargement which can be seen with pulmonary hypertension.          10/22/24 CT ABDOMEN AND PELVIS WITH IV CONTRAST     INDICATION: K86.89: Other specified diseases of pancreas. .     COMPARISON: CT abdomen/pelvis dated 10/15/2024.     TECHNIQUE: CT examination of the abdomen and pelvis was performed. Multiplanar 2D reformatted images were created from the source data.     This examination, like all CT scans performed in the [facility], was performed utilizing techniques to minimize radiation dose exposure, including the use of iterative reconstruction and automated exposure control. Radiation dose length   product (DLP) for this visit: 971.15 mGy-cm     IV Contrast: 50 mL of iohexol (OMNIPAQUE)  Enteric Contrast: Not administered.     FINDINGS:     ABDOMEN     LOWER CHEST: Trace left pleural effusion with subjacent atelectasis.     LIVER/BILIARY TREE: Redemonstrated mild to moderate intra and extrahepatic  biliary ductal dilatation with maximal common bile duct diameter of 0.9 cm at the site of the obstructing pancreatic head mass. No focal liver mass is identified.     GALLBLADDER: Cholelithiasis. Gallbladder distention without pericholecystic inflammatory changes.     SPLEEN: Unremarkable.     PANCREAS: There is a lesion suspicious for pancreatic cancer, which will be described based on Society of Abdominal Radiologists and American Pancreatic Association recommendations:     MORPHOLOGIC EVALUATION:  Appearance: Isoattenuating, infiltrating mass  Size: 4.4 cm in maximal axial dimension  Location: Pancreatic head.  Pancreatic duct narrowing/abrupt cut-off with or without upstream dilatation: Diffuse upstream ductal dilatation with abrupt cut off at the level of the pancreatic head  Biliary tree abrupt cut-off with or without upstream dilatation: Mild to moderate intra and extrahepatic biliary ductal dilatation with maximal common bile duct diameter of 0.9 cm at the site of the obstructing pancreatic head mass.     ARTERIAL EVALUATION:  Superior Mesenteric Artery Involvement: Absent.     Celiac Axis Involvement: Absent.     Common Hepatic Artery Involvement: Absent.     Arterial Variant:  Type: Replaced right hepatic artery.  Tumor involvement: Absent.     VENOUS EVALUATION:  Main Portal Vein Involvement: Absent.     Superior Mesenteric Vein Involvement: Absent.     Diffuse pancreatic atrophy distal to the mass. 1.1 cm cystic lesion in the pancreatic tail.     ADRENAL GLANDS: Diffuse bilateral adrenal gland thickening, left greater than right.     KIDNEYS/URETERS: 1.7 cm right renal cyst. 1.3 cm complex posterior left renal cyst, likely hemorrhagic/proteinaceous. Subcentimeter hypoattenuating renal lesion(s), too small to characterize but statistically likely benign, which do not warrant follow-up   (Radiology June 2019). No hydronephrosis.     STOMACH AND BOWEL: No evidence for bowel obstruction. Colonic  diverticulosis without evidence for acute diverticulitis.     APPENDIX: No findings to suggest appendicitis.     ABDOMINOPELVIC CAVITY: No ascites. No pneumoperitoneum. No lymphadenopathy.     VESSELS: Atherosclerotic disease. No abdominal aortic aneurysm.     PELVIS     REPRODUCTIVE ORGANS: The prostate gland is markedly enlarged measuring 8.5 x 7.6 x 6.8 cm and indents the urinary bladder base.     URINARY BLADDER: Unremarkable.     ABDOMINAL WALL/INGUINAL REGIONS: Unremarkable.     BONES: No acute fracture or suspicious osseous lesion.     IMPRESSION:     Redemonstrated findings of a 4.4 cm infiltrative pancreatic head mass. No evidence for vascular involvement. No evidence for metastatic disease in the abdomen or pelvis.     Uncomplicated colonic diverticulosis.     Marked prostatomegaly indenting the urinary bladder base.     Trace left pleural effusion with subjacent atelectasis.          11/7/24 NETSPOT PET/CT SCAN     INDICATION:   Newly diagnosed NET head of the pancreas. D3A.8: Other benign neuroendocrine tumors  C25.0: Malignant neoplasm of head of pancreas     MODIFIER: PI     COMPARISON: CT abdomen pelvis 10/22/2024, CT chest 10/19/2024 and additional priors, MRI abdomen 10/16/2024     CELL TYPE: Pancreatic neuroendocrine tumor     TECHNIQUE:   3.9 mCi Gallium-68 Dotatate Netspot administered IV.  Multiplanar attenuation corrected and non-attenuation corrected PET images were acquired 60 minutes post injection.  Contiguous, low dose, axial CT sections were obtained from the vertex through the femurs for anatomic localization.  Intravenous contrast was not utilized.     FINDINGS:     BRAIN:    Normal pituitary gland uptake is demonstrated.  No acute abnormalities are seen.     HEAD/NECK:  There is a physiologic distribution of the radiotracer. Normal salivary gland and thyroid uptake is demonstrated.     CT images:  Unremarkable.     CHEST:   No suspicious Dotatate avid lesions.     Mild hilar activity  "bilaterally, SUV max of 2.1 on the right likely reactive.     CT images: Extensive coronary artery calcifications. Scattered pleural calcifications of the left.     ABDOMEN:  Intense focal radiotracer uptake at the level of the pancreatic head mass, SUV max of 21. This measured up to 4.7 cm in size on prior CT. Secondary biliary and pancreatic ductal dilatation.     Asymmetrically increased radiotracer uptake in the left adrenal gland, SUV max of 16 compared to the right, SUV max of 12.0. Asymmetric adrenal hyperplasia suggested on the prior imaging which may explain these findings.     There are no Dotatate avid lymph nodes.     CT images: Gallbladder is distended with small gallstone dependently. Moderate fecal retention in the colon. Stable small isodense left renal lesion at the midpole posteriorly probably proteinaceous cyst.     PELVIS: Diffusely enlarged prostate with heterogeneous uptake, SUV max of 9.0.     No Dotatate avid lymph nodes.     CT images: No additional significant findings.     OSSEOUS STRUCTURES:   There is a physiologic distribution of the radiotracer.     CT images: No significant findings.     IMPRESSION:     1. Intense radiotracer uptake at the pancreatic head mass compatible with the known neuroendocrine tumor.  2. No Dotatate avid lesions suspicious for metastasis.  3. Diffusely enlarged prostate with heterogeneous uptake may be related to prostatitis.          12/5/24 CTA - CHEST WITH IV CONTRAST - PULMONARY ANGIOGRAM     INDICATION:   R07.89: Other chest pain. \"Chest tightness, hypoxia, EKG shows sinus tachycardia with heart rate of 104.\" Per my review of the medical record, neuroendocrine tumor in the pancreatic head.     COMPARISON: PET/CT 11/07/2024, chest CT 10/19/2024. Abdomen CT 10/22/2024.     TECHNIQUE: CT angiogram timed for optimal opacification of the pulmonary arteries.  Axial, sagittal, and coronal 2D reformats created from source data.  Coronal 3D MIP postprocessing on " the acquisition scanner.     Radiation dose length product (DLP):  344 mGy-cm .  Radiation dose exposure minimized using iterative reconstruction and automated exposure control.     IV Contrast:  70 mL of iohexol (OMNIPAQUE)     FINDINGS:     PULMONARY ARTERIES:  No pulmonary embolus.     LUNGS: No acute disease. Minimal juxtapleural reticulation in the right lower lobe, possibly age-related fibrosis. 3 mm posterior basal right lower lobe nodule, of doubtful significance, likely a benign intrapulmonary lymph node (6/141).     AIRWAYS: No significant filling defects.     PLEURA:  Unremarkable.     HEART/GREAT VESSELS: Normal heart size. Severe coronary artery calcification indicating atherosclerotic heart disease. Moderate calcification of the aortic valve leaflets which can contribute to aortic stenosis.     MEDIASTINUM AND DELIA:  Unremarkable.     CHEST WALL AND LOWER NECK: Unremarkable.     UPPER ABDOMEN: Redemonstration of intrahepatic biliary dilation and dilation of the pancreatic duct with atrophy of the pancreas. Redemonstration of 1.8 cm cystic lesion in the neck of the pancreas (4/232) and 9 mm cystic lesion in the tail of the   pancreas (4/215). Pancreatic head incompletely imaged with mass not conspicuous redemonstration of distended gallbladder with cholelithiasis. Right renal cyst. Left adrenal hyperplasia.     OSSEOUS STRUCTURES: Moderate degenerative disease in the spine.     IMPRESSION:     No pulmonary embolus.     No acute pulmonary disease.     Redemonstration of cystic lesions in the pancreas with pancreatic head only partially imaged and pancreatic head mass not visible     Redemonstration of intrahepatic biliary dilation, distended gallbladder, and left adrenal hyperplasia.          Pathology:  Case Report   Non-gynecologic Cytology                          Case: [id number]                                   Authorizing Provider:  Phil Baires MD           Collected:           10/17/2024 1514                Ordering Location:     [facility]        Received:            10/17/2024 1701                                      [place] [address]                                                             Pathologist:           Joseluis Schulz DO                                                           Specimens:   A) - Pancreas, pancreatic head mass                                                                  B) - Pancreas, pancreatic head mass                                                        Addendum   Additional immunohistochemical stains performed at [facility] demonstrate that the neoplastic cells are negative for trypsin and BCL10 immunostains, arguing against acinar cell carcinoma. The original diagnosis remains unchanged.   Addendum electronically signed by Joseluis Schulz DO on 10/25/2024 at 1402   Final Diagnosis   A-B. Pancreas, Head Mass, FNA (Cell Block and Smear preparations):  Positive for malignancy.  - Pancreatic neuroendocrine tumor (PanNET), favor at least well-differentiated neuroendocrine tumor, WHO grade 2 of 3, in this limited sample.     Satisfactory for evaluation.      Immunohistochemistry performed at [facility] demonstrates the following staining profile in the lesional cells:               Positive: Cam 5.2, synaptophysin, chromogranin (focal), CD56               Negative: Beta-catenin (cytoplasmic)     A Ki-67 immunostain shows a proliferative activity up to ~19% in this limited sample.     Note: As reported in the “WHO Reporting System for Pancreaticobiliary Cytopathology *” the diagnostic category of “malignant” carries a % risk of malignancy being found in subsequent FNAB or resection.  The usual management following an initial diagnosis of “malignant” is per clinical stage. Ultimately clinical and radiologic correlation is needed for this patient in arriving at the actual management plan.      *Asael FAY, Audra Glynn (Eds). (2022). WHO Reporting  System for Pancreaticobiliary Cytopathology. [facility].  FNAB - fine needle aspiration/biopsy      Electronically signed by Joseluis Schulz DO on 10/22/2024 at 1135   Note    Intradepartmental consultation is in agreement.     Dr. Phil Baires MD was notified of these findings via Epic secure message on 10/22/2024 at 11:30 am.     Additional immunohistochemical stains for trypsin and BCL10 have been ordered and will be reported in a addendum.   Intraoperative Consultation    B. Adequate. Lesional tissue present Reviewed by Joseluis Schulz M.D. Interpretation performed at [facility] [facility], [address] [place] [id number]   Gross Description    A. Formalin, 12 mL, dark brown strands.  Moderate cell button, dark brown/grey, Due to the (size and/or consistency) of the specimen, it is questionable whether cell block will survive histological processing.     B. 6 slides received (3 Diff Quik, 3 Alcohol)      Additional Information    [employer]'s FDA approved ,  and ThinPrep Imaging Duo System are utilized with strict adherence to the 's instruction manual to prepare gynecologic and non-gynecologic cytology specimens for the production of ThinPrep slides as well as for gynecologic ThinPrep imaging. These processes have been validated by our laboratory and/or by the .     These tests were developed and their performance characteristics determined by [facility] or [facility]. They may not be cleared or approved by the U.S. Food and Drug Administration. The FDA has determined that such clearance or approval is not necessary. These tests are used for clinical purposes. They should not be regarded as investigational or for research. This laboratory has been approved by CLIA 88, designated as a high-complexity laboratory and is qualified to perform these tests.     Interpretation performed at [facility] [address], [place] [address].    Resulting Agency [id number]             Specimen Collected: 10/17/24 15:14 Last Resulted: 10/25/24 14:02           Dariana Gutierrez MD 12/07/24 10:02 AM

## 2024-12-07 NOTE — ASSESSMENT & PLAN NOTE
Patient is a 89 y.o. 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 Case was discussed with attending physician and medical oncology. Imaging was personally reviewed. Patient has localized disease. Hemoglobin has improved with transfusion but patient will require continued monitoring for now. Patient has localized disease which is symptomatic with recurrent anemia. Agree with local radiation therapy. Would likely plan for around 45 Gy in 15 fractions; final dose and fractionation to be determined during radiotherapy planning. If patient remains unstable, then recommend transfer to [facility] or [facility] for inpatient radiation therapy. If patient is stable for discharge, recommend ambulatory referral for outpatient radiation therapy at [facility].

## 2024-12-08 LAB
ANION GAP SERPL CALCULATED.3IONS-SCNC: 7 MMOL/L (ref 4–13)
BUN SERPL-MCNC: 41 MG/DL (ref 5–25)
CALCIUM SERPL-MCNC: 7.5 MG/DL (ref 8.4–10.2)
CHLORIDE SERPL-SCNC: 114 MMOL/L (ref 96–108)
CO2 SERPL-SCNC: 21 MMOL/L (ref 21–32)
CREAT SERPL-MCNC: 1.39 MG/DL (ref 0.6–1.3)
ERYTHROCYTE [DISTWIDTH] IN BLOOD BY AUTOMATED COUNT: 19.9 % (ref 11.6–15.1)
GFR SERPL CREATININE-BSD FRML MDRD: 44 ML/MIN/1.73SQ M
GLUCOSE SERPL-MCNC: 153 MG/DL (ref 65–140)
GLUCOSE SERPL-MCNC: 164 MG/DL (ref 65–140)
GLUCOSE SERPL-MCNC: 172 MG/DL (ref 65–140)
GLUCOSE SERPL-MCNC: 259 MG/DL (ref 65–140)
GLUCOSE SERPL-MCNC: 309 MG/DL (ref 65–140)
HCT VFR BLD AUTO: 26.6 % (ref 36.5–49.3)
HGB BLD-MCNC: 8.2 G/DL (ref 12–17)
MCH RBC QN AUTO: 24.3 PG (ref 26.8–34.3)
MCHC RBC AUTO-ENTMCNC: 30.8 G/DL (ref 31.4–37.4)
MCV RBC AUTO: 79 FL (ref 82–98)
PLATELET # BLD AUTO: 216 THOUSANDS/UL (ref 149–390)
PMV BLD AUTO: 10.7 FL (ref 8.9–12.7)
POTASSIUM SERPL-SCNC: 3.9 MMOL/L (ref 3.5–5.3)
RBC # BLD AUTO: 3.38 MILLION/UL (ref 3.88–5.62)
SODIUM SERPL-SCNC: 142 MMOL/L (ref 135–147)
WBC # BLD AUTO: 6.74 THOUSAND/UL (ref 4.31–10.16)

## 2024-12-08 PROCEDURE — 85027 COMPLETE CBC AUTOMATED: CPT | Performed by: HOSPITALIST

## 2024-12-08 PROCEDURE — 80048 BASIC METABOLIC PNL TOTAL CA: CPT | Performed by: HOSPITALIST

## 2024-12-08 PROCEDURE — NC001 PR NO CHARGE: Performed by: STUDENT IN AN ORGANIZED HEALTH CARE EDUCATION/TRAINING PROGRAM

## 2024-12-08 PROCEDURE — 99232 SBSQ HOSP IP/OBS MODERATE 35: CPT | Performed by: HOSPITALIST

## 2024-12-08 PROCEDURE — 82948 REAGENT STRIP/BLOOD GLUCOSE: CPT

## 2024-12-08 RX ADMIN — AMLODIPINE BESYLATE 5 MG: 5 TABLET ORAL at 08:51

## 2024-12-08 RX ADMIN — ATORVASTATIN CALCIUM 20 MG: 20 TABLET, FILM COATED ORAL at 16:10

## 2024-12-08 RX ADMIN — PANTOPRAZOLE SODIUM 40 MG: 40 TABLET, DELAYED RELEASE ORAL at 08:51

## 2024-12-08 RX ADMIN — Medication 12.5 MG: at 08:51

## 2024-12-08 RX ADMIN — FINASTERIDE 5 MG: 5 TABLET, FILM COATED ORAL at 08:51

## 2024-12-08 RX ADMIN — SODIUM BICARBONATE 650 MG TABLET 650 MG: at 16:09

## 2024-12-08 RX ADMIN — INSULIN LISPRO 3 UNITS: 100 INJECTION, SOLUTION INTRAVENOUS; SUBCUTANEOUS at 21:42

## 2024-12-08 RX ADMIN — Medication 12.5 MG: at 21:44

## 2024-12-08 RX ADMIN — FOLIC ACID 1000 MCG: 1 TABLET ORAL at 08:52

## 2024-12-08 RX ADMIN — INSULIN LISPRO 2 UNITS: 100 INJECTION, SOLUTION INTRAVENOUS; SUBCUTANEOUS at 12:12

## 2024-12-08 RX ADMIN — SODIUM BICARBONATE 650 MG TABLET 650 MG: at 08:51

## 2024-12-08 RX ADMIN — PANTOPRAZOLE SODIUM 40 MG: 40 TABLET, DELAYED RELEASE ORAL at 16:10

## 2024-12-08 RX ADMIN — INSULIN LISPRO 1 UNITS: 100 INJECTION, SOLUTION INTRAVENOUS; SUBCUTANEOUS at 08:52

## 2024-12-08 RX ADMIN — TAMSULOSIN HYDROCHLORIDE 0.4 MG: 0.4 CAPSULE ORAL at 16:10

## 2024-12-08 RX ADMIN — INSULIN LISPRO 1 UNITS: 100 INJECTION, SOLUTION INTRAVENOUS; SUBCUTANEOUS at 17:08

## 2024-12-08 RX ADMIN — LEVOTHYROXINE SODIUM 50 MCG: 50 TABLET ORAL at 05:39

## 2024-12-08 NOTE — PROGRESS NOTES
Progress Note - Hospitalist   Name: Jose Zamora 89 y.o. male I MRN: [id number]  Unit/Bed#: -01 I Date of Admission: 12/7/2024   Date of Service: 12/8/2024 I Hospital Day: 1     Assessment & Plan  Primary pancreatic neuroendocrine tumor  Known pancreatic head neuroendocrine carcinoma.   Mass found in October 2024.  Seen by surgical oncology who had recommended Whipple procedure however patient declined intervention given age.  Was interested in somatostatin analogs however have not yet been started  Has localized disease which is symptomatic with recurrent anemia.  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 for inpatient radiation therapy.   Consult oncology and radiation oncology.   Acute blood loss anemia  Initially presented to [place] due to exertional chest pain.  Found to have hemoglobin of 4.8 thus was transferred to [facility] to be seen by GI.  Required 3 units PRBC  EGD 12/6/2024  Single ulcer in the duodenum with clean base (John III)  3 cm hiatal hernia  The esophagus appeared normal.  Mild erythematous mucosa in the stomach  No active bleeding, but the recent bleeding was probably due to the large duodenal ulcer likely due to tumor erosion  Avoid AP/AC/DVT prophylaxis  Trend hemoglobin and transfuse to maintain greater than 7  PPI twice daily  Atherosclerotic heart disease of native coronary artery without angina pectoris  S/p LAD IRIS 2005.  Continue atorvastatin and metoprolol  Follows with cardiology at [facility]  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)  Lab Results   Component Value Date    HGBA1C 7.1 (H) 10/15/2024       Recent Labs     12/07/24  1108 12/07/24  1610 12/07/24  2056 12/08/24  0747   POCGLU 221* 159* 209* 153*       Blood Sugar Average: Last 72 hrs:  (P) 181Home regimen:   None   SSI     Urinary retention  Failed urinary retention protocol while at [facility].  Presents to [facility] with  urinary catheter in place.   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    Discussions with Specialists or Other Care Team Provider: case management    Education and Discussions with Family / Patient: yes    Mobility:   Basic Mobility Inpatient Raw Score: 23  JH-HLM Goal: 7: Walk 25 feet or more  JH-HLM Achieved: 6: Walk 10 steps or more      Current Length of Stay: 1 day(s)    Current Patient Status: Inpatient        Code Status: Level 3 - DNAR and DNI    Discharge Plan: Pt will require continued inpatient hospitalization.    Subjective:   Pt with no complaints  Denies bleeding    Patient is seen and examined at bedside.  All other ROS are negative.    Objective:     Vitals:   Temp (24hrs), Av.8 °F (36.6 °C), Min:97.5 °F (36.4 °C), Max:98.1 °F (36.7 °C)    Temp:  [97.5 °F (36.4 °C)-98.1 °F (36.7 °C)] 97.5 °F (36.4 °C)  HR:  [] 89  Resp:  [15-16] 16  BP: (132-176)/(70-90) 148/70  SpO2:  [96 %-99 %] 98 %  Body mass index is 22.15 kg/m².     Input and Output Summary (last 24 hours):       Intake/Output Summary (Last 24 hours) at 2024 0946  Last data filed at 2024 0500  Gross per 24 hour   Intake 340 ml   Output 750 ml   Net -410 ml       Physical Exam:       GEN: No acute distress, comfortable  HEEENT: No JVD, PERRLA, no scleral icterus  RESP: Lungs clear to auscultation bilaterally  CV: RRR, +s1/s2   ABD: SOFT NON TENDER, POSITIVE BOWEL SOUNDS, NO  DISTENTION  PSYCH: CALM  NEURO: Mentation baseline, NO FOCAL DEFICITS  SKIN: NO RASH  EXTREM: NO EDEMA    Additional Data:     Labs:    Results from last 7 days   Lab Units 12/08/24  0406 12/06/24  1425 12/06/24  0510   WBC Thousand/uL 6.74   < > 7.58   HEMOGLOBIN g/dL 8.2*   < > 6.5*   HEMATOCRIT % 26.6*   < > 21.0*   PLATELETS Thousands/uL 216   < > 196   SEGS PCT %  --   --  70   LYMPHO PCT %  --   --  17   MONO PCT %  --   --  9   EOS PCT %  --   --  2    < > = values in this interval not displayed.     Results from last 7 days   Lab Units 12/08/24  0406 12/06/24  0510 12/05/24  2113   SODIUM mmol/L 142   < > 140   POTASSIUM mmol/L 3.9   < > 4.0   CHLORIDE mmol/L 114*   < > 113*   CO2 mmol/L 21   < > 20*   BUN mg/dL 41*   < > 50*   CREATININE mg/dL 1.39*   < > 1.45*   ANION GAP mmol/L 7   < > 7   CALCIUM mg/dL 7.5*   < > 8.3*   ALBUMIN g/dL  --   --  3.6   TOTAL BILIRUBIN mg/dL  --   --  0.63   ALK PHOS U/L  --   --  68   ALT U/L  --   --  16   AST U/L  --   --  15   GLUCOSE RANDOM mg/dL 164*   < > 191*    < > = values in this interval not displayed.         Results from last 7 days   Lab Units 12/08/24  0747 12/07/24  2056 12/07/24  1610 12/07/24  1108 12/07/24  0705 12/06/24  2102 12/06/24  1602 12/06/24  1201 12/06/24  0747 12/06/24  0556 12/06/24  0305 12/06/24  0019   POC GLUCOSE mg/dl 153* 209* 159* 221* 147* 195* 123 136 145* 142* 149* 169*               Lines/Drains:  Invasive Devices       Peripheral Intravenous Line  Duration             Peripheral IV 12/05/24 Right Antecubital 2 days              Drain  Duration             Urethral Catheter 14 Fr. 1 day                    Telemetry:        * I Have Reviewed All Lab Data Listed Above.           Imaging:     No results found for this or any previous visit.    No results found for this or any previous visit.      *I have reviewed all imaging reports listed above      Recent Cultures (last 7 days):           Last 24 Hours Medication List:   Current  Facility-Administered Medications   Medication Dose Route Frequency Provider Last Rate    aluminum-magnesium hydroxide-simethicone  30 mL Oral Q6H PRN Thang Cardenas PA-C      amLODIPine  5 mg Oral Daily Thang Cardenas PA-C      atorvastatin  20 mg Oral Daily With Dinner Thang Cardenas PA-C      finasteride  5 mg Oral Daily Thang Cardenas PA-C      folic acid  1,000 mcg Oral Daily Thang Cardenas PA-C      insulin lispro  1-5 Units Subcutaneous 4x Daily (AC & HS) Thang Cardenas PA-C      levothyroxine  50 mcg Oral Early Morning Thang Cardenas PA-C      metoprolol tartrate  12.5 mg Oral BID Thang Cardenas PA-C      ondansetron  4 mg Intravenous Q6H PRN Thang Cardenas PA-C      pantoprazole  40 mg Oral BID AC Thang Cardenas PA-C      simethicone  80 mg Oral 4x Daily PRN Thang Cardenas PA-C      sodium bicarbonate  650 mg Oral BID after meals Thang Cardenas PA-C      tamsulosin  0.4 mg Oral Daily With Dinner Thang Cardenas PA-C          Today, Patient Was Seen By: Mami Pinedo MD    ** Please Note: Dictation voice to text software may have been used in the creation of this document. **

## 2024-12-08 NOTE — RESTORATIVE TECHNICIAN NOTE
Restorative Technician Note      Patient Name: Jose Zamora     Restorative Tech Visit Date: 12/08/24  Note Type: Mobility  Patient Position Upon Consult: Bedside chair  Activity Performed: Ambulated; Range of motion  Patient Position at End of Consult: All needs within reach; Bedside chair

## 2024-12-08 NOTE — ASSESSMENT & PLAN NOTE
Known pancreatic head neuroendocrine carcinoma.   Mass found in October 2024.  Seen by surgical oncology who had recommended Whipple procedure however patient declined intervention given age.  Was interested in somatostatin analogs however have not yet been started  Has localized disease which is symptomatic with recurrent anemia.  Anemia suspected due to tumor erosion  Recent EGD at [facility]  Transferred from [facility] to [facility] for inpatient radiation therapy.   Consult oncology and radiation oncology.

## 2024-12-08 NOTE — ASSESSMENT & PLAN NOTE
Known pancreatic head neuroendocrine carcinoma.   Mass found in October 2024.  Seen by surgical oncology who had recommended Whipple procedure however patient declined intervention given age.  Was interested in somatostatin analogs however have not yet been started  Has localized disease which is symptomatic with recurrent anemia.  Anemia suspected due to tumor erosion  Recent EGD at [facility]  Transferred from [facility] to [facility] for inpatient radiation therapy.   Consult oncology and radiation oncology.

## 2024-12-08 NOTE — H&P
H&P - Hospitalist   Name: Jose Zamora 89 y.o. male I MRN: [id number]  Unit/Bed#: -01 I Date of Admission: 12/7/2024   Date of Service: 12/7/2024 I Hospital Day: 0     Assessment & Plan  Primary pancreatic neuroendocrine tumor  Known pancreatic head neuroendocrine carcinoma.   Mass found in October 2024.  Seen by surgical oncology who had recommended Whipple procedure however patient declined intervention given age.  Was interested in somatostatin analogs however have not yet been started  Has localized disease which is symptomatic with recurrent anemia.  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  Transferred from [facility] to [facility] for inpatient radiation therapy.   Consult oncology and radiation oncology.   Acute blood loss anemia  Initially presented to [place] due to exertional chest pain.  Found to have hemoglobin of 4.8 thus was transferred to [facility] to be seen by GI.  Required 3 units PRBC  EGD 12/6/2024  Single ulcer in the duodenum with clean base (John III)  3 cm hiatal hernia  The esophagus appeared normal.  Mild erythematous mucosa in the stomach  No active bleeding, but the recent bleeding was probably due to the large duodenal ulcer likely due to tumor erosion  Avoid AP/AC/DVT prophylaxis  Trend hemoglobin and transfuse to maintain greater than 7  PPI twice daily  Atherosclerotic heart disease of native coronary artery without angina pectoris  S/p LAD IRIS 2005.  Continue atorvastatin and metoprolol  Follows with cardiology at [facility]  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)  Lab Results   Component Value Date    HGBA1C 7.1 (H) 10/15/2024       Recent Labs     12/07/24  0705 12/07/24  1108 12/07/24  1610 12/07/24 2056   POCGLU 147* 221* 159* 209*       Blood Sugar Average: Last 72 hrs:  (P) 209Home regimen:   None   SSI     Urinary retention  Failed urinary retention protocol while at [facility].  Presents to [facility] with urinary  catheter in place.   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 Sequential Compression Devices Ordered.  Code Status: Level 3 - DNAR and DNI   Discussion with family: Patient declined call to .     Anticipated Length of Stay: Patient will be admitted on an inpatient basis with an anticipated length of stay of greater than 2 midnights secondary to Primary pancreatic neuroendocrine tumor.    History of Present Illness   Chief Complaint: Need for radiation    Jose Zamora is a 89 y.o. male with a PMH of primary pancreatic neuroendocrine tumor, CKD 3, urinary retention, CAD, anemia who presents as a transfer from [facility] for radiation therapy.  Presented to [place] due to chest pain.  On labs found to have significant anemia with hemoglobin of 4.8.  Had had similar episode in October.  At that time found to have pancreatic mass with GI bleed.  Since October admission patient had seen oncology and surgical oncology.  Patient had declined Whipple procedure secondary to age.  Had voiced interest in somatostatin analogs.  However these had not yet started when patient presented to [place]. Patient was then transferred from [place] to [facility].  At [facility] received 3 unit PRBC and had endoscopy performed on 12/6.  Endoscopy without active bleed but noted to have a large duodenal ulcer likely due to tumor erosion thought to have caused the recent bleeding.   Recommended to have no blood thinners, be on Protonix twice daily and to have radiation therapy.  Patient agreeable and thus transferred to [facility] for radiation.     Patient reports that he is overall feeling well.  Has no complaints.  Ate a large meal at [employer] voicing it was his best meal in a while.  Nervous about radiation but hopeful it will be an improvement to his current situation.     Review of Systems   Constitutional:  Negative for fatigue and fever.   HENT:  Negative for sore throat.    Respiratory:  Negative for cough, chest tightness and shortness of breath.    Cardiovascular:  Negative for chest pain.   Gastrointestinal:  Negative for abdominal distention, abdominal pain, diarrhea, nausea and vomiting.   Genitourinary:  Negative for difficulty urinating.   Musculoskeletal:  Negative for arthralgias.   Neurological:  Negative for weakness and headaches.   Psychiatric/Behavioral:  Negative for agitation and behavioral problems.    All other systems reviewed and are negative.      Historical Information   Past Medical History:   Diagnosis Date    Allergic     Anemia     Arthritis     Diabetes mellitus (HCC)     Disease of thyroid gland     Hyperlipemia     Hypertension      Past Surgical History:   Procedure Laterality Date    COLONOSCOPY      PROSTATE BIOPSY      needle,  resolved may 2005     Social History     Tobacco Use    Smoking status: Former     Current packs/day: 1.00     Average packs/day: 1 pack/day for 54.0 years (54.0 ttl pk-yrs)     Types: Cigarettes     Start date: 12/4/1970    Smokeless tobacco: Never   Vaping Use    Vaping status: Never Used   Substance and Sexual Activity    Alcohol use: Not Currently     Comment: social/rarely    Drug use: Never    Sexual activity: Not Currently     E-Cigarette/Vaping    E-Cigarette Use Never User      E-Cigarette/Vaping Substances    Nicotine No     THC No     CBD No     Flavoring No     Other No     Unknown No      Family History    Problem Relation Age of Onset    No Known Problems Mother      Social History:  Marital Status: /Civil Union   Occupation: retired  Patient Pre-hospital Living Situation: Home  Patient Pre-hospital Level of Mobility: walks  Patient Pre-hospital Diet Restrictions: diabetic     Meds/Allergies   I have reviewed home medications with patient personally.  Prior to Admission medications    Medication Sig Start Date End Date Taking? Authorizing Provider   amLODIPine (NORVASC) 5 mg tablet Take 1 tablet (5 mg total) by mouth daily 10/21/24 12/5/24  Jose Dozier MD   atorvastatin (LIPITOR) 20 mg tablet Take 1 tablet by mouth daily 10/23/12   Historical Provider, MD   cholecalciferol (VITAMIN D3) 1,000 units tablet Take by mouth    Historical Provider, MD   fexofenadine (Allegra Allergy) 180 MG tablet Take 180 mg by mouth daily as needed (allergies)    Historical Provider, MD   folic acid (FOLVITE) 1 mg tablet Take 1 tablet by mouth daily 10/23/12   Historical Provider, MD   levothyroxine 50 mcg tablet TAKE 1 TABLET BY MOUTH EVERY DAY 11/25/24   Mega Hodges DO   metoprolol tartrate (LOPRESSOR) 25 mg tablet Take 0.5 tablets (12.5 mg total) by mouth 2 (two) times a day 12/3/24 6/1/25  Mega Hodges DO   montelukast (SINGULAIR) 10 mg tablet Take by mouth 5/7/14   Historical Provider, MD   Multiple Vitamins-Minerals (CENTRUM SILVER) tablet Take by mouth 5/14/15   Historical Provider, MD   ondansetron (ZOFRAN) 4 mg tablet Take 1 tablet (4 mg total) by mouth every 8 (eight) hours as needed for nausea or vomiting 10/14/24   Mgea Hodges DO   pantoprazole (PROTONIX) 40 mg tablet Take 1 tablet (40 mg total) by mouth 2 (two) times a day before meals 12/3/24 6/1/25  Mega Hodges DO   Potassium 99 MG TABS Take by mouth 5/14/15   Historical Provider, MD Joe Ghosh 160 MG CAPS Take by mouth  Patient not taking: Reported on 12/5/2024    Historical Provider, MD   sulfaSALAzine (AZULFIDINE) 500 mg tablet  1/16/18   Historical  Provider, MD   zinc gluconate 50 mg tablet Take by mouth 5/14/15   Historical Provider, MD     Allergies   Allergen Reactions    Lisinopril     Candesartan Rash    Penicillins Rash       Objective :  Temp:  [97.5 °F (36.4 °C)-98.6 °F (37 °C)] 97.5 °F (36.4 °C)  HR:  [] 88  BP: ()/(57-90) 132/71  Resp:  [15-18] 15  SpO2:  [96 %-99 %] 97 %  O2 Device: None (Room air)    Physical Exam  Vitals and nursing note reviewed.   Constitutional:       Appearance: Normal appearance.   HENT:      Head: Normocephalic.   Eyes:      Extraocular Movements: Extraocular movements intact.      Pupils: Pupils are equal, round, and reactive to light.   Cardiovascular:      Rate and Rhythm: Normal rate and regular rhythm.      Heart sounds: No murmur heard.     No gallop.   Pulmonary:      Effort: No respiratory distress.      Breath sounds: Normal breath sounds. No wheezing.   Abdominal:      General: Bowel sounds are normal. There is no distension.      Tenderness: There is no abdominal tenderness.   Musculoskeletal:         General: Normal range of motion.      Cervical back: Normal range of motion.      Right lower leg: No edema.      Left lower leg: No edema.   Skin:     General: Skin is warm.   Neurological:      General: No focal deficit present.      Mental Status: He is alert and oriented to person, place, and time. Mental status is at baseline.   Psychiatric:         Mood and Affect: Mood normal.         Behavior: Behavior normal.         Thought Content: Thought content normal.          Lines/Drains:    Urinary Catheter:  Goal for removal: N/A- Discharging with Mccormack               Lab Results: I have reviewed the following results:  Results from last 7 days   Lab Units 12/07/24  1456 12/07/24  0505 12/06/24  1425 12/06/24  0510   WBC Thousand/uL  --  7.35  --  7.58   HEMOGLOBIN g/dL 9.3* 7.7*   < > 6.5*   HEMATOCRIT % 30.0* 24.4*  --  21.0*   PLATELETS Thousands/uL  --  192  --  196   SEGS PCT %  --   --   --  70    LYMPHO PCT %  --   --   --  17   MONO PCT %  --   --   --  9   EOS PCT %  --   --   --  2    < > = values in this interval not displayed.     Results from last 7 days   Lab Units 12/07/24  0505 12/06/24  0510 12/05/24  2113   SODIUM mmol/L 143   < > 140   POTASSIUM mmol/L 3.8   < > 4.0   CHLORIDE mmol/L 116*   < > 113*   CO2 mmol/L 19*   < > 20*   BUN mg/dL 41*   < > 50*   CREATININE mg/dL 1.54*   < > 1.45*   ANION GAP mmol/L 8   < > 7   CALCIUM mg/dL 8.0*   < > 8.3*   ALBUMIN g/dL  --   --  3.6   TOTAL BILIRUBIN mg/dL  --   --  0.63   ALK PHOS U/L  --   --  68   ALT U/L  --   --  16   AST U/L  --   --  15   GLUCOSE RANDOM mg/dL 130   < > 191*    < > = values in this interval not displayed.         Results from last 7 days   Lab Units 12/07/24  2056 12/07/24  1610 12/07/24  1108 12/07/24  0705 12/06/24  2102 12/06/24  1602 12/06/24  1201 12/06/24  0747 12/06/24  0556 12/06/24  0305 12/06/24  0019   POC GLUCOSE mg/dl 209* 159* 221* 147* 195* 123 136 145* 142* 149* 169*     Lab Results   Component Value Date    HGBA1C 7.1 (H) 10/15/2024    HGBA1C 7.1 (H) 05/06/2024    HGBA1C 5.7 08/30/2023           Imaging Results Review: No pertinent imaging studies reviewed.  Other Study Results Review: No additional pertinent studies reviewed.    Administrative Statements   I have spent a total time of 55 minutes in caring for this patient on the day of the visit/encounter including Risks and benefits of tx options, Instructions for management, Documenting in the medical record, Reviewing / ordering tests, medicine, procedures  , and Obtaining or reviewing history  .    ** Please Note: This note has been constructed using a voice recognition system. **

## 2024-12-08 NOTE — CONSULTS
Patient was transferred to [facility] for inpatient radiation therapy. Please see radiation oncology e-consult note from 12/7/24 for more details. Patient is currently stable, no signs of worsening anemia. Will plan for CT simulation tomorrow.    Dariana Gutierrez MD 12/08/24 12:10 PM

## 2024-12-08 NOTE — NURSING NOTE
Report called to receiving facility. Pt left unit with transport team, all belongings sent with transport. Mccormack draining clear yellow urine, IV saline locked in R AC. Pt has no questions or concerns.

## 2024-12-08 NOTE — ASSESSMENT & PLAN NOTE
Initially presented to [place] due to exertional chest pain.  Found to have hemoglobin of 4.8 thus was transferred to [facility] to be seen by GI.  Required 3 units PRBC  EGD 12/6/2024  Single ulcer in the duodenum with clean base (John III)  3 cm hiatal hernia  The esophagus appeared normal.  Mild erythematous mucosa in the stomach  No active bleeding, but the recent bleeding was probably due to the large duodenal ulcer likely due to tumor erosion  Avoid AP/AC/DVT prophylaxis  Trend hemoglobin and transfuse to maintain greater than 7  PPI twice daily

## 2024-12-08 NOTE — ASSESSMENT & PLAN NOTE
Failed urinary retention protocol while at [facility].  Presents to SLUB with urinary catheter in place.   Urology at [place] recommended maintaining catheter for 2 weeks with outpatient follow up to discuss voiding trial

## 2024-12-08 NOTE — MALNUTRITION/BMI
This medical record reflects one or more clinical indicators suggestive of malnutrition.    Malnutrition Findings:   Adult Malnutrition type: Chronic illness  Adult Degree of Malnutrition: Malnutrition of moderate degree  Malnutrition Characteristics: Weight loss, Muscle loss, Fat loss                360 Statement: Chronic moderate malnutrition related to inadequate oral intake as evidenced by 10.9% weight loss x 5 mo (5/14/24 174lb, 10/15/24 155lb); moderate muscle loss to pectoralis muscles; moderate fat loss to orbitals. Treated with: Can continue diet as ordered, consider liberalizing cardiac restriction as necessary though pt has no complaints of diet restrictions at this time. Recommend glucerna BID chocolate flavor. Recommend daily weights for nutrition monitoring. Provided diet ed for high kcal/high PRO nutrition therapy.    BMI Findings:           Body mass index is 22.15 kg/m².     See Nutrition note dated 12/8/24 for additional details.  Completed nutrition assessment is viewable in the nutrition documentation.

## 2024-12-08 NOTE — ASSESSMENT & PLAN NOTE
Failed urinary retention protocol while at [facility].  Presents to SLUB with urinary catheter in place.   Urology at [place] recommended maintaining catheter for 2 weeks with outpatient follow up to discuss voiding trial

## 2024-12-08 NOTE — CONSULTS
e-Consult ([facility]) - Oncology-Medical   Name: Jose Zamora 89 y.o. male I MRN: [id number]  Unit/Bed#: -01 I Date of Admission: 12/7/2024   Date of Service: 12/8/2024 I Hospital Day: 1   Inpatient consult to Oncology  Consult performed by: Blanca Demarco MD  Consult ordered by: Hayde Vincent PA-C        Physician Requesting Evaluation: Mami Pinedo MD   Reason for Evaluation / Principal Problem: Well-differentiated neuroendocrine tumor of the pancreas.    ASSESSMENT:  This is an 89-year-old male with history of diabetes mellitus, hyperlipidemia and hypertension.  He was admitted to the hospital due to decompensation with weight loss and fatigue.  He was found to have significant GI bleeding with hemoglobin of 4.5 back in October.  He had multiple imaging including CT scan and MRI of the abdomen around the middle of October which showed 4.7 cm pancreatic head mass.  FNA on 10/17/2024 was compatible with well-differentiated neuroendocrine tumor, WHO grade 2.  PET CT scan on 11/7/2024 was negative for metastatic disease.  The patient was not found to be a surgical candidate according to the surgical oncology evaluation.  During this hospital stay had CT of the chest which was negative for pulmonary embolism.  He was found to have intrahepatic bile artery dilation with distended gallbladder.  EGD on 12/6/2024 showed single large ulcer in the duodenum with clean base.  Blood work from this morning showed hemoglobin of 8.2 with normal white cells and platelets.      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  The patient was transferred to [facility] for the consideration of palliative radiation of the pancreatic mass.  His case was discussed with the primary team and Dr. Gutierrez from the radiation oncology team.  It is not entirely clear what is the exact etiology of the drop of his hemoglobin level.  However, upper GI bleeding is a  consideration given the duodenal ulcer seen on recent endoscopy.  I do recommend CT scan of the abdomen pelvis to evaluate the status of the pancreatic mass and to rule out intra-abdominal bleeding.  We discussed that there is no indication for concurrent chemotherapy with the planned radiation for well-differentiated neuroendocrine tumor.  As an outpatient he will be a candidate for lanreotide.  Lutathera can be also considered in the future if his performance status allows.    2.  Microcytic anemia: Upper GI bleeding versus intra-abdominal bleeding.  Recent iron panel from 12/5/2024 was negative for obvious hint of iron deficiency.    Blood transfusion to keep his hemoglobin level above 7 g       21-30 minutes, >50% of the total time devoted to medical consultative verbal/EMR discussion between providers. Written report will be generated in the EMR.

## 2024-12-08 NOTE — PLAN OF CARE
Problem: PAIN - ADULT  Goal: Verbalizes/displays adequate comfort level or baseline comfort level  Description: Interventions:  - Encourage patient to monitor pain and request assistance  - Assess pain using appropriate pain scale  - Administer analgesics based on type and severity of pain and evaluate response  - Implement non-pharmacological measures as appropriate and evaluate response  - Consider cultural and social influences on pain and pain management  - Notify physician/advanced practitioner if interventions unsuccessful or patient reports new pain  Outcome: Progressing     Problem: INFECTION - ADULT  Goal: Absence or prevention of progression during hospitalization  Description: INTERVENTIONS:  - Assess and monitor for signs and symptoms of infection  - Monitor lab/diagnostic results  - Monitor all insertion sites, i.e. indwelling lines, tubes, and drains  - Monitor endotracheal if appropriate and nasal secretions for changes in amount and color  - [place] appropriate cooling/warming therapies per order  - Administer medications as ordered  - Instruct and encourage patient and family to use good hand hygiene technique  - Identify and instruct in appropriate isolation precautions for identified infection/condition  Outcome: Progressing  Goal: Absence of fever/infection during neutropenic period  Description: INTERVENTIONS:  - Monitor WBC    Outcome: Progressing

## 2024-12-08 NOTE — ASSESSMENT & PLAN NOTE
Initially presented to [facility] due to exertional chest pain.  Found to have hemoglobin of 4.8 thus was transferred to [facility] to be seen by GI.  Required 3 units PRBC  EGD 12/6/2024  Single ulcer in the duodenum with clean base (John III)  3 cm hiatal hernia  The esophagus appeared normal.  Mild erythematous mucosa in the stomach  No active bleeding, but the recent bleeding was probably due to the large duodenal ulcer likely due to tumor erosion  Avoid AP/AC/DVT prophylaxis  Trend hemoglobin and transfuse to maintain greater than 7  PPI twice daily

## 2024-12-08 NOTE — ASSESSMENT & PLAN NOTE
Lab Results   Component Value Date    HGBA1C 7.1 (H) 10/15/2024       Recent Labs     12/07/24  0705 12/07/24  1108 12/07/24  1610 12/07/24 2056   POCGLU 147* 221* 159* 209*       Blood Sugar Average: Last 72 hrs:  (P) 209Home regimen:   None   SSI

## 2024-12-08 NOTE — ASSESSMENT & PLAN NOTE
Lab Results   Component Value Date    EGFR 39 12/07/2024    EGFR 40 12/06/2024    EGFR 42 12/05/2024    CREATININE 1.54 (H) 12/07/2024    CREATININE 1.51 (H) 12/06/2024    CREATININE 1.45 (H) 12/05/2024   Baseline 1.4-1.6  Avoid nephrotoxic agents  Continue to monitor

## 2024-12-08 NOTE — ASSESSMENT & PLAN NOTE
Lab Results   Component Value Date    HGBA1C 7.1 (H) 10/15/2024       Recent Labs     12/07/24  1108 12/07/24  1610 12/07/24 2056 12/08/24  0747   POCGLU 221* 159* 209* 153*       Blood Sugar Average: Last 72 hrs:  (P) 181Home regimen:   None   SSI

## 2024-12-08 NOTE — ASSESSMENT & PLAN NOTE
S/p LAD IRIS 2005.  Continue atorvastatin and metoprolol  Follows with cardiology at [facility]   Subjective;    35-year-old adult male now approximately 5 months from ORIF of displaced pelvic fractures  He presents accompanied by a young female individual  He has had x-rays on arrival to the office  He has not yet returned to work  He reports minimal discomfort  X-rays AP pelvis were obtained on arrival to the office    History reviewed  No pertinent past medical history  Past Surgical History:   Procedure Laterality Date    CIRCUMCISION      Elective    ORIF PELVIS Bilateral 9/27/2018    Procedure: OPEN REDUCTION W/ INTERNAL FIXATION (ORIF) PELVIS ANTERIOR/POSTERIOR APPROACH, ORIF PUBIC SYMPHYSIS;  Surgeon: Candance Knife, MD;  Location: [facility];  Service: Orthopedics       Family History   Problem Relation Age of Onset    Diabetes Mother        Social History   Substance Use Topics    Smoking status: Current Every Day Smoker     Packs/day: 0 25     Years: 15 00    Smokeless tobacco: Never Used      Comment: pack per week     Alcohol use No     Exam;    The gentleman is posturally balanced with no leg length detriment  The gentleman allows for hip flexion of 90° both hips with equal symmetrical strength of the hip flexors he has abduction strength of both hips which is symmetrical     He has no pain to pelvic iliac crest compression he has no anterior to posterior translation that can be reproduced  X-rays AP pelvis;   shows evidence of a symphyseal plate in good position also there is a right sacroiliac screw and continued restoration of the right SI joint and hemipelvis    Impression; Follow-up for after care of displaced pelvic fractures  History of ORIF with symphyseal plate and right sacroiliac screw of the pelvis  Plan; The physical therapy in and is permitted to return to work  He can now be seen on a p r n   Basis  His exam was supervised by me plan formulated by the attending surgeon it was my privilege to assist him in its delivery

## 2024-12-08 NOTE — ASSESSMENT & PLAN NOTE
Lab Results   Component Value Date    EGFR 44 12/08/2024    EGFR 39 12/07/2024    EGFR 40 12/06/2024    CREATININE 1.39 (H) 12/08/2024    CREATININE 1.54 (H) 12/07/2024    CREATININE 1.51 (H) 12/06/2024   Baseline 1.4-1.6  Avoid nephrotoxic agents  Continue to monitor

## 2024-12-08 NOTE — PLAN OF CARE
Problem: PAIN - ADULT  Goal: Verbalizes/displays adequate comfort level or baseline comfort level  Description: Interventions:  - Encourage patient to monitor pain and request assistance  - Assess pain using appropriate pain scale  - Administer analgesics based on type and severity of pain and evaluate response  - Implement non-pharmacological measures as appropriate and evaluate response  - Consider cultural and social influences on pain and pain management  - Notify physician/advanced practitioner if interventions unsuccessful or patient reports new pain  Outcome: Progressing     Problem: INFECTION - ADULT  Goal: Absence or prevention of progression during hospitalization  Description: INTERVENTIONS:  - Assess and monitor for signs and symptoms of infection  - Monitor lab/diagnostic results  - Monitor all insertion sites, i.e. indwelling lines, tubes, and drains  - Monitor endotracheal if appropriate and nasal secretions for changes in amount and color  - [place] appropriate cooling/warming therapies per order  - Administer medications as ordered  - Instruct and encourage patient and family to use good hand hygiene technique  - Identify and instruct in appropriate isolation precautions for identified infection/condition  Outcome: Progressing  Goal: Absence of fever/infection during neutropenic period  Description: INTERVENTIONS:  - Monitor WBC    Outcome: Progressing     Problem: SAFETY ADULT  Goal: Patient will remain free of falls  Description: INTERVENTIONS:  - Educate patient/family on patient safety including physical limitations  - Instruct patient to call for assistance with activity   - Consult OT/PT to assist with strengthening/mobility   - Keep Call bell within reach  - Keep bed low and locked with side rails adjusted as appropriate  - Keep care items and personal belongings within reach  - Initiate and maintain comfort rounds  - Make Fall Risk Sign visible to staff  - Offer Toileting every 2 Hours,  in advance of need  - Apply yellow socks and bracelet for high fall risk patients  - Consider moving patient to room near nurses station  Outcome: Progressing  Goal: Maintain or return to baseline ADL function  Description: INTERVENTIONS:  -  Assess patient's ability to carry out ADLs; assess patient's baseline for ADL function and identify physical deficits which impact ability to perform ADLs (bathing, care of mouth/teeth, toileting, grooming, dressing, etc.)  - Assess/evaluate cause of self-care deficits   - Assess range of motion  - Assess patient's mobility; develop plan if impaired  - Assess patient's need for assistive devices and provide as appropriate  - Encourage maximum independence but intervene and supervise when necessary  - Involve family in performance of ADLs  - Assess for home care needs following discharge   - Consider OT consult to assist with ADL evaluation and planning for discharge  - Provide patient education as appropriate  Outcome: Progressing  Goal: Maintains/Returns to pre admission functional level  Description: INTERVENTIONS:  - Perform AM-PAC 6 Click Basic Mobility/ Daily Activity assessment daily.  - Set and communicate daily mobility goal to care team and patient/family/caregiver.   - Collaborate with rehabilitation services on mobility goals if consulted  - Perform Range of Motion 2 times a day.  - Reposition patient every 2 hours.  - Dangle patient 2 times a day  - Stand patient 2 times a day  - Ambulate patient 2 times a day  - Out of bed to chair 2 times a day   - Out of bed for meals 2 times a day  - Out of bed for toileting  - Record patient progress and toleration of activity level   Outcome: Progressing     Problem: DISCHARGE PLANNING  Goal: Discharge to home or other facility with appropriate resources  Description: INTERVENTIONS:  - Identify barriers to discharge w/patient and caregiver  - Arrange for needed discharge resources and transportation as appropriate  - Identify  discharge learning needs (meds, wound care, etc.)  - Arrange for interpretive services to assist at discharge as needed  - Refer to Case Management Department for coordinating discharge planning if the patient needs post-hospital services based on physician/advanced practitioner order or complex needs related to functional status, cognitive ability, or social support system  Outcome: Progressing     Problem: Knowledge Deficit  Goal: Patient/family/caregiver demonstrates understanding of disease process, treatment plan, medications, and discharge instructions  Description: Complete learning assessment and assess knowledge base.  Interventions:  - Provide teaching at level of understanding  - Provide teaching via preferred learning methods  Outcome: Progressing

## 2024-12-09 ENCOUNTER — TRANSITIONAL CARE MANAGEMENT (OUTPATIENT)
Age: 89
End: 2024-12-09

## 2024-12-09 PROBLEM — E44.0 MODERATE PROTEIN-CALORIE MALNUTRITION (HCC): Status: ACTIVE | Noted: 2024-12-09

## 2024-12-09 LAB
ALBUMIN SERPL BCG-MCNC: 3.4 G/DL (ref 3.5–5)
ALP SERPL-CCNC: 72 U/L (ref 34–104)
ALT SERPL W P-5'-P-CCNC: 13 U/L (ref 7–52)
ANION GAP SERPL CALCULATED.3IONS-SCNC: 7 MMOL/L (ref 4–13)
AST SERPL W P-5'-P-CCNC: 11 U/L (ref 13–39)
BASOPHILS # BLD AUTO: 0.05 THOUSANDS/ÂΜL (ref 0–0.1)
BASOPHILS NFR BLD AUTO: 1 % (ref 0–1)
BILIRUB SERPL-MCNC: 0.47 MG/DL (ref 0.2–1)
BUN SERPL-MCNC: 40 MG/DL (ref 5–25)
CALCIUM ALBUM COR SERPL-MCNC: 7.9 MG/DL (ref 8.3–10.1)
CALCIUM SERPL-MCNC: 7.4 MG/DL (ref 8.4–10.2)
CHLORIDE SERPL-SCNC: 112 MMOL/L (ref 96–108)
CO2 SERPL-SCNC: 22 MMOL/L (ref 21–32)
CREAT SERPL-MCNC: 1.45 MG/DL (ref 0.6–1.3)
EOSINOPHIL # BLD AUTO: 0.3 THOUSAND/ÂΜL (ref 0–0.61)
EOSINOPHIL NFR BLD AUTO: 4 % (ref 0–6)
ERYTHROCYTE [DISTWIDTH] IN BLOOD BY AUTOMATED COUNT: 19.9 % (ref 11.6–15.1)
GFR SERPL CREATININE-BSD FRML MDRD: 42 ML/MIN/1.73SQ M
GLUCOSE SERPL-MCNC: 137 MG/DL (ref 65–140)
GLUCOSE SERPL-MCNC: 143 MG/DL (ref 65–140)
GLUCOSE SERPL-MCNC: 208 MG/DL (ref 65–140)
GLUCOSE SERPL-MCNC: 251 MG/DL (ref 65–140)
GLUCOSE SERPL-MCNC: 289 MG/DL (ref 65–140)
HCT VFR BLD AUTO: 28 % (ref 36.5–49.3)
HGB BLD-MCNC: 8.6 G/DL (ref 12–17)
IMM GRANULOCYTES # BLD AUTO: 0.05 THOUSAND/UL (ref 0–0.2)
IMM GRANULOCYTES NFR BLD AUTO: 1 % (ref 0–2)
LYMPHOCYTES # BLD AUTO: 1.17 THOUSANDS/ÂΜL (ref 0.6–4.47)
LYMPHOCYTES NFR BLD AUTO: 14 % (ref 14–44)
MCH RBC QN AUTO: 24 PG (ref 26.8–34.3)
MCHC RBC AUTO-ENTMCNC: 30.7 G/DL (ref 31.4–37.4)
MCV RBC AUTO: 78 FL (ref 82–98)
MONOCYTES # BLD AUTO: 0.87 THOUSAND/ÂΜL (ref 0.17–1.22)
MONOCYTES NFR BLD AUTO: 10 % (ref 4–12)
NEUTROPHILS # BLD AUTO: 5.92 THOUSANDS/ÂΜL (ref 1.85–7.62)
NEUTS SEG NFR BLD AUTO: 70 % (ref 43–75)
NRBC BLD AUTO-RTO: 0 /100 WBCS
PLATELET # BLD AUTO: 214 THOUSANDS/UL (ref 149–390)
PMV BLD AUTO: 10.5 FL (ref 8.9–12.7)
POTASSIUM SERPL-SCNC: 4 MMOL/L (ref 3.5–5.3)
PROT SERPL-MCNC: 5.8 G/DL (ref 6.4–8.4)
RBC # BLD AUTO: 3.58 MILLION/UL (ref 3.88–5.62)
SODIUM SERPL-SCNC: 141 MMOL/L (ref 135–147)
WBC # BLD AUTO: 8.36 THOUSAND/UL (ref 4.31–10.16)

## 2024-12-09 PROCEDURE — 80053 COMPREHEN METABOLIC PANEL: CPT | Performed by: HOSPITALIST

## 2024-12-09 PROCEDURE — 77290 THER RAD SIMULAJ FIELD CPLX: CPT | Performed by: RADIOLOGY

## 2024-12-09 PROCEDURE — 99232 SBSQ HOSP IP/OBS MODERATE 35: CPT | Performed by: PHYSICIAN ASSISTANT

## 2024-12-09 PROCEDURE — 77334 RADIATION TREATMENT AID(S): CPT | Performed by: RADIOLOGY

## 2024-12-09 PROCEDURE — 85025 COMPLETE CBC W/AUTO DIFF WBC: CPT | Performed by: HOSPITALIST

## 2024-12-09 PROCEDURE — 82948 REAGENT STRIP/BLOOD GLUCOSE: CPT

## 2024-12-09 PROCEDURE — 77263 THER RADIOLOGY TX PLNG CPLX: CPT | Performed by: STUDENT IN AN ORGANIZED HEALTH CARE EDUCATION/TRAINING PROGRAM

## 2024-12-09 RX ADMIN — PANTOPRAZOLE SODIUM 40 MG: 40 TABLET, DELAYED RELEASE ORAL at 16:58

## 2024-12-09 RX ADMIN — INSULIN LISPRO 3 UNITS: 100 INJECTION, SOLUTION INTRAVENOUS; SUBCUTANEOUS at 21:06

## 2024-12-09 RX ADMIN — INSULIN LISPRO 2 UNITS: 100 INJECTION, SOLUTION INTRAVENOUS; SUBCUTANEOUS at 11:39

## 2024-12-09 RX ADMIN — SODIUM BICARBONATE 650 MG TABLET 650 MG: at 16:58

## 2024-12-09 RX ADMIN — FOLIC ACID 1000 MCG: 1 TABLET ORAL at 08:23

## 2024-12-09 RX ADMIN — LEVOTHYROXINE SODIUM 50 MCG: 50 TABLET ORAL at 05:16

## 2024-12-09 RX ADMIN — ATORVASTATIN CALCIUM 20 MG: 20 TABLET, FILM COATED ORAL at 16:58

## 2024-12-09 RX ADMIN — PANTOPRAZOLE SODIUM 40 MG: 40 TABLET, DELAYED RELEASE ORAL at 08:23

## 2024-12-09 RX ADMIN — SODIUM BICARBONATE 650 MG TABLET 650 MG: at 08:23

## 2024-12-09 RX ADMIN — FINASTERIDE 5 MG: 5 TABLET, FILM COATED ORAL at 08:23

## 2024-12-09 RX ADMIN — Medication 12.5 MG: at 21:05

## 2024-12-09 RX ADMIN — TAMSULOSIN HYDROCHLORIDE 0.4 MG: 0.4 CAPSULE ORAL at 16:58

## 2024-12-09 RX ADMIN — Medication 12.5 MG: at 08:23

## 2024-12-09 RX ADMIN — INSULIN LISPRO 1 UNITS: 100 INJECTION, SOLUTION INTRAVENOUS; SUBCUTANEOUS at 16:58

## 2024-12-09 RX ADMIN — AMLODIPINE BESYLATE 5 MG: 5 TABLET ORAL at 08:23

## 2024-12-09 NOTE — ASSESSMENT & PLAN NOTE
Initially presented [facility] 2/2 CP. Hgb 4.8; pt transferred to [facility] for GI evaluation.  S/p 3 u PRBC    EGD (12/06):   Single ulcer in the duodenum with clean base (John III)  3 cm hiatal hernia  Normal esophagus  Mild erythematous mucosa in the stomach  No active bleeding  Recent bleeding likely 2/2 large duodenal ulcer due to tumor erosion  Avoid AP/AC/DVT prophylaxis  Trend Hgb; transfuse w/ Hgb < 7.0    C/w Protonix BID     Recent Labs     12/07/24  1456 12/08/24  0406 12/09/24  0532   HGB 9.3* 8.2* 8.6*

## 2024-12-09 NOTE — QUICK NOTE
Patient transferred to  for CT simulation.  Please see Dr. Gutierrez's consultation for complete details.    Prior to CT simulation today, I reviewed the diagnosis and recommendation for palliative radiotherapy.   I described risks of radiation to this region which include but are not limited to skin irritation and pigment changes, fatigue, nausea/vomiting, reflux and/or heartburn, possible weight loss and/or dehydration, diarrhea (which may be chronic), difficulty digesting certain foods, adhesions causing bowel obstruction requiring possible surgical treatment, rarely spinal cord/nerve injury, decreased kidney or liver function, possible bowel or stomach ulcers and rarely other bowel injury resulting in bowel bleeding and/or perforation.  Even with treatment there is the risk for ongoing bleeding or progressive/persistent disease.    After weighing the risks/benefits of possible radiation, informed consent was obtained today.  Treatment will be planned by Dr. Gutierrez and he can transition to our [place] facility once discharged.

## 2024-12-09 NOTE — CASE MANAGEMENT
Case Management Assessment & Discharge Planning Note    Patient name Jose Zamora  Location /-01 MRN [id number]  : 1935 Date 2024       Current Admission Date: 2024  Current Admission Diagnosis:Primary pancreatic neuroendocrine tumor   Patient Active Problem List    Diagnosis Date Noted Date Diagnosed    Moderate protein-calorie malnutrition (HCC) 2024     Metabolic acidosis 2024     Chest tightness 2024     Hypertensive urgency 2024     Elevated troponin 2024     Primary pancreatic neuroendocrine tumor 10/23/2024     Hypokalemia 10/20/2024     Hypomagnesemia 10/20/2024     CKD stage 3b, 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 ([facility]) 10/19/2024     Urinary retention 10/16/2024     PAWEL (acute kidney injury) ([facility]) 10/16/2024     Pancreatic mass 10/16/2024     Lower back pain 10/16/2024     UGIB (upper gastrointestinal bleed) 10/15/2024     Generalized abdominal pain 10/14/2024     Weight loss 10/14/2024     Acute bilateral thoracic back pain 10/14/2024     Nausea 10/14/2024     Right hand pain 2022     Acute pain of both shoulders 2022     Localized osteoarthritis of right knee 2020     Acute pain of right knee 2020     Strain of calf muscle 2020     Lumbar strain 10/25/2019     Abnormal prostate exam 2019     Beta thalassemia minor 05/15/2019     Elevated PSA 05/15/2019     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 ([facility]) 2019     Type 2 diabetes mellitus with stage 3 chronic kidney disease ([facility]) 2019     Squamous cell carcinoma 2019     Dermatitis 2019     Skin neoplasm 2019     Acute blood loss anemia 2017     Acquired hypothyroidism 10/19/2016     PAD (peripheral artery disease) ([facility]) 2015     Hyperlipidemia 2013     Atherosclerotic heart disease of native coronary artery without angina pectoris 09/10/2012     DMII (diabetes mellitus, type 2) ([facility]) 09/10/2012     Hypertension 09/10/2012        LOS (days): 2  Geometric Mean LOS (GMLOS) (days): 2.3  Days to GMLOS:0.4     OBJECTIVE:    Risk of Unplanned Readmission Score: 21.33         Current admission status: Inpatient       Preferred Pharmacy:   [facility]/pharmacy #[id number] - [place] - [address]  [address] [address]  Phone: [phone] Fax: [phone]    Primary Care Provider: Scott Hodges DO    Primary Insurance: MEDICARE  Secondary Insurance: BLUE CROSS    ASSESSMENT:  Active Health Care Proxies       Yolanda Zamora Health Care Representative - Spouse   Primary Phone: [phone] (Mobile)  Home Phone: [phone]                 Advance Directives  Does patient have a Health Care POA?: Yes  Does patient have Advance Directives?: Yes  Advance Directives: Power of  for health care  Primary Contact: Dtr Parul         Readmission Root Cause  30 Day Readmission: No    Patient Information  Admitted from:: Home  Mental Status: Alert  During Assessment patient was accompanied by: Not accompanied during assessment  Assessment information provided by:: Patient  Primary Caregiver: Self  Support Systems: Self, Spouse/significant other, Friends/neighbors, Family members  County of Residence: [place]  What [facility] do you live in?: [address]  Home entry access options. Select all that apply.: Stairs  Number of steps to enter home.: 3  Do the steps have railings?: No  Type of Current Residence: [facility]  Living Arrangements: Lives w/ Spouse/significant other  Is patient a ?: No    Activities of Daily Living Prior to Admission  Functional Status: Independent  Completes ADLs independently?: Yes  Ambulates independently?: Yes  Does patient use assisted devices?: No  Does patient currently own DME?: Yes  What DME does the patient currently own?: Walker  Does patient have a history of Outpatient Therapy (PT/OT)?: No  Does the patient have a history of Short-Term Rehab?: No  Does patient have a history of HHC?: No  Does patient currently  have HHC?: No         Patient Information Continued  Income Source: SSI/SSD  Does patient have prescription coverage?: Yes  Does patient receive dialysis treatments?: No  Does patient have a history of substance abuse?: No  Does patient have a history of Mental Health Diagnosis?: No         Means of Transportation  Means of Transport to Appts:: Drives Self          DISCHARGE DETAILS:    Discharge planning discussed with:: patient  Freedom of Choice: Yes  Comments - Freedom of Choice: discussed dc planning and role of CM. No needs anticiapted at this time  CM contacted family/caregiver?: No- see comments (pt alert and indpt in room)  Were Treatment Team discharge recommendations reviewed with patient/caregiver?: Yes  Did patient/caregiver verbalize understanding of patient care needs?: Yes       Contacts  Reason/Outcome: Discharge Planning, Continuity of Care    Requested Home Health Care         Is the patient interested in HHC at discharge?: No    DME Referral Provided  Referral made for DME?: No    Other Referral/Resources/Interventions Provided:  Interventions: None Indicated  Referral Comments: No needs antiicpated. Cm following         Treatment Team Recommendation: Home  Discharge Destination Plan:: Home  Transport at Discharge : Other (Comment) ([place])                                      Additional Comments: Pt was a transfer from [facility] for recurrent anemia, Gi bleed. He is a new start RAD tx. He may have a dumont on Dc. If so cm will arange HHC. Pt resides with his wife in a Ranch style home with 2 chloé in front and 3 chloé from the rear entrance of the home. Pt has a walker but does not require it. He drives and sees a  PCP. Pts POA is his dtr Parul. He uses [facility] pharmacy and states he will likely need a ride home. Cm following for dc planning needs.

## 2024-12-09 NOTE — ASSESSMENT & PLAN NOTE
Lab Results   Component Value Date    HGBA1C 7.1 (H) 10/15/2024     Recent Labs     12/08/24  1118 12/08/24  1631 12/08/24 2047 12/09/24  0722   POCGLU 259* 172* 309* 137     Blood Sugar Average: Last 72 hrs:  (P) 206.5  Home regimen: no medications PTA   C/w Accu-Cheks and SSI   W/ advanced age and risk for hypoglycemia, will likely defer to PCP whether initiation of medication is appropriate

## 2024-12-09 NOTE — QUICK NOTE
Radiation Oncology Simulation  Note     A CT simulation was done today for radiation treatment planning purposes to the pancreas. The plan is to begin treatments tomorrow 12/10 @ 2:30 pm. Pt will need to be NPO 3 hours prior to that appt and for all following RT appts. Patient tolerated simulation well and was returned to room.     KG

## 2024-12-09 NOTE — ASSESSMENT & PLAN NOTE
PMH of pancreatic head neuroendocrine carcinoma, no obvious mets; transferred from [facility] for initiation of inpatient radiation therapy.  Dx in 10/2024  Evaluated by surgical oncology  who had recommended Whipple procedure however pt declined intervention given age.    Pt was interested in somatostatin analogs; therapy had not yet been initiated  Noted localized disease w/ symptomatic recurrent anemia 2/2 tumor erosion, see below   Oncology/radiation oncology following   Initiation of CT stimulation for eventual palliative radiation to start on 12/09

## 2024-12-09 NOTE — ASSESSMENT & PLAN NOTE
Lab Results   Component Value Date    EGFR 42 12/09/2024    EGFR 44 12/08/2024    EGFR 39 12/07/2024    CREATININE 1.45 (H) 12/09/2024    CREATININE 1.39 (H) 12/08/2024    CREATININE 1.54 (H) 12/07/2024     Baseline Cr. 1.4-1.6  Avoid nephrotoxic agents & hypotension

## 2024-12-09 NOTE — ASSESSMENT & PLAN NOTE
Pt failed urinary retention protocol at [facility]; he presents to [facility] w/ urinary catheter in place  H/o dumont catheter in 10/2024   Per [facility] urology, plan to maintain catheter x 2 weeks w/ outpt void trial   C/w Flomax

## 2024-12-09 NOTE — PROGRESS NOTES
Progress Note - Hospitalist   Name: Jose Zamora 89 y.o. 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  Dx in 10/2024  Evaluated by surgical oncology  who had recommended Whipple procedure however pt declined intervention given age.    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 Hgb 4.8; pt transferred to [facility] for GI evaluation.  S/p 3 u PRBC    EGD (12/06):   Single ulcer in the duodenum with clean base (John III)  3 cm hiatal hernia  Normal esophagus  Mild erythematous mucosa in the stomach  No active bleeding  Recent bleeding likely 2/2 large duodenal ulcer due to tumor erosion  Avoid AP/AC/DVT prophylaxis  Trend Hgb; transfuse w/ Hgb < 7.0    C/w Protonix BID     Recent Labs     12/07/24  1456 12/08/24  0406 12/09/24  0532   HGB 9.3* 8.2* 8.6*     Urinary retention  Pt failed urinary retention protocol at [facility]; he presents to [facility] w/ urinary catheter in place  H/o dumont catheter in 10/2024   Per [facility] urology, plan to maintain catheter x 2 weeks w/ outpt void trial   C/w Flomax  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)  Lab Results   Component Value Date    HGBA1C 7.1 (H) 10/15/2024     Recent Labs     12/08/24  1118 12/08/24  1631 12/08/24  2047 12/09/24  0722   POCGLU 259* 172* 309* 137     Blood Sugar Average: Last 72 hrs:  (P) 206.5  Home regimen: no medications PTA   C/w Accu-Cheks and SSI   W/ advanced age and risk  for hypoglycemia, will likely defer to PCP whether initiation of medication is appropriate  Atherosclerotic heart disease of native coronary artery without angina pectoris  S/p LAD IRIS 2005; known to [facility] cardiology   C/w atorvastatin, amlodipine & metoprolol  No symptoms of unstable angina  CKD stage 3b, 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 1.4-1.6  Avoid nephrotoxic agents & hypotension   Moderate protein-calorie malnutrition (HCC)  Malnutrition Findings:   Adult Malnutrition type: Chronic illness  Adult Degree of Malnutrition: Malnutrition of moderate degree  Malnutrition Characteristics: Weight loss, Muscle loss, Fat loss    360 Statement: Chronic moderate malnutrition related to inadequate oral intake as evidenced by 10.9% weight loss x 5 mo (5/14/24 174lb, 10/15/24 155lb); moderate muscle loss to pectoralis muscles; moderate fat loss to orbitals. Treated with: Can continue diet as ordered, consider liberalizing cardiac restriction as necessary though pt has no complaints of diet restrictions at this time. Recommend glucerna BID chocolate flavor. Recommend daily weights for nutrition monitoring. Provided diet ed for high kcal/high PRO nutrition therapy.    BMI Findings:   Body mass index is 22.15 kg/m².     VTE Pharmacologic Prophylaxis: VTE Score: 3 Moderate Risk (Score 3-4) - Pharmacological DVT Prophylaxis Contraindicated. Sequential Compression Devices Ordered.    Mobility:   Basic Mobility Inpatient Raw Score: 23  JH-HLM Goal: 7: Walk 25 feet or more  JH-HLM Achieved: 6: Walk 10 steps or more  JH-HLM Goal achieved. Continue to encourage appropriate mobility.    Patient Centered Rounds: I performed bedside rounds with nursing staff today.   Discussions with Specialists or Other Care Team Provider:  Oncology/radiation oncology following    Education and Discussions with Family / Patient: Updated  (daughter) via phone.    Current Length of Stay: 2 day(s)  Current Patient Status: Inpatient   Certification Statement: The patient will continue to require additional inpatient hospital stay due to initiation of inpatient radiation  Discharge Plan: Anticipate discharge in 24-48 hrs to home.    Code Status: Level 3 - DNAR and DNI    Subjective   Patient is doing well.  He has no specific complaints.  He is tolerating p.o. intake.  He finished his breakfast today.  He denies chest pain, shortness of breath, nausea, vomiting, abdominal pain, joint or muscle pain.    Objective :  Temp:  [97.5 °F (36.4 °C)-98.1 °F (36.7 °C)] 97.5 °F (36.4 °C)  HR:  [] 96  BP: (129-152)/(66-81) 141/68  Resp:  [18] 18  SpO2:  [97 %-99 %] 98 %  O2 Device: None (Room air)    Body mass index is 22.15 kg/m².     Input and Output Summary (last 24 hours):     Intake/Output Summary (Last 24 hours) at 12/9/2024 0857  Last data filed at 12/9/2024 0832  Gross per 24 hour   Intake 822 ml   Output 1750 ml   Net -928 ml       Physical Exam  Constitutional:       Comments: Elderly, pleasant   HENT:      Head: Normocephalic.      Right Ear: External ear normal.      Left Ear: External ear normal.      Nose: Nose normal.      Mouth/Throat:      Mouth: Mucous membranes are moist.      Pharynx: Oropharynx is clear.   Eyes:      Conjunctiva/sclera: Conjunctivae normal.   Cardiovascular:      Rate and Rhythm: Normal rate and regular rhythm.      Pulses: Normal pulses.      Heart sounds: Normal heart sounds.   Pulmonary:      Effort: Pulmonary effort is normal.      Breath sounds: Normal breath sounds.   Abdominal:      General: Abdomen is flat. Bowel sounds are normal. There is no distension.      Palpations: Abdomen is soft.      Tenderness: There is no abdominal tenderness. There is no guarding.   Musculoskeletal:         General: No  swelling or deformity. Normal range of motion.      Cervical back: Normal range of motion.   Skin:     General: Skin is warm and dry.   Neurological:      General: No focal deficit present.      Mental Status: He is alert. Mental status is at baseline.   Psychiatric:         Mood and Affect: Mood normal.         Behavior: Behavior normal.           Lines/Drains:  Lines/Drains/Airways       Active Status       Name Placement date Placement time Site Days    Urethral Catheter 14 Fr. 12/06/24 1807  --  2                  Urinary Catheter:  Goal for removal: N/A- Discharging with [place]                 Lab Results: I have reviewed the following results:   Results from last 7 days   Lab Units 12/09/24  0532   WBC Thousand/uL 8.36   HEMOGLOBIN g/dL 8.6*   HEMATOCRIT % 28.0*   PLATELETS Thousands/uL 214   SEGS PCT % 70   LYMPHO PCT % 14   MONO PCT % 10   EOS PCT % 4     Results from last 7 days   Lab Units 12/09/24  0532   SODIUM mmol/L 141   POTASSIUM mmol/L 4.0   CHLORIDE mmol/L 112*   CO2 mmol/L 22   BUN mg/dL 40*   CREATININE mg/dL 1.45*   ANION GAP mmol/L 7   CALCIUM mg/dL 7.4*   ALBUMIN g/dL 3.4*   TOTAL BILIRUBIN mg/dL 0.47   ALK PHOS U/L 72   ALT U/L 13   AST U/L 11*   GLUCOSE RANDOM mg/dL 143*         Results from last 7 days   Lab Units 12/09/24  0722 12/08/24  2047 12/08/24  1631 12/08/24  1118 12/08/24  0747 12/07/24  2056 12/07/24  1610 12/07/24  1108 12/07/24  0705 12/06/24  2102 12/06/24  1602 12/06/24  1201   POC GLUCOSE mg/dl 137 309* 172* 259* 153* 209* 159* 221* 147* 195* 123 136               Recent Cultures (last 7 days):         Imaging Results Review: No pertinent imaging studies reviewed.  Other Study Results Review: No additional pertinent studies reviewed.    Last 24 Hours Medication List:     Current Facility-Administered Medications:     aluminum-magnesium hydroxide-simethicone (MAALOX) oral suspension 30 mL, Q6H PRN    amLODIPine (NORVASC) tablet 5 mg, Daily    atorvastatin (LIPITOR) tablet 20  mg, Daily With Dinner    finasteride (PROSCAR) tablet 5 mg, Daily    folic acid (FOLVITE) tablet 1,000 mcg, Daily    insulin lispro (HumALOG/ADMELOG) 100 units/mL subcutaneous injection 1-5 Units, 4x Daily (AC & HS) **AND** Fingerstick Glucose (POCT), 4x Daily AC and at bedtime    levothyroxine tablet 50 mcg, Early Morning    metoprolol tartrate (LOPRESSOR) partial tablet 12.5 mg, BID    ondansetron (ZOFRAN) injection 4 mg, Q6H PRN    pantoprazole (PROTONIX) EC tablet 40 mg, BID AC    simethicone (MYLICON) chewable tablet 80 mg, 4x Daily PRN    sodium bicarbonate tablet 650 mg, BID after meals    tamsulosin (FLOMAX) capsule 0.4 mg, Daily With Dinner    Administrative Statements   Today, Patient Was Seen By: Cathi Wetzel PA-C  I have spent a total time of 35 minutes in caring for this patient on the day of the visit/encounter including Diagnostic results, Impressions, Counseling / Coordination of care, Documenting in the medical record, Reviewing / ordering tests, medicine, procedures  , Obtaining or reviewing history  , and Communicating with other healthcare professionals .    **Please Note: This note may have been constructed using a voice recognition system.**

## 2024-12-09 NOTE — PLAN OF CARE
Problem: PAIN - ADULT  Goal: Verbalizes/displays adequate comfort level or baseline comfort level  Description: Interventions:  - Encourage patient to monitor pain and request assistance  - Assess pain using appropriate pain scale  - Administer analgesics based on type and severity of pain and evaluate response  - Implement non-pharmacological measures as appropriate and evaluate response  - Consider cultural and social influences on pain and pain management  - Notify physician/advanced practitioner if interventions unsuccessful or patient reports new pain  Outcome: Progressing     Problem: INFECTION - ADULT  Goal: Absence or prevention of progression during hospitalization  Description: INTERVENTIONS:  - Assess and monitor for signs and symptoms of infection  - Monitor lab/diagnostic results  - Monitor all insertion sites, i.e. indwelling lines, tubes, and drains  - Monitor endotracheal if appropriate and nasal secretions for changes in amount and color  - [place] appropriate cooling/warming therapies per order  - Administer medications as ordered  - Instruct and encourage patient and family to use good hand hygiene technique  - Identify and instruct in appropriate isolation precautions for identified infection/condition  Outcome: Progressing  Goal: Absence of fever/infection during neutropenic period  Description: INTERVENTIONS:  - Monitor WBC    Outcome: Progressing     Problem: SAFETY ADULT  Goal: Patient will remain free of falls  Description: INTERVENTIONS:  - Educate patient/family on patient safety including physical limitations  - Instruct patient to call for assistance with activity   - Consult OT/PT to assist with strengthening/mobility   - Keep Call bell within reach  - Keep bed low and locked with side rails adjusted as appropriate  - Keep care items and personal belongings within reach  - Initiate and maintain comfort rounds  - Make Fall Risk Sign visible to staff  - Offer Toileting every 2 Hours,  in advance of need  - Initiate/Maintain bed alarm  - Obtain necessary fall risk management equipment:   - Apply yellow socks and bracelet for high fall risk patients  - Consider moving patient to room near nurses station  Outcome: Progressing  Goal: Maintain or return to baseline ADL function  Description: INTERVENTIONS:  -  Assess patient's ability to carry out ADLs; assess patient's baseline for ADL function and identify physical deficits which impact ability to perform ADLs (bathing, care of mouth/teeth, toileting, grooming, dressing, etc.)  - Assess/evaluate cause of self-care deficits   - Assess range of motion  - Assess patient's mobility; develop plan if impaired  - Assess patient's need for assistive devices and provide as appropriate  - Encourage maximum independence but intervene and supervise when necessary  - Involve family in performance of ADLs  - Assess for home care needs following discharge   - Consider OT consult to assist with ADL evaluation and planning for discharge  - Provide patient education as appropriate  Outcome: Progressing  Goal: Maintains/Returns to pre admission functional level  Description: INTERVENTIONS:  - Perform AM-PAC 6 Click Basic Mobility/ Daily Activity assessment daily.  - Set and communicate daily mobility goal to care team and patient/family/caregiver.   - Collaborate with rehabilitation services on mobility goals if consulted  - Perform Range of Motion x times a day.  - Reposition patient every x hours.  - Dangle patient x times a day  - Stand patient x times a day  - Ambulate patient x times a day  - Out of bed to chair x times a day   - Out of bed for meals  times a day  - Out of bed for toileting  - Record patient progress and toleration of activity level   Outcome: Progressing     Problem: DISCHARGE PLANNING  Goal: Discharge to home or other facility with appropriate resources  Description: INTERVENTIONS:  - Identify barriers to discharge w/patient and caregiver  -  Arrange for needed discharge resources and transportation as appropriate  - Identify discharge learning needs (meds, wound care, etc.)  - Arrange for interpretive services to assist at discharge as needed  - Refer to Case Management Department for coordinating discharge planning if the patient needs post-hospital services based on physician/advanced practitioner order or complex needs related to functional status, cognitive ability, or social support system  Outcome: Progressing     Problem: Knowledge Deficit  Goal: Patient/family/caregiver demonstrates understanding of disease process, treatment plan, medications, and discharge instructions  Description: Complete learning assessment and assess knowledge base.  Interventions:  - Provide teaching at level of understanding  - Provide teaching via preferred learning methods  Outcome: Progressing     Problem: Nutrition/Hydration-ADULT  Goal: Nutrient/Hydration intake appropriate for improving, restoring or maintaining nutritional needs  Description: Monitor and assess patient's nutrition/hydration status for malnutrition. Collaborate with interdisciplinary team and initiate plan and interventions as ordered.  Monitor patient's weight and dietary intake as ordered or per policy. Utilize nutrition screening tool and intervene as necessary. Determine patient's food preferences and provide high-protein, high-caloric foods as appropriate.     INTERVENTIONS:  - Monitor oral intake, urinary output, labs, and treatment plans  - Assess nutrition and hydration status and recommend course of action  - Evaluate amount of meals eaten  - Assist patient with eating if necessary   - Allow adequate time for meals  - Recommend/ encourage appropriate diets, oral nutritional supplements, and vitamin/mineral supplements  - Order, calculate, and assess calorie counts as needed  - Recommend, monitor, and adjust tube feedings and TPN/PPN based on assessed needs  - Assess need for intravenous  fluids  - Provide specific nutrition/hydration education as appropriate  - Include patient/family/caregiver in decisions related to nutrition  Outcome: Progressing     Problem: Nutrition/Hydration-ADULT  Goal: Nutrient/Hydration intake appropriate for improving, restoring or maintaining nutritional needs  Description: Monitor and assess patient's nutrition/hydration status for malnutrition. Collaborate with interdisciplinary team and initiate plan and interventions as ordered.  Monitor patient's weight and dietary intake as ordered or per policy. Utilize nutrition screening tool and intervene as necessary. Determine patient's food preferences and provide high-protein, high-caloric foods as appropriate.     INTERVENTIONS:  - Monitor oral intake, urinary output, labs, and treatment plans  - Assess nutrition and hydration status and recommend course of action  - Evaluate amount of meals eaten  - Assist patient with eating if necessary   - Allow adequate time for meals  - Recommend/ encourage appropriate diets, oral nutritional supplements, and vitamin/mineral supplements  - Order, calculate, and assess calorie counts as needed  - Recommend, monitor, and adjust tube feedings and TPN/PPN based on assessed needs  - Assess need for intravenous fluids  - Provide specific nutrition/hydration education as appropriate  - Include patient/family/caregiver in decisions related to nutrition  Outcome: Progressing

## 2024-12-09 NOTE — ASSESSMENT & PLAN NOTE
S/p LAD IRIS 2005; known to [facility] cardiology   C/w atorvastatin, amlodipine & metoprolol  No symptoms of unstable angina

## 2024-12-09 NOTE — ASSESSMENT & PLAN NOTE
Malnutrition Findings:   Adult Malnutrition type: Chronic illness  Adult Degree of Malnutrition: Malnutrition of moderate degree  Malnutrition Characteristics: Weight loss, Muscle loss, Fat loss    360 Statement: Chronic moderate malnutrition related to inadequate oral intake as evidenced by 10.9% weight loss x 5 mo (5/14/24 174lb, 10/15/24 155lb); moderate muscle loss to pectoralis muscles; moderate fat loss to orbitals. Treated with: Can continue diet as ordered, consider liberalizing cardiac restriction as necessary though pt has no complaints of diet restrictions at this time. Recommend glucerna BID chocolate flavor. Recommend daily weights for nutrition monitoring. Provided diet ed for high kcal/high PRO nutrition therapy.    BMI Findings:   Body mass index is 22.15 kg/m².

## 2024-12-09 NOTE — PLAN OF CARE
Problem: PAIN - ADULT  Goal: Verbalizes/displays adequate comfort level or baseline comfort level  Description: Interventions:  - Encourage patient to monitor pain and request assistance  - Assess pain using appropriate pain scale  - Administer analgesics based on type and severity of pain and evaluate response  - Implement non-pharmacological measures as appropriate and evaluate response  - Consider cultural and social influences on pain and pain management  - Notify physician/advanced practitioner if interventions unsuccessful or patient reports new pain  Outcome: Progressing     Problem: INFECTION - ADULT  Goal: Absence or prevention of progression during hospitalization  Description: INTERVENTIONS:  - Assess and monitor for signs and symptoms of infection  - Monitor lab/diagnostic results  - Monitor all insertion sites, i.e. indwelling lines, tubes, and drains  - Monitor endotracheal if appropriate and nasal secretions for changes in amount and color  - [place] appropriate cooling/warming therapies per order  - Administer medications as ordered  - Instruct and encourage patient and family to use good hand hygiene technique  - Identify and instruct in appropriate isolation precautions for identified infection/condition  Outcome: Progressing  Goal: Absence of fever/infection during neutropenic period  Description: INTERVENTIONS:  - Monitor WBC    Outcome: Progressing     Problem: SAFETY ADULT  Goal: Patient will remain free of falls  Description: INTERVENTIONS:  - Educate patient/family on patient safety including physical limitations  - Instruct patient to call for assistance with activity   - Consult OT/PT to assist with strengthening/mobility   - Keep Call bell within reach  - Keep bed low and locked with side rails adjusted as appropriate  - Keep care items and personal belongings within reach  - Initiate and maintain comfort rounds  - Make Fall Risk Sign visible to staff  - Offer Toileting every 2 Hours,  in advance of need  - Initiate/Maintain alarm  - Obtain necessary fall risk management equipment  - Apply yellow socks and bracelet for high fall risk patients  - Consider moving patient to room near nurses station  Outcome: Progressing  Goal: Maintain or return to baseline ADL function  Description: INTERVENTIONS:  -  Assess patient's ability to carry out ADLs; assess patient's baseline for ADL function and identify physical deficits which impact ability to perform ADLs (bathing, care of mouth/teeth, toileting, grooming, dressing, etc.)  - Assess/evaluate cause of self-care deficits   - Assess range of motion  - Assess patient's mobility; develop plan if impaired  - Assess patient's need for assistive devices and provide as appropriate  - Encourage maximum independence but intervene and supervise when necessary  - Involve family in performance of ADLs  - Assess for home care needs following discharge   - Consider OT consult to assist with ADL evaluation and planning for discharge  - Provide patient education as appropriate  Outcome: Progressing  Goal: Maintains/Returns to pre admission functional level  Description: INTERVENTIONS:  - Perform AM-PAC 6 Click Basic Mobility/ Daily Activity assessment daily.  - Set and communicate daily mobility goal to care team and patient/family/caregiver.   - Collaborate with rehabilitation services on mobility goals if consulted  - Perform Range of Motion 3 times a day.  - Reposition patient every 3 hours.  - Dangle patient 3 times a day  - Stand patient 3 times a day  - Ambulate patient 3 times a day  - Out of bed to chair 3 times a day   - Out of bed for meals 3 times a day  - Out of bed for toileting  - Record patient progress and toleration of activity level   Outcome: Progressing     Problem: DISCHARGE PLANNING  Goal: Discharge to home or other facility with appropriate resources  Description: INTERVENTIONS:  - Identify barriers to discharge w/patient and caregiver  - Arrange  for needed discharge resources and transportation as appropriate  - Identify discharge learning needs (meds, wound care, etc.)  - Arrange for interpretive services to assist at discharge as needed  - Refer to Case Management Department for coordinating discharge planning if the patient needs post-hospital services based on physician/advanced practitioner order or complex needs related to functional status, cognitive ability, or social support system  Outcome: Progressing     Problem: Knowledge Deficit  Goal: Patient/family/caregiver demonstrates understanding of disease process, treatment plan, medications, and discharge instructions  Description: Complete learning assessment and assess knowledge base.  Interventions:  - Provide teaching at level of understanding  - Provide teaching via preferred learning methods  Outcome: Progressing     Problem: Nutrition/Hydration-ADULT  Goal: Nutrient/Hydration intake appropriate for improving, restoring or maintaining nutritional needs  Description: Monitor and assess patient's nutrition/hydration status for malnutrition. Collaborate with interdisciplinary team and initiate plan and interventions as ordered.  Monitor patient's weight and dietary intake as ordered or per policy. Utilize nutrition screening tool and intervene as necessary. Determine patient's food preferences and provide high-protein, high-caloric foods as appropriate.     INTERVENTIONS:  - Monitor oral intake, urinary output, labs, and treatment plans  - Assess nutrition and hydration status and recommend course of action  - Evaluate amount of meals eaten  - Assist patient with eating if necessary   - Allow adequate time for meals  - Recommend/ encourage appropriate diets, oral nutritional supplements, and vitamin/mineral supplements  - Order, calculate, and assess calorie counts as needed  - Recommend, monitor, and adjust tube feedings and TPN/PPN based on assessed needs  - Assess need for intravenous fluids  -  Provide specific nutrition/hydration education as appropriate  - Include patient/family/caregiver in decisions related to nutrition  Outcome: Progressing     Problem: Potential for Falls  Goal: Patient will remain free of falls  Description: INTERVENTIONS:  - Educate patient/family on patient safety including physical limitations  - Instruct patient to call for assistance with activity   - Consult OT/PT to assist with strengthening/mobility   - Keep Call bell within reach  - Keep bed low and locked with side rails adjusted as appropriate  - Keep care items and personal belongings within reach  - Initiate and maintain comfort rounds  - Make Fall Risk Sign visible to staff  - Offer Toileting every 2 Hours, in advance of need  - Initiate/Maintain alarm  - Obtain necessary fall risk management equipment  - Apply yellow socks and bracelet for high fall risk patients  - Consider moving patient to room near nurses station  Outcome: Progressing

## 2024-12-10 LAB
ANION GAP SERPL CALCULATED.3IONS-SCNC: 7 MMOL/L (ref 4–13)
BUN SERPL-MCNC: 41 MG/DL (ref 5–25)
CALCIUM SERPL-MCNC: 7.3 MG/DL (ref 8.4–10.2)
CHLORIDE SERPL-SCNC: 112 MMOL/L (ref 96–108)
CO2 SERPL-SCNC: 21 MMOL/L (ref 21–32)
CREAT SERPL-MCNC: 1.44 MG/DL (ref 0.6–1.3)
ERYTHROCYTE [DISTWIDTH] IN BLOOD BY AUTOMATED COUNT: 19.6 % (ref 11.6–15.1)
GFR SERPL CREATININE-BSD FRML MDRD: 42 ML/MIN/1.73SQ M
GLUCOSE SERPL-MCNC: 150 MG/DL (ref 65–140)
GLUCOSE SERPL-MCNC: 151 MG/DL (ref 65–140)
GLUCOSE SERPL-MCNC: 194 MG/DL (ref 65–140)
GLUCOSE SERPL-MCNC: 244 MG/DL (ref 65–140)
GLUCOSE SERPL-MCNC: 287 MG/DL (ref 65–140)
HCT VFR BLD AUTO: 27.8 % (ref 36.5–49.3)
HGB BLD-MCNC: 8.7 G/DL (ref 12–17)
MCH RBC QN AUTO: 24.5 PG (ref 26.8–34.3)
MCHC RBC AUTO-ENTMCNC: 31.3 G/DL (ref 31.4–37.4)
MCV RBC AUTO: 78 FL (ref 82–98)
PLATELET # BLD AUTO: 206 THOUSANDS/UL (ref 149–390)
PMV BLD AUTO: 10.2 FL (ref 8.9–12.7)
POTASSIUM SERPL-SCNC: 4 MMOL/L (ref 3.5–5.3)
RBC # BLD AUTO: 3.55 MILLION/UL (ref 3.88–5.62)
SODIUM SERPL-SCNC: 140 MMOL/L (ref 135–147)
WBC # BLD AUTO: 7.57 THOUSAND/UL (ref 4.31–10.16)

## 2024-12-10 PROCEDURE — 77334 RADIATION TREATMENT AID(S): CPT | Performed by: STUDENT IN AN ORGANIZED HEALTH CARE EDUCATION/TRAINING PROGRAM

## 2024-12-10 PROCEDURE — 85027 COMPLETE CBC AUTOMATED: CPT | Performed by: PHYSICIAN ASSISTANT

## 2024-12-10 PROCEDURE — 99232 SBSQ HOSP IP/OBS MODERATE 35: CPT | Performed by: PHYSICIAN ASSISTANT

## 2024-12-10 PROCEDURE — 77435 SBRT MANAGEMENT: CPT | Performed by: STUDENT IN AN ORGANIZED HEALTH CARE EDUCATION/TRAINING PROGRAM

## 2024-12-10 PROCEDURE — 82948 REAGENT STRIP/BLOOD GLUCOSE: CPT

## 2024-12-10 PROCEDURE — 77280 THER RAD SIMULAJ FIELD SMPL: CPT | Performed by: RADIOLOGY

## 2024-12-10 PROCEDURE — 77300 RADIATION THERAPY DOSE PLAN: CPT | Performed by: STUDENT IN AN ORGANIZED HEALTH CARE EDUCATION/TRAINING PROGRAM

## 2024-12-10 PROCEDURE — 77412 RADIATION TX DELIVERY LVL 3: CPT | Performed by: RADIOLOGY

## 2024-12-10 PROCEDURE — 77295 3-D RADIOTHERAPY PLAN: CPT | Performed by: STUDENT IN AN ORGANIZED HEALTH CARE EDUCATION/TRAINING PROGRAM

## 2024-12-10 PROCEDURE — 80048 BASIC METABOLIC PNL TOTAL CA: CPT | Performed by: PHYSICIAN ASSISTANT

## 2024-12-10 RX ADMIN — LEVOTHYROXINE SODIUM 50 MCG: 50 TABLET ORAL at 05:13

## 2024-12-10 RX ADMIN — Medication 12.5 MG: at 21:32

## 2024-12-10 RX ADMIN — PANTOPRAZOLE SODIUM 40 MG: 40 TABLET, DELAYED RELEASE ORAL at 15:55

## 2024-12-10 RX ADMIN — SODIUM BICARBONATE 650 MG TABLET 650 MG: at 17:54

## 2024-12-10 RX ADMIN — ATORVASTATIN CALCIUM 20 MG: 20 TABLET, FILM COATED ORAL at 15:55

## 2024-12-10 RX ADMIN — SODIUM BICARBONATE 650 MG TABLET 650 MG: at 08:05

## 2024-12-10 RX ADMIN — AMLODIPINE BESYLATE 5 MG: 5 TABLET ORAL at 08:05

## 2024-12-10 RX ADMIN — INSULIN LISPRO 3 UNITS: 100 INJECTION, SOLUTION INTRAVENOUS; SUBCUTANEOUS at 21:33

## 2024-12-10 RX ADMIN — INSULIN LISPRO 1 UNITS: 100 INJECTION, SOLUTION INTRAVENOUS; SUBCUTANEOUS at 08:06

## 2024-12-10 RX ADMIN — FINASTERIDE 5 MG: 5 TABLET, FILM COATED ORAL at 08:05

## 2024-12-10 RX ADMIN — Medication 12.5 MG: at 08:05

## 2024-12-10 RX ADMIN — PANTOPRAZOLE SODIUM 40 MG: 40 TABLET, DELAYED RELEASE ORAL at 08:05

## 2024-12-10 RX ADMIN — TAMSULOSIN HYDROCHLORIDE 0.4 MG: 0.4 CAPSULE ORAL at 15:55

## 2024-12-10 RX ADMIN — INSULIN LISPRO 1 UNITS: 100 INJECTION, SOLUTION INTRAVENOUS; SUBCUTANEOUS at 15:55

## 2024-12-10 RX ADMIN — INSULIN LISPRO 2 UNITS: 100 INJECTION, SOLUTION INTRAVENOUS; SUBCUTANEOUS at 12:28

## 2024-12-10 RX ADMIN — FOLIC ACID 1000 MCG: 1 TABLET ORAL at 08:05

## 2024-12-10 NOTE — ASSESSMENT & PLAN NOTE
Lab Results   Component Value Date    EGFR 42 12/10/2024    EGFR 42 12/09/2024    EGFR 44 12/08/2024    CREATININE 1.44 (H) 12/10/2024    CREATININE 1.45 (H) 12/09/2024    CREATININE 1.39 (H) 12/08/2024     Baseline Cr. 1.4-1.6  Avoid nephrotoxic agents & hypotension

## 2024-12-10 NOTE — PLAN OF CARE
Problem: PAIN - ADULT  Goal: Verbalizes/displays adequate comfort level or baseline comfort level  Description: Interventions:  - Encourage patient to monitor pain and request assistance  - Assess pain using appropriate pain scale  - Administer analgesics based on type and severity of pain and evaluate response  - Implement non-pharmacological measures as appropriate and evaluate response  - Consider cultural and social influences on pain and pain management  - Notify physician/advanced practitioner if interventions unsuccessful or patient reports new pain  Outcome: Progressing     Problem: INFECTION - ADULT  Goal: Absence or prevention of progression during hospitalization  Description: INTERVENTIONS:  - Assess and monitor for signs and symptoms of infection  - Monitor lab/diagnostic results  - Monitor all insertion sites, i.e. indwelling lines, tubes, and drains  - Monitor endotracheal if appropriate and nasal secretions for changes in amount and color  - [place] appropriate cooling/warming therapies per order  - Administer medications as ordered  - Instruct and encourage patient and family to use good hand hygiene technique  - Identify and instruct in appropriate isolation precautions for identified infection/condition  Outcome: Progressing  Goal: Absence of fever/infection during neutropenic period  Description: INTERVENTIONS:  - Monitor WBC    Outcome: Progressing     Problem: SAFETY ADULT  Goal: Patient will remain free of falls  Description: INTERVENTIONS:  - Educate patient/family on patient safety including physical limitations  - Instruct patient to call for assistance with activity   - Consult OT/PT to assist with strengthening/mobility   - Keep Call bell within reach  - Keep bed low and locked with side rails adjusted as appropriate  - Keep care items and personal belongings within reach  - Initiate and maintain comfort rounds  - Make Fall Risk Sign visible to staff  - Offer Toileting every 2 Hours,  in advance of need  - Initiate/Maintain alarm  - Obtain necessary fall risk management equipment  - Apply yellow socks and bracelet for high fall risk patients  - Consider moving patient to room near nurses station  Outcome: Progressing  Goal: Maintain or return to baseline ADL function  Description: INTERVENTIONS:  -  Assess patient's ability to carry out ADLs; assess patient's baseline for ADL function and identify physical deficits which impact ability to perform ADLs (bathing, care of mouth/teeth, toileting, grooming, dressing, etc.)  - Assess/evaluate cause of self-care deficits   - Assess range of motion  - Assess patient's mobility; develop plan if impaired  - Assess patient's need for assistive devices and provide as appropriate  - Encourage maximum independence but intervene and supervise when necessary  - Involve family in performance of ADLs  - Assess for home care needs following discharge   - Consider OT consult to assist with ADL evaluation and planning for discharge  - Provide patient education as appropriate  Outcome: Progressing  Goal: Maintains/Returns to pre admission functional level  Description: INTERVENTIONS:  - Perform AM-PAC 6 Click Basic Mobility/ Daily Activity assessment daily.  - Set and communicate daily mobility goal to care team and patient/family/caregiver.   - Collaborate with rehabilitation services on mobility goals if consulted  - Perform Range of Motion 3 times a day.  - Reposition patient every 3  hours.  - Dangle patient 3 times a day  - Stand patient 3 times a day  - Ambulate patient 3 times a day  - Out of bed to chair 3 times a day   - Out of bed for meals 3 times a day  - Out of bed for toileting  - Record patient progress and toleration of activity level   Outcome: Progressing     Problem: DISCHARGE PLANNING  Goal: Discharge to home or other facility with appropriate resources  Description: INTERVENTIONS:  - Identify barriers to discharge w/patient and caregiver  -  Arrange for needed discharge resources and transportation as appropriate  - Identify discharge learning needs (meds, wound care, etc.)  - Arrange for interpretive services to assist at discharge as needed  - Refer to Case Management Department for coordinating discharge planning if the patient needs post-hospital services based on physician/advanced practitioner order or complex needs related to functional status, cognitive ability, or social support system  Outcome: Progressing     Problem: Knowledge Deficit  Goal: Patient/family/caregiver demonstrates understanding of disease process, treatment plan, medications, and discharge instructions  Description: Complete learning assessment and assess knowledge base.  Interventions:  - Provide teaching at level of understanding  - Provide teaching via preferred learning methods  Outcome: Progressing     Problem: Nutrition/Hydration-ADULT  Goal: Nutrient/Hydration intake appropriate for improving, restoring or maintaining nutritional needs  Description: Monitor and assess patient's nutrition/hydration status for malnutrition. Collaborate with interdisciplinary team and initiate plan and interventions as ordered.  Monitor patient's weight and dietary intake as ordered or per policy. Utilize nutrition screening tool and intervene as necessary. Determine patient's food preferences and provide high-protein, high-caloric foods as appropriate.     INTERVENTIONS:  - Monitor oral intake, urinary output, labs, and treatment plans  - Assess nutrition and hydration status and recommend course of action  - Evaluate amount of meals eaten  - Assist patient with eating if necessary   - Allow adequate time for meals  - Recommend/ encourage appropriate diets, oral nutritional supplements, and vitamin/mineral supplements  - Order, calculate, and assess calorie counts as needed  - Recommend, monitor, and adjust tube feedings and TPN/PPN based on assessed needs  - Assess need for intravenous  fluids  - Provide specific nutrition/hydration education as appropriate  - Include patient/family/caregiver in decisions related to nutrition  Outcome: Progressing     Problem: Potential for Falls  Goal: Patient will remain free of falls  Description: INTERVENTIONS:  - Educate patient/family on patient safety including physical limitations  - Instruct patient to call for assistance with activity   - Consult OT/PT to assist with strengthening/mobility   - Keep Call bell within reach  - Keep bed low and locked with side rails adjusted as appropriate  - Keep care items and personal belongings within reach  - Initiate and maintain comfort rounds  - Make Fall Risk Sign visible to staff  - Offer Toileting every 2 Hours, in advance of need  - Initiate/Maintain alarm  - Obtain necessary fall risk management equipment  - Apply yellow socks and bracelet for high fall risk patients  - Consider moving patient to room near nurses station  Outcome: Progressing

## 2024-12-10 NOTE — PROGRESS NOTES
Progress Note - Hospitalist   Name: Jose Zamora 89 y.o. 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  Dx in 10/2024  Evaluated by surgical oncology  who had recommended Whipple procedure however pt declined intervention given age.    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 Hgb 4.8; pt transferred to [facility] for GI evaluation.  S/p 3 u PRBC    EGD (12/06):   Single ulcer in the duodenum with clean base (John III)  3 cm hiatal hernia  Normal esophagus  Mild erythematous mucosa in the stomach  No active bleeding  Recent bleeding likely 2/2 large duodenal ulcer due to tumor erosion  Avoid AP/AC/DVT prophylaxis  Trend Hgb; transfuse w/ Hgb < 7.0    C/w Protonix BID     Recent Labs     12/08/24  0406 12/09/24  0532 12/10/24  0516   HGB 8.2* 8.6* 8.7*     Urinary retention  Pt failed urinary retention protocol at [facility]; he presents to [facility] w/ urinary catheter in place  H/o dumont catheter in 10/2024   Per [facility] urology, plan to maintain catheter x 2 weeks w/ outpt void trial   C/w Flomax  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)  Lab Results   Component Value Date    HGBA1C 7.1 (H) 10/15/2024     Recent Labs     12/09/24  1130 12/09/24  1619 12/09/24  2004 12/10/24  0635   POCGLU 251* 208* 289* 151*     Blood Sugar Average: Last 72 hrs:  (P) 213.8  Home regimen: no medications PTA   C/w Accu-Cheks and SSI   W/ advanced age and risk for hypoglycemia, will  likely defer to PCP whether initiation of medication is appropriate  Atherosclerotic heart disease of native coronary artery without angina pectoris  S/p LAD IRIS 2005; known to [facility] cardiology   C/w atorvastatin, amlodipine & metoprolol  No symptoms of unstable angina  CKD stage 3b, 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 1.4-1.6  Avoid nephrotoxic agents & hypotension   Moderate protein-calorie malnutrition (HCC)  Malnutrition Findings:   Adult Malnutrition type: Chronic illness  Adult Degree of Malnutrition: Malnutrition of moderate degree  Malnutrition Characteristics: Weight loss, Muscle loss, Fat loss    360 Statement: Chronic moderate malnutrition related to inadequate oral intake as evidenced by 10.9% weight loss x 5 mo (5/14/24 174lb, 10/15/24 155lb); moderate muscle loss to pectoralis muscles; moderate fat loss to orbitals. Treated with: Can continue diet as ordered, consider liberalizing cardiac restriction as necessary though pt has no complaints of diet restrictions at this time. Recommend glucerna BID chocolate flavor. Recommend daily weights for nutrition monitoring. Provided diet ed for high kcal/high PRO nutrition therapy.    BMI Findings:   Body mass index is 22.15 kg/m².     VTE Pharmacologic Prophylaxis: VTE Score: 3 Moderate Risk (Score 3-4) - Pharmacological DVT Prophylaxis Contraindicated. Sequential Compression Devices Ordered.    Mobility:   Basic Mobility Inpatient Raw Score: 23  JH-HLM Goal: 7: Walk 25 feet or more  JH-HLM Achieved: 6: Walk 10 steps or more  JH-HLM Goal achieved. Continue to encourage appropriate mobility.    Patient Centered Rounds: I performed bedside rounds with nursing staff today.   Discussions with Specialists or Other Care Team Provider: Plan of care reviewed with  nursing, case management, radiation oncology    Education and Discussions with Family / Patient: Updated  (daughter) via phone.    Current Length of Stay: 3 day(s)  Current Patient Status: Inpatient   Certification Statement: The patient will continue to require additional inpatient hospital stay due to initiation of inpatient radiation treatment  Discharge Plan: Anticipate discharge tomorrow to home.    Code Status: Level 3 - DNAR and DNI    Subjective   Pt has no acute complaints; he notes feeling well. He denies any acute source of bleeding. He slept well. He is eager to get radiation started.     Objective :  Temp:  [97.7 °F (36.5 °C)-97.9 °F (36.6 °C)] 97.7 °F (36.5 °C)  HR:  [] 99  BP: (138-150)/(69-76) 150/76  Resp:  [18-20] 20  SpO2:  [96 %-98 %] 96 %  O2 Device: None (Room air)    Body mass index is 22.15 kg/m².     Input and Output Summary (last 24 hours):     Intake/Output Summary (Last 24 hours) at 12/10/2024 0859  Last data filed at 12/10/2024 0840  Gross per 24 hour   Intake 1080 ml   Output 1000 ml   Net 80 ml       Physical Exam  Constitutional:       General: He is not in acute distress.     Appearance: He is normal weight.   HENT:      Head: Normocephalic.      Right Ear: External ear normal.      Left Ear: External ear normal.      Nose: Nose normal.      Mouth/Throat:      Mouth: Mucous membranes are dry.      Pharynx: Oropharynx is clear.   Eyes:      Extraocular Movements: Extraocular movements intact.      Conjunctiva/sclera: Conjunctivae normal.   Cardiovascular:      Rate and Rhythm: Normal rate and regular rhythm.      Pulses: Normal pulses.      Heart sounds: Normal heart sounds.   Pulmonary:      Effort: Pulmonary effort is normal. No respiratory distress.      Breath sounds: Normal breath sounds. No wheezing or rales.   Abdominal:      General: Bowel sounds are normal. There is distension (mild).      Palpations: Abdomen is soft.      Tenderness: There is no abdominal  tenderness. There is no guarding.   Musculoskeletal:         General: Swelling (Trace LE) present. Normal range of motion.      Cervical back: Normal range of motion and neck supple.   Skin:     General: Skin is warm and dry.   Neurological:      General: No focal deficit present.      Mental Status: He is alert and oriented to person, place, and time. Mental status is at baseline.   Psychiatric:         Mood and Affect: Mood normal.         Behavior: Behavior normal.         Lines/Drains:  Lines/Drains/Airways       Active Status       Name Placement date Placement time Site Days    Urethral Catheter 14 Fr. 12/06/24 1807  --  3                  Urinary Catheter:  Goal for removal: N/A- Discharging with [place]                 Lab Results: I have reviewed the following results:   Results from last 7 days   Lab Units 12/10/24  0516 12/09/24  0532   WBC Thousand/uL 7.57 8.36   HEMOGLOBIN g/dL 8.7* 8.6*   HEMATOCRIT % 27.8* 28.0*   PLATELETS Thousands/uL 206 214   SEGS PCT %  --  70   LYMPHO PCT %  --  14   MONO PCT %  --  10   EOS PCT %  --  4     Results from last 7 days   Lab Units 12/10/24  0516 12/09/24  0532   SODIUM mmol/L 140 141   POTASSIUM mmol/L 4.0 4.0   CHLORIDE mmol/L 112* 112*   CO2 mmol/L 21 22   BUN mg/dL 41* 40*   CREATININE mg/dL 1.44* 1.45*   ANION GAP mmol/L 7 7   CALCIUM mg/dL 7.3* 7.4*   ALBUMIN g/dL  --  3.4*   TOTAL BILIRUBIN mg/dL  --  0.47   ALK PHOS U/L  --  72   ALT U/L  --  13   AST U/L  --  11*   GLUCOSE RANDOM mg/dL 150* 143*         Results from last 7 days   Lab Units 12/10/24  0635 12/09/24  2004 12/09/24  1619 12/09/24  1130 12/09/24  0722 12/08/24  2047 12/08/24  1631 12/08/24  1118 12/08/24  0747 12/07/24  2056 12/07/24  1610 12/07/24  1108   POC GLUCOSE mg/dl 151* 289* 208* 251* 137 309* 172* 259* 153* 209* 159* 221*               Recent Cultures (last 7 days):         Imaging Results Review: I reviewed radiology reports from this admission including: EGD.  Other Study Results  Review: No additional pertinent studies reviewed.    Last 24 Hours Medication List:     Current Facility-Administered Medications:     aluminum-magnesium hydroxide-simethicone (MAALOX) oral suspension 30 mL, Q6H PRN    amLODIPine (NORVASC) tablet 5 mg, Daily    atorvastatin (LIPITOR) tablet 20 mg, Daily With Dinner    finasteride (PROSCAR) tablet 5 mg, Daily    folic acid (FOLVITE) tablet 1,000 mcg, Daily    insulin lispro (HumALOG/ADMELOG) 100 units/mL subcutaneous injection 1-5 Units, 4x Daily (AC & HS) **AND** Fingerstick Glucose (POCT), 4x Daily AC and at bedtime    levothyroxine tablet 50 mcg, Early Morning    metoprolol tartrate (LOPRESSOR) partial tablet 12.5 mg, BID    ondansetron (ZOFRAN) injection 4 mg, Q6H PRN    pantoprazole (PROTONIX) EC tablet 40 mg, BID AC    simethicone (MYLICON) chewable tablet 80 mg, 4x Daily PRN    sodium bicarbonate tablet 650 mg, BID after meals    tamsulosin (FLOMAX) capsule 0.4 mg, Daily With Dinner    Administrative Statements   Today, Patient Was Seen By: Cathi Wetzel PA-C  I have spent a total time of 30 minutes in caring for this patient on the day of the visit/encounter including Diagnostic results, Patient and family education, Counseling / Coordination of care, Documenting in the medical record, Reviewing / ordering tests, medicine, procedures  , Obtaining or reviewing history  , and Communicating with other healthcare professionals .    **Please Note: This note may have been constructed using a voice recognition system.**

## 2024-12-10 NOTE — ASSESSMENT & PLAN NOTE
Lab Results   Component Value Date    HGBA1C 7.1 (H) 10/15/2024     Recent Labs     12/09/24  1130 12/09/24  1619 12/09/24  2004 12/10/24  0635   POCGLU 251* 208* 289* 151*     Blood Sugar Average: Last 72 hrs:  (P) 213.8  Home regimen: no medications PTA   C/w Accu-Cheks and SSI   W/ advanced age and risk for hypoglycemia, will likely defer to PCP whether initiation of medication is appropriate

## 2024-12-10 NOTE — ASSESSMENT & PLAN NOTE
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  Dx in 10/2024  Evaluated by surgical oncology  who had recommended Whipple procedure however pt declined intervention given age.    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

## 2024-12-10 NOTE — ASSESSMENT & PLAN NOTE
Initially presented [facility] 2/2 CP. Hgb 4.8; pt transferred to [facility] for GI evaluation.  S/p 3 u PRBC    EGD (12/06):   Single ulcer in the duodenum with clean base (John III)  3 cm hiatal hernia  Normal esophagus  Mild erythematous mucosa in the stomach  No active bleeding  Recent bleeding likely 2/2 large duodenal ulcer due to tumor erosion  Avoid AP/AC/DVT prophylaxis  Trend Hgb; transfuse w/ Hgb < 7.0    C/w Protonix BID     Recent Labs     12/08/24  0406 12/09/24  0532 12/10/24  0516   HGB 8.2* 8.6* 8.7*

## 2024-12-10 NOTE — PLAN OF CARE
Problem: PAIN - ADULT  Goal: Verbalizes/displays adequate comfort level or baseline comfort level  Description: Interventions:  - Encourage patient to monitor pain and request assistance  - Assess pain using appropriate pain scale  - Administer analgesics based on type and severity of pain and evaluate response  - Implement non-pharmacological measures as appropriate and evaluate response  - Consider cultural and social influences on pain and pain management  - Notify physician/advanced practitioner if interventions unsuccessful or patient reports new pain  Outcome: Progressing     Problem: INFECTION - ADULT  Goal: Absence or prevention of progression during hospitalization  Description: INTERVENTIONS:  - Assess and monitor for signs and symptoms of infection  - Monitor lab/diagnostic results  - Monitor all insertion sites, i.e. indwelling lines, tubes, and drains  - Monitor endotracheal if appropriate and nasal secretions for changes in amount and color  - [place] appropriate cooling/warming therapies per order  - Administer medications as ordered  - Instruct and encourage patient and family to use good hand hygiene technique  - Identify and instruct in appropriate isolation precautions for identified infection/condition  Outcome: Progressing  Goal: Absence of fever/infection during neutropenic period  Description: INTERVENTIONS:  - Monitor WBC    Outcome: Progressing     Problem: SAFETY ADULT  Goal: Patient will remain free of falls  Description: INTERVENTIONS:  - Educate patient/family on patient safety including physical limitations  - Instruct patient to call for assistance with activity   - Consult OT/PT to assist with strengthening/mobility   - Keep Call bell within reach  - Keep bed low and locked with side rails adjusted as appropriate  - Keep care items and personal belongings within reach  - Initiate and maintain comfort rounds  - Make Fall Risk Sign visible to staff  - Offer Toileting every 2 Hours,  in advance of need  - Initiate/Maintain bed alarm  - Obtain necessary fall risk management equipment:   - Apply yellow socks and bracelet for high fall risk patients  - Consider moving patient to room near nurses station  Outcome: Progressing  Goal: Maintain or return to baseline ADL function  Description: INTERVENTIONS:  -  Assess patient's ability to carry out ADLs; assess patient's baseline for ADL function and identify physical deficits which impact ability to perform ADLs (bathing, care of mouth/teeth, toileting, grooming, dressing, etc.)  - Assess/evaluate cause of self-care deficits   - Assess range of motion  - Assess patient's mobility; develop plan if impaired  - Assess patient's need for assistive devices and provide as appropriate  - Encourage maximum independence but intervene and supervise when necessary  - Involve family in performance of ADLs  - Assess for home care needs following discharge   - Consider OT consult to assist with ADL evaluation and planning for discharge  - Provide patient education as appropriate  Outcome: Progressing  Goal: Maintains/Returns to pre admission functional level  Description: INTERVENTIONS:  - Perform AM-PAC 6 Click Basic Mobility/ Daily Activity assessment daily.  - Set and communicate daily mobility goal to care team and patient/family/caregiver.   - Collaborate with rehabilitation services on mobility goals if consulted  - Perform Range of Motion 3 times a day.  - Reposition patient every 3 hours.  - Dangle patient 3 times a day  - Stand patient 3 times a day  - Ambulate patient 3 times a day  - Out of bed to chair 3 times a day   - Out of bed for meals 3 times a day  - Out of bed for toileting  - Record patient progress and toleration of activity level   Outcome: Progressing     Problem: DISCHARGE PLANNING  Goal: Discharge to home or other facility with appropriate resources  Description: INTERVENTIONS:  - Identify barriers to discharge w/patient and caregiver  -  Arrange for needed discharge resources and transportation as appropriate  - Identify discharge learning needs (meds, wound care, etc.)  - Arrange for interpretive services to assist at discharge as needed  - Refer to Case Management Department for coordinating discharge planning if the patient needs post-hospital services based on physician/advanced practitioner order or complex needs related to functional status, cognitive ability, or social support system  Outcome: Progressing     Problem: Knowledge Deficit  Goal: Patient/family/caregiver demonstrates understanding of disease process, treatment plan, medications, and discharge instructions  Description: Complete learning assessment and assess knowledge base.  Interventions:  - Provide teaching at level of understanding  - Provide teaching via preferred learning methods  Outcome: Progressing     Problem: Nutrition/Hydration-ADULT  Goal: Nutrient/Hydration intake appropriate for improving, restoring or maintaining nutritional needs  Description: Monitor and assess patient's nutrition/hydration status for malnutrition. Collaborate with interdisciplinary team and initiate plan and interventions as ordered.  Monitor patient's weight and dietary intake as ordered or per policy. Utilize nutrition screening tool and intervene as necessary. Determine patient's food preferences and provide high-protein, high-caloric foods as appropriate.     INTERVENTIONS:  - Monitor oral intake, urinary output, labs, and treatment plans  - Assess nutrition and hydration status and recommend course of action  - Evaluate amount of meals eaten  - Assist patient with eating if necessary   - Allow adequate time for meals  - Recommend/ encourage appropriate diets, oral nutritional supplements, and vitamin/mineral supplements  - Order, calculate, and assess calorie counts as needed  - Recommend, monitor, and adjust tube feedings and TPN/PPN based on assessed needs  - Assess need for intravenous  fluids  - Provide specific nutrition/hydration education as appropriate  - Include patient/family/caregiver in decisions related to nutrition  Outcome: Progressing     Problem: Potential for Falls  Goal: Patient will remain free of falls  Description: INTERVENTIONS:  - Educate patient/family on patient safety including physical limitations  - Instruct patient to call for assistance with activity   - Consult OT/PT to assist with strengthening/mobility   - Keep Call bell within reach  - Keep bed low and locked with side rails adjusted as appropriate  - Keep care items and personal belongings within reach  - Initiate and maintain comfort rounds  - Make Fall Risk Sign visible to staff  - Apply yellow socks and bracelet for high fall risk patients  - Consider moving patient to room near nurses station  Outcome: Progressing

## 2024-12-10 NOTE — ASSESSMENT & PLAN NOTE
Pt failed urinary retention protocol at [facility]; he presents to [facility] w/ urinary catheter in place  H/o dumont catheter in 10/2024   Per [facility] urology, plan to maintain catheter x 2 weeks w/ outpt void trial   C/w Flomax

## 2024-12-11 ENCOUNTER — TELEPHONE (OUTPATIENT)
Dept: HEMATOLOGY ONCOLOGY | Facility: CLINIC | Age: 89
End: 2024-12-11

## 2024-12-11 ENCOUNTER — DOCUMENTATION (OUTPATIENT)
Dept: HEMATOLOGY ONCOLOGY | Facility: CLINIC | Age: 89
End: 2024-12-11

## 2024-12-11 ENCOUNTER — HOME HEALTH ADMISSION (OUTPATIENT)
Dept: HOME HEALTH SERVICES | Facility: HOME HEALTHCARE | Age: 89
End: 2024-12-11
Payer: MEDICARE

## 2024-12-11 VITALS
BODY MASS INDEX: 22.22 KG/M2 | RESPIRATION RATE: 16 BRPM | TEMPERATURE: 97.9 F | SYSTOLIC BLOOD PRESSURE: 131 MMHG | WEIGHT: 150 LBS | HEIGHT: 69 IN | DIASTOLIC BLOOD PRESSURE: 69 MMHG | HEART RATE: 94 BPM | OXYGEN SATURATION: 96 %

## 2024-12-11 LAB
ANION GAP SERPL CALCULATED.3IONS-SCNC: 9 MMOL/L (ref 4–13)
BUN SERPL-MCNC: 38 MG/DL (ref 5–25)
CALCIUM SERPL-MCNC: 7.2 MG/DL (ref 8.4–10.2)
CHLORIDE SERPL-SCNC: 110 MMOL/L (ref 96–108)
CO2 SERPL-SCNC: 22 MMOL/L (ref 21–32)
CREAT SERPL-MCNC: 1.47 MG/DL (ref 0.6–1.3)
ERYTHROCYTE [DISTWIDTH] IN BLOOD BY AUTOMATED COUNT: 19.4 % (ref 11.6–15.1)
GFR SERPL CREATININE-BSD FRML MDRD: 41 ML/MIN/1.73SQ M
GLUCOSE SERPL-MCNC: 151 MG/DL (ref 65–140)
GLUCOSE SERPL-MCNC: 152 MG/DL (ref 65–140)
GLUCOSE SERPL-MCNC: 264 MG/DL (ref 65–140)
HCT VFR BLD AUTO: 28.1 % (ref 36.5–49.3)
HGB BLD-MCNC: 8.7 G/DL (ref 12–17)
MCH RBC QN AUTO: 24.3 PG (ref 26.8–34.3)
MCHC RBC AUTO-ENTMCNC: 31 G/DL (ref 31.4–37.4)
MCV RBC AUTO: 79 FL (ref 82–98)
PLATELET # BLD AUTO: 205 THOUSANDS/UL (ref 149–390)
PMV BLD AUTO: 10.4 FL (ref 8.9–12.7)
POTASSIUM SERPL-SCNC: 4.2 MMOL/L (ref 3.5–5.3)
RBC # BLD AUTO: 3.58 MILLION/UL (ref 3.88–5.62)
SODIUM SERPL-SCNC: 141 MMOL/L (ref 135–147)
WBC # BLD AUTO: 6.99 THOUSAND/UL (ref 4.31–10.16)

## 2024-12-11 PROCEDURE — 77387 GUIDANCE FOR RADJ TX DLVR: CPT | Performed by: RADIOLOGY

## 2024-12-11 PROCEDURE — 77014 CHG CT GUIDANCE RADIATION THERAPY FLDS PLACEMENT: CPT | Performed by: RADIOLOGY

## 2024-12-11 PROCEDURE — 77412 RADIATION TX DELIVERY LVL 3: CPT | Performed by: RADIOLOGY

## 2024-12-11 PROCEDURE — 80048 BASIC METABOLIC PNL TOTAL CA: CPT | Performed by: INTERNAL MEDICINE

## 2024-12-11 PROCEDURE — 99239 HOSP IP/OBS DSCHRG MGMT >30: CPT | Performed by: PHYSICIAN ASSISTANT

## 2024-12-11 PROCEDURE — 82948 REAGENT STRIP/BLOOD GLUCOSE: CPT

## 2024-12-11 PROCEDURE — 85027 COMPLETE CBC AUTOMATED: CPT | Performed by: INTERNAL MEDICINE

## 2024-12-11 RX ORDER — FINASTERIDE 5 MG/1
5 TABLET, FILM COATED ORAL DAILY
Qty: 30 TABLET | Refills: 0 | Status: SHIPPED | OUTPATIENT
Start: 2024-12-12

## 2024-12-11 RX ORDER — TAMSULOSIN HYDROCHLORIDE 0.4 MG/1
0.4 CAPSULE ORAL
Qty: 30 CAPSULE | Refills: 0 | Status: SHIPPED | OUTPATIENT
Start: 2024-12-11

## 2024-12-11 RX ADMIN — Medication 12.5 MG: at 08:00

## 2024-12-11 RX ADMIN — LEVOTHYROXINE SODIUM 50 MCG: 50 TABLET ORAL at 06:24

## 2024-12-11 RX ADMIN — INSULIN LISPRO 2 UNITS: 100 INJECTION, SOLUTION INTRAVENOUS; SUBCUTANEOUS at 12:36

## 2024-12-11 RX ADMIN — AMLODIPINE BESYLATE 5 MG: 5 TABLET ORAL at 08:00

## 2024-12-11 RX ADMIN — FINASTERIDE 5 MG: 5 TABLET, FILM COATED ORAL at 08:00

## 2024-12-11 RX ADMIN — FOLIC ACID 1000 MCG: 1 TABLET ORAL at 08:00

## 2024-12-11 RX ADMIN — PANTOPRAZOLE SODIUM 40 MG: 40 TABLET, DELAYED RELEASE ORAL at 07:59

## 2024-12-11 RX ADMIN — SODIUM BICARBONATE 650 MG TABLET 650 MG: at 07:59

## 2024-12-11 NOTE — TELEPHONE ENCOUNTER
Called and spoke to PARUL. Informed that Dr. Fuentes would like to see patient on 1/7/2025 @ 1:20pm. Parul agreed with date and time.

## 2024-12-11 NOTE — DISCHARGE SUMMARY
Discharge Summary - Hospitalist   Name: Jose Zamora 89 y.o. 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  Dx in 10/2024  Evaluated by surgical oncology  who had recommended Whipple procedure however pt declined intervention given age.    Pt was interested in somatostatin analogs; therapy had not yet been initiated  Noted localized disease w/ symptomatic recurrent anemia 2/2 tumor erosion, see below   Oncology/radiation oncology following   First tx of palliative radiation 12/10  Received additional treatment 12/11 without incident  Discussed with radiation oncology -stable for discharge, will arrange outpatient radiation appointments  Acute blood loss anemia  Initially presented [facility] 2/2 CP. Hgb 4.8; pt transferred to [facility] for GI evaluation.  S/p 3 u PRBC    EGD (12/06):   Single ulcer in the duodenum with clean base (John III)  3 cm hiatal hernia  Normal esophagus  Mild erythematous mucosa in the stomach  No active bleeding  Recent bleeding likely 2/2 large duodenal ulcer due to tumor erosion  Avoid AP/AC/DVT prophylaxis  Hemoglobin stable  Continue Protonix twice daily    Recent Labs     12/09/24  0532 12/10/24  0516 12/11/24  0444   HGB 8.6* 8.7* 8.7*     Urinary retention  Pt failed urinary retention protocol at [facility]; he presents to [facility] w/ urinary catheter in place  H/o dumont catheter in 10/2024   Per [facility] urology, plan to maintain catheter x 2 weeks w/ outpt void trial   C/w Flomax, Proscar  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)  Lab Results   Component Value Date    HGBA1C 7.1 (H) 10/15/2024     Recent Labs     12/10/24  1141 12/10/24  1553 12/10/24  2104 12/11/24  0713   POCGLU 244* 194* 287* 152*      Blood Sugar Average: Last 72 hrs:  (P) [id number]  Home regimen: no medications PTA   C/w Accu-Cheks and SSI   W/ advanced age and risk for hypoglycemia, will defer to PCP whether initiation of medication is appropriate  Atherosclerotic heart disease of native coronary artery without angina pectoris  S/p LAD IRIS 2005; known to [facility] cardiology   C/w atorvastatin, amlodipine & metoprolol  No symptoms of unstable angina  CKD stage 3b, 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 1.4-1.6  Avoid nephrotoxic agents & hypotension   Moderate protein-calorie malnutrition (HCC)  Malnutrition Findings:   Adult Malnutrition type: Chronic illness  Adult Degree of Malnutrition: Malnutrition of moderate degree  Malnutrition Characteristics: Weight loss, Muscle loss, Fat loss    360 Statement: Chronic moderate malnutrition related to inadequate oral intake as evidenced by 10.9% weight loss x 5 mo (5/14/24 174lb, 10/15/24 155lb); moderate muscle loss to pectoralis muscles; moderate fat loss to orbitals. Treated with: Can continue diet as ordered, consider liberalizing cardiac restriction as necessary though pt has no complaints of diet restrictions at this time. Recommend glucerna BID chocolate flavor. Recommend daily weights for nutrition monitoring. Provided diet ed for high kcal/high PRO nutrition therapy.    BMI Findings:   Body mass index is 22.15 kg/m².      Medical Problems       Resolved Problems  Date Reviewed: 12/5/2024   None       Discharging Physician / Practitioner: Veronika Gorman PA-C  PCP: Scott Hodges DO  Admission Date:   Admission Orders (From admission, onward)       Ordered        12/07/24 1953  INPATIENT ADMISSION  Once                          Discharge Date: 12/11/24    Consultations During  Hospital Stay:  Radiation Oncology    Procedures Performed:   Radiation treatment 12/10 and 12/11    Significant Findings / Test Results:   None    Incidental Findings:   None     Test Results Pending at Discharge (will require follow up):   None     Outpatient Tests Requested:  None    Complications:  None    Reason for Admission: Need for radiation initiation    Hospital Course:   Jose Zamora is a 89 y.o. male patient who originally presented to the hospital on 12/7/2024 due to need for radiation initiation.    Past medical history significant for pancreatic cancer, anemia, chronic kidney disease, urinary retention, malnutrition.  Patient presented as transfer from [facility] for initiation of radiation with radiation oncology.  Patient had been originally admitted due to symptomatic anemia due to ulcer noted on EGD.  Patient was continued on Protonix twice daily.  Did require blood transfusions earlier during that hospitalization however hemoglobin has since remained stable without further episodes of bleeding.  Patient tolerated minutes of radiation without difficulty.  Case was discussed with radiation oncology, patient does not need to remain inpatient for next radiation treatment and appointment will be scheduled by radiation oncology department.  Patient to be discharged home with M[place]ormack catheter which was placed at previous hospital due to urinary retention.  Patient to follow-up with urology for outpatient void trial and case management has arranged for home VNA services.  On day of discharge patient was afebrile, hemodynamically stable and verbalized understanding for requested outpatient follow-up.    Please see above list of diagnoses and related plan for additional information.     Condition at Discharge: stable    Discharge Day Visit / Exam:   Subjective:  Feels well, no complaints.  Vitals: Blood Pressure: 131/69 (12/11/24 0715)  Pulse: 94 (12/11/24 0715)  Temperature: 97.9 °F (36.6 °C)  "(12/11/24 0715)  Temp Source: Temporal (12/11/24 0715)  Respirations: 16 (12/11/24 0715)  Height: 5' 9\" (175.3 cm) (12/08/24 1019)  Weight - Scale: 68 kg (150 lb) (12/07/24 1954)  SpO2: 96 % (12/11/24 0715)  Physical Exam  Vitals and nursing note reviewed.   Constitutional:       Appearance: Normal appearance.      Comments: No acute distress   HENT:      Head: Normocephalic.   Eyes:      General: No scleral icterus.     Extraocular Movements: Extraocular movements intact.      Conjunctiva/sclera: Conjunctivae normal.   Cardiovascular:      Rate and Rhythm: Normal rate and regular rhythm.      Heart sounds: Normal heart sounds. No murmur heard.  Pulmonary:      Effort: Pulmonary effort is normal. No respiratory distress.      Breath sounds: Normal breath sounds. No wheezing, rhonchi or rales.   Abdominal:      General: Bowel sounds are normal.      Palpations: Abdomen is soft.      Tenderness: There is no abdominal tenderness. There is no guarding or rebound.   Musculoskeletal:      Cervical back: Normal range of motion.      Comments: Able to move upper/lower extremities bilaterally, no edema   Skin:     General: Skin is warm and dry.   Neurological:      Mental Status: He is alert and oriented to person, place, and time.   Psychiatric:         Mood and Affect: Mood normal.         Speech: Speech normal.         Behavior: Behavior normal.          Discussion with Family: Updated  (daughter) via phone.    Discharge instructions/Information to patient and family:   See after visit summary for information provided to patient and family.      Provisions for Follow-Up Care:  See after visit summary for information related to follow-up care and any pertinent home health orders.      Mobility at time of Discharge:   Basic Mobility Inpatient Raw Score: 23  JH-HLM Goal: 7: Walk 25 feet or more  JH-HLM Achieved: 6: Walk 10 steps or more  HLM Goal NOT achieved. Continue to encourage mobility in post discharge " setting.     Disposition:   Home with VNA Services (Reminder: Complete face to face encounter)    Planned Readmission: None    Discharge Medications:  See after visit summary for reconciled discharge medications provided to patient and/or family.      Administrative Statements   Discharge Statement:  I have spent a total time of 60 minutes in caring for this patient on the day of the visit/encounter. >30 minutes of time was spent on: Diagnostic results, Instructions for management, Patient and family education, Importance of tx compliance, Counseling / Coordination of care, Documenting in the medical record, Reviewing / ordering tests, medicine, procedures  , and Communicating with other healthcare professionals .    **Please Note: This note may have been constructed using a voice recognition system**

## 2024-12-11 NOTE — CASE MANAGEMENT
Case Management Assessment & Discharge Planning Note    Patient name Jose Zamora  Location /-01 MRN [id number]  : 1935 Date 2024       Current Admission Date: 2024  Current Admission Diagnosis:Primary pancreatic neuroendocrine tumor   Patient Active Problem List    Diagnosis Date Noted Date Diagnosed    Moderate protein-calorie malnutrition (HCC) 2024     Metabolic acidosis 2024     Chest tightness 2024     Hypertensive urgency 2024     Elevated troponin 2024     Primary pancreatic neuroendocrine tumor 10/23/2024     Hypokalemia 10/20/2024     Hypomagnesemia 10/20/2024     CKD stage 3b, 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 ([facility]) 10/19/2024     Urinary retention 10/16/2024     PAWEL (acute kidney injury) ([facility]) 10/16/2024     Pancreatic mass 10/16/2024     Lower back pain 10/16/2024     UGIB (upper gastrointestinal bleed) 10/15/2024     Generalized abdominal pain 10/14/2024     Weight loss 10/14/2024     Acute bilateral thoracic back pain 10/14/2024     Nausea 10/14/2024     Right hand pain 2022     Acute pain of both shoulders 2022     Localized osteoarthritis of right knee 2020     Acute pain of right knee 2020     Strain of calf muscle 2020     Lumbar strain 10/25/2019     Abnormal prostate exam 2019     Beta thalassemia minor 05/15/2019     Elevated PSA 05/15/2019     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 ([facility]) 2019     Type 2 diabetes mellitus with stage 3 chronic kidney disease ([facility]) 2019     Squamous cell carcinoma 2019     Dermatitis 2019     Skin neoplasm 2019     Acute blood loss anemia 2017     Acquired hypothyroidism 10/19/2016     PAD (peripheral artery disease) ([facility]) 2015     Hyperlipidemia 2013     Atherosclerotic heart disease of native coronary artery without angina pectoris 09/10/2012     DMII (diabetes mellitus, type 2) ([facility]) 09/10/2012     Hypertension 09/10/2012        LOS (days): 4  Geometric Mean LOS (GMLOS) (days): 3.3  Days to GMLOS:-0.4     OBJECTIVE:    Risk of Unplanned Readmission Score: 21.87         Current admission status: Inpatient       Preferred Pharmacy:   [id number] - [place] - [address]  [address] [address]  Phone: [phone] Fax: [phone]    Primary Care Provider: Scott Hodges DO    Primary Insurance: MEDICARE  Secondary Insurance: BLUE CROSS    ASSESSMENT:  Active Health Care Proxies       Yolanda Zamora Health Care Representative - Spouse   Primary Phone: [phone] (Mobile)  Home Phone: [phone]                                          Current Home Health Care  [facility] Health Agency [facility]:: [facility]                         DISCHARGE DETAILS:                                Requested Home Health Care         Home Health Discipline requested:: Nursing  Home Health Agency Name:: [facility]  Home Health Follow-Up Provider:: PCP  Home Health Services Needed:: Evaluate Functional Status and Safety, Other (comment) (dumont new)  Homebound Criteria Met:: Requires the Assistance of Another Person for Safe Ambulation or to Leave the Home, Uses an Assist Device (i.e. cane, walker, etc)  Supporting Clincal Findings:: Limited Endurance    DME Referral Provided  Referral made for DME?: No    Other Referral/Resources/Interventions Provided:  Interventions: HHC                                             IMM Given (Date):: 12/11/24  IMM Given to:: Patient     Additional Comments: Pt to dc home today with [facility] for new dumont. Lyft waiver signed. Adelia to be requested once nursing is ready for pt to dc.

## 2024-12-11 NOTE — CASE MANAGEMENT
Case Management Progress Note    Patient name Jose Zamora  Location /-01 MRN [id number]  : 1935 Date 2024       LOS (days): 4  Geometric Mean LOS (GMLOS) (days): 3.3  Days to GMLOS:-0.3        OBJECTIVE:        Current admission status: Inpatient  Preferred Pharmacy:   [id number] - [place], [place] - [address]  [address] [address]  Phone: [phone] Fax: [phone]    Primary Care Provider: Scott Hodges DO    Primary Insurance: MEDICARE  Secondary Insurance: BLUE CROSS    PROGRESS NOTE: Pt is cleared for dc home today after RAD tx. Pt will dc home with dumont. HHC referral sent for dumont management. CM to meet with pt regarding choice of HHC agency.

## 2024-12-11 NOTE — PLAN OF CARE
Problem: PAIN - ADULT  Goal: Verbalizes/displays adequate comfort level or baseline comfort level  Description: Interventions:  - Encourage patient to monitor pain and request assistance  - Assess pain using appropriate pain scale  - Administer analgesics based on type and severity of pain and evaluate response  - Implement non-pharmacological measures as appropriate and evaluate response  - Consider cultural and social influences on pain and pain management  - Notify physician/advanced practitioner if interventions unsuccessful or patient reports new pain  Outcome: Progressing     Problem: INFECTION - ADULT  Goal: Absence or prevention of progression during hospitalization  Description: INTERVENTIONS:  - Assess and monitor for signs and symptoms of infection  - Monitor lab/diagnostic results  - Monitor all insertion sites, i.e. indwelling lines, tubes, and drains  - Monitor endotracheal if appropriate and nasal secretions for changes in amount and color  - [place] appropriate cooling/warming therapies per order  - Administer medications as ordered  - Instruct and encourage patient and family to use good hand hygiene technique  - Identify and instruct in appropriate isolation precautions for identified infection/condition  Outcome: Progressing  Goal: Absence of fever/infection during neutropenic period  Description: INTERVENTIONS:  - Monitor WBC    Outcome: Progressing     Problem: SAFETY ADULT  Goal: Patient will remain free of falls  Description: INTERVENTIONS:  - Educate patient/family on patient safety including physical limitations  - Instruct patient to call for assistance with activity   - Consult OT/PT to assist with strengthening/mobility   - Keep Call bell within reach  - Keep bed low and locked with side rails adjusted as appropriate  - Keep care items and personal belongings within reach  - Initiate and maintain comfort rounds  - Make Fall Risk Sign visible to staff  - Offer Toileting every x Hours,  in advance of need  - Initiate/Maintain xalarm  - Obtain necessary fall risk management equipment: x  - Apply yellow socks and bracelet for high fall risk patients  - Consider moving patient to room near nurses station  Outcome: Progressing  Goal: Maintain or return to baseline ADL function  Description: INTERVENTIONS:  -  Assess patient's ability to carry out ADLs; assess patient's baseline for ADL function and identify physical deficits which impact ability to perform ADLs (bathing, care of mouth/teeth, toileting, grooming, dressing, etc.)  - Assess/evaluate cause of self-care deficits   - Assess range of motion  - Assess patient's mobility; develop plan if impaired  - Assess patient's need for assistive devices and provide as appropriate  - Encourage maximum independence but intervene and supervise when necessary  - Involve family in performance of ADLs  - Assess for home care needs following discharge   - Consider OT consult to assist with ADL evaluation and planning for discharge  - Provide patient education as appropriate  Outcome: Progressing  Goal: Maintains/Returns to pre admission functional level  Description: INTERVENTIONS:  - Perform AM-PAC 6 Click Basic Mobility/ Daily Activity assessment daily.  - Set and communicate daily mobility goal to care team and patient/family/caregiver.   - Collaborate with rehabilitation services on mobility goals if consulted  - Perform Range of Motion x times a day.  - Reposition patient every x hours.  - Dangle patient x times a day  - Stand patient x times a day  - Ambulate patient xxxx times a day  - Out of bed to chair x times a day   - Out of bed for meals x times a day  - Out of bed for toileting  - Record patient progress and toleration of activity level   Outcome: Progressing     Problem: DISCHARGE PLANNING  Goal: Discharge to home or other facility with appropriate resources  Description: INTERVENTIONS:  - Identify barriers to discharge w/patient and caregiver  -  Arrange for needed discharge resources and transportation as appropriate  - Identify discharge learning needs (meds, wound care, etc.)  - Arrange for interpretive services to assist at discharge as needed  - Refer to Case Management Department for coordinating discharge planning if the patient needs post-hospital services based on physician/advanced practitioner order or complex needs related to functional status, cognitive ability, or social support system  Outcome: Progressing     Problem: Knowledge Deficit  Goal: Patient/family/caregiver demonstrates understanding of disease process, treatment plan, medications, and discharge instructions  Description: Complete learning assessment and assess knowledge base.  Interventions:  - Provide teaching at level of understanding  - Provide teaching via preferred learning methods  Outcome: Progressing     Problem: Nutrition/Hydration-ADULT  Goal: Nutrient/Hydration intake appropriate for improving, restoring or maintaining nutritional needs  Description: Monitor and assess patient's nutrition/hydration status for malnutrition. Collaborate with interdisciplinary team and initiate plan and interventions as ordered.  Monitor patient's weight and dietary intake as ordered or per policy. Utilize nutrition screening tool and intervene as necessary. Determine patient's food preferences and provide high-protein, high-caloric foods as appropriate.     INTERVENTIONS:  - Monitor oral intake, urinary output, labs, and treatment plans  - Assess nutrition and hydration status and recommend course of action  - Evaluate amount of meals eaten  - Assist patient with eating if necessary   - Allow adequate time for meals  - Recommend/ encourage appropriate diets, oral nutritional supplements, and vitamin/mineral supplements  - Order, calculate, and assess calorie counts as needed  - Recommend, monitor, and adjust tube feedings and TPN/PPN based on assessed needs  - Assess need for intravenous  fluids  - Provide specific nutrition/hydration education as appropriate  - Include patient/family/caregiver in decisions related to nutrition  Outcome: Progressing     Problem: Potential for Falls  Goal: Patient will remain free of falls  Description: INTERVENTIONS:  - Educate patient/family on patient safety including physical limitations  - Instruct patient to call for assistance with activity   - Consult OT/PT to assist with strengthening/mobility   - Keep Call bell within reach  - Keep bed low and locked with side rails adjusted as appropriate  - Keep care items and personal belongings within reach  - Initiate and maintain comfort rounds  - Make Fall Risk Sign visible to staff  - Offer Toileting every xx Hours, in advance of need  - Initiate/Maintain xalarm  - Obtain necessary fall risk management equipment: x  - Apply yellow socks and bracelet for high fall risk patients  - Consider moving patient to room near nurses station  Outcome: Progressing

## 2024-12-11 NOTE — DISCHARGE INSTR - AVS FIRST PAGE
Follow-up with PCP within 1 week for posthospitalization follow-up  Outpatient follow-up with urology regarding void trial/Mccormack catheter removal.  Case management has arranged for home visiting nurses  Continue outpatient follow-up with oncology/radiation treatments as previously scheduled  Given ulcer found on EGD, recommend avoiding NSAIDs (ex. Ibuprofen, aleve, naproxen).  Continue protonix twice daily    Return to the emergency department for further evaluation with any chest pain/palpitations, shortness of breath, nausea/vomiting, abdominal pain, fever/chills.

## 2024-12-11 NOTE — ASSESSMENT & PLAN NOTE
Initially presented [facility] 2/2 CP. Hgb 4.8; pt transferred to [facility] for GI evaluation.  S/p 3 u PRBC    EGD (12/06):   Single ulcer in the duodenum with clean base (John III)  3 cm hiatal hernia  Normal esophagus  Mild erythematous mucosa in the stomach  No active bleeding  Recent bleeding likely 2/2 large duodenal ulcer due to tumor erosion  Avoid AP/AC/DVT prophylaxis  Hemoglobin stable  Continue Protonix twice daily    Recent Labs     12/09/24  0532 12/10/24  0516 12/11/24  0444   HGB 8.6* 8.7* 8.7*

## 2024-12-11 NOTE — ASSESSMENT & PLAN NOTE
Lab Results   Component Value Date    EGFR 41 12/11/2024    EGFR 42 12/10/2024    EGFR 42 12/09/2024    CREATININE 1.47 (H) 12/11/2024    CREATININE 1.44 (H) 12/10/2024    CREATININE 1.45 (H) 12/09/2024     Baseline Cr. 1.4-1.6  Avoid nephrotoxic agents & hypotension

## 2024-12-11 NOTE — ASSESSMENT & PLAN NOTE
Lab Results   Component Value Date    HGBA1C 7.1 (H) 10/15/2024     Recent Labs     12/10/24  1141 12/10/24  1553 12/10/24  2104 12/11/24  0713   POCGLU 244* 194* 287* 152*     Blood Sugar Average: Last 72 hrs:  (P) [id number]  Home regimen: no medications PTA   C/w Accu-Cheks and SSI   W/ advanced age and risk for hypoglycemia, will defer to PCP whether initiation of medication is appropriate

## 2024-12-11 NOTE — TELEPHONE ENCOUNTER
Telephone call spoke with Pt's dtr Parul.  Pt is still inpt and they are working on getting him d/c.  Canceled tomorrow's office visit with Dr Fuentes and dtr is requesting to reschedule an appt after the holidays.  Explained a  will call her back with a new appt date and time.

## 2024-12-11 NOTE — CASE MANAGEMENT
Case Management Progress Note    Patient name Jose Zamora  Location /-01 MRN [id number]  : 1935 Date 2024       LOS (days): 4  Geometric Mean LOS (GMLOS) (days): 3.3  Days to GMLOS:-0.5        OBJECTIVE:        Current admission status: Inpatient  Preferred Pharmacy:   [facility]/pharmacy #[id number] - [place] - [address] [id number] [facility] [address]  Phone: [phone] Fax: [phone]    Primary Care Provider: Scott Hodges DO    Primary Insurance: MEDICARE  Secondary Insurance: BLUE CROSS    PROGRESS NOTE: Lyft ride requested for 3pm.  details provided to nursing. Waiver signed.

## 2024-12-11 NOTE — PLAN OF CARE
Problem: PAIN - ADULT  Goal: Verbalizes/displays adequate comfort level or baseline comfort level  Description: Interventions:  - Encourage patient to monitor pain and request assistance  - Assess pain using appropriate pain scale  - Administer analgesics based on type and severity of pain and evaluate response  - Implement non-pharmacological measures as appropriate and evaluate response  - Consider cultural and social influences on pain and pain management  - Notify physician/advanced practitioner if interventions unsuccessful or patient reports new pain  Outcome: Progressing     Problem: INFECTION - ADULT  Goal: Absence or prevention of progression during hospitalization  Description: INTERVENTIONS:  - Assess and monitor for signs and symptoms of infection  - Monitor lab/diagnostic results  - Monitor all insertion sites, i.e. indwelling lines, tubes, and drains  - Monitor endotracheal if appropriate and nasal secretions for changes in amount and color  - [place] appropriate cooling/warming therapies per order  - Administer medications as ordered  - Instruct and encourage patient and family to use good hand hygiene technique  - Identify and instruct in appropriate isolation precautions for identified infection/condition  Outcome: Progressing  Goal: Absence of fever/infection during neutropenic period  Description: INTERVENTIONS:  - Monitor WBC    Outcome: Progressing     Problem: SAFETY ADULT  Goal: Patient will remain free of falls  Description: INTERVENTIONS:  - Educate patient/family on patient safety including physical limitations  - Instruct patient to call for assistance with activity   - Consult OT/PT to assist with strengthening/mobility   - Keep Call bell within reach  - Keep bed low and locked with side rails adjusted as appropriate  - Keep care items and personal belongings within reach  - Initiate and maintain comfort rounds  - Make Fall Risk Sign visible to staff  - Offer Toileting every 2 Hours,  in advance of need  - Initiate/Maintain alarm  - Obtain necessary fall risk management equipment  - Apply yellow socks and bracelet for high fall risk patients  - Consider moving patient to room near nurses station  Outcome: Progressing  Goal: Maintain or return to baseline ADL function  Description: INTERVENTIONS:  -  Assess patient's ability to carry out ADLs; assess patient's baseline for ADL function and identify physical deficits which impact ability to perform ADLs (bathing, care of mouth/teeth, toileting, grooming, dressing, etc.)  - Assess/evaluate cause of self-care deficits   - Assess range of motion  - Assess patient's mobility; develop plan if impaired  - Assess patient's need for assistive devices and provide as appropriate  - Encourage maximum independence but intervene and supervise when necessary  - Involve family in performance of ADLs  - Assess for home care needs following discharge   - Consider OT consult to assist with ADL evaluation and planning for discharge  - Provide patient education as appropriate  Outcome: Progressing  Goal: Maintains/Returns to pre admission functional level  Description: INTERVENTIONS:  - Perform AM-PAC 6 Click Basic Mobility/ Daily Activity assessment daily.  - Set and communicate daily mobility goal to care team and patient/family/caregiver.   - Collaborate with rehabilitation services on mobility goals if consulted  - Perform Range of Motion 2 times a day.  - Reposition patient every 2 hours.  - Dangle patient 2 times a day  - Stand patient 2 times a day  - Ambulate patient 2 times a day  - Out of bed to chair 2 times a day   - Out of bed for meals 2 times a day  - Out of bed for toileting  - Record patient progress and toleration of activity level   Outcome: Progressing     Problem: DISCHARGE PLANNING  Goal: Discharge to home or other facility with appropriate resources  Description: INTERVENTIONS:  - Identify barriers to discharge w/patient and caregiver  - Arrange  for needed discharge resources and transportation as appropriate  - Identify discharge learning needs (meds, wound care, etc.)  - Arrange for interpretive services to assist at discharge as needed  - Refer to Case Management Department for coordinating discharge planning if the patient needs post-hospital services based on physician/advanced practitioner order or complex needs related to functional status, cognitive ability, or social support system  Outcome: Progressing     Problem: Knowledge Deficit  Goal: Patient/family/caregiver demonstrates understanding of disease process, treatment plan, medications, and discharge instructions  Description: Complete learning assessment and assess knowledge base.  Interventions:  - Provide teaching at level of understanding  - Provide teaching via preferred learning methods  Outcome: Progressing     Problem: Nutrition/Hydration-ADULT  Goal: Nutrient/Hydration intake appropriate for improving, restoring or maintaining nutritional needs  Description: Monitor and assess patient's nutrition/hydration status for malnutrition. Collaborate with interdisciplinary team and initiate plan and interventions as ordered.  Monitor patient's weight and dietary intake as ordered or per policy. Utilize nutrition screening tool and intervene as necessary. Determine patient's food preferences and provide high-protein, high-caloric foods as appropriate.     INTERVENTIONS:  - Monitor oral intake, urinary output, labs, and treatment plans  - Assess nutrition and hydration status and recommend course of action  - Evaluate amount of meals eaten  - Assist patient with eating if necessary   - Allow adequate time for meals  - Recommend/ encourage appropriate diets, oral nutritional supplements, and vitamin/mineral supplements  - Order, calculate, and assess calorie counts as needed  - Recommend, monitor, and adjust tube feedings and TPN/PPN based on assessed needs  - Assess need for intravenous fluids  -  Provide specific nutrition/hydration education as appropriate  - Include patient/family/caregiver in decisions related to nutrition  Outcome: Progressing     Problem: Potential for Falls  Goal: Patient will remain free of falls  Description: INTERVENTIONS:  - Educate patient/family on patient safety including physical limitations  - Instruct patient to call for assistance with activity   - Consult OT/PT to assist with strengthening/mobility   - Keep Call bell within reach  - Keep bed low and locked with side rails adjusted as appropriate  - Keep care items and personal belongings within reach  - Initiate and maintain comfort rounds  - Make Fall Risk Sign visible to staff  - Offer Toileting every 2 Hours, in advance of need  - Initiate/Maintain alarm  - Obtain necessary fall risk management equipment  - Apply yellow socks and bracelet for high fall risk patients  - Consider moving patient to room near nurses station  Outcome: Progressing

## 2024-12-11 NOTE — ASSESSMENT & PLAN NOTE
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  Dx in 10/2024  Evaluated by surgical oncology  who had recommended Whipple procedure however pt declined intervention given age.    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

## 2024-12-11 NOTE — PROGRESS NOTES
Patient:    MRN:  [id number]    Nancy Request ID:  [id number]    Level of care reserved:  Home Health Agency    Partner Reserved:  [facility], [place] [address] [phone]    Clinical needs requested:    Geography searched:  [phone]    Start of Service:    Request sent:  9:27am EST on 12/11/2024 by Priti Dee    Partner reserved:  11:00am EST on 12/11/2024 by Priti Dee    Choice list shared:  10:59am EST on 12/11/2024 by Priti Dee

## 2024-12-11 NOTE — ASSESSMENT & PLAN NOTE
Pt failed urinary retention protocol at [facility]; he presents to [facility] w/ urinary catheter in place  H/o dumont catheter in 10/2024   Per [facility] urology, plan to maintain catheter x 2 weeks w/ outpt void trial   C/w Flomax, Proscar

## 2024-12-12 ENCOUNTER — APPOINTMENT (OUTPATIENT)
Dept: RADIATION ONCOLOGY | Facility: CLINIC | Age: 89
End: 2024-12-12
Payer: MEDICARE

## 2024-12-12 ENCOUNTER — HOME CARE VISIT (OUTPATIENT)
Dept: HOME HEALTH SERVICES | Facility: HOME HEALTHCARE | Age: 89
End: 2024-12-12
Payer: MEDICARE

## 2024-12-12 ENCOUNTER — TELEPHONE (OUTPATIENT)
Dept: HEMATOLOGY ONCOLOGY | Facility: CLINIC | Age: 89
End: 2024-12-12

## 2024-12-12 ENCOUNTER — TRANSITIONAL CARE MANAGEMENT (OUTPATIENT)
Age: 89
End: 2024-12-12

## 2024-12-12 VITALS
DIASTOLIC BLOOD PRESSURE: 60 MMHG | SYSTOLIC BLOOD PRESSURE: 152 MMHG | HEART RATE: 94 BPM | TEMPERATURE: 97.6 F | OXYGEN SATURATION: 97 % | RESPIRATION RATE: 18 BRPM

## 2024-12-12 PROCEDURE — G0299 HHS/HOSPICE OF RN EA 15 MIN: HCPCS

## 2024-12-12 PROCEDURE — 10330081 VN NO-PAY CLAIM PROCEDURE

## 2024-12-12 PROCEDURE — 77387 GUIDANCE FOR RADJ TX DLVR: CPT | Performed by: STUDENT IN AN ORGANIZED HEALTH CARE EDUCATION/TRAINING PROGRAM

## 2024-12-12 PROCEDURE — 77014 CHG CT GUIDANCE RADIATION THERAPY FLDS PLACEMENT: CPT | Performed by: STUDENT IN AN ORGANIZED HEALTH CARE EDUCATION/TRAINING PROGRAM

## 2024-12-12 PROCEDURE — 77412 RADIATION TX DELIVERY LVL 3: CPT | Performed by: STUDENT IN AN ORGANIZED HEALTH CARE EDUCATION/TRAINING PROGRAM

## 2024-12-12 NOTE — TELEPHONE ENCOUNTER
Called and spoke with Parul to inform her that Dr. Fuentes would like to see Pt on 12/26 @ 2:40pm instead of 1/7/2025. Parul would like to keep 1/7 appointment and wants to know if that is okay. She stated it is okay to cancel whichever appointment they dont need and she will see it in the portal.

## 2024-12-12 NOTE — PROGRESS NOTES
12/13/2024    No chief complaint on file.    Assessment and Plan    89 y.o. male managed by New patient     1. 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 male here for evaluation of urinary retention. Initially presented to ED due to shortness of breath and chest pain on 12/5.  Patient was found to be in urinary retention with 1 L of urine.  Dumont inserted during hospitalization.  Patient was discharged with plan for trial void in 14 days.  Patient does have a history of urinary retention with last admission to ED in October.  Dumont was inserted at the time and patient had a successful trial void- discharged home without a dumont.  Multiparametric MRI of the prostate from June 2019 revealed a prostate significantly enlarged at 134 g at that time. CT scan estimates prostate to now be 439 g.     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      Review of Systems   Constitutional:  Negative for chills and fever.   HENT:  Negative for ear pain and sore throat.    Eyes:  Negative for pain and visual disturbance.   Respiratory:  Negative for cough and shortness of breath.    Cardiovascular:  Negative for chest pain and palpitations.   Gastrointestinal:  Negative for abdominal pain and vomiting.   Genitourinary:  Positive for difficulty urinating (dumont inplace). Negative for decreased urine volume, dysuria, flank pain and hematuria.   Musculoskeletal:  Negative for arthralgias and back pain.   Skin:  Negative for color change and rash.   Neurological:  Negative for seizures and syncope.   All other systems reviewed and are negative.            AUA SYMPTOM SCORE      Flowsheet Row Most Recent Value   AUA SYMPTOM SCORE    How often have you had a sensation of not emptying your bladder completely after you finished urinating? 1 (P)     How often have you had to urinate again less than two hours after you finished urinating? 3 (P)     How often have you found you stopped and started again several times when you urinate? 1 (P)     How often have you found it difficult to postpone urination? 4 (P)     How often have you had a weak urinary stream? 2 (P)     How often have you had to push or strain to begin urination? 2 (P)     How many times did you most typically get up to urinate from the time you went to bed at night until the time you got up in the morning? 5 (P)     Quality of Life: If you were to spend the rest of your life with your urinary condition just the way it is now, how would you feel about that? 3 (P)     AUA SYMPTOM SCORE 18 (P)               Vitals  Vitals:    12/13/24 1422   BP: 142/68   BP Location: Left arm   Patient Position:  "Sitting   Cuff Size: Standard   Pulse: 86   SpO2: 99%   Weight: 67.6 kg (149 lb)   Height: 5' 9\" (1.753 m)       Physical Exam  Vitals reviewed.   Constitutional:       Appearance: Normal appearance.   HENT:      Head: Normocephalic and atraumatic.   Eyes:      Conjunctiva/sclera: Conjunctivae normal.   Pulmonary:      Effort: Pulmonary effort is normal.   Abdominal:      General: Abdomen is flat. There is no distension.      Palpations: Abdomen is soft.      Tenderness: There is no abdominal tenderness.   Genitourinary:     Penis: Normal.       Testes: Normal.   Musculoskeletal:         General: Normal range of motion.      Cervical back: Normal range of motion.   Skin:     General: Skin is warm and dry.   Neurological:      General: No focal deficit present.      Mental Status: He is alert and oriented to person, place, and time.   Psychiatric:         Mood and Affect: Mood normal.         Behavior: Behavior normal.         Thought Content: Thought content normal.         Judgment: Judgment normal.         Past History  Past Medical History:   Diagnosis Date   • Allergic    • Anemia    • Arthritis    • Diabetes mellitus (HCC)    • Disease of thyroid gland    • Hyperlipemia    • Hypertension      Social History     Socioeconomic History   • Marital status: /Civil Union     Spouse name: None   • Number of children: None   • Years of education: None   • Highest education level: None   Occupational History   • 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[employer]   Tobacco Use   • Smoking status: Former     Current packs/day: 1.00     Average packs/day: 1 pack/day for 54.0 years (54.0 ttl pk-yrs)     Types: Cigarettes     Start date: 12/4/1970   • Smokeless tobacco: Never   Vaping Use   • Vaping status: Never Used   Substance and Sexual Activity   • Alcohol use: Not Currently     Comment: social/rarely   • Drug use: Never   • Sexual activity: Not Currently   Other Topics Concern   • None   Social History Narrative   • None "     Social Drivers of Health     Financial Resource Strain: Low Risk  (2023)    Overall Financial Resource Strain (CARDIA)    • Difficulty of Paying Living Expenses: Not hard at all   Food Insecurity: No Food Insecurity (2024)    Nursing - Inadequate Food Risk Classification    • Worried About Running Out of Food in the Last Year: Never true    • Ran Out of Food in the Last Year: Never true    • Ran Out of Food in the Last Year: 1   Transportation Needs: No Transportation Needs (2024)    OASIS : Transportation    • Lack of Transportation (Medical): No    • Lack of Transportation (Non-Medical): No    • Patient Unable or Declines to Respond: No   Physical Activity: Not on file   Stress: Not on file   Social Connections: Not on file   Intimate Partner Violence: Unknown (2024)    Nursing IPS    • Feels Physically and Emotionally Safe: Not on file    • Physically Hurt by Someone: Not on file    • Humiliated or Emotionally Abused by Someone: Not on file    • Physically Hurt by Someone: 2    • Hurt or Threatened by Someone: 2   Housing Stability: Unknown (2024)    Nursing: Inadequate Housing Risk Classification    • Has Housing: Not on file    • Worried About Losing Housing: Not on file    • Unable to Get Utilities: Not on file    • Unable to Pay for Housing in the Last Year: 2    • Has Housin     Social History     Tobacco Use   Smoking Status Former   • Current packs/day: 1.00   • Average packs/day: 1 pack/day for 54.0 years (54.0 ttl pk-yrs)   • Types: Cigarettes   • Start date: 1970   Smokeless Tobacco Never     Family History   Problem Relation Age of Onset   • No Known Problems Mother        The following portions of the patient's history were reviewed and updated as appropriate: allergies, current medications, past medical history, past social history, past surgical history and problem list.    Results  No results found for this or any previous visit (from the past  hour).]  Lab Results   Component Value Date    PSA 16.0 (H) 05/09/2019    PSA 9.2 (H) 10/19/2016    PSA 9.0 (H) 02/05/2015    PSA 12.7 (H) 08/19/2014     Lab Results   Component Value Date    GLUCOSE 130 02/05/2015    CALCIUM 7.2 (L) 12/11/2024     02/05/2015    K 4.2 12/11/2024    CO2 22 12/11/2024     (H) 12/11/2024    BUN 38 (H) 12/11/2024    CREATININE 1.47 (H) 12/11/2024     Lab Results   Component Value Date    WBC 6.99 12/11/2024    HGB 8.7 (L) 12/11/2024    HCT 28.1 (L) 12/11/2024    MCV 79 (L) 12/11/2024     12/11/2024

## 2024-12-13 ENCOUNTER — TELEPHONE (OUTPATIENT)
Age: 89
End: 2024-12-13

## 2024-12-13 ENCOUNTER — OFFICE VISIT (OUTPATIENT)
Dept: UROLOGY | Facility: MEDICAL CENTER | Age: 89
End: 2024-12-13
Payer: MEDICARE

## 2024-12-13 ENCOUNTER — TELEMEDICINE (OUTPATIENT)
Age: 89
End: 2024-12-13
Payer: MEDICARE

## 2024-12-13 VITALS
HEART RATE: 86 BPM | DIASTOLIC BLOOD PRESSURE: 68 MMHG | BODY MASS INDEX: 22.07 KG/M2 | SYSTOLIC BLOOD PRESSURE: 142 MMHG | OXYGEN SATURATION: 99 % | WEIGHT: 149 LBS | HEIGHT: 69 IN

## 2024-12-13 DIAGNOSIS — D62 ACUTE BLOOD LOSS ANEMIA: ICD-10-CM

## 2024-12-13 DIAGNOSIS — D3A.8 PRIMARY PANCREATIC NEUROENDOCRINE TUMOR: Primary | ICD-10-CM

## 2024-12-13 DIAGNOSIS — E44.0 MODERATE PROTEIN-CALORIE MALNUTRITION (HCC): ICD-10-CM

## 2024-12-13 DIAGNOSIS — R33.9 URINARY RETENTION: ICD-10-CM

## 2024-12-13 DIAGNOSIS — I25.10 ATHEROSCLEROSIS OF NATIVE CORONARY ARTERY OF NATIVE HEART WITHOUT ANGINA PECTORIS: ICD-10-CM

## 2024-12-13 DIAGNOSIS — E11.22 TYPE 2 DIABETES MELLITUS WITH STAGE 3 CHRONIC KIDNEY DISEASE, WITHOUT LONG-TERM CURRENT USE OF INSULIN, UNSPECIFIED WHETHER STAGE 3A OR 3B CKD (HCC): ICD-10-CM

## 2024-12-13 DIAGNOSIS — N18.30 TYPE 2 DIABETES MELLITUS WITH STAGE 3 CHRONIC KIDNEY DISEASE, WITHOUT LONG-TERM CURRENT USE OF INSULIN, UNSPECIFIED WHETHER STAGE 3A OR 3B CKD (HCC): ICD-10-CM

## 2024-12-13 DIAGNOSIS — I10 PRIMARY HYPERTENSION: ICD-10-CM

## 2024-12-13 LAB
ATRIAL RATE: 110 BPM
P AXIS: 67 DEGREES
PR INTERVAL: 158 MS
QRS AXIS: 25 DEGREES
QRSD INTERVAL: 78 MS
QT INTERVAL: 326 MS
QTC INTERVAL: 441 MS
T WAVE AXIS: 77 DEGREES
VENTRICULAR RATE: 110 BPM

## 2024-12-13 PROCEDURE — 10330064 SALINE, IRR SOL STR 100ML (48/CS) MGM37

## 2024-12-13 PROCEDURE — 93010 ELECTROCARDIOGRAM REPORT: CPT | Performed by: INTERNAL MEDICINE

## 2024-12-13 PROCEDURE — 10330064 BAG, URINE DRN (20/CS)        BARD

## 2024-12-13 PROCEDURE — 10330064 SYRINGE, CATH TIP FLAT STR 60CC (50/CS)

## 2024-12-13 PROCEDURE — 99204 OFFICE O/P NEW MOD 45 MIN: CPT

## 2024-12-13 PROCEDURE — 99496 TRANSJ CARE MGMT HIGH F2F 7D: CPT | Performed by: FAMILY MEDICINE

## 2024-12-13 NOTE — ASSESSMENT & PLAN NOTE
Orders:    Comprehensive metabolic panel; Future    CBC and differential; Future    Lipid panel; Future    TSH, 3rd generation with Free T4 reflex; Future    Hemoglobin A1C; Future    TIBC Panel (incl. Iron, TIBC, % Iron Saturation); Future

## 2024-12-13 NOTE — TELEPHONE ENCOUNTER
Patient's daughter, Parul, calling in regards to patient's radiation therapy treatments.  Parul states patient is supposed to have a RT treatment today at 9:45am at the [facility].  Parul states she was informed of this appointment when patient was seen inpatient. I informed Parul that I do not see a  RT treatment appointment scheduled for today.  I attempted to transfer call to the [place] office but was unable to speak with an office staff member.  Please call Parul back at [phone] to confirm if patient will have RT treatment today.

## 2024-12-13 NOTE — CASE COMMUNICATION
Ship to   Clinician       Ordering MD DR. CHAN    Syringe 60cc [id number]   Drain bag 2000cc [id number] 1   Saline 100ml [id number] 1

## 2024-12-13 NOTE — PROGRESS NOTES
Virtual TCM Visit:    Verification of patient location:    Patient is located at Home in the following state in which I hold an active license [place]    Assessment & Plan  Primary pancreatic neuroendocrine tumor    Orders:    Comprehensive metabolic panel; Future    CBC and differential; Future    Lipid panel; Future    TSH, 3rd generation with Free T4 reflex; Future    Hemoglobin A1C; Future    TIBC Panel (incl. Iron, TIBC, % Iron Saturation); Future    Acute blood loss anemia    Orders:    Comprehensive metabolic panel; Future    CBC and differential; Future    Lipid panel; Future    TSH, 3rd generation with Free T4 reflex; Future    Hemoglobin A1C; Future    TIBC Panel (incl. Iron, TIBC, % Iron Saturation); Future    Atherosclerosis of native coronary artery of native heart without angina pectoris    Orders:    Comprehensive metabolic panel; Future    CBC and differential; Future    Lipid panel; Future    TSH, 3rd generation with Free T4 reflex; Future    Hemoglobin A1C; Future    TIBC Panel (incl. Iron, TIBC, % Iron Saturation); Future    Type 2 diabetes mellitus with stage 3 chronic kidney disease, without long-term current use of insulin, unspecified whether stage 3a or 3b CKD (HCC)    Lab Results   Component Value Date    HGBA1C 7.1 (H) 10/15/2024       Orders:    Comprehensive metabolic panel; Future    CBC and differential; Future    Lipid panel; Future    TSH, 3rd generation with Free T4 reflex; Future    Hemoglobin A1C; Future    TIBC Panel (incl. Iron, TIBC, % Iron Saturation); Future    Primary hypertension    Orders:    Comprehensive metabolic panel; Future    CBC and differential; Future    Lipid panel; Future    TSH, 3rd generation with Free T4 reflex; Future    Hemoglobin A1C; Future    TIBC Panel (incl. Iron, TIBC, % Iron Saturation); Future    Moderate protein-calorie malnutrition (HCC)    Orders:    Comprehensive metabolic panel; Future    CBC and differential; Future    Lipid panel; Future    TSH,  3rd generation with Free T4 reflex; Future    Hemoglobin A1C; Future    TIBC Panel (incl. Iron, TIBC, % Iron Saturation); Future         Encounter provider Scott Hodges DO     Provider located at [facility] PRIMARY [facility]ARE  [address]  [place] [id number] [phone]    After connecting through [employer], the patient was identified by name and date of birth. Jose Zamora was informed that this is a telemedicine visit and that the visit is being conducted through the Epic Embedded platform. He agrees to proceed..  My office door was closed. No one else was in the room.  He acknowledged consent and understanding of privacy and security of the video platform. The patient has agreed to participate and understands they can discontinue the visit at any time.    Patient is aware this is a billable service.    History of Present Illness     Transitional Care Management Review:   Jose Zamora is a 89 y.o. male here for TCM follow up.    During the TCM phone call patient stated:  TCM Call       Date and time call was made  12/12/2024 10:14 AM    Patient was hospitialized at  [facility]    Date of Admission  12/07/24    Date of discharge  12/11/24    Diagnosis  PRIMARY PANCREATIC NEUROENDOCRINE TUMOR    Disposition  Home    Were the patients medications reviewed and updated  Yes    Current Symptoms  None          TCM Call       Post hospital issues  None    Should patient be enrolled in anticoag monitoring?  No    Scheduled for follow up?  Yes    Did you obtain your prescribed medications  Yes    Do you need help managing your prescriptions or medications  No    Is transportation to your appointment needed  No    I have advised the patient to call PCP with any new or worsening symptoms  GUDELIA VIEIRA CMA    Living Arrangements  Spouse or Significiant other    Support System  Spouse    The type of support provided  Emotional; Financial; Physical    Do you have social  support  Yes, as much as I need    Are you recieving any outpatient services  Yes    What type of services  VNA    Are you recieving home care services  Yes    Types of home care services  Nurse visit    Are you using any community resources  No    Current waiver services  No    Have you fallen in the last 12 months  No    Interperter language line needed  No    Counseling  Patient    Comments  [facility]  Review of Systems  Objective   There were no vitals taken for this visit.    Physical Exam  Medications have been reviewed by provider in current encounter      Scott Hodges, DO      VIRTUAL VISIT DISCLAIMER    Jose Zamora verbally agrees to participate in Virtual Care Services. Pt is aware that Virtual Care Services could be limited without vital signs or the ability to perform a full hands-on physical exam. Jose Zamora understands he or the provider may request at any time to terminate the video visit and request the patient to seek care or treatment in person.

## 2024-12-13 NOTE — ASSESSMENT & PLAN NOTE
Lab Results   Component Value Date    HGBA1C 7.1 (H) 10/15/2024       Orders:    Comprehensive metabolic panel; Future    CBC and differential; Future    Lipid panel; Future    TSH, 3rd generation with Free T4 reflex; Future    Hemoglobin A1C; Future    TIBC Panel (incl. Iron, TIBC, % Iron Saturation); Future

## 2024-12-13 NOTE — CASE COMMUNICATION
Medication discrepancies or Major drug interactions  Abnormal clinical findings   This report is informational only, no response is needed  [facility] has Admitted your patient to Home Health service with the following disciplines: SN  Patient stated goals of care pt would like to have urinary catheter removed  Potential barriers to goal achievement   Primary focus of home health care:Genitourinary  Anticipated visit pattern and nex t visit date 2W2 then 1W1 next visit on 12.14.24 per pt's request  Thank you for allowing us to participate in the care of your patient.      Angie Milton RN

## 2024-12-13 NOTE — PROGRESS NOTES
Virtual TCM Visit:    Verification of patient location:    Patient is located at Home in the following state in which I hold an active license [place]    Assessment & Plan  Primary pancreatic neuroendocrine tumor    Orders:    Comprehensive metabolic panel; Future    CBC and differential; Future    Lipid panel; Future    TSH, 3rd generation with Free T4 reflex; Future    Hemoglobin A1C; Future    TIBC Panel (incl. Iron, TIBC, % Iron Saturation); Future    Acute blood loss anemia    Orders:    Comprehensive metabolic panel; Future    CBC and differential; Future    Lipid panel; Future    TSH, 3rd generation with Free T4 reflex; Future    Hemoglobin A1C; Future    TIBC Panel (incl. Iron, TIBC, % Iron Saturation); Future    Atherosclerosis of native coronary artery of native heart without angina pectoris    Orders:    Comprehensive metabolic panel; Future    CBC and differential; Future    Lipid panel; Future    TSH, 3rd generation with Free T4 reflex; Future    Hemoglobin A1C; Future    TIBC Panel (incl. Iron, TIBC, % Iron Saturation); Future    Type 2 diabetes mellitus with stage 3 chronic kidney disease, without long-term current use of insulin, unspecified whether stage 3a or 3b CKD (HCC)    Lab Results   Component Value Date    HGBA1C 7.1 (H) 10/15/2024       Orders:    Comprehensive metabolic panel; Future    CBC and differential; Future    Lipid panel; Future    TSH, 3rd generation with Free T4 reflex; Future    Hemoglobin A1C; Future    TIBC Panel (incl. Iron, TIBC, % Iron Saturation); Future    Primary hypertension    Orders:    Comprehensive metabolic panel; Future    CBC and differential; Future    Lipid panel; Future    TSH, 3rd generation with Free T4 reflex; Future    Hemoglobin A1C; Future    TIBC Panel (incl. Iron, TIBC, % Iron Saturation); Future    Moderate protein-calorie malnutrition (HCC)    Orders:    Comprehensive metabolic panel; Future    CBC and differential; Future    Lipid panel; Future    TSH,  3rd generation with Free T4 reflex; Future    Hemoglobin A1C; Future    TIBC Panel (incl. Iron, TIBC, % Iron Saturation); Future      Depression Screening and Follow-up Plan: Patient was screened for depression during today's encounter. They screened negative with a PHQ-2 score of 0.        Encounter provider Scott Hodges DO     Provider located at [facility] PRIMARY [facility]ARE  [address]  [place] [address] [phone]    After connecting through [employer], the patient was identified by name and date of birth. Jose Zamora was informed that this is a telemedicine visit and that the visit is being conducted through the Epic Embedded platform. He agrees to proceed..  My office door was closed. No one else was in the room.  He acknowledged consent and understanding of privacy and security of the video platform. The patient has agreed to participate and understands they can discontinue the visit at any time.    Patient is aware this is a billable service.    History of Present Illness     Transitional Care Management Review:   Jose Zamora is a 89 y.o. male here for TCM follow up.    During the TCM phone call patient stated:  TCM Call       Date and time call was made  12/12/2024 10:14 AM    Patient was hospitialized at  [facility]    Date of Admission  12/07/24    Date of discharge  12/11/24    Diagnosis  PRIMARY PANCREATIC NEUROENDOCRINE TUMOR    Disposition  Home    Were the patients medications reviewed and updated  Yes    Current Symptoms  None          TCM Call       Post hospital issues  None    Should patient be enrolled in anticoag monitoring?  No    Scheduled for follow up?  Yes    Did you obtain your prescribed medications  Yes    Do you need help managing your prescriptions or medications  No    Is transportation to your appointment needed  No    I have advised the patient to call PCP with any new or worsening symptoms  GUDELIA VIEIRA CMA    Living  Arrangements  Spouse or Significiant other    Support System  Spouse    The type of support provided  Emotional; Financial; Physical    Do you have social support  Yes, as much as I need    Are you recieving any outpatient services  Yes    What type of services  VNA    Are you recieving home care services  Yes    Types of home care services  Nurse visit    Are you using any community resources  No    Current waiver services  No    Have you fallen in the last 12 months  No    Interperter language line needed  No    Counseling  Patient    Comments  TRANSFERRED TO [facility]          Patient is status post hospitalization from the seventh through the 11th for primary neurogenic pancreatic cancer.  Patient was hospitalized at [place] as well as [facility] and transferred to [place] for last admission.  Patient will was also admitted with anemia previously and received packed red blood cells.  Patient other diagnoses and all records reviewed at the present time.  Patient did receive radiation x 3.  Patient feeling less fatigued.  No rectal bleeding noted.  No black stools.  No nausea or vomiting.  No fevers or chills.  No chest pain or shortness of breath.  No abdominal pain.  No lower extremity edema.      Review of Systems   Constitutional: Negative.    HENT: Negative.     Eyes: Negative.    Respiratory: Negative.     Cardiovascular: Negative.    Gastrointestinal: Negative.    Endocrine: Negative.    Genitourinary: Negative.    Musculoskeletal: Negative.    Skin: Negative.    Allergic/Immunologic: Negative.    Neurological: Negative.    Hematological: Negative.    Psychiatric/Behavioral: Negative.       Objective   There were no vitals taken for this visit.    Physical Exam  Vitals and nursing note reviewed.   Constitutional:       General: He is not in acute distress.     Appearance: Normal appearance. He is well-developed. He is not ill-appearing, toxic-appearing or diaphoretic.   HENT:       Head: Normocephalic and atraumatic.      Right Ear: External ear normal.      Left Ear: External ear normal.      Nose: Nose normal.   Eyes:      General:         Right eye: No discharge.         Left eye: No discharge.      Pupils: Pupils are equal, round, and reactive to light.   Neck:      Thyroid: No thyromegaly.      Trachea: No tracheal deviation.   Pulmonary:      Effort: Pulmonary effort is normal.   Abdominal:      General: There is no distension.      Palpations: Abdomen is soft.   Musculoskeletal:         General: Normal range of motion.      Cervical back: Normal range of motion and neck supple.   Lymphadenopathy:      Cervical: No cervical adenopathy.   Skin:     General: Skin is warm and dry.      Findings: No rash.   Neurological:      Mental Status: He is alert and oriented to person, place, and time. Mental status is at baseline.      Motor: No abnormal muscle tone.   Psychiatric:         Mood and Affect: Mood normal.         Behavior: Behavior normal.         Thought Content: Thought content normal.         Judgment: Judgment normal.       Medications have been reviewed by provider in current encounter      Scott Hodges DO      VIRTUAL VISIT DISCLAIMER    Jose Zamora verbally agrees to participate in Virtual Care Services. Pt is aware that Virtual Care Services could be limited without vital signs or the ability to perform a full hands-on physical exam. Jose Zamora understands he or the provider may request at any time to terminate the video visit and request the patient to seek care or treatment in person.

## 2024-12-13 NOTE — TELEPHONE ENCOUNTER
Called Parul, patients daughter and advised her dad does not have a scheduled treatment today. Treatments start on 12/16/24. Parul voiced understanding.

## 2024-12-13 NOTE — ASSESSMENT & PLAN NOTE
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

## 2024-12-14 ENCOUNTER — HOME CARE VISIT (OUTPATIENT)
Dept: HOME HEALTH SERVICES | Facility: HOME HEALTHCARE | Age: 89
End: 2024-12-14
Payer: MEDICARE

## 2024-12-14 VITALS
HEART RATE: 100 BPM | WEIGHT: 150 LBS | DIASTOLIC BLOOD PRESSURE: 60 MMHG | RESPIRATION RATE: 18 BRPM | OXYGEN SATURATION: 98 % | BODY MASS INDEX: 22.15 KG/M2 | SYSTOLIC BLOOD PRESSURE: 142 MMHG | TEMPERATURE: 97.8 F

## 2024-12-14 PROCEDURE — G0299 HHS/HOSPICE OF RN EA 15 MIN: HCPCS

## 2024-12-16 ENCOUNTER — TELEPHONE (OUTPATIENT)
Dept: RADIATION ONCOLOGY | Facility: CLINIC | Age: 89
End: 2024-12-16

## 2024-12-16 ENCOUNTER — HOME CARE VISIT (OUTPATIENT)
Dept: HOME HEALTH SERVICES | Facility: HOME HEALTHCARE | Age: 89
End: 2024-12-16
Payer: MEDICARE

## 2024-12-16 VITALS
OXYGEN SATURATION: 98 % | HEART RATE: 80 BPM | RESPIRATION RATE: 16 BRPM | SYSTOLIC BLOOD PRESSURE: 158 MMHG | TEMPERATURE: 98.1 F | DIASTOLIC BLOOD PRESSURE: 68 MMHG

## 2024-12-16 PROCEDURE — 77387 GUIDANCE FOR RADJ TX DLVR: CPT | Performed by: RADIOLOGY

## 2024-12-16 PROCEDURE — 77014 CHG CT GUIDANCE RADIATION THERAPY FLDS PLACEMENT: CPT | Performed by: RADIOLOGY

## 2024-12-16 PROCEDURE — 77412 RADIATION TX DELIVERY LVL 3: CPT | Performed by: RADIOLOGY

## 2024-12-16 PROCEDURE — 10330064 BAG, URINE LEG STR 1000ML (48/CS)

## 2024-12-16 PROCEDURE — G0299 HHS/HOSPICE OF RN EA 15 MIN: HCPCS

## 2024-12-17 ENCOUNTER — TELEPHONE (OUTPATIENT)
Dept: SURGICAL ONCOLOGY | Facility: CLINIC | Age: 89
End: 2024-12-17

## 2024-12-17 ENCOUNTER — TELEPHONE (OUTPATIENT)
Age: 89
End: 2024-12-17

## 2024-12-17 PROCEDURE — 77412 RADIATION TX DELIVERY LVL 3: CPT | Performed by: RADIOLOGY

## 2024-12-17 PROCEDURE — 77014 CHG CT GUIDANCE RADIATION THERAPY FLDS PLACEMENT: CPT | Performed by: RADIOLOGY

## 2024-12-17 PROCEDURE — 77387 GUIDANCE FOR RADJ TX DLVR: CPT | Performed by: RADIOLOGY

## 2024-12-17 NOTE — TELEPHONE ENCOUNTER
Called patient and spoke to Parul (daughter) we were able to get Jose scheduled for an office visit with Dr. Rodriguez to discuss alternate treatment options. I confirmed the date, time and location with Parul.

## 2024-12-17 NOTE — TELEPHONE ENCOUNTER
Pt daughter stated she received a call of an appt offer due to Dr. Rodriguez wanting to see pt sooner. She would like a call back to confirm if this was the case. She can be reached at [phone]. Thank you.

## 2024-12-18 PROCEDURE — 77387 GUIDANCE FOR RADJ TX DLVR: CPT | Performed by: INTERNAL MEDICINE

## 2024-12-18 PROCEDURE — 77014 CHG CT GUIDANCE RADIATION THERAPY FLDS PLACEMENT: CPT | Performed by: INTERNAL MEDICINE

## 2024-12-18 PROCEDURE — 77427 RADIATION TX MANAGEMENT X5: CPT | Performed by: STUDENT IN AN ORGANIZED HEALTH CARE EDUCATION/TRAINING PROGRAM

## 2024-12-18 PROCEDURE — 77412 RADIATION TX DELIVERY LVL 3: CPT | Performed by: INTERNAL MEDICINE

## 2024-12-19 ENCOUNTER — HOME CARE VISIT (OUTPATIENT)
Dept: HOME HEALTH SERVICES | Facility: HOME HEALTHCARE | Age: 89
End: 2024-12-19
Payer: MEDICARE

## 2024-12-19 ENCOUNTER — TELEMEDICINE (OUTPATIENT)
Dept: GERIATRICS | Facility: OTHER | Age: 89
End: 2024-12-19
Payer: MEDICARE

## 2024-12-19 VITALS
DIASTOLIC BLOOD PRESSURE: 78 MMHG | OXYGEN SATURATION: 96 % | TEMPERATURE: 98.1 F | SYSTOLIC BLOOD PRESSURE: 152 MMHG | RESPIRATION RATE: 14 BRPM | HEART RATE: 78 BPM

## 2024-12-19 DIAGNOSIS — E44.0 MODERATE PROTEIN-CALORIE MALNUTRITION (HCC): ICD-10-CM

## 2024-12-19 DIAGNOSIS — N18.30 TYPE 2 DIABETES MELLITUS WITH STAGE 3 CHRONIC KIDNEY DISEASE, WITHOUT LONG-TERM CURRENT USE OF INSULIN, UNSPECIFIED WHETHER STAGE 3A OR 3B CKD (HCC): ICD-10-CM

## 2024-12-19 DIAGNOSIS — D3A.8 PRIMARY PANCREATIC NEUROENDOCRINE TUMOR: Primary | ICD-10-CM

## 2024-12-19 DIAGNOSIS — R26.2 AMBULATORY DYSFUNCTION: ICD-10-CM

## 2024-12-19 DIAGNOSIS — D62 ACUTE BLOOD LOSS ANEMIA: ICD-10-CM

## 2024-12-19 DIAGNOSIS — N18.32 CKD STAGE 3B, GFR 30-44 ML/MIN (HCC): ICD-10-CM

## 2024-12-19 DIAGNOSIS — R33.9 URINARY RETENTION: ICD-10-CM

## 2024-12-19 DIAGNOSIS — E11.22 TYPE 2 DIABETES MELLITUS WITH STAGE 3 CHRONIC KIDNEY DISEASE, WITHOUT LONG-TERM CURRENT USE OF INSULIN, UNSPECIFIED WHETHER STAGE 3A OR 3B CKD (HCC): ICD-10-CM

## 2024-12-19 DIAGNOSIS — I10 PRIMARY HYPERTENSION: ICD-10-CM

## 2024-12-19 PROCEDURE — 77336 RADIATION PHYSICS CONSULT: CPT | Performed by: STUDENT IN AN ORGANIZED HEALTH CARE EDUCATION/TRAINING PROGRAM

## 2024-12-19 PROCEDURE — 77014 CHG CT GUIDANCE RADIATION THERAPY FLDS PLACEMENT: CPT | Performed by: STUDENT IN AN ORGANIZED HEALTH CARE EDUCATION/TRAINING PROGRAM

## 2024-12-19 PROCEDURE — 77412 RADIATION TX DELIVERY LVL 3: CPT | Performed by: STUDENT IN AN ORGANIZED HEALTH CARE EDUCATION/TRAINING PROGRAM

## 2024-12-19 PROCEDURE — 77387 GUIDANCE FOR RADJ TX DLVR: CPT | Performed by: STUDENT IN AN ORGANIZED HEALTH CARE EDUCATION/TRAINING PROGRAM

## 2024-12-19 PROCEDURE — G0299 HHS/HOSPICE OF RN EA 15 MIN: HCPCS

## 2024-12-19 PROCEDURE — 99205 OFFICE O/P NEW HI 60 MIN: CPT | Performed by: NURSE PRACTITIONER

## 2024-12-19 PROCEDURE — 77331 SPECIAL RADIATION DOSIMETRY: CPT | Performed by: STUDENT IN AN ORGANIZED HEALTH CARE EDUCATION/TRAINING PROGRAM

## 2024-12-19 NOTE — ASSESSMENT & PLAN NOTE
Urinary retention with dumont and failed voiding trial during  most recent hospital admission  Continue Dumont catheter care  Encourage p.o. fluid intake  Continue to monitor urinary output  Follow-up with urology as scheduled

## 2024-12-19 NOTE — PROGRESS NOTES
Virtual Regular Visit  Name: Jose Zamora      : 1935      MRN: [id number]  Encounter Provider: KAREL Cifuentes  Encounter Date: 2024   Encounter department: 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  Patient had surgical oncology consult 10/2024 with Whipple procedure recommended, however patient declined intervention given age  Patient received radiation treatment which is ongoing  Follow-up with hematology oncology       Acute blood loss anemia  ABLA secondary to single ulcer in duodenum with clean base (John lll) per EGD performed on   Most recent Hgb 8.7/Hct 28.1, remains stable  Continue to monitor CBC periodically  Continue Protonix BID and avoidance of NSAIDs  Monitor for signs and symptoms of bleeding  Continue to monitor patient for acute changes in condition  Follow up with PCP          Urinary retention  Urinary retention with dumont and failed voiding trial during  most recent hospital admission  Continue Dumont catheter care  Encourage p.o. fluid intake  Continue to monitor urinary output  Follow-up with urology as scheduled         Type 2 diabetes mellitus with stage 3 chronic kidney disease, without long-term current use of insulin, unspecified whether stage 3a or 3b CKD (HCC)    Lab Results   Component Value Date    HGBA1C 6.4 (H) 2025   Bgs well controlled  Patient is currently diet controlled  Continue intermittent Accu-cheks   Educate patient on s/s of hyper/hypoglycemia  Continue to monitor for acute changes in condition   Follow up with PCP/Endocrinology          Primary hypertension  BP remains stable today, 152/78  Continue Amlodipine and Metoprolol  Avoid hypotension  Continue to  monitor BP and for acute changes in condition  Follow up with PCP/Cardiology          CKD stage 3b, 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 ([facility])  Most recent Creatinine 1.47 /Gfr 44, stable  Continue to monitor BMP periodically  Avoid nephrotoxins and NSAIDS  Avoid hypotension  Continue to monitor for acute changes in condition  Follow up with PCP          Moderate protein-calorie malnutrition (HCC)  Malnutrition Findings:   Multifactorial including acute illness, inadequate energy, weight loss, muscle wasting, and fat loss  Continue to monitor weight and BMI   Continue all nutritional supplements  Consult nutritional services prn  Monitor for acute changes in condition  Follow up with PCP          Ambulatory dysfunction  History of falls  Maintain fall and safety precautions  Encourage use of asst devices  Continue PT/OT services with VNA  Assess for need with assistance with transfers, mobility, and ADLs in/out of home  Patient is at high risk for falls r/t pancreatic CA with radiation therapy, dumont catheter, and deconditioning  Continue to monitor for acute changes in condition   Follow up with PCP              Encounter provider KAREL Cifuentes    The patient was identified by name and date of birth. Jose Zamora was informed that this is a telemedicine visit and that the visit is being conducted through the [employer] Teams platform. He agrees to proceed.  My office door was closed. No one else was in the room.  He acknowledged consent and understanding of privacy and security of the video platform. The patient has agreed to participate and understands they can discontinue the visit at any time.    Patient is aware this is a billable service.     History of Present Illness      Jose Zamora is a 89 y.o. male patient, being seen for Heal at home program in conjuction with [facility]NA nurse Lourdes Jeronimo, who was in the home to perform physical assessment.      The patient is being seen and examined today for follow-up  on acute and chronic medical conditions s/p most recent hospitalization.     The patient reports he is feeling okay today.  VNA nurse reports that Bp was slightly elevated today during home visit and at appt this morning, -170's.  Patient does report some anxiety over upcoming appts and taking care of wife and her appts. He believes this is why Bp is elevated today.  He states he had radiation treatment this week and has 4 more treatments until [place]. He denies any s/e. Patient will have f/u with Urology 12/20 for voiding trial. Patient state that he is also anxious about failing this. I did encourage him to increase fluids today and tomorrow. Patient has f/u PCP 1/6/25 and Heme/Oncology 1/7/25 along with radiation visits for total of 10.             Review of Systems   Constitutional:  Positive for activity change, appetite change and fatigue. Negative for chills and fever.   HENT:  Negative for ear pain and sore throat.    Eyes:  Negative for pain and visual disturbance.   Respiratory:  Negative for cough and shortness of breath.    Cardiovascular:  Negative for chest pain and palpitations.   Gastrointestinal:  Negative for abdominal pain and vomiting.   Genitourinary:  Negative for dysuria and hematuria.   Musculoskeletal:  Positive for gait problem. Negative for arthralgias and back pain.   Skin:  Negative for color change and rash.   Neurological:  Positive for weakness. Negative for seizures and syncope.   Psychiatric/Behavioral:  The patient is nervous/anxious.    All other systems reviewed and are negative.      Objective   12/11/2024  5:31 AM    Component Value Flag Ref Range Units Status   Sodium 141  135 - 147 mmol/L Final   Potassium 4.2  3.5 - 5.3 mmol/L Final   Chloride 110  High  96 - 108 mmol/L Final   CO2 22  21 - 32 mmol/L Final   ANION GAP 9  4 - 13 mmol/L Final   BUN 38  High  5 - 25 mg/dL Final   Creatinine 1.47  High  0.60 - 1.30 mg/dL Final   Comment:   Standardized to IDMS  reference method   Glucose 151  High  65 - 140 mg/dL Final   Comment:   If the patient is fasting, the ADA then defines impaired fasting glucose as > 100 mg/dL and diabetes as > or equal to 123 mg/dL.   Calcium 7.2  Low  8.4 - 10.2 mg/dL Final   eGFR 41   ml/min/1.73sq m Final     12/11/2024  5:18 AM    Component Value Flag Ref Range Units Status   WBC 6.99  4.31 - 10.16 Thousand/uL Final   RBC 3.58  Low  3.88 - 5.62 Million/uL Final   Hemoglobin 8.7  Low  12.0 - 17.0 g/dL Final   Hematocrit 28.1  Low  36.5 - 49.3 % Final   MCV 79  Low  82 - 98 fL Final   MCH 24.3  Low  26.8 - 34.3 pg Final   MCHC 31.0  Low  31.4 - 37.4 g/dL Final   RDW 19.4  High  11.6 - 15.1 % Final   Platelets 205  149 - 390 Thousands/uL Final   MPV 10.4  8.9 - 12.7 fL Final       Vitals    Vitals 12/19/2024 12:15 PM   /78   BP Location Right arm   Patient Position Sitting   Resp 14   SpO2 96 %   SpO2 Activity At Rest   Pulse 78   Pulse Source Radial   Temp 98.1 °F (36.7 °C)   Temp src Temporal       Physical Exam  Vitals and nursing note reviewed.   Constitutional:       General: He is not in acute distress.     Appearance: He is well-developed.   HENT:      Head: Normocephalic and atraumatic.   Eyes:      Conjunctiva/sclera: Conjunctivae normal.      Comments: Wears glasses   Cardiovascular:      Rate and Rhythm: Normal rate and regular rhythm.      Heart sounds: No murmur heard.  Pulmonary:      Effort: Pulmonary effort is normal. No respiratory distress.      Breath sounds: Normal breath sounds. No wheezing, rhonchi or rales.   Abdominal:      Palpations: Abdomen is soft.      Tenderness: There is no abdominal tenderness.   Musculoskeletal:         General: No swelling.      Cervical back: Neck supple.   Skin:     General: Skin is warm and dry.      Capillary Refill: Capillary refill takes less than 2 seconds.   Neurological:      Mental Status: He is alert.      Motor: Weakness present.      Gait: Gait abnormal.   Psychiatric:          Mood and Affect: Mood normal.         Visit Time  Total Visit Duration: I have spent a total time of 67 minutes in caring for this patient on the day of the visit/encounter including Diagnostic results, Prognosis, Risks and benefits of tx options, Instructions for management, Patient and family education, Importance of tx compliance, Risk factor reductions, Impressions, Counseling / Coordination of care, Documenting in the medical record, Reviewing / ordering tests, medicine, procedures  , Obtaining or reviewing history  , and Communicating with other healthcare professionals .

## 2024-12-19 NOTE — ASSESSMENT & PLAN NOTE
ABLA secondary to single ulcer in duodenum with clean base (John lll) per EGD performed on 12/6  Most recent Hgb 8.7/Hct 28.1, remains stable  Continue to monitor CBC periodically  Continue Protonix BID and avoidance of NSAIDs  Monitor for signs and symptoms of bleeding  Continue to monitor patient for acute changes in condition  Follow up with PCP

## 2024-12-19 NOTE — TELEPHONE ENCOUNTER
Daughter calling to confirm dumont catheter trial of void for 12/20/24;  Patient also has radiation appointment and will not be drinking from 8 to 11 am;   she wanted to make office aware.  He will drink water prior to 0800;  and at 1100;  will keep nurse appointment as scheduled

## 2024-12-20 ENCOUNTER — PROCEDURE VISIT (OUTPATIENT)
Dept: UROLOGY | Facility: MEDICAL CENTER | Age: 89
End: 2024-12-20
Payer: MEDICARE

## 2024-12-20 ENCOUNTER — TELEPHONE (OUTPATIENT)
Dept: UROLOGY | Facility: MEDICAL CENTER | Age: 89
End: 2024-12-20

## 2024-12-20 VITALS
HEART RATE: 103 BPM | WEIGHT: 155 LBS | OXYGEN SATURATION: 93 % | HEIGHT: 69 IN | BODY MASS INDEX: 22.96 KG/M2 | SYSTOLIC BLOOD PRESSURE: 140 MMHG | DIASTOLIC BLOOD PRESSURE: 70 MMHG

## 2024-12-20 DIAGNOSIS — R33.9 URINARY RETENTION: Primary | ICD-10-CM

## 2024-12-20 LAB — POST-VOID RESIDUAL VOLUME, ML POC: 267 ML

## 2024-12-20 PROCEDURE — 99024 POSTOP FOLLOW-UP VISIT: CPT

## 2024-12-20 PROCEDURE — 77412 RADIATION TX DELIVERY LVL 3: CPT | Performed by: RADIOLOGY

## 2024-12-20 PROCEDURE — 77387 GUIDANCE FOR RADJ TX DLVR: CPT | Performed by: RADIOLOGY

## 2024-12-20 PROCEDURE — 77014 CHG CT GUIDANCE RADIATION THERAPY FLDS PLACEMENT: CPT | Performed by: RADIOLOGY

## 2024-12-20 PROCEDURE — 51798 US URINE CAPACITY MEASURE: CPT

## 2024-12-20 NOTE — PROGRESS NOTES
"12/20/2024    Jose Zamora  1935  [id number]    Diagnosis  Chief Complaint    Urinary Retention         Patient presents for dumont removal/TOV managed by our office    Plan  Return this afternoon for TOV  Return to office 12/30/24 for FU PVR    Procedure Dumont removal/voiding trial    Dumont catheter removed after deflation of an intact balloon. Patient tolerated well. Encouraged patient to hydrate well and return this afternoon for post void residual. He knows he may return early if uncomfortable and unable to urinate. Patient agrees to this plan.    Patient returned this afternoon. Patient states able to void. Patient voided prior. Bladder ultrasound performed and PVR measured 267 ml.    Advised pt and daughter of ED precautions. Both understood.       Vitals:    12/20/24 0828   BP: 140/70   BP Location: Left arm   Patient Position: Sitting   Cuff Size: Adult   Pulse: 103   SpO2: 93%   Weight: 70.3 kg (155 lb)   Height: 5' 9\" (1.753 m)           Priti Kiser RN       "

## 2024-12-20 NOTE — PROGRESS NOTES
Had an additional conversation with patient today regarding keeping Mccormack for additional time given size of prostate. Patient reports that during Mccormack insertion at the hospital it was not difficult. Patient would still like to have trial of void as he is uncomfortable with the catheter.  We did discuss the risk of needing another Mccormack in the future or failing the trial of void.  Patient and daughter are understanding of this.  Daughter states that there is a lot going on right now.  Patient is starting radiation therapy today.  I additionally discussed proceeding with referral to interventional radiology to discuss PAE versus SP tube.  At this time,  they would still like to hold off on referral as they are in the process of the radiation.

## 2024-12-23 PROCEDURE — 77014 CHG CT GUIDANCE RADIATION THERAPY FLDS PLACEMENT: CPT | Performed by: RADIOLOGY

## 2024-12-23 PROCEDURE — 77387 GUIDANCE FOR RADJ TX DLVR: CPT | Performed by: RADIOLOGY

## 2024-12-23 PROCEDURE — 77412 RADIATION TX DELIVERY LVL 3: CPT | Performed by: RADIOLOGY

## 2024-12-24 PROCEDURE — 77336 RADIATION PHYSICS CONSULT: CPT | Performed by: STUDENT IN AN ORGANIZED HEALTH CARE EDUCATION/TRAINING PROGRAM

## 2024-12-24 PROCEDURE — 77387 GUIDANCE FOR RADJ TX DLVR: CPT | Performed by: RADIOLOGY

## 2024-12-24 PROCEDURE — 77014 CHG CT GUIDANCE RADIATION THERAPY FLDS PLACEMENT: CPT | Performed by: RADIOLOGY

## 2024-12-24 PROCEDURE — 77412 RADIATION TX DELIVERY LVL 3: CPT | Performed by: RADIOLOGY

## 2024-12-26 ENCOUNTER — HOME CARE VISIT (OUTPATIENT)
Dept: HOME HEALTH SERVICES | Facility: HOME HEALTHCARE | Age: 89
End: 2024-12-26
Payer: MEDICARE

## 2024-12-26 VITALS — HEART RATE: 86 BPM | OXYGEN SATURATION: 98 % | RESPIRATION RATE: 16 BRPM | TEMPERATURE: 98.5 F

## 2024-12-26 PROCEDURE — G0299 HHS/HOSPICE OF RN EA 15 MIN: HCPCS

## 2024-12-30 ENCOUNTER — TELEPHONE (OUTPATIENT)
Dept: UROLOGY | Facility: MEDICAL CENTER | Age: 89
End: 2024-12-30

## 2024-12-30 ENCOUNTER — PROCEDURE VISIT (OUTPATIENT)
Dept: UROLOGY | Facility: MEDICAL CENTER | Age: 89
End: 2024-12-30
Payer: MEDICARE

## 2024-12-30 VITALS — OXYGEN SATURATION: 94 % | BODY MASS INDEX: 22.07 KG/M2 | HEART RATE: 80 BPM | WEIGHT: 149 LBS | HEIGHT: 69 IN

## 2024-12-30 DIAGNOSIS — R33.9 URINARY RETENTION: Primary | ICD-10-CM

## 2024-12-30 DIAGNOSIS — N39.0 URINARY TRACT INFECTION WITHOUT HEMATURIA, SITE UNSPECIFIED: ICD-10-CM

## 2024-12-30 LAB
POST-VOID RESIDUAL VOLUME, ML POC: 205 ML
SL AMB  POCT GLUCOSE, UA: 100
SL AMB LEUKOCYTE ESTERASE,UA: ABNORMAL
SL AMB POCT BILIRUBIN,UA: NEGATIVE
SL AMB POCT BLOOD,UA: ABNORMAL
SL AMB POCT CLARITY,UA: CLEAR
SL AMB POCT COLOR,UA: YELLOW
SL AMB POCT KETONES,UA: NEGATIVE
SL AMB POCT NITRITE,UA: POSITIVE
SL AMB POCT PH,UA: 5.5
SL AMB POCT SPECIFIC GRAVITY,UA: 1.02
SL AMB POCT URINE PROTEIN: >=300
SL AMB POCT UROBILINOGEN: 0.2

## 2024-12-30 PROCEDURE — 87186 SC STD MICRODIL/AGAR DIL: CPT

## 2024-12-30 PROCEDURE — 87086 URINE CULTURE/COLONY COUNT: CPT

## 2024-12-30 PROCEDURE — 51798 US URINE CAPACITY MEASURE: CPT

## 2024-12-30 PROCEDURE — 81003 URINALYSIS AUTO W/O SCOPE: CPT

## 2024-12-30 PROCEDURE — 87147 CULTURE TYPE IMMUNOLOGIC: CPT

## 2024-12-30 RX ORDER — SULFAMETHOXAZOLE AND TRIMETHOPRIM 800; 160 MG/1; MG/1
1 TABLET ORAL EVERY 12 HOURS SCHEDULED
Qty: 14 TABLET | Refills: 0 | Status: SHIPPED | OUTPATIENT
Start: 2024-12-30 | End: 2025-01-02

## 2024-12-30 NOTE — PROGRESS NOTES
Patient started on bactrim for 7 days for positive UA.  Continue to monitor for urine culture results and adjust antibiotics as needed.  Patient aware antibiotic sent.

## 2024-12-30 NOTE — PROGRESS NOTES
"12/30/2024  Jose Zamora is a 89 y.o. male  [id number]    Diagnosis:  Chief Complaint    Urinary Retention         Patient presents for follow up post void residual s/p TOV 12/20 managed by AP team    Plan:    FU with urine culture results    Assessment:      Vitals:    12/30/24 1130   Pulse: 80   SpO2: 94%   Weight: 67.6 kg (149 lb)   Height: 5' 9\" (1.753 m)         Patient voided in office. Post void residual measured 205 ml. Pt states he feels he is doing good. Pt stated that \"he thinks he has a UTI\". He is having burning and dysuria when urinating. No pressure or foul smelling urine. Urine was dipped in office and was positive. AP consulted for symptoms. Ordered urine culture and prescribed an abx for patient. Advised pt that we would monitor for final urine culture and depending on the results would have to adjust abx as necessary. Pt understood.       Recent Results (from the past 6 hours)   POCT Measure PVR    Collection Time: 12/30/24 11:39 AM   Result Value Ref Range    POST-VOID RESIDUAL VOLUME, ML  mL   POCT urine dip auto non-scope    Collection Time: 12/30/24 11:52 AM   Result Value Ref Range     COLOR,UA yellow     CLARITY,UA clear     SPECIFIC GRAVITY,UA 1.020      PH,UA 5.5     LEUKOCYTE ESTERASE,UA large     NITRITE,UA positive     GLUCOSE,      KETONES,UA negative     BILIRUBIN,UA negative     BLOOD,UA moderate     POCT URINE PROTEIN >=300     SL AMB POCT UROBILINOGEN 0.2          Priti Kiser RN     "

## 2025-01-01 LAB — BACTERIA UR CULT: ABNORMAL

## 2025-01-02 ENCOUNTER — RESULTS FOLLOW-UP (OUTPATIENT)
Dept: UROLOGY | Facility: CLINIC | Age: OVER 89
End: 2025-01-02

## 2025-01-02 ENCOUNTER — TELEMEDICINE (OUTPATIENT)
Dept: GERIATRICS | Facility: OTHER | Age: OVER 89
End: 2025-01-02
Payer: MEDICARE

## 2025-01-02 ENCOUNTER — APPOINTMENT (OUTPATIENT)
Age: OVER 89
End: 2025-01-02
Payer: MEDICARE

## 2025-01-02 ENCOUNTER — HOME CARE VISIT (OUTPATIENT)
Dept: HOME HEALTH SERVICES | Facility: HOME HEALTHCARE | Age: OVER 89
End: 2025-01-02
Payer: MEDICARE

## 2025-01-02 VITALS
DIASTOLIC BLOOD PRESSURE: 78 MMHG | TEMPERATURE: 98.6 F | OXYGEN SATURATION: 94 % | RESPIRATION RATE: 16 BRPM | HEART RATE: 76 BPM | SYSTOLIC BLOOD PRESSURE: 140 MMHG

## 2025-01-02 DIAGNOSIS — I25.10 ATHEROSCLEROSIS OF NATIVE CORONARY ARTERY OF NATIVE HEART WITHOUT ANGINA PECTORIS: ICD-10-CM

## 2025-01-02 DIAGNOSIS — D3A.8 PRIMARY PANCREATIC NEUROENDOCRINE TUMOR: ICD-10-CM

## 2025-01-02 DIAGNOSIS — E11.22 TYPE 2 DIABETES MELLITUS WITH STAGE 3 CHRONIC KIDNEY DISEASE, WITHOUT LONG-TERM CURRENT USE OF INSULIN, UNSPECIFIED WHETHER STAGE 3A OR 3B CKD (HCC): ICD-10-CM

## 2025-01-02 DIAGNOSIS — N18.30 TYPE 2 DIABETES MELLITUS WITH STAGE 3 CHRONIC KIDNEY DISEASE, WITHOUT LONG-TERM CURRENT USE OF INSULIN, UNSPECIFIED WHETHER STAGE 3A OR 3B CKD (HCC): ICD-10-CM

## 2025-01-02 DIAGNOSIS — D62 ACUTE BLOOD LOSS ANEMIA: ICD-10-CM

## 2025-01-02 DIAGNOSIS — N30.01 ACUTE CYSTITIS WITH HEMATURIA: Primary | ICD-10-CM

## 2025-01-02 DIAGNOSIS — I10 PRIMARY HYPERTENSION: ICD-10-CM

## 2025-01-02 DIAGNOSIS — R33.9 URINARY RETENTION: ICD-10-CM

## 2025-01-02 DIAGNOSIS — E44.0 MODERATE PROTEIN-CALORIE MALNUTRITION (HCC): ICD-10-CM

## 2025-01-02 DIAGNOSIS — R26.2 AMBULATORY DYSFUNCTION: ICD-10-CM

## 2025-01-02 DIAGNOSIS — N39.0 URINARY TRACT INFECTION WITHOUT HEMATURIA, SITE UNSPECIFIED: Primary | ICD-10-CM

## 2025-01-02 LAB
ALBUMIN SERPL BCG-MCNC: 4 G/DL (ref 3.5–5)
ALP SERPL-CCNC: 99 U/L (ref 34–104)
ALT SERPL W P-5'-P-CCNC: 12 U/L (ref 7–52)
ANION GAP SERPL CALCULATED.3IONS-SCNC: 11 MMOL/L (ref 4–13)
AST SERPL W P-5'-P-CCNC: 17 U/L (ref 13–39)
BASOPHILS # BLD AUTO: 0.08 THOUSANDS/ΜL (ref 0–0.1)
BASOPHILS NFR BLD AUTO: 1 % (ref 0–1)
BILIRUB SERPL-MCNC: 0.33 MG/DL (ref 0.2–1)
BUN SERPL-MCNC: 29 MG/DL (ref 5–25)
CALCIUM SERPL-MCNC: 8.6 MG/DL (ref 8.4–10.2)
CHLORIDE SERPL-SCNC: 105 MMOL/L (ref 96–108)
CHOLEST SERPL-MCNC: 91 MG/DL (ref ?–200)
CO2 SERPL-SCNC: 22 MMOL/L (ref 21–32)
CREAT SERPL-MCNC: 1.8 MG/DL (ref 0.6–1.3)
EOSINOPHIL # BLD AUTO: 0.11 THOUSAND/ΜL (ref 0–0.61)
EOSINOPHIL NFR BLD AUTO: 1 % (ref 0–6)
ERYTHROCYTE [DISTWIDTH] IN BLOOD BY AUTOMATED COUNT: 18.9 % (ref 11.6–15.1)
EST. AVERAGE GLUCOSE BLD GHB EST-MCNC: 137 MG/DL
GFR SERPL CREATININE-BSD FRML MDRD: 32 ML/MIN/1.73SQ M
GLUCOSE P FAST SERPL-MCNC: 143 MG/DL (ref 65–99)
HBA1C MFR BLD: 6.4 %
HCT VFR BLD AUTO: 28 % (ref 36.5–49.3)
HDLC SERPL-MCNC: 43 MG/DL
HGB BLD-MCNC: 8.1 G/DL (ref 12–17)
IMM GRANULOCYTES # BLD AUTO: 0.06 THOUSAND/UL (ref 0–0.2)
IMM GRANULOCYTES NFR BLD AUTO: 1 % (ref 0–2)
IRON SATN MFR SERPL: 18 % (ref 15–50)
IRON SERPL-MCNC: 37 UG/DL (ref 50–212)
LDLC SERPL CALC-MCNC: 29 MG/DL (ref 0–100)
LYMPHOCYTES # BLD AUTO: 0.98 THOUSANDS/ΜL (ref 0.6–4.47)
LYMPHOCYTES NFR BLD AUTO: 12 % (ref 14–44)
MCH RBC QN AUTO: 21.5 PG (ref 26.8–34.3)
MCHC RBC AUTO-ENTMCNC: 28.9 G/DL (ref 31.4–37.4)
MCV RBC AUTO: 75 FL (ref 82–98)
MONOCYTES # BLD AUTO: 0.82 THOUSAND/ΜL (ref 0.17–1.22)
MONOCYTES NFR BLD AUTO: 10 % (ref 4–12)
NEUTROPHILS # BLD AUTO: 6.46 THOUSANDS/ΜL (ref 1.85–7.62)
NEUTS SEG NFR BLD AUTO: 75 % (ref 43–75)
NONHDLC SERPL-MCNC: 48 MG/DL
NRBC BLD AUTO-RTO: 0 /100 WBCS
PLATELET # BLD AUTO: 239 THOUSANDS/UL (ref 149–390)
PMV BLD AUTO: 10.4 FL (ref 8.9–12.7)
POTASSIUM SERPL-SCNC: 4.5 MMOL/L (ref 3.5–5.3)
PROT SERPL-MCNC: 6.9 G/DL (ref 6.4–8.4)
RBC # BLD AUTO: 3.76 MILLION/UL (ref 3.88–5.62)
SODIUM SERPL-SCNC: 138 MMOL/L (ref 135–147)
T4 FREE SERPL-MCNC: 1.22 NG/DL (ref 0.61–1.12)
TIBC SERPL-MCNC: 203 UG/DL (ref 250–450)
TRANSFERRIN SERPL-MCNC: 145 MG/DL (ref 203–362)
TRIGL SERPL-MCNC: 97 MG/DL (ref ?–150)
TSH SERPL DL<=0.05 MIU/L-ACNC: 5.05 UIU/ML (ref 0.45–4.5)
UIBC SERPL-MCNC: 166 UG/DL (ref 155–355)
WBC # BLD AUTO: 8.51 THOUSAND/UL (ref 4.31–10.16)

## 2025-01-02 PROCEDURE — 83036 HEMOGLOBIN GLYCOSYLATED A1C: CPT

## 2025-01-02 PROCEDURE — 80053 COMPREHEN METABOLIC PANEL: CPT

## 2025-01-02 PROCEDURE — 83540 ASSAY OF IRON: CPT

## 2025-01-02 PROCEDURE — 84439 ASSAY OF FREE THYROXINE: CPT

## 2025-01-02 PROCEDURE — G0299 HHS/HOSPICE OF RN EA 15 MIN: HCPCS

## 2025-01-02 PROCEDURE — 83550 IRON BINDING TEST: CPT

## 2025-01-02 PROCEDURE — 84443 ASSAY THYROID STIM HORMONE: CPT

## 2025-01-02 PROCEDURE — 99215 OFFICE O/P EST HI 40 MIN: CPT | Performed by: NURSE PRACTITIONER

## 2025-01-02 PROCEDURE — 80061 LIPID PANEL: CPT

## 2025-01-02 PROCEDURE — 36415 COLL VENOUS BLD VENIPUNCTURE: CPT

## 2025-01-02 PROCEDURE — 85025 COMPLETE CBC W/AUTO DIFF WBC: CPT

## 2025-01-02 RX ORDER — NITROFURANTOIN 25; 75 MG/1; MG/1
100 CAPSULE ORAL 2 TIMES DAILY
Qty: 14 CAPSULE | Refills: 0 | Status: SHIPPED | OUTPATIENT
Start: 2025-01-02 | End: 2025-01-09

## 2025-01-02 NOTE — ASSESSMENT & PLAN NOTE
Department of Interventional Radiology  [phone]        Interventional Radiology Brief Procedure Note    Patient: Rayo Adhikari MRN: [id number]  SSN: [id number]    YOB: 1973  Age: 48 y.o. Sex: male      Date of Procedure: 8/16/2023    Pre-Procedure Diagnosis: psoas abscess    Post-Procedure Diagnosis: SAME    Procedure(s): Image Guided Drain Placement    Brief Description of Procedure: right psoas aspiration, left psoas drainage, old drain removal    Performed By: Pricilla Hill MD     Assistants: None    Anesthesia:Moderate Sedation    Estimated Blood Loss: Less than 10ml    Specimens:  Microbiology    Implants:   All Purpose Drain    Findings: 15cc pus aspirated from left psoas abscess, no residual cavity from original drain it was removed    Complications: None    Recommendations: flush 5cc daily    Follow Up: 2 weeks    Signed By: Pricilla Hill MD     August 16, 2023 Patient with noted UTI, 12/30/24  Originally started on Bactrim and s/p culture results switched to Macrobid thru 1/9/25  Increase fluid intake  Discussed importance good hygiene and pericare  Discussed frequent toileting, Q 2 hrs and importance of hourly mobility  Follow up with PCP/Urology prn

## 2025-01-02 NOTE — TELEPHONE ENCOUNTER
Call placed. Spoke to pt and advised to d/c the bactrim and start taking the macrobid that has been prescribed. Explained to pt why the need for an adjustment of the abx. Pt understood and was appreciative.

## 2025-01-02 NOTE — PROGRESS NOTES
Virtual Regular Visit  Name: Jose Zamora      : 1935      MRN: [id number]  Encounter Provider: KAREL Cifuentes  Encounter Date: 2025   Encounter department: [facility] VIRTUAL      Verification of patient location:  Patient is located at Home in the following state in which I hold an active license [place] :  Assessment & Plan  Acute cystitis with hematuria  Patient with noted UTI, 24  Originally started on Bactrim and s/p culture results switched to Macrobid thru 25  Increase fluid intake  Discussed importance good hygiene and pericare  Discussed frequent toileting, Q 2 hrs and importance of hourly mobility  Follow up with PCP/Urology prn          Urinary retention  Urinary retention with dumont catheter removed 24 with successful voiding trial  PVR in Urology office was 205 mL  Patient was noted to have UTI and started on Macrobid  Encourage safe daily exercise and movement  Encourage increase in p.o. 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  Patient had surgical oncology consult 10/2024 with Whipple procedure recommended, however patient declined intervention given age  Continue radiation treatment as scheduled  Assess for s/e r/t radiation treament  Follow-up with hematology oncology         Primary hypertension  BP remains stable today, 140/78  Continue Amlodipine and Metoprolol  Avoid hypotension  Continue to monitor BP and for acute changes in condition  Follow up with PCP/Cardiology            Type 2 diabetes mellitus with stage 3 chronic kidney disease, without long-term current use of insulin, unspecified whether stage 3a or 3b CKD (HCC)    Lab Results   Component Value Date    HGBA1C 6.4 (H) 2025   Bgs well controlled  Patient is currently diet controlled  Continue  "intermittent Accu-cheks   Educate patient on s/s of hyper/hypoglycemia  Continue to monitor for acute changes in condition   Follow up with PCP/Endocrinology            Ambulatory dysfunction  History of falls  Maintain fall and safety precautions  Encourage use of asst devices  Continue PT/OT services with VNA  Assess for need with assistance with transfers, mobility, and ADLs in/out of home  Patient is at high risk for falls r/t pancreatic CA with radiation therapy, UTI, and deconditioning  Continue to monitor for acute changes in condition   Follow up with PCP                Encounter provider KAREL Cifuentes    The patient was identified by name and date of birth. Jose Zamora was informed that this is a telemedicine visit and that the visit is being conducted through the [employer] Teams platform. He agrees to proceed..  My office door was closed. No one else was in the room.  He acknowledged consent and understanding of privacy and security of the video platform. The patient has agreed to participate and understands they can discontinue the visit at any time.    Patient is aware this is a billable service.     History of Present Illness      Jose Zamora is a 89 y.o. male patient, being seen for Heal at home program in conjuction with [facility]NA nurse Lourdes Jeronimo, who was in the home to perform physical assessment.      The patient is being seen and examined today for follow-up on acute and chronic medical conditions s/p most recent hospitalization.     The patient reports he is doing okay today, still feeling \"sluggish\". He reports that he was started on an antibiotic r/t UTI, and abx was changed this morning to Macrobid r/t culture results.  He will remain on this through 1/9/25. Patient expressing worry about urinary symptoms, burning and hesitancy and s/p urology visit the possibility of dumont catheter placement was discussed.  I did review importance of fluid intake, mobility every hour, " and discussed toileting schedule Q 2hrs during daytime hours. Patient also reporting decrease/poor appetite. I discussed eating small meals throughout the day and nutritional supplements. Patient has Boost in the home and will start drinking 1-2 daily. Patient Bp improved and stable today, 140/78. He has f/u with Dr. Hodges 1/6, Dr. Fuentes 1/7, and Dr. Rodriguez 1/9/25.             Review of Systems   Constitutional:  Positive for activity change, appetite change and fatigue. Negative for chills and fever.   HENT:  Negative for ear pain and sore throat.    Eyes:  Negative for pain and visual disturbance.   Respiratory:  Negative for cough and shortness of breath.    Cardiovascular:  Negative for chest pain and palpitations.   Gastrointestinal:  Negative for abdominal pain and vomiting.   Genitourinary:  Positive for difficulty urinating and dysuria. Negative for hematuria.   Musculoskeletal:  Positive for gait problem. Negative for arthralgias and back pain.   Skin:  Negative for color change and rash.   Neurological:  Positive for weakness. Negative for seizures and syncope.   All other systems reviewed and are negative.      Objective     1/1/2025 11:05 AM    Specimen Information: Urine, Clean Catch   Urine Culture >100,000 cfu/ml Methicillin Resistant Staphylococcus aureus Abnormal    This organism has been edited. The previous result was Staphylococcus aureus on 12/31/2024 at 1338 EST.  Please note: This patient requires contact precautions.        Susceptibility    Methicillin Resistant Staphylococcus aureus (1)    Antibiotic Interpretation Microscan  Method Status    Cefazolin ($) Resistant <=8.00 ug/ml [facility] Final    Gentamicin ($$) Susceptible <=4 ug/ml [facility] Final    Nitrofurantoin Susceptible <=32 ug/ml [facility] Final    Oxacillin Resistant >2.00 ug/ml [facility] Final    Penicillin Resistant >2.000 ug/ml [facility] Final    Tetracycline Susceptible <=4 ug/ml [facility] Final    Trimethoprim + Sulfamethoxazole ($$$) Resistant >2/38 ug/ml [facility]  Final    Vancomycin ($) Susceptible 1.00 ug/ml [facility] Final          Physical Exam  Vitals and nursing note reviewed.   Constitutional:       General: He is not in acute distress.     Appearance: He is well-developed.   HENT:      Head: Normocephalic and atraumatic.   Eyes:      Conjunctiva/sclera: Conjunctivae normal.      Comments: Wears glasses   Cardiovascular:      Rate and Rhythm: Normal rate and regular rhythm.      Heart sounds: No murmur heard.  Pulmonary:      Effort: Pulmonary effort is normal. No respiratory distress.      Breath sounds: Normal breath sounds.   Abdominal:      Palpations: Abdomen is soft.      Tenderness: There is no abdominal tenderness.   Musculoskeletal:         General: No swelling.      Cervical back: Neck supple.   Skin:     General: Skin is warm and dry.      Capillary Refill: Capillary refill takes less than 2 seconds.   Neurological:      Mental Status: He is alert. Mental status is at baseline.      Motor: Weakness present.      Gait: Gait abnormal.   Psychiatric:         Mood and Affect: Mood normal.       Visit Time  Total Visit Duration: I have spent a total time of 43 minutes in caring for this patient on the day of the visit/encounter including Diagnostic results, Prognosis, Risks and benefits of tx options, Instructions for management, Patient and family education, Importance of tx compliance, Risk factor reductions, Impressions, Counseling / Coordination of care, Documenting in the medical record, Reviewing / ordering tests, medicine, procedures  , Obtaining or reviewing history  , and Communicating with other healthcare professionals .

## 2025-01-02 NOTE — TELEPHONE ENCOUNTER
Notified patients daughter Parul, for patient to stop Bactrim and  Macrobid that was sent to pharm today .     No further assistance is needed at this time.

## 2025-01-02 NOTE — TELEPHONE ENCOUNTER
Per result note, bactrim has been discontinued and a new order for macrobid was sent to pharmacy.

## 2025-01-03 PROBLEM — R26.2 AMBULATORY DYSFUNCTION: Status: ACTIVE | Noted: 2024-12-19

## 2025-01-03 NOTE — ASSESSMENT & PLAN NOTE
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

## 2025-01-03 NOTE — ASSESSMENT & PLAN NOTE
Malnutrition Findings:   Multifactorial including acute illness, inadequate energy, weight loss, muscle wasting, and fat loss  Continue to monitor weight and BMI   Continue all nutritional supplements  Consult nutritional services prn  Monitor for acute changes in condition  Follow up with PCP

## 2025-01-03 NOTE — ASSESSMENT & PLAN NOTE
Most recent Creatinine 1.47 /Gfr 44, stable  Continue to monitor BMP periodically  Avoid nephrotoxins and NSAIDS  Avoid hypotension  Continue to monitor for acute changes in condition  Follow up with PCP

## 2025-01-03 NOTE — ASSESSMENT & PLAN NOTE
Lab Results   Component Value Date    HGBA1C 6.4 (H) 01/02/2025   Bgs well controlled  Patient is currently diet controlled  Continue intermittent Accu-cheks   Educate patient on s/s of hyper/hypoglycemia  Continue to monitor for acute changes in condition   Follow up with PCP/Endocrinology

## 2025-01-03 NOTE — ASSESSMENT & PLAN NOTE
BP remains stable today, 152/78  Continue Amlodipine and Metoprolol  Avoid hypotension  Continue to monitor BP and for acute changes in condition  Follow up with PCP/Cardiology

## 2025-01-03 NOTE — ASSESSMENT & PLAN NOTE
History of falls  Maintain fall and safety precautions  Encourage use of asst devices  Continue PT/OT services with [facility]  Assess for need with assistance with transfers, mobility, and ADLs in/out of home  Patient is at high risk for falls r/t pancreatic CA with radiation therapy, dumont catheter, and deconditioning  Continue to monitor for acute changes in condition   Follow up with PCP

## 2025-01-05 ENCOUNTER — RESULTS FOLLOW-UP (OUTPATIENT)
Age: OVER 89
End: 2025-01-05

## 2025-01-06 ENCOUNTER — TELEPHONE (OUTPATIENT)
Age: OVER 89
End: 2025-01-06

## 2025-01-06 ENCOUNTER — HOSPITAL ENCOUNTER (EMERGENCY)
Facility: HOSPITAL | Age: OVER 89
Discharge: HOME | End: 2025-01-06
Attending: EMERGENCY MEDICINE
Payer: MEDICARE

## 2025-01-06 VITALS
DIASTOLIC BLOOD PRESSURE: 80 MMHG | TEMPERATURE: 98.3 F | OXYGEN SATURATION: 99 % | HEART RATE: 108 BPM | SYSTOLIC BLOOD PRESSURE: 193 MMHG | RESPIRATION RATE: 18 BRPM

## 2025-01-06 DIAGNOSIS — R33.9 URINARY RETENTION: Primary | ICD-10-CM

## 2025-01-06 DIAGNOSIS — N39.0 UTI (URINARY TRACT INFECTION): ICD-10-CM

## 2025-01-06 LAB
BACTERIA UR QL AUTO: ABNORMAL /HPF
BILIRUB UR QL STRIP: NEGATIVE
CLARITY UR: ABNORMAL
COLOR UR: YELLOW
GLUCOSE UR STRIP-MCNC: NEGATIVE MG/DL
HGB UR QL STRIP.AUTO: ABNORMAL
KETONES UR STRIP-MCNC: NEGATIVE MG/DL
LEUKOCYTE ESTERASE UR QL STRIP: ABNORMAL
NITRITE UR QL STRIP: NEGATIVE
NON-SQ EPI CELLS URNS QL MICRO: ABNORMAL /HPF
PH UR STRIP.AUTO: 5.5 [PH]
PROT UR STRIP-MCNC: ABNORMAL MG/DL
RBC #/AREA URNS AUTO: ABNORMAL /HPF
SP GR UR STRIP.AUTO: 1.01 (ref 1–1.03)
UROBILINOGEN UR STRIP-ACNC: <2 MG/DL
WBC #/AREA URNS AUTO: ABNORMAL /HPF
WBC CLUMPS # UR AUTO: PRESENT /UL

## 2025-01-06 PROCEDURE — 81001 URINALYSIS AUTO W/SCOPE: CPT

## 2025-01-06 PROCEDURE — 99283 EMERGENCY DEPT VISIT LOW MDM: CPT

## 2025-01-06 PROCEDURE — 87086 URINE CULTURE/COLONY COUNT: CPT

## 2025-01-06 PROCEDURE — 99284 EMERGENCY DEPT VISIT MOD MDM: CPT

## 2025-01-06 RX ORDER — LIDOCAINE HYDROCHLORIDE 20 MG/ML
1 JELLY TOPICAL ONCE
Status: COMPLETED | OUTPATIENT
Start: 2025-01-06 | End: 2025-01-06

## 2025-01-06 RX ADMIN — LIDOCAINE HYDROCHLORIDE 1 APPLICATION: 20 JELLY TOPICAL at 11:52

## 2025-01-06 NOTE — TELEPHONE ENCOUNTER
----- Message from KAREL Medina sent at 1/6/2025  8:18 AM EST -----  Bactrim was previously discontinued and patient should be taking Macrobid. Please check to see if the patient is still taking bactrim or if he changed to the macrobid that was prescribed.   He can take AZO in addition for symptom relief.   Thanks!

## 2025-01-06 NOTE — ED PROVIDER NOTES
Time reflects when diagnosis was documented in both MDM as applicable and the Disposition within this note       Time User Action Codes Description Comment    1/6/2025 12:44 PM Gala Cummins Add [R33.9] Urinary retention     1/6/2025 12:44 PM Gala Cummins Add [N39.0] UTI (urinary tract infection)           ED Disposition       ED Disposition   Discharge    Condition   Stable    Date/Time   Mon Jan 6, 2025 12:43 PM    Comment   Jose Zamora discharge to home/self care.                   Assessment & Plan       Medical Decision Making  89-year-old male with a past medical history of pancreatic neuroendocrine tumor, urinary retention, enlarged prostate presenting for evaluation of dysuria and urinary retention, see HPI.  Upon evaluation, patient is distressed secondary to bladder distention and pain.  Patient attempting to urinate but unable to.  Hypertensive and tachycardic secondary to pain.  Obvious bladder distention on exam.  Will place dumont catheter and send urine culture. Per nursing, approx 1,300mL urine retention. Patient was reassessed following Dumont catheter placement, states that his pain is resolved.  Patient appearing comfortable at the time. Discussed case with urology PA on call who recommended outpatient follow-up for possible suprapubic dumont placement and completing course of Macrobid.  Patient was agreeable to discharge with Dumont catheter and outpatient urology follow-up.  Return precautions given including fevers, chills, nausea, vomiting, flank pain.    Prior to discharge, discharge instructions were discussed with patient at bedside. Patient was provided both verbal and written instructions. Patient is understanding of the discharge instructions and is agreeable to plan of care. Return precautions were discussed with patient bedside, patient verbalized understanding of signs and symptoms that would necessitate return to the ED. All questions were answered. Patient was comfortable with the  "plan of care and discharged to home.    Portions of this chart may have been written with voice recognition software.  Occasional grammatical errors, wrong word or \"sound a like\" substitutions may have occurred due to software limitations.  Please read carefully and use context to recognize where substitutions have occurred.      Amount and/or Complexity of Data Reviewed  Labs: ordered. Decision-making details documented in ED Course.    Risk  Prescription drug management.        ED Course as of 01/06/25 1811   Mon Jan 06, 2025   1249 Patient reassessed following dumont catheter placement, states that his pain has resolved and he is feeling much better. Approx 1300mL retention per nursing   1304 Leukocytes, UA(!): Large  Currently being treated with Macrobid, will send for culture       Medications   lidocaine (URO-JET) 2 % urethral/mucosal gel 1 Application (1 Application Urethral Given 1/6/25 1152)       ED Risk Strat Scores                          SBIRT 22yo+      Flowsheet Row Most Recent Value   Initial Alcohol Screen: US AUDIT-C     1. How often do you have a drink containing alcohol? 0 Filed at: 01/06/2025 1107   2. How many drinks containing alcohol do you have on a typical day you are drinking?  0 Filed at: 01/06/2025 1107   3a. Male UNDER 65: How often do you have five or more drinks on one occasion? 0 Filed at: 01/06/2025 1107   3b. FEMALE Any Age, or MALE 65+: How often do you have 4 or more drinks on one occassion? 0 Filed at: 01/06/2025 1107   Audit-C Score 0 Filed at: 01/06/2025 1107   DAISY: How many times in the past year have you...    Used an illegal drug or used a prescription medication for non-medical reasons? Never Filed at: 01/06/2025 1107                            History of Present Illness       Chief Complaint   Patient presents with    Possible UTI     Pt reports he was diagnosed with an UTI recently and taking antibiotics w/o relief.        Past Medical History:   Diagnosis Date    " "Allergic     Anemia     Arthritis     Diabetes mellitus (HCC)     Disease of thyroid gland     Hyperlipemia     Hypertension       Past Surgical History:   Procedure Laterality Date    COLONOSCOPY      PROSTATE BIOPSY      needle,  resolved may 2005      Family History   Problem Relation Age of Onset    No Known Problems Mother       Social History     Tobacco Use    Smoking status: Former     Current packs/day: 1.00     Average packs/day: 1 pack/day for 54.1 years (54.1 ttl pk-yrs)     Types: Cigarettes     Start date: 12/4/1970    Smokeless tobacco: Never   Vaping Use    Vaping status: Never Used   Substance Use Topics    Alcohol use: Not Currently     Comment: social/rarely    Drug use: Never      E-Cigarette/Vaping    E-Cigarette Use Never User       E-Cigarette/Vaping Substances    Nicotine No     THC No     CBD No     Flavoring No     Other No     Unknown No       I have reviewed and agree with the history as documented.     Patient is an 89-year-old male with a past medical history of pancreatic neuroendocrine tumor, enlarged prostate, urinary retention presenting for evaluation of dysuria and decreased urination.  Patient was recently hospitalized and found to be retaining urine, was discharged with a Mccormack catheter. Has since passed voiding trial and Mccormack was removed on 12/20.  On 12/30, patient was seen outpatient by urology for dysuria.  Was started on Bactrim, urine culture then resulted with MRSA and he was switched to Macrobid 4 days ago.  Patient stating that he is still having dysuria, it feels like razor blades when he tries to urinate, and he has been unable to void for the past 4 days other than \"dribbling\".  Denies fevers, chills, flank pain, nausea, vomiting.          Review of Systems   Constitutional:  Negative for chills and fever.   HENT:  Negative for ear pain and sore throat.    Eyes:  Negative for pain and visual disturbance.   Respiratory:  Negative for cough and shortness of breath.  "   Cardiovascular:  Negative for chest pain and palpitations.   Gastrointestinal:  Positive for abdominal distention. Negative for abdominal pain and vomiting.   Genitourinary:  Positive for decreased urine volume, difficulty urinating and dysuria. Negative for flank pain, frequency and hematuria.   Musculoskeletal:  Negative for arthralgias and back pain.   Skin:  Negative for color change and rash.   Neurological:  Negative for seizures and syncope.   All other systems reviewed and are negative.          Objective       ED Triage Vitals   Temperature Pulse Blood Pressure Respirations SpO2 Patient Position - Orthostatic VS   01/06/25 1104 01/06/25 1104 01/06/25 1104 01/06/25 1104 01/06/25 1104 01/06/25 1119   98.3 °F (36.8 °C) (!) 113 (!) 201/78 20 98 % Sitting      Temp Source Heart Rate Source BP Location FiO2 (%) Pain Score    01/06/25 1104 01/06/25 1104 01/06/25 1104 -- --    Oral Monitor Right arm        Vitals      Date and Time Temp Pulse SpO2 Resp BP Pain Score FACES Pain Rating User   01/06/25 1119 -- 108 99 % 18 193/80 -- -- SS   01/06/25 1104 98.3 °F (36.8 °C) 113 98 % 20 201/78 -- -- HMR            Physical Exam  Vitals and nursing note reviewed.   Constitutional:       General: He is in acute distress (secondary to pain).      Appearance: He is well-developed.   HENT:      Head: Normocephalic and atraumatic.   Eyes:      Conjunctiva/sclera: Conjunctivae normal.   Cardiovascular:      Rate and Rhythm: Normal rate and regular rhythm.      Heart sounds: No murmur heard.  Pulmonary:      Effort: Pulmonary effort is normal. No respiratory distress.      Breath sounds: Normal breath sounds.   Abdominal:      General: There is distension.      Palpations: Abdomen is soft.      Tenderness: There is no abdominal tenderness.      Comments: Bladder distention on exam, exquisitely tender to palpation.    Musculoskeletal:         General: No swelling.      Cervical back: Neck supple.   Skin:     General: Skin is  warm and dry.      Capillary Refill: Capillary refill takes less than 2 seconds.   Neurological:      Mental Status: He is alert.   Psychiatric:         Mood and Affect: Mood normal.         Results Reviewed       Procedure Component Value Units Date/Time    Urine Microscopic [[id number]]  (Abnormal) Collected: 01/06/25 1242    Lab Status: Final result Specimen: Urine, Indwelling Mccormack Catheter Updated: 01/06/25 1318     RBC, UA Innumerable /hpf      WBC, UA Innumerable /hpf      Epithelial Cells None Seen /hpf      Bacteria, UA None Seen /hpf      WBC Clumps Present    Urine culture [[id number]] Collected: 01/06/25 1242    Lab Status: In process Specimen: Urine, Indwelling Mccormack Catheter Updated: 01/06/25 1318    UA w Reflex to Microscopic w Reflex to Culture [[id number]]  (Abnormal) Collected: 01/06/25 1242    Lab Status: Final result Specimen: Urine, Indwelling Mccormack Catheter Updated: 01/06/25 1255     Color, UA Yellow     Clarity, UA Turbid     Specific Gravity, UA 1.015     pH, UA 5.5     Leukocytes, UA Large     Nitrite, UA Negative     Protein,  (2+) mg/dl      Glucose, UA Negative mg/dl      Ketones, UA Negative mg/dl      Urobilinogen, UA <2.0 mg/dl      Bilirubin, UA Negative     Occult Blood, UA Moderate    Urine culture [[id number]] Collected: 01/06/25 1242    Lab Status: In process Specimen: Urine, Indwelling Mccormack Catheter Updated: 01/06/25 1247            No orders to display       Procedures    ED Medication and Procedure Management   Prior to Admission Medications   Prescriptions Last Dose Informant Patient Reported? Taking?   Multiple Vitamins-Minerals (CENTRUM SILVER) tablet  Self Yes No   Sig: Take 1 tablet by mouth daily   Potassium 99 MG TABS  Self Yes No   Sig: Take 1 tablet by mouth daily   Saw Greig 160 MG CAPS  Self Yes No   Sig: Take 1 capsule by mouth daily   amLODIPine (NORVASC) 5 mg tablet   No No   Sig: Take 1 tablet (5 mg total) by mouth daily   atorvastatin (LIPITOR) 20 mg  tablet  Self Yes No   Sig: Take 1 tablet by mouth daily   cholecalciferol (VITAMIN D3) 1,000 units tablet  Self Yes No   Sig: Take 1,000 Units by mouth daily   fexofenadine (Allegra Allergy) 180 MG tablet   Yes No   Sig: Take 180 mg by mouth daily as needed (allergies)   finasteride (PROSCAR) 5 mg tablet   No No   Sig: Take 1 tablet (5 mg total) by mouth daily   folic acid (FOLVITE) 1 mg tablet  Self Yes No   Sig: Take 1 tablet by mouth daily   levothyroxine 50 mcg tablet   No No   Sig: TAKE 1 TABLET BY MOUTH EVERY DAY   metoprolol tartrate (LOPRESSOR) 25 mg tablet 1/6/2025 Morning  No Yes   Sig: Take 0.5 tablets (12.5 mg total) by mouth 2 (two) times a day   montelukast (SINGULAIR) 10 mg tablet  Self Yes No   Sig: Take 10 mg by mouth daily at bedtime   nitrofurantoin (MACROBID) 100 mg capsule   No No   Sig: Take 1 capsule (100 mg total) by mouth 2 (two) times a day for 7 days   ondansetron (ZOFRAN) 4 mg tablet   No No   Sig: Take 1 tablet (4 mg total) by mouth every 8 (eight) hours as needed for nausea or vomiting   Patient not taking: Reported on 12/20/2024   pantoprazole (PROTONIX) 40 mg tablet   No No   Sig: Take 1 tablet (40 mg total) by mouth 2 (two) times a day before meals   sulfaSALAzine (AZULFIDINE) 500 mg tablet  Self Yes No   Sig: Take 500 mg by mouth 4 (four) times a day   tamsulosin (FLOMAX) 0.4 mg   No No   Sig: Take 1 capsule (0.4 mg total) by mouth daily with dinner   zinc gluconate 50 mg tablet  Self Yes No   Sig: Take 50 mg by mouth daily      Facility-Administered Medications: None     Discharge Medication List as of 1/6/2025 12:48 PM        CONTINUE these medications which have NOT CHANGED    Details   metoprolol tartrate (LOPRESSOR) 25 mg tablet Take 0.5 tablets (12.5 mg total) by mouth 2 (two) times a day, Starting Tue 12/3/2024, Until Sun 6/1/2025, Normal      amLODIPine (NORVASC) 5 mg tablet Take 1 tablet (5 mg total) by mouth daily, Starting Mon 10/21/2024, Until Fri 12/13/2024, Normal       atorvastatin (LIPITOR) 20 mg tablet Take 1 tablet by mouth daily, Starting Tue 10/23/2012, Historical Med      cholecalciferol (VITAMIN D3) 1,000 units tablet Take 1,000 Units by mouth daily, Historical Med      fexofenadine (Allegra Allergy) 180 MG tablet Take 180 mg by mouth daily as needed (allergies), Historical Med      finasteride (PROSCAR) 5 mg tablet Take 1 tablet (5 mg total) by mouth daily, Starting Thu 12/12/2024, Normal      folic acid (FOLVITE) 1 mg tablet Take 1 tablet by mouth daily, Starting Tue 10/23/2012, Historical Med      levothyroxine 50 mcg tablet TAKE 1 TABLET BY MOUTH EVERY DAY, Starting Mon 11/25/2024, Normal      montelukast (SINGULAIR) 10 mg tablet Take 10 mg by mouth daily at bedtime, Starting Wed 5/7/2014, Historical Med      Multiple Vitamins-Minerals (CENTRUM SILVER) tablet Take 1 tablet by mouth daily, Starting Thu 5/14/2015, Historical Med      nitrofurantoin (MACROBID) 100 mg capsule Take 1 capsule (100 mg total) by mouth 2 (two) times a day for 7 days, Starting Thu 1/2/2025, Until Thu 1/9/2025, Normal      ondansetron (ZOFRAN) 4 mg tablet Take 1 tablet (4 mg total) by mouth every 8 (eight) hours as needed for nausea or vomiting, Starting Mon 10/14/2024, Normal      pantoprazole (PROTONIX) 40 mg tablet Take 1 tablet (40 mg total) by mouth 2 (two) times a day before meals, Starting Tue 12/3/2024, Until Sun 6/1/2025, Normal      Potassium 99 MG TABS Take 1 tablet by mouth daily, Starting Thu 5/14/2015, Historical Med      Saw [place] 160 MG CAPS Take 1 capsule by mouth daily, Historical Med      sulfaSALAzine (AZULFIDINE) 500 mg tablet Take 500 mg by mouth 4 (four) times a day, Starting Tue 1/16/2018, Historical Med      tamsulosin (FLOMAX) 0.4 mg Take 1 capsule (0.4 mg total) by mouth daily with dinner, Starting Wed 12/11/2024, Normal      zinc gluconate 50 mg tablet Take 50 mg by mouth daily, Starting Thu 5/14/2015, Historical Med             ED SEPSIS DOCUMENTATION   Time  reflects when diagnosis was documented in both MDM as applicable and the Disposition within this note       Time User Action Codes Description Comment    1/6/2025 12:44 PM Gala Cummins [R33.9] Urinary retention     1/6/2025 12:44 PM Gala Cummins [N39.0] UTI (urinary tract infection)                  Gala Cummins PA-C  01/06/25 2048

## 2025-01-06 NOTE — TELEPHONE ENCOUNTER
Patient's daughter, Parul, called in regarding follow up for her father. Patient's daughter very tearful on phone, stating she is very disappointed in lack of follow up from office. States father started Macrobid on 1/3/25 and has not had any improvement in symptoms. Reports that he has bladder pain, urgency, body aches. Did advise that provider states he can take AZO if needed. Patient's daughter feels that should've been explained to them and they shouldn't have to ask for this info. Reviewed Hydration, avoidance of bladder irritants and ED precautions. Patient in background stating his bladder feels full. Patient did have Mccormack removed 12/30. Explained that he may be retaining urine as well on top of UTI and should have a bladder scan to see how much. Daughter states she is taking him to PCP today. Advised her to ask PCP if they can scan bladder. Did offer 1:30 appt for PVR, but she states he is in so much discomfort that if he needs catheter placed, she would like it to be in ER so maybe they can give something for pain. Her main reason for call is she would like to know if he should have different ABX or stronger dose as his symptoms haven't budged, as well as she wishes to change providers starting with next appt to a male provider.     # [phone]

## 2025-01-06 NOTE — TELEPHONE ENCOUNTER
Parul daughter concern that Jose is retaining urine and requesting bladder scan .    Please review and advice  Patient is scheduled to be seen  1/6/25 for AWV.    Parul will be taken him to the ED for evaluation to be scanned and possible catheterization.    Please review

## 2025-01-06 NOTE — TELEPHONE ENCOUNTER
Spoke to patients daughter, Parul, unaware Frida had already called. Parul was very emotional and stated she is angry and upset that no one seems to be showing her father any compassion.  She states he is suffering from pain and that he cannot sleep. Patient has been taking Macrobid for four days and AZO as well with no relief.   Parul states patient has a pancreatic tumor and he cannot take OTC pain relief.  She states she patient saw Eliz last time and she was very unhappy with the visit because she told the patient he could go into renal failure and may need a catheter.  Parul said she does not want to bring the patient back to the office and would rather go to the ER if he needed a catheter.      Parul's main complaint for the patient is she feels the Macrobid is doing nothing and wants the patient on another antibiotic.     Please advise.

## 2025-01-06 NOTE — TELEPHONE ENCOUNTER
Patient's daughter called in to report that patient is continuing to have bladder pain and urgency while taking bactrim. States his whole body hurts. She is asking if there is a different antibiotic that patient can take. Also would like to know if he can take AZO. Please advise.     CB # [phone] - can leave a detailed voicemail.

## 2025-01-07 ENCOUNTER — TELEPHONE (OUTPATIENT)
Age: OVER 89
End: 2025-01-07

## 2025-01-07 ENCOUNTER — OFFICE VISIT (OUTPATIENT)
Dept: HEMATOLOGY ONCOLOGY | Facility: CLINIC | Age: OVER 89
End: 2025-01-07
Payer: MEDICARE

## 2025-01-07 VITALS
OXYGEN SATURATION: 96 % | HEIGHT: 69 IN | HEART RATE: 108 BPM | BODY MASS INDEX: 21.77 KG/M2 | WEIGHT: 147 LBS | TEMPERATURE: 98.3 F | RESPIRATION RATE: 16 BRPM | DIASTOLIC BLOOD PRESSURE: 68 MMHG | SYSTOLIC BLOOD PRESSURE: 146 MMHG

## 2025-01-07 DIAGNOSIS — N18.32 CHRONIC KIDNEY DISEASE, STAGE 3B (HCC): ICD-10-CM

## 2025-01-07 DIAGNOSIS — D3A.8 PRIMARY PANCREATIC NEUROENDOCRINE TUMOR: Primary | ICD-10-CM

## 2025-01-07 DIAGNOSIS — D56.3 BETA THALASSEMIA MINOR: ICD-10-CM

## 2025-01-07 DIAGNOSIS — D50.8 OTHER IRON DEFICIENCY ANEMIA: ICD-10-CM

## 2025-01-07 LAB
BACTERIA UR CULT: NORMAL
BACTERIA UR CULT: NORMAL

## 2025-01-07 PROCEDURE — 99215 OFFICE O/P EST HI 40 MIN: CPT | Performed by: INTERNAL MEDICINE

## 2025-01-07 NOTE — PROGRESS NOTES
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  Hospitalization for fatigue, weight loss and GI bleeding in 2024.  He was initially found to have a hemoglobin of 4.5 g/deciliter requiring PRBC transfusion and IV iron.  Imaging revealed a pancreatic head mass.  He underwent EUS pancreatic head mass biopsy consistent with pancreatic neuroendocrine tumor with.  He has been evaluated by surgical oncology (Dr. Rodriguez) and found to be a surgical candidate.  Recent hospitalization in December and found to have intrahepatic bile artery dilation with distended gallbladder.  EGD on 2024 showed single large ulcer in the duodenum with clean base.  He received palliative radiation to the pancreatic mass.    Lengthy discussion with the patient and his daughter regarding his diagnosis and treatment options.  He had well-differentiated neuroendocrine tumor of the head of the pancreas with no obvious metastatic disease.  He is not a surgical candidate and has received palliative radiation to the pancreatic mass.  We discussed systemic treatment with lanreotide.  We discussed side effects.  Informed consent obtained.  Patient had a teaching session with the RN and provided printed information about the medication.  Office follow-up in 4 weeks.  Will continue to monitor CBC every 2 weeks to assess for need for PRBC transfusion.    Treatment plan: Lanreotide 60 mg every 4 weeks.  Will monitor CBC and CMP.    Orders:    Ambulatory  referral to Hematology / Oncology    CBC and differential; Standing    Other iron deficiency anemia  Acute on chronic anemia  Multifactorial due to acute blood loss, CKD and beta thalassemia (has low hemoglobin at baseline).  Adequate iron stores.  Monitor CBC and iron panel.    Orders:    CBC and differential; Standing    Beta thalassemia minor  As above       Chronic kidney disease, stage 3b (HCC)  Lab Results   Component Value Date    EGFR 32 01/02/2025    EGFR 41 12/11/2024    EGFR 42 12/10/2024    CREATININE 1.80 (H) 01/02/2025    CREATININE 1.47 (H) 12/11/2024    CREATININE 1.44 (H) 12/10/2024                History of Present Illness   Chief Complaint   Patient presents with    Follow-up     Oncology History   Cancer Staging   Primary pancreatic neuroendocrine tumor  Staging form: Neuroendocrine Tumor - Pancreas, AJCC V9  - Clinical: Stage II (cT3, cN0, cM0) - Signed by Nba Rodriguez MD on 10/24/2024  Oncology History   Primary pancreatic neuroendocrine tumor   10/17/2024 Biopsy    Endoscopic ultrasound  - Pancreas, Head Mass, FNA:  Positive for malignancy.  - Pancreatic neuroendocrine tumor (PanNET), favor at least well-differentiated neuroendocrine tumor, WHO grade 2 of 3, in this limited sample.     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  He was recently hospitalized for fatigue, weight loss and GI bleeding. His hemoglobin on recent admission was 4.5 g/deciliter (baseline appears to be 8-10 g/dL).  He required 3 units PRBC transfusion.  Received IV iron. Imaging revealed a pancreatic head mass.  He underwent EUS pancreatic head mass biopsy consistent with pancreatic neuroendocrine tumor with.  He has  "been evaluated by surgical oncology (Dr. Rodriguez) and was recommended pancreaticoduodenectomy pending dotatate PET scan.  He presents for hospital follow-up visit accompanied by his daughter.  Patient notes improvement in his symptoms.  Denies any abdominal pain, nausea, vomiting, diarrhea.  Denies any blood in the stools.  Fatigue improving.     01/07/25:   Recent hospitalization in December 2024 for symptomatic anemia.  Patient required PRBC transfusion.  EGD on 12/6/2024 with single ulcer in the duodenum with clean base.  He was evaluated by radiation oncology and received palliative radiation to the pancreatic head.  Also with urinary retention now with Mccormack catheter.  Following with urology.  He presents today for follow-up visit accompanied by his daughter.  Notes fatigue.  Denies any bleeding. Denies any abdominal pain.       Review of Systems   Constitutional:  Positive for fatigue. Negative for chills and fever.   Respiratory:  Negative for cough and shortness of breath.    Cardiovascular:  Negative for chest pain and palpitations.   Gastrointestinal:  Negative for abdominal pain and vomiting.   Genitourinary:  Negative for hematuria.   Musculoskeletal:  Negative for arthralgias and back pain.   Skin:  Negative for rash.   Hematological:  Does not bruise/bleed easily.   All other systems reviewed and are negative.          Objective   /68 (BP Location: Right arm, Patient Position: Sitting, Cuff Size: Adult)   Pulse (!) 108   Temp 98.3 °F (36.8 °C) (Temporal)   Resp 16   Ht 5' 9\" (1.753 m)   Wt 66.7 kg (147 lb)   SpO2 96%   BMI 21.71 kg/m²     Pain Screening:  Pain Score: 0-No pain  ECOG   1  Physical Exam  Vitals reviewed.   Constitutional:       General: He is not in acute distress.     Appearance: Normal appearance.   HENT:      Head: Normocephalic and atraumatic.      Mouth/Throat:      Mouth: Mucous membranes are moist.   Eyes:      Extraocular Movements: Extraocular movements intact.      " Conjunctiva/sclera: Conjunctivae normal.   Cardiovascular:      Rate and Rhythm: Normal rate.   Pulmonary:      Effort: Pulmonary effort is normal. No respiratory distress.   Abdominal:      General: Abdomen is flat. There is no distension.   Musculoskeletal:      Cervical back: Normal range of motion.      Right lower leg: No edema.      Left lower leg: No edema.   Skin:     General: Skin is warm.      Coloration: Skin is not jaundiced.   Neurological:      Mental Status: He is alert and oriented to person, place, and time.      Gait: Gait normal.   Psychiatric:         Thought Content: Thought content normal.         Labs: I have reviewed the following labs:  Lab Results   Component Value Date/Time    WBC 8.51 01/02/2025 11:44 AM    RBC 3.76 (L) 01/02/2025 11:44 AM    Hemoglobin 8.1 (L) 01/02/2025 11:44 AM    Hematocrit 28.0 (L) 01/02/2025 11:44 AM    MCV 75 (L) 01/02/2025 11:44 AM    MCH 21.5 (L) 01/02/2025 11:44 AM    RDW 18.9 (H) 01/02/2025 11:44 AM    Platelets 239 01/02/2025 11:44 AM    Segmented % 75 01/02/2025 11:44 AM    Lymphocytes % 12 (L) 01/02/2025 11:44 AM    Monocytes % 10 01/02/2025 11:44 AM    Eosinophils Relative 1 01/02/2025 11:44 AM    Basophils Relative 1 01/02/2025 11:44 AM    Immature Grans % 1 01/02/2025 11:44 AM    Absolute Neutrophils 6.46 01/02/2025 11:44 AM     Lab Results   Component Value Date/Time    Potassium 4.5 01/02/2025 11:44 AM    Chloride 105 01/02/2025 11:44 AM    CO2 22 01/02/2025 11:44 AM    BUN 29 (H) 01/02/2025 11:44 AM    Creatinine 1.80 (H) 01/02/2025 11:44 AM    Glucose, Fasting 143 (H) 01/02/2025 11:44 AM    Calcium 8.6 01/02/2025 11:44 AM    Corrected Calcium 7.9 (L) 12/09/2024 05:32 AM    AST 17 01/02/2025 11:44 AM    ALT 12 01/02/2025 11:44 AM    Alkaline Phosphatase 99 01/02/2025 11:44 AM    Total Protein 6.9 01/02/2025 11:44 AM    Albumin 4.0 01/02/2025 11:44 AM    Total Bilirubin 0.33 01/02/2025 11:44 AM    eGFR 32 01/02/2025 11:44 AM     Lab Results   Component  Value Date/Time    Iron 37 (L) 01/02/2025 11:44 AM    Iron Saturation 18 01/02/2025 11:44 AM    Ferritin 124 12/05/2024 12:44 PM      Pathology Result:         Final Diagnosis   Date Value Ref Range Status   10/17/2024     Final     A-B. Pancreas, Head Mass, FNA (Cell Block and Smear preparations):  Positive for malignancy.  - Pancreatic neuroendocrine tumor (PanNET), favor at least well-differentiated neuroendocrine tumor, WHO grade 2 of 3, in this limited sample.     Satisfactory for evaluation.      Immunohistochemistry performed at [facility] demonstrates the following staining profile in the lesional cells:              Positive: Cam 5.2, synaptophysin, chromogranin (focal), CD56              Negative: Beta-catenin (cytoplasmic)     A Ki-67 immunostain shows a proliferative activity up to ~19% in this limited sample.     Note: As reported in the “WHO Reporting System for Pancreaticobiliary Cytopathology *” the diagnostic category of “malignant” carries a % risk of malignancy being found in subsequent FNAB or resection.  The usual management following an initial diagnosis of “malignant” is per clinical stage. Ultimately clinical and radiologic correlation is needed for this patient in arriving at the actual management plan.      *Asael FAY, Field ANDERSEN, Audra ARCHIBALD (Eds). (2022). WHO Reporting System for Pancreaticobiliary Cytopathology. [facility].  FNAB - fine needle aspiration/biopsy         10/17/2024     Final     A. Stomach, biopsy:  -   Gastric oxyntic/transitional mucosa with superficial erosion and acute gastritis.  -   Negative for dysplasia and intestinal metaplasia (AB/PAS negative).  -   Helicobacter pylori immunostain is negative.     Comment: There is no evidence of malignancy is the reviewed material. It is noted the patient has a recently diagnosed malignancy of the pancreas. AE1/3 is negative for carcinoma in this sample. Clinical correlation is advised.       01/11/2019     Final     A. 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    -- Entire base of lesion not visualized; underlying invasion cannot be excluded.        Interpretation performed at [facility], [address] [id number].         10/15/2024: CT abd/pelv:   Pancreatic mass with evidence of secondary bile duct obstruction. Most commonly adenocarcinoma. Further evaluation with pancreas MR and MRCP recommended.   Small indeterminate left renal nodule, may be a complicated cyst. This can also be evaluated with MR without and with IV contrast.   Other nonemergent findings above.     10/16/2024: MRCP:   Limited by motion artifact and lack of IV contrast.   Enlarged pancreatic head measuring 4.7 x 2.7 cm, #2/27, likely harboring an underlying infiltrative pancreatic head mass. The remainder of the pancreatic parenchyma is atrophic.  Intra/extrahepatic biliary dilation and pancreatic ductal dilation with caliber changes of these ducts at the site of suspected pancreatic head mass.     10/16/2024: EGD:  Single ulcer in the ampullary region with clean base (John III)  Edematous, erythematous mucosa with erosion in the body of the stomach; performed cold forceps biopsy  3 cm type I hiatal hernia  The upper third of the esophagus, middle third of the esophagus and lower third of the esophagus appeared normal.  The duodenal bulb and 1st part of the duodenum appeared normal.  Ulcerated area at the ampulla is likely tumor protruding through the ampulla     10/16/2024: EUS:  Heterogeneous and hypoechoic mass measuring 46 mm x 30 mm with well-defined margins was visualized in the head of the pancreas; 4 fine needle biopsy passes were taken. An adequate sample was obtained. Cytology analysis was performed. Onsite cytologist was present  There were no cysts in the pancreas. There were no lesions in the examined part of the liver. There was a gallstone in the gallbladder.  The CBD was dilated to 10mm and PD was dilated to 8mm. There were no enlarged lymph nodes in the peripancreatic, gastrohepatic, or celiac areas.

## 2025-01-07 NOTE — ASSESSMENT & PLAN NOTE
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  Hospitalization for fatigue, weight loss and GI bleeding in October 2024.  He was initially found to have a hemoglobin of 4.5 g/deciliter requiring PRBC transfusion and IV iron.  Imaging revealed a pancreatic head mass.  He underwent EUS pancreatic head mass biopsy consistent with pancreatic neuroendocrine tumor with.  He has been evaluated by surgical oncology (Dr. Rodriguez) and found to be a surgical candidate.  Recent hospitalization in December and found to have intrahepatic bile artery dilation with distended gallbladder.  EGD on 12/6/2024 showed single large ulcer in the duodenum with clean base.  He received palliative radiation to the pancreatic mass.    Lengthy discussion with the patient and his daughter regarding his diagnosis and treatment options.  He had well-differentiated neuroendocrine tumor of the head of the pancreas with no obvious metastatic disease.  He is not a surgical candidate and has received palliative radiation to the pancreatic mass.  We discussed systemic treatment with lanreotide.  We discussed side effects.  Informed consent obtained.  Patient had a teaching session with the RN and provided printed information about the medication.  Office follow-up in 4 weeks.  Will continue to monitor CBC every 2 weeks to assess for need for PRBC transfusion.    Treatment plan: Lanreotide 60 mg every 4 weeks.  Will monitor CBC and CMP.    Orders:    Ambulatory referral to Hematology / Oncology    CBC and differential; Standing

## 2025-01-08 ENCOUNTER — TELEPHONE (OUTPATIENT)
Dept: HEMATOLOGY ONCOLOGY | Facility: CLINIC | Age: OVER 89
End: 2025-01-08

## 2025-01-08 ENCOUNTER — HOME CARE VISIT (OUTPATIENT)
Dept: HOME HEALTH SERVICES | Facility: HOME HEALTHCARE | Age: OVER 89
End: 2025-01-08
Payer: MEDICARE

## 2025-01-08 ENCOUNTER — TELEMEDICINE (OUTPATIENT)
Dept: GERIATRICS | Facility: OTHER | Age: OVER 89
End: 2025-01-08
Payer: MEDICARE

## 2025-01-08 VITALS
RESPIRATION RATE: 16 BRPM | HEART RATE: 80 BPM | TEMPERATURE: 97.7 F | DIASTOLIC BLOOD PRESSURE: 72 MMHG | OXYGEN SATURATION: 96 % | SYSTOLIC BLOOD PRESSURE: 160 MMHG

## 2025-01-08 DIAGNOSIS — R26.2 AMBULATORY DYSFUNCTION: ICD-10-CM

## 2025-01-08 DIAGNOSIS — K51.00 ULCERATIVE PANCOLITIS WITHOUT COMPLICATION (HCC): ICD-10-CM

## 2025-01-08 DIAGNOSIS — E11.22 TYPE 2 DIABETES MELLITUS WITH STAGE 3 CHRONIC KIDNEY DISEASE, WITHOUT LONG-TERM CURRENT USE OF INSULIN, UNSPECIFIED WHETHER STAGE 3A OR 3B CKD (HCC): ICD-10-CM

## 2025-01-08 DIAGNOSIS — D3A.8 PRIMARY PANCREATIC NEUROENDOCRINE TUMOR: ICD-10-CM

## 2025-01-08 DIAGNOSIS — N18.30 TYPE 2 DIABETES MELLITUS WITH STAGE 3 CHRONIC KIDNEY DISEASE, WITHOUT LONG-TERM CURRENT USE OF INSULIN, UNSPECIFIED WHETHER STAGE 3A OR 3B CKD (HCC): ICD-10-CM

## 2025-01-08 DIAGNOSIS — D3A.8 PRIMARY PANCREATIC NEUROENDOCRINE TUMOR: Primary | ICD-10-CM

## 2025-01-08 DIAGNOSIS — R33.9 URINARY RETENTION: Primary | ICD-10-CM

## 2025-01-08 DIAGNOSIS — N30.01 ACUTE CYSTITIS WITH HEMATURIA: ICD-10-CM

## 2025-01-08 DIAGNOSIS — I10 PRIMARY HYPERTENSION: ICD-10-CM

## 2025-01-08 PROCEDURE — G0299 HHS/HOSPICE OF RN EA 15 MIN: HCPCS

## 2025-01-08 PROCEDURE — 99215 OFFICE O/P EST HI 40 MIN: CPT | Performed by: NURSE PRACTITIONER

## 2025-01-08 RX ORDER — LANREOTIDE ACETATE 120 MG/.5ML
60 INJECTION SUBCUTANEOUS ONCE
Status: CANCELLED | OUTPATIENT
Start: 2025-01-16

## 2025-01-08 NOTE — ASSESSMENT & PLAN NOTE
BP remains stable today, 140/78  Continue Amlodipine and Metoprolol  Avoid hypotension  Continue to monitor BP and for acute changes in condition  Follow up with PCP/Cardiology

## 2025-01-08 NOTE — ASSESSMENT & PLAN NOTE
Patient with history of pancreatic head neuroendocrine carcinoma with out noted metastatic  Patient had surgical oncology consult 10/2024 with Whipple procedure recommended, however patient declined intervention given age  Continue radiation treatment as scheduled  Assess for s/e r/t radiation treament  Follow-up with hematology oncology

## 2025-01-08 NOTE — ASSESSMENT & PLAN NOTE
History of falls  Maintain fall and safety precautions  Encourage use of asst devices  Continue PT/OT services with [facility]  Assess for need with assistance with transfers, mobility, and ADLs in/out of home  Patient is at high risk for falls r/t pancreatic CA with radiation therapy, UTI, dumont catheter reinsertion, anxiety, and deconditioning  Continue to monitor for acute changes in condition   Follow up with PCP

## 2025-01-08 NOTE — ASSESSMENT & PLAN NOTE
BP remains stable today, 160/72  Continue Amlodipine and Metoprolol  Avoid hypotension  Continue to monitor BP and for acute changes in condition  Follow up with PCP/Cardiology

## 2025-01-08 NOTE — PROGRESS NOTES
Virtual Regular Visit  Name: Jose Zamora      : 1935      MRN: [id number]  Encounter Provider: KAREL Cifuentes  Encounter Date: 2025   Encounter department: [facility] VIRTUAL      Verification of patient location:  Patient is located at Home in the following state in which I hold an active license [place] :  Assessment & Plan  Urinary retention  Urinary retention with dmuont catheter removed 24 with successful voiding trial  PVR in Urology office was 205 mL  Patient developed urinary retention on 25 with visit to ED with dumont catheter reinsertion, 1300 mL urine return noted upon insertion  Patient was noted to have UTI and started on Macrobid  Encourage safe daily exercise and movement  Encourage increase in p.o. 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  Patient had surgical oncology consult 10/2024 with Whipple procedure recommended, however patient declined intervention given age  Patient has completed radiation treatments  Will be starting infusion therapy, week of 25  Assess for s/e r/t infusion treament  Follow-up with hematology oncology           Primary hypertension  BP remains stable today, 160/72  Continue Amlodipine and Metoprolol  Avoid hypotension  Continue to monitor BP and for acute changes in condition  Follow up with PCP/Cardiology              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)  Stable  Continue Sulfasalazine 500 mg po QID  Follow up with PCP         Type 2 diabetes mellitus with stage 3 chronic kidney  disease, without long-term current use of insulin, unspecified whether stage 3a or 3b CKD (HCC)    Lab Results   Component Value Date    HGBA1C 6.4 (H) 01/02/2025   Bgs well controlled  Patient is currently diet controlled  Continue intermittent Accu-cheks   Educate patient on s/s of hyper/hypoglycemia  Continue to monitor for acute changes in condition   Follow up with PCP/Endocrinology              Ambulatory dysfunction  History of falls  Maintain fall and safety precautions  Encourage use of asst devices  Continue PT/OT services with VNA  Assess for need with assistance with transfers, mobility, and ADLs in/out of home  Patient is at high risk for falls r/t pancreatic CA with radiation therapy, UTI, dumont catheter reinsertion, anxiety, and deconditioning  Continue to monitor for acute changes in condition   Follow up with PCP                  Encounter provider KAREL Cifuentes    The patient was identified by name and date of birth. Jose Zamora was informed that this is a telemedicine visit and that the visit is being conducted through the [employer] Teams platform. He agrees to proceed.  My office door was closed. No one else was in the room.  He acknowledged consent and understanding of privacy and security of the video platform. The patient has agreed to participate and understands they can discontinue the visit at any time.    Patient is aware this is a billable service.     History of Present Illness      Jose Zamora is a 89 y.o. male patient, being seen for [facility] in conjuction with VNA nurse Lourdes Jeronimo, who was in the home to perform physical assessment.      The patient is being seen and examined today for follow-up on acute and chronic medical conditions s/p most recent hospitalization.     The patient reports he is doing okay today, but feeling a little overwhelmed today. He reports his wife had a fall yesterday and they spent part of the evening in the ED.  He  states his wife's health has been declining and he worries about her. He is also dependent on his daughter for many things and does not want to feel like a burden. Patient also reports that he was unable to urinate and had to have dumont catheter placed again in ED, 1/6/25.  He is on his last day of antibiotic therapy for UTI with recent labs show clearing of UTI. He will be following up with Urology to further discuss SP catheter placement. Patient reports that he finished radiation therapy but will be starting infusion therapy next week for his pancreatic tumor. He denies any increase in dyspnea. He reports that he is eating and drinking without difficulty. He states that he has had some trouble with sleep. Patient has f/u with Dr. Rodriguez 1/9, Dr. Hodges 1/13, Urology 1/15, Radiation/Oncology 1/24, and Dr. Fuentes 2/3.              Review of Systems   Constitutional:  Positive for activity change, appetite change and fatigue. Negative for chills and fever.   HENT:  Negative for ear pain and sore throat.    Eyes:  Negative for pain and visual disturbance.   Respiratory:  Negative for cough and shortness of breath.    Cardiovascular:  Negative for chest pain and palpitations.   Gastrointestinal:  Negative for abdominal pain and vomiting.   Genitourinary:  Negative for dysuria and hematuria.        Dumont catheter in place   Musculoskeletal:  Positive for gait problem. Negative for arthralgias and back pain.   Skin:  Negative for color change and rash.   Neurological:  Positive for weakness. Negative for seizures and syncope.   Psychiatric/Behavioral:  The patient is nervous/anxious.         Feeling overwhelmed   All other systems reviewed and are negative.    Pertinent Medical History       Patient presented as transfer from [facility] for initiation of radiation with radiation oncology.  Patient had been originally admitted due to symptomatic anemia due to ulcer noted on EGD.  Patient was continued on Protonix  twice daily.  Did require blood transfusions earlier during that hospitalization however hemoglobin has since remained stable without further episodes of bleeding.  Patient tolerated minutes of radiation without difficulty.  Case was discussed with radiation oncology, patient does not need to remain inpatient for next radiation treatment and appointment will be scheduled by radiation oncology department.  Patient to be discharged home with Mccormack catheter which was placed at previous hospital due to urinary retention.  Patient to follow-up with urology for outpatient void trial and case management has arranged for home VNA services.  On day of discharge patient was afebrile, hemodynamically stable and verbalized understanding for requested outpatient follow-up.    Objective   Urine Microscopic [[id number]]  (Abnormal) Collected: 01/06/25 1242      Lab Status: Final result Specimen: Urine, Indwelling Mccormack Catheter Updated: 01/06/25 1318       RBC, UA Innumerable /hpf         WBC, UA Innumerable /hpf         Epithelial Cells None Seen /hpf         Bacteria, UA None Seen /hpf         WBC Clumps Present     Urine culture [[id number]] Collected: 01/06/25 1242     Lab Status: In process Specimen: Urine, Indwelling Mccormack Catheter Updated: 01/06/25 1318     UA w Reflex to Microscopic w Reflex to Culture [[id number]]  (Abnormal) Collected: 01/06/25 1242     Lab Status: Final result Specimen: Urine, Indwelling Mccormack Catheter Updated: 01/06/25 1255       Color, UA Yellow       Clarity, UA Turbid       Specific Gravity, UA 1.015       pH, UA 5.5       Leukocytes, UA Large       Nitrite, UA Negative       Protein,  (2+) mg/dl         Glucose, UA Negative mg/dl         Ketones, UA Negative mg/dl         Urobilinogen, UA <2.0 mg/dl         Bilirubin, UA Negative       Occult Blood, UA Moderate     Urine culture [[id number]] Collected: 01/06/25 1242     Lab Status: In process Specimen: Urine, Indwelling Mccormack Catheter Updated:  01/06/25 1247          Vitals    Vitals 1/8/2025 10:30 AM   /72   BP Location Right arm   Patient Position Sitting   Resp 16   SpO2 96 %   SpO2 Activity At Rest   Pulse 80   Pulse Source Radial   Temp 97.7 °F (36.5 °C)   Temp src Temporal         Physical Exam  Vitals and nursing note reviewed.   Constitutional:       General: He is not in acute distress.     Appearance: He is well-developed.   HENT:      Head: Normocephalic and atraumatic.   Eyes:      Conjunctiva/sclera: Conjunctivae normal.      Comments: Wears glasses   Cardiovascular:      Rate and Rhythm: Normal rate and regular rhythm.      Heart sounds: No murmur heard.  Pulmonary:      Effort: Pulmonary effort is normal. No respiratory distress.      Breath sounds: Normal breath sounds.   Abdominal:      Palpations: Abdomen is soft.      Tenderness: There is no abdominal tenderness.   Genitourinary:     Comments: Mccormack catheter intact with clear yellow urine   Musculoskeletal:         General: No swelling.      Cervical back: Neck supple.   Skin:     General: Skin is warm and dry.      Capillary Refill: Capillary refill takes less than 2 seconds.   Neurological:      Mental Status: He is alert.      Motor: Weakness present.      Gait: Gait abnormal.   Psychiatric:         Attention and Perception: Attention normal.         Mood and Affect: Mood is anxious.         Behavior: Behavior normal.         Cognition and Memory: Cognition normal.         Visit Time  Total Visit Duration: I have spent a total time of 41 minutes in caring for this patient on the day of the visit/encounter including Diagnostic results, Prognosis, Risks and benefits of tx options, Instructions for management, Patient and family education, Importance of tx compliance, Risk factor reductions, Impressions, Counseling / Coordination of care, Documenting in the medical record, Reviewing / ordering tests, medicine, procedures  , Obtaining or reviewing history  , and Communicating with  other healthcare professionals .

## 2025-01-08 NOTE — ASSESSMENT & PLAN NOTE
Patient with noted UTI, 12/30/24  Continue Macrobid to be completed 1/9/25  Encourage increase in po fluid intake  Discussed importance good hygiene and pericare  Continue dumont catheter care  Follow up with Urology

## 2025-01-08 NOTE — ASSESSMENT & PLAN NOTE
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

## 2025-01-08 NOTE — ASSESSMENT & PLAN NOTE
History of falls  Maintain fall and safety precautions  Encourage use of asst devices  Continue PT/OT services with VNA  Assess for need with assistance with transfers, mobility, and ADLs in/out of home  Patient is at high risk for falls r/t pancreatic CA with radiation therapy, UTI, and deconditioning  Continue to monitor for acute changes in condition   Follow up with PCP

## 2025-01-08 NOTE — ASSESSMENT & PLAN NOTE
Urinary retention with dumont catheter removed 12/30/24 with successful voiding trial  PVR in Urology office was 205 mL  Patient developed urinary retention on 1/6/25 with visit to ED with dumont catheter reinsertion, 1300 mL urine return noted upon insertion  Patient was noted to have UTI and started on Macrobid  Encourage safe daily exercise and movement  Encourage increase in p.o. fluid intake  Continue to monitor urinary output  Follow-up with Urology

## 2025-01-08 NOTE — ASSESSMENT & PLAN NOTE
Urinary retention with dumont catheter removed 12/30/24 with successful voiding trial  PVR in Urology office was 205 mL  Patient was noted to have UTI and started on Macrobid  Encourage safe daily exercise and movement  Encourage increase in p.o. fluid intake  Continue to monitor urinary output  Follow-up with urology prn

## 2025-01-09 ENCOUNTER — TELEPHONE (OUTPATIENT)
Dept: LAB | Facility: HOSPITAL | Age: OVER 89
End: 2025-01-09

## 2025-01-09 ENCOUNTER — TELEPHONE (OUTPATIENT)
Dept: SURGICAL ONCOLOGY | Facility: CLINIC | Age: OVER 89
End: 2025-01-09

## 2025-01-09 DIAGNOSIS — R33.9 URINARY RETENTION: ICD-10-CM

## 2025-01-09 PROCEDURE — 10330064 CATH SECURE, STATLOCK FOLEY SWIVEL SIL P

## 2025-01-09 PROCEDURE — 10330064 SYRINGE, GENERAL PURPOSE LL W/CATH TIP 5

## 2025-01-09 PROCEDURE — 10330064 BAG, URINE LEG STR 1000ML (48/CS)

## 2025-01-09 PROCEDURE — 10330064 SALINE, IRR SOL STR 100ML (48/CS) MGM37

## 2025-01-09 PROCEDURE — 10330064 CATHETER, FOLEY 2WAY 5CC 16FR (10/BX)

## 2025-01-09 NOTE — TELEPHONE ENCOUNTER
Called patient and spoke to Daughter (Parul) we were able to get Jose rescheduled for his office visit with Dr. Rodriguez as he will be in the OR later today. I confirmed the new date, time and location with her.

## 2025-01-10 RX ORDER — FINASTERIDE 5 MG/1
5 TABLET, FILM COATED ORAL DAILY
Qty: 30 TABLET | Refills: 0 | Status: SHIPPED | OUTPATIENT
Start: 2025-01-10

## 2025-01-10 RX ORDER — TAMSULOSIN HYDROCHLORIDE 0.4 MG/1
0.4 CAPSULE ORAL
Qty: 30 CAPSULE | Refills: 0 | Status: SHIPPED | OUTPATIENT
Start: 2025-01-10 | End: 2025-01-15

## 2025-01-13 ENCOUNTER — OFFICE VISIT (OUTPATIENT)
Age: OVER 89
End: 2025-01-13
Payer: MEDICARE

## 2025-01-13 ENCOUNTER — APPOINTMENT (OUTPATIENT)
Age: OVER 89
End: 2025-01-13
Payer: MEDICARE

## 2025-01-13 ENCOUNTER — TELEPHONE (OUTPATIENT)
Dept: SURGICAL ONCOLOGY | Facility: CLINIC | Age: OVER 89
End: 2025-01-13

## 2025-01-13 VITALS
HEIGHT: 69 IN | SYSTOLIC BLOOD PRESSURE: 130 MMHG | TEMPERATURE: 98.6 F | BODY MASS INDEX: 22.13 KG/M2 | HEART RATE: 98 BPM | WEIGHT: 149.4 LBS | OXYGEN SATURATION: 96 % | DIASTOLIC BLOOD PRESSURE: 78 MMHG

## 2025-01-13 DIAGNOSIS — D50.8 OTHER IRON DEFICIENCY ANEMIA: ICD-10-CM

## 2025-01-13 DIAGNOSIS — D3A.8 PRIMARY PANCREATIC NEUROENDOCRINE TUMOR: ICD-10-CM

## 2025-01-13 DIAGNOSIS — R33.9 URINARY RETENTION: Primary | ICD-10-CM

## 2025-01-13 DIAGNOSIS — K86.89 PANCREATIC MASS: ICD-10-CM

## 2025-01-13 DIAGNOSIS — D62 ACUTE BLOOD LOSS ANEMIA: ICD-10-CM

## 2025-01-13 DIAGNOSIS — R63.4 WEIGHT LOSS: ICD-10-CM

## 2025-01-13 PROBLEM — R33.8 BENIGN PROSTATIC HYPERPLASIA WITH URINARY RETENTION: Status: ACTIVE | Noted: 2025-01-13

## 2025-01-13 PROBLEM — N40.1 BENIGN PROSTATIC HYPERPLASIA WITH URINARY RETENTION: Status: ACTIVE | Noted: 2025-01-13

## 2025-01-13 LAB
BASOPHILS # BLD AUTO: 0.05 THOUSANDS/ΜL (ref 0–0.1)
BASOPHILS NFR BLD AUTO: 1 % (ref 0–1)
EOSINOPHIL # BLD AUTO: 0.11 THOUSAND/ΜL (ref 0–0.61)
EOSINOPHIL NFR BLD AUTO: 1 % (ref 0–6)
ERYTHROCYTE [DISTWIDTH] IN BLOOD BY AUTOMATED COUNT: 17.1 % (ref 11.6–15.1)
HCT VFR BLD AUTO: 23.9 % (ref 36.5–49.3)
HGB BLD-MCNC: 7.1 G/DL (ref 12–17)
IMM GRANULOCYTES # BLD AUTO: 0.05 THOUSAND/UL (ref 0–0.2)
IMM GRANULOCYTES NFR BLD AUTO: 1 % (ref 0–2)
LYMPHOCYTES # BLD AUTO: 0.99 THOUSANDS/ΜL (ref 0.6–4.47)
LYMPHOCYTES NFR BLD AUTO: 12 % (ref 14–44)
MCH RBC QN AUTO: 21.3 PG (ref 26.8–34.3)
MCHC RBC AUTO-ENTMCNC: 29.7 G/DL (ref 31.4–37.4)
MCV RBC AUTO: 72 FL (ref 82–98)
MONOCYTES # BLD AUTO: 0.73 THOUSAND/ΜL (ref 0.17–1.22)
MONOCYTES NFR BLD AUTO: 9 % (ref 4–12)
NEUTROPHILS # BLD AUTO: 6.25 THOUSANDS/ΜL (ref 1.85–7.62)
NEUTS SEG NFR BLD AUTO: 76 % (ref 43–75)
NRBC BLD AUTO-RTO: 0 /100 WBCS
PLATELET # BLD AUTO: 238 THOUSANDS/UL (ref 149–390)
PMV BLD AUTO: 10.4 FL (ref 8.9–12.7)
RBC # BLD AUTO: 3.33 MILLION/UL (ref 3.88–5.62)
WBC # BLD AUTO: 8.18 THOUSAND/UL (ref 4.31–10.16)

## 2025-01-13 PROCEDURE — 85025 COMPLETE CBC W/AUTO DIFF WBC: CPT

## 2025-01-13 PROCEDURE — 99214 OFFICE O/P EST MOD 30 MIN: CPT | Performed by: FAMILY MEDICINE

## 2025-01-13 PROCEDURE — 36415 COLL VENOUS BLD VENIPUNCTURE: CPT

## 2025-01-13 NOTE — PROGRESS NOTES
Transition of Care Visit  Name: Jose Zamora      : 1935      MRN: [id number]  Encounter Provider: Scott Hodges DO  Encounter Date: 2025   Encounter department: [facility] PRIMARY CARE    Assessment & Plan  Urinary retention         Weight loss         Pancreatic mass         Acute blood loss anemia         Primary pancreatic neuroendocrine tumor           Depression Screening and Follow-up Plan: Patient was screened for depression during today's encounter. They screened negative with a PHQ-2 score of 2.        History of Present Illness     Transitional Care Management Review:   Jose Zamora is a 89 y.o. male here for TCM follow up.     During the TCM phone call patient stated:  TCM Call       Date and time call was made  2024 10:14 AM    Patient was hospitialized at  [facility]    Date of Admission  24    Date of discharge  24    Diagnosis  PRIMARY PANCREATIC NEUROENDOCRINE TUMOR    Disposition  Home    Were the patients medications reviewed and updated  Yes    Current Symptoms  None          TCM Call       Post hospital issues  None    Should patient be enrolled in anticoag monitoring?  No    Scheduled for follow up?  Yes    Did you obtain your prescribed medications  Yes    Do you need help managing your prescriptions or medications  No    Is transportation to your appointment needed  No    I have advised the patient to call PCP with any new or worsening symptoms  GUDELIA VIEIRA CMA    Living Arrangements  Spouse or Significiant other    Support System  Spouse    The type of support provided  Emotional; Financial; Physical    Do you have social support  Yes, as much as I need    Are you recieving any outpatient services  Yes    What type of services  VNA    Are you recieving home care services  Yes    Types of home care services  Nurse visit    Are you using any community resources  No    Current waiver services  No    Have you fallen in the last 12 months   "No    Interperter language line needed  No    Counseling  Patient    Comments  TRANSFERRED TO [facility]          Patient is here status post ear visit for urinary retention.  Patient did have Mccormack placed.  Patient with leg bag.  Patient did have roughly 1300 cc come out initially.  ER records reviewed at this time.  No significant nausea or vomiting.  Patient with decreased appetite.  No chest pain or shortness of breath at rest.  No abdominal pain.  No melena or constipation noted.  No fevers or chills.      Review of Systems   Constitutional:  Positive for fatigue. Negative for fever.   HENT: Negative.     Eyes: Negative.    Respiratory: Negative.     Cardiovascular: Negative.    Gastrointestinal: Negative.    Endocrine: Negative.    Genitourinary:  Positive for difficulty urinating. Negative for flank pain.   Musculoskeletal: Negative.    Skin: Negative.    Allergic/Immunologic: Negative.    Neurological: Negative.    Hematological: Negative.    Psychiatric/Behavioral: Negative.       Objective   /78 (BP Location: Right arm, Patient Position: Sitting, Cuff Size: Large)   Pulse 98   Temp 98.6 °F (37 °C) (Temporal)   Ht 5' 9\" (1.753 m)   Wt 67.8 kg (149 lb 6.4 oz)   SpO2 96%   BMI 22.06 kg/m²     Physical Exam  Vitals and nursing note reviewed.   Constitutional:       General: He is not in acute distress.     Appearance: He is well-developed. He is not diaphoretic.   HENT:      Head: Normocephalic and atraumatic.      Right Ear: External ear normal.      Left Ear: External ear normal.      Nose: Nose normal.      Mouth/Throat:      Pharynx: No oropharyngeal exudate.   Eyes:      General:         Right eye: No discharge.         Left eye: No discharge.   Neck:      Thyroid: No thyromegaly.      Vascular: No carotid bruit.      Trachea: No tracheal deviation.   Cardiovascular:      Rate and Rhythm: Normal rate and regular rhythm.      Heart sounds: Normal heart sounds. No murmur heard.     No " gallop.   Pulmonary:      Effort: Pulmonary effort is normal. No respiratory distress.      Breath sounds: Normal breath sounds. No stridor. No wheezing or rales.   Chest:      Chest wall: No tenderness.   Abdominal:      General: Abdomen is flat.      Tenderness: There is no abdominal tenderness. There is no rebound.   Musculoskeletal:         General: No tenderness or deformity. Normal range of motion.      Cervical back: Normal range of motion and neck supple.   Lymphadenopathy:      Cervical: No cervical adenopathy.   Skin:     General: Skin is warm and dry.      Coloration: Skin is not pale.      Findings: No erythema or rash.   Neurological:      Mental Status: He is alert and oriented to person, place, and time.      Cranial Nerves: No cranial nerve deficit.      Motor: No abnormal muscle tone.      Coordination: Coordination normal.      Deep Tendon Reflexes: Reflexes normal.   Psychiatric:         Behavior: Behavior normal.         Thought Content: Thought content normal.         Judgment: Judgment normal.       Medications have been reviewed by provider in current encounter

## 2025-01-14 ENCOUNTER — TELEPHONE (OUTPATIENT)
Age: OVER 89
End: 2025-01-14

## 2025-01-14 ENCOUNTER — RESULTS FOLLOW-UP (OUTPATIENT)
Dept: HEMATOLOGY ONCOLOGY | Facility: CLINIC | Age: OVER 89
End: 2025-01-14

## 2025-01-14 ENCOUNTER — APPOINTMENT (OUTPATIENT)
Dept: LAB | Facility: HOSPITAL | Age: OVER 89
End: 2025-01-14
Payer: MEDICARE

## 2025-01-14 DIAGNOSIS — D62 ACUTE BLOOD LOSS ANEMIA: Primary | ICD-10-CM

## 2025-01-14 DIAGNOSIS — D62 ACUTE BLOOD LOSS ANEMIA: ICD-10-CM

## 2025-01-14 LAB
ABO GROUP BLD: NORMAL
BLD GP AB SCN SERPL QL: NEGATIVE
RH BLD: POSITIVE
SPECIMEN EXPIRATION DATE: NORMAL

## 2025-01-14 PROCEDURE — 86850 RBC ANTIBODY SCREEN: CPT

## 2025-01-14 PROCEDURE — 86923 COMPATIBILITY TEST ELECTRIC: CPT

## 2025-01-14 PROCEDURE — 86901 BLOOD TYPING SEROLOGIC RH(D): CPT

## 2025-01-14 PROCEDURE — 36415 COLL VENOUS BLD VENIPUNCTURE: CPT

## 2025-01-14 PROCEDURE — 86900 BLOOD TYPING SEROLOGIC ABO: CPT

## 2025-01-14 RX ORDER — SODIUM CHLORIDE 9 MG/ML
20 INJECTION, SOLUTION INTRAVENOUS ONCE
Status: CANCELLED | OUTPATIENT
Start: 2025-01-14

## 2025-01-14 RX ORDER — SODIUM CHLORIDE 9 MG/ML
20 INJECTION, SOLUTION INTRAVENOUS ONCE
Status: CANCELLED | OUTPATIENT
Start: 2025-01-17

## 2025-01-14 NOTE — TELEPHONE ENCOUNTER
This is not a TCM visit from yesterday.  If you could please addend your note and take out the TCM note Mary Jo put in.  Bill as an ER f/u visit only.  Thank you.

## 2025-01-14 NOTE — TELEPHONE ENCOUNTER
Spoke with pts daughter, Parul, regarding needing to get to get in touch with a  to help her with pt. Pts daughter lives a few hours away and is looking for help . Please reach out to Parul at [phone]

## 2025-01-14 NOTE — TELEPHONE ENCOUNTER
Telephone call spoke with Pt's dtr Parul.  Reviewed Dr Fuentes's note.  Pt will go to [facility] outpt lab for a type and screen.  He has been scheduled to go to [facility] infusion Wednesday at 1100 for 1 unit of prbc.  Dtr stated she understands.  Instructed Pt to drink 1 to 2 glasses of water prior to having labs drawn to help with the draw.

## 2025-01-14 NOTE — TELEPHONE ENCOUNTER
----- Message from Shoshana Fuentes DO sent at 1/14/2025  9:02 AM EST -----  Please notify pt that his Hg has decreased to 7.1 g/deciliter.  Recommend 1 unit PRBC transfusion and repeat CBC in 1 week.

## 2025-01-15 ENCOUNTER — OFFICE VISIT (OUTPATIENT)
Dept: UROLOGY | Facility: CLINIC | Age: OVER 89
End: 2025-01-15

## 2025-01-15 ENCOUNTER — TELEPHONE (OUTPATIENT)
Age: OVER 89
End: 2025-01-15

## 2025-01-15 ENCOUNTER — HOSPITAL ENCOUNTER (OUTPATIENT)
Dept: INFUSION CENTER | Facility: CLINIC | Age: OVER 89
Discharge: HOME | End: 2025-01-15
Payer: MEDICARE

## 2025-01-15 ENCOUNTER — TELEPHONE (OUTPATIENT)
Dept: UROLOGY | Facility: CLINIC | Age: OVER 89
End: 2025-01-15

## 2025-01-15 VITALS
BODY MASS INDEX: 22.22 KG/M2 | WEIGHT: 150 LBS | OXYGEN SATURATION: 98 % | HEIGHT: 69 IN | HEART RATE: 98 BPM | DIASTOLIC BLOOD PRESSURE: 72 MMHG | SYSTOLIC BLOOD PRESSURE: 178 MMHG

## 2025-01-15 VITALS
RESPIRATION RATE: 18 BRPM | SYSTOLIC BLOOD PRESSURE: 175 MMHG | HEART RATE: 83 BPM | DIASTOLIC BLOOD PRESSURE: 75 MMHG | TEMPERATURE: 98.3 F

## 2025-01-15 DIAGNOSIS — N39.0 UTI (URINARY TRACT INFECTION): ICD-10-CM

## 2025-01-15 DIAGNOSIS — R33.9 URINARY RETENTION: ICD-10-CM

## 2025-01-15 DIAGNOSIS — D62 ACUTE BLOOD LOSS ANEMIA: Primary | ICD-10-CM

## 2025-01-15 PROCEDURE — P9016 RBC LEUKOCYTES REDUCED: HCPCS

## 2025-01-15 RX ORDER — SODIUM CHLORIDE 9 MG/ML
20 INJECTION, SOLUTION INTRAVENOUS ONCE
Status: COMPLETED | OUTPATIENT
Start: 2025-01-15 | End: 2025-01-15

## 2025-01-15 RX ORDER — LANREOTIDE ACETATE 120 MG/.5ML
120 INJECTION SUBCUTANEOUS ONCE
Status: CANCELLED | OUTPATIENT
Start: 2025-01-16

## 2025-01-15 RX ADMIN — SODIUM CHLORIDE 20 ML/HR: 0.9 INJECTION, SOLUTION INTRAVENOUS at 11:27

## 2025-01-15 NOTE — PROGRESS NOTES
Patient arrived to unit without complaint. Patient tolerated 1 unit PRBCs without incident. Patient's BP was noted to be in 170s systolic prior to the start of the transfusion and stayed in the 170s during and after. Patient denies any S/S of blood transfusion reaction. Patient's daughter states that patient has been under stress recently due to wife being in the hospital and his own health issues. Patient and daughter advised to reach out to PCP if blood pressure continues to stay elevated. Suggested taking blood pressure at home and keeping a log. AVS declined and patient aware of infusion appointment 1/16/25 at 08:00. Patient left in stable condition.

## 2025-01-15 NOTE — TELEPHONE ENCOUNTER
Called and left a detailed message for the patient's daughter, Parul, per communication consent.  Message stated her father can stop taking the Flomax/Tamsulosin but continue the Finasteride.  Asked Parul to please call the office with any questions.

## 2025-01-15 NOTE — TELEPHONE ENCOUNTER
Can we please call patient or daughter to state that 1 thing that I would recommend at this point is that patient can likely stop the Flomax tamsulosin as if he has catheter in place he does not need to take this medication as the risk of having something such as dizziness and lightheadedness which only outweigh the benefit given that he has a chronic catheter in place I think is reasonable continue with finasteride to help shrink prostate over time to potentially mitigate risk that there would be difficulty with catheter placement in the future or something of that nature

## 2025-01-15 NOTE — PROGRESS NOTES
Office Visit- Urology  Jose Zamora 1935 MRN: [id number]      Assessment/Discussion/Plan    89 y.o. male managed by     Urinary Retention   - was seen in consultation   During hospitalization in early December 2024.  Patient had catheter placed at that point in time  -Had trial of void in December for his PVR 6 hours after catheter removal with  267 mL   -unfortunately patient 1/6/2025.  He had about 1.3 L of urine in the bladder  -now presents for follow-up in regards to next steps  -Reviewed with patient he has a significant enlargement of the prostate calculated at 230g, pancreatic g based on CT scan from October 2024.  -Reviewed with him that with his second need for Mccormack catheter and the significant prostatic that he is at significant risk for urinary retention.  Discussed options including continued maintenance of urethral catheter, suprapubic catheter placement, or patient was adamant of being catheter free consideration of prostate artery embolization.  -For now patient is comfortable with continued urethral ca th.  Daughter is aware of the risk of penile erosion.  Will plan for  Follow-up  Follow-up with a nurse visit on Feb 6th for catheter exchange, continued 4 week catheter exchange thereafter.  Can stop Flomax but will continue with finasteride for the time being.  Can have 3 month follow up with AP to assess if continued urethral catheter or SPT at that point in time     2. Elevated PSA  -PSA measured 16 in 2019. MRI in 2019 returned with PIRADs two designation and a calculated volume of 134 ml   -Discussed with patient and family that I think that patient's pancreatic cancer is patient's priority and that patient's PSA was likely elevated due to his significant large prostate which I measured to be 230 g at this point in time.  prostate cancer screening for elevated PSA I think that further follow-up regarding elevated PSA with having significant risk for overdiagnosis       Chief Complaint:    Jose is a 89 y.o. male presenting to the office for a follow up visit regarding urinary retention        Subjective    Patient is a pleasant 89-year-old gentleman who presents to the office today for follow-up for urinary retention he is accompanied by his daughter.  He was originally known to Dr. Shoemaker for elevated PSA.  His PSA at that point in time was measuring up to 16 and he had a multiparametric of the prostate that demonstrated PI-RADS 2 designation and a calculated prostate volume of 134 g.  Patient did not follow-up for annual PSA as was previously recommended.  He reestablished with our practice while he was hospitalized in December 2024 due to urinary retention.  He was seen on the outpatient basis on 12/13/2024 for urinary tension.  It was discussed that patient would to have a trial of void at that point in time.  Patient successfully passed trial of void with adequate degree of incomplete bladder emptying but he had a positive urine culture and when antibiotics were not helping with his urinary symptoms he presented to the ER he was found to be in overt urinary retention he had a catheter replaced.  He just completed radiation therapy for pancreatic cancer.  His wife is in the hospital due to traumatic brain bleed.  Patient and family are okay with continued maintenance of catheter this point in time        AUA SYMPTOM SCORE      Flowsheet Row Most Recent Value   AUA SYMPTOM SCORE    How often have you had a sensation of not emptying your bladder completely after you finished urinating? 5 (P)     How often have you had to urinate again less than two hours after you finished urinating? 5 (P)     How often have you found you stopped and started again several times when you urinate? 1 (P)     How often have you found it difficult to postpone urination? 4 (P)     How often have you had a weak urinary stream? 4 (P)     How often have you had to push or strain to begin urination? 4 (P)     How many  times did you most typically get up to urinate from the time you went to bed at night until the time you got up in the morning? 5 (P)     Quality of Life: If you were to spend the rest of your life with your urinary condition just the way it is now, how would you feel about that? 4 (P)     AUA SYMPTOM SCORE 28 (P)              ROS:   Review of Systems   Constitutional: Negative.  Negative for chills, fatigue and fever.   HENT: Negative.     Respiratory:  Negative for shortness of breath.    Cardiovascular:  Negative for chest pain.   Gastrointestinal: Negative.  Negative for abdominal pain.   Endocrine: Negative.    Musculoskeletal: Negative.    Skin: Negative.    Neurological: Negative.  Negative for dizziness and light-headedness.   Hematological: Negative.    Psychiatric/Behavioral: Negative.           Past Medical History  Past Medical History:   Diagnosis Date    Allergic     Anemia     Arthritis     Diabetes mellitus (HCC)     Disease of thyroid gland     Hyperlipemia     Hypertension        Past Surgical History  Past Surgical History:   Procedure Laterality Date    COLONOSCOPY      PROSTATE BIOPSY      needle,  resolved may 2005       Past Family History  Family History   Problem Relation Age of Onset    No Known Problems Mother        Past Social history  Social History     Socioeconomic History    Marital status: /Civil Union     Spouse name: Not on file    Number of children: Not on file    Years of education: Not on file    Highest education level: Not on file   Occupational History    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[employer]   Tobacco Use    Smoking status: Former     Current packs/day: 1.00     Average packs/day: 1 pack/day for 54.1 years (54.1 ttl pk-yrs)     Types: Cigarettes     Start date: 12/4/1970    Smokeless tobacco: Never   Vaping Use    Vaping status: Never Used   Substance and Sexual Activity    Alcohol use: Not Currently     Comment: social/rarely    Drug use: Never    Sexual activity:  Not Currently   Other Topics Concern    Not on file   Social History Narrative    Not on file     Social Drivers of Health     Financial Resource Strain: Low Risk  (8/30/2023)    Overall Financial Resource Strain (CARDIA)     Difficulty of Paying Living Expenses: Not hard at all   Food Insecurity: No Food Insecurity (1/1/2025)    Hunger Vital Sign     Worried About Running Out of Food in the Last Year: Never true     Ran Out of Food in the Last Year: Never true   Transportation Needs: No Transportation Needs (1/1/2025)    PRAPARE - Transportation     Lack of Transportation (Medical): No     Lack of Transportation (Non-Medical): No   Physical Activity: Not on file   Stress: Not on file   Social Connections: Not on file   Intimate Partner Violence: Unknown (12/7/2024)    Nursing IPS     Feels Physically and Emotionally Safe: Not on file     Physically Hurt by Someone: Not on file     Humiliated or Emotionally Abused by Someone: Not on file     Physically Hurt by Someone: No     Hurt or Threatened by Someone: No   Housing Stability: Low Risk  (1/1/2025)    Housing Stability Vital Sign     Unable to Pay for Housing in the Last Year: No     Number of Times Moved in the Last Year: 0     Homeless in the Last Year: No       Current Medications  Current Outpatient Medications   Medication Sig Dispense Refill    atorvastatin (LIPITOR) 20 mg tablet Take 1 tablet by mouth daily      cholecalciferol (VITAMIN D3) 1,000 units tablet Take 1,000 Units by mouth daily      fexofenadine (Allegra Allergy) 180 MG tablet Take 180 mg by mouth daily as needed (allergies)      finasteride (PROSCAR) 5 mg tablet Take 1 tablet (5 mg total) by mouth daily 30 tablet 0    folic acid (FOLVITE) 1 mg tablet Take 1 tablet by mouth daily      levothyroxine 50 mcg tablet TAKE 1 TABLET BY MOUTH EVERY DAY 90 tablet 1    metoprolol tartrate (LOPRESSOR) 25 mg tablet Take 0.5 tablets (12.5 mg total) by mouth 2 (two) times a day 90 tablet 1    montelukast  "(SINGULAIR) 10 mg tablet Take 10 mg by mouth daily at bedtime      pantoprazole (PROTONIX) 40 mg tablet Take 1 tablet (40 mg total) by mouth 2 (two) times a day before meals 180 tablet 1    Potassium 99 MG TABS Take 1 tablet by mouth daily      Saw [place] 160 MG CAPS Take 1 capsule by mouth daily      sulfaSALAzine (AZULFIDINE) 500 mg tablet Take 500 mg by mouth 4 (four) times a day  0    tamsulosin (FLOMAX) 0.4 mg Take 1 capsule (0.4 mg total) by mouth daily with dinner 30 capsule 0    amLODIPine (NORVASC) 5 mg tablet Take 1 tablet (5 mg total) by mouth daily 30 tablet 0     No current facility-administered medications for this visit.       Allergies  Allergies   Allergen Reactions    Lisinopril     Candesartan Rash    Penicillins Rash       OBJECTIVE    Vitals   Vitals:    01/15/25 1404   BP: (!) 220/90   BP Location: Left arm   Patient Position: Sitting   Cuff Size: Adult   Pulse: 98   SpO2: 98%   Weight: 68 kg (150 lb)   Height: 5' 9\" (1.753 m)       PVR:    Physical Exam  Constitutional:       General: He is not in acute distress.     Appearance: Normal appearance. He is normal weight. He is not ill-appearing or toxic-appearing.   HENT:      Head: Normocephalic and atraumatic.   Eyes:      Conjunctiva/sclera: Conjunctivae normal.   Cardiovascular:      Rate and Rhythm: Normal rate.   Pulmonary:      Effort: Pulmonary effort is normal. No respiratory distress.   Musculoskeletal:         General: Normal range of motion.   Skin:     General: Skin is warm and dry.   Neurological:      General: No focal deficit present.      Mental Status: He is alert and oriented to person, place, and time.      Cranial Nerves: No cranial nerve deficit.   Psychiatric:         Mood and Affect: Mood normal.         Behavior: Behavior normal.         Thought Content: Thought content normal.          Labs:     Lab Results   Component Value Date    PSA 16.0 (H) 05/09/2019    PSA 9.2 (H) 10/19/2016    PSA 9.0 (H) 02/05/2015    PSA 12.7 " (H) 08/19/2014     Lab Results   Component Value Date    CREATININE 1.80 (H) 01/02/2025      Lab Results   Component Value Date    HGBA1C 6.4 (H) 01/02/2025     Lab Results   Component Value Date    GLUCOSE 130 02/05/2015    CALCIUM 8.6 01/02/2025     02/05/2015    K 4.5 01/02/2025    CO2 22 01/02/2025     01/02/2025    BUN 29 (H) 01/02/2025    CREATININE 1.80 (H) 01/02/2025       I have personally reviewed all pertinent lab results and reviewed with patient    Imaging       Chema Cho PA-C  Date: 1/15/2025 Time: 2:11 PM  [facility]    This note was written using fluency dictation software. Please excuse any resulting minor grammatical errors.

## 2025-01-16 ENCOUNTER — HOME CARE VISIT (OUTPATIENT)
Dept: HOME HEALTH SERVICES | Facility: HOME HEALTHCARE | Age: OVER 89
End: 2025-01-16
Payer: MEDICARE

## 2025-01-16 ENCOUNTER — PATIENT OUTREACH (OUTPATIENT)
Dept: CASE MANAGEMENT | Facility: OTHER | Age: OVER 89
End: 2025-01-16

## 2025-01-16 ENCOUNTER — HOSPITAL ENCOUNTER (OUTPATIENT)
Dept: INFUSION CENTER | Facility: CLINIC | Age: OVER 89
Discharge: HOME | End: 2025-01-16
Payer: MEDICARE

## 2025-01-16 VITALS
SYSTOLIC BLOOD PRESSURE: 181 MMHG | RESPIRATION RATE: 18 BRPM | DIASTOLIC BLOOD PRESSURE: 79 MMHG | TEMPERATURE: 98.7 F | HEART RATE: 86 BPM

## 2025-01-16 DIAGNOSIS — D3A.8 PRIMARY PANCREATIC NEUROENDOCRINE TUMOR: Primary | ICD-10-CM

## 2025-01-16 LAB
ABO GROUP BLD BPU: NORMAL
BPU ID: NORMAL
CROSSMATCH: NORMAL
UNIT DISPENSE STATUS: NORMAL
UNIT PRODUCT CODE: NORMAL
UNIT PRODUCT VOLUME: 350 ML
UNIT RH: NORMAL

## 2025-01-16 PROCEDURE — 96372 THER/PROPH/DIAG INJ SC/IM: CPT

## 2025-01-16 RX ORDER — LANREOTIDE ACETATE 120 MG/.5ML
120 INJECTION SUBCUTANEOUS ONCE
Status: COMPLETED | OUTPATIENT
Start: 2025-01-16 | End: 2025-01-16

## 2025-01-16 RX ORDER — LANREOTIDE ACETATE 120 MG/.5ML
120 INJECTION SUBCUTANEOUS ONCE
OUTPATIENT
Start: 2025-02-13

## 2025-01-16 RX ADMIN — LANREOTIDE ACETATE 120 MG: 120 INJECTION SUBCUTANEOUS at 08:11

## 2025-01-16 NOTE — PROGRESS NOTES
Pt tolerated lanreotide injection to right buttock without incident. Pt provided with AVS, aware of next appt 2/13 at 8am.

## 2025-01-17 ENCOUNTER — EPISODE CHANGES (OUTPATIENT)
Dept: GERIATRICS | Facility: OTHER | Age: OVER 89
End: 2025-01-17

## 2025-01-23 ENCOUNTER — HOME CARE VISIT (OUTPATIENT)
Dept: HOME HEALTH SERVICES | Facility: HOME HEALTHCARE | Age: OVER 89
End: 2025-01-23
Payer: MEDICARE

## 2025-01-23 ENCOUNTER — PATIENT OUTREACH (OUTPATIENT)
Dept: CASE MANAGEMENT | Facility: OTHER | Age: OVER 89
End: 2025-01-23

## 2025-01-23 VITALS
RESPIRATION RATE: 16 BRPM | TEMPERATURE: 98.9 F | DIASTOLIC BLOOD PRESSURE: 78 MMHG | HEART RATE: 62 BPM | SYSTOLIC BLOOD PRESSURE: 142 MMHG | OXYGEN SATURATION: 96 %

## 2025-01-23 PROCEDURE — G0299 HHS/HOSPICE OF RN EA 15 MIN: HCPCS

## 2025-01-23 NOTE — PROGRESS NOTES
Biopsychosocial and Barriers Assessment    Type of Cancer: Pancreatic Neuroendocrine tumor   Treatment plan: infusion  Noted barriers to care: overwhelmed with appointments, tired, difficulty getting to appointments  Cultural/Holiness concerns: none   Hair Loss/ Wig resources needed: none    DT completed: yes   DT score: 5/10   Issues noted: fatigue, change in appetite, grief/loss, feelings of burden, relationship with son, taking care of others    Marital status/Lives with: . Spouse currently in hospital  Pt's support system: daughter/son-in-law however they live a few hours away. Patient's neighbors and extended family work during the day.   Mental Health history: none   Substance Abuse: none    Employment/income source: retired   Concerns with bills (treatment vs household): none  Noted issues with home: none    Narrative note:  OSW outreached to patient's daughter. Daughter reported that she is overwhelmed caring for patient/patient's spouse at this time. Patient's spouse is currently in the hospital and will be transitioned to a SNF early February. She and her  reside a few hours away but she does come frequently to help when she can. Her  will be coming for Rad/onc appointment tomorrow and she will be coming for Med/onc appointment in February.     OSW/daughter discussed various resources/needs which OSW will follow up in email per daughter request.     Email sent to patient's daughter    Damir Teran,     Here is the link to apply for Medicaid and to look at different information for benefits in [place]  [employer] Homepage - [employer] Homepage [place] [HPDFT]    Here is the link on instruction on applying for Medicaid and different information that will be needed to apply  Overview    The attachment is the information for the waiver program that he isn't eligible yet, this is the program I said he is too good for 12 )      I did send the messages that we discussed to both Dr. Rodriguez and Dr. Fuentes  per your request.       Regarding your mom remember to look up the CMS Star rating for the different nursing homes and look at the ones that are the highest ratings so your aren't running around like prema.   This is the website to do that Find Healthcare Providers: Compare Care Near You  Medicare       Remember to call dad's general practitioner to see if he/she recommends a blood pressure cuff or not or if they think it will increase your dad's stress.       No question is stupid. Don't hesitate to call or email me with questions, I'm happy to help if I can.     Your parents are kip to have you helping them. Take 1 step at a time and ask for help and guidance when you need it.

## 2025-01-24 ENCOUNTER — OFFICE VISIT (OUTPATIENT)
Dept: RADIATION ONCOLOGY | Facility: CLINIC | Age: OVER 89
End: 2025-01-24
Attending: STUDENT IN AN ORGANIZED HEALTH CARE EDUCATION/TRAINING PROGRAM
Payer: MEDICARE

## 2025-01-24 VITALS
BODY MASS INDEX: 22.11 KG/M2 | HEIGHT: 69 IN | TEMPERATURE: 98.6 F | DIASTOLIC BLOOD PRESSURE: 68 MMHG | SYSTOLIC BLOOD PRESSURE: 177 MMHG | RESPIRATION RATE: 17 BRPM | HEART RATE: 92 BPM | WEIGHT: 149.3 LBS | OXYGEN SATURATION: 98 %

## 2025-01-24 DIAGNOSIS — D3A.8 PRIMARY PANCREATIC NEUROENDOCRINE TUMOR: Primary | ICD-10-CM

## 2025-01-24 PROCEDURE — 99024 POSTOP FOLLOW-UP VISIT: CPT | Performed by: STUDENT IN AN ORGANIZED HEALTH CARE EDUCATION/TRAINING PROGRAM

## 2025-01-24 PROCEDURE — 99211 OFF/OP EST MAY X REQ PHY/QHP: CPT | Performed by: STUDENT IN AN ORGANIZED HEALTH CARE EDUCATION/TRAINING PROGRAM

## 2025-01-24 NOTE — PROGRESS NOTES
Radiation Oncology Follow Up    Name: Jose Zamora      : 1935      MRN: [id number]  Encounter Provider: Dariana Gutierrez MD  Encounter Date: 2025   Encounter department: [facility] RADIATION ONCOLOGY     Cancer Staging   Primary pancreatic neuroendocrine tumor  Staging form: Neuroendocrine Tumor - Pancreas, AJCC V9  - Clinical: Stage II (cT3, cN0, cM0) - Signed by Nba Rodriguez MD on 10/24/2024    :  Assessment & Plan  Primary pancreatic neuroendocrine tumor    Patient is a 89 y.o. male with pancreatic head neuroendocrine carcinoma, declined surgery, s/p palliative radiation therapy, now on lanreotide. He tolerated radiotherapy well without significant toxicity. Continues following with medical oncology for systemic therapy and continued monitoring of blood counts. At this time, no further radiotherapy is planned. Can follow up as needed.       Time: 15 minutes spent reviewing patient's health record and speaking with/examining patient.        History of Present Illness     Prior cancer treatment:  - pancreatic head mass palliative radiation 30 Gy in 10 fx completed 24  - lanreotide started 25    Implanted devices:  none    Jose Zamora is a 89 y.o. 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 Initially presented to the hospital with fatigue, weight loss, and GI bleed and had hemoglobin of 4.5 on 10/15/24. CT abd/pelvis on 10/15/24 showed a 3.4 cm pancreatic head mass. MRI abdomen on 10/16/24 showed 4.7 cm pancreatic head mass. FNA on 10/17/24 showed pancreatic neuroendocrine tumor, at least well-differentiated. Hemoglobin improved with transfusion. CT chest on 10/19/24 showed no chest metastases. Pancreatic CT abdomen/pelvis on 10/22/24 showed 4.4 cm pancreatic head mass, no vascular involvement or abdominal metastatic disease. Dotatate PET on 24 showed pancreatic head mass avidity with no evidence of metastases. Patient discussed whipple procedure with  surgical oncology and declined any surgical management given his age. He was again admitted to the hospital on 12/5/24 with anemia, hemoglobin down to 4.8, improved with transfusion. CTA chest on 12/5/24 showed no PE. Patient continued to decline surgical approach. He elected for palliative radiation to the pancreatic mass, which he completed 12/24/24. He started lanreotide 1/7/25.    He is doing well today. No abdominal pain, no hematuria/hematochezia/hematemesis. He did have low blood counts about 2 weeks ago and required transfusion. Tolerating systemic therapy well.    Review of Systems:  Review of Systems   Constitutional:  Positive for appetite change. Negative for activity change and fever.   HENT: Negative.     Eyes: Negative.    Respiratory: Negative.     Cardiovascular: Negative.    Gastrointestinal: Negative.  Negative for blood in stool, constipation, diarrhea, nausea and vomiting.   Endocrine: Negative.    Genitourinary: Negative.         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    Musculoskeletal: Negative.    Skin: Negative.    Allergic/Immunologic: Negative.    Neurological: Negative.    Hematological: Negative.    Psychiatric/Behavioral: Negative.      Oncology History   Primary pancreatic neuroendocrine tumor   10/17/2024 Biopsy    Endoscopic ultrasound  - Pancreas, Head Mass, FNA:  Positive for malignancy.  - Pancreatic neuroendocrine tumor (PanNET), favor at least well-differentiated neuroendocrine tumor, WHO grade 2 of 3, in this limited sample.     10/24/2024 -  Cancer Staged    Staging form: Neuroendocrine Tumor - Pancreas, AJCC V9  - Clinical: Stage II (cT3, cN0, cM0) - Signed by Nba Rodriguez MD on 10/24/2024         Past Medical History:   Diagnosis Date    Allergic     Anemia     Arthritis     Diabetes mellitus (HCC)     Disease of thyroid gland     Hyperlipemia     Hypertension      Past Surgical History:   Procedure Laterality Date    COLONOSCOPY      PROSTATE BIOPSY      needle,  resolved may 2005      Family History   Problem Relation Age of Onset    No Known Problems Mother      Social History     Tobacco Use    Smoking status: Former     Current packs/day: 1.00     Average packs/day: 1 pack/day for 54.1 years (54.1 ttl pk-yrs)     Types: Cigarettes     Start date: 12/4/1970    Smokeless tobacco: Never   Vaping Use    Vaping status: Never Used   Substance and Sexual Activity    Alcohol use: Not Currently    Drug use: Never    Sexual activity: Not Currently     Current Outpatient Medications on File Prior to Visit   Medication Sig Dispense Refill    amLODIPine (NORVASC) 5 mg tablet Take 1 tablet (5 mg total) by mouth daily 30 tablet 0    atorvastatin (LIPITOR) 20 mg tablet Take 1 tablet by mouth daily      cholecalciferol (VITAMIN D3) 1,000 units tablet Take 1,000 Units by mouth daily      fexofenadine (Allegra Allergy) 180 MG tablet Take 180 mg by mouth daily as needed (allergies)      finasteride (PROSCAR) 5 mg tablet Take 1 tablet (5 mg total) by mouth daily 30 tablet 0    folic acid (FOLVITE) 1 mg tablet Take 1 tablet by mouth daily      levothyroxine 50 mcg tablet TAKE 1 TABLET BY MOUTH EVERY DAY 90 tablet 1    metoprolol tartrate (LOPRESSOR) 25 mg tablet Take 0.5 tablets (12.5 mg total) by mouth 2 (two) times a day 90 tablet 1    montelukast (SINGULAIR) 10 mg tablet Take 10 mg by mouth daily at bedtime      pantoprazole (PROTONIX) 40 mg tablet Take 1 tablet (40 mg total) by mouth 2 (two) times a day before meals 180 tablet 1    Potassium 99 MG TABS Take 1 tablet by mouth daily      Saw [place] 160 MG CAPS Take 1 capsule by mouth daily      sulfaSALAzine (AZULFIDINE) 500 mg tablet Take 500 mg by mouth 4 (four) times a day  0     No current facility-administered medications on file prior to visit.     Allergies   Allergen Reactions    Lisinopril     Candesartan Rash    Penicillins Rash       Objective   BP (!) 177/68 (BP Location: Left arm)   Pulse 92   Temp 98.6 °F (37 °C) (Temporal)   Resp 17   Ht  "5' 9\" (1.753 m)   Wt 67.7 kg (149 lb 4.8 oz)   SpO2 98%   BMI 22.05 kg/m²     Pain Screening:  Pain Score: 0-No pain   ECOG ECOG Performance Status: 1 - Restricted in physically strenuous activity but ambulatory and able to carry out work of a light or sedentary nature, e.g., light house work, office work  Physical Exam  Vitals and nursing note reviewed.   Constitutional:       General: He is not in acute distress.     Appearance: Normal appearance. He is not ill-appearing or toxic-appearing.   HENT:      Head: Normocephalic and atraumatic.      Right Ear: External ear normal.      Left Ear: External ear normal.      Nose: Nose normal.   Eyes:      Conjunctiva/sclera: Conjunctivae normal.   Cardiovascular:      Rate and Rhythm: Normal rate and regular rhythm.      Pulses: Normal pulses.      Heart sounds: Normal heart sounds. No murmur heard.     No friction rub. No gallop.   Pulmonary:      Effort: Pulmonary effort is normal. No respiratory distress.      Breath sounds: Normal breath sounds. No stridor. No wheezing, rhonchi or rales.   Abdominal:      General: Bowel sounds are normal. There is no distension.      Palpations: Abdomen is soft. There is no mass.      Tenderness: There is guarding.   Musculoskeletal:      Right lower leg: No edema.      Left lower leg: No edema.   Lymphadenopathy:      Cervical: No cervical adenopathy.   Skin:     General: Skin is warm and dry.      Capillary Refill: Capillary refill takes less than 2 seconds.   Neurological:      General: No focal deficit present.      Mental Status: He is alert.      Cranial Nerves: No cranial nerve deficit.   Psychiatric:         Mood and Affect: Mood normal.         Behavior: Behavior normal.         Thought Content: Thought content normal.         Judgment: Judgment normal.        Lab Results   Component Value Date    WBC 8.18 01/13/2025    HGB 7.1 (L) 01/13/2025    HCT 23.9 (L) 01/13/2025    MCV 72 (L) 01/13/2025     01/13/2025       " Chemistry        Component Value Date/Time     02/05/2015 1722    K 4.5 01/02/2025 1144    K 4.4 02/05/2015 1722     01/02/2025 1144     02/05/2015 1722    CO2 22 01/02/2025 1144    CO2 27 02/05/2015 1722    BUN 29 (H) 01/02/2025 1144    BUN 27 (H) 02/05/2015 1722    CREATININE 1.80 (H) 01/02/2025 1144    CREATININE 1.14 02/05/2015 1722        Component Value Date/Time    CALCIUM 8.6 01/02/2025 1144    CALCIUM 9.2 02/05/2015 1722    ALKPHOS 99 01/02/2025 1144    ALKPHOS 85 02/05/2015 1722    AST 17 01/02/2025 1144    AST 16 02/05/2015 1722    ALT 12 01/02/2025 1144    ALT 17 02/05/2015 1722    BILITOT 0.52 02/05/2015 1722            Recent Imaging:  none        Recent Pathology:  none        Dariana Gutierrez MD 01/24/25 12:10 PM

## 2025-01-24 NOTE — PROGRESS NOTES
Jose Zamora 1935 is a 89 y.o. male With history of pancreatic head neuroendocrine carcinoma. He completed 10 fractions to the pancreas on 12/24/24. Was seen in inpatient on 12/7/24. He presents today for follow up.    1/7/25   Discussed systemic treatment of lanreotide every 4 weeks.  Follow 4  upweeks.     Upcoming  2/3/25   2/27/25     Follow up visit     Oncology History Overview Note   With history of pancreatic head neuroendocrine carcinoma. He completed 10 fractions to the pancreas on 12/24/24. Was seen in inpatient on 12/7/24. He presents today for follow up.    1/7/25   Discussed systemic treatment of lanreotide every 4 weeks.  Follow 4  upweeks.     Upcoming  2/3/25   2/227/25      Primary pancreatic neuroendocrine tumor   10/17/2024 Biopsy    Endoscopic ultrasound  - Pancreas, Head Mass, FNA:  Positive for malignancy.  - Pancreatic neuroendocrine tumor (PanNET), favor at least well-differentiated neuroendocrine tumor, WHO grade 2 of 3, in this limited sample.     10/24/2024 -  Cancer Staged    Staging form: Neuroendocrine Tumor - Pancreas, AJCC V9  - Clinical: Stage II (cT3, cN0, cM0) - Signed by Nba Rodriguez MD on 10/24/2024           Review of Systems:  Review of Systems   Constitutional:  Positive for appetite change. Negative for activity change and fever.   HENT: Negative.     Eyes: Negative.    Respiratory: Negative.     Cardiovascular: Negative.    Gastrointestinal: Negative.  Negative for blood in stool, constipation, diarrhea, nausea and vomiting.   Endocrine: Negative.    Genitourinary: Negative.         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    Musculoskeletal: Negative.    Skin: Negative.    Allergic/Immunologic: Negative.    Neurological: Negative.    Hematological: Negative.    Psychiatric/Behavioral: Negative.         Clinical Trial: no      Teaching completed     Health Maintenance   Topic Date Due    Zoster Vaccine (1 of 2) Never done    RSV  Vaccine for Pregnant Patients and Patients Age 60+ Years (1 - 1-dose 75+ series) Never done    Medicare Annual Wellness Visit (AWV)  08/30/2024    Influenza Vaccine (1) 09/01/2024    COVID-19 Vaccine (4 - 2024-25 season) 09/01/2024    DM Eye Exam  05/09/2025    Diabetic Foot Exam  05/14/2025    HEMOGLOBIN A1C  07/02/2025    Fall Risk  12/05/2025    Depression Screening  01/13/2026    BMI: Adult  01/15/2026    Hepatitis C Screening  Completed    Pneumococcal Vaccine: 65+ Years  Completed    Meningococcal B Vaccine  Aged Out    RSV Vaccine age 0-20 Months  Aged Out    HIB Vaccine  Aged Out    IPV Vaccine  Aged Out    Hepatitis A Vaccine  Aged Out    Meningococcal ACWY Vaccine  Aged Out    HPV Vaccine  Aged Out     Patient Active Problem List   Diagnosis    Acute blood loss anemia    Atherosclerotic heart disease of native coronary artery without angina pectoris    DMII (diabetes mellitus, type 2) (HCC)    Hyperlipidemia    Hypertension    Acquired hypothyroidism    PAD (peripheral artery disease) (HCC)    Skin neoplasm    Squamous cell carcinoma    Dermatitis    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)    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)    Beta thalassemia minor    Elevated PSA    Abnormal prostate exam    Lumbar strain    Acute pain of right knee    Strain of calf muscle    Localized osteoarthritis of right knee    Acute pain of both shoulders    Right hand pain    Generalized abdominal pain    Weight loss    Acute bilateral thoracic back pain    Nausea    UGIB (upper gastrointestinal bleed)    Urinary retention    PAWEL (acute kidney injury) (HCC)    Pancreatic mass    Lower back pain    CKD stage 3b, 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)    Hypokalemia    Hypomagnesemia    Primary pancreatic neuroendocrine tumor    Chest tightness    Hypertensive urgency    Elevated troponin    Metabolic acidosis    Moderate protein-calorie malnutrition (HCC)    Acute cystitis with hematuria    Ambulatory  dysfunction    Benign prostatic hyperplasia with urinary retention     Past Medical History:   Diagnosis Date    Allergic     Anemia     Arthritis     Diabetes mellitus (HCC)     Disease of thyroid gland     Hyperlipemia     Hypertension      Past Surgical History:   Procedure Laterality Date    COLONOSCOPY      PROSTATE BIOPSY      needle,  resolved may 2005     Family History   Problem Relation Age of Onset    No Known Problems Mother      Social History     Socioeconomic History    Marital status: /Civil Union     Spouse name: Not on file    Number of children: Not on file    Years of education: Not on file    Highest education level: Not on file   Occupational History    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[employer]   Tobacco Use    Smoking status: Former     Current packs/day: 1.00     Average packs/day: 1 pack/day for 54.1 years (54.1 ttl pk-yrs)     Types: Cigarettes     Start date: 12/4/1970    Smokeless tobacco: Never   Vaping Use    Vaping status: Never Used   Substance and Sexual Activity    Alcohol use: Not Currently     Comment: social/rarely    Drug use: Never    Sexual activity: Not Currently   Other Topics Concern    Not on file   Social History Narrative    Not on file     Social Drivers of Health     Financial Resource Strain: Low Risk  (8/30/2023)    Overall Financial Resource Strain (CARDIA)     Difficulty of Paying Living Expenses: Not hard at all   Food Insecurity: No Food Insecurity (1/1/2025)    Hunger Vital Sign     Worried About Running Out of Food in the Last Year: Never true     Ran Out of Food in the Last Year: Never true   Transportation Needs: No Transportation Needs (1/1/2025)    PRAPARE - Transportation     Lack of Transportation (Medical): No     Lack of Transportation (Non-Medical): No   Physical Activity: Not on file   Stress: Not on file   Social Connections: Not on file   Intimate Partner Violence: Unknown (12/7/2024)    Nursing IPS     Feels Physically and Emotionally Safe: Not  on file     Physically Hurt by Someone: Not on file     Humiliated or Emotionally Abused by Someone: Not on file     Physically Hurt by Someone: No     Hurt or Threatened by Someone: No   Housing Stability: Low Risk  (1/1/2025)    Housing Stability Vital Sign     Unable to Pay for Housing in the Last Year: No     Number of Times Moved in the Last Year: 0     Homeless in the Last Year: No       Current Outpatient Medications:     amLODIPine (NORVASC) 5 mg tablet, Take 1 tablet (5 mg total) by mouth daily, Disp: 30 tablet, Rfl: 0    atorvastatin (LIPITOR) 20 mg tablet, Take 1 tablet by mouth daily, Disp: , Rfl:     cholecalciferol (VITAMIN D3) 1,000 units tablet, Take 1,000 Units by mouth daily, Disp: , Rfl:     fexofenadine (Allegra Allergy) 180 MG tablet, Take 180 mg by mouth daily as needed (allergies), Disp: , Rfl:     finasteride (PROSCAR) 5 mg tablet, Take 1 tablet (5 mg total) by mouth daily, Disp: 30 tablet, Rfl: 0    folic acid (FOLVITE) 1 mg tablet, Take 1 tablet by mouth daily, Disp: , Rfl:     levothyroxine 50 mcg tablet, TAKE 1 TABLET BY MOUTH EVERY DAY, Disp: 90 tablet, Rfl: 1    metoprolol tartrate (LOPRESSOR) 25 mg tablet, Take 0.5 tablets (12.5 mg total) by mouth 2 (two) times a day, Disp: 90 tablet, Rfl: 1    montelukast (SINGULAIR) 10 mg tablet, Take 10 mg by mouth daily at bedtime, Disp: , Rfl:     pantoprazole (PROTONIX) 40 mg tablet, Take 1 tablet (40 mg total) by mouth 2 (two) times a day before meals, Disp: 180 tablet, Rfl: 1    Potassium 99 MG TABS, Take 1 tablet by mouth daily, Disp: , Rfl:     Saw Palmetto 160 MG CAPS, Take 1 capsule by mouth daily, Disp: , Rfl:     sulfaSALAzine (AZULFIDINE) 500 mg tablet, Take 500 mg by mouth 4 (four) times a day, Disp: , Rfl: 0  Allergies   Allergen Reactions    Lisinopril     Candesartan Rash    Penicillins Rash     There were no vitals filed for this visit.

## 2025-01-28 ENCOUNTER — TELEPHONE (OUTPATIENT)
Dept: HEMATOLOGY ONCOLOGY | Facility: CLINIC | Age: OVER 89
End: 2025-01-28

## 2025-01-28 ENCOUNTER — TELEPHONE (OUTPATIENT)
Dept: LAB | Facility: HOSPITAL | Age: OVER 89
End: 2025-01-28

## 2025-01-28 NOTE — TELEPHONE ENCOUNTER
Telephone call spoke with Dtr Parul.  Mobile lab unit will draw a blood bank hold tube when they draw his cbcd.  This tube is used to draw a type and screen if one is needed after cbcd results.  If not need that tube will not be used.  Instructed the importance of drinking adequate fluids prior to mobile lab getting to home.  Dtr states she understands and will update Pt.

## 2025-01-29 ENCOUNTER — APPOINTMENT (OUTPATIENT)
Dept: LAB | Facility: HOSPITAL | Age: OVER 89
End: 2025-01-29
Payer: MEDICARE

## 2025-01-29 ENCOUNTER — PATIENT MESSAGE (OUTPATIENT)
Dept: UROLOGY | Facility: CLINIC | Age: OVER 89
End: 2025-01-29

## 2025-01-29 ENCOUNTER — TELEPHONE (OUTPATIENT)
Age: OVER 89
End: 2025-01-29

## 2025-01-29 DIAGNOSIS — D50.8 OTHER IRON DEFICIENCY ANEMIA: ICD-10-CM

## 2025-01-29 DIAGNOSIS — D3A.8 PRIMARY PANCREATIC NEUROENDOCRINE TUMOR: ICD-10-CM

## 2025-01-29 DIAGNOSIS — N39.0 URINARY TRACT INFECTION WITHOUT HEMATURIA, SITE UNSPECIFIED: Primary | ICD-10-CM

## 2025-01-29 DIAGNOSIS — Z97.8 FOLEY CATHETER IN PLACE: ICD-10-CM

## 2025-01-29 LAB
BASOPHILS # BLD AUTO: 0.06 THOUSANDS/ΜL (ref 0–0.1)
BASOPHILS NFR BLD AUTO: 1 % (ref 0–1)
EOSINOPHIL # BLD AUTO: 0.14 THOUSAND/ΜL (ref 0–0.61)
EOSINOPHIL NFR BLD AUTO: 2 % (ref 0–6)
ERYTHROCYTE [DISTWIDTH] IN BLOOD BY AUTOMATED COUNT: 19.2 % (ref 11.6–15.1)
HCT VFR BLD AUTO: 28.1 % (ref 36.5–49.3)
HGB BLD-MCNC: 8.2 G/DL (ref 12–17)
IMM GRANULOCYTES # BLD AUTO: 0.07 THOUSAND/UL (ref 0–0.2)
IMM GRANULOCYTES NFR BLD AUTO: 1 % (ref 0–2)
LYMPHOCYTES # BLD AUTO: 0.95 THOUSANDS/ΜL (ref 0.6–4.47)
LYMPHOCYTES NFR BLD AUTO: 11 % (ref 14–44)
MCH RBC QN AUTO: 21.1 PG (ref 26.8–34.3)
MCHC RBC AUTO-ENTMCNC: 29.2 G/DL (ref 31.4–37.4)
MCV RBC AUTO: 72 FL (ref 82–98)
MONOCYTES # BLD AUTO: 0.68 THOUSAND/ΜL (ref 0.17–1.22)
MONOCYTES NFR BLD AUTO: 8 % (ref 4–12)
NEUTROPHILS # BLD AUTO: 7.05 THOUSANDS/ΜL (ref 1.85–7.62)
NEUTS SEG NFR BLD AUTO: 77 % (ref 43–75)
NRBC BLD AUTO-RTO: 0 /100 WBCS
PLATELET # BLD AUTO: 267 THOUSANDS/UL (ref 149–390)
PMV BLD AUTO: 10.5 FL (ref 8.9–12.7)
RBC # BLD AUTO: 3.88 MILLION/UL (ref 3.88–5.62)
WBC # BLD AUTO: 8.95 THOUSAND/UL (ref 4.31–10.16)

## 2025-01-29 PROCEDURE — 85025 COMPLETE CBC W/AUTO DIFF WBC: CPT

## 2025-01-29 RX ORDER — CEPHALEXIN 500 MG/1
500 CAPSULE ORAL EVERY 24 HOURS
Qty: 3 CAPSULE | Refills: 0 | Status: SHIPPED | OUTPATIENT
Start: 2025-01-29 | End: 2025-02-01

## 2025-01-29 NOTE — TELEPHONE ENCOUNTER
Mason called from [facility], stating he is returning a call from MEGHAN Teran from yesterday. I reviewed Parul's note and advised that a blood bank tube hold order was placed to be drawn in addition to patient's CBC for today.    Tom will reach out to the phlebotomist who is en route for lab draws to see if they are able to add that lab today.

## 2025-01-30 ENCOUNTER — HOME CARE VISIT (OUTPATIENT)
Dept: HOME HEALTH SERVICES | Facility: HOME HEALTHCARE | Age: OVER 89
End: 2025-01-30
Payer: MEDICARE

## 2025-01-30 VITALS
RESPIRATION RATE: 16 BRPM | DIASTOLIC BLOOD PRESSURE: 82 MMHG | SYSTOLIC BLOOD PRESSURE: 148 MMHG | HEART RATE: 70 BPM | TEMPERATURE: 97.8 F | OXYGEN SATURATION: 97 %

## 2025-01-30 PROCEDURE — G0299 HHS/HOSPICE OF RN EA 15 MIN: HCPCS

## 2025-02-01 PROBLEM — N30.01 ACUTE CYSTITIS WITH HEMATURIA: Status: RESOLVED | Noted: 2025-01-02 | Resolved: 2025-02-01

## 2025-02-03 ENCOUNTER — OFFICE VISIT (OUTPATIENT)
Dept: HEMATOLOGY ONCOLOGY | Facility: CLINIC | Age: OVER 89
End: 2025-02-03
Payer: MEDICARE

## 2025-02-03 VITALS
RESPIRATION RATE: 18 BRPM | HEIGHT: 69 IN | WEIGHT: 150.5 LBS | BODY MASS INDEX: 22.29 KG/M2 | HEART RATE: 99 BPM | OXYGEN SATURATION: 98 % | TEMPERATURE: 97.9 F | DIASTOLIC BLOOD PRESSURE: 68 MMHG | SYSTOLIC BLOOD PRESSURE: 170 MMHG

## 2025-02-03 DIAGNOSIS — D50.8 OTHER IRON DEFICIENCY ANEMIA: ICD-10-CM

## 2025-02-03 DIAGNOSIS — N18.32 CHRONIC KIDNEY DISEASE, STAGE 3B (HCC): ICD-10-CM

## 2025-02-03 DIAGNOSIS — D3A.8 PRIMARY PANCREATIC NEUROENDOCRINE TUMOR: Primary | ICD-10-CM

## 2025-02-03 DIAGNOSIS — D56.3 BETA THALASSEMIA MINOR: ICD-10-CM

## 2025-02-03 DIAGNOSIS — D62 ACUTE BLOOD LOSS ANEMIA: ICD-10-CM

## 2025-02-03 PROCEDURE — 99214 OFFICE O/P EST MOD 30 MIN: CPT | Performed by: INTERNAL MEDICINE

## 2025-02-03 NOTE — PROGRESS NOTES
Name: Jose Zamora      : 1935      MRN: [id number]  Encounter Provider: Shoshana Fuentes DO  Encounter Date: 2/3/2025   Encounter department: [facility] [place]  :  Assessment & Plan  Primary pancreatic neuroendocrine tumor  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  Hospitalization for fatigue, weight loss and GI bleeding in 2024.  He was initially found to have a hemoglobin of 4.5 g/deciliter requiring PRBC transfusion and IV iron.  Imaging revealed a pancreatic head mass.  He underwent EUS pancreatic head mass biopsy consistent with pancreatic neuroendocrine tumor with.  He has been evaluated by surgical oncology (Dr. Rodriguez), but declined surgery.  Recent hospitalization in December and found to have intrahepatic bile artery dilation with distended gallbladder.  EGD on 2024 showed single large ulcer in the duodenum with clean base.  He received palliative radiation to the pancreatic mass.    He has well-differentiated neuroendocrine tumor of the head of the pancreas with no obvious metastatic disease.  He declined surgery and has received palliative radiation to the pancreatic mass.  He is currently receiving systemic treatment with lanreotide since 2025.  He will continue treatment.  Will continue to follow clinically, with labs and imaging (CT chest/abdomen/pelvis) already ordered by surgical oncology.  He continues to follow with Dr. Rodriguez.    Treatment plan: Lanreotide 120 mg every 4 weeks.  Will monitor CBC and CMP.  Started 2025.       Other iron deficiency anemia  Acute on chronic anemia  Multifactorial due to acute blood loss, CKD and beta thalassemia (has low hemoglobin at baseline between 9-10  g/dL).  Adequate iron stores.  Monitor CBC and iron panel.  Transfusion as needed.       Beta thalassemia minor  As above        Chronic kidney disease, stage 3b (HCC)  Lab Results   Component Value Date    EGFR 32 01/02/2025    EGFR 41 12/11/2024    EGFR 42 12/10/2024    CREATININE 1.80 (H) 01/02/2025    CREATININE 1.47 (H) 12/11/2024    CREATININE 1.44 (H) 12/10/2024                History of Present Illness   Chief Complaint   Patient presents with    Follow-up     Oncology History   Cancer Staging   Primary pancreatic neuroendocrine tumor  Staging form: Neuroendocrine Tumor - Pancreas, AJCC V9  - Clinical: Stage II (cT3, cN0, cM0) - Signed by Nba Rodriguez MD on 10/24/2024  Oncology History   Primary pancreatic neuroendocrine tumor   10/17/2024 Biopsy    Endoscopic ultrasound  - Pancreas, Head Mass, FNA:  Positive for malignancy.  - Pancreatic neuroendocrine tumor (PanNET), favor at least well-differentiated neuroendocrine tumor, WHO grade 2 of 3, in this limited sample.     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  He was recently hospitalized for fatigue, weight loss and GI bleeding. His hemoglobin on recent admission was 4.5 g/deciliter (baseline appears to be 8-10 g/dL).  He required 3 units PRBC transfusion.  Received IV iron. Imaging revealed a pancreatic head mass.  He underwent EUS pancreatic head mass biopsy consistent with pancreatic neuroendocrine tumor with.  He has been evaluated by surgical oncology (Dr. Rodriguez) and was recommended pancreaticoduodenectomy pending dotatate PET scan.  He presents for hospital follow-up visit accompanied by his daughter.  Patient notes improvement in his symptoms.  " Denies any abdominal pain, nausea, vomiting, diarrhea.  Denies any blood in the stools.  Fatigue improving.     01/07/25:   Recent hospitalization in December 2024 for symptomatic anemia.  Patient required PRBC transfusion.  EGD on 12/6/2024 with single ulcer in the duodenum with clean base.  He was evaluated by radiation oncology and received palliative radiation to the pancreatic head.  Also with urinary retention now with Mccormack catheter.  Following with urology.  He presents today for follow-up visit accompanied by his daughter.  Notes fatigue.  Denies any bleeding. Denies any abdominal pain.    02/03/25:   Pt accompanied by his daughter for visit. States he has more energy. Has started doing the laundry. Wife remains hospitalized. He continues to take care of himself and cooking.   Last pRBC transfusion on 1/15/25.      Review of Systems   Constitutional:  Positive for fatigue. Negative for chills and fever.   Respiratory:  Negative for cough and shortness of breath.    Cardiovascular:  Negative for chest pain and palpitations.   Gastrointestinal:  Negative for abdominal pain and vomiting.   Genitourinary:  Negative for hematuria.   Musculoskeletal:  Negative for arthralgias and back pain.   Skin:  Negative for rash.   Hematological:  Does not bruise/bleed easily.   All other systems reviewed and are negative.          Objective   /68 (Patient Position: Sitting, Cuff Size: Adult)   Pulse 99   Temp 97.9 °F (36.6 °C) (Temporal)   Resp 18   Ht 5' 9\" (1.753 m)   Wt 68.3 kg (150 lb 8 oz)   SpO2 98%   BMI 22.22 kg/m²     Pain Screening:  Pain Score: 0-No pain  ECOG   1  Physical Exam  Vitals reviewed.   Constitutional:       General: He is not in acute distress.     Appearance: Normal appearance.   HENT:      Head: Normocephalic and atraumatic.      Mouth/Throat:      Mouth: Mucous membranes are moist.   Eyes:      Extraocular Movements: Extraocular movements intact.      Conjunctiva/sclera: Conjunctivae " normal.   Cardiovascular:      Rate and Rhythm: Normal rate.   Pulmonary:      Effort: Pulmonary effort is normal. No respiratory distress.      Breath sounds: No wheezing, rhonchi or rales.   Abdominal:      General: Abdomen is flat. There is no distension.      Palpations: Abdomen is soft.   Musculoskeletal:      Cervical back: Normal range of motion.      Right lower leg: No edema.      Left lower leg: No edema.   Skin:     General: Skin is warm.      Coloration: Skin is not jaundiced.   Neurological:      General: No focal deficit present.      Mental Status: He is alert and oriented to person, place, and time. Mental status is at baseline.      Gait: Gait normal.   Psychiatric:         Thought Content: Thought content normal.         Labs: I have reviewed the following labs:  Lab Results   Component Value Date/Time    WBC 8.95 01/29/2025 07:52 AM    RBC 3.88 01/29/2025 07:52 AM    Hemoglobin 8.2 (L) 01/29/2025 07:52 AM    Hematocrit 28.1 (L) 01/29/2025 07:52 AM    MCV 72 (L) 01/29/2025 07:52 AM    MCH 21.1 (L) 01/29/2025 07:52 AM    RDW 19.2 (H) 01/29/2025 07:52 AM    Platelets 267 01/29/2025 07:52 AM    Segmented % 77 (H) 01/29/2025 07:52 AM    Lymphocytes % 11 (L) 01/29/2025 07:52 AM    Monocytes % 8 01/29/2025 07:52 AM    Eosinophils Relative 2 01/29/2025 07:52 AM    Basophils Relative 1 01/29/2025 07:52 AM    Immature Grans % 1 01/29/2025 07:52 AM    Absolute Neutrophils 7.05 01/29/2025 07:52 AM     Lab Results   Component Value Date/Time    Potassium 4.5 01/02/2025 11:44 AM    Chloride 105 01/02/2025 11:44 AM    CO2 22 01/02/2025 11:44 AM    BUN 29 (H) 01/02/2025 11:44 AM    Creatinine 1.80 (H) 01/02/2025 11:44 AM    Glucose, Fasting 143 (H) 01/02/2025 11:44 AM    Calcium 8.6 01/02/2025 11:44 AM    Corrected Calcium 7.9 (L) 12/09/2024 05:32 AM    AST 17 01/02/2025 11:44 AM    ALT 12 01/02/2025 11:44 AM    Alkaline Phosphatase 99 01/02/2025 11:44 AM    Total Protein 6.9 01/02/2025 11:44 AM    Albumin 4.0  01/02/2025 11:44 AM    Total Bilirubin 0.33 01/02/2025 11:44 AM    eGFR 32 01/02/2025 11:44 AM     Lab Results   Component Value Date/Time    Iron 37 (L) 01/02/2025 11:44 AM    Iron Saturation 18 01/02/2025 11:44 AM    Ferritin 124 12/05/2024 12:44 PM          10/15/2024: CT abd/pelv:   Pancreatic mass with evidence of secondary bile duct obstruction. Most commonly adenocarcinoma. Further evaluation with pancreas MR and MRCP recommended.   Small indeterminate left renal nodule, may be a complicated cyst. This can also be evaluated with MR without and with IV contrast.   Other nonemergent findings above.     10/16/2024: MRCP:   Limited by motion artifact and lack of IV contrast.   Enlarged pancreatic head measuring 4.7 x 2.7 cm, #2/27, likely harboring an underlying infiltrative pancreatic head mass. The remainder of the pancreatic parenchyma is atrophic.  Intra/extrahepatic biliary dilation and pancreatic ductal dilation with caliber changes of these ducts at the site of suspected pancreatic head mass.     10/16/2024: EGD:  Single ulcer in the ampullary region with clean base (John III)  Edematous, erythematous mucosa with erosion in the body of the stomach; performed cold forceps biopsy  3 cm type I hiatal hernia  The upper third of the esophagus, middle third of the esophagus and lower third of the esophagus appeared normal.  The duodenal bulb and 1st part of the duodenum appeared normal.  Ulcerated area at the ampulla is likely tumor protruding through the ampulla     10/16/2024: EUS:  Heterogeneous and hypoechoic mass measuring 46 mm x 30 mm with well-defined margins was visualized in the head of the pancreas; 4 fine needle biopsy passes were taken. An adequate sample was obtained. Cytology analysis was performed. Onsite cytologist was present  There were no cysts in the pancreas. There were no lesions in the examined part of the liver. There was a gallstone in the gallbladder. The CBD was dilated to 10mm and  PD was dilated to 8mm. There were no enlarged lymph nodes in the peripancreatic, gastrohepatic, or celiac areas.

## 2025-02-03 NOTE — ASSESSMENT & PLAN NOTE
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  Hospitalization for fatigue, weight loss and GI bleeding in October 2024.  He was initially found to have a hemoglobin of 4.5 g/deciliter requiring PRBC transfusion and IV iron.  Imaging revealed a pancreatic head mass.  He underwent EUS pancreatic head mass biopsy consistent with pancreatic neuroendocrine tumor with.  He has been evaluated by surgical oncology (Dr. Rodriguez), but declined surgery.  Recent hospitalization in December and found to have intrahepatic bile artery dilation with distended gallbladder.  EGD on 12/6/2024 showed single large ulcer in the duodenum with clean base.  He received palliative radiation to the pancreatic mass.    He has well-differentiated neuroendocrine tumor of the head of the pancreas with no obvious metastatic disease.  He declined surgery and has received palliative radiation to the pancreatic mass.  He is currently receiving systemic treatment with lanreotide since 1/16/2025.  He will continue treatment.  Will continue to follow clinically, with labs and imaging (CT chest/abdomen/pelvis) already ordered by surgical oncology.  He continues to follow with Dr. Rodriguez.    Treatment plan: Lanreotide 120 mg every 4 weeks.  Will monitor CBC and CMP.  Started 1/16/2025.

## 2025-02-04 ENCOUNTER — TELEPHONE (OUTPATIENT)
Dept: UROLOGY | Facility: AMBULATORY SURGERY CENTER | Age: OVER 89
End: 2025-02-04

## 2025-02-04 NOTE — TELEPHONE ENCOUNTER
Called and spoke with daughter with Jose in background t, They have [facility]NA coming at that same time as appointment at [place] - told then we could cahange appointment and they wer going to cancel VNA

## 2025-02-06 ENCOUNTER — HOME CARE VISIT (OUTPATIENT)
Dept: HOME HEALTH SERVICES | Facility: HOME HEALTHCARE | Age: OVER 89
End: 2025-02-06
Payer: MEDICARE

## 2025-02-06 ENCOUNTER — HOME CARE VISIT (OUTPATIENT)
Dept: HOME HEALTH SERVICES | Facility: HOME HEALTHCARE | Age: OVER 89
End: 2025-02-06

## 2025-02-06 VITALS
SYSTOLIC BLOOD PRESSURE: 156 MMHG | TEMPERATURE: 98 F | DIASTOLIC BLOOD PRESSURE: 72 MMHG | OXYGEN SATURATION: 96 % | RESPIRATION RATE: 16 BRPM | HEART RATE: 78 BPM

## 2025-02-06 PROCEDURE — G0299 HHS/HOSPICE OF RN EA 15 MIN: HCPCS

## 2025-02-06 NOTE — TELEPHONE ENCOUNTER
Called and spoke with the patient's daughter, Parul, regarding her father's appointment today for a dumont change.  Asked if she would like to reschedule.    Parul mentioned her father has [facility] and the nurse was planning to visit today.  Parul requesting [facility] to change the patient's dumont today and then  monthly changes.    Advised Parul I will contact [facility] to give verbal order for dumont changes today and then monthly/prn.  May irrigate dumont with NSS/Sterile water for blockage prn.  ml.    Called [facility], office will open at 1100 am today due to inclement weather.  Will call VNA after 1100 am

## 2025-02-06 NOTE — TELEPHONE ENCOUNTER
Called [facility] and spoke with Yanna at this time.  Asked Yanna if the VN could possibly change the patient's dumont today.    Yanna will reach out to the VN and if she is unable to change it today, next week would be fine.    Verbal order given for monthly changes .    Patient has 3 month follow up with Eliz VINSON on 4/15/2025.

## 2025-02-07 ENCOUNTER — PATIENT OUTREACH (OUTPATIENT)
Dept: CASE MANAGEMENT | Facility: OTHER | Age: OVER 89
End: 2025-02-07

## 2025-02-07 NOTE — PROGRESS NOTES
OSW outreached to patient's daughter to check in. Patient's spouse being moved to facility today. Patient and spouse waiting for her arrival.     Patient's spouse being placed in facility closer to their home which will make it easier for him to visit. They have already discussed that he will run errands prior to visiting her so that he can go straight home after visiting to rest. Discussed importance if listening to his body and resting when needed.     Patient benefiting from mobile labs and home health helping with changing of catheter and that they attribute that to patient feeling much better.     Denies additional needs at this time however were encouraged to reach out if needed.

## 2025-02-10 DIAGNOSIS — R33.9 URINARY RETENTION: ICD-10-CM

## 2025-02-10 PROCEDURE — 400014 VN F/U

## 2025-02-10 RX ORDER — FINASTERIDE 5 MG/1
5 TABLET, FILM COATED ORAL DAILY
Qty: 30 TABLET | Refills: 0 | Status: SHIPPED | OUTPATIENT
Start: 2025-02-10

## 2025-02-10 NOTE — TELEPHONE ENCOUNTER
Reason for call: : Mega Hodges, manage this medication!    [x] Refill   [] Prior Auth  [] Other:     Office:   [x] PCP/Provider -   [] Specialty/Provider -     Medication:     finasteride (PROSCAR) 5 mg tablet       Dose/Frequency: Take 1 tablet (5 mg total) by mouth daily,     Quantity: : 30 tablet     Pharmacy: [facility]/pharmacy #[id number] - [place] - [address] [phone]     Does the patient have enough for 3 days?   [] Yes   [x] No - Send as HP to POD

## 2025-02-12 ENCOUNTER — APPOINTMENT (OUTPATIENT)
Dept: LAB | Facility: HOSPITAL | Age: OVER 89
End: 2025-02-12
Payer: MEDICARE

## 2025-02-12 DIAGNOSIS — N18.32 CHRONIC KIDNEY DISEASE, STAGE 3B (HCC): ICD-10-CM

## 2025-02-12 DIAGNOSIS — D50.8 OTHER IRON DEFICIENCY ANEMIA: ICD-10-CM

## 2025-02-12 DIAGNOSIS — D3A.8 PRIMARY PANCREATIC NEUROENDOCRINE TUMOR: ICD-10-CM

## 2025-02-12 DIAGNOSIS — D62 ACUTE BLOOD LOSS ANEMIA: ICD-10-CM

## 2025-02-12 LAB
BASOPHILS # BLD AUTO: 0.05 THOUSANDS/ΜL (ref 0–0.1)
BASOPHILS NFR BLD AUTO: 1 % (ref 0–1)
EOSINOPHIL # BLD AUTO: 0.1 THOUSAND/ΜL (ref 0–0.61)
EOSINOPHIL NFR BLD AUTO: 2 % (ref 0–6)
ERYTHROCYTE [DISTWIDTH] IN BLOOD BY AUTOMATED COUNT: 18.5 % (ref 11.6–15.1)
HCT VFR BLD AUTO: 25.3 % (ref 36.5–49.3)
HGB BLD-MCNC: 7.2 G/DL (ref 12–17)
IMM GRANULOCYTES # BLD AUTO: 0.03 THOUSAND/UL (ref 0–0.2)
IMM GRANULOCYTES NFR BLD AUTO: 0 % (ref 0–2)
LYMPHOCYTES # BLD AUTO: 0.75 THOUSANDS/ΜL (ref 0.6–4.47)
LYMPHOCYTES NFR BLD AUTO: 11 % (ref 14–44)
MCH RBC QN AUTO: 20.6 PG (ref 26.8–34.3)
MCHC RBC AUTO-ENTMCNC: 28.5 G/DL (ref 31.4–37.4)
MCV RBC AUTO: 73 FL (ref 82–98)
MONOCYTES # BLD AUTO: 0.66 THOUSAND/ΜL (ref 0.17–1.22)
MONOCYTES NFR BLD AUTO: 10 % (ref 4–12)
NEUTROPHILS # BLD AUTO: 5.09 THOUSANDS/ΜL (ref 1.85–7.62)
NEUTS SEG NFR BLD AUTO: 76 % (ref 43–75)
NRBC BLD AUTO-RTO: 0 /100 WBCS
PLATELET # BLD AUTO: 176 THOUSANDS/UL (ref 149–390)
PMV BLD AUTO: 10.7 FL (ref 8.9–12.7)
RBC # BLD AUTO: 3.49 MILLION/UL (ref 3.88–5.62)
WBC # BLD AUTO: 6.68 THOUSAND/UL (ref 4.31–10.16)

## 2025-02-12 PROCEDURE — 85025 COMPLETE CBC W/AUTO DIFF WBC: CPT

## 2025-02-13 ENCOUNTER — HOSPITAL ENCOUNTER (OUTPATIENT)
Dept: INFUSION CENTER | Facility: CLINIC | Age: OVER 89
Discharge: HOME | End: 2025-02-13
Payer: MEDICARE

## 2025-02-13 ENCOUNTER — TELEPHONE (OUTPATIENT)
Dept: INFUSION CENTER | Facility: CLINIC | Age: OVER 89
End: 2025-02-13

## 2025-02-13 ENCOUNTER — TELEPHONE (OUTPATIENT)
Age: OVER 89
End: 2025-02-13

## 2025-02-13 VITALS — HEART RATE: 80 BPM | DIASTOLIC BLOOD PRESSURE: 62 MMHG | SYSTOLIC BLOOD PRESSURE: 166 MMHG

## 2025-02-13 DIAGNOSIS — D50.0 IRON DEFICIENCY ANEMIA DUE TO CHRONIC BLOOD LOSS: ICD-10-CM

## 2025-02-13 DIAGNOSIS — D62 ACUTE BLOOD LOSS ANEMIA: ICD-10-CM

## 2025-02-13 DIAGNOSIS — N18.32 CHRONIC KIDNEY DISEASE, STAGE 3B (HCC): ICD-10-CM

## 2025-02-13 DIAGNOSIS — D3A.8 PRIMARY PANCREATIC NEUROENDOCRINE TUMOR: Primary | ICD-10-CM

## 2025-02-13 DIAGNOSIS — D3A.8 PRIMARY PANCREATIC NEUROENDOCRINE TUMOR: ICD-10-CM

## 2025-02-13 DIAGNOSIS — D62 ACUTE BLOOD LOSS ANEMIA: Primary | ICD-10-CM

## 2025-02-13 DIAGNOSIS — D50.8 OTHER IRON DEFICIENCY ANEMIA: ICD-10-CM

## 2025-02-13 LAB
ABO GROUP BLD: NORMAL
BLD GP AB SCN SERPL QL: POSITIVE
BUN SERPL-MCNC: 26 MG/DL (ref 5–25)
CREAT SERPL-MCNC: 1.31 MG/DL (ref 0.6–1.3)
DAT C3-SP REAG RBC QL: NEGATIVE
DAT IGG-SP REAG RBCCO QL: NEGATIVE
DAT POLY-SP REAG RBC QL: NEGATIVE
GFR SERPL CREATININE-BSD FRML MDRD: 47 ML/MIN/1.73SQ M
RH BLD: POSITIVE
SPECIMEN EXPIRATION DATE: NORMAL

## 2025-02-13 PROCEDURE — 86921 COMPATIBILITY TEST INCUBATE: CPT

## 2025-02-13 PROCEDURE — 86922 COMPATIBILITY TEST ANTIGLOB: CPT

## 2025-02-13 PROCEDURE — 86900 BLOOD TYPING SEROLOGIC ABO: CPT | Performed by: INTERNAL MEDICINE

## 2025-02-13 PROCEDURE — 86901 BLOOD TYPING SEROLOGIC RH(D): CPT | Performed by: INTERNAL MEDICINE

## 2025-02-13 PROCEDURE — 86870 RBC ANTIBODY IDENTIFICATION: CPT | Performed by: INTERNAL MEDICINE

## 2025-02-13 PROCEDURE — 86850 RBC ANTIBODY SCREEN: CPT | Performed by: INTERNAL MEDICINE

## 2025-02-13 PROCEDURE — 86880 COOMBS TEST DIRECT: CPT | Performed by: INTERNAL MEDICINE

## 2025-02-13 PROCEDURE — 0084U RBC DNA GNOTYP 10 BLD GROUPS: CPT | Performed by: INTERNAL MEDICINE

## 2025-02-13 RX ORDER — LANREOTIDE ACETATE 120 MG/.5ML
120 INJECTION SUBCUTANEOUS ONCE
Status: CANCELLED | OUTPATIENT
Start: 2025-02-21

## 2025-02-13 RX ORDER — SODIUM CHLORIDE 9 MG/ML
20 INJECTION, SOLUTION INTRAVENOUS ONCE
Status: CANCELLED | OUTPATIENT
Start: 2025-02-13

## 2025-02-13 RX ORDER — SODIUM CHLORIDE 9 MG/ML
20 INJECTION, SOLUTION INTRAVENOUS ONCE
Status: CANCELLED | OUTPATIENT
Start: 2025-02-14

## 2025-02-13 NOTE — PROGRESS NOTES
Received a call from blood bank. Patient T&S came back with antibodies. We will not be able to transfuse unit of blood today due to further testing needed. Sent 3- lavendar 6 ml and 3- red 6ml tubes to [place] blood bank. Sent a T&S to [place] blood bank. Patient will be receiving blood transfusion tomorrow at [facility] 2/14 @9:30am. Patient made aware. Lyft ride was set up for patient to got to [facility] and the [facility] tomorrow. Patient did not feel comfortable driving himself. Patient was made aware and agreeable. Patient was reminded to go for repeat blood work next week. He is rescheduled for lanreotide on 2/21 @ 8am. AVS provided. Pt left unit in stable condition.

## 2025-02-13 NOTE — TELEPHONE ENCOUNTER
Spoke with Bonita CHRISTIANSON in BB - 6ml tube received from [facility] for us to release prepare and type and screen. T&S label released and sent. Noted that no prepare ordered in blood plan, Parul CHRISTIANSON aware to please add.

## 2025-02-13 NOTE — PROGRESS NOTES
Jose Zamora arrived to unit without complaint. Upon arrival patients BP was elevated 190/81. Rechecked BP 10 mins later was 187/82.  Patient states that he has a lot going on because his wife is in rehab and not doing well. Secure chat message sent to Parul Fischer RN regarding BP being elevated. Per Dr. Fuentes lanerotide will be deferred by 1 week and repeat labs to be done prior to coming for injection. Patient should also follow up with PCP regarding BP. Dr. Fuentes would like for patient to receive 1 unit of PRBCs today for hgb of 7.2. Type and screen sent to blood bank. Patient made aware and able to stay for blood transfusion today. Patient also made aware of repeat blood work. States he has people who come to his home to do his blood work. Patient spoke with his daughter who sets up mobile lab for him to make her aware of repeat blood work needed.

## 2025-02-14 ENCOUNTER — HOSPITAL ENCOUNTER (OUTPATIENT)
Dept: INFUSION CENTER | Facility: CLINIC | Age: OVER 89
Discharge: HOME | End: 2025-02-14
Payer: MEDICARE

## 2025-02-14 DIAGNOSIS — D62 ACUTE BLOOD LOSS ANEMIA: Primary | ICD-10-CM

## 2025-02-14 LAB
ABO GROUP BLD: NORMAL
BLD GP AB SCN SERPL QL: POSITIVE
BLOOD GROUP ANTIBODIES SERPL: NORMAL
BLOOD GROUP ANTIBODIES SERPL: NORMAL
RH BLD: POSITIVE
SCAN RESULT: NORMAL
SPECIMEN EXPIRATION DATE: NORMAL

## 2025-02-14 RX ORDER — SODIUM CHLORIDE 9 MG/ML
20 INJECTION, SOLUTION INTRAVENOUS ONCE
Status: CANCELLED | OUTPATIENT
Start: 2025-02-14

## 2025-02-15 ENCOUNTER — HOSPITAL ENCOUNTER (OUTPATIENT)
Dept: INFUSION CENTER | Facility: CLINIC | Age: OVER 89
Discharge: HOME | End: 2025-02-15
Payer: MEDICARE

## 2025-02-15 VITALS
RESPIRATION RATE: 18 BRPM | OXYGEN SATURATION: 98 % | TEMPERATURE: 97 F | DIASTOLIC BLOOD PRESSURE: 73 MMHG | HEART RATE: 88 BPM | SYSTOLIC BLOOD PRESSURE: 189 MMHG

## 2025-02-15 DIAGNOSIS — D62 ACUTE BLOOD LOSS ANEMIA: Primary | ICD-10-CM

## 2025-02-15 PROCEDURE — P9016 RBC LEUKOCYTES REDUCED: HCPCS

## 2025-02-15 PROCEDURE — 36430 TRANSFUSION BLD/BLD COMPNT: CPT

## 2025-02-15 PROCEDURE — 86902 BLOOD TYPE ANTIGEN DONOR EA: CPT

## 2025-02-15 RX ORDER — SODIUM CHLORIDE 9 MG/ML
20 INJECTION, SOLUTION INTRAVENOUS ONCE
Status: COMPLETED | OUTPATIENT
Start: 2025-02-15 | End: 2025-02-15

## 2025-02-15 RX ADMIN — SODIUM CHLORIDE 20 ML/HR: 0.9 INJECTION, SOLUTION INTRAVENOUS at 11:00

## 2025-02-15 NOTE — PROGRESS NOTES
Patient received 1 unit of PRBC's without any adverse reactions. His next appointment is 2/21 at 0800 at [facility]. AVS declined. Adelia scheduled for transport home.

## 2025-02-17 ENCOUNTER — TELEPHONE (OUTPATIENT)
Dept: LAB | Facility: HOSPITAL | Age: OVER 89
End: 2025-02-17

## 2025-02-18 DIAGNOSIS — N18.32 CHRONIC KIDNEY DISEASE, STAGE 3B (HCC): ICD-10-CM

## 2025-02-18 DIAGNOSIS — D3A.8 PRIMARY PANCREATIC NEUROENDOCRINE TUMOR: Primary | ICD-10-CM

## 2025-02-21 ENCOUNTER — HOSPITAL ENCOUNTER (OUTPATIENT)
Dept: INFUSION CENTER | Facility: CLINIC | Age: OVER 89
End: 2025-02-21
Payer: MEDICARE

## 2025-02-21 DIAGNOSIS — D3A.8 PRIMARY PANCREATIC NEUROENDOCRINE TUMOR: Primary | ICD-10-CM

## 2025-02-21 PROCEDURE — 96372 THER/PROPH/DIAG INJ SC/IM: CPT

## 2025-02-21 RX ORDER — LANREOTIDE ACETATE 120 MG/.5ML
120 INJECTION SUBCUTANEOUS ONCE
Status: COMPLETED | OUTPATIENT
Start: 2025-02-21 | End: 2025-02-21

## 2025-02-21 RX ORDER — LANREOTIDE ACETATE 120 MG/.5ML
120 INJECTION SUBCUTANEOUS ONCE
OUTPATIENT
Start: 2025-03-20

## 2025-02-21 RX ADMIN — LANREOTIDE ACETATE 120 MG: 120 INJECTION SUBCUTANEOUS at 08:13

## 2025-02-21 NOTE — PROGRESS NOTES
Received for lanreoide. No complaints offered. Tolerated injection to left buttock without issue. Pt to return 3/21. AVS printed and given to pt.

## 2025-02-24 ENCOUNTER — TELEPHONE (OUTPATIENT)
Dept: OTHER | Facility: HOSPITAL | Age: OVER 89
End: 2025-02-24

## 2025-02-24 DIAGNOSIS — R33.9 URINARY RETENTION: Primary | ICD-10-CM

## 2025-02-24 LAB — SCAN RESULT: NORMAL

## 2025-02-24 RX ORDER — NITROFURANTOIN 25; 75 MG/1; MG/1
100 CAPSULE ORAL 2 TIMES DAILY
Qty: 6 CAPSULE | Refills: 0 | Status: SHIPPED | OUTPATIENT
Start: 2025-03-05 | End: 2025-03-08

## 2025-02-24 NOTE — TELEPHONE ENCOUNTER
3 days of Bactrim (2 times daily) sent to patient's pharmacy to avoid bladder infection with upcoming Mccormack catheter exchange.  Start antibiotic 1 day prior to Mccormack catheter exchange (3/5/2025).

## 2025-02-24 NOTE — TELEPHONE ENCOUNTER
Called and spoke with patients daughter Parul regarding patients MyChart message about antibiotics prior to upcoming catheter change. Patient is scheduled to have his catheter changed by [facility] on 3/6/25. Advised daughter that a script for Macrobid was sent to [facility] for patient to start taking on 3/5/25. She verbalized understanding and was thankful for the call.

## 2025-02-25 ENCOUNTER — HOME CARE VISIT (OUTPATIENT)
Dept: HOME HEALTH SERVICES | Facility: HOME HEALTHCARE | Age: OVER 89
End: 2025-02-25
Payer: MEDICARE

## 2025-02-25 VITALS
RESPIRATION RATE: 20 BRPM | OXYGEN SATURATION: 97 % | HEART RATE: 88 BPM | SYSTOLIC BLOOD PRESSURE: 184 MMHG | TEMPERATURE: 95.9 F | DIASTOLIC BLOOD PRESSURE: 80 MMHG

## 2025-02-25 PROCEDURE — G0300 HHS/HOSPICE OF LPN EA 15 MIN: HCPCS

## 2025-02-26 ENCOUNTER — APPOINTMENT (OUTPATIENT)
Dept: LAB | Facility: HOSPITAL | Age: OVER 89
End: 2025-02-26
Payer: MEDICARE

## 2025-02-26 DIAGNOSIS — D50.8 OTHER IRON DEFICIENCY ANEMIA: ICD-10-CM

## 2025-02-26 DIAGNOSIS — D3A.8 PRIMARY PANCREATIC NEUROENDOCRINE TUMOR: ICD-10-CM

## 2025-02-26 DIAGNOSIS — N18.32 CHRONIC KIDNEY DISEASE, STAGE 3B (HCC): ICD-10-CM

## 2025-02-26 DIAGNOSIS — D62 ACUTE BLOOD LOSS ANEMIA: ICD-10-CM

## 2025-02-26 LAB
ALBUMIN SERPL BCG-MCNC: 3.7 G/DL (ref 3.5–5)
ALP SERPL-CCNC: 75 U/L (ref 34–104)
ALT SERPL W P-5'-P-CCNC: 7 U/L (ref 7–52)
ANION GAP SERPL CALCULATED.3IONS-SCNC: 8 MMOL/L (ref 4–13)
AST SERPL W P-5'-P-CCNC: 11 U/L (ref 13–39)
BASOPHILS # BLD AUTO: 0.07 THOUSANDS/ÂΜL (ref 0–0.1)
BASOPHILS NFR BLD AUTO: 1 % (ref 0–1)
BILIRUB SERPL-MCNC: 0.32 MG/DL (ref 0.2–1)
BUN SERPL-MCNC: 28 MG/DL (ref 5–25)
CALCIUM SERPL-MCNC: 7.7 MG/DL (ref 8.4–10.2)
CHLORIDE SERPL-SCNC: 104 MMOL/L (ref 96–108)
CO2 SERPL-SCNC: 26 MMOL/L (ref 21–32)
CREAT SERPL-MCNC: 1.35 MG/DL (ref 0.6–1.3)
EOSINOPHIL # BLD AUTO: 0.17 THOUSAND/ÂΜL (ref 0–0.61)
EOSINOPHIL NFR BLD AUTO: 3 % (ref 0–6)
ERYTHROCYTE [DISTWIDTH] IN BLOOD BY AUTOMATED COUNT: 18.1 % (ref 11.6–15.1)
GFR SERPL CREATININE-BSD FRML MDRD: 46 ML/MIN/1.73SQ M
GLUCOSE SERPL-MCNC: 253 MG/DL (ref 65–140)
HCT VFR BLD AUTO: 30 % (ref 36.5–49.3)
HGB BLD-MCNC: 8.6 G/DL (ref 12–17)
IMM GRANULOCYTES # BLD AUTO: 0.04 THOUSAND/UL (ref 0–0.2)
IMM GRANULOCYTES NFR BLD AUTO: 1 % (ref 0–2)
LYMPHOCYTES # BLD AUTO: 1.1 THOUSANDS/ÂΜL (ref 0.6–4.47)
LYMPHOCYTES NFR BLD AUTO: 17 % (ref 14–44)
MCH RBC QN AUTO: 20.8 PG (ref 26.8–34.3)
MCHC RBC AUTO-ENTMCNC: 28.7 G/DL (ref 31.4–37.4)
MCV RBC AUTO: 73 FL (ref 82–98)
MONOCYTES # BLD AUTO: 0.77 THOUSAND/ÂΜL (ref 0.17–1.22)
MONOCYTES NFR BLD AUTO: 12 % (ref 4–12)
NEUTROPHILS # BLD AUTO: 4.32 THOUSANDS/ÂΜL (ref 1.85–7.62)
NEUTS SEG NFR BLD AUTO: 66 % (ref 43–75)
NRBC BLD AUTO-RTO: 0 /100 WBCS
PLATELET # BLD AUTO: 223 THOUSANDS/UL (ref 149–390)
PMV BLD AUTO: 11.3 FL (ref 8.9–12.7)
POTASSIUM SERPL-SCNC: 4.4 MMOL/L (ref 3.5–5.3)
PROT SERPL-MCNC: 6.3 G/DL (ref 6.4–8.4)
RBC # BLD AUTO: 4.14 MILLION/UL (ref 3.88–5.62)
SCAN RESULT: NORMAL
SODIUM SERPL-SCNC: 138 MMOL/L (ref 135–147)
WBC # BLD AUTO: 6.47 THOUSAND/UL (ref 4.31–10.16)

## 2025-02-26 PROCEDURE — 85025 COMPLETE CBC W/AUTO DIFF WBC: CPT

## 2025-02-26 PROCEDURE — 36415 COLL VENOUS BLD VENIPUNCTURE: CPT

## 2025-02-26 PROCEDURE — 80053 COMPREHEN METABOLIC PANEL: CPT

## 2025-02-26 NOTE — CASE COMMUNICATION
Dr. Hodges,    I had a home visit with this patient and his BP was elevated at 184/80 HR 88. He did state that he is a bit worked up as his wife is in the nursing home/rehab and he has a lot going on right now. His daughter was in the home today. He is asymptomatic. We are scheduled to see him again toward the end of next week. If you have any new instructions for the patient, please contact him directly.    Thank you,  Martha

## 2025-02-26 NOTE — CASE COMMUNICATION
Ship to x Pt. Home    Bard flipflo valve Leg bag 500ml 1   Bard Flipflo valve leg bag 1000ml ref [id number]  2   Statlock  NOT STOCKED [id number]

## 2025-03-03 DIAGNOSIS — R33.9 URINARY RETENTION: ICD-10-CM

## 2025-03-05 DIAGNOSIS — R33.9 URINARY RETENTION: ICD-10-CM

## 2025-03-05 RX ORDER — FINASTERIDE 5 MG/1
5 TABLET, FILM COATED ORAL DAILY
Qty: 30 TABLET | Refills: 5 | Status: SHIPPED | OUTPATIENT
Start: 2025-03-05

## 2025-03-06 ENCOUNTER — HOME CARE VISIT (OUTPATIENT)
Dept: HOME HEALTH SERVICES | Facility: HOME HEALTHCARE | Age: OVER 89
End: 2025-03-06
Payer: MEDICARE

## 2025-03-06 VITALS
TEMPERATURE: 98 F | SYSTOLIC BLOOD PRESSURE: 152 MMHG | DIASTOLIC BLOOD PRESSURE: 82 MMHG | HEART RATE: 88 BPM | OXYGEN SATURATION: 97 % | RESPIRATION RATE: 16 BRPM

## 2025-03-06 PROCEDURE — G0299 HHS/HOSPICE OF RN EA 15 MIN: HCPCS

## 2025-03-06 RX ORDER — FINASTERIDE 5 MG/1
5 TABLET, FILM COATED ORAL DAILY
Qty: 30 TABLET | Refills: 0 | OUTPATIENT
Start: 2025-03-06

## 2025-03-07 ENCOUNTER — HOSPITAL ENCOUNTER (OUTPATIENT)
Dept: CT IMAGING | Facility: HOSPITAL | Age: OVER 89
Discharge: HOME | End: 2025-03-07
Attending: SURGERY
Payer: MEDICARE

## 2025-03-07 DIAGNOSIS — D3A.8 PRIMARY PANCREATIC NEUROENDOCRINE TUMOR: ICD-10-CM

## 2025-03-07 PROCEDURE — 71260 CT THORAX DX C+: CPT

## 2025-03-07 PROCEDURE — 74177 CT ABD & PELVIS W/CONTRAST: CPT

## 2025-03-07 RX ADMIN — IOHEXOL 100 ML: 350 INJECTION, SOLUTION INTRAVENOUS at 08:11

## 2025-03-09 NOTE — PROGRESS NOTES
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  Hospitalization for fatigue, weight loss and GI bleeding in 2024.  He was initially found to have a hemoglobin of 4.5 g/deciliter requiring PRBC transfusion and IV iron.  Imaging revealed a pancreatic head mass.  He underwent EUS pancreatic head mass biopsy consistent with pancreatic neuroendocrine tumor with.  He has been evaluated by surgical oncology (Dr. Rodriguez), but declined surgery.  Recent hospitalization in December and found to have intrahepatic bile artery dilation with distended gallbladder.  EGD on 2024 showed single large ulcer in the duodenum with clean base.  He received palliative radiation to the pancreatic mass.     He has well-differentiated neuroendocrine tumor of the head of the pancreas with no obvious metastatic disease.  He declined surgery and has received palliative radiation to the pancreatic mass.  He is currently receiving systemic treatment with lanreotide since 2025.  He will continue treatment.  Will continue to follow clinically, with labs and imaging (CT chest/abdomen/pelvis) already ordered by surgical oncology.  He continues to follow with Dr. Rodriguez.    CT chest/abdomen/pelvis for 2025 with no interval change, stable 3 mm subpleural nodule in the right lower lobe, no thoracic lymphadenopathy.  Stable desmoplastic mass in the pancreatic head. No evidence of vascular invasion or hepatic metastatic disease. No enlarged  abdominal or pelvic lymph nodes. No peritoneal lesions.  Patient continues to do well clinically.  He will continue treatment with lanreotide for now.  Will continue to monitor CBC and CMP (will change interval to 3 weeks).  Discussed with patient and his daughter.     Treatment plan: Lanreotide 120 mg every 4 weeks.  Will monitor CBC and CMP.  Started 1/16/2025.    Orders:    CBC and differential; Standing    Comprehensive metabolic panel; Standing    Iron deficiency anemia due to chronic blood loss  Acute on chronic anemia  Multifactorial due to acute blood loss, CKD and beta thalassemia (has low hemoglobin at baseline between 9-10 g/dL).  Adequate iron stores.  Monitor CBC and iron panel.  Transfusion as needed.    Orders:    CBC and differential; Standing    Comprehensive metabolic panel; Standing    Beta thalassemia minor  Hemoglobin remained stable.    Orders:    CBC and differential; Standing    Comprehensive metabolic panel; Standing    Chronic kidney disease, stage 3b (HCC)  Lab Results   Component Value Date    EGFR 46 02/26/2025    EGFR 47 02/12/2025    EGFR 32 01/02/2025    CREATININE 1.35 (H) 02/26/2025    CREATININE 1.31 (H) 02/12/2025    CREATININE 1.80 (H) 01/02/2025       Orders:    CBC and differential; Standing    Comprehensive metabolic panel; Standing    Malignant neoplasm of pancreas, unspecified location of malignancy (HCC)             Return in about 8 weeks (around 5/5/2025) for Office Visit.    History of Present Illness   Chief Complaint   Patient presents with    Follow-up     Oncology History   Cancer Staging   Primary pancreatic neuroendocrine tumor  Staging form: Neuroendocrine Tumor - Pancreas, AJCC V9  - Clinical: Stage II (cT3, cN0, cM0) - Signed by Nba Rodriguez MD on 10/24/2024  Oncology History   Primary pancreatic neuroendocrine tumor   10/17/2024 Biopsy    Endoscopic ultrasound  - Pancreas, Head Mass, FNA:  Positive for malignancy.  - Pancreatic neuroendocrine tumor (PanNET),  favor at least well-differentiated neuroendocrine tumor, WHO grade 2 of 3, in this limited sample.     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  He was recently hospitalized for fatigue, weight loss and GI bleeding. His hemoglobin on recent admission was 4.5 g/deciliter (baseline appears to be 8-10 g/dL).  He required 3 units PRBC transfusion.  Received IV iron. Imaging revealed a pancreatic head mass.  He underwent EUS pancreatic head mass biopsy consistent with pancreatic neuroendocrine tumor with.  He has been evaluated by surgical oncology (Dr. Rodriguez) and was recommended pancreaticoduodenectomy pending dotatate PET scan.  He presents for hospital follow-up visit accompanied by his daughter.  Patient notes improvement in his symptoms.  Denies any abdominal pain, nausea, vomiting, diarrhea.  Denies any blood in the stools.  Fatigue improving.     01/07/25:   Recent hospitalization in December 2024 for symptomatic anemia.  Patient required PRBC transfusion.  EGD on 12/6/2024 with single ulcer in the duodenum with clean base.  He was evaluated by radiation oncology and received palliative radiation to the pancreatic head.  Also with urinary retention now with Mccormack catheter.  Following with urology.  He presents today for follow-up visit accompanied by his daughter.  Notes fatigue.  Denies any bleeding. Denies any abdominal pain.    02/03/25:   Pt accompanied by his daughter for visit. States he has more energy. Has started doing the laundry. Wife remains hospitalized. He continues to take care of himself and cooking.   Last pRBC transfusion on 1/15/25.     03/10/25:     3/7/2025: CT CAP:   1. No interval change  "since previous study. Stable 3 mm subpleural nodule in the right lower lobe. No thoracic lymphadenopathy. No suspicious pulmonary lesions. Mild distal esophageal wall thickening which can be seen in reflux disease.   2. Stable desmoplastic mass in the pancreatic head with resultant atrophy of the pancreatic body and tail and upstream ductal dilation. No evidence of vascular invasion or hepatic metastatic disease. No enlarged abdominal or pelvic lymph nodes. No peritoneal lesions.   3. Cholelithiasis.   4. Prostate hypertrophy. Diffuse bladder wall thickening with Mccormack catheter in place.      Continues to feel well.  Denies abdominal pain, nausea, vomiting, diarrhea.  States remains very active.  States that schedule has been hectic recently due to his wife being admitted to rehab.  He has been missing meals.     Review of Systems   Constitutional:  Positive for fatigue. Negative for chills and fever.   Respiratory:  Negative for cough and shortness of breath.    Cardiovascular:  Negative for chest pain and palpitations.   Gastrointestinal:  Negative for abdominal pain and vomiting.   Genitourinary:  Negative for hematuria.   Musculoskeletal:  Negative for arthralgias and back pain.   Skin:  Negative for rash.   Hematological:  Does not bruise/bleed easily.   All other systems reviewed and are negative.          Objective   /72 (Patient Position: Sitting, Cuff Size: Large)   Pulse 97   Temp 98.2 °F (36.8 °C) (Temporal)   Resp 18   Ht 5' 9\" (1.753 m)   Wt 66.2 kg (146 lb)   SpO2 98%   BMI 21.56 kg/m²     Pain Screening:  Pain Score: 0-No pain  ECOG   1  Physical Exam  Vitals reviewed.   Constitutional:       General: He is not in acute distress.     Appearance: Normal appearance.   HENT:      Head: Normocephalic and atraumatic.      Mouth/Throat:      Mouth: Mucous membranes are moist.   Eyes:      Extraocular Movements: Extraocular movements intact.      Conjunctiva/sclera: Conjunctivae normal. "   Cardiovascular:      Rate and Rhythm: Normal rate.   Pulmonary:      Effort: Pulmonary effort is normal. No respiratory distress.   Abdominal:      General: Abdomen is flat. There is no distension.      Palpations: Abdomen is soft.   Musculoskeletal:      Cervical back: Normal range of motion.      Right lower leg: No edema.      Left lower leg: No edema.   Skin:     General: Skin is warm.      Coloration: Skin is not jaundiced.   Neurological:      Mental Status: He is alert and oriented to person, place, and time. Mental status is at baseline.      Gait: Gait normal.   Psychiatric:         Thought Content: Thought content normal.       Labs: I have reviewed the following labs:  Lab Results   Component Value Date/Time    WBC 6.47 02/26/2025 10:30 AM    RBC 4.14 02/26/2025 10:30 AM    Hemoglobin 8.6 (L) 02/26/2025 10:30 AM    Hematocrit 30.0 (L) 02/26/2025 10:30 AM    MCV 73 (L) 02/26/2025 10:30 AM    MCH 20.8 (L) 02/26/2025 10:30 AM    RDW 18.1 (H) 02/26/2025 10:30 AM    Platelets 223 02/26/2025 10:30 AM    Segmented % 66 02/26/2025 10:30 AM    Lymphocytes % 17 02/26/2025 10:30 AM    Monocytes % 12 02/26/2025 10:30 AM    Eosinophils Relative 3 02/26/2025 10:30 AM    Basophils Relative 1 02/26/2025 10:30 AM    Immature Grans % 1 02/26/2025 10:30 AM    Absolute Neutrophils 4.32 02/26/2025 10:30 AM     Lab Results   Component Value Date/Time    Potassium 4.4 02/26/2025 10:30 AM    Chloride 104 02/26/2025 10:30 AM    CO2 26 02/26/2025 10:30 AM    BUN 28 (H) 02/26/2025 10:30 AM    Creatinine 1.35 (H) 02/26/2025 10:30 AM    Glucose, Fasting 143 (H) 01/02/2025 11:44 AM    Calcium 7.7 (L) 02/26/2025 10:30 AM    Corrected Calcium 7.9 (L) 12/09/2024 05:32 AM    AST 11 (L) 02/26/2025 10:30 AM    ALT 7 02/26/2025 10:30 AM    Alkaline Phosphatase 75 02/26/2025 10:30 AM    Total Protein 6.3 (L) 02/26/2025 10:30 AM    Albumin 3.7 02/26/2025 10:30 AM    Total Bilirubin 0.32 02/26/2025 10:30 AM    eGFR 46 02/26/2025 10:30 AM          10/15/2024: CT abd/pelv:   Pancreatic mass with evidence of secondary bile duct obstruction. Most commonly adenocarcinoma. Further evaluation with pancreas MR and MRCP recommended.   Small indeterminate left renal nodule, may be a complicated cyst. This can also be evaluated with MR without and with IV contrast.   Other nonemergent findings above.     10/16/2024: MRCP:   Limited by motion artifact and lack of IV contrast.   Enlarged pancreatic head measuring 4.7 x 2.7 cm, #2/27, likely harboring an underlying infiltrative pancreatic head mass. The remainder of the pancreatic parenchyma is atrophic.  Intra/extrahepatic biliary dilation and pancreatic ductal dilation with caliber changes of these ducts at the site of suspected pancreatic head mass.     10/16/2024: EGD:  Single ulcer in the ampullary region with clean base (John III)  Edematous, erythematous mucosa with erosion in the body of the stomach; performed cold forceps biopsy  3 cm type I hiatal hernia  The upper third of the esophagus, middle third of the esophagus and lower third of the esophagus appeared normal.  The duodenal bulb and 1st part of the duodenum appeared normal.  Ulcerated area at the ampulla is likely tumor protruding through the ampulla     10/16/2024: EUS:  Heterogeneous and hypoechoic mass measuring 46 mm x 30 mm with well-defined margins was visualized in the head of the pancreas; 4 fine needle biopsy passes were taken. An adequate sample was obtained. Cytology analysis was performed. Onsite cytologist was present  There were no cysts in the pancreas. There were no lesions in the examined part of the liver. There was a gallstone in the gallbladder. The CBD was dilated to 10mm and PD was dilated to 8mm. There were no enlarged lymph nodes in the peripancreatic, gastrohepatic, or celiac areas.    3/7/2025: CT CAP:   1. No interval change since previous study. Stable 3 mm subpleural nodule in the right lower lobe. No thoracic  lymphadenopathy. No suspicious pulmonary lesions. Mild distal esophageal wall thickening which can be seen in reflux disease.     2. Stable desmoplastic mass in the pancreatic head with resultant atrophy of the pancreatic body and tail and upstream ductal dilation. No evidence of vascular invasion or hepatic metastatic disease. No enlarged abdominal or pelvic lymph nodes. No peritoneal lesions.     3. Cholelithiasis.     4. Prostate hypertrophy. Diffuse bladder wall thickening with Mccormack catheter in place.

## 2025-03-09 NOTE — ASSESSMENT & PLAN NOTE
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  Hospitalization for fatigue, weight loss and GI bleeding in October 2024.  He was initially found to have a hemoglobin of 4.5 g/deciliter requiring PRBC transfusion and IV iron.  Imaging revealed a pancreatic head mass.  He underwent EUS pancreatic head mass biopsy consistent with pancreatic neuroendocrine tumor with.  He has been evaluated by surgical oncology (Dr. Rodriguez), but declined surgery.  Recent hospitalization in December and found to have intrahepatic bile artery dilation with distended gallbladder.  EGD on 12/6/2024 showed single large ulcer in the duodenum with clean base.  He received palliative radiation to the pancreatic mass.     He has well-differentiated neuroendocrine tumor of the head of the pancreas with no obvious metastatic disease.  He declined surgery and has received palliative radiation to the pancreatic mass.  He is currently receiving systemic treatment with lanreotide since 1/16/2025.  He will continue treatment.  Will continue to follow clinically, with labs and imaging (CT chest/abdomen/pelvis) already ordered by surgical oncology.  He continues to follow with Dr. Rodriguez.    CT chest/abdomen/pelvis for March 2025 with no interval change, stable 3 mm subpleural nodule in the right lower lobe, no thoracic lymphadenopathy.  Stable desmoplastic mass in the pancreatic head. No evidence of vascular invasion or hepatic metastatic disease. No enlarged abdominal or pelvic lymph nodes. No peritoneal lesions.  Patient continues to do well clinically.  He will continue treatment with lanreotide for now.  Will continue to monitor CBC and CMP (will change interval to 3 weeks).  Discussed with patient and his daughter.      Treatment plan: Lanreotide 120 mg every 4 weeks.  Will monitor CBC and CMP.  Started 1/16/2025.    Orders:    CBC and differential; Standing    Comprehensive metabolic panel; Standing

## 2025-03-09 NOTE — ASSESSMENT & PLAN NOTE
Hemoglobin remained stable.    Orders:    CBC and differential; Standing    Comprehensive metabolic panel; Standing

## 2025-03-10 ENCOUNTER — OFFICE VISIT (OUTPATIENT)
Dept: HEMATOLOGY ONCOLOGY | Facility: CLINIC | Age: OVER 89
End: 2025-03-10
Payer: MEDICARE

## 2025-03-10 VITALS
TEMPERATURE: 98.2 F | OXYGEN SATURATION: 98 % | WEIGHT: 146 LBS | RESPIRATION RATE: 18 BRPM | HEIGHT: 69 IN | DIASTOLIC BLOOD PRESSURE: 72 MMHG | SYSTOLIC BLOOD PRESSURE: 160 MMHG | BODY MASS INDEX: 21.62 KG/M2 | HEART RATE: 97 BPM

## 2025-03-10 DIAGNOSIS — D56.3 BETA THALASSEMIA MINOR: ICD-10-CM

## 2025-03-10 DIAGNOSIS — D3A.8 PRIMARY PANCREATIC NEUROENDOCRINE TUMOR: Primary | ICD-10-CM

## 2025-03-10 DIAGNOSIS — C25.9 MALIGNANT NEOPLASM OF PANCREAS, UNSPECIFIED LOCATION OF MALIGNANCY (HCC): ICD-10-CM

## 2025-03-10 DIAGNOSIS — D50.0 IRON DEFICIENCY ANEMIA DUE TO CHRONIC BLOOD LOSS: ICD-10-CM

## 2025-03-10 DIAGNOSIS — N18.32 CHRONIC KIDNEY DISEASE, STAGE 3B (HCC): ICD-10-CM

## 2025-03-10 PROCEDURE — 99214 OFFICE O/P EST MOD 30 MIN: CPT | Performed by: INTERNAL MEDICINE

## 2025-03-12 ENCOUNTER — APPOINTMENT (OUTPATIENT)
Dept: LAB | Facility: HOSPITAL | Age: OVER 89
End: 2025-03-12
Payer: MEDICARE

## 2025-03-12 DIAGNOSIS — D50.8 OTHER IRON DEFICIENCY ANEMIA: ICD-10-CM

## 2025-03-12 DIAGNOSIS — N18.32 CHRONIC KIDNEY DISEASE, STAGE 3B (HCC): ICD-10-CM

## 2025-03-12 DIAGNOSIS — D3A.8 PRIMARY PANCREATIC NEUROENDOCRINE TUMOR: ICD-10-CM

## 2025-03-12 DIAGNOSIS — D62 ACUTE BLOOD LOSS ANEMIA: ICD-10-CM

## 2025-03-12 LAB
BASOPHILS # BLD AUTO: 0.08 THOUSANDS/ÂΜL (ref 0–0.1)
BASOPHILS NFR BLD AUTO: 1 % (ref 0–1)
EOSINOPHIL # BLD AUTO: 0.27 THOUSAND/ÂΜL (ref 0–0.61)
EOSINOPHIL NFR BLD AUTO: 3 % (ref 0–6)
ERYTHROCYTE [DISTWIDTH] IN BLOOD BY AUTOMATED COUNT: 17.6 % (ref 11.6–15.1)
HCT VFR BLD AUTO: 28.4 % (ref 36.5–49.3)
HGB BLD-MCNC: 8.2 G/DL (ref 12–17)
IMM GRANULOCYTES # BLD AUTO: 0.06 THOUSAND/UL (ref 0–0.2)
IMM GRANULOCYTES NFR BLD AUTO: 1 % (ref 0–2)
LYMPHOCYTES # BLD AUTO: 1.14 THOUSANDS/ÂΜL (ref 0.6–4.47)
LYMPHOCYTES NFR BLD AUTO: 13 % (ref 14–44)
MCH RBC QN AUTO: 20.5 PG (ref 26.8–34.3)
MCHC RBC AUTO-ENTMCNC: 28.9 G/DL (ref 31.4–37.4)
MCV RBC AUTO: 71 FL (ref 82–98)
MONOCYTES # BLD AUTO: 0.78 THOUSAND/ÂΜL (ref 0.17–1.22)
MONOCYTES NFR BLD AUTO: 9 % (ref 4–12)
NEUTROPHILS # BLD AUTO: 6.15 THOUSANDS/ÂΜL (ref 1.85–7.62)
NEUTS SEG NFR BLD AUTO: 73 % (ref 43–75)
NRBC BLD AUTO-RTO: 0 /100 WBCS
PLATELET # BLD AUTO: 204 THOUSANDS/UL (ref 149–390)
PMV BLD AUTO: 10.4 FL (ref 8.9–12.7)
RBC # BLD AUTO: 4 MILLION/UL (ref 3.88–5.62)
WBC # BLD AUTO: 8.48 THOUSAND/UL (ref 4.31–10.16)

## 2025-03-12 PROCEDURE — 85025 COMPLETE CBC W/AUTO DIFF WBC: CPT

## 2025-03-12 PROCEDURE — 36415 COLL VENOUS BLD VENIPUNCTURE: CPT

## 2025-03-19 ENCOUNTER — TELEPHONE (OUTPATIENT)
Age: OVER 89
End: 2025-03-19

## 2025-03-19 NOTE — TELEPHONE ENCOUNTER
Called pt to inquire about the my chart message. Talked to his daughter, Parul, who reported confusion with text messages about changing pts appt, believes that the pt is on a wait list. Parul would like to be removed from the wait list. Parul wanted to confirm that the pt is still on schedule for 04/7 and 05/13. Aware that it is still scheduled.

## 2025-03-21 ENCOUNTER — HOSPITAL ENCOUNTER (OUTPATIENT)
Dept: INFUSION CENTER | Facility: CLINIC | Age: OVER 89
Discharge: HOME | End: 2025-03-21
Payer: MEDICARE

## 2025-03-21 DIAGNOSIS — D3A.8 PRIMARY PANCREATIC NEUROENDOCRINE TUMOR: Primary | ICD-10-CM

## 2025-03-21 PROCEDURE — 96372 THER/PROPH/DIAG INJ SC/IM: CPT

## 2025-03-21 RX ORDER — LANREOTIDE ACETATE 120 MG/.5ML
120 INJECTION SUBCUTANEOUS ONCE
Status: COMPLETED | OUTPATIENT
Start: 2025-03-21 | End: 2025-03-21

## 2025-03-21 RX ORDER — LANREOTIDE ACETATE 120 MG/.5ML
120 INJECTION SUBCUTANEOUS ONCE
OUTPATIENT
Start: 2025-04-18

## 2025-03-21 RX ADMIN — LANREOTIDE ACETATE 120 MG: 120 INJECTION SUBCUTANEOUS at 12:27

## 2025-03-21 NOTE — PROGRESS NOTES
Jose Zamora  tolerated lanreotide injection in R buttock well with no complications.      Jose Zamora is aware of future appt on 4/18 at 10am.     AVS provided    Left unit in stable condition.

## 2025-03-25 ENCOUNTER — OFFICE VISIT (OUTPATIENT)
Dept: SURGICAL ONCOLOGY | Facility: CLINIC | Age: OVER 89
End: 2025-03-25
Payer: MEDICARE

## 2025-03-25 ENCOUNTER — TELEPHONE (OUTPATIENT)
Dept: SURGICAL ONCOLOGY | Facility: CLINIC | Age: OVER 89
End: 2025-03-25

## 2025-03-25 VITALS
HEART RATE: 95 BPM | HEIGHT: 69 IN | RESPIRATION RATE: 16 BRPM | DIASTOLIC BLOOD PRESSURE: 74 MMHG | OXYGEN SATURATION: 96 % | WEIGHT: 141 LBS | TEMPERATURE: 97.8 F | SYSTOLIC BLOOD PRESSURE: 158 MMHG | BODY MASS INDEX: 20.88 KG/M2

## 2025-03-25 DIAGNOSIS — D3A.8 PRIMARY PANCREATIC NEUROENDOCRINE TUMOR: Primary | ICD-10-CM

## 2025-03-25 DIAGNOSIS — I10 PRIMARY HYPERTENSION: ICD-10-CM

## 2025-03-25 DIAGNOSIS — D64.9 SYMPTOMATIC ANEMIA: ICD-10-CM

## 2025-03-25 DIAGNOSIS — E03.9 PRIMARY HYPOTHYROIDISM: ICD-10-CM

## 2025-03-25 PROCEDURE — G2211 COMPLEX E/M VISIT ADD ON: HCPCS

## 2025-03-25 PROCEDURE — 99213 OFFICE O/P EST LOW 20 MIN: CPT

## 2025-03-25 NOTE — TELEPHONE ENCOUNTER
Called patient and spoke to daughter (maxine). I stated the Dr. Rodriguez was sent to the OR last minute this morning and we will need to reschedule patients appointment. Maxine was not happy as she has been driving since 5:30 this morning from [place] to  her dad and bring him to the appointment. She stated she understood the emergency and would like to reschedule patient about a month out because of her job. She asked me to reschedule the appointment and she will check his My Chart and will call us back if that appointment does not work for her.

## 2025-03-25 NOTE — ASSESSMENT & PLAN NOTE
Recent imaging demonstrates stability of the pancreatic head mass at 3.4 x 3.1 cm , as well as stability of the cystic lesion adjacent to the pancreatic head at 1.7 x 1.7cm. He continues on lanreotide without incident and remains asymptomatic.  He is not interested in surgical resection. I have discussed the interval for surveillance moving forward.  In the absence of new symptoms, we will plan to repeat CT in 6 months, and he will see Dr Rodriguez at that time for another clinical exam.  I have encouraged him to focus on eating calorie-dense foods to avoid any further weight loss.  Should this continue, however, we could discuss options for appetite stimulation.  He is agreeable to the plan, all questions were answered today.  Orders:  •  CT chest abdomen pelvis w contrast; Future  •  BUN; Future  •  Creatinine, serum; Future

## 2025-03-25 NOTE — LETTER
2025     Nba Rodriguez MD  [address]  [address]  [facility] [address]    Patient: Jose Zamora   YOB: 1935   Date of Visit: 3/25/2025       Dear Dr. Rodriguez:    Thank you for referring Jose Zamora to me for evaluation. Below are my notes for this consultation.    If you have questions, please do not hesitate to call me. I look forward to following your patient along with you.         Sincerely,        KAREL Beckford        CC: No Recipients    KAREL Beckford  3/25/2025 11:22 AM  Sign when Signing Visit  Name: Jose Zamora      : 1935      MRN: [id number]  Encounter Provider: KAREL Beckford  Encounter Date: 3/25/2025   Encounter department[facility]  :  Assessment & Plan  Primary pancreatic neuroendocrine tumor  Recent imaging demonstrates stability of the pancreatic head mass at 3.4 x 3.1 cm , as well as stability of the cystic lesion adjacent to the pancreatic head at 1.7 x 1.7cm. He continues on lanreotide without incident and remains asymptomatic.  He is not interested in surgical resection. I have discussed the interval for surveillance moving forward.  In the absence of new symptoms, we will plan to repeat CT in 6 months, and he will see Dr Rodriguez at that time for another clinical exam.  I have encouraged him to focus on eating calorie-dense foods to avoid any further weight loss.  Should this continue, however, we could discuss options for appetite stimulation.  He is agreeable to the plan, all questions were answered today.  Orders:  •  CT chest abdomen pelvis w contrast; Future  •  BUN; Future  •  Creatinine, serum; Future        History of Present Illness  Chief Complaint   Patient presents with   • office visit     Return in about 6 months (around 2025) for Office visit with Dr Rodriguez, Imaging - See orders.    Pertinent Medical History  This is an 89 y/o male who presents to the office  today in follow-up for a neuroendocrine tumor of the pancreas, accompanied by his daughter.  He is currently maintained on Lanreotide as he would prefer not to have surgical resection.  He reports he feels very well and remains active.  He denies any abdominal pain, bloating, N/V/D or jaundice. He does endorse some temporary stool changes after each treatment. He has lost 14 pounds in the last 4 months, but attributes that to the stress of caring for his wife.  CT scan was performed on March 7, and I have discussed the results with him today.      Oncology History  Cancer Staging   Primary pancreatic neuroendocrine tumor  Staging form: Neuroendocrine Tumor - Pancreas, AJCC V9  - Clinical: Stage II (cT3, cN0, cM0) - Signed by Nba Rodriguez MD on 10/24/2024  Oncology History   Primary pancreatic neuroendocrine tumor   10/17/2024 Biopsy    Endoscopic ultrasound  - Pancreas, Head Mass, FNA:  Positive for malignancy.  - Pancreatic neuroendocrine tumor (PanNET), favor at least well-differentiated neuroendocrine tumor, WHO grade 2 of 3, in this limited sample.     10/24/2024 -  Cancer Staged    Staging form: Neuroendocrine Tumor - Pancreas, AJCC V9  - Clinical: Stage II (cT3, cN0, cM0) - Signed by Nba Rodriguez MD on 10/24/2024           Review of Systems A complete review of systems is negative other than that noted above in the HPI.       Current Outpatient Medications   Medication Sig Dispense Refill   • amLODIPine (NORVASC) 5 mg tablet Take 1 tablet (5 mg total) by mouth daily 30 tablet 0   • atorvastatin (LIPITOR) 20 mg tablet Take 1 tablet by mouth daily     • cholecalciferol (VITAMIN D3) 1,000 units tablet Take 1,000 Units by mouth daily     • fexofenadine (Allegra Allergy) 180 MG tablet Take 180 mg by mouth daily as needed (allergies)     • finasteride (PROSCAR) 5 mg tablet TAKE 1 TABLET (5 MG TOTAL) BY MOUTH DAILY. 30 tablet 5   • folic acid (FOLVITE) 1 mg tablet Take 1 tablet by mouth daily     • levothyroxine 50  "mcg tablet TAKE 1 TABLET BY MOUTH EVERY DAY 90 tablet 1   • metoprolol tartrate (LOPRESSOR) 25 mg tablet Take 0.5 tablets (12.5 mg total) by mouth 2 (two) times a day 90 tablet 1   • montelukast (SINGULAIR) 10 mg tablet Take 10 mg by mouth daily at bedtime     • pantoprazole (PROTONIX) 40 mg tablet Take 1 tablet (40 mg total) by mouth 2 (two) times a day before meals 180 tablet 1   • Potassium 99 MG TABS Take 1 tablet by mouth daily     • Saw [place] 160 MG CAPS Take 1 capsule by mouth daily     • sulfaSALAzine (AZULFIDINE) 500 mg tablet Take 500 mg by mouth 4 (four) times a day  0     No current facility-administered medications for this visit.      Objective  /74 (Patient Position: Sitting, Cuff Size: Standard)   Pulse 95   Temp 97.8 °F (36.6 °C) (Temporal)   Resp 16   Ht 5' 9\" (1.753 m)   Wt 64 kg (141 lb)   SpO2 96%   BMI 20.82 kg/m²     Pain Screening:     ECOG    Physical Exam  Vitals reviewed.   Constitutional:       General: He is not in acute distress.     Appearance: Normal appearance. He is normal weight. He is not ill-appearing or toxic-appearing.   HENT:      Head: Normocephalic and atraumatic.   Eyes:      General: No scleral icterus.  Cardiovascular:      Rate and Rhythm: Normal rate.   Pulmonary:      Effort: Pulmonary effort is normal.   Abdominal:      General: Abdomen is flat.      Palpations: Abdomen is soft.   Musculoskeletal:         General: Normal range of motion.      Cervical back: Normal range of motion and neck supple.   Skin:     General: Skin is warm and dry.      Coloration: Skin is not jaundiced.   Neurological:      General: No focal deficit present.      Mental Status: He is alert and oriented to person, place, and time.   Psychiatric:         Mood and Affect: Mood normal.         Behavior: Behavior normal.         Thought Content: Thought content normal.         Judgment: Judgment normal.            Radiology Results Review: I have reviewed radiology reports from " 3/7/25 including: CT chest and CT abdomen/pelvis.    CT chest abdomen pelvis w contrast  Narrative & Impression      CT CHEST, ABDOMEN AND PELVIS WITH IV CONTRAST     INDICATION:   Other benign neuroendocrine tumors. .     COMPARISON: CT chest dated 12/5/2024 and CT abdomen and pelvis dated 10/22/2024.     TECHNIQUE: CT examination of the chest, abdomen and pelvis was performed. Multiplanar 2D reformatted images were created from the source data.     This examination, like all CT scans performed in the [facility], was performed utilizing techniques to minimize radiation dose exposure, including the use of iterative reconstruction and automated exposure control. Radiation dose length   product (DLP) for this visit:     IV Contrast: 100 cc of Omnipaque 350  Enteric Contrast: Enteric contrast was administered.     FINDINGS:     CHEST     LUNGS: Biapical pleural-parenchymal scarring. Bilateral lower lobe subsegmental atelectasis. Stable 3 mm subpleural nodule in the right lower lobe image 147 series 5. No new pulmonary lesions. There is no tracheal or endobronchial lesion.     PLEURA:  Unremarkable.     HEART/GREAT VESSELS: Heart is unremarkable for patient's age.  No thoracic aortic aneurysm. Moderate aortic atherosclerotic calcification. Coronary artery calcification.     MEDIASTINUM AND DELIA: Mild distal esophageal wall thickening. No mediastinal or hilar lymphadenopathy. No axillary lymphadenopathy.     CHEST WALL AND LOWER NECK: No chest wall mass..     ABDOMEN     LIVER/BILIARY TREE:  Unremarkable.     GALLBLADDER:  There are gallstone(s) within the gallbladder, without pericholecystic inflammatory changes.     SPLEEN: Splenomegaly measuring 13.3 cm.     PANCREAS: Stable desmoplastic mass in the pancreatic head measuring 3.4 x 3.1 cm with atrophy of the pancreatic body and tail and upstream ductal dilation best seen on image 60 series 3. Stable thick walled cystic lesion abutting the pancreatic  head   measures 1.7 x 1.7 cm image 53 series 3.. No evidence of vascular invasion.     ADRENAL GLANDS: Stable thickened adrenal glands.     KIDNEYS/URETERS:  One or more simple renal cyst(s) is noted.  Otherwise unremarkable kidneys.  No hydronephrosis.     STOMACH AND BOWEL:  There is colonic diverticulosis without evidence of acute diverticulitis.     APPENDIX:  No findings to suggest appendicitis.     ABDOMINOPELVIC CAVITY:  No ascites.  No pneumoperitoneum.  No lymphadenopathy.     VESSELS:  Atherosclerotic changes are present.  No evidence of aneurysm.     PELVIS     REPRODUCTIVE ORGANS: The prostate is enlarged.     URINARY BLADDER: Diffuse bladder wall thickening. Mccormack catheter in the bladder..     ABDOMINAL WALL/INGUINAL REGIONS:  Unremarkable.     OSSEOUS STRUCTURES:  No acute fracture or destructive osseous lesion.  Spinal degenerative changes are noted.     IMPRESSION:     1. No interval change since previous study. Stable 3 mm subpleural nodule in the right lower lobe. No thoracic lymphadenopathy. No suspicious pulmonary lesions. Mild distal esophageal wall thickening which can be seen in reflux disease.     2. Stable desmoplastic mass in the pancreatic head with resultant atrophy of the pancreatic body and tail and upstream ductal dilation. No evidence of vascular invasion or hepatic metastatic disease. No enlarged abdominal or pelvic lymph nodes. No   peritoneal lesions.     3. Cholelithiasis.     4. Prostate hypertrophy. Diffuse bladder wall thickening with Mccormack catheter in place.

## 2025-03-25 NOTE — PROGRESS NOTES
Name: Jose Zamora      : 1935      MRN: [id number]  Encounter Provider: KAREL Beckford  Encounter Date: 3/25/2025   Encounter department: [facility] [place]  :  Assessment & Plan  Primary pancreatic neuroendocrine tumor  Recent imaging demonstrates stability of the pancreatic head mass at 3.4 x 3.1 cm , as well as stability of the cystic lesion adjacent to the pancreatic head at 1.7 x 1.7cm. He continues on lanreotide without incident and remains asymptomatic.  He is not interested in surgical resection. I have discussed the interval for surveillance moving forward.  In the absence of new symptoms, we will plan to repeat CT in 6 months, and he will see Dr Rodriguez at that time for another clinical exam.  I have encouraged him to focus on eating calorie-dense foods to avoid any further weight loss.  Should this continue, however, we could discuss options for appetite stimulation.  He is agreeable to the plan, all questions were answered today.  Orders:  •  CT chest abdomen pelvis w contrast; Future  •  BUN; Future  •  Creatinine, serum; Future        History of Present Illness   Chief Complaint   Patient presents with   • office visit     Return in about 6 months (around 2025) for Office visit with Dr Rodriguez, Imaging - See orders.    Pertinent Medical History   This is an 88 y/o male who presents to the office today in follow-up for a neuroendocrine tumor of the pancreas, accompanied by his daughter.  He is currently maintained on Lanreotide as he would prefer not to have surgical resection.  He reports he feels very well and remains active.  He denies any abdominal pain, bloating, N/V/D or jaundice. He does endorse some temporary stool changes after each treatment. He has lost 14 pounds in the last 4 months, but attributes that to the stress of caring for his wife.  CT scan was performed on , and I have discussed the results with him today.        Oncology History   Cancer Staging   Primary pancreatic neuroendocrine tumor  Staging form: Neuroendocrine Tumor - Pancreas, AJCC V9  - Clinical: Stage II (cT3, cN0, cM0) - Signed by Nba Rodriguez MD on 10/24/2024  Oncology History   Primary pancreatic neuroendocrine tumor   10/17/2024 Biopsy    Endoscopic ultrasound  - Pancreas, Head Mass, FNA:  Positive for malignancy.  - Pancreatic neuroendocrine tumor (PanNET), favor at least well-differentiated neuroendocrine tumor, WHO grade 2 of 3, in this limited sample.     10/24/2024 -  Cancer Staged    Staging form: Neuroendocrine Tumor - Pancreas, AJCC V9  - Clinical: Stage II (cT3, cN0, cM0) - Signed by Nba Rodriguez MD on 10/24/2024            Review of Systems A complete review of systems is negative other than that noted above in the HPI.       Current Outpatient Medications   Medication Sig Dispense Refill   • amLODIPine (NORVASC) 5 mg tablet Take 1 tablet (5 mg total) by mouth daily 30 tablet 0   • atorvastatin (LIPITOR) 20 mg tablet Take 1 tablet by mouth daily     • cholecalciferol (VITAMIN D3) 1,000 units tablet Take 1,000 Units by mouth daily     • fexofenadine (Allegra Allergy) 180 MG tablet Take 180 mg by mouth daily as needed (allergies)     • finasteride (PROSCAR) 5 mg tablet TAKE 1 TABLET (5 MG TOTAL) BY MOUTH DAILY. 30 tablet 5   • folic acid (FOLVITE) 1 mg tablet Take 1 tablet by mouth daily     • levothyroxine 50 mcg tablet TAKE 1 TABLET BY MOUTH EVERY DAY 90 tablet 1   • metoprolol tartrate (LOPRESSOR) 25 mg tablet Take 0.5 tablets (12.5 mg total) by mouth 2 (two) times a day 90 tablet 1   • montelukast (SINGULAIR) 10 mg tablet Take 10 mg by mouth daily at bedtime     • pantoprazole (PROTONIX) 40 mg tablet Take 1 tablet (40 mg total) by mouth 2 (two) times a day before meals 180 tablet 1   • Potassium 99 MG TABS Take 1 tablet by mouth daily     • Saw [place] 160 MG CAPS Take 1 capsule by mouth daily     • sulfaSALAzine (AZULFIDINE) 500 mg tablet Take  "500 mg by mouth 4 (four) times a day  0     No current facility-administered medications for this visit.      Objective   /74 (Patient Position: Sitting, Cuff Size: Standard)   Pulse 95   Temp 97.8 °F (36.6 °C) (Temporal)   Resp 16   Ht 5' 9\" (1.753 m)   Wt 64 kg (141 lb)   SpO2 96%   BMI 20.82 kg/m²     Pain Screening:     ECOG    Physical Exam  Vitals reviewed.   Constitutional:       General: He is not in acute distress.     Appearance: Normal appearance. He is normal weight. He is not ill-appearing or toxic-appearing.   HENT:      Head: Normocephalic and atraumatic.   Eyes:      General: No scleral icterus.  Cardiovascular:      Rate and Rhythm: Normal rate.   Pulmonary:      Effort: Pulmonary effort is normal.   Abdominal:      General: Abdomen is flat.      Palpations: Abdomen is soft.   Musculoskeletal:         General: Normal range of motion.      Cervical back: Normal range of motion and neck supple.   Skin:     General: Skin is warm and dry.      Coloration: Skin is not jaundiced.   Neurological:      General: No focal deficit present.      Mental Status: He is alert and oriented to person, place, and time.   Psychiatric:         Mood and Affect: Mood normal.         Behavior: Behavior normal.         Thought Content: Thought content normal.         Judgment: Judgment normal.            Radiology Results Review: I have reviewed radiology reports from 3/7/25 including: CT chest and CT abdomen/pelvis.    CT chest abdomen pelvis w contrast  Narrative & Impression      CT CHEST, ABDOMEN AND PELVIS WITH IV CONTRAST     INDICATION:   Other benign neuroendocrine tumors. .     COMPARISON: CT chest dated 12/5/2024 and CT abdomen and pelvis dated 10/22/2024.     TECHNIQUE: CT examination of the chest, abdomen and pelvis was performed. Multiplanar 2D reformatted images were created from the source data.     This examination, like all CT scans performed in the [facility], was performed " utilizing techniques to minimize radiation dose exposure, including the use of iterative reconstruction and automated exposure control. Radiation dose length   product (DLP) for this visit:     IV Contrast: 100 cc of Omnipaque 350  Enteric Contrast: Enteric contrast was administered.     FINDINGS:     CHEST     LUNGS: Biapical pleural-parenchymal scarring. Bilateral lower lobe subsegmental atelectasis. Stable 3 mm subpleural nodule in the right lower lobe image 147 series 5. No new pulmonary lesions. There is no tracheal or endobronchial lesion.     PLEURA:  Unremarkable.     HEART/GREAT VESSELS: Heart is unremarkable for patient's age.  No thoracic aortic aneurysm. Moderate aortic atherosclerotic calcification. Coronary artery calcification.     MEDIASTINUM AND DELIA: Mild distal esophageal wall thickening. No mediastinal or hilar lymphadenopathy. No axillary lymphadenopathy.     CHEST WALL AND LOWER NECK: No chest wall mass..     ABDOMEN     LIVER/BILIARY TREE:  Unremarkable.     GALLBLADDER:  There are gallstone(s) within the gallbladder, without pericholecystic inflammatory changes.     SPLEEN: Splenomegaly measuring 13.3 cm.     PANCREAS: Stable desmoplastic mass in the pancreatic head measuring 3.4 x 3.1 cm with atrophy of the pancreatic body and tail and upstream ductal dilation best seen on image 60 series 3. Stable thick walled cystic lesion abutting the pancreatic head   measures 1.7 x 1.7 cm image 53 series 3.. No evidence of vascular invasion.     ADRENAL GLANDS: Stable thickened adrenal glands.     KIDNEYS/URETERS:  One or more simple renal cyst(s) is noted.  Otherwise unremarkable kidneys.  No hydronephrosis.     STOMACH AND BOWEL:  There is colonic diverticulosis without evidence of acute diverticulitis.     APPENDIX:  No findings to suggest appendicitis.     ABDOMINOPELVIC CAVITY:  No ascites.  No pneumoperitoneum.  No lymphadenopathy.     VESSELS:  Atherosclerotic changes are present.  No evidence of  aneurysm.     PELVIS     REPRODUCTIVE ORGANS: The prostate is enlarged.     URINARY BLADDER: Diffuse bladder wall thickening. Mccormack catheter in the bladder..     ABDOMINAL WALL/INGUINAL REGIONS:  Unremarkable.     OSSEOUS STRUCTURES:  No acute fracture or destructive osseous lesion.  Spinal degenerative changes are noted.     IMPRESSION:     1. No interval change since previous study. Stable 3 mm subpleural nodule in the right lower lobe. No thoracic lymphadenopathy. No suspicious pulmonary lesions. Mild distal esophageal wall thickening which can be seen in reflux disease.     2. Stable desmoplastic mass in the pancreatic head with resultant atrophy of the pancreatic body and tail and upstream ductal dilation. No evidence of vascular invasion or hepatic metastatic disease. No enlarged abdominal or pelvic lymph nodes. No   peritoneal lesions.     3. Cholelithiasis.     4. Prostate hypertrophy. Diffuse bladder wall thickening with Mccormack catheter in place.

## 2025-03-26 ENCOUNTER — APPOINTMENT (OUTPATIENT)
Dept: LAB | Facility: HOSPITAL | Age: OVER 89
End: 2025-03-26
Payer: MEDICARE

## 2025-03-26 DIAGNOSIS — N18.32 CHRONIC KIDNEY DISEASE, STAGE 3B (HCC): ICD-10-CM

## 2025-03-26 DIAGNOSIS — D3A.8 PRIMARY PANCREATIC NEUROENDOCRINE TUMOR: ICD-10-CM

## 2025-03-26 DIAGNOSIS — D50.8 OTHER IRON DEFICIENCY ANEMIA: ICD-10-CM

## 2025-03-26 DIAGNOSIS — D62 ACUTE BLOOD LOSS ANEMIA: ICD-10-CM

## 2025-03-26 LAB
BASOPHILS # BLD AUTO: 0.07 THOUSANDS/ÂΜL (ref 0–0.1)
BASOPHILS NFR BLD AUTO: 1 % (ref 0–1)
EOSINOPHIL # BLD AUTO: 0.16 THOUSAND/ÂΜL (ref 0–0.61)
EOSINOPHIL NFR BLD AUTO: 2 % (ref 0–6)
ERYTHROCYTE [DISTWIDTH] IN BLOOD BY AUTOMATED COUNT: 18 % (ref 11.6–15.1)
HCT VFR BLD AUTO: 28.4 % (ref 36.5–49.3)
HGB BLD-MCNC: 8.4 G/DL (ref 12–17)
IMM GRANULOCYTES # BLD AUTO: 0.05 THOUSAND/UL (ref 0–0.2)
IMM GRANULOCYTES NFR BLD AUTO: 1 % (ref 0–2)
LYMPHOCYTES # BLD AUTO: 0.87 THOUSANDS/ÂΜL (ref 0.6–4.47)
LYMPHOCYTES NFR BLD AUTO: 10 % (ref 14–44)
MCH RBC QN AUTO: 20.7 PG (ref 26.8–34.3)
MCHC RBC AUTO-ENTMCNC: 29.6 G/DL (ref 31.4–37.4)
MCV RBC AUTO: 70 FL (ref 82–98)
MONOCYTES # BLD AUTO: 0.73 THOUSAND/ÂΜL (ref 0.17–1.22)
MONOCYTES NFR BLD AUTO: 9 % (ref 4–12)
NEUTROPHILS # BLD AUTO: 6.65 THOUSANDS/ÂΜL (ref 1.85–7.62)
NEUTS SEG NFR BLD AUTO: 77 % (ref 43–75)
NRBC BLD AUTO-RTO: 0 /100 WBCS
PLATELET # BLD AUTO: 242 THOUSANDS/UL (ref 149–390)
PMV BLD AUTO: 10.7 FL (ref 8.9–12.7)
RBC # BLD AUTO: 4.06 MILLION/UL (ref 3.88–5.62)
WBC # BLD AUTO: 8.53 THOUSAND/UL (ref 4.31–10.16)

## 2025-03-26 PROCEDURE — 36415 COLL VENOUS BLD VENIPUNCTURE: CPT

## 2025-03-27 ENCOUNTER — TELEPHONE (OUTPATIENT)
Dept: LAB | Facility: HOSPITAL | Age: OVER 89
End: 2025-03-27

## 2025-03-27 ENCOUNTER — HOME CARE VISIT (OUTPATIENT)
Dept: HOME HEALTH SERVICES | Facility: HOME HEALTHCARE | Age: OVER 89
End: 2025-03-27
Payer: MEDICARE

## 2025-03-27 ENCOUNTER — OFFICE VISIT (OUTPATIENT)
Age: OVER 89
End: 2025-03-27
Payer: MEDICARE

## 2025-03-27 VITALS
HEART RATE: 84 BPM | DIASTOLIC BLOOD PRESSURE: 70 MMHG | HEIGHT: 69 IN | OXYGEN SATURATION: 96 % | WEIGHT: 143 LBS | SYSTOLIC BLOOD PRESSURE: 138 MMHG | TEMPERATURE: 98.3 F | BODY MASS INDEX: 21.18 KG/M2

## 2025-03-27 DIAGNOSIS — F32.1 CURRENT MODERATE EPISODE OF MAJOR DEPRESSIVE DISORDER WITHOUT PRIOR EPISODE (HCC): Primary | ICD-10-CM

## 2025-03-27 PROCEDURE — 99213 OFFICE O/P EST LOW 20 MIN: CPT | Performed by: FAMILY MEDICINE

## 2025-03-27 PROCEDURE — G2211 COMPLEX E/M VISIT ADD ON: HCPCS | Performed by: FAMILY MEDICINE

## 2025-03-27 RX ORDER — METOPROLOL TARTRATE 25 MG/1
TABLET, FILM COATED ORAL
Qty: 90 TABLET | Refills: 1 | Status: SHIPPED | OUTPATIENT
Start: 2025-03-27

## 2025-03-27 RX ORDER — ESCITALOPRAM OXALATE 5 MG/1
5 TABLET ORAL DAILY
Qty: 30 TABLET | Refills: 5 | Status: SHIPPED | OUTPATIENT
Start: 2025-03-27

## 2025-03-27 RX ORDER — LEVOTHYROXINE SODIUM 50 UG/1
50 TABLET ORAL DAILY
Qty: 90 TABLET | Refills: 1 | Status: SHIPPED | OUTPATIENT
Start: 2025-03-27

## 2025-03-27 RX ORDER — PANTOPRAZOLE SODIUM 40 MG/1
40 TABLET, DELAYED RELEASE ORAL
Qty: 180 TABLET | Refills: 1 | Status: SHIPPED | OUTPATIENT
Start: 2025-03-27

## 2025-03-27 NOTE — ASSESSMENT & PLAN NOTE
Depression Screening Follow-up Plan: Patient's depression screening was positive with a PHQ-2 score of 4. Their PHQ-9 score was 13. Patient assessed for underlying major depression. They have no active suicidal ideations. Brief counseling provided and recommend additional follow-up/re-evaluation next office visit.  Patient will start Lexapro as directed.  Guidance given overall.  To consider counseling.  Patient will follow-up in 6 weeks    Orders:    escitalopram (LEXAPRO) 5 mg tablet; Take 1 tablet (5 mg total) by mouth daily

## 2025-03-27 NOTE — PROGRESS NOTES
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 Their PHQ-9 score was 13. Patient assessed for underlying major depression. They have no active suicidal ideations. Brief counseling provided and recommend additional follow-up/re-evaluation next office visit.  Patient will start Lexapro as directed.  Guidance given overall.  To consider counseling.  Patient will follow-up in 6 weeks    Orders:    escitalopram (LEXAPRO) 5 mg tablet; Take 1 tablet (5 mg total) by mouth daily          Depression Screening and Follow-up Plan: Patient's depression screening was positive with a PHQ-2 score of 4. Their PHQ-9 score was 13.   Patient assessed for underlying major depression. Brief counseling provided and recommend additional follow-up/re-evaluation next office visit.       History of Present Illness   Patient is here with depressed mood.  Patient with decreased appetite and some weight loss.  Patient also with history of pancreatic mass.  Not sleeping well.  No homicidal suicidal thoughts.  No self-injury.  No medication for depression in the past.    Depression  Associated symptoms include fatigue.     Review of Systems   Constitutional:  Positive for activity change, appetite change and fatigue.   Psychiatric/Behavioral:  Positive for depression, dysphoric mood and sleep disturbance. Negative for self-injury and suicidal ideas.        Objective   /70 (BP Location: Left arm, Patient Position: Sitting, Cuff Size: Standard)   Pulse 84   Temp 98.3 °F (36.8 °C) (Temporal)   Ht 5' 9\" (1.753 m)   Wt 64.9 kg (143 lb)   SpO2 96%   BMI 21.12 kg/m²      Physical Exam  Vitals and " nursing note reviewed.   Constitutional:       General: He is not in acute distress.     Appearance: He is well-developed. He is not ill-appearing, toxic-appearing or diaphoretic.      Comments: Patient tearful and depressed   HENT:      Head: Normocephalic and atraumatic.      Right Ear: External ear normal.      Left Ear: External ear normal.      Nose: Nose normal.      Mouth/Throat:      Pharynx: No oropharyngeal exudate.   Eyes:      General:         Right eye: No discharge.         Left eye: No discharge.      Conjunctiva/sclera: Conjunctivae normal.      Pupils: Pupils are equal, round, and reactive to light.   Neck:      Thyroid: No thyromegaly.      Vascular: No carotid bruit.      Trachea: No tracheal deviation.   Cardiovascular:      Rate and Rhythm: Normal rate and regular rhythm.      Heart sounds: Normal heart sounds. No murmur heard.     No gallop.   Pulmonary:      Effort: Pulmonary effort is normal. No respiratory distress.      Breath sounds: Normal breath sounds. No stridor. No wheezing or rales.   Chest:      Chest wall: No tenderness.   Musculoskeletal:         General: No tenderness or deformity. Normal range of motion.      Cervical back: Normal range of motion and neck supple.   Lymphadenopathy:      Cervical: No cervical adenopathy.   Skin:     General: Skin is warm and dry.      Coloration: Skin is not pale.      Findings: No erythema or rash.   Neurological:      Mental Status: He is alert and oriented to person, place, and time.      Cranial Nerves: No cranial nerve deficit.      Motor: No abnormal muscle tone.      Coordination: Coordination normal.      Deep Tendon Reflexes: Reflexes normal.   Psychiatric:         Behavior: Behavior normal.         Thought Content: Thought content normal.         Judgment: Judgment normal.      Comments: Depressed mood

## 2025-03-28 DIAGNOSIS — R33.9 URINARY RETENTION: ICD-10-CM

## 2025-03-28 RX ORDER — FINASTERIDE 5 MG/1
5 TABLET, FILM COATED ORAL DAILY
Qty: 90 TABLET | Refills: 1 | Status: SHIPPED | OUTPATIENT
Start: 2025-03-28

## 2025-03-31 ENCOUNTER — PATIENT MESSAGE (OUTPATIENT)
Age: OVER 89
End: 2025-03-31

## 2025-03-31 DIAGNOSIS — R33.8 BENIGN PROSTATIC HYPERPLASIA WITH URINARY RETENTION: Primary | ICD-10-CM

## 2025-03-31 DIAGNOSIS — N40.1 BENIGN PROSTATIC HYPERPLASIA WITH URINARY RETENTION: Primary | ICD-10-CM

## 2025-03-31 RX ORDER — NITROFURANTOIN 25; 75 MG/1; MG/1
100 CAPSULE ORAL 2 TIMES DAILY
Qty: 6 CAPSULE | Refills: 0 | Status: SHIPPED | OUTPATIENT
Start: 2025-03-31 | End: 2025-04-03

## 2025-04-03 ENCOUNTER — HOME CARE VISIT (OUTPATIENT)
Dept: HOME HEALTH SERVICES | Facility: HOME HEALTHCARE | Age: OVER 89
End: 2025-04-03
Payer: MEDICARE

## 2025-04-03 VITALS
TEMPERATURE: 97.9 F | OXYGEN SATURATION: 97 % | HEART RATE: 78 BPM | RESPIRATION RATE: 16 BRPM | SYSTOLIC BLOOD PRESSURE: 168 MMHG | DIASTOLIC BLOOD PRESSURE: 82 MMHG

## 2025-04-03 PROCEDURE — G0299 HHS/HOSPICE OF RN EA 15 MIN: HCPCS

## 2025-04-07 ENCOUNTER — OFFICE VISIT (OUTPATIENT)
Dept: HEMATOLOGY ONCOLOGY | Facility: CLINIC | Age: OVER 89
End: 2025-04-07
Payer: MEDICARE

## 2025-04-07 VITALS
BODY MASS INDEX: 20.88 KG/M2 | TEMPERATURE: 97.2 F | SYSTOLIC BLOOD PRESSURE: 140 MMHG | RESPIRATION RATE: 16 BRPM | OXYGEN SATURATION: 97 % | WEIGHT: 141 LBS | HEIGHT: 69 IN | HEART RATE: 93 BPM | DIASTOLIC BLOOD PRESSURE: 78 MMHG

## 2025-04-07 DIAGNOSIS — D56.3 BETA THALASSEMIA MINOR: ICD-10-CM

## 2025-04-07 DIAGNOSIS — D3A.8 PRIMARY PANCREATIC NEUROENDOCRINE TUMOR: Primary | ICD-10-CM

## 2025-04-07 DIAGNOSIS — N18.32 CHRONIC KIDNEY DISEASE, STAGE 3B (HCC): ICD-10-CM

## 2025-04-07 DIAGNOSIS — D50.0 IRON DEFICIENCY ANEMIA DUE TO CHRONIC BLOOD LOSS: ICD-10-CM

## 2025-04-07 PROCEDURE — 99213 OFFICE O/P EST LOW 20 MIN: CPT | Performed by: INTERNAL MEDICINE

## 2025-04-07 NOTE — ASSESSMENT & PLAN NOTE
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  Hospitalization for fatigue, weight loss and GI bleeding in October 2024.  He was initially found to have a hemoglobin of 4.5 g/deciliter requiring PRBC transfusion and IV iron.  Imaging revealed a pancreatic head mass.  He underwent EUS pancreatic head mass biopsy consistent with pancreatic neuroendocrine tumor with.  He has been evaluated by surgical oncology (Dr. Rodriguez), but declined surgery.  Recent hospitalization in December and found to have intrahepatic bile artery dilation with distended gallbladder.  EGD on 12/6/2024 showed single large ulcer in the duodenum with clean base.  He received palliative radiation to the pancreatic mass.     He has well-differentiated neuroendocrine tumor of the head of the pancreas with no obvious metastatic disease.  He declined surgery and has received palliative radiation to the pancreatic mass.  He is currently receiving systemic treatment with lanreotide since 1/16/2025.  He will continue treatment.  Will continue to follow clinically, with labs and imaging (CT chest/abdomen/pelvis) already ordered by surgical oncology.  He continues to follow with Dr. Rodriguez.     CT chest/abdomen/pelvis for March 2025 with no interval change, stable 3 mm subpleural nodule in the right lower lobe, no thoracic lymphadenopathy.  Stable desmoplastic mass in the pancreatic head. No evidence of vascular invasion or hepatic metastatic disease. No enlarged abdominal or pelvic lymph nodes. No peritoneal lesions.  Patient continues to do well clinically.  He will continue treatment with lanreotide for now.  Will continue to monitor CBC and CMP (will change interval to 4 weeks).  Discussed with patient and his daughter.  He  continues to follow with surgical oncology and in the absence of new symptoms they plan CT scan in 6 months.    Encouraged eating calorie dense foods.  Continue to monitor weight.  Recently started on an antidepressant.     Treatment plan: Lanreotide 120 mg every 4 weeks.  Will monitor CBC and CMP.  Started 1/16/2025.    Orders:    CBC and differential; Standing    Comprehensive metabolic panel; Standing

## 2025-04-07 NOTE — PROGRESS NOTES
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  Hospitalization for fatigue, weight loss and GI bleeding in 2024.  He was initially found to have a hemoglobin of 4.5 g/deciliter requiring PRBC transfusion and IV iron.  Imaging revealed a pancreatic head mass.  He underwent EUS pancreatic head mass biopsy consistent with pancreatic neuroendocrine tumor with.  He has been evaluated by surgical oncology (Dr. Rodriguez), but declined surgery.  Recent hospitalization in December and found to have intrahepatic bile artery dilation with distended gallbladder.  EGD on 2024 showed single large ulcer in the duodenum with clean base.  He received palliative radiation to the pancreatic mass.     He has well-differentiated neuroendocrine tumor of the head of the pancreas with no obvious metastatic disease.  He declined surgery and has received palliative radiation to the pancreatic mass.  He is currently receiving systemic treatment with lanreotide since 2025.  He will continue treatment.  Will continue to follow clinically, with labs and imaging (CT chest/abdomen/pelvis) already ordered by surgical oncology.  He continues to follow with Dr. Rodriguez.     CT chest/abdomen/pelvis for 2025 with no interval change, stable 3 mm subpleural nodule in the right lower lobe, no thoracic lymphadenopathy.  Stable desmoplastic mass in the pancreatic head. No evidence of vascular invasion or hepatic metastatic disease. No enlarged  abdominal or pelvic lymph nodes. No peritoneal lesions.  Patient continues to do well clinically.  He will continue treatment with lanreotide for now.  Will continue to monitor CBC and CMP (will change interval to 4 weeks).  Discussed with patient and his daughter.  He continues to follow with surgical oncology and in the absence of new symptoms they plan CT scan in 6 months.    Encouraged eating calorie dense foods.  Continue to monitor weight.  Recently started on an antidepressant.     Treatment plan: Lanreotide 120 mg every 4 weeks.  Will monitor CBC and CMP.  Started 1/16/2025.    Orders:    CBC and differential; Standing    Comprehensive metabolic panel; Standing    Iron deficiency anemia due to chronic blood loss  Acute on chronic anemia  Multifactorial due to acute blood loss, CKD and beta thalassemia (has low hemoglobin at baseline between 9-10 g/dL).  Adequate iron stores.  Monitor CBC and iron panel.  Transfusion as needed.  Orders:    CBC and differential; Standing    Comprehensive metabolic panel; Standing    Beta thalassemia minor    Orders:    CBC and differential; Standing    Comprehensive metabolic panel; Standing    Chronic kidney disease, stage 3b (HCC)  Lab Results   Component Value Date    EGFR 46 02/26/2025    EGFR 47 02/12/2025    EGFR 32 01/02/2025    CREATININE 1.35 (H) 02/26/2025    CREATININE 1.31 (H) 02/12/2025    CREATININE 1.80 (H) 01/02/2025       Orders:    CBC and differential; Standing    Comprehensive metabolic panel; Standing        Return in about 8 weeks (around 6/2/2025) for Office Visit, Infusion - See Treatment Plan, Labs.    History of Present Illness   Chief Complaint   Patient presents with    Follow-up     Oncology History   Cancer Staging   Primary pancreatic neuroendocrine tumor  Staging form: Neuroendocrine Tumor - Pancreas, AJCC V9  - Clinical: Stage II (cT3, cN0, cM0) - Signed by Nba Rodriguez MD on 10/24/2024  Oncology History   Primary pancreatic neuroendocrine  tumor   10/17/2024 Biopsy    Endoscopic ultrasound  - Pancreas, Head Mass, FNA:  Positive for malignancy.  - Pancreatic neuroendocrine tumor (PanNET), favor at least well-differentiated neuroendocrine tumor, WHO grade 2 of 3, in this limited sample.     10/24/2024 -  Cancer Staged    Staging form: Neuroendocrine Tumor - Pancreas, AJCC V9  - Clinical: Stage II (cT3, cN0, cM0) - Signed by Nba Rodriguez MD on 10/24/2024          Pertinent Medical History   Jose Zamora is an 89 yo male with past medical history significant for HTN, DM2, thyroidism, pan UC on sulfasalazine and beta thalassemia minor.  He was recently hospitalized for fatigue, weight loss and GI bleeding. His hemoglobin on recent admission was 4.5 g/deciliter (baseline appears to be 8-10 g/dL).  He required 3 units PRBC transfusion.  Received IV iron. Imaging revealed a pancreatic head mass.  He underwent EUS pancreatic head mass biopsy consistent with pancreatic neuroendocrine tumor with.  He has been evaluated by surgical oncology (Dr. Rodriguez) and was recommended pancreaticoduodenectomy pending dotatate PET scan.  He presents for hospital follow-up visit accompanied by his daughter.  Patient notes improvement in his symptoms.  Denies any abdominal pain, nausea, vomiting, diarrhea.  Denies any blood in the stools.  Fatigue improving.     01/07/25:   Recent hospitalization in December 2024 for symptomatic anemia.  Patient required PRBC transfusion.  EGD on 12/6/2024 with single ulcer in the duodenum with clean base.  He was evaluated by radiation oncology and received palliative radiation to the pancreatic head.  Also with urinary retention now with Mccormack catheter.  Following with urology.  He presents today for follow-up visit accompanied by his daughter.  Notes fatigue.  Denies any bleeding. Denies any abdominal pain.    02/03/25:   Pt accompanied by his daughter for visit. States he has more energy. Has started doing the laundry. Wife remains  hospitalized. He continues to take care of himself and cooking.   Last pRBC transfusion on 1/15/25.     03/10/25:     3/7/2025: CT CAP:   1. No interval change since previous study. Stable 3 mm subpleural nodule in the right lower lobe. No thoracic lymphadenopathy. No suspicious pulmonary lesions. Mild distal esophageal wall thickening which can be seen in reflux disease.   2. Stable desmoplastic mass in the pancreatic head with resultant atrophy of the pancreatic body and tail and upstream ductal dilation. No evidence of vascular invasion or hepatic metastatic disease. No enlarged abdominal or pelvic lymph nodes. No peritoneal lesions.   3. Cholelithiasis.   4. Prostate hypertrophy. Diffuse bladder wall thickening with Mccormack catheter in place.      Continues to feel well.  Denies abdominal pain, nausea, vomiting, diarrhea.  States remains very active.  States that schedule has been hectic recently due to his wife being admitted to rehab.  He has been missing meals.    04/07/25:   Pt presents for f/u visit. Denies any abd pain, n/v/d or bleeding.   Notes decreased appetite. States he has been very nervous lately and continues to worry about his wife's health who is now discharged from rehab and residing at home.  He was recently started on an antidepressant by his PCP.  He is utilizing protein shakes in his diet.    Hemoglobin remains stable.    Parul (daughter on speaker phone)     Review of Systems   Constitutional:  Positive for fatigue. Negative for chills and fever.   Respiratory:  Negative for cough and shortness of breath.    Cardiovascular:  Negative for chest pain and palpitations.   Gastrointestinal:  Negative for abdominal pain and vomiting.   Genitourinary:  Negative for hematuria.   Musculoskeletal:  Negative for arthralgias and back pain.   Skin:  Negative for rash.   Hematological:  Does not bruise/bleed easily.   Psychiatric/Behavioral:  The patient is nervous/anxious.    All other systems reviewed  "and are negative.          Objective   /78 (BP Location: Left arm, Patient Position: Sitting, Cuff Size: Adult)   Pulse 93   Temp (!) 97.2 °F (36.2 °C) (Temporal)   Resp 16   Ht 5' 9\" (1.753 m)   Wt 64 kg (141 lb)   SpO2 97%   BMI 20.82 kg/m²     Pain Screening:  Pain Score: 0-No pain  ECOG     Physical Exam  Vitals reviewed.   Constitutional:       General: He is not in acute distress.     Appearance: Normal appearance.   HENT:      Head: Normocephalic and atraumatic.      Mouth/Throat:      Mouth: Mucous membranes are moist.   Eyes:      Extraocular Movements: Extraocular movements intact.      Conjunctiva/sclera: Conjunctivae normal.   Cardiovascular:      Rate and Rhythm: Normal rate.   Pulmonary:      Effort: Pulmonary effort is normal. No respiratory distress.   Abdominal:      General: Abdomen is flat. There is no distension.   Musculoskeletal:      Cervical back: Normal range of motion.      Right lower leg: No edema.      Left lower leg: No edema.   Skin:     General: Skin is warm.      Coloration: Skin is not jaundiced.   Neurological:      General: No focal deficit present.      Mental Status: He is alert and oriented to person, place, and time. Mental status is at baseline.      Gait: Gait normal.   Psychiatric:         Thought Content: Thought content normal.         Labs: I have reviewed the following labs:  Lab Results   Component Value Date/Time    WBC 8.53 03/26/2025 07:47 AM    RBC 4.06 03/26/2025 07:47 AM    Hemoglobin 8.4 (L) 03/26/2025 07:47 AM    Hematocrit 28.4 (L) 03/26/2025 07:47 AM    MCV 70 (L) 03/26/2025 07:47 AM    MCH 20.7 (L) 03/26/2025 07:47 AM    RDW 18.0 (H) 03/26/2025 07:47 AM    Platelets 242 03/26/2025 07:47 AM    Segmented % 77 (H) 03/26/2025 07:47 AM    Lymphocytes % 10 (L) 03/26/2025 07:47 AM    Monocytes % 9 03/26/2025 07:47 AM    Eosinophils Relative 2 03/26/2025 07:47 AM    Basophils Relative 1 03/26/2025 07:47 AM    Immature Grans % 1 03/26/2025 07:47 AM    " Absolute Neutrophils 6.65 03/26/2025 07:47 AM     Lab Results   Component Value Date/Time    Potassium 4.4 02/26/2025 10:30 AM    Chloride 104 02/26/2025 10:30 AM    CO2 26 02/26/2025 10:30 AM    BUN 28 (H) 02/26/2025 10:30 AM    Creatinine 1.35 (H) 02/26/2025 10:30 AM    Glucose, Fasting 143 (H) 01/02/2025 11:44 AM    Calcium 7.7 (L) 02/26/2025 10:30 AM    Corrected Calcium 7.9 (L) 12/09/2024 05:32 AM    AST 11 (L) 02/26/2025 10:30 AM    ALT 7 02/26/2025 10:30 AM    Alkaline Phosphatase 75 02/26/2025 10:30 AM    Total Protein 6.3 (L) 02/26/2025 10:30 AM    Albumin 3.7 02/26/2025 10:30 AM    Total Bilirubin 0.32 02/26/2025 10:30 AM    eGFR 46 02/26/2025 10:30 AM         10/15/2024: CT abd/pelv:   Pancreatic mass with evidence of secondary bile duct obstruction. Most commonly adenocarcinoma. Further evaluation with pancreas MR and MRCP recommended.   Small indeterminate left renal nodule, may be a complicated cyst. This can also be evaluated with MR without and with IV contrast.   Other nonemergent findings above.     10/16/2024: MRCP:   Limited by motion artifact and lack of IV contrast.   Enlarged pancreatic head measuring 4.7 x 2.7 cm, #2/27, likely harboring an underlying infiltrative pancreatic head mass. The remainder of the pancreatic parenchyma is atrophic.  Intra/extrahepatic biliary dilation and pancreatic ductal dilation with caliber changes of these ducts at the site of suspected pancreatic head mass.     10/16/2024: EGD:  Single ulcer in the ampullary region with clean base (John III)  Edematous, erythematous mucosa with erosion in the body of the stomach; performed cold forceps biopsy  3 cm type I hiatal hernia  The upper third of the esophagus, middle third of the esophagus and lower third of the esophagus appeared normal.  The duodenal bulb and 1st part of the duodenum appeared normal.  Ulcerated area at the ampulla is likely tumor protruding through the ampulla     10/16/2024: EUS:  Heterogeneous  and hypoechoic mass measuring 46 mm x 30 mm with well-defined margins was visualized in the head of the pancreas; 4 fine needle biopsy passes were taken. An adequate sample was obtained. Cytology analysis was performed. Onsite cytologist was present  There were no cysts in the pancreas. There were no lesions in the examined part of the liver. There was a gallstone in the gallbladder. The CBD was dilated to 10mm and PD was dilated to 8mm. There were no enlarged lymph nodes in the peripancreatic, gastrohepatic, or celiac areas.     3/7/2025: CT CAP:   1. No interval change since previous study. Stable 3 mm subpleural nodule in the right lower lobe. No thoracic lymphadenopathy. No suspicious pulmonary lesions. Mild distal esophageal wall thickening which can be seen in reflux disease.     2. Stable desmoplastic mass in the pancreatic head with resultant atrophy of the pancreatic body and tail and upstream ductal dilation. No evidence of vascular invasion or hepatic metastatic disease. No enlarged abdominal or pelvic lymph nodes. No peritoneal lesions.     3. Cholelithiasis.     4. Prostate hypertrophy. Diffuse bladder wall thickening with Mccormack catheter in place.

## 2025-04-08 ENCOUNTER — HOME CARE VISIT (OUTPATIENT)
Dept: HOME HEALTH SERVICES | Facility: HOME HEALTHCARE | Age: OVER 89
End: 2025-04-08
Payer: MEDICARE

## 2025-04-08 VITALS
RESPIRATION RATE: 16 BRPM | SYSTOLIC BLOOD PRESSURE: 160 MMHG | DIASTOLIC BLOOD PRESSURE: 88 MMHG | TEMPERATURE: 97.8 F | OXYGEN SATURATION: 96 % | HEART RATE: 82 BPM

## 2025-04-08 PROCEDURE — G0299 HHS/HOSPICE OF RN EA 15 MIN: HCPCS

## 2025-04-09 ENCOUNTER — TELEPHONE (OUTPATIENT)
Age: OVER 89
End: 2025-04-09

## 2025-04-10 ENCOUNTER — TELEPHONE (OUTPATIENT)
Dept: LAB | Facility: HOSPITAL | Age: OVER 89
End: 2025-04-10

## 2025-04-10 DIAGNOSIS — E11.00 TYPE 2 DIABETES MELLITUS WITH HYPEROSMOLARITY WITHOUT COMA, WITHOUT LONG-TERM CURRENT USE OF INSULIN (HCC): Primary | ICD-10-CM

## 2025-04-11 PROCEDURE — 400014 VN F/U

## 2025-04-14 PROCEDURE — G0179 MD RECERTIFICATION HHA PT: HCPCS | Performed by: FAMILY MEDICINE

## 2025-04-18 ENCOUNTER — HOSPITAL ENCOUNTER (OUTPATIENT)
Dept: INFUSION CENTER | Facility: CLINIC | Age: OVER 89
End: 2025-04-18
Payer: MEDICARE

## 2025-04-18 DIAGNOSIS — D3A.8 PRIMARY PANCREATIC NEUROENDOCRINE TUMOR: Primary | ICD-10-CM

## 2025-04-18 DIAGNOSIS — F32.1 CURRENT MODERATE EPISODE OF MAJOR DEPRESSIVE DISORDER WITHOUT PRIOR EPISODE (HCC): ICD-10-CM

## 2025-04-18 PROCEDURE — 96372 THER/PROPH/DIAG INJ SC/IM: CPT

## 2025-04-18 RX ORDER — ESCITALOPRAM OXALATE 5 MG/1
5 TABLET ORAL DAILY
Qty: 90 TABLET | Refills: 1 | Status: SHIPPED | OUTPATIENT
Start: 2025-04-18

## 2025-04-18 RX ORDER — LANREOTIDE ACETATE 120 MG/.5ML
120 INJECTION SUBCUTANEOUS ONCE
OUTPATIENT
Start: 2025-05-16

## 2025-04-18 RX ORDER — LANREOTIDE ACETATE 120 MG/.5ML
120 INJECTION SUBCUTANEOUS ONCE
Status: COMPLETED | OUTPATIENT
Start: 2025-04-18 | End: 2025-04-18

## 2025-04-18 RX ADMIN — LANREOTIDE ACETATE 120 MG: 120 INJECTION SUBCUTANEOUS at 09:42

## 2025-04-18 NOTE — PROGRESS NOTES
Pt received Lanreotide injection in the left buttock as ordered.  Pt was provided with future appt on 5/16 at 0930 and AVS

## 2025-04-23 ENCOUNTER — APPOINTMENT (OUTPATIENT)
Dept: LAB | Facility: HOSPITAL | Age: OVER 89
End: 2025-04-23
Payer: MEDICARE

## 2025-04-23 DIAGNOSIS — D56.3 BETA THALASSEMIA MINOR: ICD-10-CM

## 2025-04-23 DIAGNOSIS — D62 ACUTE BLOOD LOSS ANEMIA: ICD-10-CM

## 2025-04-23 DIAGNOSIS — D50.0 IRON DEFICIENCY ANEMIA DUE TO CHRONIC BLOOD LOSS: ICD-10-CM

## 2025-04-23 DIAGNOSIS — N18.32 CHRONIC KIDNEY DISEASE, STAGE 3B (HCC): ICD-10-CM

## 2025-04-23 DIAGNOSIS — D3A.8 PRIMARY PANCREATIC NEUROENDOCRINE TUMOR: ICD-10-CM

## 2025-04-23 LAB
ALBUMIN SERPL BCG-MCNC: 3.9 G/DL (ref 3.5–5)
ALP SERPL-CCNC: 84 U/L (ref 34–104)
ALT SERPL W P-5'-P-CCNC: 9 U/L (ref 7–52)
ANION GAP SERPL CALCULATED.3IONS-SCNC: 12 MMOL/L (ref 4–13)
AST SERPL W P-5'-P-CCNC: 13 U/L (ref 13–39)
BASOPHILS # BLD AUTO: 0.07 THOUSANDS/ÂΜL (ref 0–0.1)
BASOPHILS NFR BLD AUTO: 1 % (ref 0–1)
BILIRUB SERPL-MCNC: 0.44 MG/DL (ref 0.2–1)
BUN SERPL-MCNC: 37 MG/DL (ref 5–25)
CALCIUM SERPL-MCNC: 7.5 MG/DL (ref 8.4–10.2)
CHLORIDE SERPL-SCNC: 103 MMOL/L (ref 96–108)
CO2 SERPL-SCNC: 27 MMOL/L (ref 21–32)
CREAT SERPL-MCNC: 1.71 MG/DL (ref 0.6–1.3)
EOSINOPHIL # BLD AUTO: 0.47 THOUSAND/ÂΜL (ref 0–0.61)
EOSINOPHIL NFR BLD AUTO: 6 % (ref 0–6)
ERYTHROCYTE [DISTWIDTH] IN BLOOD BY AUTOMATED COUNT: 15.9 % (ref 11.6–15.1)
GFR SERPL CREATININE-BSD FRML MDRD: 34 ML/MIN/1.73SQ M
GLUCOSE P FAST SERPL-MCNC: 219 MG/DL (ref 65–99)
HCT VFR BLD AUTO: 28 % (ref 36.5–49.3)
HGB BLD-MCNC: 8.1 G/DL (ref 12–17)
IMM GRANULOCYTES # BLD AUTO: 0.05 THOUSAND/UL (ref 0–0.2)
IMM GRANULOCYTES NFR BLD AUTO: 1 % (ref 0–2)
LYMPHOCYTES # BLD AUTO: 1.09 THOUSANDS/ÂΜL (ref 0.6–4.47)
LYMPHOCYTES NFR BLD AUTO: 13 % (ref 14–44)
MCH RBC QN AUTO: 20.5 PG (ref 26.8–34.3)
MCHC RBC AUTO-ENTMCNC: 28.9 G/DL (ref 31.4–37.4)
MCV RBC AUTO: 71 FL (ref 82–98)
MONOCYTES # BLD AUTO: 0.69 THOUSAND/ÂΜL (ref 0.17–1.22)
MONOCYTES NFR BLD AUTO: 8 % (ref 4–12)
NEUTROPHILS # BLD AUTO: 5.99 THOUSANDS/ÂΜL (ref 1.85–7.62)
NEUTS SEG NFR BLD AUTO: 71 % (ref 43–75)
NRBC BLD AUTO-RTO: 0 /100 WBCS
PLATELET # BLD AUTO: 218 THOUSANDS/UL (ref 149–390)
PMV BLD AUTO: 10.3 FL (ref 8.9–12.7)
POTASSIUM SERPL-SCNC: 4.2 MMOL/L (ref 3.5–5.3)
PROT SERPL-MCNC: 6.5 G/DL (ref 6.4–8.4)
RBC # BLD AUTO: 3.95 MILLION/UL (ref 3.88–5.62)
SODIUM SERPL-SCNC: 142 MMOL/L (ref 135–147)
WBC # BLD AUTO: 8.36 THOUSAND/UL (ref 4.31–10.16)

## 2025-04-23 PROCEDURE — 36415 COLL VENOUS BLD VENIPUNCTURE: CPT

## 2025-04-23 PROCEDURE — 80053 COMPREHEN METABOLIC PANEL: CPT

## 2025-04-23 PROCEDURE — 85025 COMPLETE CBC W/AUTO DIFF WBC: CPT

## 2025-04-27 ENCOUNTER — HOME CARE VISIT (OUTPATIENT)
Dept: HOME HEALTH SERVICES | Facility: HOME HEALTHCARE | Age: OVER 89
End: 2025-04-27
Payer: MEDICARE

## 2025-04-27 NOTE — CASE COMMUNICATION
"1910...Patient called into office regarding his dumont catheter \"not working right\" States that catheter is leaking from insertion point and his pants are wet. Tubing is free from kinks and bag positioned correctly but, urine is not going into catheter bag. Patient denies pain at present time. On call SN visit to be made for assist. Supplies in the home. "

## 2025-04-30 ENCOUNTER — PATIENT MESSAGE (OUTPATIENT)
Age: OVER 89
End: 2025-04-30

## 2025-04-30 DIAGNOSIS — N30.00 ACUTE CYSTITIS WITHOUT HEMATURIA: Primary | ICD-10-CM

## 2025-04-30 RX ORDER — CIPROFLOXACIN 250 MG/1
250 TABLET, FILM COATED ORAL EVERY 12 HOURS SCHEDULED
Qty: 6 TABLET | Refills: 0 | Status: SHIPPED | OUTPATIENT
Start: 2025-04-30 | End: 2025-05-03

## 2025-05-01 ENCOUNTER — HOME CARE VISIT (OUTPATIENT)
Dept: HOME HEALTH SERVICES | Facility: HOME HEALTHCARE | Age: OVER 89
End: 2025-05-01
Payer: MEDICARE

## 2025-05-01 VITALS
SYSTOLIC BLOOD PRESSURE: 156 MMHG | DIASTOLIC BLOOD PRESSURE: 76 MMHG | OXYGEN SATURATION: 96 % | RESPIRATION RATE: 16 BRPM | HEART RATE: 76 BPM | TEMPERATURE: 98.3 F

## 2025-05-01 PROCEDURE — G0299 HHS/HOSPICE OF RN EA 15 MIN: HCPCS

## 2025-05-07 ENCOUNTER — TELEPHONE (OUTPATIENT)
Dept: LAB | Facility: HOSPITAL | Age: OVER 89
End: 2025-05-07

## 2025-05-09 ENCOUNTER — TELEPHONE (OUTPATIENT)
Age: OVER 89
End: 2025-05-09

## 2025-05-09 NOTE — TELEPHONE ENCOUNTER
Patient called to request a medication to replace the Jardiance 10mg tablet     Patient advised the cost for 1 month of this medication under his medicare insurance was $286.00.  he previously spoke with Dr Hodges and Dr Hodges  advised he could send in a new medication more reasonably priced    Please sent to [facility] # [id number]    Does the patient have enough for 3 days?   [x] Yes   [] No - Send as HP to POD

## 2025-05-13 ENCOUNTER — OFFICE VISIT (OUTPATIENT)
Dept: HEMATOLOGY ONCOLOGY | Facility: CLINIC | Age: OVER 89
End: 2025-05-13
Payer: MEDICARE

## 2025-05-13 VITALS
HEIGHT: 69 IN | WEIGHT: 141.5 LBS | BODY MASS INDEX: 20.96 KG/M2 | RESPIRATION RATE: 16 BRPM | TEMPERATURE: 97.8 F | SYSTOLIC BLOOD PRESSURE: 140 MMHG | DIASTOLIC BLOOD PRESSURE: 68 MMHG | HEART RATE: 89 BPM | OXYGEN SATURATION: 96 %

## 2025-05-13 DIAGNOSIS — D50.0 IRON DEFICIENCY ANEMIA DUE TO CHRONIC BLOOD LOSS: ICD-10-CM

## 2025-05-13 DIAGNOSIS — D3A.8 PRIMARY PANCREATIC NEUROENDOCRINE TUMOR: Primary | ICD-10-CM

## 2025-05-13 DIAGNOSIS — N18.32 CHRONIC KIDNEY DISEASE, STAGE 3B (HCC): ICD-10-CM

## 2025-05-13 DIAGNOSIS — D56.3 BETA THALASSEMIA MINOR: ICD-10-CM

## 2025-05-13 PROCEDURE — 99214 OFFICE O/P EST MOD 30 MIN: CPT | Performed by: INTERNAL MEDICINE

## 2025-05-13 NOTE — PROGRESS NOTES
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  Hospitalization for fatigue, weight loss and GI bleeding in 2024.  He was initially found to have a hemoglobin of 4.5 g/deciliter requiring PRBC transfusion and IV iron.  Imaging revealed a pancreatic head mass.  He underwent EUS pancreatic head mass biopsy consistent with pancreatic neuroendocrine tumor with.  He has been evaluated by surgical oncology (Dr. Rodriguez), but declined surgery.  Recent hospitalization in December and found to have intrahepatic bile artery dilation with distended gallbladder.  EGD on 2024 showed single large ulcer in the duodenum with clean base.  He received palliative radiation to the pancreatic mass.     He has well-differentiated neuroendocrine tumor of the head of the pancreas with no obvious metastatic disease.  He declined surgery and has received palliative radiation to the pancreatic mass.  He is currently receiving systemic treatment with lanreotide since 2025.  He will continue treatment.  Will continue to follow clinically, with labs and imaging (CT chest/abdomen/pelvis) already ordered by surgical oncology.  He continues to follow with Dr. Rodriguez.     CT chest/abdomen/pelvis for 2025 with no interval change, stable 3 mm subpleural nodule in the right lower lobe, no thoracic lymphadenopathy.  Stable desmoplastic mass in the pancreatic head. No evidence of vascular invasion or hepatic metastatic disease. No  enlarged abdominal or pelvic lymph nodes. No peritoneal lesions.  Patient continues to do well clinically.  He will continue treatment with lanreotide for now.  Will continue to monitor CBC and CMP every 4 weeks. He continues to follow with surgical oncology and in the absence of new symptoms they plan CT scan in 6 months.     Encouraged eating calorie dense foods.  Continue to monitor weight.  Recently started on an antidepressant.     Treatment plan: Lanreotide 120 mg every 4 weeks.  Started 1/16/2025. Will monitor CBC and CMP.      Follow-up in 8 weeks.       Iron deficiency anemia due to chronic blood loss  Acute on chronic anemia  Multifactorial due to acute blood loss, CKD and beta thalassemia (has low hemoglobin at baseline between 9-10 g/dL).  Adequate iron stores.  Monitor CBC and iron panel.  Transfusion as needed.       Beta thalassemia minor         Chronic kidney disease, stage 3b (HCC)  Lab Results   Component Value Date    EGFR 34 04/23/2025    EGFR 46 02/26/2025    EGFR 47 02/12/2025    CREATININE 1.71 (H) 04/23/2025    CREATININE 1.35 (H) 02/26/2025    CREATININE 1.31 (H) 02/12/2025                Return in about 8 weeks (around 7/8/2025) for Office Visit, On tx visit, Labs.    History of Present Illness   Chief Complaint   Patient presents with    Follow-up     Oncology History   Cancer Staging   Primary pancreatic neuroendocrine tumor  Staging form: Neuroendocrine Tumor - Pancreas, AJCC V9  - Clinical: Stage II (cT3, cN0, cM0) - Signed by Nba Rodriguez MD on 10/24/2024  Oncology History   Primary pancreatic neuroendocrine tumor   10/17/2024 Biopsy    Endoscopic ultrasound  - Pancreas, Head Mass, FNA:  Positive for malignancy.  - Pancreatic neuroendocrine tumor (PanNET), favor at least well-differentiated neuroendocrine tumor, WHO grade 2 of 3, in this limited sample.     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  He was recently hospitalized for fatigue, weight loss and GI bleeding. His hemoglobin on recent admission was 4.5 g/deciliter (baseline appears to be 8-10 g/dL).  He required 3 units PRBC transfusion.  Received IV iron. Imaging revealed a pancreatic head mass.  He underwent EUS pancreatic head mass biopsy consistent with pancreatic neuroendocrine tumor with.  He has been evaluated by surgical oncology (Dr. Rodriguez) and was recommended pancreaticoduodenectomy pending dotatate PET scan.  He presents for hospital follow-up visit accompanied by his daughter.  Patient notes improvement in his symptoms.  Denies any abdominal pain, nausea, vomiting, diarrhea.  Denies any blood in the stools.  Fatigue improving.     01/07/25:   Recent hospitalization in December 2024 for symptomatic anemia.  Patient required PRBC transfusion.  EGD on 12/6/2024 with single ulcer in the duodenum with clean base.  He was evaluated by radiation oncology and received palliative radiation to the pancreatic head.  Also with urinary retention now with Mccormack catheter.  Following with urology.  He presents today for follow-up visit accompanied by his daughter.  Notes fatigue.  Denies any bleeding. Denies any abdominal pain.    02/03/25:   Pt accompanied by his daughter for visit. States he has more energy. Has started doing the laundry. Wife remains hospitalized. He continues to take care of himself and cooking.   Last pRBC transfusion on 1/15/25.     03/10/25:     3/7/2025: CT CAP:   1. No interval change since previous study. Stable 3 mm subpleural nodule in the right lower lobe. No thoracic lymphadenopathy. No suspicious pulmonary lesions. Mild distal esophageal wall thickening which can be seen in reflux disease.   2. Stable  desmoplastic mass in the pancreatic head with resultant atrophy of the pancreatic body and tail and upstream ductal dilation. No evidence of vascular invasion or hepatic metastatic disease. No enlarged abdominal or pelvic lymph nodes. No peritoneal lesions.   3. Cholelithiasis.   4. Prostate hypertrophy. Diffuse bladder wall thickening with Mccormack catheter in place.      Continues to feel well.  Denies abdominal pain, nausea, vomiting, diarrhea.  States remains very active.  States that schedule has been hectic recently due to his wife being admitted to rehab.  He has been missing meals.    04/07/25:   Pt presents for f/u visit. Denies any abd pain, n/v/d or bleeding.   Notes decreased appetite. States he has been very nervous lately and continues to worry about his wife's health who is now discharged from rehab and residing at home.  He was recently started on an antidepressant by his PCP.  He is utilizing protein shakes in his diet.    Hemoglobin remains stable.    Parul (daughter on speaker phone)    05/13/25:   Patient presented follow visit.  Denies any abdominal pain, nausea, vomiting, diarrhea.  Remains active.  Has not required recent PRBC transfusion.  Hemoglobin stable.  His wife is recovering.     Review of Systems   Constitutional:  Positive for fatigue. Negative for chills and fever.   Respiratory:  Negative for cough and shortness of breath.    Cardiovascular:  Negative for chest pain and palpitations.   Gastrointestinal:  Negative for abdominal pain and vomiting.   Genitourinary:  Negative for hematuria.   Musculoskeletal:  Negative for arthralgias and back pain.   Skin:  Negative for rash.   Hematological:  Does not bruise/bleed easily.   All other systems reviewed and are negative.    Medical History Reviewed by provider this encounter:     .      Objective   /68 (BP Location: Left arm, Patient Position: Sitting, Cuff Size: Adult)   Pulse 89   Temp 97.8 °F (36.6 °C) (Temporal)   Resp 16   Ht  "5' 9\" (1.753 m)   Wt 64.2 kg (141 lb 8 oz)   SpO2 96%   BMI 20.90 kg/m²     Pain Screening:  Pain Score: 0-No pain  ECOG     Physical Exam  Vitals reviewed.   Constitutional:       General: He is not in acute distress.     Appearance: Normal appearance.   HENT:      Head: Normocephalic and atraumatic.      Mouth/Throat:      Mouth: Mucous membranes are moist.   Eyes:      Extraocular Movements: Extraocular movements intact.      Conjunctiva/sclera: Conjunctivae normal.   Cardiovascular:      Rate and Rhythm: Normal rate.   Pulmonary:      Effort: Pulmonary effort is normal. No respiratory distress.   Abdominal:      General: Abdomen is flat. There is no distension.      Palpations: Abdomen is soft.   Musculoskeletal:      Cervical back: Normal range of motion.      Right lower leg: No edema.      Left lower leg: No edema.   Skin:     General: Skin is warm.      Coloration: Skin is not jaundiced.   Neurological:      Mental Status: He is alert and oriented to person, place, and time. Mental status is at baseline.      Gait: Gait normal.   Psychiatric:         Thought Content: Thought content normal.         Labs: I have reviewed the following labs:  Lab Results   Component Value Date/Time    WBC 8.36 04/23/2025 08:20 AM    RBC 3.95 04/23/2025 08:20 AM    Hemoglobin 8.1 (L) 04/23/2025 08:20 AM    Hematocrit 28.0 (L) 04/23/2025 08:20 AM    MCV 71 (L) 04/23/2025 08:20 AM    MCH 20.5 (L) 04/23/2025 08:20 AM    RDW 15.9 (H) 04/23/2025 08:20 AM    Platelets 218 04/23/2025 08:20 AM    Segmented % 71 04/23/2025 08:20 AM    Lymphocytes % 13 (L) 04/23/2025 08:20 AM    Monocytes % 8 04/23/2025 08:20 AM    Eosinophils Relative 6 04/23/2025 08:20 AM    Basophils Relative 1 04/23/2025 08:20 AM    Immature Grans % 1 04/23/2025 08:20 AM    Absolute Neutrophils 5.99 04/23/2025 08:20 AM     Lab Results   Component Value Date/Time    Potassium 4.2 04/23/2025 08:20 AM    Chloride 103 04/23/2025 08:20 AM    CO2 27 04/23/2025 08:20 AM "    BUN 37 (H) 04/23/2025 08:20 AM    Creatinine 1.71 (H) 04/23/2025 08:20 AM    Glucose, Fasting 219 (H) 04/23/2025 08:20 AM    Calcium 7.5 (L) 04/23/2025 08:20 AM    Corrected Calcium 7.9 (L) 12/09/2024 05:32 AM    AST 13 04/23/2025 08:20 AM    ALT 9 04/23/2025 08:20 AM    Alkaline Phosphatase 84 04/23/2025 08:20 AM    Total Protein 6.5 04/23/2025 08:20 AM    Albumin 3.9 04/23/2025 08:20 AM    Total Bilirubin 0.44 04/23/2025 08:20 AM    eGFR 34 04/23/2025 08:20 AM         10/15/2024: CT abd/pelv:   Pancreatic mass with evidence of secondary bile duct obstruction. Most commonly adenocarcinoma. Further evaluation with pancreas MR and MRCP recommended.   Small indeterminate left renal nodule, may be a complicated cyst. This can also be evaluated with MR without and with IV contrast.   Other nonemergent findings above.     10/16/2024: MRCP:   Limited by motion artifact and lack of IV contrast.   Enlarged pancreatic head measuring 4.7 x 2.7 cm, #2/27, likely harboring an underlying infiltrative pancreatic head mass. The remainder of the pancreatic parenchyma is atrophic.  Intra/extrahepatic biliary dilation and pancreatic ductal dilation with caliber changes of these ducts at the site of suspected pancreatic head mass.     10/16/2024: EGD:  Single ulcer in the ampullary region with clean base (John III)  Edematous, erythematous mucosa with erosion in the body of the stomach; performed cold forceps biopsy  3 cm type I hiatal hernia  The upper third of the esophagus, middle third of the esophagus and lower third of the esophagus appeared normal.  The duodenal bulb and 1st part of the duodenum appeared normal.  Ulcerated area at the ampulla is likely tumor protruding through the ampulla     10/16/2024: EUS:  Heterogeneous and hypoechoic mass measuring 46 mm x 30 mm with well-defined margins was visualized in the head of the pancreas; 4 fine needle biopsy passes were taken. An adequate sample was obtained. Cytology  analysis was performed. Onsite cytologist was present  There were no cysts in the pancreas. There were no lesions in the examined part of the liver. There was a gallstone in the gallbladder. The CBD was dilated to 10mm and PD was dilated to 8mm. There were no enlarged lymph nodes in the peripancreatic, gastrohepatic, or celiac areas.     3/7/2025: CT CAP:   1. No interval change since previous study. Stable 3 mm subpleural nodule in the right lower lobe. No thoracic lymphadenopathy. No suspicious pulmonary lesions. Mild distal esophageal wall thickening which can be seen in reflux disease.     2. Stable desmoplastic mass in the pancreatic head with resultant atrophy of the pancreatic body and tail and upstream ductal dilation. No evidence of vascular invasion or hepatic metastatic disease. No enlarged abdominal or pelvic lymph nodes. No peritoneal lesions.     3. Cholelithiasis.     4. Prostate hypertrophy. Diffuse bladder wall thickening with Mccormack catheter in place.  Administrative Statements   I have spent a total time of 31 minutes in caring for this patient on the day of the visit/encounter including Diagnostic results, Prognosis, Instructions for management, Patient and family education, and Obtaining or reviewing history  .

## 2025-05-13 NOTE — ASSESSMENT & PLAN NOTE
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  Hospitalization for fatigue, weight loss and GI bleeding in October 2024.  He was initially found to have a hemoglobin of 4.5 g/deciliter requiring PRBC transfusion and IV iron.  Imaging revealed a pancreatic head mass.  He underwent EUS pancreatic head mass biopsy consistent with pancreatic neuroendocrine tumor with.  He has been evaluated by surgical oncology (Dr. Rodriguez), but declined surgery.  Recent hospitalization in December and found to have intrahepatic bile artery dilation with distended gallbladder.  EGD on 12/6/2024 showed single large ulcer in the duodenum with clean base.  He received palliative radiation to the pancreatic mass.     He has well-differentiated neuroendocrine tumor of the head of the pancreas with no obvious metastatic disease.  He declined surgery and has received palliative radiation to the pancreatic mass.  He is currently receiving systemic treatment with lanreotide since 1/16/2025.  He will continue treatment.  Will continue to follow clinically, with labs and imaging (CT chest/abdomen/pelvis) already ordered by surgical oncology.  He continues to follow with Dr. Rodriguez.     CT chest/abdomen/pelvis for March 2025 with no interval change, stable 3 mm subpleural nodule in the right lower lobe, no thoracic lymphadenopathy.  Stable desmoplastic mass in the pancreatic head. No evidence of vascular invasion or hepatic metastatic disease. No enlarged abdominal or pelvic lymph nodes. No peritoneal lesions.  Patient continues to do well clinically.  He will continue treatment with lanreotide for now.  Will continue to monitor CBC and CMP every 4 weeks. He continues to follow with surgical oncology and in the absence of  new symptoms they plan CT scan in 6 months.     Encouraged eating calorie dense foods.  Continue to monitor weight.  Recently started on an antidepressant.     Treatment plan: Lanreotide 120 mg every 4 weeks.  Started 1/16/2025. Will monitor CBC and CMP.      Follow-up in 8 weeks.

## 2025-05-14 ENCOUNTER — RA CDI HCC (OUTPATIENT)
Dept: OTHER | Facility: HOSPITAL | Age: OVER 89
End: 2025-05-14

## 2025-05-14 PROBLEM — E11.51 TYPE 2 DIABETES MELLITUS WITH DIABETIC PERIPHERAL ANGIOPATHY WITHOUT GANGRENE (HCC): Status: ACTIVE | Noted: 2025-05-14

## 2025-05-16 ENCOUNTER — TELEPHONE (OUTPATIENT)
Age: OVER 89
End: 2025-05-16

## 2025-05-16 ENCOUNTER — HOSPITAL ENCOUNTER (OUTPATIENT)
Dept: INFUSION CENTER | Facility: CLINIC | Age: OVER 89
End: 2025-05-16
Attending: INTERNAL MEDICINE
Payer: MEDICARE

## 2025-05-16 VITALS
SYSTOLIC BLOOD PRESSURE: 161 MMHG | DIASTOLIC BLOOD PRESSURE: 67 MMHG | HEART RATE: 80 BPM | TEMPERATURE: 98 F | RESPIRATION RATE: 18 BRPM

## 2025-05-16 DIAGNOSIS — D3A.8 PRIMARY PANCREATIC NEUROENDOCRINE TUMOR: Primary | ICD-10-CM

## 2025-05-16 DIAGNOSIS — E11.00 TYPE 2 DIABETES MELLITUS WITH HYPEROSMOLARITY WITHOUT COMA, WITHOUT LONG-TERM CURRENT USE OF INSULIN (HCC): Primary | ICD-10-CM

## 2025-05-16 RX ORDER — LANREOTIDE ACETATE 120 MG/.5ML
120 INJECTION SUBCUTANEOUS ONCE
Status: COMPLETED | OUTPATIENT
Start: 2025-05-16 | End: 2025-05-16

## 2025-05-16 RX ORDER — DAPAGLIFLOZIN 5 MG/1
5 TABLET, FILM COATED ORAL DAILY
Qty: 90 TABLET | Refills: 0 | Status: SHIPPED | OUTPATIENT
Start: 2025-05-16 | End: 2026-05-11

## 2025-05-16 RX ORDER — LANREOTIDE ACETATE 120 MG/.5ML
120 INJECTION SUBCUTANEOUS ONCE
OUTPATIENT
Start: 2025-06-13

## 2025-05-16 RX ADMIN — LANREOTIDE ACETATE 120 MG: 120 INJECTION SUBCUTANEOUS at 09:34

## 2025-05-16 NOTE — TELEPHONE ENCOUNTER
Called pt, left vm for pt to call us back and clarified if he contacted his pharmacy for refills as I see here that medication was sent to to pharmacy on 04.10.25 with 2 refills.

## 2025-05-16 NOTE — TELEPHONE ENCOUNTER
Patient said Jardiance was $287 for 30 day supply, he called the pharmacy and they told him Farxiga is the preferred product. Please review and advise. Patient uses [facility]/pharmacy #[id number] - [place] - [phone] [phone]

## 2025-05-16 NOTE — PLAN OF CARE
Problem: Potential for Falls  Goal: Patient will remain free of falls  Description: INTERVENTIONS:  - Educate patient/family on patient safety including physical limitations  - Instruct patient to call for assistance with activity   - Consider consulting OT/PT to assist with strengthening/mobility based on AM PAC & JH-HLM score  - Consult OT/PT to assist with strengthening/mobility   - Keep Call bell within reach  - Keep bed low and locked with side rails adjusted as appropriate  - Keep care items and personal belongings within reach  - Initiate and maintain comfort rounds  - Make Fall Risk Sign visible to staff  - Offer Toileting every  Hours, in advance of need  - Initiate/Maintain alarm  - Obtain necessary fall risk management equipment:   - Apply yellow socks and bracelet for high fall risk patients  - Consider moving patient to room near nurses station  Outcome: Progressing

## 2025-05-16 NOTE — PROGRESS NOTES
Patient presents for lanreotide IM injection. Lanreotide given in right buttock without incident. AVS printed and given to patient. Patient is aware of appointment on 6/13/25 at 9:30AM.

## 2025-05-16 NOTE — TELEPHONE ENCOUNTER
Patient called the RX Refill Line. Message is being forwarded to the office.     Patient is requesting a call back regarding his Jardiance. Patient stated that he only has 1 tablet remaining and is not sure how to go about contacting his insurance company to find out which medication they will cover. He would appreciate assistance with this     Please contact patient at [phone]

## 2025-05-21 ENCOUNTER — APPOINTMENT (OUTPATIENT)
Dept: LAB | Facility: HOSPITAL | Age: OVER 89
End: 2025-05-21
Payer: MEDICARE

## 2025-05-21 ENCOUNTER — OFFICE VISIT (OUTPATIENT)
Age: OVER 89
End: 2025-05-21
Payer: MEDICARE

## 2025-05-21 VITALS
BODY MASS INDEX: 20.53 KG/M2 | TEMPERATURE: 97.9 F | OXYGEN SATURATION: 96 % | HEIGHT: 69 IN | HEART RATE: 104 BPM | DIASTOLIC BLOOD PRESSURE: 70 MMHG | SYSTOLIC BLOOD PRESSURE: 160 MMHG | WEIGHT: 138.6 LBS

## 2025-05-21 DIAGNOSIS — E11.00 TYPE 2 DIABETES MELLITUS WITH HYPEROSMOLARITY WITHOUT COMA, WITHOUT LONG-TERM CURRENT USE OF INSULIN (HCC): Primary | ICD-10-CM

## 2025-05-21 DIAGNOSIS — I73.9 PAD (PERIPHERAL ARTERY DISEASE) (HCC): ICD-10-CM

## 2025-05-21 DIAGNOSIS — D56.3 BETA THALASSEMIA MINOR: ICD-10-CM

## 2025-05-21 DIAGNOSIS — E03.9 ACQUIRED HYPOTHYROIDISM: ICD-10-CM

## 2025-05-21 DIAGNOSIS — Z00.00 MEDICARE ANNUAL WELLNESS VISIT, SUBSEQUENT: ICD-10-CM

## 2025-05-21 DIAGNOSIS — D62 ACUTE BLOOD LOSS ANEMIA: ICD-10-CM

## 2025-05-21 DIAGNOSIS — D50.0 IRON DEFICIENCY ANEMIA DUE TO CHRONIC BLOOD LOSS: ICD-10-CM

## 2025-05-21 DIAGNOSIS — D3A.8 PRIMARY PANCREATIC NEUROENDOCRINE TUMOR: ICD-10-CM

## 2025-05-21 DIAGNOSIS — N18.32 CHRONIC KIDNEY DISEASE, STAGE 3B (HCC): ICD-10-CM

## 2025-05-21 DIAGNOSIS — I25.10 ATHEROSCLEROSIS OF NATIVE CORONARY ARTERY OF NATIVE HEART WITHOUT ANGINA PECTORIS: ICD-10-CM

## 2025-05-21 DIAGNOSIS — E78.2 MIXED HYPERLIPIDEMIA: ICD-10-CM

## 2025-05-21 DIAGNOSIS — E11.51 TYPE 2 DIABETES MELLITUS WITH DIABETIC PERIPHERAL ANGIOPATHY WITHOUT GANGRENE, UNSPECIFIED WHETHER LONG TERM INSULIN USE (HCC): ICD-10-CM

## 2025-05-21 DIAGNOSIS — I10 PRIMARY HYPERTENSION: ICD-10-CM

## 2025-05-21 LAB
ALBUMIN SERPL BCG-MCNC: 4 G/DL (ref 3.5–5)
ALP SERPL-CCNC: 84 U/L (ref 34–104)
ALT SERPL W P-5'-P-CCNC: 9 U/L (ref 7–52)
ANION GAP SERPL CALCULATED.3IONS-SCNC: 11 MMOL/L (ref 4–13)
AST SERPL W P-5'-P-CCNC: 13 U/L (ref 13–39)
BASOPHILS # BLD AUTO: 0.08 THOUSANDS/ÂΜL (ref 0–0.1)
BASOPHILS NFR BLD AUTO: 1 % (ref 0–1)
BILIRUB SERPL-MCNC: 0.4 MG/DL (ref 0.2–1)
BUN SERPL-MCNC: 50 MG/DL (ref 5–25)
CALCIUM SERPL-MCNC: 8.2 MG/DL (ref 8.4–10.2)
CHLORIDE SERPL-SCNC: 105 MMOL/L (ref 96–108)
CO2 SERPL-SCNC: 25 MMOL/L (ref 21–32)
CREAT SERPL-MCNC: 1.74 MG/DL (ref 0.6–1.3)
EOSINOPHIL # BLD AUTO: 0.39 THOUSAND/ÂΜL (ref 0–0.61)
EOSINOPHIL NFR BLD AUTO: 5 % (ref 0–6)
ERYTHROCYTE [DISTWIDTH] IN BLOOD BY AUTOMATED COUNT: 15.9 % (ref 11.6–15.1)
GFR SERPL CREATININE-BSD FRML MDRD: 34 ML/MIN/1.73SQ M
GLUCOSE SERPL-MCNC: 220 MG/DL (ref 65–140)
HCT VFR BLD AUTO: 27.7 % (ref 36.5–49.3)
HGB BLD-MCNC: 7.8 G/DL (ref 12–17)
IMM GRANULOCYTES # BLD AUTO: 0.06 THOUSAND/UL (ref 0–0.2)
IMM GRANULOCYTES NFR BLD AUTO: 1 % (ref 0–2)
LEFT EYE DIABETIC RETINOPATHY: ABNORMAL
LEFT EYE IMAGE QUALITY: ABNORMAL
LEFT EYE MACULAR EDEMA: ABNORMAL
LEFT EYE OTHER RETINOPATHY: ABNORMAL
LYMPHOCYTES # BLD AUTO: 1.08 THOUSANDS/ÂΜL (ref 0.6–4.47)
LYMPHOCYTES NFR BLD AUTO: 14 % (ref 14–44)
MCH RBC QN AUTO: 20.4 PG (ref 26.8–34.3)
MCHC RBC AUTO-ENTMCNC: 28.2 G/DL (ref 31.4–37.4)
MCV RBC AUTO: 72 FL (ref 82–98)
MONOCYTES # BLD AUTO: 0.62 THOUSAND/ÂΜL (ref 0.17–1.22)
MONOCYTES NFR BLD AUTO: 8 % (ref 4–12)
NEUTROPHILS # BLD AUTO: 5.27 THOUSANDS/ÂΜL (ref 1.85–7.62)
NEUTS SEG NFR BLD AUTO: 71 % (ref 43–75)
NRBC BLD AUTO-RTO: 0 /100 WBCS
PLATELET # BLD AUTO: 242 THOUSANDS/UL (ref 149–390)
PMV BLD AUTO: 11.2 FL (ref 8.9–12.7)
POTASSIUM SERPL-SCNC: 4.3 MMOL/L (ref 3.5–5.3)
PROT SERPL-MCNC: 6.8 G/DL (ref 6.4–8.4)
RBC # BLD AUTO: 3.83 MILLION/UL (ref 3.88–5.62)
RIGHT EYE DIABETIC RETINOPATHY: ABNORMAL
RIGHT EYE IMAGE QUALITY: ABNORMAL
RIGHT EYE MACULAR EDEMA: ABNORMAL
RIGHT EYE OTHER RETINOPATHY: ABNORMAL
SEVERITY (EYE EXAM): ABNORMAL
SL AMB POCT HEMOGLOBIN AIC: 7.1 (ref ?–6.5)
SODIUM SERPL-SCNC: 141 MMOL/L (ref 135–147)
WBC # BLD AUTO: 7.5 THOUSAND/UL (ref 4.31–10.16)

## 2025-05-21 PROCEDURE — G2211 COMPLEX E/M VISIT ADD ON: HCPCS | Performed by: FAMILY MEDICINE

## 2025-05-21 PROCEDURE — 99214 OFFICE O/P EST MOD 30 MIN: CPT | Performed by: FAMILY MEDICINE

## 2025-05-21 PROCEDURE — 36415 COLL VENOUS BLD VENIPUNCTURE: CPT

## 2025-05-21 PROCEDURE — G0439 PPPS, SUBSEQ VISIT: HCPCS | Performed by: FAMILY MEDICINE

## 2025-05-21 PROCEDURE — 80053 COMPREHEN METABOLIC PANEL: CPT

## 2025-05-21 PROCEDURE — 85025 COMPLETE CBC W/AUTO DIFF WBC: CPT

## 2025-05-21 PROCEDURE — 83036 HEMOGLOBIN GLYCOSYLATED A1C: CPT | Performed by: FAMILY MEDICINE

## 2025-05-21 NOTE — ASSESSMENT & PLAN NOTE
Will continue with current treatment of dietary modification.  Continue Farxiga daily  Lab Results   Component Value Date    HGBA1C 7.1 (A) 05/21/2025       Orders:    [facility] Diabetic eye exam    POCT hemoglobin A1c

## 2025-05-21 NOTE — PROGRESS NOTES
Name: Jose Zamora      : 1935      MRN: [id number]  Encounter Provider: Scott Hodges DO  Encounter Date: 2025   Encounter department: [facility] PRIMARY CARE  :  Assessment & Plan  Type 2 diabetes mellitus with hyperosmolarity without coma, without long-term current use of insulin (HCC)  Will continue with current treatment of dietary modification.  Continue Farxiga daily  Lab Results   Component Value Date    HGBA1C 7.1 (A) 2025       Orders:    IRIS Diabetic eye exam    POCT hemoglobin A1c    Medicare annual wellness visit, subsequent  Completed at this time.       Type 2 diabetes mellitus with diabetic peripheral angiopathy without gangrene, unspecified whether long term insulin use (HCC)    Lab Results   Component Value Date    HGBA1C 7.1 (A) 2025   Continue with dietary modification.         Atherosclerosis of native coronary artery of native heart without angina pectoris  Stable.       Primary hypertension  Stable at this time.       PAD (peripheral artery disease) (HCC)  Stable.       Mixed hyperlipidemia  Continue with current treatment with atorvastatin.        Acquired hypothyroidism  Continue levothyroxine.          Preventive health issues were discussed with patient, and age appropriate screening tests were ordered as noted in patient's After Visit Summary. Personalized health advice and appropriate referrals for health education or preventive services given if needed, as noted in patient's After Visit Summary.    History of Present Illness     Patient is here to follow-up on diabetes hypertension hyperlipidemia as well as Medicare wellness exam.  Patient's mood is better overall.  Patient's appetite is not significantly improved.  No chest pain or shortness of breath or difficulty urinating or defecating overall.    Diabetes       Patient Care Team:  Mega Hodges DO as PCP - General (Family Medicine)  DO Shoshana Cochran DO (Hematology and  Oncology)  Catie Evans MA (Oncology)  Cintia Matias RN as Nurse Navigator (Oncology)  Catie Evans MA as Care Coordinator (Oncology)  Nba Rodriguez MD (Surgical Oncology)    Review of Systems   Constitutional: Negative.    HENT: Negative.     Eyes: Negative.    Respiratory: Negative.     Cardiovascular: Negative.    Gastrointestinal: Negative.    Endocrine: Negative.    Genitourinary: Negative.    Musculoskeletal: Negative.    Skin: Negative.    Allergic/Immunologic: Negative.    Neurological: Negative.    Hematological: Negative.    Psychiatric/Behavioral: Negative.       Medical History Reviewed by provider this encounter:  Tobacco  Allergies  Meds  Problems  Med Hx  Surg Hx  Fam Hx       Annual Wellness Visit Questionnaire   Jose is here for his Subsequent Wellness visit.     Health Risk Assessment:   Patient rates overall health as fair. Patient feels that their physical health rating is slightly worse. Patient is satisfied with their life. Eyesight was rated as same. Hearing was rated as slightly worse. Patient feels that their emotional and mental health rating is same. Patients states they are never, rarely angry. Patient states they are often unusually tired/fatigued. Pain experienced in the last 7 days has been none. Patient states that he has experienced weight loss or gain in last 6 months.     Depression Screening:   PHQ-9 Score: 3      Fall Risk Screening:   In the past year, patient has experienced: no history of falling in past year      Home Safety:  Patient does not have trouble with stairs inside or outside of their home. Patient has no working smoke alarms and has no working carbon monoxide detector. Home safety hazards include: none.     Nutrition:   Current diet is Regular.     Medications:   Patient is currently taking over-the-counter supplements. OTC medications include: see medication list. Patient is able to manage medications.     Activities of Daily Living  (ADLs)/Instrumental Activities of Daily Living (IADLs):   Walk and transfer into and out of bed and chair?: Yes  Dress and groom yourself?: Yes    Bathe or shower yourself?: Yes    Feed yourself? Yes  Do your laundry/housekeeping?: Yes  Manage your money, pay your bills and track your expenses?: Yes  Make your own meals?: Yes    Do your own shopping?: Yes    ADL comments: I have meals on wheels to help with the cooking    Previous Hospitalizations:   Any hospitalizations or ED visits within the last 12 months?: Yes    How many hospitalizations have you had in the last year?: 1-2    Advance Care Planning:   Living will: Yes    Durable POA for healthcare: Yes    Advanced directive: Yes      Cognitive Screening:   Provider or family/friend/caregiver concerned regarding cognition?: No    Preventive Screenings      Cardiovascular Screening:    General: Screening Not Indicated, History Lipid Disorder, Risks and Benefits Discussed and Screening Current      Diabetes Screening:     General: Screening Not Indicated, History Diabetes, Screening Current and Risks and Benefits Discussed      Colorectal Cancer Screening:     General: Screening Not Indicated and Risks and Benefits Discussed      Prostate Cancer Screening:    General: Screening Not Indicated and Risks and Benefits Discussed      Osteoporosis Screening:    General: Risks and Benefits Discussed      Abdominal Aortic Aneurysm (AAA) Screening:    Risk factors include: tobacco use        General: Risks and Benefits Discussed and Screening Not Indicated      Lung Cancer Screening:     General: Screening Not Indicated and Risks and Benefits Discussed      Hepatitis C Screening:    General: Screening Current and Risks and Benefits Discussed    Immunizations:  - Immunizations due: Zoster (Shingrix)    Screening, Brief Intervention, and Referral to Treatment (SBIRT)     Screening  Typical number of drinks in a day: 0  Typical number of drinks in a week: 0  Interpretation:  "Low risk drinking behavior.    AUDIT-C Screenin) How often did you have a drink containing alcohol in the past year? monthly or less  2) How many drinks did you have on a typical day when you were drinking in the past year? 0  3) How often did you have 6 or more drinks on one occasion in the past year? never    AUDIT-C Score: 1  Interpretation: Score 0-3 (male): Negative screen for alcohol misuse    Single Item Drug Screening:  How often have you used an illegal drug (including marijuana) or a prescription medication for non-medical reasons in the past year? never    Single Item Drug Screen Score: 0  Interpretation: Negative screen for possible drug use disorder    Other Counseling Topics:   Regular weightbearing exercise.     Social Drivers of Health     Financial Resource Strain: Low Risk  (2023)    Overall Financial Resource Strain (CARDIA)     Difficulty of Paying Living Expenses: Not hard at all   Food Insecurity: No Food Insecurity (2025)    Nursing - Inadequate Food Risk Classification     Worried About Running Out of Food in the Last Year: Never true     Ran Out of Food in the Last Year: Never true     Ran Out of Food in the Last Year: Never true   Transportation Needs: No Transportation Needs (2025)    PRAPARE - Transportation     Lack of Transportation (Medical): No     Lack of Transportation (Non-Medical): No   Housing Stability: Low Risk  (2025)    Housing Stability Vital Sign     Unable to Pay for Housing in the Last Year: No     Number of Times Moved in the Last Year: 0     Homeless in the Last Year: No   Utilities: Not At Risk (2025)    [facility] Utilities     Threatened with loss of utilities: No     No results found.    Objective   /70 (BP Location: Left arm, Patient Position: Sitting, Cuff Size: Standard)   Pulse 104   Temp 97.9 °F (36.6 °C) (Temporal)   Ht 5' 9\" (1.753 m)   Wt 62.9 kg (138 lb 9.6 oz)   SpO2 96%   BMI 20.47 kg/m²     Physical Exam  Vitals and " nursing note reviewed.   Constitutional:       General: He is not in acute distress.     Appearance: Normal appearance. He is well-developed. He is not ill-appearing, toxic-appearing or diaphoretic.   HENT:      Head: Normocephalic and atraumatic.      Right Ear: External ear normal.      Left Ear: External ear normal.      Nose: Nose normal.     Eyes:      General:         Right eye: No discharge.         Left eye: No discharge.      Conjunctiva/sclera: Conjunctivae normal.      Pupils: Pupils are equal, round, and reactive to light.     Neck:      Thyroid: No thyromegaly.      Vascular: No carotid bruit.      Trachea: No tracheal deviation.     Cardiovascular:      Rate and Rhythm: Normal rate and regular rhythm.      Pulses: Normal pulses.      Heart sounds: Normal heart sounds. No murmur heard.     No gallop.   Pulmonary:      Effort: Pulmonary effort is normal. No respiratory distress.      Breath sounds: Normal breath sounds. No stridor. No wheezing or rales.   Chest:      Chest wall: No tenderness.   Abdominal:      General: Bowel sounds are normal.     Musculoskeletal:         General: No tenderness or deformity. Normal range of motion.      Cervical back: Normal range of motion and neck supple.   Lymphadenopathy:      Cervical: No cervical adenopathy.     Skin:     General: Skin is warm and dry.      Capillary Refill: Capillary refill takes less than 2 seconds.      Coloration: Skin is not pale.      Findings: No erythema or rash.     Neurological:      Mental Status: He is alert and oriented to person, place, and time. Mental status is at baseline.      Cranial Nerves: No cranial nerve deficit.      Motor: No abnormal muscle tone.      Coordination: Coordination normal.      Deep Tendon Reflexes: Reflexes normal.     Psychiatric:         Mood and Affect: Mood normal.         Behavior: Behavior normal.         Thought Content: Thought content normal.         Judgment: Judgment normal.

## 2025-05-21 NOTE — PATIENT INSTRUCTIONS
Medicare Preventive Visit Patient Instructions  Thank you for completing your Welcome to Medicare Visit or Medicare Annual Wellness Visit today. Your next wellness visit will be due in one year (5/22/2026).  The screening/preventive services that you may require over the next 5-10 years are detailed below. Some tests may not apply to you based off risk factors and/or age. Screening tests ordered at today's visit but not completed yet may show as past due. Also, please note that scanned in results may not display below.  Preventive Screenings:  Service Recommendations Previous Testing/Comments   Colorectal Cancer Screening  Colonoscopy    Fecal Occult Blood Test (FOBT)/Fecal Immunochemical Test (FIT)  Fecal DNA/Cologuard Test  Flexible Sigmoidoscopy Age: 45-75 years old   Colonoscopy: every 10 years (May be performed more frequently if at higher risk)  OR  FOBT/FIT: every 1 year  OR  Cologuard: every 3 years  OR  Sigmoidoscopy: every 5 years  Screening may be recommended earlier than age 45 if at higher risk for colorectal cancer. Also, an individualized decision between you and your healthcare provider will decide whether screening between the ages of 76-85 would be appropriate. Colonoscopy: Not on file  FOBT/FIT: Not on file  Cologuard: Not on file  Sigmoidoscopy: Not on file    Screening Not Indicated     Prostate Cancer Screening Individualized decision between patient and health care provider in men between ages of 55-69   Medicare will cover every 12 months beginning on the day after your 50th birthday PSA: No results in last 5 years     Screening Not Indicated     Hepatitis C Screening Once for adults born between 1945 and 1965  More frequently in patients at high risk for Hepatitis C Hep C Antibody: 05/09/2019    Screening Current   Diabetes Screening 1-2 times per year if you're at risk for diabetes or have pre-diabetes Fasting glucose: 219 mg/dL (4/23/2025)  A1C: 7.1 (5/21/2025)  Screening Not  Indicated  History Diabetes   Cholesterol Screening Once every 5 years if you don't have a lipid disorder. May order more often based on risk factors. Lipid panel: 01/02/2025  Screening Not Indicated  History Lipid Disorder      Other Preventive Screenings Covered by Medicare:  Abdominal Aortic Aneurysm (AAA) Screening: covered once if your at risk. You're considered to be at risk if you have a family history of AAA or a male between the age of 65-75 who smoking at least 100 cigarettes in your lifetime.  Lung Cancer Screening: covers low dose CT scan once per year if you meet all of the following conditions: (1) Age 55-77; (2) No signs or symptoms of lung cancer; (3) Current smoker or have quit smoking within the last 15 years; (4) You have a tobacco smoking history of at least 20 pack years (packs per day x number of years you smoked); (5) You get a written order from a healthcare provider.  Glaucoma Screening: covered annually if you're considered high risk: (1) You have diabetes OR (2) Family history of glaucoma OR (3)  aged 50 and older OR (4)  American aged 65 and older  Osteoporosis Screening: covered every 2 years if you meet one of the following conditions: (1) Have a vertebral abnormality; (2) On glucocorticoid therapy for more than 3 months; (3) Have primary hyperparathyroidism; (4) On osteoporosis medications and need to assess response to drug therapy.  HIV Screening: covered annually if you're between the age of 15-65. Also covered annually if you are younger than 15 and older than 65 with risk factors for HIV infection. For pregnant patients, it is covered up to 3 times per pregnancy.    Immunizations:  Immunization Recommendations   Influenza Vaccine Annual influenza vaccination during flu season is recommended for all persons aged >= 6 months who do not have contraindications   Pneumococcal Vaccine   * Pneumococcal conjugate vaccine = PCV13 (Prevnar 13), PCV15 (Vaxneuvance),  PCV20 (Prevnar 20)  * Pneumococcal polysaccharide vaccine = PPSV23 (Pneumovax) Adults 19-65 yo with certain risk factors or if 65+ yo  If never received any pneumonia vaccine: recommend Prevnar 20 (PCV20)  Give PCV20 if previously received 1 dose of PCV13 or PPSV23   Hepatitis B Vaccine 3 dose series if at intermediate or high risk (ex: diabetes, end stage renal disease, liver disease)   Respiratory syncytial virus (RSV) Vaccine - COVERED BY MEDICARE PART D  * RSVPreF3 (Arexvy) CDC recommends that adults 60 years of age and older may receive a single dose of RSV vaccine using shared clinical decision-making (SCDM)   Tetanus (Td) Vaccine - COST NOT COVERED BY MEDICARE PART B Following completion of primary series, a booster dose should be given every 10 years to maintain immunity against tetanus. Td may also be given as tetanus wound prophylaxis.   Tdap Vaccine - COST NOT COVERED BY MEDICARE PART B Recommended at least once for all adults. For pregnant patients, recommended with each pregnancy.   Shingles Vaccine (Shingrix) - COST NOT COVERED BY MEDICARE PART B  2 shot series recommended in those 19 years and older who have or will have weakened immune systems or those 50 years and older     Health Maintenance Due:      Topic Date Due   • Hepatitis C Screening  Completed     Immunizations Due:      Topic Date Due   • COVID-19 Vaccine (4 - 2024-25 season) 09/01/2024     Advance Directives   What are advance directives?  Advance directives are legal documents that state your wishes and plans for medical care. These plans are made ahead of time in case you lose your ability to make decisions for yourself. Advance directives can apply to any medical decision, such as the treatments you want, and if you want to donate organs.   What are the types of advance directives?  There are many types of advance directives, and each state has rules about how to use them. You may choose a combination of any of the following:  Living  will:  This is a written record of the treatment you want. You can also choose which treatments you do not want, which to limit, and which to stop at a certain time. This includes surgery, medicine, IV fluid, and tube feedings.   Durable power of  for healthcare (DPAHC):  This is a written record that states who you want to make healthcare choices for you when you are unable to make them for yourself. This person, called a proxy, is usually a family member or a friend. You may choose more than 1 proxy.  Do not resuscitate (DNR) order:  A DNR order is used in case your heart stops beating or you stop breathing. It is a request not to have certain forms of treatment, such as CPR. A DNR order may be included in other types of advance directives.  Medical directive:  This covers the care that you want if you are in a coma, near death, or unable to make decisions for yourself. You can list the treatments you want for each condition. Treatment may include pain medicine, surgery, blood transfusions, dialysis, IV or tube feedings, and a ventilator (breathing machine).  Values history:  This document has questions about your views, beliefs, and how you feel and think about life. This information can help others choose the care that you would choose.  Why are advance directives important?  An advance directive helps you control your care. Although spoken wishes may be used, it is better to have your wishes written down. Spoken wishes can be misunderstood, or not followed. Treatments may be given even if you do not want them. An advance directive may make it easier for your family to make difficult choices about your care.       © Copyright [employer] 2018 Information is for End User's use only and may not be sold, redistributed or otherwise used for commercial purposes. All illustrations and images included in CareNotes® are the copyrighted property of [employer] or [employer] Medicare Preventive  Visit Patient Instructions  Thank you for completing your Welcome to Medicare Visit or Medicare Annual Wellness Visit today. Your next wellness visit will be due in one year (5/22/2026).  The screening/preventive services that you may require over the next 5-10 years are detailed below. Some tests may not apply to you based off risk factors and/or age. Screening tests ordered at today's visit but not completed yet may show as past due. Also, please note that scanned in results may not display below.  Preventive Screenings:  Service Recommendations Previous Testing/Comments   Colorectal Cancer Screening  Colonoscopy    Fecal Occult Blood Test (FOBT)/Fecal Immunochemical Test (FIT)  Fecal DNA/Cologuard Test  Flexible Sigmoidoscopy Age: 45-75 years old   Colonoscopy: every 10 years (May be performed more frequently if at higher risk)  OR  FOBT/FIT: every 1 year  OR  Cologuard: every 3 years  OR  Sigmoidoscopy: every 5 years  Screening may be recommended earlier than age 45 if at higher risk for colorectal cancer. Also, an individualized decision between you and your healthcare provider will decide whether screening between the ages of 76-85 would be appropriate. Colonoscopy: Not on file  FOBT/FIT: Not on file  Cologuard: Not on file  Sigmoidoscopy: Not on file    Screening Not Indicated     Prostate Cancer Screening Individualized decision between patient and health care provider in men between ages of 55-69   Medicare will cover every 12 months beginning on the day after your 50th birthday PSA: No results in last 5 years     Screening Not Indicated     Hepatitis C Screening Once for adults born between 1945 and 1965  More frequently in patients at high risk for Hepatitis C Hep C Antibody: 05/09/2019    Screening Current   Diabetes Screening 1-2 times per year if you're at risk for diabetes or have pre-diabetes Fasting glucose: 219 mg/dL (4/23/2025)  A1C: 7.1 (5/21/2025)  Screening Not Indicated  History Diabetes    Cholesterol Screening Once every 5 years if you don't have a lipid disorder. May order more often based on risk factors. Lipid panel: 01/02/2025  Screening Not Indicated  History Lipid Disorder      Other Preventive Screenings Covered by Medicare:  Abdominal Aortic Aneurysm (AAA) Screening: covered once if your at risk. You're considered to be at risk if you have a family history of AAA or a male between the age of 65-75 who smoking at least 100 cigarettes in your lifetime.  Lung Cancer Screening: covers low dose CT scan once per year if you meet all of the following conditions: (1) Age 55-77; (2) No signs or symptoms of lung cancer; (3) Current smoker or have quit smoking within the last 15 years; (4) You have a tobacco smoking history of at least 20 pack years (packs per day x number of years you smoked); (5) You get a written order from a healthcare provider.  Glaucoma Screening: covered annually if you're considered high risk: (1) You have diabetes OR (2) Family history of glaucoma OR (3)  aged 50 and older OR (4)  American aged 65 and older  Osteoporosis Screening: covered every 2 years if you meet one of the following conditions: (1) Have a vertebral abnormality; (2) On glucocorticoid therapy for more than 3 months; (3) Have primary hyperparathyroidism; (4) On osteoporosis medications and need to assess response to drug therapy.  HIV Screening: covered annually if you're between the age of 15-65. Also covered annually if you are younger than 15 and older than 65 with risk factors for HIV infection. For pregnant patients, it is covered up to 3 times per pregnancy.    Immunizations:  Immunization Recommendations   Influenza Vaccine Annual influenza vaccination during flu season is recommended for all persons aged >= 6 months who do not have contraindications   Pneumococcal Vaccine   * Pneumococcal conjugate vaccine = PCV13 (Prevnar 13), PCV15 (Vaxneuvance), PCV20 (Prevnar 20)  *  Pneumococcal polysaccharide vaccine = PPSV23 (Pneumovax) Adults 19-65 yo with certain risk factors or if 65+ yo  If never received any pneumonia vaccine: recommend Prevnar 20 (PCV20)  Give PCV20 if previously received 1 dose of PCV13 or PPSV23   Hepatitis B Vaccine 3 dose series if at intermediate or high risk (ex: diabetes, end stage renal disease, liver disease)   Respiratory syncytial virus (RSV) Vaccine - COVERED BY MEDICARE PART D  * RSVPreF3 (Arexvy) CDC recommends that adults 60 years of age and older may receive a single dose of RSV vaccine using shared clinical decision-making (SCDM)   Tetanus (Td) Vaccine - COST NOT COVERED BY MEDICARE PART B Following completion of primary series, a booster dose should be given every 10 years to maintain immunity against tetanus. Td may also be given as tetanus wound prophylaxis.   Tdap Vaccine - COST NOT COVERED BY MEDICARE PART B Recommended at least once for all adults. For pregnant patients, recommended with each pregnancy.   Shingles Vaccine (Shingrix) - COST NOT COVERED BY MEDICARE PART B  2 shot series recommended in those 19 years and older who have or will have weakened immune systems or those 50 years and older     Health Maintenance Due:      Topic Date Due   • Hepatitis C Screening  Completed     Immunizations Due:      Topic Date Due   • COVID-19 Vaccine (4 - 2024-25 season) 09/01/2024     Advance Directives   What are advance directives?  Advance directives are legal documents that state your wishes and plans for medical care. These plans are made ahead of time in case you lose your ability to make decisions for yourself. Advance directives can apply to any medical decision, such as the treatments you want, and if you want to donate organs.   What are the types of advance directives?  There are many types of advance directives, and each state has rules about how to use them. You may choose a combination of any of the following:  Living will:  This is a  written record of the treatment you want. You can also choose which treatments you do not want, which to limit, and which to stop at a certain time. This includes surgery, medicine, IV fluid, and tube feedings.   Durable power of  for healthcare (DPAHC):  This is a written record that states who you want to make healthcare choices for you when you are unable to make them for yourself. This person, called a proxy, is usually a family member or a friend. You may choose more than 1 proxy.  Do not resuscitate (DNR) order:  A DNR order is used in case your heart stops beating or you stop breathing. It is a request not to have certain forms of treatment, such as CPR. A DNR order may be included in other types of advance directives.  Medical directive:  This covers the care that you want if you are in a coma, near death, or unable to make decisions for yourself. You can list the treatments you want for each condition. Treatment may include pain medicine, surgery, blood transfusions, dialysis, IV or tube feedings, and a ventilator (breathing machine).  Values history:  This document has questions about your views, beliefs, and how you feel and think about life. This information can help others choose the care that you would choose.  Why are advance directives important?  An advance directive helps you control your care. Although spoken wishes may be used, it is better to have your wishes written down. Spoken wishes can be misunderstood, or not followed. Treatments may be given even if you do not want them. An advance directive may make it easier for your family to make difficult choices about your care.       © Copyright [employer] 2018 Information is for End User's use only and may not be sold, redistributed or otherwise used for commercial purposes. All illustrations and images included in CareNotes® are the copyrighted property of [employer] or [employer]

## 2025-05-21 NOTE — ASSESSMENT & PLAN NOTE
Lab Results   Component Value Date    HGBA1C 7.1 (A) 05/21/2025   Continue with dietary modification.

## 2025-05-22 ENCOUNTER — RESULTS FOLLOW-UP (OUTPATIENT)
Dept: HEMATOLOGY ONCOLOGY | Facility: CLINIC | Age: OVER 89
End: 2025-05-22

## 2025-05-22 DIAGNOSIS — D50.0 IRON DEFICIENCY ANEMIA DUE TO CHRONIC BLOOD LOSS: Primary | ICD-10-CM

## 2025-05-22 DIAGNOSIS — C25.9 MALIGNANT NEOPLASM OF PANCREAS, UNSPECIFIED LOCATION OF MALIGNANCY (HCC): ICD-10-CM

## 2025-05-22 DIAGNOSIS — N18.32 CHRONIC KIDNEY DISEASE, STAGE 3B (HCC): ICD-10-CM

## 2025-05-22 DIAGNOSIS — D3A.8 PRIMARY PANCREATIC NEUROENDOCRINE TUMOR: ICD-10-CM

## 2025-05-22 DIAGNOSIS — D62 ACUTE BLOOD LOSS ANEMIA: ICD-10-CM

## 2025-05-22 NOTE — TELEPHONE ENCOUNTER
----- Message from Shoshana Fuentes DO sent at 5/22/2025  1:09 PM EDT -----  Please notify patient that his hemoglobin has decreased to 7.8 g/deciliter.  Recommend repeat CBC in 1 week.  ----- Message -----  From: Lab, Background User  Sent: 5/21/2025   2:15 PM EDT  To: Shoshana Fuentes DO

## 2025-05-22 NOTE — TELEPHONE ENCOUNTER
Telephone call spoke with Pt's spouse.  Reviewed Dr Gilmore's note.  Pt will go either next Wednesday or Thursday for repeat cbcd and blood bank hold tube.

## 2025-05-27 ENCOUNTER — PATIENT MESSAGE (OUTPATIENT)
Age: OVER 89
End: 2025-05-27

## 2025-05-27 DIAGNOSIS — N30.00 ACUTE CYSTITIS WITHOUT HEMATURIA: ICD-10-CM

## 2025-05-27 DIAGNOSIS — R33.9 URINARY RETENTION: Primary | ICD-10-CM

## 2025-05-27 RX ORDER — SULFAMETHOXAZOLE AND TRIMETHOPRIM 800; 160 MG/1; MG/1
1 TABLET ORAL 2 TIMES DAILY
Qty: 6 TABLET | Refills: 0 | Status: SHIPPED | OUTPATIENT
Start: 2025-05-27 | End: 2025-05-30

## 2025-05-28 ENCOUNTER — APPOINTMENT (OUTPATIENT)
Age: OVER 89
End: 2025-05-28
Attending: INTERNAL MEDICINE
Payer: MEDICARE

## 2025-05-28 ENCOUNTER — RESULTS FOLLOW-UP (OUTPATIENT)
Dept: HEMATOLOGY ONCOLOGY | Facility: CLINIC | Age: OVER 89
End: 2025-05-28

## 2025-05-28 DIAGNOSIS — D50.0 IRON DEFICIENCY ANEMIA DUE TO CHRONIC BLOOD LOSS: ICD-10-CM

## 2025-05-28 DIAGNOSIS — C25.9 MALIGNANT NEOPLASM OF PANCREAS, UNSPECIFIED LOCATION OF MALIGNANCY (HCC): ICD-10-CM

## 2025-05-28 DIAGNOSIS — N18.32 CHRONIC KIDNEY DISEASE, STAGE 3B (HCC): ICD-10-CM

## 2025-05-28 DIAGNOSIS — D56.3 BETA THALASSEMIA MINOR: ICD-10-CM

## 2025-05-28 DIAGNOSIS — D62 ACUTE BLOOD LOSS ANEMIA: ICD-10-CM

## 2025-05-28 DIAGNOSIS — D3A.8 PRIMARY PANCREATIC NEUROENDOCRINE TUMOR: ICD-10-CM

## 2025-05-28 LAB
ALBUMIN SERPL BCG-MCNC: 4 G/DL (ref 3.5–5)
ALP SERPL-CCNC: 73 U/L (ref 34–104)
ALT SERPL W P-5'-P-CCNC: 9 U/L (ref 7–52)
ANION GAP SERPL CALCULATED.3IONS-SCNC: 9 MMOL/L (ref 4–13)
AST SERPL W P-5'-P-CCNC: 13 U/L (ref 13–39)
BASOPHILS # BLD AUTO: 0.06 THOUSANDS/ÂΜL (ref 0–0.1)
BASOPHILS NFR BLD AUTO: 1 % (ref 0–1)
BILIRUB SERPL-MCNC: 0.4 MG/DL (ref 0.2–1)
BUN SERPL-MCNC: 45 MG/DL (ref 5–25)
CALCIUM SERPL-MCNC: 7.8 MG/DL (ref 8.4–10.2)
CHLORIDE SERPL-SCNC: 107 MMOL/L (ref 96–108)
CO2 SERPL-SCNC: 25 MMOL/L (ref 21–32)
CREAT SERPL-MCNC: 1.83 MG/DL (ref 0.6–1.3)
EOSINOPHIL # BLD AUTO: 0.23 THOUSAND/ÂΜL (ref 0–0.61)
EOSINOPHIL NFR BLD AUTO: 3 % (ref 0–6)
ERYTHROCYTE [DISTWIDTH] IN BLOOD BY AUTOMATED COUNT: 15.9 % (ref 11.6–15.1)
GFR SERPL CREATININE-BSD FRML MDRD: 31 ML/MIN/1.73SQ M
GLUCOSE SERPL-MCNC: 280 MG/DL (ref 65–140)
HCT VFR BLD AUTO: 25.3 % (ref 36.5–49.3)
HGB BLD-MCNC: 7.4 G/DL (ref 12–17)
IMM GRANULOCYTES # BLD AUTO: 0.04 THOUSAND/UL (ref 0–0.2)
IMM GRANULOCYTES NFR BLD AUTO: 1 % (ref 0–2)
LYMPHOCYTES # BLD AUTO: 0.86 THOUSANDS/ÂΜL (ref 0.6–4.47)
LYMPHOCYTES NFR BLD AUTO: 12 % (ref 14–44)
MCH RBC QN AUTO: 20.8 PG (ref 26.8–34.3)
MCHC RBC AUTO-ENTMCNC: 29.2 G/DL (ref 31.4–37.4)
MCV RBC AUTO: 71 FL (ref 82–98)
MONOCYTES # BLD AUTO: 0.54 THOUSAND/ÂΜL (ref 0.17–1.22)
MONOCYTES NFR BLD AUTO: 8 % (ref 4–12)
NEUTROPHILS # BLD AUTO: 5.46 THOUSANDS/ÂΜL (ref 1.85–7.62)
NEUTS SEG NFR BLD AUTO: 75 % (ref 43–75)
NRBC BLD AUTO-RTO: 0 /100 WBCS
PLATELET # BLD AUTO: 203 THOUSANDS/UL (ref 149–390)
PMV BLD AUTO: 11.3 FL (ref 8.9–12.7)
POTASSIUM SERPL-SCNC: 4.3 MMOL/L (ref 3.5–5.3)
PROT SERPL-MCNC: 6.4 G/DL (ref 6.4–8.4)
RBC # BLD AUTO: 3.56 MILLION/UL (ref 3.88–5.62)
SODIUM SERPL-SCNC: 141 MMOL/L (ref 135–147)
WBC # BLD AUTO: 7.19 THOUSAND/UL (ref 4.31–10.16)

## 2025-05-28 PROCEDURE — 36415 COLL VENOUS BLD VENIPUNCTURE: CPT

## 2025-05-28 PROCEDURE — 80053 COMPREHEN METABOLIC PANEL: CPT

## 2025-05-28 PROCEDURE — 85025 COMPLETE CBC W/AUTO DIFF WBC: CPT

## 2025-05-29 ENCOUNTER — HOME CARE VISIT (OUTPATIENT)
Dept: HOME HEALTH SERVICES | Facility: HOME HEALTHCARE | Age: OVER 89
End: 2025-05-29
Payer: MEDICARE

## 2025-05-29 VITALS
DIASTOLIC BLOOD PRESSURE: 70 MMHG | HEART RATE: 74 BPM | RESPIRATION RATE: 16 BRPM | SYSTOLIC BLOOD PRESSURE: 148 MMHG | OXYGEN SATURATION: 96 % | TEMPERATURE: 98.2 F

## 2025-05-29 PROCEDURE — G0299 HHS/HOSPICE OF RN EA 15 MIN: HCPCS

## 2025-05-29 NOTE — TELEPHONE ENCOUNTER
Pt's daughter called back requesting to pass message along to Dr. Fuentes's team.    She had spoken with her Dad, he denies feeling dizzy or weak, but is more tired lately.

## 2025-05-29 NOTE — RESULT ENCOUNTER NOTE
Dr Fuentes updated that dtr called back stating   She had spoken with her Dad, he denies feeling dizzy or weak, but is more tired lately.   Per Dr Fuentes will repeat labs in 1 week.

## 2025-05-29 NOTE — TELEPHONE ENCOUNTER
Telephone call left voice message on Dtr's voice mail.  Reviewed Dr Fuentes's note.  Instructed to call me back with an update as to how Pt is feeling.

## 2025-05-29 NOTE — TELEPHONE ENCOUNTER
Patient's daughter, Parul, called back. States she lives a few hours away but he has not reported being any more symptomatic to her. States she isn't sure he would say much if he was feeling worse because he isn't a big complainer. She will call to double check and see how he's feeling. She requests a call back if Dr. Fuentes feels he should have labs any sooner than 1 week, otherwise they will proceed as planned. She will call back only if patient does report any concerning symptoms to her.

## 2025-05-29 NOTE — TELEPHONE ENCOUNTER
----- Message from Shoshana Fuentes DO sent at 5/28/2025  5:29 PM EDT -----  Please notify pt that his Hg decreased to 7.4 g/dL. Any symptoms? Repeat CBC in 1 week.   ----- Message -----  From: Lab, Background User  Sent: 5/28/2025  12:36 PM EDT  To: Shoshana Fuentes, DO

## 2025-06-02 DIAGNOSIS — E11.00 TYPE 2 DIABETES MELLITUS WITH HYPEROSMOLARITY WITHOUT COMA, WITHOUT LONG-TERM CURRENT USE OF INSULIN (HCC): ICD-10-CM

## 2025-06-03 RX ORDER — DAPAGLIFLOZIN 5 MG/1
5 TABLET, FILM COATED ORAL DAILY
Qty: 90 TABLET | Refills: 0 | Status: SHIPPED | OUTPATIENT
Start: 2025-06-03

## 2025-06-04 ENCOUNTER — APPOINTMENT (OUTPATIENT)
Age: OVER 89
End: 2025-06-04
Payer: MEDICARE

## 2025-06-04 DIAGNOSIS — D3A.8 PRIMARY PANCREATIC NEUROENDOCRINE TUMOR: ICD-10-CM

## 2025-06-04 DIAGNOSIS — D50.0 IRON DEFICIENCY ANEMIA DUE TO CHRONIC BLOOD LOSS: ICD-10-CM

## 2025-06-04 DIAGNOSIS — D62 ACUTE BLOOD LOSS ANEMIA: ICD-10-CM

## 2025-06-04 DIAGNOSIS — D56.3 BETA THALASSEMIA MINOR: ICD-10-CM

## 2025-06-04 DIAGNOSIS — N18.32 CHRONIC KIDNEY DISEASE, STAGE 3B (HCC): ICD-10-CM

## 2025-06-04 LAB
ALBUMIN SERPL BCG-MCNC: 4.2 G/DL (ref 3.5–5)
ALP SERPL-CCNC: 81 U/L (ref 34–104)
ALT SERPL W P-5'-P-CCNC: 12 U/L (ref 7–52)
ANION GAP SERPL CALCULATED.3IONS-SCNC: 9 MMOL/L (ref 4–13)
AST SERPL W P-5'-P-CCNC: 16 U/L (ref 13–39)
BASOPHILS # BLD AUTO: 0.06 THOUSANDS/ÂΜL (ref 0–0.1)
BASOPHILS NFR BLD AUTO: 1 % (ref 0–1)
BILIRUB SERPL-MCNC: 0.41 MG/DL (ref 0.2–1)
BUN SERPL-MCNC: 41 MG/DL (ref 5–25)
CALCIUM SERPL-MCNC: 7.9 MG/DL (ref 8.4–10.2)
CHLORIDE SERPL-SCNC: 108 MMOL/L (ref 96–108)
CO2 SERPL-SCNC: 26 MMOL/L (ref 21–32)
CREAT SERPL-MCNC: 1.76 MG/DL (ref 0.6–1.3)
EOSINOPHIL # BLD AUTO: 0.28 THOUSAND/ÂΜL (ref 0–0.61)
EOSINOPHIL NFR BLD AUTO: 4 % (ref 0–6)
ERYTHROCYTE [DISTWIDTH] IN BLOOD BY AUTOMATED COUNT: 16.2 % (ref 11.6–15.1)
GFR SERPL CREATININE-BSD FRML MDRD: 33 ML/MIN/1.73SQ M
GLUCOSE SERPL-MCNC: 185 MG/DL (ref 65–140)
HCT VFR BLD AUTO: 25.5 % (ref 36.5–49.3)
HGB BLD-MCNC: 7.1 G/DL (ref 12–17)
IMM GRANULOCYTES # BLD AUTO: 0.04 THOUSAND/UL (ref 0–0.2)
IMM GRANULOCYTES NFR BLD AUTO: 1 % (ref 0–2)
LYMPHOCYTES # BLD AUTO: 0.91 THOUSANDS/ÂΜL (ref 0.6–4.47)
LYMPHOCYTES NFR BLD AUTO: 14 % (ref 14–44)
MCH RBC QN AUTO: 20.3 PG (ref 26.8–34.3)
MCHC RBC AUTO-ENTMCNC: 27.8 G/DL (ref 31.4–37.4)
MCV RBC AUTO: 73 FL (ref 82–98)
MONOCYTES # BLD AUTO: 0.65 THOUSAND/ÂΜL (ref 0.17–1.22)
MONOCYTES NFR BLD AUTO: 10 % (ref 4–12)
NEUTROPHILS # BLD AUTO: 4.45 THOUSANDS/ÂΜL (ref 1.85–7.62)
NEUTS SEG NFR BLD AUTO: 70 % (ref 43–75)
NRBC BLD AUTO-RTO: 0 /100 WBCS
PLATELET # BLD AUTO: 221 THOUSANDS/UL (ref 149–390)
PMV BLD AUTO: 11.7 FL (ref 8.9–12.7)
POTASSIUM SERPL-SCNC: 4.7 MMOL/L (ref 3.5–5.3)
PROT SERPL-MCNC: 6.7 G/DL (ref 6.4–8.4)
RBC # BLD AUTO: 3.5 MILLION/UL (ref 3.88–5.62)
SODIUM SERPL-SCNC: 143 MMOL/L (ref 135–147)
WBC # BLD AUTO: 6.39 THOUSAND/UL (ref 4.31–10.16)

## 2025-06-04 PROCEDURE — 80053 COMPREHEN METABOLIC PANEL: CPT

## 2025-06-04 PROCEDURE — 85025 COMPLETE CBC W/AUTO DIFF WBC: CPT

## 2025-06-04 PROCEDURE — 86870 RBC ANTIBODY IDENTIFICATION: CPT

## 2025-06-04 PROCEDURE — 86900 BLOOD TYPING SEROLOGIC ABO: CPT | Performed by: INTERNAL MEDICINE

## 2025-06-04 PROCEDURE — 86901 BLOOD TYPING SEROLOGIC RH(D): CPT | Performed by: INTERNAL MEDICINE

## 2025-06-04 PROCEDURE — 36415 COLL VENOUS BLD VENIPUNCTURE: CPT

## 2025-06-04 PROCEDURE — 86850 RBC ANTIBODY SCREEN: CPT | Performed by: INTERNAL MEDICINE

## 2025-06-05 ENCOUNTER — RESULTS FOLLOW-UP (OUTPATIENT)
Dept: HEMATOLOGY ONCOLOGY | Facility: CLINIC | Age: OVER 89
End: 2025-06-05

## 2025-06-05 LAB
ABO GROUP BLD: NORMAL
BLD GP AB SCN SERPL QL: POSITIVE
BLOOD GROUP ANTIBODIES SERPL: NORMAL
RH BLD: POSITIVE
SPECIMEN EXPIRATION DATE: NORMAL

## 2025-06-05 RX ORDER — SODIUM CHLORIDE 9 MG/ML
20 INJECTION, SOLUTION INTRAVENOUS ONCE
OUTPATIENT
Start: 2025-06-06

## 2025-06-05 RX ORDER — SODIUM CHLORIDE 9 MG/ML
20 INJECTION, SOLUTION INTRAVENOUS ONCE
Status: CANCELLED | OUTPATIENT
Start: 2025-06-06

## 2025-06-05 NOTE — TELEPHONE ENCOUNTER
Telephone call spoke with Pt's dtr.  Pt is ok with going to [facility] infusion tomorrow at 0930 for 1 unit of prbc as his Hgb was 7.1 and Pt is feeling tired.  He had a blood bank hold tube drawn yesterday.

## 2025-06-05 NOTE — TELEPHONE ENCOUNTER
----- Message from Shoshana Fuentes DO sent at 6/5/2025  7:52 AM EDT -----  Please notify patient that his hemoglobin has decreased to 7.1 g/dL.  Any symptoms?  Recommend 1 unit PRBC transfusion.  Repeat CBC 1 week after transfusion.  ----- Message -----  From: Lab, Background User  Sent: 6/4/2025   4:20 PM EDT  To: Shoshana Fuentes DO

## 2025-06-06 ENCOUNTER — HOSPITAL ENCOUNTER (OUTPATIENT)
Dept: INFUSION CENTER | Facility: HOSPITAL | Age: OVER 89
End: 2025-06-06
Payer: MEDICARE

## 2025-06-06 VITALS
TEMPERATURE: 97.4 F | DIASTOLIC BLOOD PRESSURE: 65 MMHG | HEART RATE: 68 BPM | SYSTOLIC BLOOD PRESSURE: 155 MMHG | RESPIRATION RATE: 18 BRPM

## 2025-06-06 DIAGNOSIS — C25.9 MALIGNANT NEOPLASM OF PANCREAS, UNSPECIFIED LOCATION OF MALIGNANCY (HCC): ICD-10-CM

## 2025-06-06 DIAGNOSIS — D62 ACUTE BLOOD LOSS ANEMIA: Primary | ICD-10-CM

## 2025-06-06 PROCEDURE — 86922 COMPATIBILITY TEST ANTIGLOB: CPT

## 2025-06-06 PROCEDURE — 86921 COMPATIBILITY TEST INCUBATE: CPT

## 2025-06-06 PROCEDURE — P9016 RBC LEUKOCYTES REDUCED: HCPCS

## 2025-06-06 PROCEDURE — 36430 TRANSFUSION BLD/BLD COMPNT: CPT

## 2025-06-06 PROCEDURE — 86902 BLOOD TYPE ANTIGEN DONOR EA: CPT

## 2025-06-06 RX ORDER — SODIUM CHLORIDE 9 MG/ML
20 INJECTION, SOLUTION INTRAVENOUS ONCE
Status: COMPLETED | OUTPATIENT
Start: 2025-06-06 | End: 2025-06-06

## 2025-06-06 RX ADMIN — SODIUM CHLORIDE 20 ML/HR: 0.9 INJECTION, SOLUTION INTRAVENOUS at 09:57

## 2025-06-06 NOTE — PROGRESS NOTES
Patient tolerated 1 unit PRBC without complications. Next appointment scheduled 6/13 at 9:30am-[facility]. AVS given.

## 2025-06-09 ENCOUNTER — HOME CARE VISIT (OUTPATIENT)
Dept: HOME HEALTH SERVICES | Facility: HOME HEALTHCARE | Age: OVER 89
End: 2025-06-09

## 2025-06-09 VITALS
DIASTOLIC BLOOD PRESSURE: 64 MMHG | SYSTOLIC BLOOD PRESSURE: 144 MMHG | TEMPERATURE: 97.5 F | OXYGEN SATURATION: 98 % | RESPIRATION RATE: 16 BRPM | HEART RATE: 78 BPM

## 2025-06-09 PROCEDURE — G0299 HHS/HOSPICE OF RN EA 15 MIN: HCPCS

## 2025-06-10 ENCOUNTER — HOSPITAL ENCOUNTER (EMERGENCY)
Facility: HOSPITAL | Age: OVER 89
Discharge: HOME | End: 2025-06-10
Admitting: EMERGENCY MEDICINE
Payer: MEDICARE

## 2025-06-10 ENCOUNTER — APPOINTMENT (EMERGENCY)
Dept: RADIOLOGY | Facility: HOSPITAL | Age: OVER 89
End: 2025-06-10
Payer: MEDICARE

## 2025-06-10 VITALS
SYSTOLIC BLOOD PRESSURE: 176 MMHG | WEIGHT: 150.79 LBS | TEMPERATURE: 97.9 F | OXYGEN SATURATION: 95 % | HEART RATE: 76 BPM | RESPIRATION RATE: 20 BRPM | BODY MASS INDEX: 22.27 KG/M2 | DIASTOLIC BLOOD PRESSURE: 77 MMHG

## 2025-06-10 DIAGNOSIS — R07.9 CHEST PAIN: Primary | ICD-10-CM

## 2025-06-10 LAB
2HR DELTA HS TROPONIN: 3 NG/L
ALBUMIN SERPL BCG-MCNC: 4.1 G/DL (ref 3.5–5)
ALP SERPL-CCNC: 76 U/L (ref 34–104)
ALT SERPL W P-5'-P-CCNC: 8 U/L (ref 7–52)
ANION GAP SERPL CALCULATED.3IONS-SCNC: 10 MMOL/L (ref 4–13)
AST SERPL W P-5'-P-CCNC: 14 U/L (ref 13–39)
ATRIAL RATE: 76 BPM
ATRIAL RATE: 81 BPM
ATRIAL RATE: 89 BPM
BASOPHILS # BLD AUTO: 0.04 THOUSANDS/ÂΜL (ref 0–0.1)
BASOPHILS NFR BLD AUTO: 1 % (ref 0–1)
BILIRUB SERPL-MCNC: 0.41 MG/DL (ref 0.2–1)
BUN SERPL-MCNC: 45 MG/DL (ref 5–25)
CALCIUM SERPL-MCNC: 8 MG/DL (ref 8.4–10.2)
CARDIAC TROPONIN I PNL SERPL HS: 10 NG/L (ref ?–50)
CARDIAC TROPONIN I PNL SERPL HS: 13 NG/L (ref ?–50)
CHLORIDE SERPL-SCNC: 108 MMOL/L (ref 96–108)
CO2 SERPL-SCNC: 22 MMOL/L (ref 21–32)
CREAT SERPL-MCNC: 1.61 MG/DL (ref 0.6–1.3)
EOSINOPHIL # BLD AUTO: 0.41 THOUSAND/ÂΜL (ref 0–0.61)
EOSINOPHIL NFR BLD AUTO: 6 % (ref 0–6)
ERYTHROCYTE [DISTWIDTH] IN BLOOD BY AUTOMATED COUNT: 17.8 % (ref 11.6–15.1)
GFR SERPL CREATININE-BSD FRML MDRD: 37 ML/MIN/1.73SQ M
GLUCOSE SERPL-MCNC: 206 MG/DL (ref 65–140)
HCT VFR BLD AUTO: 29 % (ref 36.5–49.3)
HGB BLD-MCNC: 8.6 G/DL (ref 12–17)
IMM GRANULOCYTES # BLD AUTO: 0.04 THOUSAND/UL (ref 0–0.2)
IMM GRANULOCYTES NFR BLD AUTO: 1 % (ref 0–2)
LYMPHOCYTES # BLD AUTO: 1.17 THOUSANDS/ÂΜL (ref 0.6–4.47)
LYMPHOCYTES NFR BLD AUTO: 16 % (ref 14–44)
MCH RBC QN AUTO: 21.3 PG (ref 26.8–34.3)
MCHC RBC AUTO-ENTMCNC: 29.7 G/DL (ref 31.4–37.4)
MCV RBC AUTO: 72 FL (ref 82–98)
MONOCYTES # BLD AUTO: 0.65 THOUSAND/ÂΜL (ref 0.17–1.22)
MONOCYTES NFR BLD AUTO: 9 % (ref 4–12)
NEUTROPHILS # BLD AUTO: 4.99 THOUSANDS/ÂΜL (ref 1.85–7.62)
NEUTS SEG NFR BLD AUTO: 67 % (ref 43–75)
NRBC BLD AUTO-RTO: 0 /100 WBCS
P AXIS: 67 DEGREES
P AXIS: 72 DEGREES
P AXIS: 72 DEGREES
PLATELET # BLD AUTO: 189 THOUSANDS/UL (ref 149–390)
PMV BLD AUTO: 10.4 FL (ref 8.9–12.7)
POTASSIUM SERPL-SCNC: 4.2 MMOL/L (ref 3.5–5.3)
PR INTERVAL: 152 MS
PR INTERVAL: 158 MS
PR INTERVAL: 160 MS
PROT SERPL-MCNC: 6.9 G/DL (ref 6.4–8.4)
QRS AXIS: 49 DEGREES
QRS AXIS: 50 DEGREES
QRS AXIS: 54 DEGREES
QRSD INTERVAL: 78 MS
QT INTERVAL: 366 MS
QT INTERVAL: 382 MS
QT INTERVAL: 384 MS
QTC INTERVAL: 432 MS
QTC INTERVAL: 443 MS
QTC INTERVAL: 445 MS
RBC # BLD AUTO: 4.04 MILLION/UL (ref 3.88–5.62)
SODIUM SERPL-SCNC: 140 MMOL/L (ref 135–147)
T WAVE AXIS: 49 DEGREES
T WAVE AXIS: 51 DEGREES
T WAVE AXIS: 52 DEGREES
VENTRICULAR RATE: 76 BPM
VENTRICULAR RATE: 81 BPM
VENTRICULAR RATE: 89 BPM
WBC # BLD AUTO: 7.3 THOUSAND/UL (ref 4.31–10.16)

## 2025-06-10 PROCEDURE — 400014 VN F/U

## 2025-06-10 PROCEDURE — 85025 COMPLETE CBC W/AUTO DIFF WBC: CPT

## 2025-06-10 PROCEDURE — 36415 COLL VENOUS BLD VENIPUNCTURE: CPT

## 2025-06-10 PROCEDURE — 99285 EMERGENCY DEPT VISIT HI MDM: CPT

## 2025-06-10 PROCEDURE — 84484 ASSAY OF TROPONIN QUANT: CPT

## 2025-06-10 PROCEDURE — 93010 ELECTROCARDIOGRAM REPORT: CPT | Performed by: INTERNAL MEDICINE

## 2025-06-10 PROCEDURE — 93005 ELECTROCARDIOGRAM TRACING: CPT

## 2025-06-10 PROCEDURE — 80053 COMPREHEN METABOLIC PANEL: CPT

## 2025-06-10 PROCEDURE — 71046 X-RAY EXAM CHEST 2 VIEWS: CPT

## 2025-06-10 RX ORDER — SODIUM CHLORIDE 9 MG/ML
3 INJECTION INTRAVENOUS
Status: DISCONTINUED | OUTPATIENT
Start: 2025-06-10 | End: 2025-06-10 | Stop reason: HOSPADM

## 2025-06-10 NOTE — ED PROVIDER NOTES
Time reflects when diagnosis was documented in both MDM as applicable and the Disposition within this note       Time User Action Codes Description Comment    6/10/2025  6:32 AM Josemanuel Padilla Add [R07.9] Chest pain           ED Disposition       ED Disposition   Discharge    Condition   Stable    Date/Time   Tue Jos 10, 2025  6:32 AM    Comment   Jose ZULMA Zamora discharge to home/self care.                   Assessment & Plan       Medical Decision Making  Patient with history as below presented with chest pain. Patient presents hemodynamically stable with vitals with hypertension, otherwise within normal limits. I considered the patient's chronic medical conditions including their history of CAD in forming my differential diagnosis, plan, and my medical decision making. I also reviewed their external records including office visit 5/21/2025. History obtained from patient.    After initial evaluation differential diagnosis includes: ACS, arrhythmia, anemia, electrolyte disturbance.     Plan: Initial testing to include CBC, CMP, troponin, ECG, chest x-ray.  Will hold on therapeutics as patient now completely asymptomatic.    After initial evaluation and testing, ECG independently interpreted by myself without acute ischemic changes as below. Labs reviewed and unremarkable including negative troponin and delta troponin. Independently reviewed imaging without acute cardiopulmonary disease. Reassessed the patient and they continue to be well appearing and has remained completely asymptomatic since arrival. Given multiple serial ECGs all of which were nonischemic in nature, troponin along with delta troponin which were both reassuring, chest x-ray without acute cardiopulmonary disease, as well as the patient's lack of symptoms since arrival, I suspect the patient's presentation is due to a nonemergent cause of his chest pain that could be followed up closely as an outpatient with his primary care doctor/cardiologist.   Patient has a heart score of 5, therefore it was stressed the importance of following up with cardiology.  He already has established care with an outside cardiology group, however also placed a referral to our cardiology group. Stable for outpatient management.    Disposition: Discharged with instructions to obtain outpatient follow up of patient's symptoms and findings, with strict return precautions if patient develops new or worsening symptoms. Patient understands this plan and is agreeable. All questions answered. Patient discharged home with return precautions.    Amount and/or Complexity of Data Reviewed  Labs: ordered. Decision-making details documented in ED Course.  Radiology: ordered and independent interpretation performed.    Risk  Prescription drug management.        ED Course as of 06/10/25 0823   Tue Jos 10, 2025   0346 Procedure Note: EKG  Date/Time: 06/10/25 3:47 AM   Interpreted by: Josemanuel Padilla   Indications / Diagnosis: CP  ECG reviewed by me, the ED Provider: yes   The EKG demonstrates:  Rhythm: normal sinus  Intervals: normal intervals  Axis: normal axis  QRS/Blocks: normal QRS  ST Changes: No acute ST Changes, no STD/LONI.   0419 Procedure Note: EKG  Date/Time: 06/10/25 4:19 AM   Interpreted by: Josemanuel Padilla   Indications / Diagnosis: repeat CP  ECG reviewed by me, the ED Provider: yes   The EKG demonstrates:  Rhythm: normal sinus  Intervals: normal intervals  Axis: normal axis  QRS/Blocks: normal QRS  ST Changes: No acute ST Changes, no STD/LONI.   0427 hs TnI 0hr: 10  Will delta   0547 Procedure Note: EKG  Date/Time: 06/10/25 5:48 AM   Interpreted by: Josemanuel Padilla   Indications / Diagnosis: CP  ECG reviewed by me, the ED Provider: yes   The EKG demonstrates:  Rhythm: normal sinus  Intervals: normal intervals  Axis: normal axis  QRS/Blocks: normal QRS  ST Changes: No acute ST Changes, no STD/LONI.       Medications   sodium chloride (PF) 0.9 % injection 3 mL (has no administration in  time range)       ED Risk Strat Scores   HEART Risk Score      Flowsheet Row Most Recent Value   Heart Score Risk Calculator    History 1 Filed at: 06/10/2025 0449   ECG 0 Filed at: 06/10/2025 0449   Age 2 Filed at: 06/10/2025 0449   Risk Factors 2 Filed at: 06/10/2025 0449   Troponin 0 Filed at: 06/10/2025 0449   HEART Score 5 Filed at: 06/10/2025 0449          HEART Risk Score      Flowsheet Row Most Recent Value   Heart Score Risk Calculator    History 1 Filed at: 06/10/2025 0449   ECG 0 Filed at: 06/10/2025 0449   Age 2 Filed at: 06/10/2025 0449   Risk Factors 2 Filed at: 06/10/2025 0449   Troponin 0 Filed at: 06/10/2025 0449   HEART Score 5 Filed at: 06/10/2025 0449                      No data recorded        SBIRT 20yo+      Flowsheet Row Most Recent Value   Initial Alcohol Screen: US AUDIT-C     1. How often do you have a drink containing alcohol? 0 Filed at: 06/10/2025 0344   2. How many drinks containing alcohol do you have on a typical day you are drinking?  0 Filed at: 06/10/2025 0344   3b. FEMALE Any Age, or MALE 65+: How often do you have 4 or more drinks on one occassion? 0 Filed at: 06/10/2025 0344   Audit-C Score 0 Filed at: 06/10/2025 0344   DAISY: How many times in the past year have you...    Used an illegal drug or used a prescription medication for non-medical reasons? Never Filed at: 06/10/2025 0344                            History of Present Illness       Chief Complaint   Patient presents with    Chest Pain     Pt states x1 hour ago he got up to use the restroom and began with chest pain on the R side. Pt states he has hx of stent placement. 324 aspirin and 1 nitro given en route.        Past Medical History[1]   Past Surgical History[2]   Family History[3]   Social History[4]   E-Cigarette/Vaping    E-Cigarette Use Never User       E-Cigarette/Vaping Substances    Nicotine No     THC No     CBD No     Flavoring No     Other No     Unknown No       I have reviewed and agree with the history  as documented.     Patient is an 89-year-old male with significant past medical history of CAD, MI status post stenting, hypertension, presenting for evaluation of chest pain.  Patient reports that just prior to arrival he got up to use the bathroom when he experienced some tightness in the center of his chest.  There was no radiation of the pain.  There was some associated diaphoresis without any nausea or vomiting.  Patient denies shortness of breath.  He does not seem to think that this was exertional in nature.  He received aspirin as well as nitroglycerin en route to the hospital.  He states that his symptoms have completely resolved.  He is otherwise without acute complaint.        Review of Systems   Respiratory:  Negative for shortness of breath.    Cardiovascular:  Positive for chest pain. Negative for leg swelling.   Gastrointestinal:  Negative for abdominal pain, nausea and vomiting.           Objective       ED Triage Vitals   Temperature Pulse Blood Pressure Respirations SpO2 Patient Position - Orthostatic VS   06/10/25 0335 06/10/25 0338 06/10/25 0338 06/10/25 0338 06/10/25 0338 06/10/25 0338   97.9 °F (36.6 °C) 90 159/77 16 98 % Sitting      Temp src Heart Rate Source BP Location FiO2 (%) Pain Score    -- 06/10/25 0338 06/10/25 0338 -- --     Monitor Right arm        Vitals      Date and Time Temp Pulse SpO2 Resp BP Pain Score FACES Pain Rating User   06/10/25 0600 -- 76 95 % 20 176/77 -- -- MM   06/10/25 0500 -- 74 97 % 18 184/79 -- -- MM   06/10/25 0400 -- 76 97 % 14 175/79 -- -- EG   06/10/25 0338 -- 90 98 % 16 159/77 -- -- MM   06/10/25 0335 97.9 °F (36.6 °C) -- -- -- -- -- -- MM            Physical Exam  Vitals and nursing note reviewed.   Constitutional:       General: He is not in acute distress.     Appearance: Normal appearance. He is not ill-appearing or toxic-appearing.   HENT:      Head: Normocephalic and atraumatic.      Right Ear: External ear normal.      Left Ear: External ear normal.       Nose: Nose normal.     Eyes:      General: No scleral icterus.        Right eye: No discharge.         Left eye: No discharge.      Extraocular Movements: Extraocular movements intact.      Conjunctiva/sclera: Conjunctivae normal.       Cardiovascular:      Rate and Rhythm: Normal rate.      Heart sounds: Normal heart sounds. No murmur heard.     No friction rub. No gallop.   Pulmonary:      Effort: Pulmonary effort is normal. No respiratory distress.      Breath sounds: Normal breath sounds.   Abdominal:      General: Abdomen is flat. There is no distension.      Palpations: Abdomen is soft. There is no mass.      Tenderness: There is no abdominal tenderness.   Genitourinary:     Comments: Deferred    Skin:     General: Skin is warm and dry.     Neurological:      General: No focal deficit present.      Mental Status: He is alert.         Results Reviewed       Procedure Component Value Units Date/Time    HS Troponin I 2hr [[id number]]  (Normal) Collected: 06/10/25 0540    Lab Status: Final result Specimen: Blood from Arm, Left Updated: 06/10/25 0616     hs TnI 2hr 13 ng/L      Delta 2hr hsTnI 3 ng/L     HS Troponin 0hr (reflex protocol) [[id number]]  (Normal) Collected: 06/10/25 0345    Lab Status: Final result Specimen: Blood from Arm, Left Updated: 06/10/25 0422     hs TnI 0hr 10 ng/L     Comprehensive metabolic panel [[id number]]  (Abnormal) Collected: 06/10/25 0345    Lab Status: Final result Specimen: Blood from Arm, Left Updated: 06/10/25 0415     Sodium 140 mmol/L      Potassium 4.2 mmol/L      Chloride 108 mmol/L      CO2 22 mmol/L      ANION GAP 10 mmol/L      BUN 45 mg/dL      Creatinine 1.61 mg/dL      Glucose 206 mg/dL      Calcium 8.0 mg/dL      AST 14 U/L      ALT 8 U/L      Alkaline Phosphatase 76 U/L      Total Protein 6.9 g/dL      Albumin 4.1 g/dL      Total Bilirubin 0.41 mg/dL      eGFR 37 ml/min/1.73sq m     Narrative:      National Kid[employer] guidelines for Chronic Kidney  Disease (CKD):     Stage 1 with normal or high GFR (GFR > 90 mL/min/1.73 square meters)    Stage 2 Mild CKD (GFR = 60-89 mL/min/1.73 square meters)    Stage 3A Moderate CKD (GFR = 45-59 mL/min/1.73 square meters)    Stage 3B Moderate CKD (GFR = 30-44 mL/min/1.73 square meters)    Stage 4 Severe CKD (GFR = 15-29 mL/min/1.73 square meters)    Stage 5 End Stage CKD (GFR <15 mL/min/1.73 square meters)  Note: GFR calculation is accurate only with a steady state creatinine    CBC and differential [[id number]]  (Abnormal) Collected: 06/10/25 0345    Lab Status: Final result Specimen: Blood from Arm, Left Updated: 06/10/25 0358     WBC 7.30 Thousand/uL      RBC 4.04 Million/uL      Hemoglobin 8.6 g/dL      Hematocrit 29.0 %      MCV 72 fL      MCH 21.3 pg      MCHC 29.7 g/dL      RDW 17.8 %      MPV 10.4 fL      Platelets 189 Thousands/uL      nRBC 0 /100 WBCs      Segmented % 67 %      Immature Grans % 1 %      Lymphocytes % 16 %      Monocytes % 9 %      Eosinophils Relative 6 %      Basophils Relative 1 %      Absolute Neutrophils 4.99 Thousands/µL      Absolute Immature Grans 0.04 Thousand/uL      Absolute Lymphocytes 1.17 Thousands/µL      Absolute Monocytes 0.65 Thousand/µL      Eosinophils Absolute 0.41 Thousand/µL      Basophils Absolute 0.04 Thousands/µL             X-ray chest 2 views   ED Interpretation by Josemanuel Padilla DO (06/10 0420)   No acute cardiopulmonary disease          Procedures    ED Medication and Procedure Management   Prior to Admission Medications   Prescriptions Last Dose Informant Patient Reported? Taking?   Farxiga 5 MG TABS   No No   Sig: TAKE 1 TABLET (5 MG TOTAL) BY MOUTH DAILY.   Potassium 99 MG TABS  Self Yes No   Sig: Take 1 tablet by mouth in the morning.   Saw Palmetto 160 MG CAPS  Self Yes No   Sig: Take 1 capsule by mouth in the morning.   amLODIPine (NORVASC) 5 mg tablet  Self No No   Sig: Take 1 tablet (5 mg total) by mouth daily   atorvastatin (LIPITOR) 20 mg tablet  Self  Yes No   Sig: Take 1 tablet by mouth in the morning.   cholecalciferol (VITAMIN D3) 1,000 units tablet  Self Yes No   Sig: Take 1,000 Units by mouth in the morning.   escitalopram (LEXAPRO) 5 mg tablet  Self No No   Sig: TAKE 1 TABLET (5 MG TOTAL) BY MOUTH DAILY.   fexofenadine (Allegra Allergy) 180 MG tablet  Self Yes No   Sig: Take 180 mg by mouth daily as needed (allergies)   finasteride (PROSCAR) 5 mg tablet  Self No No   Sig: TAKE 1 TABLET (5 MG TOTAL) BY MOUTH DAILY.   folic acid (FOLVITE) 1 mg tablet  Self Yes No   Sig: Take 1 tablet by mouth in the morning.   levothyroxine 50 mcg tablet  Self No No   Sig: TAKE 1 TABLET BY MOUTH EVERY DAY   metoprolol tartrate (LOPRESSOR) 25 mg tablet  Self No No   Sig: TAKE 1/2 OF A TABLET (12.5 MG TOTAL) BY MOUTH TWICE A DAY   montelukast (SINGULAIR) 10 mg tablet  Self Yes No   Sig: Take 10 mg by mouth daily at bedtime   pantoprazole (PROTONIX) 40 mg tablet  Self No No   Sig: TAKE 1 TABLET BY MOUTH 2 TIMES A DAY BEFORE MEALS.   sulfaSALAzine (AZULFIDINE) 500 mg tablet  Self Yes No   Sig: Take 500 mg by mouth in the morning and 500 mg at noon and 500 mg in the evening and 500 mg before bedtime.      Facility-Administered Medications: None     Discharge Medication List as of 6/10/2025  6:33 AM        CONTINUE these medications which have NOT CHANGED    Details   amLODIPine (NORVASC) 5 mg tablet Take 1 tablet (5 mg total) by mouth daily, Starting Mon 10/21/2024, Until Wed 5/21/2025, Normal      atorvastatin (LIPITOR) 20 mg tablet Take 1 tablet by mouth in the morning., Starting Tue 10/23/2012, Historical Med      cholecalciferol (VITAMIN D3) 1,000 units tablet Take 1,000 Units by mouth in the morning., Historical Med      escitalopram (LEXAPRO) 5 mg tablet TAKE 1 TABLET (5 MG TOTAL) BY MOUTH DAILY., Starting Fri 4/18/2025, Normal      Farxiga 5 MG TABS TAKE 1 TABLET (5 MG TOTAL) BY MOUTH DAILY., Starting Tue 6/3/2025, Normal      fexofenadine (Allegra Allergy) 180 MG tablet  Take 180 mg by mouth daily as needed (allergies), Historical Med      finasteride (PROSCAR) 5 mg tablet TAKE 1 TABLET (5 MG TOTAL) BY MOUTH DAILY., Starting Fri 3/28/2025, Normal      folic acid (FOLVITE) 1 mg tablet Take 1 tablet by mouth in the morning., Starting Tue 10/23/2012, Historical Med      levothyroxine 50 mcg tablet TAKE 1 TABLET BY MOUTH EVERY DAY, Starting Thu 3/27/2025, Normal      metoprolol tartrate (LOPRESSOR) 25 mg tablet TAKE 1/2 OF A TABLET (12.5 MG TOTAL) BY MOUTH TWICE A DAY, Normal      montelukast (SINGULAIR) 10 mg tablet Take 10 mg by mouth daily at bedtime, Starting Wed 5/7/2014, Historical Med      pantoprazole (PROTONIX) 40 mg tablet TAKE 1 TABLET BY MOUTH 2 TIMES A DAY BEFORE MEALS., Starting Thu 3/27/2025, Normal      Potassium 99 MG TABS Take 1 tablet by mouth in the morning., Starting Thu 5/14/2015, Historical Med      Saw Yoder 160 MG CAPS Take 1 capsule by mouth in the morning., Historical Med      sulfaSALAzine (AZULFIDINE) 500 mg tablet Take 500 mg by mouth in the morning and 500 mg at noon and 500 mg in the evening and 500 mg before bedtime., Starting Tue 1/16/2018, Historical Med             ED SEPSIS DOCUMENTATION   Time reflects when diagnosis was documented in both MDM as applicable and the Disposition within this note       Time User Action Codes Description Comment    6/10/2025  6:32 AM Josemanuel Padilla Add [R07.9] Chest pain                      [1]   Past Medical History:  Diagnosis Date    Allergic     Anemia     Arthritis     Cancer (HCC) 10.01.2024    Coronary artery disease     Diabetes mellitus (HCC)     Disease of thyroid gland     Hyperlipemia     Hypertension     Ulcerative colitis (HCC)     Urinary retention 10/11/2024   [2]   Past Surgical History:  Procedure Laterality Date    COLONOSCOPY      PROSTATE BIOPSY      needle,  resolved may 2005   [3]   Family History  Problem Relation Name Age of Onset    Stroke Mother Mayi     Anemia Sister daughter Parul,          you do not have a family member for children    Autoimmune disease Sister daughter Parul,         you do not have a family member for children   [4]   Social History  Tobacco Use    Smoking status: Former     Current packs/day: 0.00     Average packs/day: 1 pack/day for 36.0 years (36.0 ttl pk-yrs)     Types: Cigarettes     Start date: 12/3/1952     Quit date: 1988     Years since quittin.5     Passive exposure: Past    Smokeless tobacco: Never   Vaping Use    Vaping status: Never Used   Substance Use Topics    Alcohol use: Not Currently     Comment: stopped drinking years ago    Drug use: Never        Josemanuel Padilla,   06/10/25 0823

## 2025-06-10 NOTE — DISCHARGE INSTRUCTIONS
You were evaluated in the Emergency Department today for chest pain. Your evaluation has shown no signs of medical conditions requiring emergent intervention at this time, however we recommend that you follow up with your primary care physician or your cardiologist as soon as possible for further testing as an outpatient.    Please schedule an appointment for follow up with your primary care physician as soon as possible.    Return to the Emergency Department if you experience worsening or uncontrolled chest pain, shortness of breath, light headedness, feeling faint, nausea, vomiting, or any other concerning symptoms.    Thank you for choosing us for your care.

## 2025-06-10 NOTE — ED NOTES
Roundtrip ordered for patient at this time. PU time scheduled for 0653     Alondra Welch RN  06/10/25 0697

## 2025-06-11 LAB
ATRIAL RATE: 90 BPM
P AXIS: 62 DEGREES
PR INTERVAL: 158 MS
QRS AXIS: 59 DEGREES
QRSD INTERVAL: 72 MS
QT INTERVAL: 360 MS
QTC INTERVAL: 440 MS
T WAVE AXIS: 61 DEGREES
VENTRICULAR RATE: 90 BPM

## 2025-06-11 PROCEDURE — 93010 ELECTROCARDIOGRAM REPORT: CPT | Performed by: INTERNAL MEDICINE

## 2025-06-13 ENCOUNTER — HOSPITAL ENCOUNTER (OUTPATIENT)
Dept: INFUSION CENTER | Facility: CLINIC | Age: OVER 89
End: 2025-06-13
Payer: MEDICARE

## 2025-06-13 VITALS — TEMPERATURE: 97.8 F

## 2025-06-13 DIAGNOSIS — D3A.8 PRIMARY PANCREATIC NEUROENDOCRINE TUMOR: Primary | ICD-10-CM

## 2025-06-13 PROCEDURE — 96372 THER/PROPH/DIAG INJ SC/IM: CPT

## 2025-06-13 RX ORDER — LANREOTIDE ACETATE 120 MG/.5ML
120 INJECTION SUBCUTANEOUS ONCE
Status: COMPLETED | OUTPATIENT
Start: 2025-06-13 | End: 2025-06-13

## 2025-06-13 RX ORDER — LANREOTIDE ACETATE 120 MG/.5ML
120 INJECTION SUBCUTANEOUS ONCE
OUTPATIENT
Start: 2025-07-11

## 2025-06-13 RX ADMIN — LANREOTIDE ACETATE 120 MG: 120 INJECTION SUBCUTANEOUS at 09:23

## 2025-06-13 NOTE — PROGRESS NOTES
Jose Zamora  tolerated treatment well with no complications.      Jose Zamora is aware of future appt on 7/11/25 at 1100.     AVS printed and given to Jose Zamora:

## 2025-06-16 ENCOUNTER — OFFICE VISIT (OUTPATIENT)
Age: OVER 89
End: 2025-06-16
Payer: MEDICARE

## 2025-06-16 ENCOUNTER — RESULTS FOLLOW-UP (OUTPATIENT)
Age: OVER 89
End: 2025-06-16

## 2025-06-16 VITALS
RESPIRATION RATE: 16 BRPM | TEMPERATURE: 98.3 F | DIASTOLIC BLOOD PRESSURE: 80 MMHG | HEART RATE: 83 BPM | SYSTOLIC BLOOD PRESSURE: 172 MMHG | HEIGHT: 69 IN | BODY MASS INDEX: 21.18 KG/M2 | WEIGHT: 143 LBS | OXYGEN SATURATION: 98 %

## 2025-06-16 DIAGNOSIS — E11.00 TYPE 2 DIABETES MELLITUS WITH HYPEROSMOLARITY WITHOUT COMA, WITHOUT LONG-TERM CURRENT USE OF INSULIN (HCC): Primary | ICD-10-CM

## 2025-06-16 DIAGNOSIS — E78.2 MIXED HYPERLIPIDEMIA: ICD-10-CM

## 2025-06-16 DIAGNOSIS — I10 PRIMARY HYPERTENSION: ICD-10-CM

## 2025-06-16 DIAGNOSIS — K51.911 ULCERATIVE COLITIS WITH RECTAL BLEEDING, UNSPECIFIED LOCATION (HCC): ICD-10-CM

## 2025-06-16 DIAGNOSIS — D3A.8 PRIMARY PANCREATIC NEUROENDOCRINE TUMOR: ICD-10-CM

## 2025-06-16 DIAGNOSIS — I73.9 PAD (PERIPHERAL ARTERY DISEASE) (HCC): ICD-10-CM

## 2025-06-16 DIAGNOSIS — Z46.6 URINARY CATHETER (FOLEY) CHANGE REQUIRED: ICD-10-CM

## 2025-06-16 DIAGNOSIS — E11.51 TYPE 2 DIABETES MELLITUS WITH DIABETIC PERIPHERAL ANGIOPATHY WITHOUT GANGRENE, UNSPECIFIED WHETHER LONG TERM INSULIN USE (HCC): ICD-10-CM

## 2025-06-16 DIAGNOSIS — C25.9 MALIGNANT NEOPLASM OF PANCREAS, UNSPECIFIED LOCATION OF MALIGNANCY (HCC): ICD-10-CM

## 2025-06-16 LAB
LEFT EYE DIABETIC RETINOPATHY: ABNORMAL
LEFT EYE IMAGE QUALITY: ABNORMAL
LEFT EYE MACULAR EDEMA: ABNORMAL
LEFT EYE OTHER RETINOPATHY: ABNORMAL
RIGHT EYE DIABETIC RETINOPATHY: ABNORMAL
RIGHT EYE IMAGE QUALITY: ABNORMAL
RIGHT EYE MACULAR EDEMA: ABNORMAL
RIGHT EYE OTHER RETINOPATHY: ABNORMAL
SEVERITY (EYE EXAM): ABNORMAL

## 2025-06-16 PROCEDURE — G2211 COMPLEX E/M VISIT ADD ON: HCPCS | Performed by: FAMILY MEDICINE

## 2025-06-16 PROCEDURE — 99214 OFFICE O/P EST MOD 30 MIN: CPT | Performed by: FAMILY MEDICINE

## 2025-06-16 RX ORDER — CEPHALEXIN 500 MG/1
500 CAPSULE ORAL 3 TIMES DAILY
Qty: 9 CAPSULE | Refills: 5 | Status: SHIPPED | OUTPATIENT
Start: 2025-06-16 | End: 2025-06-17

## 2025-06-16 NOTE — ASSESSMENT & PLAN NOTE
Lab Results   Component Value Date    HGBA1C 7.1 (A) 05/21/2025   Patient will refrain from using orange juice.  Continue with appropriate dietary modification as well as medication.

## 2025-06-16 NOTE — ASSESSMENT & PLAN NOTE
Lab Results   Component Value Date    HGBA1C 7.1 (A) 05/21/2025       Orders:    [facility] Diabetic eye exam

## 2025-06-16 NOTE — ASSESSMENT & PLAN NOTE
Patient with low-salt diet.  Patient will continue with current regimen per patient request will consider increasing at follow-up if still elevated

## 2025-06-16 NOTE — PROGRESS NOTES
Name: Jose Zamora      : 1935      MRN: [id number]  Encounter Provider: Scott Hodges DO  Encounter Date: 2025   Encounter department: [facility] PRIMARY CARE  :  Assessment & Plan  Type 2 diabetes mellitus with hyperosmolarity without coma, without long-term current use of insulin (HCC)    Lab Results   Component Value Date    HGBA1C 7.1 (A) 2025       Orders:    [facility] Diabetic eye exam    Type 2 diabetes mellitus with diabetic peripheral angiopathy without gangrene, unspecified whether long term insulin use (HCC)    Lab Results   Component Value Date    HGBA1C 7.1 (A) 2025   Patient will refrain from using orange juice.  Continue with appropriate dietary modification as well as medication.         Primary hypertension  Patient with low-salt diet.  Patient will continue with current regimen per patient request will consider increasing at follow-up if still elevated        PAD (peripheral artery disease) (HCC)  Stable at this time.       Malignant neoplasm of pancreas, unspecified location of malignancy (HCC)  Follow-up with specialist appropriately.       Primary pancreatic neuroendocrine tumor  Follow-up with specialist appropriately.       Mixed hyperlipidemia  Continue with atorvastatin       Ulcerative colitis with rectal bleeding, unspecified location (HCC)  Stable at this time.       Urinary catheter (M[place]ormack) change required    Orders:    cephalexin (KEFLEX) 500 mg capsule; Take 1 capsule (500 mg total) by mouth 3 (three) times a day for 10 days           History of Present Illness   Patient is here to follow-up on diabetes hypertension hyperlipidemia with history of peripheral vascular disease/pancreatic mass.  Patient has been relatively in normal state of health this time.  Urination and defecation experienced overall.  Patient will need antibiotics for catheter changes.  Patient is trying to drink more water.  No chest pain or shortness of breath noted.      Review of  "Systems   Constitutional: Negative.    HENT: Negative.     Eyes: Negative.    Respiratory: Negative.     Cardiovascular: Negative.    Gastrointestinal: Negative.    Endocrine: Negative.    Genitourinary: Negative.    Musculoskeletal: Negative.    Skin: Negative.    Allergic/Immunologic: Negative.    Neurological: Negative.    Hematological: Negative.    Psychiatric/Behavioral: Negative.         Objective   BP (!) 172/80 (BP Location: Left arm, Patient Position: Sitting, Cuff Size: Large)   Pulse 83   Temp 98.3 °F (36.8 °C) (Temporal)   Resp 16   Ht 5' 9\" (1.753 m)   Wt 64.9 kg (143 lb)   SpO2 98%   BMI 21.12 kg/m²      Physical Exam  Vitals and nursing note reviewed.   Constitutional:       General: He is not in acute distress.     Appearance: Normal appearance. He is well-developed. He is not ill-appearing, toxic-appearing or diaphoretic.   HENT:      Head: Normocephalic and atraumatic.      Right Ear: External ear normal.      Left Ear: External ear normal.      Nose: Nose normal.     Eyes:      General:         Right eye: No discharge.         Left eye: No discharge.      Conjunctiva/sclera: Conjunctivae normal.      Pupils: Pupils are equal, round, and reactive to light.     Neck:      Thyroid: No thyromegaly.      Vascular: No carotid bruit.      Trachea: No tracheal deviation.     Cardiovascular:      Rate and Rhythm: Normal rate and regular rhythm.      Pulses: Normal pulses.      Heart sounds: Normal heart sounds. No murmur heard.     No gallop.   Pulmonary:      Effort: Pulmonary effort is normal. No respiratory distress.      Breath sounds: Normal breath sounds. No stridor. No wheezing or rales.   Chest:      Chest wall: No tenderness.   Abdominal:      General: Bowel sounds are normal.     Musculoskeletal:         General: No tenderness or deformity. Normal range of motion.      Cervical back: Normal range of motion and neck supple.      Right lower leg: No edema.      Left lower leg: No edema. "   Lymphadenopathy:      Cervical: No cervical adenopathy.     Skin:     General: Skin is warm and dry.      Capillary Refill: Capillary refill takes less than 2 seconds.      Coloration: Skin is not pale.      Findings: No erythema or rash.     Neurological:      Mental Status: He is alert and oriented to person, place, and time. Mental status is at baseline.      Motor: No abnormal muscle tone.     Psychiatric:         Mood and Affect: Mood normal.         Behavior: Behavior normal.         Thought Content: Thought content normal.         Judgment: Judgment normal.

## 2025-06-17 RX ORDER — CEPHALEXIN 500 MG/1
CAPSULE ORAL
Qty: 9 CAPSULE | Refills: 5 | Status: SHIPPED | OUTPATIENT
Start: 2025-06-17 | End: 2025-06-20

## 2025-06-18 ENCOUNTER — APPOINTMENT (OUTPATIENT)
Dept: LAB | Facility: HOSPITAL | Age: OVER 89
End: 2025-06-18
Payer: MEDICARE

## 2025-06-18 DIAGNOSIS — D62 ACUTE BLOOD LOSS ANEMIA: ICD-10-CM

## 2025-06-18 DIAGNOSIS — N18.32 CHRONIC KIDNEY DISEASE, STAGE 3B (HCC): ICD-10-CM

## 2025-06-18 DIAGNOSIS — D3A.8 PRIMARY PANCREATIC NEUROENDOCRINE TUMOR: ICD-10-CM

## 2025-06-18 LAB
BASOPHILS # BLD AUTO: 0.07 THOUSANDS/ÂΜL (ref 0–0.1)
BASOPHILS NFR BLD AUTO: 1 % (ref 0–1)
EOSINOPHIL # BLD AUTO: 0.34 THOUSAND/ÂΜL (ref 0–0.61)
EOSINOPHIL NFR BLD AUTO: 4 % (ref 0–6)
ERYTHROCYTE [DISTWIDTH] IN BLOOD BY AUTOMATED COUNT: 17.1 % (ref 11.6–15.1)
HCT VFR BLD AUTO: 28 % (ref 36.5–49.3)
HGB BLD-MCNC: 8.2 G/DL (ref 12–17)
IMM GRANULOCYTES # BLD AUTO: 0.07 THOUSAND/UL (ref 0–0.2)
IMM GRANULOCYTES NFR BLD AUTO: 1 % (ref 0–2)
LYMPHOCYTES # BLD AUTO: 1.05 THOUSANDS/ÂΜL (ref 0.6–4.47)
LYMPHOCYTES NFR BLD AUTO: 14 % (ref 14–44)
MCH RBC QN AUTO: 21.3 PG (ref 26.8–34.3)
MCHC RBC AUTO-ENTMCNC: 29.3 G/DL (ref 31.4–37.4)
MCV RBC AUTO: 73 FL (ref 82–98)
MONOCYTES # BLD AUTO: 0.63 THOUSAND/ÂΜL (ref 0.17–1.22)
MONOCYTES NFR BLD AUTO: 8 % (ref 4–12)
NEUTROPHILS # BLD AUTO: 5.56 THOUSANDS/ÂΜL (ref 1.85–7.62)
NEUTS SEG NFR BLD AUTO: 72 % (ref 43–75)
NRBC BLD AUTO-RTO: 0 /100 WBCS
PLATELET # BLD AUTO: 208 THOUSANDS/UL (ref 149–390)
PMV BLD AUTO: 10.7 FL (ref 8.9–12.7)
RBC # BLD AUTO: 3.85 MILLION/UL (ref 3.88–5.62)
WBC # BLD AUTO: 7.72 THOUSAND/UL (ref 4.31–10.16)

## 2025-06-18 PROCEDURE — 85025 COMPLETE CBC W/AUTO DIFF WBC: CPT

## 2025-06-18 PROCEDURE — 36415 COLL VENOUS BLD VENIPUNCTURE: CPT

## 2025-06-26 ENCOUNTER — HOME CARE VISIT (OUTPATIENT)
Dept: HOME HEALTH SERVICES | Facility: HOME HEALTHCARE | Age: OVER 89
End: 2025-06-26
Payer: MEDICARE

## 2025-06-26 VITALS
HEART RATE: 86 BPM | DIASTOLIC BLOOD PRESSURE: 68 MMHG | RESPIRATION RATE: 16 BRPM | OXYGEN SATURATION: 96 % | TEMPERATURE: 97.6 F | SYSTOLIC BLOOD PRESSURE: 138 MMHG

## 2025-06-26 PROCEDURE — G0299 HHS/HOSPICE OF RN EA 15 MIN: HCPCS

## 2025-07-08 ENCOUNTER — TELEPHONE (OUTPATIENT)
Dept: HEMATOLOGY ONCOLOGY | Facility: CLINIC | Age: OVER 89
End: 2025-07-08

## 2025-07-08 ENCOUNTER — OFFICE VISIT (OUTPATIENT)
Dept: HEMATOLOGY ONCOLOGY | Facility: CLINIC | Age: OVER 89
End: 2025-07-08
Payer: MEDICARE

## 2025-07-08 VITALS
HEART RATE: 98 BPM | DIASTOLIC BLOOD PRESSURE: 74 MMHG | HEIGHT: 69 IN | TEMPERATURE: 98.1 F | OXYGEN SATURATION: 97 % | SYSTOLIC BLOOD PRESSURE: 162 MMHG | RESPIRATION RATE: 16 BRPM | BODY MASS INDEX: 20.88 KG/M2 | WEIGHT: 141 LBS

## 2025-07-08 DIAGNOSIS — N18.32 CHRONIC KIDNEY DISEASE, STAGE 3B (HCC): ICD-10-CM

## 2025-07-08 DIAGNOSIS — D56.3 BETA THALASSEMIA MINOR: ICD-10-CM

## 2025-07-08 DIAGNOSIS — D50.0 IRON DEFICIENCY ANEMIA DUE TO CHRONIC BLOOD LOSS: ICD-10-CM

## 2025-07-08 DIAGNOSIS — D3A.8 PRIMARY PANCREATIC NEUROENDOCRINE TUMOR: Primary | ICD-10-CM

## 2025-07-08 PROCEDURE — 99214 OFFICE O/P EST MOD 30 MIN: CPT | Performed by: INTERNAL MEDICINE

## 2025-07-08 NOTE — ASSESSMENT & PLAN NOTE
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  Hospitalization for fatigue, weight loss and GI bleeding in October 2024.  He was initially found to have a hemoglobin of 4.5 g/deciliter requiring PRBC transfusion and IV iron.  Imaging revealed a pancreatic head mass.  He underwent EUS pancreatic head mass biopsy consistent with pancreatic neuroendocrine tumor with.  He has been evaluated by surgical oncology (Dr. Rodriguez), but declined surgery.  Recent hospitalization in December and found to have intrahepatic bile artery dilation with distended gallbladder.  EGD on 12/6/2024 showed single large ulcer in the duodenum with clean base.  He received palliative radiation to the pancreatic mass.     He has well-differentiated neuroendocrine tumor of the head of the pancreas with no obvious metastatic disease.  He declined surgery and has received palliative radiation to the pancreatic mass.  He is currently receiving systemic treatment with lanreotide since 1/16/2025.  He will continue treatment.  Will continue to follow clinically, with labs and imaging (CT chest/abdomen/pelvis) already ordered by surgical oncology.  He continues to follow with Dr. Rodriguez.     CT chest/abdomen/pelvis for March 2025 with no interval change, stable 3 mm subpleural nodule in the right lower lobe, no thoracic lymphadenopathy.  Stable desmoplastic mass in the pancreatic head. No evidence of vascular invasion or hepatic metastatic disease. No enlarged abdominal or pelvic lymph nodes. No peritoneal lesions.  Patient continues to do well clinically.  He will continue treatment with lanreotide for now.  Will continue to monitor CBC and CMP every 4 weeks. He continues to follow with surgical oncology and in the absence of  new symptoms they plan CT scan in 6 months.      Treatment plan: Lanreotide 120 mg every 4 weeks.  Started 1/16/2025. Will monitor CBC and CMP.       Follow-up in 8 weeks.

## 2025-07-08 NOTE — PROGRESS NOTES
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  Hospitalization for fatigue, weight loss and GI bleeding in 2024.  He was initially found to have a hemoglobin of 4.5 g/deciliter requiring PRBC transfusion and IV iron.  Imaging revealed a pancreatic head mass.  He underwent EUS pancreatic head mass biopsy consistent with pancreatic neuroendocrine tumor with.  He has been evaluated by surgical oncology (Dr. Rodriguez), but declined surgery.  Recent hospitalization in December and found to have intrahepatic bile artery dilation with distended gallbladder.  EGD on 2024 showed single large ulcer in the duodenum with clean base.  He received palliative radiation to the pancreatic mass.     He has well-differentiated neuroendocrine tumor of the head of the pancreas with no obvious metastatic disease.  He declined surgery and has received palliative radiation to the pancreatic mass.  He is currently receiving systemic treatment with lanreotide since 2025.  He will continue treatment.  Will continue to follow clinically, with labs and imaging (CT chest/abdomen/pelvis) already ordered by surgical oncology.  He continues to follow with Dr. Rodriguez.     CT chest/abdomen/pelvis for 2025 with no interval change, stable 3 mm subpleural nodule in the right lower lobe, no thoracic lymphadenopathy.  Stable desmoplastic mass in the pancreatic head. No evidence of vascular invasion or hepatic metastatic disease. No enlarged  abdominal or pelvic lymph nodes. No peritoneal lesions.  Patient continues to do well clinically.  He will continue treatment with lanreotide for now.  Will continue to monitor CBC and CMP every 4 weeks. He continues to follow with surgical oncology and in the absence of new symptoms they plan CT scan in 6 months.      Treatment plan: Lanreotide 120 mg every 4 weeks.  Started 1/16/2025. Will monitor CBC and CMP.       Follow-up in 8 weeks.          Iron deficiency anemia due to chronic blood loss  Acute on chronic anemia  Multifactorial due to acute blood loss, CKD and beta thalassemia (has low hemoglobin at baseline between 9-10 g/dL).  Adequate iron stores.  Monitor CBC and iron panel.  Transfusion as needed.       Beta thalassemia minor         Chronic kidney disease, stage 3b (HCC)  Lab Results   Component Value Date    EGFR 37 06/10/2025    EGFR 33 06/04/2025    EGFR 31 05/28/2025    CREATININE 1.61 (H) 06/10/2025    CREATININE 1.76 (H) 06/04/2025    CREATININE 1.83 (H) 05/28/2025                Return in about 8 weeks (around 9/2/2025) for Office Visit, Labs, Infusion - See Treatment Plan.    History of Present Illness   Chief Complaint   Patient presents with    Follow-up     Oncology History   Cancer Staging   Primary pancreatic neuroendocrine tumor  Staging form: Neuroendocrine Tumor - Pancreas, AJCC V9  - Clinical: Stage II (cT3, cN0, cM0) - Signed by Nba Rodriguez MD on 10/24/2024  Oncology History   Primary pancreatic neuroendocrine tumor   10/17/2024 Biopsy    Endoscopic ultrasound  - Pancreas, Head Mass, FNA:  Positive for malignancy.  - Pancreatic neuroendocrine tumor (PanNET), favor at least well-differentiated neuroendocrine tumor, WHO grade 2 of 3, in this limited sample.     10/24/2024 -  Cancer Staged    Staging form: Neuroendocrine Tumor - Pancreas, AJCC V9  - Clinical: Stage II (cT3, cN0, cM0) - Signed by Nba Rodriguez MD on 10/24/2024          Pertinent Medical History   Jose Zamora is an 89  yo male with past medical history significant for HTN, DM2, thyroidism, pan UC on sulfasalazine and beta thalassemia minor.  He was recently hospitalized for fatigue, weight loss and GI bleeding. His hemoglobin on recent admission was 4.5 g/deciliter (baseline appears to be 8-10 g/dL).  He required 3 units PRBC transfusion.  Received IV iron. Imaging revealed a pancreatic head mass.  He underwent EUS pancreatic head mass biopsy consistent with pancreatic neuroendocrine tumor with.  He has been evaluated by surgical oncology (Dr. Rodriguez) and was recommended pancreaticoduodenectomy pending dotatate PET scan.  He presents for hospital follow-up visit accompanied by his daughter.  Patient notes improvement in his symptoms.  Denies any abdominal pain, nausea, vomiting, diarrhea.  Denies any blood in the stools.  Fatigue improving.     01/07/25:   Recent hospitalization in December 2024 for symptomatic anemia.  Patient required PRBC transfusion.  EGD on 12/6/2024 with single ulcer in the duodenum with clean base.  He was evaluated by radiation oncology and received palliative radiation to the pancreatic head.  Also with urinary retention now with Mccormack catheter.  Following with urology.  He presents today for follow-up visit accompanied by his daughter.  Notes fatigue.  Denies any bleeding. Denies any abdominal pain.    02/03/25:   Pt accompanied by his daughter for visit. States he has more energy. Has started doing the laundry. Wife remains hospitalized. He continues to take care of himself and cooking.   Last pRBC transfusion on 1/15/25.     03/10/25:     3/7/2025: CT CAP:   1. No interval change since previous study. Stable 3 mm subpleural nodule in the right lower lobe. No thoracic lymphadenopathy. No suspicious pulmonary lesions. Mild distal esophageal wall thickening which can be seen in reflux disease.   2. Stable desmoplastic mass in the pancreatic head with resultant atrophy of the pancreatic body and tail and  upstream ductal dilation. No evidence of vascular invasion or hepatic metastatic disease. No enlarged abdominal or pelvic lymph nodes. No peritoneal lesions.   3. Cholelithiasis.   4. Prostate hypertrophy. Diffuse bladder wall thickening with Mccormack catheter in place.      Continues to feel well.  Denies abdominal pain, nausea, vomiting, diarrhea.  States remains very active.  States that schedule has been hectic recently due to his wife being admitted to rehab.  He has been missing meals.    04/07/25:   Pt presents for f/u visit. Denies any abd pain, n/v/d or bleeding.   Notes decreased appetite. States he has been very nervous lately and continues to worry about his wife's health who is now discharged from rehab and residing at home.  He was recently started on an antidepressant by his PCP.  He is utilizing protein shakes in his diet.    Hemoglobin remains stable.    Parul (daughter on speaker phone)    05/13/25:   Patient presented follow visit.  Denies any abdominal pain, nausea, vomiting, diarrhea.  Remains active.  Has not required recent PRBC transfusion.  Hemoglobin stable.  His wife is recovering.    07/08/25:   Follow-up visit for pancreatic neuroendocrine tumor.  Patient continues to do well overall.  Remains with positive attitude.  Denies any chest pain, shortness of breath, Jimmie pain, nausea, vomiting, diarrhea.     Review of Systems   Constitutional:  Positive for fatigue. Negative for chills and fever.   Respiratory:  Negative for cough and shortness of breath.    Cardiovascular:  Negative for chest pain and palpitations.   Gastrointestinal:  Negative for abdominal pain and vomiting.   Genitourinary:  Negative for hematuria.   Musculoskeletal:  Negative for arthralgias and back pain.   Skin:  Negative for rash.   Hematological:  Does not bruise/bleed easily.   All other systems reviewed and are negative.    Medical History Reviewed by provider this encounter:     .      Objective   /74 (BP  "Location: Left arm, Patient Position: Sitting, Cuff Size: Large)   Pulse 98   Temp 98.1 °F (36.7 °C) (Temporal)   Resp 16   Ht 5' 9\" (1.753 m)   Wt 64 kg (141 lb)   SpO2 97%   BMI 20.82 kg/m²     Pain Screening:  Pain Score: 0-No pain  ECOG   1  Physical Exam  Vitals reviewed.   Constitutional:       General: He is not in acute distress.     Appearance: Normal appearance.   HENT:      Head: Normocephalic and atraumatic.      Mouth/Throat:      Mouth: Mucous membranes are moist.     Eyes:      Extraocular Movements: Extraocular movements intact.      Conjunctiva/sclera: Conjunctivae normal.       Cardiovascular:      Rate and Rhythm: Normal rate.   Pulmonary:      Effort: Pulmonary effort is normal. No respiratory distress.   Abdominal:      General: Abdomen is flat. There is no distension.      Palpations: Abdomen is soft.     Musculoskeletal:      Cervical back: Normal range of motion.      Right lower leg: No edema.      Left lower leg: No edema.     Skin:     General: Skin is warm.      Coloration: Skin is not jaundiced.     Neurological:      Mental Status: He is alert and oriented to person, place, and time. Mental status is at baseline.      Gait: Gait normal.     Psychiatric:         Thought Content: Thought content normal.         Labs: I have reviewed the following labs:  Lab Results   Component Value Date/Time    WBC 7.72 06/18/2025 07:49 AM    RBC 3.85 (L) 06/18/2025 07:49 AM    Hemoglobin 8.2 (L) 06/18/2025 07:49 AM    Hematocrit 28.0 (L) 06/18/2025 07:49 AM    MCV 73 (L) 06/18/2025 07:49 AM    MCH 21.3 (L) 06/18/2025 07:49 AM    RDW 17.1 (H) 06/18/2025 07:49 AM    Platelets 208 06/18/2025 07:49 AM    Segmented % 72 06/18/2025 07:49 AM    Lymphocytes % 14 06/18/2025 07:49 AM    Monocytes % 8 06/18/2025 07:49 AM    Eosinophils Relative 4 06/18/2025 07:49 AM    Basophils Relative 1 06/18/2025 07:49 AM    Immature Grans % 1 06/18/2025 07:49 AM    Absolute Neutrophils 5.56 06/18/2025 07:49 AM     Lab " Results   Component Value Date/Time    Potassium 4.2 06/10/2025 03:45 AM    Chloride 108 06/10/2025 03:45 AM    CO2 22 06/10/2025 03:45 AM    BUN 45 (H) 06/10/2025 03:45 AM    Creatinine 1.61 (H) 06/10/2025 03:45 AM    Glucose, Fasting 219 (H) 04/23/2025 08:20 AM    Calcium 8.0 (L) 06/10/2025 03:45 AM    Corrected Calcium 7.9 (L) 12/09/2024 05:32 AM    AST 14 06/10/2025 03:45 AM    ALT 8 06/10/2025 03:45 AM    Alkaline Phosphatase 76 06/10/2025 03:45 AM    Total Protein 6.9 06/10/2025 03:45 AM    Albumin 4.1 06/10/2025 03:45 AM    Total Bilirubin 0.41 06/10/2025 03:45 AM    eGFR 37 06/10/2025 03:45 AM         10/15/2024: CT abd/pelv:   Pancreatic mass with evidence of secondary bile duct obstruction. Most commonly adenocarcinoma. Further evaluation with pancreas MR and MRCP recommended.   Small indeterminate left renal nodule, may be a complicated cyst. This can also be evaluated with MR without and with IV contrast.   Other nonemergent findings above.     10/16/2024: MRCP:   Limited by motion artifact and lack of IV contrast.   Enlarged pancreatic head measuring 4.7 x 2.7 cm, #2/27, likely harboring an underlying infiltrative pancreatic head mass. The remainder of the pancreatic parenchyma is atrophic.  Intra/extrahepatic biliary dilation and pancreatic ductal dilation with caliber changes of these ducts at the site of suspected pancreatic head mass.     10/16/2024: EGD:  Single ulcer in the ampullary region with clean base (John III)  Edematous, erythematous mucosa with erosion in the body of the stomach; performed cold forceps biopsy  3 cm type I hiatal hernia  The upper third of the esophagus, middle third of the esophagus and lower third of the esophagus appeared normal.  The duodenal bulb and 1st part of the duodenum appeared normal.  Ulcerated area at the ampulla is likely tumor protruding through the ampulla     10/16/2024: EUS:  Heterogeneous and hypoechoic mass measuring 46 mm x 30 mm with well-defined  margins was visualized in the head of the pancreas; 4 fine needle biopsy passes were taken. An adequate sample was obtained. Cytology analysis was performed. Onsite cytologist was present  There were no cysts in the pancreas. There were no lesions in the examined part of the liver. There was a gallstone in the gallbladder. The CBD was dilated to 10mm and PD was dilated to 8mm. There were no enlarged lymph nodes in the peripancreatic, gastrohepatic, or celiac areas.     3/7/2025: CT CAP:   1. No interval change since previous study. Stable 3 mm subpleural nodule in the right lower lobe. No thoracic lymphadenopathy. No suspicious pulmonary lesions. Mild distal esophageal wall thickening which can be seen in reflux disease.     2. Stable desmoplastic mass in the pancreatic head with resultant atrophy of the pancreatic body and tail and upstream ductal dilation. No evidence of vascular invasion or hepatic metastatic disease. No enlarged abdominal or pelvic lymph nodes. No peritoneal lesions.     3. Cholelithiasis.     4. Prostate hypertrophy. Diffuse bladder wall thickening with Mccormack catheter in place.

## 2025-07-11 ENCOUNTER — HOSPITAL ENCOUNTER (OUTPATIENT)
Dept: INFUSION CENTER | Facility: CLINIC | Age: OVER 89
End: 2025-07-11
Attending: INTERNAL MEDICINE
Payer: MEDICARE

## 2025-07-11 VITALS
RESPIRATION RATE: 18 BRPM | DIASTOLIC BLOOD PRESSURE: 65 MMHG | HEART RATE: 96 BPM | SYSTOLIC BLOOD PRESSURE: 164 MMHG | TEMPERATURE: 98.3 F

## 2025-07-11 DIAGNOSIS — D3A.8 PRIMARY PANCREATIC NEUROENDOCRINE TUMOR: Primary | ICD-10-CM

## 2025-07-11 PROCEDURE — 96372 THER/PROPH/DIAG INJ SC/IM: CPT

## 2025-07-11 RX ORDER — LANREOTIDE ACETATE 120 MG/.5ML
120 INJECTION SUBCUTANEOUS ONCE
Status: COMPLETED | OUTPATIENT
Start: 2025-07-11 | End: 2025-07-11

## 2025-07-11 RX ORDER — LANREOTIDE ACETATE 120 MG/.5ML
120 INJECTION SUBCUTANEOUS ONCE
OUTPATIENT
Start: 2025-08-08

## 2025-07-11 RX ADMIN — LANREOTIDE ACETATE 120 MG: 120 INJECTION SUBCUTANEOUS at 11:37

## 2025-07-11 NOTE — PROGRESS NOTES
Patient arrived to unit without complaint. Patient tolerated Lanreotide SC injection to right ventrogluteal without incident. AVS provided and patient aware of next appointment on 8/8/25 at 11:00. Patient left in stable condition.

## 2025-07-14 ENCOUNTER — OFFICE VISIT (OUTPATIENT)
Age: OVER 89
End: 2025-07-14
Payer: MEDICARE

## 2025-07-14 VITALS
HEART RATE: 92 BPM | SYSTOLIC BLOOD PRESSURE: 142 MMHG | HEIGHT: 69 IN | WEIGHT: 135 LBS | BODY MASS INDEX: 19.99 KG/M2 | TEMPERATURE: 98.2 F | RESPIRATION RATE: 16 BRPM | OXYGEN SATURATION: 95 % | DIASTOLIC BLOOD PRESSURE: 60 MMHG

## 2025-07-14 DIAGNOSIS — F32.1 CURRENT MODERATE EPISODE OF MAJOR DEPRESSIVE DISORDER WITHOUT PRIOR EPISODE (HCC): ICD-10-CM

## 2025-07-14 DIAGNOSIS — D3A.8 PRIMARY PANCREATIC NEUROENDOCRINE TUMOR: ICD-10-CM

## 2025-07-14 DIAGNOSIS — I10 PRIMARY HYPERTENSION: ICD-10-CM

## 2025-07-14 DIAGNOSIS — E11.51 TYPE 2 DIABETES MELLITUS WITH DIABETIC PERIPHERAL ANGIOPATHY WITHOUT GANGRENE, UNSPECIFIED WHETHER LONG TERM INSULIN USE (HCC): Primary | ICD-10-CM

## 2025-07-14 DIAGNOSIS — I73.9 PAD (PERIPHERAL ARTERY DISEASE) (HCC): ICD-10-CM

## 2025-07-14 PROCEDURE — 99214 OFFICE O/P EST MOD 30 MIN: CPT | Performed by: FAMILY MEDICINE

## 2025-07-14 PROCEDURE — G2211 COMPLEX E/M VISIT ADD ON: HCPCS | Performed by: FAMILY MEDICINE

## 2025-07-14 RX ORDER — ONDANSETRON 4 MG/1
4 TABLET, FILM COATED ORAL EVERY 8 HOURS PRN
Qty: 20 TABLET | Refills: 0 | Status: SHIPPED | OUTPATIENT
Start: 2025-07-14

## 2025-07-14 RX ORDER — ESCITALOPRAM OXALATE 5 MG/1
5 TABLET ORAL DAILY
Qty: 90 TABLET | Refills: 1 | Status: SHIPPED | OUTPATIENT
Start: 2025-07-14

## 2025-07-14 RX ORDER — AMLODIPINE BESYLATE 5 MG/1
5 TABLET ORAL DAILY
Qty: 90 TABLET | Refills: 1 | Status: SHIPPED | OUTPATIENT
Start: 2025-07-14 | End: 2025-08-13

## 2025-07-14 NOTE — ASSESSMENT & PLAN NOTE
Blood pressure improving  Orders:    amLODIPine (NORVASC) 5 mg tablet; Take 1 tablet (5 mg total) by mouth daily

## 2025-07-14 NOTE — ASSESSMENT & PLAN NOTE
Patient with nausea status post infusion on Friday.  This will be discussed with hematology.  Patient will use Zofran as needed.  Orders:    ondansetron (ZOFRAN) 4 mg tablet; Take 1 tablet (4 mg total) by mouth every 8 (eight) hours as needed for nausea or vomiting

## 2025-07-14 NOTE — PROGRESS NOTES
Name: Jose Zamora      : 1935      MRN: [id number]  Encounter Provider: Scott Hodges DO  Encounter Date: 2025   Encounter department: [facility]  :  Assessment & Plan  Type 2 diabetes mellitus with diabetic peripheral angiopathy without gangrene, unspecified whether long term insulin use (HCC)    Lab Results   Component Value Date    HGBA1C 7.1 (A) 2025     Continue with Farxiga.       Primary hypertension  Blood pressure improving  Orders:    amLODIPine (NORVASC) 5 mg tablet; Take 1 tablet (5 mg total) by mouth daily    PAD (peripheral artery disease) (HCC)  Stable.       Primary pancreatic neuroendocrine tumor  Patient with nausea status post infusion on Friday.  This will be discussed with hematology.  Patient will use Zofran as needed.  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  Patient status post infusion for pancreatic tumor on Friday.  Patient now with some nausea but no vomiting.  No fevers noted.  No chest pain or shortness of breath.  Appetite slightly decreased      Review of Systems   Constitutional:  Positive for appetite change.   HENT: Negative.     Eyes: Negative.    Respiratory: Negative.     Cardiovascular: Negative.    Gastrointestinal:  Positive for nausea.   Endocrine: Negative.    Genitourinary: Negative.    Musculoskeletal: Negative.    Skin: Negative.    Allergic/Immunologic: Negative.    Neurological: Negative.    Hematological: Negative.    Psychiatric/Behavioral: Negative.         Objective   /60 (BP Location: Left arm, Patient  "Position: Sitting, Cuff Size: Large)   Pulse 92   Temp 98.2 °F (36.8 °C) (Temporal)   Resp 16   Ht 5' 9\" (1.753 m)   Wt 61.2 kg (135 lb)   SpO2 95%   BMI 19.94 kg/m²      Physical Exam  Vitals and nursing note reviewed.   Constitutional:       General: He is not in acute distress.     Appearance: Normal appearance. He is well-developed. He is not ill-appearing, toxic-appearing or diaphoretic.   HENT:      Head: Normocephalic and atraumatic.      Right Ear: Tympanic membrane, ear canal and external ear normal.      Left Ear: Tympanic membrane, ear canal and external ear normal.      Nose: Nose normal.      Mouth/Throat:      Pharynx: No oropharyngeal exudate.     Eyes:      General:         Right eye: No discharge.         Left eye: No discharge.      Conjunctiva/sclera: Conjunctivae normal.      Pupils: Pupils are equal, round, and reactive to light.     Neck:      Thyroid: No thyromegaly.      Vascular: No carotid bruit.      Trachea: No tracheal deviation.     Cardiovascular:      Rate and Rhythm: Normal rate and regular rhythm.      Pulses: Normal pulses.      Heart sounds: Normal heart sounds. No murmur heard.     No gallop.   Pulmonary:      Effort: Pulmonary effort is normal. No respiratory distress.      Breath sounds: Normal breath sounds. No stridor. No wheezing or rales.   Chest:      Chest wall: No tenderness.   Abdominal:      General: Bowel sounds are normal.     Musculoskeletal:         General: No tenderness or deformity. Normal range of motion.      Cervical back: Normal range of motion and neck supple.   Lymphadenopathy:      Cervical: No cervical adenopathy.     Skin:     General: Skin is warm and dry.      Capillary Refill: Capillary refill takes less than 2 seconds.      Coloration: Skin is not pale.      Findings: No erythema or rash.     Neurological:      Mental Status: He is alert and oriented to person, place, and time.      Cranial Nerves: No cranial nerve deficit.      Motor: No " abnormal muscle tone.      Coordination: Coordination normal.      Deep Tendon Reflexes: Reflexes normal.     Psychiatric:         Mood and Affect: Mood normal.         Behavior: Behavior normal.         Thought Content: Thought content normal.         Judgment: Judgment normal.

## 2025-07-16 ENCOUNTER — APPOINTMENT (OUTPATIENT)
Dept: LAB | Facility: HOSPITAL | Age: OVER 89
End: 2025-07-16
Payer: MEDICARE

## 2025-07-16 DIAGNOSIS — D3A.8 PRIMARY PANCREATIC NEUROENDOCRINE TUMOR: ICD-10-CM

## 2025-07-16 DIAGNOSIS — N18.32 CHRONIC KIDNEY DISEASE, STAGE 3B (HCC): ICD-10-CM

## 2025-07-16 DIAGNOSIS — D62 ACUTE BLOOD LOSS ANEMIA: ICD-10-CM

## 2025-07-16 LAB
BASOPHILS # BLD AUTO: 0.04 THOUSANDS/ÂΜL (ref 0–0.1)
BASOPHILS NFR BLD AUTO: 1 % (ref 0–1)
BUN SERPL-MCNC: 40 MG/DL (ref 5–25)
CREAT SERPL-MCNC: 1.74 MG/DL (ref 0.6–1.3)
EOSINOPHIL # BLD AUTO: 0.14 THOUSAND/ÂΜL (ref 0–0.61)
EOSINOPHIL NFR BLD AUTO: 2 % (ref 0–6)
ERYTHROCYTE [DISTWIDTH] IN BLOOD BY AUTOMATED COUNT: 16.4 % (ref 11.6–15.1)
GFR SERPL CREATININE-BSD FRML MDRD: 34 ML/MIN/1.73SQ M
HCT VFR BLD AUTO: 26.1 % (ref 36.5–49.3)
HGB BLD-MCNC: 7.4 G/DL (ref 12–17)
IMM GRANULOCYTES # BLD AUTO: 0.03 THOUSAND/UL (ref 0–0.2)
IMM GRANULOCYTES NFR BLD AUTO: 1 % (ref 0–2)
LYMPHOCYTES # BLD AUTO: 0.82 THOUSANDS/ÂΜL (ref 0.6–4.47)
LYMPHOCYTES NFR BLD AUTO: 14 % (ref 14–44)
MCH RBC QN AUTO: 20.4 PG (ref 26.8–34.3)
MCHC RBC AUTO-ENTMCNC: 28.4 G/DL (ref 31.4–37.4)
MCV RBC AUTO: 72 FL (ref 82–98)
MONOCYTES # BLD AUTO: 0.5 THOUSAND/ÂΜL (ref 0.17–1.22)
MONOCYTES NFR BLD AUTO: 8 % (ref 4–12)
NEUTROPHILS # BLD AUTO: 4.47 THOUSANDS/ÂΜL (ref 1.85–7.62)
NEUTS SEG NFR BLD AUTO: 74 % (ref 43–75)
NRBC BLD AUTO-RTO: 0 /100 WBCS
PLATELET # BLD AUTO: 211 THOUSANDS/UL (ref 149–390)
PMV BLD AUTO: 11.3 FL (ref 8.9–12.7)
RBC # BLD AUTO: 3.63 MILLION/UL (ref 3.88–5.62)
WBC # BLD AUTO: 6 THOUSAND/UL (ref 4.31–10.16)

## 2025-07-16 PROCEDURE — 84520 ASSAY OF UREA NITROGEN: CPT

## 2025-07-16 PROCEDURE — 85025 COMPLETE CBC W/AUTO DIFF WBC: CPT

## 2025-07-16 PROCEDURE — 82565 ASSAY OF CREATININE: CPT

## 2025-07-17 ENCOUNTER — RESULTS FOLLOW-UP (OUTPATIENT)
Dept: HEMATOLOGY ONCOLOGY | Facility: CLINIC | Age: OVER 89
End: 2025-07-17

## 2025-07-17 DIAGNOSIS — D50.0 IRON DEFICIENCY ANEMIA DUE TO CHRONIC BLOOD LOSS: Primary | ICD-10-CM

## 2025-07-17 DIAGNOSIS — D50.0 IRON DEFICIENCY ANEMIA DUE TO CHRONIC BLOOD LOSS: ICD-10-CM

## 2025-07-17 DIAGNOSIS — D62 ACUTE BLOOD LOSS ANEMIA: ICD-10-CM

## 2025-07-17 DIAGNOSIS — N18.32 CHRONIC KIDNEY DISEASE, STAGE 3B (HCC): ICD-10-CM

## 2025-07-17 DIAGNOSIS — D62 ACUTE BLOOD LOSS ANEMIA: Primary | ICD-10-CM

## 2025-07-17 DIAGNOSIS — D50.8 OTHER IRON DEFICIENCY ANEMIA: ICD-10-CM

## 2025-07-17 NOTE — TELEPHONE ENCOUNTER
Returned call to Parul. Relayed message from Parul CHRISTIANSON/Dr. Fuentes. She verbalized an understanding. She is requesting a blood bank hold tube be ordered with next week's labs so he doesn't have to be stuck twice if he needs a transfusion because he is a tough stick. She also wanted to make Dr. Fuentes aware he has been feeling a bit more fatigued, not significantly but just a bit. Denies any shortness of breath. States the house has also been really hot because his wife doesn't run the air conditioning so it has been in the 80s. She is going to ask her to turn on the AC and wear warmer clothes because she thinks the heat may be a factor in his fatigue as well. States he feels well otherwise and is staying hydrated.

## 2025-07-17 NOTE — TELEPHONE ENCOUNTER
Telephone call left voice message for dtr Parul.  Reviewed Dr Fuentes's note on the message.  Instructed her to call back with any questions.

## 2025-07-17 NOTE — TELEPHONE ENCOUNTER
----- Message from Shoshana Fuentes DO sent at 7/17/2025 10:14 AM EDT -----  Please notify patient that his hemoglobin has decreased to 7.4 g/deciliter.  Recommend repeat H/H in 1 week.  ----- Message -----  From: Lab, Background User  Sent: 7/16/2025   3:37 PM EDT  To: Shoshana Fuentes DO

## 2025-07-18 ENCOUNTER — HOME CARE VISIT (OUTPATIENT)
Dept: HOME HEALTH SERVICES | Facility: HOME HEALTHCARE | Age: OVER 89
End: 2025-07-18
Payer: MEDICARE

## 2025-07-21 ENCOUNTER — PATIENT MESSAGE (OUTPATIENT)
Age: OVER 89
End: 2025-07-21

## 2025-07-21 ENCOUNTER — APPOINTMENT (OUTPATIENT)
Age: OVER 89
End: 2025-07-21
Attending: INTERNAL MEDICINE
Payer: MEDICARE

## 2025-07-21 DIAGNOSIS — T83.9XXD PROBLEM WITH FOLEY CATHETER, SUBSEQUENT ENCOUNTER: Primary | ICD-10-CM

## 2025-07-21 DIAGNOSIS — D50.0 IRON DEFICIENCY ANEMIA DUE TO CHRONIC BLOOD LOSS: ICD-10-CM

## 2025-07-21 DIAGNOSIS — D3A.8 PRIMARY PANCREATIC NEUROENDOCRINE TUMOR: ICD-10-CM

## 2025-07-21 DIAGNOSIS — D62 ACUTE BLOOD LOSS ANEMIA: ICD-10-CM

## 2025-07-21 DIAGNOSIS — N18.32 CHRONIC KIDNEY DISEASE, STAGE 3B (HCC): ICD-10-CM

## 2025-07-21 LAB
ALBUMIN SERPL BCG-MCNC: 3.9 G/DL (ref 3.5–5)
ALP SERPL-CCNC: 82 U/L (ref 34–104)
ALT SERPL W P-5'-P-CCNC: 10 U/L (ref 7–52)
ANION GAP SERPL CALCULATED.3IONS-SCNC: 10 MMOL/L (ref 4–13)
AST SERPL W P-5'-P-CCNC: 13 U/L (ref 13–39)
BASOPHILS # BLD AUTO: 0.08 THOUSANDS/ÂΜL (ref 0–0.1)
BASOPHILS NFR BLD AUTO: 1 % (ref 0–1)
BILIRUB SERPL-MCNC: 0.35 MG/DL (ref 0.2–1)
BUN SERPL-MCNC: 38 MG/DL (ref 5–25)
CALCIUM SERPL-MCNC: 8.3 MG/DL (ref 8.4–10.2)
CHLORIDE SERPL-SCNC: 106 MMOL/L (ref 96–108)
CO2 SERPL-SCNC: 26 MMOL/L (ref 21–32)
CREAT SERPL-MCNC: 1.74 MG/DL (ref 0.6–1.3)
EOSINOPHIL # BLD AUTO: 0.25 THOUSAND/ÂΜL (ref 0–0.61)
EOSINOPHIL NFR BLD AUTO: 4 % (ref 0–6)
ERYTHROCYTE [DISTWIDTH] IN BLOOD BY AUTOMATED COUNT: 15.8 % (ref 11.6–15.1)
GFR SERPL CREATININE-BSD FRML MDRD: 34 ML/MIN/1.73SQ M
GLUCOSE SERPL-MCNC: 225 MG/DL (ref 65–140)
HCT VFR BLD AUTO: 26.8 % (ref 36.5–49.3)
HGB BLD-MCNC: 7.6 G/DL (ref 12–17)
IMM GRANULOCYTES # BLD AUTO: 0.06 THOUSAND/UL (ref 0–0.2)
IMM GRANULOCYTES NFR BLD AUTO: 1 % (ref 0–2)
LYMPHOCYTES # BLD AUTO: 1.12 THOUSANDS/ÂΜL (ref 0.6–4.47)
LYMPHOCYTES NFR BLD AUTO: 17 % (ref 14–44)
MCH RBC QN AUTO: 20.3 PG (ref 26.8–34.3)
MCHC RBC AUTO-ENTMCNC: 28.4 G/DL (ref 31.4–37.4)
MCV RBC AUTO: 72 FL (ref 82–98)
MONOCYTES # BLD AUTO: 0.52 THOUSAND/ÂΜL (ref 0.17–1.22)
MONOCYTES NFR BLD AUTO: 8 % (ref 4–12)
NEUTROPHILS # BLD AUTO: 4.46 THOUSANDS/ÂΜL (ref 1.85–7.62)
NEUTS SEG NFR BLD AUTO: 69 % (ref 43–75)
NRBC BLD AUTO-RTO: 0 /100 WBCS
PLATELET # BLD AUTO: 236 THOUSANDS/UL (ref 149–390)
PMV BLD AUTO: 11.7 FL (ref 8.9–12.7)
POTASSIUM SERPL-SCNC: 5.1 MMOL/L (ref 3.5–5.3)
PROT SERPL-MCNC: 6.6 G/DL (ref 6.4–8.4)
RBC # BLD AUTO: 3.74 MILLION/UL (ref 3.88–5.62)
SODIUM SERPL-SCNC: 142 MMOL/L (ref 135–147)
WBC # BLD AUTO: 6.49 THOUSAND/UL (ref 4.31–10.16)

## 2025-07-21 PROCEDURE — 80053 COMPREHEN METABOLIC PANEL: CPT

## 2025-07-21 PROCEDURE — 85025 COMPLETE CBC W/AUTO DIFF WBC: CPT

## 2025-07-21 PROCEDURE — 36415 COLL VENOUS BLD VENIPUNCTURE: CPT

## 2025-07-22 RX ORDER — CEPHALEXIN 500 MG/1
500 CAPSULE ORAL 3 TIMES DAILY
Qty: 9 CAPSULE | Refills: 11 | Status: SHIPPED | OUTPATIENT
Start: 2025-07-22 | End: 2025-07-25

## 2025-07-23 DIAGNOSIS — R33.9 URINARY RETENTION: ICD-10-CM

## 2025-07-24 ENCOUNTER — HOME CARE VISIT (OUTPATIENT)
Dept: HOME HEALTH SERVICES | Facility: HOME HEALTHCARE | Age: OVER 89
End: 2025-07-24
Payer: MEDICARE

## 2025-07-24 VITALS
RESPIRATION RATE: 16 BRPM | HEART RATE: 80 BPM | TEMPERATURE: 98 F | OXYGEN SATURATION: 97 % | SYSTOLIC BLOOD PRESSURE: 152 MMHG | DIASTOLIC BLOOD PRESSURE: 82 MMHG

## 2025-07-24 PROCEDURE — G0299 HHS/HOSPICE OF RN EA 15 MIN: HCPCS

## 2025-07-24 RX ORDER — FINASTERIDE 5 MG/1
5 TABLET, FILM COATED ORAL DAILY
Qty: 90 TABLET | Refills: 1 | Status: SHIPPED | OUTPATIENT
Start: 2025-07-24

## 2025-08-03 ENCOUNTER — HOME CARE VISIT (OUTPATIENT)
Dept: HOME HEALTH SERVICES | Facility: HOME HEALTHCARE | Age: OVER 89
End: 2025-08-03
Payer: MEDICARE

## 2025-08-03 ENCOUNTER — TELEPHONE (OUTPATIENT)
Dept: OTHER | Facility: OTHER | Age: OVER 89
End: 2025-08-03

## 2025-08-04 ENCOUNTER — HOME CARE VISIT (OUTPATIENT)
Dept: HOME HEALTH SERVICES | Facility: HOME HEALTHCARE | Age: OVER 89
End: 2025-08-04
Payer: MEDICARE

## 2025-08-04 PROCEDURE — G0299 HHS/HOSPICE OF RN EA 15 MIN: HCPCS

## 2025-08-05 VITALS
OXYGEN SATURATION: 96 % | DIASTOLIC BLOOD PRESSURE: 68 MMHG | SYSTOLIC BLOOD PRESSURE: 140 MMHG | HEART RATE: 86 BPM | RESPIRATION RATE: 16 BRPM | TEMPERATURE: 97.7 F

## 2025-08-06 ENCOUNTER — HOME CARE VISIT (OUTPATIENT)
Dept: HOME HEALTH SERVICES | Facility: HOME HEALTHCARE | Age: OVER 89
End: 2025-08-06
Payer: MEDICARE

## 2025-08-07 ENCOUNTER — HOME CARE VISIT (OUTPATIENT)
Dept: HOME HEALTH SERVICES | Facility: HOME HEALTHCARE | Age: OVER 89
End: 2025-08-07

## 2025-08-07 VITALS
HEART RATE: 80 BPM | SYSTOLIC BLOOD PRESSURE: 142 MMHG | RESPIRATION RATE: 16 BRPM | TEMPERATURE: 98.1 F | DIASTOLIC BLOOD PRESSURE: 68 MMHG | OXYGEN SATURATION: 96 %

## 2025-08-07 PROCEDURE — G0299 HHS/HOSPICE OF RN EA 15 MIN: HCPCS

## 2025-08-08 ENCOUNTER — HOSPITAL ENCOUNTER (OUTPATIENT)
Dept: INFUSION CENTER | Facility: CLINIC | Age: OVER 89
End: 2025-08-08
Attending: INTERNAL MEDICINE
Payer: MEDICARE

## 2025-08-11 ENCOUNTER — OFFICE VISIT (OUTPATIENT)
Age: OVER 89
End: 2025-08-11
Payer: MEDICARE

## 2025-08-11 PROBLEM — R09.89 BILATERAL CAROTID BRUITS: Status: ACTIVE | Noted: 2025-08-11

## 2025-08-15 ENCOUNTER — APPOINTMENT (OUTPATIENT)
Dept: LAB | Facility: HOSPITAL | Age: OVER 89
End: 2025-08-15
Payer: MEDICARE

## 2025-08-18 ENCOUNTER — RESULTS FOLLOW-UP (OUTPATIENT)
Dept: HEMATOLOGY ONCOLOGY | Facility: CLINIC | Age: OVER 89
End: 2025-08-18

## 2025-08-18 DIAGNOSIS — T83.9XXD PROBLEM WITH FOLEY CATHETER, SUBSEQUENT ENCOUNTER: Primary | ICD-10-CM

## 2025-08-18 DIAGNOSIS — D50.8 OTHER IRON DEFICIENCY ANEMIA: ICD-10-CM

## 2025-08-18 DIAGNOSIS — N18.32 CHRONIC KIDNEY DISEASE, STAGE 3B (HCC): ICD-10-CM

## 2025-08-18 DIAGNOSIS — D50.0 IRON DEFICIENCY ANEMIA DUE TO CHRONIC BLOOD LOSS: ICD-10-CM

## 2025-08-18 DIAGNOSIS — D62 ACUTE BLOOD LOSS ANEMIA: Primary | ICD-10-CM

## 2025-08-18 RX ORDER — CEPHALEXIN 500 MG/1
CAPSULE ORAL
Qty: 9 CAPSULE | Refills: 5 | Status: SHIPPED | OUTPATIENT
Start: 2025-08-18 | End: 2025-08-21

## 2025-08-19 ENCOUNTER — HOME CARE VISIT (OUTPATIENT)
Dept: HOME HEALTH SERVICES | Facility: HOME HEALTHCARE | Age: OVER 89
End: 2025-08-19
Payer: MEDICARE

## 2025-08-21 ENCOUNTER — TELEPHONE (OUTPATIENT)
Dept: OTHER | Facility: OTHER | Age: OVER 89
End: 2025-08-21

## 2025-08-21 ENCOUNTER — HOME CARE VISIT (OUTPATIENT)
Dept: HOME HEALTH SERVICES | Facility: HOME HEALTHCARE | Age: OVER 89
End: 2025-08-21
Payer: MEDICARE

## 2025-08-21 VITALS
OXYGEN SATURATION: 96 % | SYSTOLIC BLOOD PRESSURE: 132 MMHG | RESPIRATION RATE: 16 BRPM | TEMPERATURE: 97 F | HEART RATE: 76 BPM | DIASTOLIC BLOOD PRESSURE: 68 MMHG

## 2025-08-21 PROCEDURE — G0299 HHS/HOSPICE OF RN EA 15 MIN: HCPCS
